# Patient Record
Sex: MALE | Race: WHITE | NOT HISPANIC OR LATINO | Employment: FULL TIME | URBAN - METROPOLITAN AREA
[De-identification: names, ages, dates, MRNs, and addresses within clinical notes are randomized per-mention and may not be internally consistent; named-entity substitution may affect disease eponyms.]

---

## 2023-11-06 ENCOUNTER — ANCILLARY PROCEDURE (OUTPATIENT)
Dept: RADIOLOGY | Facility: CLINIC | Age: 58
End: 2023-11-06
Payer: COMMERCIAL

## 2023-11-06 DIAGNOSIS — C83.10 MANTLE CELL LYMPHOMA, UNSPECIFIED SITE (MULTI): ICD-10-CM

## 2023-11-06 PROCEDURE — 71260 CT THORAX DX C+: CPT | Performed by: STUDENT IN AN ORGANIZED HEALTH CARE EDUCATION/TRAINING PROGRAM

## 2023-11-06 PROCEDURE — 74177 CT ABD & PELVIS W/CONTRAST: CPT | Performed by: STUDENT IN AN ORGANIZED HEALTH CARE EDUCATION/TRAINING PROGRAM

## 2023-11-06 PROCEDURE — 71260 CT THORAX DX C+: CPT

## 2023-11-06 PROCEDURE — 2550000001 HC RX 255 CONTRASTS: Performed by: INTERNAL MEDICINE

## 2023-11-06 RX ADMIN — IOHEXOL 70 ML: 350 INJECTION, SOLUTION INTRAVENOUS at 09:48

## 2023-11-11 PROBLEM — K57.90 DIVERTICULOSIS: Status: ACTIVE | Noted: 2020-10-01

## 2023-11-11 PROBLEM — S60.419A ABRASION OF FINGER: Status: ACTIVE | Noted: 2023-11-11

## 2023-11-11 PROBLEM — H53.2 VERTICAL DIPLOPIA: Status: ACTIVE | Noted: 2023-11-11

## 2023-11-11 PROBLEM — R93.1 AGATSTON CORONARY ARTERY CALCIUM SCORE BETWEEN 100 AND 400: Status: ACTIVE | Noted: 2023-09-18

## 2023-11-11 PROBLEM — K21.9 GERD (GASTROESOPHAGEAL REFLUX DISEASE): Status: ACTIVE | Noted: 2018-01-31

## 2023-11-11 PROBLEM — H53.2 DIPLOPIA: Status: ACTIVE | Noted: 2023-11-11

## 2023-11-11 PROBLEM — E78.00 HYPERCHOLESTEROLEMIA: Status: ACTIVE | Noted: 2023-09-18

## 2023-11-11 PROBLEM — S60.042A CONTUSION OF LEFT RING FINGER WITHOUT DAMAGE TO NAIL: Status: ACTIVE | Noted: 2023-11-11

## 2023-11-11 PROBLEM — H04.123 DRY EYE SYNDROME OF BILATERAL LACRIMAL GLANDS: Status: ACTIVE | Noted: 2017-01-10

## 2023-11-11 PROBLEM — H53.2 TRANSIENT DIPLOPIA: Status: ACTIVE | Noted: 2023-11-11

## 2023-11-11 PROBLEM — M79.2 NEURALGIC PAIN: Status: ACTIVE | Noted: 2023-11-11

## 2023-11-11 PROBLEM — S69.92XA: Status: ACTIVE | Noted: 2023-11-11

## 2023-11-11 PROBLEM — G60.3 IDIOPATHIC PROGRESSIVE NEUROPATHY: Status: ACTIVE | Noted: 2023-11-11

## 2023-11-11 PROBLEM — S61.219A LACERATION OF FINGER, INITIAL ENCOUNTER: Status: ACTIVE | Noted: 2023-11-11

## 2023-11-11 PROBLEM — I70.0 ATHEROSCLEROSIS OF ABDOMINAL AORTA (CMS-HCC): Status: ACTIVE | Noted: 2023-09-18

## 2023-11-11 PROBLEM — H52.7 REFRACTIVE ERROR: Status: ACTIVE | Noted: 2017-01-10

## 2023-11-11 RX ORDER — PYRIDOSTIGMINE BROMIDE 60 MG/1
1 TABLET ORAL 3 TIMES DAILY
COMMUNITY
Start: 2017-06-08 | End: 2023-11-13 | Stop reason: ALTCHOICE

## 2023-11-11 RX ORDER — PRAVASTATIN SODIUM 10 MG/1
TABLET ORAL
COMMUNITY
Start: 2023-04-20 | End: 2023-11-13 | Stop reason: DRUGHIGH

## 2023-11-11 RX ORDER — POLYETHYLENE GLYCOL 3350 17 G/17G
POWDER, FOR SOLUTION ORAL
COMMUNITY
Start: 2023-07-10 | End: 2023-11-13 | Stop reason: ALTCHOICE

## 2023-11-11 RX ORDER — CHLORHEXIDINE GLUCONATE ORAL RINSE 1.2 MG/ML
SOLUTION DENTAL
COMMUNITY
Start: 2023-08-13 | End: 2023-11-13 | Stop reason: ALTCHOICE

## 2023-11-11 RX ORDER — IBUPROFEN 600 MG/1
TABLET ORAL
COMMUNITY
Start: 2023-08-21 | End: 2023-11-13 | Stop reason: ALTCHOICE

## 2023-11-11 RX ORDER — PANTOPRAZOLE SODIUM 40 MG/1
TABLET, DELAYED RELEASE ORAL
COMMUNITY
Start: 2016-01-08 | End: 2023-11-13 | Stop reason: ALTCHOICE

## 2023-11-11 RX ORDER — VENETOCLAX 10 MG/1
TABLET, FILM COATED ORAL
COMMUNITY
Start: 2019-12-16 | End: 2023-11-13 | Stop reason: ALTCHOICE

## 2023-11-11 RX ORDER — CLINDAMYCIN HYDROCHLORIDE 300 MG/1
CAPSULE ORAL
COMMUNITY
Start: 2023-08-13 | End: 2023-11-13 | Stop reason: ALTCHOICE

## 2023-11-11 RX ORDER — ACYCLOVIR 400 MG/1
TABLET ORAL
COMMUNITY
Start: 2019-11-15 | End: 2023-11-13 | Stop reason: ALTCHOICE

## 2023-11-11 RX ORDER — ASPIRIN 81 MG/1
81 TABLET ORAL DAILY
COMMUNITY
Start: 2023-09-18 | End: 2024-09-17

## 2023-11-11 RX ORDER — SULFAMETHOXAZOLE AND TRIMETHOPRIM 800; 160 MG/1; MG/1
TABLET ORAL
COMMUNITY
End: 2023-11-13 | Stop reason: ALTCHOICE

## 2023-11-11 RX ORDER — HYDROCORTISONE 10 MG/G
CREAM TOPICAL
COMMUNITY
Start: 2023-08-20

## 2023-11-11 RX ORDER — FEXOFENADINE HCL AND PSEUDOEPHEDRINE HCI 180; 240 MG/1; MG/1
1 TABLET, EXTENDED RELEASE ORAL
COMMUNITY
Start: 2011-10-20 | End: 2023-11-13 | Stop reason: ALTCHOICE

## 2023-11-11 RX ORDER — ATORVASTATIN CALCIUM 40 MG/1
40 TABLET, FILM COATED ORAL DAILY
COMMUNITY
Start: 2023-06-15 | End: 2024-05-16

## 2023-11-13 ENCOUNTER — OFFICE VISIT (OUTPATIENT)
Dept: HEMATOLOGY/ONCOLOGY | Facility: HOSPITAL | Age: 58
End: 2023-11-13
Payer: COMMERCIAL

## 2023-11-13 ENCOUNTER — LAB (OUTPATIENT)
Dept: LAB | Facility: HOSPITAL | Age: 58
End: 2023-11-13
Payer: COMMERCIAL

## 2023-11-13 VITALS
BODY MASS INDEX: 31.98 KG/M2 | WEIGHT: 236.11 LBS | TEMPERATURE: 97.2 F | DIASTOLIC BLOOD PRESSURE: 88 MMHG | OXYGEN SATURATION: 99 % | HEIGHT: 72 IN | HEART RATE: 74 BPM | RESPIRATION RATE: 16 BRPM | SYSTOLIC BLOOD PRESSURE: 132 MMHG

## 2023-11-13 DIAGNOSIS — C83.10 MANTLE CELL LYMPHOMA (MULTI): ICD-10-CM

## 2023-11-13 LAB
ALBUMIN SERPL BCP-MCNC: 4.6 G/DL (ref 3.4–5)
ALP SERPL-CCNC: 64 U/L (ref 33–120)
ALT SERPL W P-5'-P-CCNC: 47 U/L (ref 10–52)
ANION GAP SERPL CALC-SCNC: 13 MMOL/L (ref 10–20)
AST SERPL W P-5'-P-CCNC: 41 U/L (ref 9–39)
BASOPHILS # BLD AUTO: 0.02 X10*3/UL (ref 0–0.1)
BASOPHILS NFR BLD AUTO: 0.3 %
BILIRUB SERPL-MCNC: 0.5 MG/DL (ref 0–1.2)
BUN SERPL-MCNC: 19 MG/DL (ref 6–23)
CALCIUM SERPL-MCNC: 9.7 MG/DL (ref 8.6–10.3)
CHLORIDE SERPL-SCNC: 102 MMOL/L (ref 98–107)
CO2 SERPL-SCNC: 29 MMOL/L (ref 21–32)
CREAT SERPL-MCNC: 1.21 MG/DL (ref 0.5–1.3)
EOSINOPHIL # BLD AUTO: 0.13 X10*3/UL (ref 0–0.7)
EOSINOPHIL NFR BLD AUTO: 2.2 %
ERYTHROCYTE [DISTWIDTH] IN BLOOD BY AUTOMATED COUNT: 12.7 % (ref 11.5–14.5)
GFR SERPL CREATININE-BSD FRML MDRD: 69 ML/MIN/1.73M*2
GLUCOSE SERPL-MCNC: 82 MG/DL (ref 74–99)
HCT VFR BLD AUTO: 44.1 % (ref 41–52)
HGB BLD-MCNC: 15.1 G/DL (ref 13.5–17.5)
IMM GRANULOCYTES # BLD AUTO: 0.04 X10*3/UL (ref 0–0.7)
IMM GRANULOCYTES NFR BLD AUTO: 0.7 % (ref 0–0.9)
LYMPHOCYTES # BLD AUTO: 1.89 X10*3/UL (ref 1.2–4.8)
LYMPHOCYTES NFR BLD AUTO: 31.3 %
MCH RBC QN AUTO: 31.7 PG (ref 26–34)
MCHC RBC AUTO-ENTMCNC: 34.2 G/DL (ref 32–36)
MCV RBC AUTO: 93 FL (ref 80–100)
MONOCYTES # BLD AUTO: 0.68 X10*3/UL (ref 0.1–1)
MONOCYTES NFR BLD AUTO: 11.3 %
NEUTROPHILS # BLD AUTO: 3.28 X10*3/UL (ref 1.2–7.7)
NEUTROPHILS NFR BLD AUTO: 54.2 %
NRBC BLD-RTO: 0 /100 WBCS (ref 0–0)
PLATELET # BLD AUTO: 162 X10*3/UL (ref 150–450)
POTASSIUM SERPL-SCNC: 4.5 MMOL/L (ref 3.5–5.3)
PROT SERPL-MCNC: 7.5 G/DL (ref 6.4–8.2)
RBC # BLD AUTO: 4.76 X10*6/UL (ref 4.5–5.9)
SODIUM SERPL-SCNC: 139 MMOL/L (ref 136–145)
WBC # BLD AUTO: 6 X10*3/UL (ref 4.4–11.3)

## 2023-11-13 PROCEDURE — 85025 COMPLETE CBC W/AUTO DIFF WBC: CPT

## 2023-11-13 PROCEDURE — 99214 OFFICE O/P EST MOD 30 MIN: CPT | Performed by: INTERNAL MEDICINE

## 2023-11-13 PROCEDURE — 36415 COLL VENOUS BLD VENIPUNCTURE: CPT

## 2023-11-13 PROCEDURE — 80053 COMPREHEN METABOLIC PANEL: CPT

## 2023-11-13 PROCEDURE — 1036F TOBACCO NON-USER: CPT | Performed by: INTERNAL MEDICINE

## 2023-11-13 ASSESSMENT — ENCOUNTER SYMPTOMS
CHILLS: 0
WOUND: 0
DIARRHEA: 0
LIGHT-HEADEDNESS: 0
NUMBNESS: 1
DIZZINESS: 0
DYSURIA: 0
BLOOD IN STOOL: 0
NAUSEA: 0
HEMATURIA: 0
ADENOPATHY: 0
DIAPHORESIS: 0
SHORTNESS OF BREATH: 0
WHEEZING: 0
COUGH: 0
VOMITING: 0
LEG SWELLING: 1
PALPITATIONS: 0
UNEXPECTED WEIGHT CHANGE: 0
CONSTIPATION: 0
SLEEP DISTURBANCE: 0
BRUISES/BLEEDS EASILY: 0
APPETITE CHANGE: 0
FEVER: 0
FATIGUE: 0

## 2023-11-13 ASSESSMENT — PAIN SCALES - GENERAL: PAINLEVEL: 0-NO PAIN

## 2023-11-13 NOTE — PROGRESS NOTES
Patient ID: Nancie Armenta is a 58 y.o. male.    Treatment:   Oncology History Overview Note           Patient Visit Information:   Visit Type: Follow Up Visit      Cancer History:   Treatment Synopsis:     1. Stage IV A mantle cell non-Hodgkin lymphoma involving the gastrointestinal tract diagnosed in October 2011, with diffuse gastrointestinal involvement. The patient was treated  with RCHOP alternating with Rituxan and high-dose Mamie-C and received total of 6 cycles of chemotherapy which he finished in February 2012.   Allergic to Augmentin, causes hives.     2. Patient underwent beam chemotherapy followed by autologous peripheral stem cell transplant in April 2012, by Dr. Yvonne Hobbs at [facility], was also involved in phase 3 clinical trial up killed shingles vaccine versus placebo (Merck-1 Z10  study).     3.The patient developed shingles in January 2014, involving left flank.      4. 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      5.  Recurrent mantle cell lymphoma June 2019 assx presentation with leukocytosis. Peripheral blood flow which showed a CD5 positve clonal lymphocytosis. 6/2/19 CT of chest abdomen and pelvis revealed  splenomegaly of 15 cm compared to 12.8 cm last evaluation. Recurrence confirmed by FISH on peripheral blood earlier this month.  BM biopsy which showed 5% Mantle cell involvement although peripheral blood shows 30% involvement. CT imaging showed splenomegaly. PET scan declined by insurance twice.  Patient underwent colonoscopy  with Dr. Ac ( [place]) 8/8/19 which showed cobblestoning of colon and multiple biopsies including terminal ileum, appendix, cecum,  rectosigmoid positive for mantle cell lymphoma.     6. Initiation of acalabrutinib August 2019 with minimal response. Switched to ibrutinib and venetoclax 12/20/20 with clearing of t(11, 14) by by FISH ( 1/2020) and resolution of involvement of colon by endoscopy and pathology on exam 3/2/2020! BM biopsy  4/2020 HEATHER.     CR March 2022, ibrutinib and venetoclax discontinued.     7. S/P COVID 19 infection 12/1/2020, no sequelae     8. BMBx (4/13/2020): no evidence of MCL either by histology, flow, or FISH studies. PET scan ( 8/24/20): no abnormal FDG uptake seen. [place]auville 1      9. Repeat CT C/A/P (3/10/21) with no evidence of LAD seen, Spleen size 11.5 cm ( decreased from prior).       10.C-scope (3/11/22): at OSH: negative, no evidence of disease. PET scan ( 3/14/22): no abnormal uptake. [place] 1. BMBx (3/17/22): negative, no evidence of MCL.      11. Evusheld given on (3/17/22)     12. Pt has remained in remission, with no evidence of disease on C-scope, PET scan or BMBx. Will stop Ibrutinib/Venetoclax (3/24/22) and plan to continue to ID PPX.     13. ID PPX acyclovir stopped 2/23/23.    14: Colonoscopy (7/14/23): normal except internal hemorrhoids.    15: CT CAP (11/6/23): no intrathoracic or intra-abdominal/pelvic enlarged lymphadenopathy.  Spleen is normal in size.             Mantle cell lymphoma of spleen (CMS/HCC)   10/13/2016 Initial Diagnosis    Mantle cell lymphoma of spleen (CMS/HCC)         Response:     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    Right-sided fourth cranial nerve palsy    Malignant neoplasm of connective and soft tissue, unspecified (CMS/HCC) 06/08/2017    Cancer of blood vessel       Surgical History:     Past Surgical History:   Procedure Laterality Date    SHOULDER SURGERY  07/13/2017    Shoulder Surgery         Family History:     Family History   Problem Relation Name Age of Onset    Cataracts Mother      Cataracts Father      Other (chronic lymphoid leukemia) Father         Social History:     Social History     Tobacco Use    Smoking status: Never    Smokeless tobacco: Never   Vaping Use    Vaping Use: Never used   Substance Use Topics    Alcohol use: Never    Drug use: Never      -------------------------------------------------------------------------------------------------------  Subjective       HPI    Nancie Armenta is a 58 year old male with PMH of hyperglycemia, renal insufficiency, and stage IV A mantle cell non hodgkin lymphoma involving the gastrointestinal tract, s/p autologous SCT (April 2012).  Mr. Armenta presents to the clinic today (11/13/23), for evaluation and blood work.      Reports he is doing well.  No health changes since last seen 8/3/23.  Stated he met his goal by seeing his daughter get  in October.  States his energy level is good.  Has a good appetite and has had no weight loss.     Reports no fevers, chills, night sweats, swollen lymph nodes, headaches, vision changes, light-headedness, dizziness, cough, SOB, wheezing, chest pain, palpitations, swelling, nausea, vomiting, diarrhea, constipation, dysuria, blood in stool/urine, new skin rash/lesions, gait/balance disturbances.        Review of Systems   Constitutional:  Negative for appetite change, chills, diaphoresis, fatigue, fever and unexpected weight change.   Respiratory:  Negative for cough, shortness of breath and wheezing.    Cardiovascular:  Positive for leg swelling (mild intermittent edema to bilateral lower extremities.). Negative for chest pain and palpitations.   Gastrointestinal:  Negative for blood in stool, constipation, diarrhea, nausea and vomiting.   Genitourinary:  Negative for dysuria and hematuria.    Skin:  Negative for rash and wound.   Neurological:  Positive for numbness (hands and feet,  "intermittent). Negative for dizziness and light-headedness.   Hematological:  Negative for adenopathy. Does not bruise/bleed easily.   Psychiatric/Behavioral:  Negative for sleep disturbance.       -------------------------------------------------------------------------------------------------------  Objective   BSA: 2.34 meters squared  /88   Pulse 74   Temp 36.2 °C (97.2 °F)   Resp 16   Ht (S) 1.837 m (6' 0.32\")   Wt 107 kg (236 lb 1.8 oz)   SpO2 99%   BMI 31.74 kg/m²     Physical Exam  Vitals reviewed.   Constitutional:       General: He is not in acute distress.     Appearance: Normal appearance.   HENT:      Head: Normocephalic.      Mouth/Throat:      Mouth: Mucous membranes are moist.   Cardiovascular:      Rate and Rhythm: Normal rate and regular rhythm.      Heart sounds: Normal heart sounds. No murmur heard.     No friction rub. No gallop.   Pulmonary:      Effort: Pulmonary effort is normal. No respiratory distress.      Breath sounds: Normal breath sounds. No wheezing.   Abdominal:      General: Abdomen is flat. Bowel sounds are normal. There is no distension.      Palpations: Abdomen is soft. There is no mass.      Tenderness: There is no abdominal tenderness.   Musculoskeletal:         General: Swelling (non-pitting edema to bilateral lower extremities) present. Normal range of motion.   Skin:     General: Skin is warm and dry.   Neurological:      General: No focal deficit present.      Mental Status: He is alert and oriented to person, place, and time.   Psychiatric:         Mood and Affect: Mood normal.         Behavior: Behavior normal.       Performance Status:  ECOG performance status: 0 fully active  -------------------------------------------------------------------------------------------------------  Assessment/Plan      1. Stage IV mantle cell lymphoma in unmaintained complete  remission after receiving Nordic regimen and consolidation autograft with BEAM preparative regimen " April 2012.  Recent noted mild leukocytosis  and tpenia and flow cytometry suggestive of recurrence as is splenomegaly on CT imaging from 6/2019.  I have recommended complete staging evaluation with BM biopsy 5% mantle cells, blood 30% lymphoma cells.   colonoscopy cobblestoning throughout with positive biopsy for mantle cell.  Seems this is an indolent relapse although we don't have Ki67 analysis or SOX11.     Patient initated acalabrutinib 8/2019 without response as Peripheral blood still shows 71 copies of mantle cell gene which is identical to the pretreatment. In addition spleen size on ultrasound 15.5 cm which is unchanged or even a little larger than pre treatment.      Patient initiated venetoclax and ibrutinib 12/20/20 according  article in NEJ 2018, 378: 6479-9442 by Jim et al shows an overall response rate  of 71% with 62% CR rate at 16 weeks. In order to prevent tumor lysis syndrome venetoclax is ramped up as follows 20 mg for 1 week, 50 mg for 1 week, 100 mg for 1 week, 200 mg for 1 week, 400 mg for 1 week and maintained at 400 mg. Restagin March 2020  included colonoscopy 3/2/20 shows HEATHER!!  BM biopsy from 4/13/2020 also shows HEATHER by histology, flow or FISH studies as does PET from 8/24/90.      (3/24/22): pt seen in clinic, completed EOT C-scope (3/11/22): at OSH: negative, no evidence of disease. PET scan ( 3/14/22): no abnormal uptake.  [place]ville 1. BMBx (3/17/22): negative, no evidence of MCL. Given  Pt has remained in remission, with no evidence of disease on C-scope, PET scan or BMBx.  Ibrutinib/Venetoclax discontinued (3/24/22) .         8/3/23   on observation since March 2022.   Surveillance CT imaging AP with contrast (4/13/23)  no evidence of disease recurrence and personally reviewed. Spleen size 11.5 cm on  films.  Surveillance colonoscopy July 2023 negative for recurrence. Plan CT imaging prior to return in November, than will do annually. Explained that new drup pirtubrutinib   recently approved for relapsed MCL.    11/13/23:   -CT CAP done 11/6/23: no lymphadenopathy, stable right upper lobe 0.6 cm pulmonary nodule since 2015-likely benign.  Spleen is normal in size.    -Repeat CT scan in 1 year.    -Today Hbg 15.1, WBC 6.0.    -Continue observation with evaluation every 3 months with blood work.  Contact with any new concerns.      Renal failure:  encouraged to hydrate more, grade 1 will follow.  11/13/23: Creatinine improved level 1.21.       2. Health maintenance.   Patient has recv'd both doses of shingrix vaccine & has completed all of his other post transplant vaccines,  Discontinued Acyclovir, & Bactrim prophylaxis (2/23/23) since off treatment since 3/24/22.  Recv'd Prevnar 13 and influenza vaccine (9/16/20) with his PCP. Up to date on tetanus.   - Recv'd Sha & Sha COVID vaccine on 3/16/21. Received booster late 2021. Received Evusheld given on (3/17/22) & 9/22/22.   -Received influenza vaccine either late September or early October 2023.  Advised to obtain covid vaccine at local pharmacy.     4/20/23: some aortic calcifications on imaging, started on prevastatin-managed by his PCP.  11/13/23:  Reported PCP increased pravastatin dose to 40 mg daily    RTC:    Follow up in 3 months with blood draws 1 week prior    -------------------------------------------------------------------------------------------------------  NORBERT Mathew

## 2023-11-17 ENCOUNTER — APPOINTMENT (OUTPATIENT)
Dept: HEMATOLOGY/ONCOLOGY | Facility: CLINIC | Age: 58
End: 2023-11-17
Payer: COMMERCIAL

## 2024-01-23 ENCOUNTER — OFFICE VISIT (OUTPATIENT)
Dept: HEMATOLOGY/ONCOLOGY | Facility: CLINIC | Age: 59
End: 2024-01-23
Payer: COMMERCIAL

## 2024-01-23 VITALS
OXYGEN SATURATION: 97 % | BODY MASS INDEX: 32.77 KG/M2 | WEIGHT: 243.83 LBS | DIASTOLIC BLOOD PRESSURE: 72 MMHG | TEMPERATURE: 98.1 F | SYSTOLIC BLOOD PRESSURE: 133 MMHG | HEART RATE: 62 BPM

## 2024-01-23 DIAGNOSIS — C83.17 MANTLE CELL LYMPHOMA OF SPLEEN (MULTI): Primary | ICD-10-CM

## 2024-01-23 PROCEDURE — 99213 OFFICE O/P EST LOW 20 MIN: CPT | Performed by: INTERNAL MEDICINE

## 2024-01-23 PROCEDURE — 1036F TOBACCO NON-USER: CPT | Performed by: INTERNAL MEDICINE

## 2024-01-23 ASSESSMENT — PAIN SCALES - GENERAL: PAINLEVEL: 0-NO PAIN

## 2024-01-23 NOTE — PROGRESS NOTES
Follow-up in 6 months with no labs.  Call back instructions reviewed.  Patient verbalized understanding.

## 2024-01-23 NOTE — PROGRESS NOTES
Patient ID: Nancie Armenta is a 58 y.o. male.  Referring Physician: No referring provider defined for this encounter.  Primary Care Provider: Maurice Nunez MD    ORDERS & PATIENT INSTRUCTIONS:        RTC 6 m  No labs            ASSESSMENT, PROBLEM LIST, DECISION MAKING, PLAN.      1. Stage IV A mantle cell non-Hodgkin lymphoma involving the gastrointestinal tract diagnosed in October 2011, with diffuse gastrointestinal involvement. The patient was treated with RCHOP alternating with Rituxan and high-dose Mamie-C and received total of 6 cycles of chemotherapy which he finished in February 2012.   2. Patient underwent beam chemotherapy followed by autologous peripheral stem cell transplant in April 2012, by Dr. Yvonne Hobbs at [facility], was also involved in phase 3 clinical trial up killed shingles vaccine versus placebo (Merck-1 Z10 study).       3. Patient developed progressive disease in June 2019 with positive bone marrow, and significant GI involvement and he has started Alcalabrutinib on August 21, 2019 but didnt get expected response especially peripheral blood flow cytometric continued to show abnormal lymphocytes so was switched to Imbruvica 560 mg and Venetoclax in December 2019 . Patient's peripheral blood FISH is completely negative for mantle cell DNA so CAR-T was not considered. And has been followed by Dr. Yvonne Hobbs.   Pt remained in CR   Pts PET, BM Bx at  and Colonoscopy at [facility] in March 2022 was -ve so Imbruvica and Venetoclax was d/ed March 24, 2022      3. The patient developed shingles in January 2014, involving left flank.   4.  Patient had Covid in December 2020 but fortunately it was mild and did not require any hospital admissions and continued his anticancer medication during that time.    4. 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 emergency room when he was orthostatic and also injured his head and further workup is underway to determine fourth cranial nerve palsy left side, MRI of the brain was overall stable, was evaluated by Dr Vern St/neuro-ophthalmologist and thinks that patient probably has myasthenia gravis and has been placed on pyridostigmine in June 2017, and seems to have responded  He was also evaluated with MRI of the brain and spinal tap which was showing minimal increased protein but cytology, flow cytometry were all normal and has been followed by neurologist at [facility]. Patient's symptoms are improving with pyridostigmine, although later he discontinued and patient became asymptomatic, on watchful waiting      4. Allergic to Augmentin, causes hives      INTERVAL HISTORY: The patient is being evaluated virtually for mantle cell lymphoma  Pt remained in CR   Pts PET, BM Bx at  and Colonoscopy at [facility] in March 2022 was -ve so Imbruvica and Venetoclax was d/ed March 24, 2022    His last CAT scan of the chest abdomen pelvis done in November 2023 showed no evidence of lymphadenopathy stable pulmonary nodule 0.6 cm possible benign, he had a calcium scoring CT scan done which showed moderately increased calcium in coronary arteries so he has been now placed on Lipitor by Dr. Brandon Nunez    Patient is currently asymptomatic          General: Conscious, alert, oriented x3, not in acute distress.  HEENT:    No icterus.    Chest:Bilateral symmetrical,     CVS:  S1, S2.    Abdomen:  Soft, no palpable mass   Extremities: No clubbing, cyanosis,     Skin: No petechial rash.    ASSESSMENT AND PLAN: 1. Mr. Armenta with mantle cell lymphoma.,  Had a progressive disease in August 2019  pt has been started on Alcalabrutinib on 8/21/2019 and with ultimate plan for CAR-T  Patient did not get expected response especially peripheral blood  flow cytometry was positive for abnormal lymphocytes so was switched to Imbruvica 560 mg and Venetoclax  400mg daily as there was no study using Alcalabrutinib with  Venetoclax, patient seems to have responded and is in complete molecular remission so CAR-T is not being offered.  Pt remained in CR   Pts PET, BM Bx at  and Colonoscopy at [facility] in March 2022 was -ve so Imbruvica and Venetoclax was d/ed March 24, 2022    His last CAT scan of the chest abdomen pelvis done in November 2023 showed no evidence of lymphadenopathy stable pulmonary nodule 0.6 cm possible benign, he had a calcium scoring CT scan done which showed moderately increased calcium in coronary arteries so he has been now placed on Lipitor by Dr. Brandon Nunez      Patient was seen by Dr. Yvonne Hobbs in November 2023    Patient's colonoscopy has been scheduled by Dr. Julien in July 1, 2023     There is no clinical evidence of progression  Continue supportive care    Return for follow-up in 6-month or sooner if there is any new symptoms            VITALS:   2.38 meters squared /72   Pulse 62   Temp 36.7 °C (98.1 °F) (Temporal)   Wt 111 kg (243 lb 13.3 oz)   SpO2 97%   BMI 32.77 kg/m²     LABS:    CBC:  Recent Labs     11/13/23  1256 08/03/23  1408 04/13/23  1318 02/23/23  1046 12/22/22  1317   WBC 6.0 5.1 5.3 5.2 4.9   HGB 15.1 13.4* 14.3 14.7 13.7   HCT 44.1 38.5* 42.8 42.0 38.5*    141* 175 163 146*   MCV 93 93 94 92 92       CMP:  Recent Labs     11/13/23  1256 08/03/23  1408 04/20/23  1340 04/13/23  1324 04/13/23  1318 02/23/23  1046 12/22/22  1317 03/24/22  1248 02/24/22  1335    143  --   --  141 140 137   < > 138   K 4.5 4.9  --   --  4.6 5.1 4.8   < > 4.8    107  --   --  104 105 102   < > 104   CO2 29 30  --   --  27 28 31   < > 30   ANIONGAP 13 11  --   --  15 12 9*   < > 9*   BUN 19 22  --  23 22 20 28*   < > 27*   CREATININE 1.21 1.49* 1.40* 1.50* 1.37* 1.34* 1.25   < > 1.28   EGFR 69  --   --   --   --   --    "--   --   --    MG  --   --   --   --   --   --   --   --  2.02    < > = values in this interval not displayed.     Recent Labs     11/13/23  1256 08/03/23  1408 04/13/23  1318 02/23/23  1046 12/22/22  1317   ALBUMIN 4.6 4.4 4.5 4.8 4.3   ALKPHOS 64 66 59 76 60   ALT 47 31 42 39 40   AST 41* 25 28 28 26   BILITOT 0.5 0.6 0.6 0.6 0.5       HEME/ENDO:No results for input(s): \"FERRITIN\", \"IRONSAT\", \"TSH\", \"BEGLJPFE05\", \"FOLATE\" in the last 71421 hours.     MICRO: No results for input(s): \"ESR\", \"CRP\", \"PROCAL\" in the last 78906 hours.  No results found for the last 90 days.        TUMOR MARKERS:  No results found for: \"LABCA2\", \"CEA\", \"\", \"PSA\", \"AFPTM\", \"HCGTM\", \"\"             IMAGING:         CT chest abdomen pelvis w IV contrast  Narrative: Interpreted By:  Samson Villanueva,   STUDY:  CT CHEST ABDOMEN PELVIS W IV CONTRAST;  11/6/2023 9:48 am      INDICATION:  Signs/Symptoms: history of mantel cell lymphoma, monitor for diseae  progression  C83.10: Mantle cell lymphoma.      COMPARISON:  CT scan dated 04/14/2023. PET scan dated 03/14/2022. Chest CT scan  dated 03/10/2021      ACCESSION NUMBER(S):  [id number]      ORDERING CLINICIAN:  FLASH CHAIREZ      TECHNIQUE:  CT of the chest, abdomen, and pelvis was performed.  Contiguous axial  images were obtained at 3 mm slice thickness through the chest,  abdomen and pelvis. Coronal and sagittal reconstructions at 3 mm  slice thickness were performed.  75 ml of contrast Omnipaque were administered intravenously without  immediate complication.      FINDINGS:  CHEST:      LUNG/PLEURA/LARGE AIRWAYS:  Stable right upper lobe 0.6 cm pulmonary nodule (series 202, image  148). No lung masses, pulmonary nodules or consolidations are  present. There is no pleural effusion or pneumothorax.      VESSELS:  Aorta and pulmonary arteries are normal caliber.  No atherosclerotic  changes of the aorta are identified.  Moderate coronary artery  calcifications are present.    "   HEART:  The heart is normal in size.  No pericardial effusion      MEDIASTINUM AND HERNANDO:  No mediastinal, hilar or axillary lymphadenopathy is present.  The  esophagus is normal.      CHEST WALL AND LOWER NECK:  The soft tissues of the chest wall demonstrate no gross abnormality.  The visualized thyroid gland appears within normal limits.      ABDOMEN:      LIVER:  Normal size. Normal contour. Hepatic steatosis. Stable calcific  nodule in segment 4A measuring 1.2 cm.      BILE DUCTS:  The intrahepatic and extrahepatic ducts are not dilated.      GALLBLADDER:  Surgically absent.      PANCREAS:  The pancreas appears unremarkable without evidence of ductal  dilatation or masses.      SPLEEN:  The spleen is normal in size without focal lesions.      ADRENAL GLANDS:  No nodules.      KIDNEYS AND URETERS:  The kidneys are normal in size and enhance symmetrically.  No  hydroureteronephrosis or nephroureterolithiasis is identified.      PELVIS:      BLADDER:  Within normal limits.      REPRODUCTIVE ORGANS:  No pelvic masses.      BOWEL:  No bowel dilatation. Few uncomplicated colonic diverticulosis. No  ascites. No pneumoperitoneum          VESSELS:  The aorta and IVC appear normal.      LYMPH NODES:  No enlarged lymphadenopathy.      BONE AND SOFT TISSUE:  No destructive osseous lesions. Redemonstrated small umbilical and  periumbilical fat containing hernias      Impression: 1. No intrathoracic or intra-abdominal/pelvic enlarged  lymphadenopathy.  2. Stable right upper lobe 0.6 cm pulmonary nodule since at least  2015 and likely benign. No suspicious pulmonary nodules.  3. Other ancillary findings are unchanged.      MACRO:  None      Signed by: Samson Villanueva 11/6/2023 2:10 PM  Dictation workstation:   [id number]       Current Outpatient Medications   Medication Sig Dispense Refill    ASCORBIC ACID, VITAMIN C, ORAL Take 1 tablet by mouth once daily.      aspirin 81 mg EC tablet Take 1 tablet (81 mg) by mouth once  daily.      hydrocortisone 1 % cream       multivit-min/iron/folic acid/K (ADULTS MULTIVITAMIN ORAL) Take by mouth.      atorvastatin (Lipitor) 40 mg tablet Take 1 tablet (40 mg) by mouth once daily.       No current facility-administered medications for this visit.            FAMILY HISTORY:   Family History   Problem Relation Name Age of Onset    Cataracts Mother      Cataracts Father      Other (chronic lymphoid leukemia) Father          ALLERGY: Amoxicillin-pot clavulanate    SOCIAL HISTORY: He  reports no history of alcohol use. He  reports no history of drug use.            Medication reviewed in e-chart  Patient is monitored for medication toxicity  labs reviewed and interpreted independently, X rays independently reviewed  Notes from other physicians involved in care were reviewed    Charting was completed using voice recognition technology and may include unintended errors.    JUSTIN BELTRAN MD, [place].    Octavia Landrum Master Clinician in Hematology and Oncology  Medical Director, [facility] at [facility].  [place] office  Phone [phone]  Fax      [phone]  [facility] /[place].  Phone [phone]  Fax     [phone]

## 2024-02-13 ASSESSMENT — ENCOUNTER SYMPTOMS
FATIGUE: 0
PALPITATIONS: 0
NAUSEA: 0
WOUND: 0
DIAPHORESIS: 0
DIZZINESS: 0
UNEXPECTED WEIGHT CHANGE: 0
FEVER: 0
WHEEZING: 0
CHILLS: 0
VOMITING: 0
LIGHT-HEADEDNESS: 0
LEG SWELLING: 1
APPETITE CHANGE: 0
SHORTNESS OF BREATH: 0
BLOOD IN STOOL: 0
ADENOPATHY: 0
DIARRHEA: 0
COUGH: 0
DYSURIA: 0
HEMATURIA: 0

## 2024-02-14 NOTE — PROGRESS NOTES
Patient ID: Nancie Armenta is a 59 y.o. male.    Treatment:   Oncology History Overview Note           Patient Visit Information:   Visit Type: Follow Up Visit      Cancer History:   Treatment Synopsis:     1. Stage IV A mantle cell non-Hodgkin lymphoma involving the gastrointestinal tract diagnosed in October 2011, with diffuse gastrointestinal involvement. The patient was treated  with RCHOP alternating with Rituxan and high-dose Mamie-C and received total of 6 cycles of chemotherapy which he finished in February 2012.   Allergic to Augmentin, causes hives.     2. Patient underwent beam chemotherapy followed by autologous peripheral stem cell transplant in April 2012, by Dr. Yvonne Hobbs at [facility], was also involved in phase 3 clinical trial up killed shingles vaccine versus placebo (Merck-1 Z10  study).     3.The patient developed shingles in January 2014, involving left flank.      4. 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      5.  Recurrent mantle cell lymphoma June 2019 assx presentation with leukocytosis. Peripheral blood flow which showed a CD5 positve clonal lymphocytosis. 6/2/19 CT of chest abdomen and pelvis revealed  splenomegaly of 15 cm compared to 12.8 cm last evaluation. Recurrence confirmed by FISH on peripheral blood earlier this month.  BM biopsy which showed 5% Mantle cell involvement although peripheral blood shows 30% involvement. CT imaging showed splenomegaly. PET scan declined by insurance twice.  Patient underwent colonoscopy  with Dr. Ac ( [place]) 8/8/19 which showed cobblestoning of colon and multiple biopsies including terminal ileum, appendix, cecum,  rectosigmoid positive for mantle cell lymphoma.     6. Initiation of acalabrutinib August 2019 with minimal response. Switched to ibrutinib and venetoclax 12/20/20 with clearing of t(11, 14) by by FISH ( 1/2020) and resolution of involvement of colon by endoscopy and pathology on exam 3/2/2020! BM biopsy  4/2020 HEATHER.     CR March 2022, ibrutinib and venetoclax discontinued.     7. S/P COVID 19 infection 12/1/2020, no sequelae     8. BMBx (4/13/2020): no evidence of MCL either by histology, flow, or FISH studies. PET scan ( 8/24/20): no abnormal FDG uptake seen. Deauville 1      9. Repeat CT C/A/P (3/10/21) with no evidence of LAD seen, Spleen size 11.5 cm ( decreased from prior).       10.C-scope (3/11/22): at OSH: negative, no evidence of disease. PET scan ( 3/14/22): no abnormal uptake. [place] 1. BMBx (3/17/22): negative, no evidence of MCL.      11. Evusheld given on (3/17/22)     12. Pt has remained in remission, with no evidence of disease on C-scope, PET scan or BMBx. Will stop Ibrutinib/Venetoclax (3/24/22) and plan to continue to ID PPX.     13. ID PPX acyclovir stopped 2/23/23.    14: Colonoscopy (7/14/23): normal except internal hemorrhoids.    15: CT CAP (11/6/23): no intrathoracic or intra-abdominal/pelvic enlarged lymphadenopathy.  Spleen is normal in size.             Mantle cell lymphoma of spleen (CMS/HCC)   10/13/2016 Initial Diagnosis    Mantle cell lymphoma of spleen (CMS/HCC)         Response:     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    Right-sided fourth cranial nerve palsy    Malignant neoplasm of connective and soft tissue, unspecified (CMS/HCC) 06/08/2017    Cancer of blood vessel       Surgical History:     Past Surgical History:   Procedure Laterality Date    SHOULDER SURGERY  07/13/2017    Shoulder Surgery         Family History:     Family History   Problem Relation Name Age of Onset    Cataracts Mother      Cataracts Father      Other (chronic lymphoid leukemia) Father         Social History:     Social History     Tobacco Use    Smoking status: Never    Smokeless tobacco: Never   Vaping Use    Vaping Use: Never used   Substance Use Topics    Alcohol use: Never    Drug use: Never      -------------------------------------------------------------------------------------------------------  Subjective       HPI    Nancie Armenta is a 59 year old male with PMH of hyperglycemia, renal insufficiency, and stage IV A mantle cell non hodgkin lymphoma involving the gastrointestinal tract, s/p autologous SCT (April 2012).  Mr. Armenta presents to the clinic today (2/15/24) with his spouse Cheri for follow up evaluation of mantle cell NHL and blood work.      Mr. Armenta reports he is doing well.  Had had no illnesses since last visit, feels like his prostate may be enlarged and PCP discussed starting prostate medication.  Recently had stress test done and was good.      Continues to works for [employer] in [employer].  Averages working about 50-55 hours per week.  Energy level is good, working out [employer] about 3-4 times per week using the elliptical machine and lifts weights.  Appetite is good and reports no weight loss.      Has constipation at times, but feels like he needs to drink more water.  Neuropathy to hands and feet, feels like an electric shock, improving.    Review of Systems   Constitutional:  Negative for appetite change, chills, diaphoresis, fatigue, fever and unexpected weight change.   Respiratory:  Negative for cough, shortness of breath and wheezing.    Cardiovascular:  Positive for leg swelling (mild intermittent edema to bilateral lower extremities.). Negative for chest pain and palpitations.   Gastrointestinal:  Positive for constipation. Negative for blood in stool, diarrhea, nausea and  vomiting.   Genitourinary:  Positive for frequency and nocturia. Negative for dysuria and hematuria.    Musculoskeletal:  Negative for gait problem.   Skin:  Negative for rash and wound.   Neurological:  Negative for dizziness, gait problem and light-headedness.   Hematological:  Negative for adenopathy.      -------------------------------------------------------------------------------------------------------  Objective   BSA: 2.36 meters squared  /83   Pulse 67   Temp 36.2 °C (97.2 °F)   Resp 16   Wt 109 kg (239 lb 10.2 oz)   SpO2 98%   BMI 32.21 kg/m²     Physical Exam  Vitals reviewed.   Constitutional:       General: He is not in acute distress.     Appearance: Normal appearance.   HENT:      Head: Normocephalic.      Mouth/Throat:      Mouth: Mucous membranes are moist.   Cardiovascular:      Rate and Rhythm: Normal rate and regular rhythm.      Heart sounds: Normal heart sounds. No murmur heard.     No friction rub. No gallop.   Pulmonary:      Effort: Pulmonary effort is normal. No respiratory distress.      Breath sounds: Normal breath sounds. No wheezing.   Abdominal:      General: Abdomen is flat. Bowel sounds are normal. There is no distension.      Palpations: Abdomen is soft. There is no mass.      Tenderness: There is no abdominal tenderness.   Musculoskeletal:         General: Swelling (non-pitting edema to bilateral lower extremities) present. Normal range of motion.   Lymphadenopathy:      Comments: No lymphadenopathy   Skin:     General: Skin is warm and dry.   Neurological:      General: No focal deficit present.      Mental Status: He is alert and oriented to person, place, and time.   Psychiatric:         Mood and Affect: Mood normal.         Behavior: Behavior normal.       Performance Status:  ECOG performance status: 0 fully active  -------------------------------------------------------------------------------------------------------  Assessment/Plan      1. Stage IV mantle  cell lymphoma in unmaintained complete  remission after receiving Nordic regimen and consolidation autograft with BEAM preparative regimen April 2012.  Recent noted mild leukocytosis  and tpenia and flow cytometry suggestive of recurrence as is splenomegaly on CT imaging from 6/2019.  I have recommended complete staging evaluation with BM biopsy 5% mantle cells, blood 30% lymphoma cells.   colonoscopy cobblestoning throughout with positive biopsy for mantle cell.  Seems this is an indolent relapse although we don't have Ki67 analysis or SOX11.     Patient initated acalabrutinib 8/2019 without response as Peripheral blood still shows 71 copies of mantle cell gene which is identical to the pretreatment. In addition spleen size on ultrasound 15.5 cm which is unchanged or even a little larger than pre treatment.      Patient initiated venetoclax and ibrutinib 12/20/20 according  article in NEJM 2018, 378: 6573-9851 by Jim et al shows an overall response rate  of 71% with 62% CR rate at 16 weeks. In order to prevent tumor lysis syndrome venetoclax is ramped up as follows 20 mg for 1 week, 50 mg for 1 week, 100 mg for 1 week, 200 mg for 1 week, 400 mg for 1 week and maintained at 400 mg. Restagin March 2020  included colonoscopy 3/2/20 shows HEATHER!!  BM biopsy from 4/13/2020 also shows HEATHER by histology, flow or FISH studies as does PET from 8/24/90.      (3/24/22): pt seen in clinic, completed EOT C-scope (3/11/22): at OSH: negative, no evidence of disease. PET scan ( 3/14/22): no abnormal uptake.  [place]ak[place] 1. BMBx (3/17/22): negative, no evidence of MCL. Given  Pt has remained in remission, with no evidence of disease on C-scope, PET scan or BMBx.  Ibrutinib/Venetoclax discontinued (3/24/22) .         8/3/23   on observation since March 2022.   Surveillance CT imaging AP with contrast (4/13/23)  no evidence of disease recurrence and personally reviewed. Spleen size 11.5 cm on  films.  Surveillance colonoscopy July  2023 negative for recurrence. Plan CT imaging prior to return in November, than will do annually. Explained that new drup pirtubrutinib recently approved for relapsed MCL.    11/13/23:   -CT CAP done 11/6/23: no lymphadenopathy, stable right upper lobe 0.6 cm pulmonary nodule since 2015-likely benign.  Spleen is normal in size.    -Repeat CT scan in 1 year.    -Today Hbg 15.1, WBC 6.0.    -Continue observation with evaluation every 3 months with blood work.  Contact with any new concerns.      2/15/24:  Today hbg 14.6, plts 162, WBC 6.3.  Physical examination unremarkable.  Follow up evaluation in 3 months with blood work prior.  Plan for CT scan November 2024.       Renal failure:  Encouraged to hydrate more, grade 1 will follow.  2/15/24: Creatinine improved level 1.22.       Health maintenance.   Patient has recv'd both doses of shingrix vaccine & has completed all of his other post transplant vaccines,  Discontinued Acyclovir, & Bactrim prophylaxis (2/23/23) since off treatment since 3/24/22.  Recv'd Prevnar 13 and influenza vaccine (9/16/20) with his PCP. Up to date on tetanus.   - Recv'd Sha & Sha COVID vaccine on 3/16/21. Received booster late 2021. Received Evusheld given on (3/17/22) & 9/22/22.   -Received influenza vaccine 10/18/23.  Advised to obtain covid vaccine at local pharmacy.  Colonoscopy completed 7/14/23: normal except internal hemorrhoids.  Recommendations to repeat in 2 years.      4/20/23: some aortic calcifications on imaging, started on prevastatin-managed by his PCP.  PCP increased pravastatin dose to 40 mg daily    RTC:  Follow up visit with provider in 3 months with blood draws 1 week prior    ------------------------------------------------------------------------------------------------------Kelvin Sullivan APRN-CNP

## 2024-02-15 ENCOUNTER — OFFICE VISIT (OUTPATIENT)
Dept: HEMATOLOGY/ONCOLOGY | Facility: HOSPITAL | Age: 59
End: 2024-02-15
Payer: COMMERCIAL

## 2024-02-15 ENCOUNTER — LAB (OUTPATIENT)
Dept: LAB | Facility: HOSPITAL | Age: 59
End: 2024-02-15
Payer: COMMERCIAL

## 2024-02-15 VITALS
BODY MASS INDEX: 32.21 KG/M2 | OXYGEN SATURATION: 98 % | WEIGHT: 239.64 LBS | SYSTOLIC BLOOD PRESSURE: 124 MMHG | HEART RATE: 67 BPM | TEMPERATURE: 97.2 F | DIASTOLIC BLOOD PRESSURE: 83 MMHG | RESPIRATION RATE: 16 BRPM

## 2024-02-15 DIAGNOSIS — C83.17 MANTLE CELL LYMPHOMA OF SPLEEN (MULTI): Primary | ICD-10-CM

## 2024-02-15 DIAGNOSIS — C83.17 MANTLE CELL LYMPHOMA OF SPLEEN (MULTI): ICD-10-CM

## 2024-02-15 DIAGNOSIS — E78.00 HYPERCHOLESTEROLEMIA: ICD-10-CM

## 2024-02-15 DIAGNOSIS — Z00.00 HEALTHCARE MAINTENANCE: ICD-10-CM

## 2024-02-15 DIAGNOSIS — C83.10 MANTLE CELL LYMPHOMA (MULTI): ICD-10-CM

## 2024-02-15 LAB
ALBUMIN SERPL BCP-MCNC: 4.5 G/DL (ref 3.4–5)
ALP SERPL-CCNC: 66 U/L (ref 33–136)
ALT SERPL W P-5'-P-CCNC: 37 U/L (ref 10–52)
ANION GAP SERPL CALC-SCNC: 12 MMOL/L (ref 10–20)
AST SERPL W P-5'-P-CCNC: 28 U/L (ref 9–39)
BASOPHILS # BLD AUTO: 0.04 X10*3/UL (ref 0–0.1)
BASOPHILS NFR BLD AUTO: 0.6 %
BILIRUB SERPL-MCNC: 0.6 MG/DL (ref 0–1.2)
BUN SERPL-MCNC: 21 MG/DL (ref 6–23)
CALCIUM SERPL-MCNC: 9.7 MG/DL (ref 8.6–10.3)
CHLORIDE SERPL-SCNC: 104 MMOL/L (ref 98–107)
CO2 SERPL-SCNC: 28 MMOL/L (ref 21–32)
CREAT SERPL-MCNC: 1.22 MG/DL (ref 0.5–1.3)
EGFRCR SERPLBLD CKD-EPI 2021: 68 ML/MIN/1.73M*2
EOSINOPHIL # BLD AUTO: 0.23 X10*3/UL (ref 0–0.7)
EOSINOPHIL NFR BLD AUTO: 3.7 %
ERYTHROCYTE [DISTWIDTH] IN BLOOD BY AUTOMATED COUNT: 12.8 % (ref 11.5–14.5)
GLUCOSE SERPL-MCNC: 94 MG/DL (ref 74–99)
HCT VFR BLD AUTO: 42 % (ref 41–52)
HGB BLD-MCNC: 14.6 G/DL (ref 13.5–17.5)
IMM GRANULOCYTES # BLD AUTO: 0.02 X10*3/UL (ref 0–0.7)
IMM GRANULOCYTES NFR BLD AUTO: 0.3 % (ref 0–0.9)
LDH SERPL L TO P-CCNC: 170 U/L (ref 84–246)
LYMPHOCYTES # BLD AUTO: 2.47 X10*3/UL (ref 1.2–4.8)
LYMPHOCYTES NFR BLD AUTO: 39.5 %
MCH RBC QN AUTO: 31.9 PG (ref 26–34)
MCHC RBC AUTO-ENTMCNC: 34.8 G/DL (ref 32–36)
MCV RBC AUTO: 92 FL (ref 80–100)
MONOCYTES # BLD AUTO: 0.61 X10*3/UL (ref 0.1–1)
MONOCYTES NFR BLD AUTO: 9.7 %
NEUTROPHILS # BLD AUTO: 2.89 X10*3/UL (ref 1.2–7.7)
NEUTROPHILS NFR BLD AUTO: 46.2 %
NRBC BLD-RTO: 0 /100 WBCS (ref 0–0)
PLATELET # BLD AUTO: 162 X10*3/UL (ref 150–450)
POTASSIUM SERPL-SCNC: 4.6 MMOL/L (ref 3.5–5.3)
PROT SERPL-MCNC: 7.2 G/DL (ref 6.4–8.2)
RBC # BLD AUTO: 4.57 X10*6/UL (ref 4.5–5.9)
SODIUM SERPL-SCNC: 139 MMOL/L (ref 136–145)
WBC # BLD AUTO: 6.3 X10*3/UL (ref 4.4–11.3)

## 2024-02-15 PROCEDURE — 99214 OFFICE O/P EST MOD 30 MIN: CPT

## 2024-02-15 PROCEDURE — 85025 COMPLETE CBC W/AUTO DIFF WBC: CPT

## 2024-02-15 PROCEDURE — 1036F TOBACCO NON-USER: CPT

## 2024-02-15 PROCEDURE — 83615 LACTATE (LD) (LDH) ENZYME: CPT

## 2024-02-15 PROCEDURE — 36415 COLL VENOUS BLD VENIPUNCTURE: CPT

## 2024-02-15 PROCEDURE — 80053 COMPREHEN METABOLIC PANEL: CPT

## 2024-02-15 ASSESSMENT — ENCOUNTER SYMPTOMS
FREQUENCY: 1
CONSTIPATION: 1

## 2024-02-15 ASSESSMENT — PAIN SCALES - GENERAL: PAINLEVEL: 0-NO PAIN

## 2024-02-18 PROBLEM — Z00.00 HEALTHCARE MAINTENANCE: Status: ACTIVE | Noted: 2024-02-18

## 2024-05-15 ASSESSMENT — ENCOUNTER SYMPTOMS
HEMATURIA: 0
VOMITING: 0
DIARRHEA: 0
CHILLS: 0
UNEXPECTED WEIGHT CHANGE: 0
ADENOPATHY: 0
COUGH: 0
PALPITATIONS: 0
FATIGUE: 0
NAUSEA: 0
APPETITE CHANGE: 0
FEVER: 0
DIZZINESS: 0
LIGHT-HEADEDNESS: 0
DIAPHORESIS: 0
WHEEZING: 0
DYSURIA: 0
BLOOD IN STOOL: 0
WOUND: 0
SHORTNESS OF BREATH: 0

## 2024-05-15 NOTE — PROGRESS NOTES
Patient ID: Nancie Armenta is a 59 y.o. male.    Treatment:   Oncology History Overview Note           Patient Visit Information:   Visit Type: Follow Up Visit      Cancer History:   Treatment Synopsis:     1. Stage IV A mantle cell non-Hodgkin lymphoma involving the gastrointestinal tract diagnosed in October 2011, with diffuse gastrointestinal involvement. The patient was treated  with RCHOP alternating with Rituxan and high-dose Mamie-C and received total of 6 cycles of chemotherapy which he finished in February 2012.   Allergic to Augmentin, causes hives.     2. Patient underwent beam chemotherapy followed by autologous peripheral stem cell transplant in April 2012, by Dr. Yvonne Hobbs at [facility], was also involved in phase 3 clinical trial up killed shingles vaccine versus placebo (Merck-1 Z10  study).     3.The patient developed shingles in January 2014, involving left flank.      4. 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      5.  Recurrent mantle cell lymphoma June 2019 assx presentation with leukocytosis. Peripheral blood flow which showed a CD5 positve clonal lymphocytosis. 6/2/19 CT of chest abdomen and pelvis revealed  splenomegaly of 15 cm compared to 12.8 cm last evaluation. Recurrence confirmed by FISH on peripheral blood earlier this month.  BM biopsy which showed 5% Mantle cell involvement although peripheral blood shows 30% involvement. CT imaging showed splenomegaly. PET scan declined by insurance twice.  Patient underwent colonoscopy  with Dr. Ac ( [place]) 8/8/19 which showed cobblestoning of colon and multiple biopsies including terminal ileum, appendix, cecum,  rectosigmoid positive for mantle cell lymphoma.     6. Initiation of acalabrutinib August 2019 with minimal response. Switched to ibrutinib and venetoclax 12/20/20 with clearing of t(11, 14) by by FISH ( 1/2020) and resolution of involvement of colon by endoscopy and pathology on exam 3/2/2020! BM biopsy  4/2020 HEATHER.     CR March 2022, ibrutinib and venetoclax discontinued.     7. S/P COVID 19 infection 12/1/2020, no sequelae     8. BMBx (4/13/2020): no evidence of MCL either by histology, flow, or FISH studies. PET scan ( 8/24/20): no abnormal FDG uptake seen. Deauville 1      9. Repeat CT C/A/P (3/10/21) with no evidence of LAD seen, Spleen size 11.5 cm ( decreased from prior).       10.C-scope (3/11/22): at OSH: negative, no evidence of disease. PET scan ( 3/14/22): no abnormal uptake. [place] 1. BMBx (3/17/22): negative, no evidence of MCL.      11. Evusheld given on (3/17/22)     12. Pt has remained in remission, with no evidence of disease on C-scope, PET scan or BMBx. Will stop Ibrutinib/Venetoclax (3/24/22) and plan to continue to ID PPX.     13. ID PPX acyclovir stopped 2/23/23.    14: Colonoscopy (7/14/23): normal except internal hemorrhoids.    15: CT CAP (11/6/23): no intrathoracic or intra-abdominal/pelvic enlarged lymphadenopathy.  Spleen is normal in size.             Mantle cell lymphoma of spleen (Multi)   10/13/2016 Initial Diagnosis    Mantle cell lymphoma of spleen (CMS/HCC)         Response:     Past Medical History:  Renal insufficiency.   Past Medical History:   Diagnosis Date    Fourth (trochlear) nerve palsy, right eye 06/08/2017    Right-sided fourth cranial nerve palsy    Fourth (trochlear) nerve palsy, right eye 06/08/2017    Right-sided fourth cranial nerve palsy    Malignant neoplasm of connective and soft tissue, unspecified (Multi) 06/08/2017    Cancer of blood vessel     Surgical History:   Past Surgical History:   Procedure Laterality Date    SHOULDER SURGERY  07/13/2017     Shoulder Surgery        Family History:   Family History   Problem Relation Name Age of Onset    Cataracts Mother      Cataracts Father      Other (chronic lymphoid leukemia) Father         Social History:  .  Working full time for [employer].    Social History     Tobacco Use    Smoking status: Never    Smokeless tobacco: Never   Vaping Use    Vaping status: Never Used   Substance Use Topics    Alcohol use: Never    Drug use: Never   -------------------------------------------------------------------------------------------------------  Subjective       HPI    Nancie Armenta is a 59 year old male with PMH of hyperglycemia, renal insufficiency, and stage IV A mantle cell non hodgkin lymphoma involving the gastrointestinal tract, s/p autologous SCT (April 2012).  Mr. Armenta presents to the clinic today (5/16/24) with his spouse Cheri for follow up evaluation of mantle cell NHL and blood work.      He is doing well overall.  Working 50-60 hours per week for [employer].  Excited to be going to [place], [place] on vacation for 10 days at the end of July.    Had mild upper respiratory infection with sinus congestion in April.  Symptoms resolved in a few days with OTC medications.  Energy level is good.  Has continued to go to [employer] 3-4 times per week.  Appetite is good.  Is up a few pounds but thinks it might be from building muscle weight.  No longer having urinary urgency and nocturia but is improved after taking tadalafil daily.  Continues to have tingling to bilateral feet.      Review of Systems   Constitutional:  Negative for appetite change, chills, diaphoresis, fatigue, fever and unexpected weight change.   Respiratory:  Negative for cough, shortness of breath and wheezing.    Cardiovascular:  Positive for leg swelling (mild intermittent edema to bilateral lower extremities, left is worse than right). Negative for chest pain and palpitations.   Gastrointestinal:  Negative for  blood in stool, constipation, diarrhea, nausea and vomiting.   Genitourinary:  Negative for dysuria, frequency, hematuria and nocturia.    Musculoskeletal: Negative.  Negative for gait problem.   Skin:  Negative for rash and wound.   Neurological:  Negative for dizziness, gait problem, light-headedness and numbness.   Hematological:  Negative for adenopathy.   -------------------------------------------------------------------------------------------------------  Objective   BSA: 2.36 meters squared  /72 (BP Location: Left arm, Patient Position: Sitting)   Pulse 65   Temp 36.5 °C (97.7 °F) (Temporal)   Resp 18   Wt 109 kg (239 lb 10.2 oz)   SpO2 95%   BMI 32.21 kg/m²     Physical Exam  Vitals reviewed.   Constitutional:       General: He is not in acute distress.     Appearance: Normal appearance.   HENT:      Head: Normocephalic.      Mouth/Throat:      Mouth: Mucous membranes are moist.   Eyes:      Pupils: Pupils are equal, round, and reactive to light.   Cardiovascular:      Rate and Rhythm: Normal rate and regular rhythm.      Pulses: Normal pulses.      Heart sounds: Normal heart sounds. No murmur heard.     No friction rub. No gallop.   Pulmonary:      Effort: Pulmonary effort is normal. No respiratory distress.      Breath sounds: Normal breath sounds. No wheezing.   Abdominal:      General: Abdomen is flat. Bowel sounds are normal. There is no distension.      Palpations: Abdomen is soft. There is no mass.      Tenderness: There is no abdominal tenderness.   Musculoskeletal:         General: Swelling (non-pitting edema to bilateral lower extremities) present. Normal range of motion.   Lymphadenopathy:      Comments: No lymphadenopathy   Skin:     General: Skin is warm and dry.   Neurological:      General: No focal deficit present.      Mental Status: He is alert and oriented to person, place, and time.   Psychiatric:         Mood and Affect: Mood normal.         Behavior: Behavior normal.      Performance Status:  ECOG performance status: 0 fully active  -------------------------------------------------------------------------------------------------------  Assessment/Plan      1. Stage IV mantle cell lymphoma in unmaintained complete  remission after receiving Nordic regimen and consolidation autograft with BEAM preparative regimen April 2012.  Recent noted mild leukocytosis  and tpenia and flow cytometry suggestive of recurrence as is splenomegaly on CT imaging from 6/2019.  I have recommended complete staging evaluation with BM biopsy 5% mantle cells, blood 30% lymphoma cells.   colonoscopy cobblestoning throughout with positive biopsy for mantle cell.  Seems this is an indolent relapse although we don't have Ki67 analysis or SOX11.     Patient initated acalabrutinib 8/2019 without response as Peripheral blood still shows 71 copies of mantle cell gene which is identical to the pretreatment. In addition spleen size on ultrasound 15.5 cm which is unchanged or even a little larger than pre treatment.      Patient initiated venetoclax and ibrutinib 12/20/20 according  article in NEJM 2018, 378: 2255-2409 by Jim et al shows an overall response rate  of 71% with 62% CR rate at 16 weeks. In order to prevent tumor lysis syndrome venetoclax is ramped up as follows 20 mg for 1 week, 50 mg for 1 week, 100 mg for 1 week, 200 mg for 1 week, 400 mg for 1 week and maintained at 400 mg. Restagin March 2020  included colonoscopy 3/2/20 shows HEATHER!!  BM biopsy from 4/13/2020 also shows HEATHER by histology, flow or FISH studies as does PET from 8/24/90.      (3/24/22): pt seen in clinic, completed EOT C-scope (3/11/22): at OSH: negative, no evidence of disease. PET scan ( 3/14/22): no abnormal uptake.  Deauville 1. BMBx (3/17/22): negative, no evidence of MCL. Given  Pt has remained in remission, with no evidence of disease on C-scope, PET scan or BMBx.  Ibrutinib/Venetoclax discontinued (3/24/22) .         8/3/23   on  observation since March 2022.   Surveillance CT imaging AP with contrast (4/13/23)  no evidence of disease recurrence and personally reviewed. Spleen size 11.5 cm on  films.  Surveillance colonoscopy July 2023 negative for recurrence. Plan CT imaging prior to return in November, than will do annually. Explained that new drup pirtubrutinib recently approved for relapsed MCL.    11/13/23:   -CT CAP done 11/6/23: no lymphadenopathy, stable right upper lobe 0.6 cm pulmonary nodule since 2015-likely benign.  Spleen is normal in size.    -Repeat CT scan in 1 year.    -Today Hbg 15.1, WBC 6.0.    -Continue observation with evaluation every 3 months with blood work.  Contact with any new concerns.      2/15/24:  Today hbg 14.6, plts 162, WBC 6.3.  Physical examination unremarkable.  Follow up evaluation in 3 months with blood work prior.  Plan for CT scan November 2024.    5/16/24:  Blood work from today is stable: hgb 13.6, plt 151, WBC 5.5, ANC 2.89.  No concerning findings on physical examination.  Plan for CT scan November 2024, will order at next visit.  Follow up in 3 months.     Renal failure:  Encouraged to hydrate more, grade 1 will follow.  5/16/24: Creatinine level 1.38.  Again advised adequate oral hydration.       Health maintenance.   Patient has recv'd both doses of shingrix vaccine & has completed all of his other post transplant vaccines,  Discontinued Acyclovir, & Bactrim prophylaxis (2/23/23) since off treatment since 3/24/22.  Recv'd Prevnar 13 and influenza vaccine (9/16/20) with his PCP. Up to date on tetanus.   - Recv'd Sha & Sha COVID vaccine on 3/16/21. Received booster late 2021. Received Evusheld given on (3/17/22) & 9/22/22.   -Received influenza vaccine 10/18/23.  Advised to obtain covid vaccine at local pharmacy.  Colonoscopy completed 7/14/23: normal except internal hemorrhoids.  Recommendations to repeat in 2 years.      4/20/23: some aortic calcifications on imaging, started on  prevastatin-managed by his PCP.  PCP increased pravastatin dose to 40 mg daily.  PCP switched to atorvastatin 40 mg daily.    BPH:  5/16/24:  Reports urinary frequency and nocturia symptoms has greatly improved after starting tadalafil 5 mg daily.     RTC:  Follow up visit with provider in 3 months with blood draws 1 week prior  -------------------------------------------------------------------------------------------------------  THA Mathew-LETI

## 2024-05-16 ENCOUNTER — LAB (OUTPATIENT)
Dept: LAB | Facility: HOSPITAL | Age: 59
End: 2024-05-16
Payer: COMMERCIAL

## 2024-05-16 ENCOUNTER — OFFICE VISIT (OUTPATIENT)
Dept: HEMATOLOGY/ONCOLOGY | Facility: HOSPITAL | Age: 59
End: 2024-05-16
Payer: COMMERCIAL

## 2024-05-16 VITALS
DIASTOLIC BLOOD PRESSURE: 72 MMHG | TEMPERATURE: 97.7 F | HEART RATE: 65 BPM | BODY MASS INDEX: 32.21 KG/M2 | WEIGHT: 239.64 LBS | SYSTOLIC BLOOD PRESSURE: 123 MMHG | RESPIRATION RATE: 18 BRPM | OXYGEN SATURATION: 95 %

## 2024-05-16 DIAGNOSIS — C83.10 MANTLE CELL LYMPHOMA (MULTI): ICD-10-CM

## 2024-05-16 DIAGNOSIS — C83.17 MANTLE CELL LYMPHOMA OF SPLEEN (MULTI): Primary | ICD-10-CM

## 2024-05-16 DIAGNOSIS — E78.00 HYPERCHOLESTEROLEMIA: ICD-10-CM

## 2024-05-16 DIAGNOSIS — N40.1 BENIGN NON-NODULAR PROSTATIC HYPERPLASIA WITH LOWER URINARY TRACT SYMPTOMS: ICD-10-CM

## 2024-05-16 LAB
ALBUMIN SERPL BCP-MCNC: 4.4 G/DL (ref 3.4–5)
ALP SERPL-CCNC: 62 U/L (ref 33–136)
ALT SERPL W P-5'-P-CCNC: 28 U/L (ref 10–52)
ANION GAP SERPL CALC-SCNC: 12 MMOL/L (ref 10–20)
AST SERPL W P-5'-P-CCNC: 21 U/L (ref 9–39)
BASOPHILS # BLD AUTO: 0.02 X10*3/UL (ref 0–0.1)
BASOPHILS NFR BLD AUTO: 0.4 %
BILIRUB SERPL-MCNC: 0.5 MG/DL (ref 0–1.2)
BUN SERPL-MCNC: 28 MG/DL (ref 6–23)
CALCIUM SERPL-MCNC: 9.3 MG/DL (ref 8.6–10.3)
CHLORIDE SERPL-SCNC: 106 MMOL/L (ref 98–107)
CO2 SERPL-SCNC: 28 MMOL/L (ref 21–32)
CREAT SERPL-MCNC: 1.38 MG/DL (ref 0.5–1.3)
EGFRCR SERPLBLD CKD-EPI 2021: 59 ML/MIN/1.73M*2
EOSINOPHIL # BLD AUTO: 0.15 X10*3/UL (ref 0–0.7)
EOSINOPHIL NFR BLD AUTO: 2.7 %
ERYTHROCYTE [DISTWIDTH] IN BLOOD BY AUTOMATED COUNT: 12.9 % (ref 11.5–14.5)
GLUCOSE SERPL-MCNC: 78 MG/DL (ref 74–99)
HCT VFR BLD AUTO: 39.9 % (ref 41–52)
HGB BLD-MCNC: 13.6 G/DL (ref 13.5–17.5)
IMM GRANULOCYTES # BLD AUTO: 0.01 X10*3/UL (ref 0–0.7)
IMM GRANULOCYTES NFR BLD AUTO: 0.2 % (ref 0–0.9)
LDH SERPL L TO P-CCNC: 147 U/L (ref 84–246)
LYMPHOCYTES # BLD AUTO: 1.86 X10*3/UL (ref 1.2–4.8)
LYMPHOCYTES NFR BLD AUTO: 33.8 %
MCH RBC QN AUTO: 31.4 PG (ref 26–34)
MCHC RBC AUTO-ENTMCNC: 34.1 G/DL (ref 32–36)
MCV RBC AUTO: 92 FL (ref 80–100)
MONOCYTES # BLD AUTO: 0.58 X10*3/UL (ref 0.1–1)
MONOCYTES NFR BLD AUTO: 10.5 %
NEUTROPHILS # BLD AUTO: 2.89 X10*3/UL (ref 1.2–7.7)
NEUTROPHILS NFR BLD AUTO: 52.4 %
NRBC BLD-RTO: 0 /100 WBCS (ref 0–0)
PLATELET # BLD AUTO: 151 X10*3/UL (ref 150–450)
POTASSIUM SERPL-SCNC: 5.1 MMOL/L (ref 3.5–5.3)
PROT SERPL-MCNC: 6.8 G/DL (ref 6.4–8.2)
RBC # BLD AUTO: 4.33 X10*6/UL (ref 4.5–5.9)
SODIUM SERPL-SCNC: 141 MMOL/L (ref 136–145)
WBC # BLD AUTO: 5.5 X10*3/UL (ref 4.4–11.3)

## 2024-05-16 PROCEDURE — 83615 LACTATE (LD) (LDH) ENZYME: CPT

## 2024-05-16 PROCEDURE — 99214 OFFICE O/P EST MOD 30 MIN: CPT

## 2024-05-16 PROCEDURE — 36415 COLL VENOUS BLD VENIPUNCTURE: CPT

## 2024-05-16 PROCEDURE — 85025 COMPLETE CBC W/AUTO DIFF WBC: CPT

## 2024-05-16 PROCEDURE — 80053 COMPREHEN METABOLIC PANEL: CPT

## 2024-05-16 RX ORDER — TADALAFIL 5 MG/1
5 TABLET ORAL DAILY
COMMUNITY

## 2024-05-16 ASSESSMENT — ENCOUNTER SYMPTOMS
FREQUENCY: 0
CONSTIPATION: 0
NUMBNESS: 0
LEG SWELLING: 1
MUSCULOSKELETAL NEGATIVE: 1

## 2024-05-16 ASSESSMENT — PAIN SCALES - GENERAL: PAINLEVEL: 0-NO PAIN

## 2024-06-28 ENCOUNTER — LAB (OUTPATIENT)
Dept: LAB | Facility: LAB | Age: 59
End: 2024-06-28
Payer: COMMERCIAL

## 2024-06-28 DIAGNOSIS — E78.00 PURE HYPERCHOLESTEROLEMIA, UNSPECIFIED: ICD-10-CM

## 2024-06-28 DIAGNOSIS — Z00.00 ENCOUNTER FOR GENERAL ADULT MEDICAL EXAMINATION WITHOUT ABNORMAL FINDINGS: Primary | ICD-10-CM

## 2024-06-28 LAB
PSA SERPL-MCNC: 0.99 NG/ML
TSH SERPL-ACNC: 3.58 MIU/L (ref 0.44–3.98)

## 2024-06-28 PROCEDURE — 80061 LIPID PANEL: CPT

## 2024-06-28 PROCEDURE — 80053 COMPREHEN METABOLIC PANEL: CPT

## 2024-06-28 PROCEDURE — 36415 COLL VENOUS BLD VENIPUNCTURE: CPT

## 2024-06-28 PROCEDURE — 83036 HEMOGLOBIN GLYCOSYLATED A1C: CPT

## 2024-06-28 PROCEDURE — 84153 ASSAY OF PSA TOTAL: CPT

## 2024-06-28 PROCEDURE — 84443 ASSAY THYROID STIM HORMONE: CPT

## 2024-06-28 PROCEDURE — 85027 COMPLETE CBC AUTOMATED: CPT

## 2024-06-29 LAB
ALBUMIN SERPL BCP-MCNC: 4.5 G/DL (ref 3.4–5)
ALP SERPL-CCNC: 66 U/L (ref 33–136)
ALT SERPL W P-5'-P-CCNC: 37 U/L (ref 10–52)
ANION GAP SERPL CALC-SCNC: 12 MMOL/L (ref 10–20)
AST SERPL W P-5'-P-CCNC: 27 U/L (ref 9–39)
BILIRUB SERPL-MCNC: 0.8 MG/DL (ref 0–1.2)
BUN SERPL-MCNC: 24 MG/DL (ref 6–23)
CALCIUM SERPL-MCNC: 9.6 MG/DL (ref 8.6–10.6)
CHLORIDE SERPL-SCNC: 106 MMOL/L (ref 98–107)
CHOLEST SERPL-MCNC: 144 MG/DL (ref 0–199)
CHOLESTEROL/HDL RATIO: 3.3
CO2 SERPL-SCNC: 26 MMOL/L (ref 21–32)
CREAT SERPL-MCNC: 1.33 MG/DL (ref 0.5–1.3)
EGFRCR SERPLBLD CKD-EPI 2021: 61 ML/MIN/1.73M*2
ERYTHROCYTE [DISTWIDTH] IN BLOOD BY AUTOMATED COUNT: 12.8 % (ref 11.5–14.5)
EST. AVERAGE GLUCOSE BLD GHB EST-MCNC: 105 MG/DL
GLUCOSE SERPL-MCNC: 93 MG/DL (ref 74–99)
HBA1C MFR BLD: 5.3 %
HCT VFR BLD AUTO: 42.6 % (ref 41–52)
HDLC SERPL-MCNC: 43.3 MG/DL
HGB BLD-MCNC: 14.4 G/DL (ref 13.5–17.5)
LDLC SERPL CALC-MCNC: 80 MG/DL
MCH RBC QN AUTO: 31.2 PG (ref 26–34)
MCHC RBC AUTO-ENTMCNC: 33.8 G/DL (ref 32–36)
MCV RBC AUTO: 92 FL (ref 80–100)
NON HDL CHOLESTEROL: 101 MG/DL (ref 0–149)
NRBC BLD-RTO: 0 /100 WBCS (ref 0–0)
PLATELET # BLD AUTO: 95 X10*3/UL (ref 150–450)
POTASSIUM SERPL-SCNC: 4.6 MMOL/L (ref 3.5–5.3)
PROT SERPL-MCNC: 7.1 G/DL (ref 6.4–8.2)
RBC # BLD AUTO: 4.62 X10*6/UL (ref 4.5–5.9)
SODIUM SERPL-SCNC: 139 MMOL/L (ref 136–145)
TRIGL SERPL-MCNC: 105 MG/DL (ref 0–149)
VLDL: 21 MG/DL (ref 0–40)
WBC # BLD AUTO: 4 X10*3/UL (ref 4.4–11.3)

## 2024-07-23 ENCOUNTER — OFFICE VISIT (OUTPATIENT)
Dept: HEMATOLOGY/ONCOLOGY | Facility: CLINIC | Age: 59
End: 2024-07-23
Payer: COMMERCIAL

## 2024-07-23 VITALS
SYSTOLIC BLOOD PRESSURE: 127 MMHG | HEART RATE: 73 BPM | BODY MASS INDEX: 31.89 KG/M2 | OXYGEN SATURATION: 98 % | DIASTOLIC BLOOD PRESSURE: 79 MMHG | WEIGHT: 237.22 LBS | TEMPERATURE: 98.4 F

## 2024-07-23 DIAGNOSIS — C83.17 MANTLE CELL LYMPHOMA OF SPLEEN (MULTI): ICD-10-CM

## 2024-07-23 PROCEDURE — G2211 COMPLEX E/M VISIT ADD ON: HCPCS | Performed by: INTERNAL MEDICINE

## 2024-07-23 PROCEDURE — 99214 OFFICE O/P EST MOD 30 MIN: CPT | Performed by: INTERNAL MEDICINE

## 2024-07-23 ASSESSMENT — ENCOUNTER SYMPTOMS
LOSS OF SENSATION IN FEET: 0
DEPRESSION: 0
OCCASIONAL FEELINGS OF UNSTEADINESS: 0

## 2024-07-23 ASSESSMENT — PATIENT HEALTH QUESTIONNAIRE - PHQ9
2. FEELING DOWN, DEPRESSED OR HOPELESS: NOT AT ALL
1. LITTLE INTEREST OR PLEASURE IN DOING THINGS: NOT AT ALL
SUM OF ALL RESPONSES TO PHQ9 QUESTIONS 1 AND 2: 0

## 2024-07-23 ASSESSMENT — COLUMBIA-SUICIDE SEVERITY RATING SCALE - C-SSRS
2. HAVE YOU ACTUALLY HAD ANY THOUGHTS OF KILLING YOURSELF?: NO
6. HAVE YOU EVER DONE ANYTHING, STARTED TO DO ANYTHING, OR PREPARED TO DO ANYTHING TO END YOUR LIFE?: NO
1. IN THE PAST MONTH, HAVE YOU WISHED YOU WERE DEAD OR WISHED YOU COULD GO TO SLEEP AND NOT WAKE UP?: NO

## 2024-07-23 ASSESSMENT — PAIN SCALES - GENERAL: PAINLEVEL: 0-NO PAIN

## 2024-07-23 NOTE — PROGRESS NOTES
Lab closed now.  Patient will come in tomorrow AM and have repeat CBC and wait for results to review with RN.  Follow-up in 6 months with no labs.  Call back instructions reviewed.  Patient verbalized understanding.

## 2024-07-23 NOTE — PROGRESS NOTES
Patient ID: Nancie Armenta is a 59 y.o. male.  Referring Physician: Fito Hdez MD  [address], [address]  [place] [address]  Primary Care Provider: Maurice Nunez MD    ORDERS & PATIENT INSTRUCTIONS:    CBC today    RTC 6 m  No labs            ASSESSMENT, PROBLEM LIST, DECISION MAKING, PLAN.      1. Stage IV A mantle cell non-Hodgkin lymphoma involving the gastrointestinal tract diagnosed in October 2011, with diffuse gastrointestinal involvement. The patient was treated with RCHOP alternating with Rituxan and high-dose Mamie-C and received total of 6 cycles of chemotherapy which he finished in February 2012.   2. Patient underwent beam chemotherapy followed by autologous peripheral stem cell transplant in April 2012, by Dr. Yvonne Hobbs at [facility], was also involved in phase 3 clinical trial up killed shingles vaccine versus placebo ([employer]-1 Z10 study).       3. Patient developed progressive disease in June 2019 with positive bone marrow, and significant GI involvement and he has started Alcalabrutinib on August 21, 2019 but didnt get expected response especially peripheral blood flow cytometric continued to show abnormal lymphocytes so was switched to Imbruvica 560 mg and Venetoclax in December 2019 . Patient's peripheral blood FISH is completely negative for mantle cell DNA so CAR-T was not considered. And has been followed by Dr. Yvonne Hobbs.   Pt remained in CR   Pts PET, BM Bx at  and Colonoscopy at [facility] in March 2022 was -ve so Imbruvica and Venetoclax was d/ed March 24, 2022      3. The patient developed shingles in January 2014, involving left flank.   4.  Patient had Covid in December 2020 but fortunately it was mild and did not require any hospital admissions and continued his anticancer medication during that time.    4. 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 but Patient had a syncopal episode in January 2017 without any clear explanation went to [facility] emergency room when he was orthostatic and also injured his head and further workup is underway to determine fourth cranial nerve palsy left side, MRI of the brain was overall stable, was evaluated by Dr Vern St/neuro-ophthalmologist and thinks that patient probably has myasthenia gravis and has been placed on pyridostigmine in June 2017, and seems to have responded  He was also evaluated with MRI of the brain and spinal tap which was showing minimal increased protein but cytology, flow cytometry were all normal and has been followed by neurologist at [facility]. Patient's symptoms are improving with pyridostigmine, although later he discontinued and patient became asymptomatic, on watchful waiting      4. Allergic to Augmentin, causes hives      INTERVAL HISTORY: The patient is being evaluated virtually for mantle cell lymphoma  Pt remained in CR   Pts PET, BM Bx at  and Colonoscopy at [facility] in March 2022 was -ve so Imbruvica and Venetoclax was d/ed March 24, 2022    His last CAT scan of the chest abdomen pelvis done in November 2023 showed no evidence of lymphadenopathy stable pulmonary nodule 0.6 cm possible benign, he had a calcium scoring CT scan done which showed moderately increased calcium in coronary arteries so he has been now placed on Lipitor by Dr. Brandon Nunez    Patient is currently asymptomatic  Patient was seen by nurse practitioner of Dr. Yvonne Hobbs in May 2024 and had normal CBC, he was seen by Dr. Valiente and had repeat lab work done on 28 June 2024 showed platelet count of 95,000 without any clear explanation patient is otherwise asymptomatic        General: Conscious, alert, oriented x3, not in acute distress.  HEENT:    No icterus.    Chest:Bilateral symmetrical,     CVS:  S1, S2.    Abdomen:  Soft, no palpable mass    Extremities: No clubbing, cyanosis,     Skin: No petechial rash.    ASSESSMENT AND PLAN: 1. Mr. Armenta with mantle cell lymphoma.,  Had a progressive disease in August 2019  pt has been started on Alcalabrutinib on 8/21/2019 and with ultimate plan for CAR-T  Patient did not get expected response especially peripheral blood flow cytometry was positive for abnormal lymphocytes so was switched to Imbruvica 560 mg and Venetoclax  400mg daily as there was no study using Alcalabrutinib with  Venetoclax, patient seems to have responded and is in complete molecular remission so CAR-T is not being offered.  Pt remained in CR   Pts PET, BM Bx at  and Colonoscopy at [facility] in March 2022 was -ve so Imbruvica and Venetoclax was d/ed March 24, 2022    His last CAT scan of the chest abdomen pelvis done in November 2023 showed no evidence of lymphadenopathy stable pulmonary nodule 0.6 cm possible benign, he had a calcium scoring CT scan done which showed moderately increased calcium in coronary arteries so he has been now placed on Lipitor by Dr. Brandon Nunez    Patient was seen by nurse practitioner of Dr. Yvonne Hobbs in May 2024 and had normal CBC, he had repeat lab work done by primary care physician Dr. Brice on June 28, 2024 showed platelet count of 95,000 without any clear explanation, my gut feeling is that this is probably a lab error and could be related to platelet count being, we will recheck CBC just to make sure.  If platelet remains stable or comes up then he will require no additional workup if not we will do additional testing.    -Clinically there is no evidence of progression      Patient's colonoscopy has been scheduled by Dr. Julien in July 1, 2023     There is no clinical evidence of progression  Continue supportive care    Return for follow-up in 6-month or sooner if there is any new symptoms            VITALS:   2.35 meters squared /79   Pulse 73   Temp 36.9 °C (98.4 °F)   Wt 108 kg (237 lb 3.4  "oz)   SpO2 98%   BMI 31.89 kg/m²     LABS:    CBC:  Recent Labs     06/28/24  0755 05/16/24  1332 02/15/24  1252 11/13/23  1256 08/03/23  1408   WBC 4.0* 5.5 6.3 6.0 5.1   HGB 14.4 13.6 14.6 15.1 13.4*   HCT 42.6 39.9* 42.0 44.1 38.5*   PLT 95* 151 162 162 141*   MCV 92 92 92 93 93       CMP:  Recent Labs     06/28/24  0755 05/16/24  1332 02/15/24  1252 11/13/23  1256 08/03/23  1408 03/24/22  1248 02/24/22  1335    141 139 139 143   < > 138   K 4.6 5.1 4.6 4.5 4.9   < > 4.8    106 104 102 107   < > 104   CO2 26 28 28 29 30   < > 30   ANIONGAP 12 12 12 13 11   < > 9*   BUN 24* 28* 21 19 22   < > 27*   CREATININE 1.33* 1.38* 1.22 1.21 1.49*   < > 1.28   EGFR 61 59* 68 69  --   --   --    MG  --   --   --   --   --   --  2.02    < > = values in this interval not displayed.     Recent Labs     06/28/24  0755 05/16/24  1332 02/15/24  1252 11/13/23  1256 08/03/23  1408   ALBUMIN 4.5 4.4 4.5 4.6 4.4   ALKPHOS 66 62 66 64 66   ALT 37 28 37 47 31   AST 27 21 28 41* 25   BILITOT 0.8 0.5 0.6 0.5 0.6       HEME/ENDO:  Recent Labs     06/28/24  0755   TSH 3.58        MICRO: No results for input(s): \"ESR\", \"CRP\", \"PROCAL\" in the last 93115 hours.  No results found for the last 90 days.        TUMOR MARKERS:  Prostate Specific AG (ng/mL)   Date Value   06/28/2024 0.99                IMAGING:         CT chest abdomen pelvis w IV contrast  Narrative: Interpreted By:  Samson Villanueva,   STUDY:  CT CHEST ABDOMEN PELVIS W IV CONTRAST;  11/6/2023 9:48 am      INDICATION:  Signs/Symptoms: history of mantel cell lymphoma, monitor for diseae  progression  C83.10: Mantle cell lymphoma.      COMPARISON:  CT scan dated 04/14/2023. PET scan dated 03/14/2022. Chest CT scan  dated 03/10/2021      ACCESSION NUMBER(S):  [id number]      ORDERING CLINICIAN:  FLASH CHAIREZ      TECHNIQUE:  CT of the chest, abdomen, and pelvis was performed.  Contiguous axial  images were obtained at 3 mm slice thickness through the chest,  abdomen and " pelvis. Coronal and sagittal reconstructions at 3 mm  slice thickness were performed.  75 ml of contrast Omnipaque were administered intravenously without  immediate complication.      FINDINGS:  CHEST:      LUNG/PLEURA/LARGE AIRWAYS:  Stable right upper lobe 0.6 cm pulmonary nodule (series 202, image  148). No lung masses, pulmonary nodules or consolidations are  present. There is no pleural effusion or pneumothorax.      VESSELS:  Aorta and pulmonary arteries are normal caliber.  No atherosclerotic  changes of the aorta are identified.  Moderate coronary artery  calcifications are present.      HEART:  The heart is normal in size.  No pericardial effusion      MEDIASTINUM AND HERNANDO:  No mediastinal, hilar or axillary lymphadenopathy is present.  The  esophagus is normal.      CHEST WALL AND LOWER NECK:  The soft tissues of the chest wall demonstrate no gross abnormality.  The visualized thyroid gland appears within normal limits.      ABDOMEN:      LIVER:  Normal size. Normal contour. Hepatic steatosis. Stable calcific  nodule in segment 4A measuring 1.2 cm.      BILE DUCTS:  The intrahepatic and extrahepatic ducts are not dilated.      GALLBLADDER:  Surgically absent.      PANCREAS:  The pancreas appears unremarkable without evidence of ductal  dilatation or masses.      SPLEEN:  The spleen is normal in size without focal lesions.      ADRENAL GLANDS:  No nodules.      KIDNEYS AND URETERS:  The kidneys are normal in size and enhance symmetrically.  No  hydroureteronephrosis or nephroureterolithiasis is identified.      PELVIS:      BLADDER:  Within normal limits.      REPRODUCTIVE ORGANS:  No pelvic masses.      BOWEL:  No bowel dilatation. Few uncomplicated colonic diverticulosis. No  ascites. No pneumoperitoneum          VESSELS:  The aorta and IVC appear normal.      LYMPH NODES:  No enlarged lymphadenopathy.      BONE AND SOFT TISSUE:  No destructive osseous lesions. Redemonstrated small umbilical  and  periumbilical fat containing hernias      Impression: 1. No intrathoracic or intra-abdominal/pelvic enlarged  lymphadenopathy.  2. Stable right upper lobe 0.6 cm pulmonary nodule since at least  2015 and likely benign. No suspicious pulmonary nodules.  3. Other ancillary findings are unchanged.      MACRO:  None      Signed by: Samson Villanueva 11/6/2023 2:10 PM  Dictation workstation:   [id number]       Current Outpatient Medications   Medication Sig Dispense Refill    aspirin 81 mg EC tablet Take 1 tablet (81 mg) by mouth once daily.      hydrocortisone 1 % cream       multivit-min/iron/folic acid/K (ADULTS MULTIVITAMIN ORAL) Take by mouth.      tadalafil (Cialis) 5 mg tablet Take 1 tablet (5 mg) by mouth once daily.      atorvastatin (Lipitor) 40 mg tablet Take 1 tablet (40 mg) by mouth once daily.       No current facility-administered medications for this visit.            FAMILY HISTORY:   Family History   Problem Relation Name Age of Onset    Cataracts Mother      Cataracts Father      Other (chronic lymphoid leukemia) Father          ALLERGY: Amoxicillin-pot clavulanate    SOCIAL HISTORY: He  reports no history of alcohol use. He  reports no history of drug use.            Medication reviewed in e-chart  Patient is monitored for medication toxicity  labs reviewed and interpreted independently, X rays independently reviewed  Notes from other physicians involved in care were reviewed    Charting was completed using voice recognition technology and may include unintended errors.    JUSTIN BELTRAN MD, [place].    Octavia Landrum Master Clinician in Hematology and Oncology  Medical Director, [facility] at [facility].  [place] office  Phone [phone]  Fax      [phone]  [facility] /[place].  Phone [phone]  Fax     [phone]

## 2024-07-24 ENCOUNTER — TELEPHONE (OUTPATIENT)
Dept: ADMISSION | Facility: HOSPITAL | Age: 59
End: 2024-07-24
Payer: COMMERCIAL

## 2024-07-24 ENCOUNTER — TELEPHONE (OUTPATIENT)
Dept: HEMATOLOGY/ONCOLOGY | Facility: CLINIC | Age: 59
End: 2024-07-24
Payer: COMMERCIAL

## 2024-07-24 ENCOUNTER — LAB (OUTPATIENT)
Dept: LAB | Facility: CLINIC | Age: 59
End: 2024-07-24
Payer: COMMERCIAL

## 2024-07-24 DIAGNOSIS — C83.17 MANTLE CELL LYMPHOMA OF SPLEEN (MULTI): Primary | ICD-10-CM

## 2024-07-24 DIAGNOSIS — C83.17 MANTLE CELL LYMPHOMA OF SPLEEN (MULTI): ICD-10-CM

## 2024-07-24 LAB
ALBUMIN SERPL BCP-MCNC: 4.5 G/DL (ref 3.4–5)
ALP SERPL-CCNC: 86 U/L (ref 33–136)
ALT SERPL W P-5'-P-CCNC: 38 U/L (ref 10–52)
ANION GAP SERPL CALC-SCNC: 13 MMOL/L (ref 10–20)
AST SERPL W P-5'-P-CCNC: 28 U/L (ref 9–39)
BASOPHILS # BLD AUTO: 0 X10*3/UL (ref 0–0.1)
BASOPHILS NFR BLD AUTO: 0 %
BILIRUB SERPL-MCNC: 0.8 MG/DL (ref 0–1.2)
BUN SERPL-MCNC: 27 MG/DL (ref 6–23)
CALCIUM SERPL-MCNC: 9 MG/DL (ref 8.6–10.6)
CHLORIDE SERPL-SCNC: 104 MMOL/L (ref 98–107)
CO2 SERPL-SCNC: 27 MMOL/L (ref 21–32)
CREAT SERPL-MCNC: 1.52 MG/DL (ref 0.5–1.3)
EGFRCR SERPLBLD CKD-EPI 2021: 52 ML/MIN/1.73M*2
EOSINOPHIL # BLD AUTO: 0.08 X10*3/UL (ref 0–0.7)
EOSINOPHIL NFR BLD AUTO: 1.8 %
ERYTHROCYTE [DISTWIDTH] IN BLOOD BY AUTOMATED COUNT: 13 % (ref 11.5–14.5)
GLUCOSE SERPL-MCNC: 81 MG/DL (ref 74–99)
HCT VFR BLD AUTO: 37.9 % (ref 41–52)
HGB BLD-MCNC: 13.1 G/DL (ref 13.5–17.5)
IMM GRANULOCYTES # BLD AUTO: 0.01 X10*3/UL (ref 0–0.7)
IMM GRANULOCYTES NFR BLD AUTO: 0.2 % (ref 0–0.9)
LDH SERPL L TO P-CCNC: 183 U/L (ref 84–246)
LYMPHOCYTES # BLD AUTO: 1.27 X10*3/UL (ref 1.2–4.8)
LYMPHOCYTES NFR BLD AUTO: 28.8 %
MCH RBC QN AUTO: 31.6 PG (ref 26–34)
MCHC RBC AUTO-ENTMCNC: 34.6 G/DL (ref 32–36)
MCV RBC AUTO: 92 FL (ref 80–100)
MONOCYTES # BLD AUTO: 0.49 X10*3/UL (ref 0.1–1)
MONOCYTES NFR BLD AUTO: 11.1 %
NEUTROPHILS # BLD AUTO: 2.56 X10*3/UL (ref 1.2–7.7)
NEUTROPHILS NFR BLD AUTO: 58.1 %
NRBC BLD-RTO: ABNORMAL /100{WBCS}
PLATELET # BLD AUTO: 55 X10*3/UL (ref 150–450)
POTASSIUM SERPL-SCNC: 4.8 MMOL/L (ref 3.5–5.3)
PROT SERPL-MCNC: 6.9 G/DL (ref 6.4–8.2)
RBC # BLD AUTO: 4.14 X10*6/UL (ref 4.5–5.9)
SODIUM SERPL-SCNC: 139 MMOL/L (ref 136–145)
WBC # BLD AUTO: 4.4 X10*3/UL (ref 4.4–11.3)

## 2024-07-24 PROCEDURE — 36415 COLL VENOUS BLD VENIPUNCTURE: CPT

## 2024-07-24 PROCEDURE — 80053 COMPREHEN METABOLIC PANEL: CPT

## 2024-07-24 PROCEDURE — 83615 LACTATE (LD) (LDH) ENZYME: CPT

## 2024-07-24 PROCEDURE — 85025 COMPLETE CBC W/AUTO DIFF WBC: CPT

## 2024-07-24 NOTE — TELEPHONE ENCOUNTER
Spoke with Dr Hdez by phone.  Per Dr Hdez PET Scan and BMBX.  We will arrange STAT PET and BMBX will be arranged by Dr Hobbs.  Provider will reach out to patient and this nurse will follow-up with patient as well.

## 2024-07-24 NOTE — TELEPHONE ENCOUNTER
Patient called back to [facility] to confirm that he received the message regarding the BMBX scheduling and will arrive tomorrow at 10AM as per the VM. Message routed to [facility] as confirmation

## 2024-07-24 NOTE — TELEPHONE ENCOUNTER
Call placed to patient.  Discussed providers recommendation for PET and BMBX.  Patient confirmed he spoke with Dr Hdez this AM and is aware of plan.  Patient is leaving on vacation tomorrow and returns 8/9.  Will schedule PET next available and Dr Rojas team to coordinate BMBX.  Teaching reiterated regarding thrombocytopenia.  Call back instructions reviewed.  Patient verbalized understanding.

## 2024-07-25 ENCOUNTER — PROCEDURE VISIT (OUTPATIENT)
Dept: OTHER | Facility: HOSPITAL | Age: 59
End: 2024-07-25
Payer: COMMERCIAL

## 2024-07-25 ENCOUNTER — LAB (OUTPATIENT)
Dept: LAB | Facility: HOSPITAL | Age: 59
End: 2024-07-25
Payer: COMMERCIAL

## 2024-07-25 ENCOUNTER — OFFICE VISIT (OUTPATIENT)
Dept: HEMATOLOGY/ONCOLOGY | Facility: HOSPITAL | Age: 59
End: 2024-07-25
Payer: COMMERCIAL

## 2024-07-25 VITALS
OXYGEN SATURATION: 100 % | DIASTOLIC BLOOD PRESSURE: 80 MMHG | TEMPERATURE: 96.3 F | HEART RATE: 81 BPM | WEIGHT: 233.25 LBS | SYSTOLIC BLOOD PRESSURE: 142 MMHG | BODY MASS INDEX: 31.35 KG/M2 | RESPIRATION RATE: 18 BRPM

## 2024-07-25 DIAGNOSIS — C83.17 MANTLE CELL LYMPHOMA OF SPLEEN (MULTI): ICD-10-CM

## 2024-07-25 DIAGNOSIS — C83.17 MANTLE CELL LYMPHOMA OF SPLEEN (MULTI): Primary | ICD-10-CM

## 2024-07-25 LAB
BASOPHILS # BLD AUTO: 0.01 X10*3/UL (ref 0–0.1)
BASOPHILS NFR BLD AUTO: 0.2 %
EOSINOPHIL # BLD AUTO: 0.08 X10*3/UL (ref 0–0.7)
EOSINOPHIL NFR BLD AUTO: 1.8 %
ERYTHROCYTE [DISTWIDTH] IN BLOOD BY AUTOMATED COUNT: 13.2 % (ref 11.5–14.5)
HCT VFR BLD AUTO: 39.6 % (ref 41–52)
HGB BLD-MCNC: 13.7 G/DL (ref 13.5–17.5)
IMM GRANULOCYTES # BLD AUTO: 0.01 X10*3/UL (ref 0–0.7)
IMM GRANULOCYTES NFR BLD AUTO: 0.2 % (ref 0–0.9)
LYMPHOCYTES # BLD AUTO: 1.33 X10*3/UL (ref 1.2–4.8)
LYMPHOCYTES NFR BLD AUTO: 29.5 %
MCH RBC QN AUTO: 31.4 PG (ref 26–34)
MCHC RBC AUTO-ENTMCNC: 34.6 G/DL (ref 32–36)
MCV RBC AUTO: 91 FL (ref 80–100)
MONOCYTES # BLD AUTO: 0.48 X10*3/UL (ref 0.1–1)
MONOCYTES NFR BLD AUTO: 10.6 %
NEUTROPHILS # BLD AUTO: 2.6 X10*3/UL (ref 1.2–7.7)
NEUTROPHILS NFR BLD AUTO: 57.7 %
NRBC BLD-RTO: 0 /100 WBCS (ref 0–0)
PLATELET # BLD AUTO: 71 X10*3/UL (ref 150–450)
RBC # BLD AUTO: 4.37 X10*6/UL (ref 4.5–5.9)
WBC # BLD AUTO: 4.5 X10*3/UL (ref 4.4–11.3)

## 2024-07-25 PROCEDURE — 99214 OFFICE O/P EST MOD 30 MIN: CPT | Performed by: PHYSICIAN ASSISTANT

## 2024-07-25 PROCEDURE — 36415 COLL VENOUS BLD VENIPUNCTURE: CPT

## 2024-07-25 PROCEDURE — 99214 OFFICE O/P EST MOD 30 MIN: CPT | Performed by: NURSE PRACTITIONER

## 2024-07-25 PROCEDURE — 88185 FLOWCYTOMETRY/TC ADD-ON: CPT | Mod: TC

## 2024-07-25 PROCEDURE — 85025 COMPLETE CBC W/AUTO DIFF WBC: CPT

## 2024-07-25 NOTE — PROGRESS NOTES
Patient arrived to ASCT unit via self-ambulation for bone marrow biopsy, accompanied by spouse. He is alert and oriented x3, denies pain or any discomfort. Vital signs obtained. Blood drawn prior to arrival to unit. Stephania Frias PA-C performed procedure. Dressing is clean, dry and intact. Education provided and PI sheet given. LEVI Barton also saw patient at bedside for follow up visit. No adverse reactions, no complaints and no assistance needed.

## 2024-07-25 NOTE — PROGRESS NOTES
Patient ID: Nancie Armenta is a 59 y.o. male.    Bone marrow biopsy and aspirate    Date/Time: 7/25/2024 11:30 AM    Performed by: Stephania Womack PA-C  Authorized by: Stephania Womack PA-C    Consent:     Consent obtained:  Verbal    Consent given by:  Patient    Risks discussed:  Bleeding, infection and pain  Universal protocol:     Immediately prior to procedure, a time out was called: yes      Patient identity confirmed:  Verbally with patient  Indications:     Indications:  To asess disease status  Pre-procedure details:     Skin preparation:  Chlorhexidine  Procedure specific details:      The patient was explained the risks and benefits of the bone marrow biopsy procedure.  Their questions were answered and consent was obtained.  Patient's most recent history and physical reviewed and relevant findings were noted.  Medications and allergies reviewed.  Prior to the procedure the patient's identity was confirmed using their name, medical record number, and date of birth.  The patient lay in the prone position and their Lt iliac crest was cleansed and draped in sterile fashion.  15ml of 1% plain lidocaine was injected locally.  Using a Jamshidi the core and aspirate samples were obtained without difficulty and sent for pathology, flow cytometry, testing per heme/path, and cytogenetics.  A sterile dressing was applied once hemostasis achieved.  The patient tolerated the procedure well with no immediate complications and less than 5ml of blood loss.  The patient was educated to leave the dressing on for 24hrs and they verbalized understanding.       Post-procedure details:     Procedure completion:  Tolerated well, no immediate complications

## 2024-07-26 LAB
CELL COUNT (BLOOD): 7.13 X10*3/UL
CELL POPULATIONS: NORMAL
DIAGNOSIS: NORMAL
FLOW DIFFERENTIAL: NORMAL
FLOW TEST ORDERED: NORMAL
LAB TEST METHOD: NORMAL
NUMBER OF CELLS COLLECTED: NORMAL PER TUBE
PATH REPORT.TOTAL CANCER: NORMAL
SIGNATURE COMMENT: NORMAL
SPECIMEN VIABILITY: NORMAL

## 2024-07-26 NOTE — PROGRESS NOTES
Patient ID: Nancie Armenta is a 59 y.o. male.    Subjective    HPI  Presents today for bmbx and routine follow up visit in [facility]. Recently found to have new thrombocytopenia (Plt 55 yesterday). He is feeling well with no acute issues. No s/sx bleeding/bruising in the setting of new thrombocytopenia. Traveling to [place] tomorrow for 1 week.    Review of Systems   All other systems reviewed and are negative.      Objective    BSA: There is no height or weight on file to calculate BSA.  There were no vitals taken for this visit.  Vital signs reviewed today.      Physical Exam  Constitutional:       Appearance: Normal appearance.   HENT:      Head: Normocephalic.      Nose: Nose normal.      Mouth/Throat:      Mouth: Mucous membranes are moist.      Pharynx: Oropharynx is clear.   Eyes:      Extraocular Movements: Extraocular movements intact.      Conjunctiva/sclera: Conjunctivae normal.      Pupils: Pupils are equal, round, and reactive to light.   Cardiovascular:      Rate and Rhythm: Normal rate and regular rhythm.      Pulses: Normal pulses.      Heart sounds: Normal heart sounds.   Pulmonary:      Effort: Pulmonary effort is normal.      Breath sounds: Normal breath sounds.   Abdominal:      General: Abdomen is flat. Bowel sounds are normal.      Palpations: Abdomen is soft.   Musculoskeletal:         General: Normal range of motion.      Cervical back: Normal range of motion.   Skin:     General: Skin is warm and dry.      Capillary Refill: Capillary refill takes less than 2 seconds.   Neurological:      General: No focal deficit present.      Mental Status: He is alert and oriented to person, place, and time. Mental status is at baseline.   Psychiatric:         Mood and Affect: Mood normal.         Performance Status:  Karnofsky Score: 90 - Able to carry on normal activity; minor signs or symptoms of disease       Assessment/Plan        Oncology History Overview Note           Patient  Visit Information:   Visit Type: Follow Up Visit      Cancer History:   Treatment Synopsis:     1. Stage IV A mantle cell non-Hodgkin lymphoma involving the gastrointestinal tract diagnosed in October 2011, with diffuse gastrointestinal involvement. The patient was treated  with RCHOP alternating with Rituxan and high-dose Mamie-C and received total of 6 cycles of chemotherapy which he finished in February 2012.   Allergic to Augmentin, causes hives.     2. Patient underwent beam chemotherapy followed by autologous peripheral stem cell transplant in April 2012, by Dr. Yvonne Hobbs at [facility], was also involved in phase 3 clinical trial up killed shingles vaccine versus placebo ([employer]-1 Z10  study).     3.The patient developed shingles in January 2014, involving left flank.      4. 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      5.  Recurrent mantle cell lymphoma June 2019 assx presentation with leukocytosis. Peripheral blood flow which showed a CD5 positve clonal lymphocytosis. 6/2/19 CT of chest abdomen and pelvis revealed  splenomegaly of 15 cm compared to 12.8 cm last evaluation. Recurrence confirmed by FISH on peripheral blood earlier this month.  BM biopsy which showed 5% Mantle cell involvement although peripheral blood shows 30% involvement. CT imaging showed splenomegaly. PET scan declined by insurance twice.  Patient underwent colonoscopy  with Dr. Ac ( [place]) 8/8/19 which showed cobblestoning of colon and multiple biopsies including terminal ileum, appendix, cecum, rectosigmoid positive for mantle cell lymphoma.     6. Initiation of acalabrutinib August 2019 with minimal response.  Switched to ibrutinib and venetoclax 12/20/20 with clearing of t(11, 14) by by FISH ( 1/2020) and resolution of involvement of colon by endoscopy and pathology on exam 3/2/2020! BM biopsy  4/2020 HEATHER.     CR March 2022, ibrutinib and venetoclax discontinued.     7. S/P COVID 19 infection 12/1/2020, no sequelae     8. BMBx (4/13/2020): no evidence of MCL either by histology, flow, or FISH studies. PET scan ( 8/24/20): no abnormal FDG uptake seen. Deauville 1      9. Repeat CT C/A/P (3/10/21) with no evidence of LAD seen, Spleen size 11.5 cm ( decreased from prior).       10.C-scope (3/11/22): at OSH: negative, no evidence of disease. PET scan ( 3/14/22): no abnormal uptake. Deauville 1. BMBx (3/17/22): negative, no evidence of MCL.      11. Evusheld given on (3/17/22)     12. Pt has remained in remission, with no evidence of disease on C-scope, PET scan or BMBx. Will stop Ibrutinib/Venetoclax (3/24/22) and plan to continue to ID PPX.     13. ID PPX acyclovir stopped 2/23/23.    14: Colonoscopy (7/14/23): normal except internal hemorrhoids.    15: CT CAP (11/6/23): no intrathoracic or intra-abdominal/pelvic enlarged lymphadenopathy.  Spleen is normal in size.             Mantle cell lymphoma of spleen (Multi)   10/13/2016 Initial Diagnosis    Mantle cell lymphoma of spleen (CMS/HCC)          1. Stage IV mantle cell lymphoma in unmaintained complete  remission after receiving Nordic regimen and consolidation autograft with BEAM preparative regimen April 2012.  Recent noted mild leukocytosis  and tpenia and flow cytometry suggestive of recurrence as is splenomegaly on CT imaging from 6/2019.  I have recommended complete staging evaluation with BM biopsy 5% mantle cells, blood 30% lymphoma cells.   colonoscopy cobblestoning throughout with positive biopsy for mantle cell.  Seems this is an indolent relapse although we don't have Ki67 analysis or SOX11.     Patient initated acalabrutinib 8/2019 without response as  Peripheral blood still shows 71 copies of mantle cell gene which is identical to the pretreatment. In addition spleen size on ultrasound 15.5 cm which is unchanged or even a little larger than pre treatment.      Patient initiated venetoclax and ibrutinib 12/20/20 according  article in NEJ 2018, 378: 4377-6396 by Jim et al shows an overall response rate  of 71% with 62% CR rate at 16 weeks. In order to prevent tumor lysis syndrome venetoclax is ramped up as follows 20 mg for 1 week, 50 mg for 1 week, 100 mg for 1 week, 200 mg for 1 week, 400 mg for 1 week and maintained at 400 mg. Restagin March 2020  included colonoscopy 3/2/20 shows HEATHER!!  BM biopsy from 4/13/2020 also shows HEATHER by histology, flow or FISH studies as does PET from 8/24/90.      (3/24/22): pt seen in clinic, completed EOT C-scope (3/11/22): at OSH: negative, no evidence of disease. PET scan ( 3/14/22): no abnormal uptake.  Deauville 1. BMBx (3/17/22): negative, no evidence of MCL. Given  Pt has remained in remission, with no evidence of disease on C-scope, PET scan or BMBx.  Ibrutinib/Venetoclax discontinued (3/24/22) .         8/3/23   on observation since March 2022.   Surveillance CT imaging AP with contrast (4/13/23)  no evidence of disease recurrence and personally reviewed. Spleen size 11.5 cm on  films.  Surveillance colonoscopy July 2023 negative for recurrence. Plan CT imaging prior to return in November, than will do annually. Explained that new drup pirtubrutinib recently approved for relapsed MCL.     11/13/23:   -CT CAP done 11/6/23: no lymphadenopathy, stable right upper lobe 0.6 cm pulmonary nodule since 2015-likely benign.  Spleen is normal in size.    -Repeat CT scan in 1 year.    -Today Hbg 15.1, WBC 6.0.    -Continue observation with evaluation every 3 months with blood work.  Contact with any new concerns.       2/15/24:  Today hbg 14.6, plts 162, WBC 6.3.  Physical examination unremarkable.  Follow up evaluation in 3  months with blood work prior.  Plan for CT scan November 2024.      5/16/24:  Blood work from today is stable: hgb 13.6, plt 151, WBC 5.5, ANC 2.89.  No concerning findings on physical examination.  Plan for CT scan November 2024, will order at next visit.  Follow up in 3 months.     7/25/24: New thrombocytopenia in the past week - Platelets 55 yesterday; 71 today. Feeling well otherwise. Evaluated with Bmbx today. PET-CT + repeat CBC planned for 8/8.  Educated to watch for s/sx bleeding while traveling.      Renal failure:  Encouraged to hydrate more, grade 1 will follow.  5/16/24: Creatinine level 1.38.  Again advised adequate oral hydration.       Health maintenance.   Patient has recv'd both doses of shingrix vaccine & has completed all of his other post transplant vaccines,  Discontinued Acyclovir, & Bactrim prophylaxis (2/23/23) since off treatment since 3/24/22.  Recv'd Prevnar 13 and influenza vaccine (9/16/20) with his PCP. Up to date on tetanus.   - Recv'd Sha & Sha COVID vaccine on 3/16/21. Received booster late 2021. Received Evusheld given on (3/17/22) & 9/22/22.   -Received influenza vaccine 10/18/23.  Advised to obtain covid vaccine at local pharmacy.  Colonoscopy completed 7/14/23: normal except internal hemorrhoids.  Recommendations to repeat in 2 years.      4/20/23: some aortic calcifications on imaging, started on prevastatin-managed by his PCP.  PCP increased pravastatin dose to 40 mg daily.  PCP switched to atorvastatin 40 mg daily.     BPH:  5/16/24:  Reports urinary frequency and nocturia symptoms has greatly improved after starting tadalafil 5 mg daily.      RTC:  8/8 PET-CT ([place]) + Labs ([place])  8/15 Follow up with Dr. Anjel Barton, THA-CNP  [facility]

## 2024-07-30 LAB
PATH REPORT.COMMENTS IMP SPEC: NORMAL
PATH REPORT.FINAL DX SPEC: NORMAL
PATH REPORT.GROSS SPEC: NORMAL
PATH REPORT.MICROSCOPIC SPEC OTHER STN: NORMAL
PATH REPORT.RELEVANT HX SPEC: NORMAL
PATH REPORT.RELEVANT HX SPEC: NORMAL
PATH REPORT.TOTAL CANCER: NORMAL

## 2024-07-31 ENCOUNTER — TELEPHONE (OUTPATIENT)
Dept: HEMATOLOGY/ONCOLOGY | Facility: CLINIC | Age: 59
End: 2024-07-31
Payer: COMMERCIAL

## 2024-07-31 DIAGNOSIS — C83.17 MANTLE CELL LYMPHOMA OF SPLEEN (MULTI): Primary | ICD-10-CM

## 2024-07-31 LAB
ELECTRONICALLY SIGNED BY: NORMAL
LYMPHOID NGS RESULTS: NORMAL

## 2024-07-31 NOTE — TELEPHONE ENCOUNTER
Patient called stating he received call regarding denial.  Notified patient we are aware and working on this.  Call back instructions reviewed.  Patient verbalized understanding.   Discussed with provider by phone.  Per provider will do [id number].

## 2024-07-31 NOTE — TELEPHONE ENCOUNTER
Denial received by fax from [facility] for upcoming PET scheduled 8/8/24.  Will forward to provider to advise and follow-up.  Stating not medically necessary.  Denial placed in providers inbox for ref.

## 2024-08-01 DIAGNOSIS — C83.17 MANTLE CELL LYMPHOMA OF SPLEEN (MULTI): ICD-10-CM

## 2024-08-02 LAB
CHROM ANALY OVERALL INTERP-IMP: NORMAL
CHROM ANALY OVERALL INTERP-IMP: NORMAL
ELECTRONICALLY COSIGNED BY CYTOGENETICS: NORMAL
ELECTRONICALLY COSIGNED BY CYTOGENETICS: NORMAL
ELECTRONICALLY SIGNED BY CYTOGENETICS: NORMAL
ELECTRONICALLY SIGNED BY CYTOGENETICS: NORMAL
FISH ISCN RESULTS: NORMAL
FISH ISCN RESULTS: NORMAL

## 2024-08-05 ENCOUNTER — TELEPHONE (OUTPATIENT)
Dept: HEMATOLOGY/ONCOLOGY | Facility: CLINIC | Age: 59
End: 2024-08-05
Payer: COMMERCIAL

## 2024-08-05 ENCOUNTER — TELEPHONE (OUTPATIENT)
Dept: HEMATOLOGY/ONCOLOGY | Facility: HOSPITAL | Age: 59
End: 2024-08-05
Payer: COMMERCIAL

## 2024-08-05 NOTE — TELEPHONE ENCOUNTER
Patient wishing to talk with Scott regarding PET Scan and understanding messages on [employer].    As of now, peer to peer was to be handled to Dr. Hobbs but is to be completed by Zainab Sullivan NP with Dr. Hobbs.  PET is still scheduled as of now for 08/08/24.

## 2024-08-06 ENCOUNTER — HOSPITAL ENCOUNTER (OUTPATIENT)
Dept: RADIOLOGY | Facility: CLINIC | Age: 59
Discharge: HOME | End: 2024-08-06
Payer: COMMERCIAL

## 2024-08-06 ENCOUNTER — TELEPHONE (OUTPATIENT)
Dept: HEMATOLOGY/ONCOLOGY | Facility: HOSPITAL | Age: 59
End: 2024-08-06

## 2024-08-06 DIAGNOSIS — C83.17 MANTLE CELL LYMPHOMA OF SPLEEN (MULTI): ICD-10-CM

## 2024-08-06 LAB
CHROM ANALY OVERALL INTERP-IMP: NORMAL
ELECTRONICALLY SIGNED BY CYTOGENETICS: NORMAL

## 2024-08-06 PROCEDURE — 74177 CT ABD & PELVIS W/CONTRAST: CPT | Performed by: RADIOLOGY

## 2024-08-06 PROCEDURE — 74177 CT ABD & PELVIS W/CONTRAST: CPT

## 2024-08-06 PROCEDURE — 71260 CT THORAX DX C+: CPT

## 2024-08-06 PROCEDURE — 71260 CT THORAX DX C+: CPT | Performed by: RADIOLOGY

## 2024-08-06 PROCEDURE — 2550000001 HC RX 255 CONTRASTS: Performed by: INTERNAL MEDICINE

## 2024-08-08 ENCOUNTER — APPOINTMENT (OUTPATIENT)
Dept: HEMATOLOGY/ONCOLOGY | Facility: HOSPITAL | Age: 59
End: 2024-08-08
Payer: COMMERCIAL

## 2024-08-08 ENCOUNTER — LAB (OUTPATIENT)
Dept: LAB | Facility: HOSPITAL | Age: 59
End: 2024-08-08
Payer: COMMERCIAL

## 2024-08-08 ENCOUNTER — ONCOLOGY MEDICATION OUTREACH (OUTPATIENT)
Dept: HEMATOLOGY/ONCOLOGY | Facility: HOSPITAL | Age: 59
End: 2024-08-08

## 2024-08-08 ENCOUNTER — APPOINTMENT (OUTPATIENT)
Dept: RADIOLOGY | Facility: CLINIC | Age: 59
End: 2024-08-08
Payer: COMMERCIAL

## 2024-08-08 ENCOUNTER — OFFICE VISIT (OUTPATIENT)
Dept: HEMATOLOGY/ONCOLOGY | Facility: HOSPITAL | Age: 59
End: 2024-08-08
Payer: COMMERCIAL

## 2024-08-08 ENCOUNTER — TUMOR BOARD CONFERENCE (OUTPATIENT)
Dept: HEMATOLOGY/ONCOLOGY | Facility: HOSPITAL | Age: 59
End: 2024-08-08
Payer: COMMERCIAL

## 2024-08-08 VITALS
WEIGHT: 228.62 LBS | DIASTOLIC BLOOD PRESSURE: 82 MMHG | TEMPERATURE: 97 F | HEART RATE: 89 BPM | SYSTOLIC BLOOD PRESSURE: 135 MMHG | RESPIRATION RATE: 17 BRPM | OXYGEN SATURATION: 99 % | BODY MASS INDEX: 30.73 KG/M2

## 2024-08-08 DIAGNOSIS — C83.17 MANTLE CELL LYMPHOMA OF SPLEEN (MULTI): ICD-10-CM

## 2024-08-08 LAB
ALBUMIN SERPL BCP-MCNC: 4.1 G/DL (ref 3.4–5)
ALP SERPL-CCNC: 133 U/L (ref 33–136)
ALT SERPL W P-5'-P-CCNC: 136 U/L (ref 10–52)
ANION GAP SERPL CALC-SCNC: 14 MMOL/L (ref 10–20)
AST SERPL W P-5'-P-CCNC: 71 U/L (ref 9–39)
BASOPHILS # BLD AUTO: 0.01 X10*3/UL (ref 0–0.1)
BASOPHILS NFR BLD AUTO: 0.2 %
BILIRUB SERPL-MCNC: 1.2 MG/DL (ref 0–1.2)
BUN SERPL-MCNC: 17 MG/DL (ref 6–23)
CALCIUM SERPL-MCNC: 9.1 MG/DL (ref 8.6–10.3)
CHLORIDE SERPL-SCNC: 101 MMOL/L (ref 98–107)
CO2 SERPL-SCNC: 27 MMOL/L (ref 21–32)
CREAT SERPL-MCNC: 1.21 MG/DL (ref 0.5–1.3)
EGFRCR SERPLBLD CKD-EPI 2021: 69 ML/MIN/1.73M*2
EOSINOPHIL # BLD AUTO: 0.03 X10*3/UL (ref 0–0.7)
EOSINOPHIL NFR BLD AUTO: 0.5 %
ERYTHROCYTE [DISTWIDTH] IN BLOOD BY AUTOMATED COUNT: 13.1 % (ref 11.5–14.5)
GLUCOSE SERPL-MCNC: 154 MG/DL (ref 74–99)
HCT VFR BLD AUTO: 37.6 % (ref 41–52)
HGB BLD-MCNC: 12.4 G/DL (ref 13.5–17.5)
IMM GRANULOCYTES # BLD AUTO: 0.03 X10*3/UL (ref 0–0.7)
IMM GRANULOCYTES NFR BLD AUTO: 0.5 % (ref 0–0.9)
LYMPHOCYTES # BLD AUTO: 1.44 X10*3/UL (ref 1.2–4.8)
LYMPHOCYTES NFR BLD AUTO: 25.6 %
MCH RBC QN AUTO: 30.2 PG (ref 26–34)
MCHC RBC AUTO-ENTMCNC: 33 G/DL (ref 32–36)
MCV RBC AUTO: 92 FL (ref 80–100)
MONOCYTES # BLD AUTO: 0.57 X10*3/UL (ref 0.1–1)
MONOCYTES NFR BLD AUTO: 10.1 %
NEUTROPHILS # BLD AUTO: 3.55 X10*3/UL (ref 1.2–7.7)
NEUTROPHILS NFR BLD AUTO: 63.1 %
NRBC BLD-RTO: 0 /100 WBCS (ref 0–0)
PLATELET # BLD AUTO: 102 X10*3/UL (ref 150–450)
POTASSIUM SERPL-SCNC: 5 MMOL/L (ref 3.5–5.3)
PROT SERPL-MCNC: 7 G/DL (ref 6.4–8.2)
RBC # BLD AUTO: 4.11 X10*6/UL (ref 4.5–5.9)
SODIUM SERPL-SCNC: 137 MMOL/L (ref 136–145)
WBC # BLD AUTO: 5.6 X10*3/UL (ref 4.4–11.3)

## 2024-08-08 PROCEDURE — 99215 OFFICE O/P EST HI 40 MIN: CPT | Performed by: INTERNAL MEDICINE

## 2024-08-08 PROCEDURE — 85025 COMPLETE CBC W/AUTO DIFF WBC: CPT

## 2024-08-08 PROCEDURE — 36415 COLL VENOUS BLD VENIPUNCTURE: CPT

## 2024-08-08 PROCEDURE — 84075 ASSAY ALKALINE PHOSPHATASE: CPT

## 2024-08-08 RX ORDER — ACYCLOVIR 400 MG/1
400 TABLET ORAL 2 TIMES DAILY
Qty: 60 TABLET | Refills: 11 | Status: SHIPPED | OUTPATIENT
Start: 2024-08-08 | End: 2025-08-08

## 2024-08-08 RX ORDER — PIRTOBRUTINIB 100 MG/1
200 TABLET, COATED ORAL DAILY
Qty: 60 TABLET | Refills: 1 | Status: SHIPPED | OUTPATIENT
Start: 2024-08-08 | End: 2025-08-08

## 2024-08-08 RX ORDER — ALLOPURINOL 300 MG/1
300 TABLET ORAL DAILY
Qty: 30 TABLET | Refills: 1 | Status: SHIPPED | OUTPATIENT
Start: 2024-08-08 | End: 2025-02-04

## 2024-08-08 RX ORDER — PREDNISONE 50 MG/1
100 TABLET ORAL DAILY
Qty: 10 TABLET | Refills: 0 | Status: SHIPPED | OUTPATIENT
Start: 2024-08-08 | End: 2024-08-13

## 2024-08-08 ASSESSMENT — ENCOUNTER SYMPTOMS
MUSCULOSKELETAL NEGATIVE: 1
DIZZINESS: 0
ADENOPATHY: 0
PALPITATIONS: 0
FATIGUE: 0
UNEXPECTED WEIGHT CHANGE: 0
SHORTNESS OF BREATH: 0
CHILLS: 0
WHEEZING: 0
BLOOD IN STOOL: 0
WOUND: 0
CONSTIPATION: 0
DIAPHORESIS: 0
LEG SWELLING: 1
DYSURIA: 0
DIARRHEA: 0
LIGHT-HEADEDNESS: 0
NUMBNESS: 0
NAUSEA: 0
FEVER: 0
COUGH: 0
VOMITING: 0
APPETITE CHANGE: 0
FREQUENCY: 0
HEMATURIA: 0

## 2024-08-08 ASSESSMENT — PAIN SCALES - GENERAL: PAINLEVEL: 0-NO PAIN

## 2024-08-08 NOTE — TUMOR BOARD NOTE
TUMOR BOARD DISCUSSION SUMMARY    PRESENTER: Dr. Yvonne Hobbs    DIAGNOSIS: Stage IV mantle cell non-Hodgkin lymphoma    SUMMARY/PRESENTATION: Nancie Armenta is a 59 y.o. male patient who presents with recurrent mantle cell lymphoma involving the gastrointestinal tract dx 2011. Now with splenomegaly.     History: Shingles in 2014    Previous treatment: Nordic regimen, Beam therapy followed by autologous stem cell transplant in 2012, acalabrutinib 2019, venetoclax and ibrutinib in 2020       Information reviewed: Radiology Review and Pathology Review    Radiology:   (08/06/24) CT A/P w Contrast     Recurrent malignancy, again presumably lymphoma with upper  retroperitoneal adenopathy and splenomegaly including with some areas  of parenchymal hypodensity. There are also linear components of the  splenic hypodensity, questionable for splenic laceration which could  occur with predisposed splenomegaly and relatively minor trauma.  Follow-up would be recommended if is associated symptomatology.  2. Mild sigmoid diverticulosis, small volume of free fluid in the  pelvis and nonincarcerated umbilical hernia.    Pathology:   (07/25/24) Bone Marrow Biopsy  Variably cellular bone marrow (30-50%) with maturing trilineage hematopoiesis and focal involvement by mantle cell lymphoma.   NOTE: Clonal lambda-restricted B cells that were CD5+ and CD10? were identified by flow cytometry and visualized by immunostain at roughly 5-10% of the marrow cellularity. These findings are consistent with the patient's known lymphoproliferative disease. Clinical correlation and correlation with pending molecular and cytogenetic studies is recommended.     RECOMMENDATIONS: Consider Pirtobrutinib alone or with venetclax, consider CAR T cell treatment.          Disclaimer     [facility] tumor board recommendations represent the consensus opinion of physicians present at a weekly patient care conference. The treating [facility] physician is not always present,  and many of the physicians formulating the recommendation have not personally seen or examined the patient under discussion. It is understood that the treating [facility] physician considers the expertise of the Tumor Board Recommendation in formulating his/her plan for the patient. However, in many situations, based on individualized patient considerations, a different plan is determined by the treating physician to be the optimal medical management.     Scribe Attestation  By signing my name below, IEileen Scribe   attest that this documentation has been prepared under the direction and in the presence of MALIGNANT HEME TUMOR BOARD.

## 2024-08-08 NOTE — PROGRESS NOTES
ONCOLOGY PHARMACY CHEMOTHERAPY EDUCATION NOTE     Diagnosis: Mantle Cell lymphoma     Prescriber: Dr. Hobbs    Current Outpatient Medications on File Prior to Visit   Medication Sig Dispense Refill    acyclovir (Zovirax) 400 mg tablet Take 1 tablet (400 mg) by mouth 2 times a day. 60 tablet 11    allopurinol (Zyloprim) 300 mg tablet Take 1 tablet (300 mg) by mouth once daily. 30 tablet 1    aspirin 81 mg EC tablet Take 1 tablet (81 mg) by mouth once daily.      atorvastatin (Lipitor) 40 mg tablet Take 1 tablet (40 mg) by mouth once daily.      hydrocortisone 1 % cream       multivit-min/iron/folic acid/K (ADULTS MULTIVITAMIN ORAL) Take by mouth.      pirtobrutinib 100 mg tablet Take 200 mg by mouth once daily. 60 tablet 1    predniSONE (Deltasone) 50 mg tablet Take 2 tablets (100 mg) by mouth once daily for 5 days. 10 tablet 0    tadalafil (Cialis) 5 mg tablet Take 1 tablet (5 mg) by mouth once daily.       No current facility-administered medications on file prior to visit.     Treatment Plan       None            Note:      Pharmacist consulted to provide education on Pirtobrutinib  chemotherapy via In-Person  visit.      1.  Chemotherapy Education: The chemotherapy regimen Pirtobrutinib was discussed with patient and wife including treatment schedule, potential side effects, and management of side effects.  Potential side effects include but are not limited to: chemotherapy side effects: neutropenia, infection risk, anemia, fatigue, weakness, low energy, thrombocytopenia, bleeding/bruising, skin rash, and afib/aflutter.  Management techniques of these side effects were discussed, such as blood count checks, prophylactic anti-infective medicines, temperature checks, blood product transfusions, electrolyte monitoring, and moisturizer and sunblock SPF30 use.  Written materials were provided including drug-specific side effect handouts.  Prescriptions for supportive care/prophylaxis (if applicable) medications were  also discussed in terms of use and potential side effects.      2.  Home Medications: Reviewed patients documented home medications. Drug interactions identified between chemotherapy and patient’s home medications: none*.  All questions were answered.  patient and wife verbalized understanding of the plan of care and management of side effects.  Spent approximately 20 minutes coordinating care and patient interaction.  Will follow-up as necessary.        Thank you for consulting [facility] Oncology Pharmacy Team.   Please don’t hesitate to reach out for further questions regarding this patient.     Sumit Donovan [facility], PharmD

## 2024-08-08 NOTE — PROGRESS NOTES
Patient ID: Nancie Armenta is a 59 y.o. male.    Treatment:   Oncology History Overview Note           Patient Visit Information:   Visit Type: Follow Up Visit      Cancer History:   Treatment Synopsis:     1. Stage IV A mantle cell non-Hodgkin lymphoma involving the gastrointestinal tract diagnosed in October 2011, with diffuse gastrointestinal involvement. The patient was treated  with RCHOP alternating with Rituxan and high-dose Mamie-C and received total of 6 cycles of chemotherapy which he finished in February 2012.   Allergic to Augmentin, causes hives.     2. Patient underwent beam chemotherapy followed by autologous peripheral stem cell transplant in April 2012, by Dr. Yvonne Hobbs at [facility], was also involved in phase 3 clinical trial up killed shingles vaccine versus placebo (Merck-1 Z10  study).     3.The patient developed shingles in January 2014, involving left flank.      4. 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      5.  Recurrent mantle cell lymphoma June 2019 assx presentation with leukocytosis. Peripheral blood flow which showed a CD5 positve clonal lymphocytosis. 6/2/19 CT of chest abdomen and pelvis revealed  splenomegaly of 15 cm compared to 12.8 cm last evaluation. Recurrence confirmed by FISH on peripheral blood earlier this month.  BM biopsy which showed 5% Mantle cell involvement although peripheral blood shows 30% involvement. CT imaging showed splenomegaly. PET scan declined by insurance twice.  Patient underwent colonoscopy  with Dr. Ac ( [place]) 8/8/19 which showed cobblestoning of colon and multiple biopsies including terminal ileum, appendix, cecum,  rectosigmoid positive for mantle cell lymphoma.     6. Initiation of acalabrutinib August 2019 with minimal response. Switched to ibrutinib and venetoclax 12/20/20 with clearing of t(11, 14) by by FISH ( 1/2020) and resolution of involvement of colon by endoscopy and pathology on exam 3/2/2020! BM biopsy  4/2020 HEATHER.     CR March 2022, ibrutinib and venetoclax discontinued.    July 2024 development of Tpenia, bone marrow biopsy 7/25/24 showed focal involvement with mantle cell lymphoma (NGS cyclin D1 positive and Sox 11 positive. BIRC+ and TP 53 positve with a VAF of 3%, BIRC3 positive). FISH negative for 17p deletion  CT imaging done on 8/7/24 shows thickening of sigmoid colon, development of mild to moderate retroperitoneal adenopathy, splenomegally and possible splenic infarct.    Pirotbrutib 200 mg daily initiated 8/12/24     7. S/P COVID 19 infection 12/1/2020, no sequelae     8. BMBx (4/13/2020): no evidence of MCL either by histology, flow, or FISH studies. PET scan ( 8/24/20): no abnormal FDG uptake seen. Deauville 1      9. Repeat CT C/A/P (3/10/21) with no evidence of LAD seen, Spleen size 11.5 cm ( decreased from prior).       10.C-scope (3/11/22): at OSH: negative, no evidence of disease. PET scan ( 3/14/22): no abnormal uptake. Deauville 1. BMBx (3/17/22): negative, no evidence of MCL.      11. Evusheld given on (3/17/22)     12. Pt has remained in remission, with no evidence of disease on C-scope, PET scan or BMBx. Will stop Ibrutinib/Venetoclax (3/24/22) and plan to continue to ID PPX.     13. ID PPX acyclovir stopped 2/23/23.    14: Colonoscopy (7/14/23): normal except internal hemorrhoids.    15: CT CAP (11/6/23): no intrathoracic or intra-abdominal/pelvic enlarged lymphadenopathy.  Spleen is normal in size.             Lymphoma (Multi)   10/20/2011 Initial Diagnosis    Lymphoma (Multi)     Mantle cell lymphoma of spleen (Multi)   10/13/2016 Initial Diagnosis    Mantle cell lymphoma of spleen  (CMS/[facility])         Response:     Past Medical History:  Renal insufficiency.   Past Medical History:   Diagnosis Date    Fourth (trochlear) nerve palsy, right eye 06/08/2017    Right-sided fourth cranial nerve palsy    Fourth (trochlear) nerve palsy, right eye 06/08/2017    Right-sided fourth cranial nerve palsy    Malignant neoplasm of connective and soft tissue, unspecified (Multi) 06/08/2017    Cancer of blood vessel     Surgical History:   Past Surgical History:   Procedure Laterality Date    SHOULDER SURGERY  07/13/2017    Shoulder Surgery        Family History:   Family History   Problem Relation Name Age of Onset    Cataracts Mother      Cataracts Father      Other (chronic lymphoid leukemia) Father         Social History:  .  Working full time for [employer].    Social History     Tobacco Use    Smoking status: Never    Smokeless tobacco: Never   Vaping Use    Vaping status: Never Used   Substance Use Topics    Alcohol use: Never    Drug use: Never   -------------------------------------------------------------------------------------------------------  Subjective       HPI    Nancie Armenta is a 59 year old male with PMH of hyperglycemia, renal insufficiency, and stage IV A mantle cell non hodgkin lymphoma involving the gastrointestinal tract, s/p autologous SCT (April 2012).  Mr. Armenta presents to the clinic today (5/16/24) with his spouse Cheri for follow up evaluation of mantle cell NHL and blood work.      Patient is here today 8/8/24 with his wife to discuss treatment for recently recurrent mantle cell lymphoma,  Early in July patient noted to have dropping platelet counts and bone marrow biopsy 7/25/24 showed focal involvement with mantle cell lymphoma (NGS cyclin D1 positive and Sox 11 positive. BIRC+ ad TP 53 positve with a VAF of 3%, BIRC3 positive). FISH negative for 17p deletion  CT imaging done on 8/7/24 shows thickening of sigmoid colon, development of mild to moderate  retroperitoneal adenopathy, splenomegally and possible splenic infarct.  He has noted some pain in his spleen,  appetite is down  and eating small portions.  Noted some cold intolerance was able to relax some on his recent beach vacation despite the worries about recurrence. .       Review of Systems   Constitutional:  Negative for appetite change, chills, diaphoresis, fatigue, fever and unexpected weight change.   Respiratory:  Negative for cough, shortness of breath and wheezing.    Cardiovascular:  Positive for leg swelling (mild intermittent edema to bilateral lower extremities, left is worse than right). Negative for chest pain and palpitations.   Gastrointestinal:  Positive for abdominal pain (LUQ). Negative for blood in stool, constipation, diarrhea, nausea and vomiting.   Genitourinary:  Negative for dysuria, frequency, hematuria and nocturia.    Musculoskeletal: Negative.  Negative for gait problem.   Skin:  Negative for rash and wound.   Neurological:  Negative for dizziness, gait problem, light-headedness and numbness.   Hematological:  Negative for adenopathy.   -------------------------------------------------------------------------------------------------------  Objective   BSA: 2.3 meters squared  /82   Pulse 89   Temp 36.1 °C (97 °F)   Resp 17   Wt 104 kg (228 lb 9.9 oz)   SpO2 99%   BMI 30.73 kg/m²     Physical Exam  Vitals reviewed.   Constitutional:       General: He is not in acute distress.     Appearance: Normal appearance.   HENT:      Head: Normocephalic and atraumatic.      Mouth/Throat:      Mouth: Mucous membranes are moist.   Eyes:      Extraocular Movements: Extraocular movements intact.      Conjunctiva/sclera: Conjunctivae normal.      Pupils: Pupils are equal, round, and reactive to light.   Cardiovascular:      Rate and Rhythm: Normal rate and regular rhythm.      Pulses: Normal pulses.      Heart sounds: Normal heart sounds. No murmur heard.     No friction rub. No  gallop.   Pulmonary:      Effort: Pulmonary effort is normal. No respiratory distress.      Breath sounds: Normal breath sounds. No wheezing.   Abdominal:      General: Abdomen is flat. Bowel sounds are normal. There is no distension.      Palpations: Abdomen is soft. There is no mass.      Tenderness: There is no abdominal tenderness.      Comments: Spleen soft and palpable 10 cm BCM, mild tenderness   Musculoskeletal:         General: Swelling (non-pitting edema to bilateral lower extremities) present. Normal range of motion.   Lymphadenopathy:      Comments: No lymphadenopathy   Skin:     General: Skin is warm and dry.   Neurological:      General: No focal deficit present.      Mental Status: He is alert and oriented to person, place, and time.   Psychiatric:         Mood and Affect: Mood normal.         Behavior: Behavior normal.         Thought Content: Thought content normal.         Judgment: Judgment normal.       Performance Status:  ECOG performance status: 0 fully active  -------------------------------------------------------------------------------------------------------  Assessment/Plan      1. Stage IV mantle cell lymphoma in unmaintained complete  remission after receiving Nordic regimen and consolidation autograft with BEAM preparative regimen April 2012.  Recent noted mild leukocytosis  and tpenia and flow cytometry suggestive of recurrence as is splenomegaly on CT imaging from 6/2019.  BM biopsy 5% mantle cells, blood 30% lymphoma cells. colonoscopy cobblestoning throughout with positive biopsy for mantle cell.  Seems this is an indolent relapse although we don't have Ki67 analysis or SOX11.     Patient initated acalabrutinib 8/2019 without response as Peripheral blood still shows 71 copies of mantle cell gene which is identical to the pretreatment. In addition spleen size on ultrasound 15.5 cm which is unchanged or even a little larger than pre treatment.      Patient initiated venetoclax and  ibrutinib 12/20/20 according  article in NEJM 2018, 378: 8467-1306 by Jim et al shows an overall response rate  of 71% with 62% CR rate at 16 weeks. In order to prevent tumor lysis syndrome venetoclax is ramped up as follows 20 mg for 1 week, 50 mg for 1 week, 100 mg for 1 week, 200 mg for 1 week, 400 mg for 1 week and maintained at 400 mg. Restagin March 2020  included colonoscopy 3/2/20 shows HEATHER!!  BM biopsy from 4/13/2020 also shows HEATHER by histology, flow or FISH studies as does PET from 8/24/90.      (3/24/22): pt seen in clinic, completed EOT C-scope (3/11/22): at OSH: negative, no evidence of disease. PET scan ( 3/14/22): no abnormal uptake.  Deauville 1. BMBx (3/17/22): negative, no evidence of MCL. Given  Pt has remained in remission, with no evidence of disease on C-scope, PET scan or BMBx.  Ibrutinib/Venetoclax discontinued (3/24/22) .        8/8/24 In July patient noted to have dropping platelet counts and bone marrow biopsy 7/25/24 showed focal involvement with mantle cell lymphoma (NGS cyclin D1 positive and Sox 11 positive. BIRC+ ad TP 53 positve with a VAF of 3%, BIRC3 positive). FISH negative for 17p deletion  CT imaging done on 8/7/24 shows thickening of sigmoid colon, development of mild to moderate retroperitoneal adenopathy, splenomegally and possible splenic infarct.  Today  platelets in 110K range, we discussed starting pirubrutinib a new BTK inhibitor for salvage therapy with goal to decrease splenomegally and improve blood counts and gain control of disease. Side effects including cytopenias, rash, increased bleeding risk and risk of cardiac arryhtmias, chemo consent signed and med sent to  specialty with delivery on 8/12/24 anticipated.  Will give short course of prephase prednisone for debulking and sx control.    Long term will likely plan commercial CART cells, tescartus, info provided        Renal failure:  Encouraged to hydrate more, grade 1 will follow.  5/16/24: Creatinine level  1.38.  Again advised adequate oral hydration.       Health maintenance.   Patient has recv'd both doses of shingrix vaccine & has completed all of his other post transplant vaccines,  Discontinued Acyclovir, & Bactrim prophylaxis (2/23/23) since off treatment since 3/24/22.  Recv'd Prevnar 13 and influenza vaccine (9/16/20) with his PCP. Up to date on tetanus.   - Recv'd Sha & Sha COVID vaccine on 3/16/21. Received booster late 2021. Received Evusheld given on (3/17/22) & 9/22/22.   -Received influenza vaccine 10/18/23.  Advised to obtain covid vaccine at local pharmacy.  Colonoscopy completed 7/14/23: normal except internal hemorrhoids.  Recommendations to repeat in 2 years.      4/20/23: some aortic calcifications on imaging, started on prevastatin-managed by his PCP.  PCP increased pravastatin dose to 40 mg daily.  PCP switched to atorvastatin 40 mg daily.    BPH:  5/16/24:  Reports urinary frequency and nocturia symptoms has greatly improved after starting tadalafil 5 mg daily.     RTC: To start pirtobrutinib, side effects reviewed,  will also start allopurinol and acyclovir. Will give short course of prednisone to relieve splenic swelling

## 2024-08-09 ENCOUNTER — SPECIALTY PHARMACY (OUTPATIENT)
Dept: PHARMACY | Facility: CLINIC | Age: 59
End: 2024-08-09

## 2024-08-09 ENCOUNTER — SPECIALTY PHARMACY (OUTPATIENT)
Dept: HEMATOLOGY/ONCOLOGY | Facility: HOSPITAL | Age: 59
End: 2024-08-09
Payer: COMMERCIAL

## 2024-08-09 ENCOUNTER — PHARMACY VISIT (OUTPATIENT)
Dept: PHARMACY | Facility: CLINIC | Age: 59
End: 2024-08-09
Payer: COMMERCIAL

## 2024-08-09 PROCEDURE — RXMED WILLOW AMBULATORY MEDICATION CHARGE

## 2024-08-09 RX ORDER — OMEPRAZOLE 20 MG/1
20 CAPSULE, DELAYED RELEASE ORAL
Qty: 30 CAPSULE | Refills: 11 | Status: SHIPPED | OUTPATIENT
Start: 2024-08-09 | End: 2025-08-09

## 2024-08-09 ASSESSMENT — ENCOUNTER SYMPTOMS: ABDOMINAL PAIN: 1

## 2024-08-09 NOTE — PROGRESS NOTES
[facility] Clinical Note    Nancie Armenta is a 59 y.o. male, who is on the specialty pharmacy service for management of: Oncology Core with status of: (Enrolled)     Nancie was contacted on 8/9/2024.    Refer to the encounter summary report for documentation details about patient counseling and education.      Medication Adherence  The importance of adherence was discussed with the patient and they were advised to take the medication as prescribed by their provider. Nancie was encouraged to call his physician's office if they have a question regarding a missed dose.       Patient advised to contact the pharmacy if there are any changes to her medication list, including prescriptions, OTC medications, herbal products, or supplements. Patient was advised of [facility]’s dispensing process, refill timeline, contact information ([phone]), and patient management follow up. Patient confirmed understanding of education conducted during assessment. All patient questions and concerns were addressed to the best of my ability. Patient was encouraged to contact the specialty pharmacy with any questions or concerns.    Confirmed follow-up outreaches are properly scheduled. Reviewed goals of therapy in the program targets.  See - onc med outreach note for education details.  Sumit Donovan, PharmD

## 2024-08-09 NOTE — PROGRESS NOTES
Specialty Pharmacy    Jaypirca 100mg    Coverage has been approved for this patient's medication from 07.10.24-08.09.25.     This patient has scheduled their medication delivery with  Specialty Pharmacy.      They should receive their medication Monday, 08.12.24.    Thank you,    Mayte Portillo, [facility]  Pharmacy Support [facility]  Specialty Pharmacy  [address] [place] [address], [id number]  T: [phone]  F: [phone]  [email or web address]

## 2024-08-15 ENCOUNTER — APPOINTMENT (OUTPATIENT)
Dept: HEMATOLOGY/ONCOLOGY | Facility: HOSPITAL | Age: 59
End: 2024-08-15
Payer: COMMERCIAL

## 2024-08-15 ENCOUNTER — SPECIALTY PHARMACY (OUTPATIENT)
Dept: PHARMACY | Facility: CLINIC | Age: 59
End: 2024-08-15

## 2024-08-28 ASSESSMENT — ENCOUNTER SYMPTOMS
WOUND: 0
ABDOMINAL PAIN: 1
SHORTNESS OF BREATH: 0
HEMATURIA: 0
FEVER: 0
DIAPHORESIS: 0
DIZZINESS: 0
LEG SWELLING: 1
LIGHT-HEADEDNESS: 0
BLOOD IN STOOL: 0
COUGH: 0
UNEXPECTED WEIGHT CHANGE: 0
VOMITING: 0
FREQUENCY: 0
CONSTIPATION: 0
MUSCULOSKELETAL NEGATIVE: 1
APPETITE CHANGE: 0
ADENOPATHY: 0
PALPITATIONS: 0
CHILLS: 0
NAUSEA: 0
FATIGUE: 0
NUMBNESS: 0
DIARRHEA: 0
DYSURIA: 0
WHEEZING: 0

## 2024-08-29 ENCOUNTER — SPECIALTY PHARMACY (OUTPATIENT)
Dept: HEMATOLOGY/ONCOLOGY | Facility: HOSPITAL | Age: 59
End: 2024-08-29
Payer: COMMERCIAL

## 2024-08-29 ENCOUNTER — LAB (OUTPATIENT)
Dept: LAB | Facility: HOSPITAL | Age: 59
End: 2024-08-29
Payer: COMMERCIAL

## 2024-08-29 ENCOUNTER — OFFICE VISIT (OUTPATIENT)
Dept: HEMATOLOGY/ONCOLOGY | Facility: HOSPITAL | Age: 59
End: 2024-08-29
Payer: COMMERCIAL

## 2024-08-29 VITALS
SYSTOLIC BLOOD PRESSURE: 122 MMHG | DIASTOLIC BLOOD PRESSURE: 78 MMHG | HEART RATE: 91 BPM | BODY MASS INDEX: 30.55 KG/M2 | OXYGEN SATURATION: 100 % | TEMPERATURE: 96.3 F | RESPIRATION RATE: 16 BRPM | WEIGHT: 227.29 LBS

## 2024-08-29 DIAGNOSIS — C83.17 MANTLE CELL LYMPHOMA OF SPLEEN (MULTI): ICD-10-CM

## 2024-08-29 LAB
ALBUMIN SERPL BCP-MCNC: 3.7 G/DL (ref 3.4–5)
ALP SERPL-CCNC: 90 U/L (ref 33–136)
ALT SERPL W P-5'-P-CCNC: 44 U/L (ref 10–52)
ANION GAP SERPL CALC-SCNC: 14 MMOL/L (ref 10–20)
AST SERPL W P-5'-P-CCNC: 29 U/L (ref 9–39)
BASOPHILS # BLD AUTO: 0.02 X10*3/UL (ref 0–0.1)
BASOPHILS NFR BLD AUTO: 0.4 %
BILIRUB SERPL-MCNC: 0.7 MG/DL (ref 0–1.2)
BUN SERPL-MCNC: 23 MG/DL (ref 6–23)
CALCIUM SERPL-MCNC: 8.4 MG/DL (ref 8.6–10.3)
CHLORIDE SERPL-SCNC: 102 MMOL/L (ref 98–107)
CO2 SERPL-SCNC: 26 MMOL/L (ref 21–32)
CREAT SERPL-MCNC: 1.35 MG/DL (ref 0.5–1.3)
EGFRCR SERPLBLD CKD-EPI 2021: 60 ML/MIN/1.73M*2
EOSINOPHIL # BLD AUTO: 0.04 X10*3/UL (ref 0–0.7)
EOSINOPHIL NFR BLD AUTO: 0.8 %
ERYTHROCYTE [DISTWIDTH] IN BLOOD BY AUTOMATED COUNT: 15 % (ref 11.5–14.5)
GLUCOSE SERPL-MCNC: 106 MG/DL (ref 74–99)
HCT VFR BLD AUTO: 34.6 % (ref 41–52)
HGB BLD-MCNC: 11 G/DL (ref 13.5–17.5)
IMM GRANULOCYTES # BLD AUTO: 0.02 X10*3/UL (ref 0–0.7)
IMM GRANULOCYTES NFR BLD AUTO: 0.4 % (ref 0–0.9)
LDH SERPL L TO P-CCNC: 147 U/L (ref 84–246)
LYMPHOCYTES # BLD AUTO: 1.86 X10*3/UL (ref 1.2–4.8)
LYMPHOCYTES NFR BLD AUTO: 39 %
MCH RBC QN AUTO: 30.2 PG (ref 26–34)
MCHC RBC AUTO-ENTMCNC: 31.8 G/DL (ref 32–36)
MCV RBC AUTO: 95 FL (ref 80–100)
MONOCYTES # BLD AUTO: 0.44 X10*3/UL (ref 0.1–1)
MONOCYTES NFR BLD AUTO: 9.2 %
NEUTROPHILS # BLD AUTO: 2.39 X10*3/UL (ref 1.2–7.7)
NEUTROPHILS NFR BLD AUTO: 50.2 %
NRBC BLD-RTO: 0 /100 WBCS (ref 0–0)
PLATELET # BLD AUTO: 63 X10*3/UL (ref 150–450)
POTASSIUM SERPL-SCNC: 5 MMOL/L (ref 3.5–5.3)
PROT SERPL-MCNC: 6.9 G/DL (ref 6.4–8.2)
RBC # BLD AUTO: 3.64 X10*6/UL (ref 4.5–5.9)
SODIUM SERPL-SCNC: 137 MMOL/L (ref 136–145)
WBC # BLD AUTO: 4.8 X10*3/UL (ref 4.4–11.3)

## 2024-08-29 PROCEDURE — 83615 LACTATE (LD) (LDH) ENZYME: CPT

## 2024-08-29 PROCEDURE — 85025 COMPLETE CBC W/AUTO DIFF WBC: CPT

## 2024-08-29 PROCEDURE — 99214 OFFICE O/P EST MOD 30 MIN: CPT | Performed by: INTERNAL MEDICINE

## 2024-08-29 PROCEDURE — 36415 COLL VENOUS BLD VENIPUNCTURE: CPT

## 2024-08-29 PROCEDURE — 84075 ASSAY ALKALINE PHOSPHATASE: CPT

## 2024-08-29 ASSESSMENT — PAIN SCALES - GENERAL: PAINLEVEL: 0-NO PAIN

## 2024-08-29 NOTE — PROGRESS NOTES
[facility] Clinical Note    Nancie Armenta is a 59 y.o. male, who is on the specialty pharmacy service for management of: Oncology Core with status of: (Enrolled)     Nancie was contacted on 8/29/2024.    Refer to the encounter summary report for documentation details about patient counseling and education.      Medication Adherence  The importance of adherence was discussed with the patient and they were advised to take the medication as prescribed by their provider. Nancie was encouraged to call his physician's office if they have a question regarding a missed dose.       Patient advised to contact the pharmacy if there are any changes to her medication list, including prescriptions, OTC medications, herbal products, or supplements. Patient was advised of [facility]’s dispensing process, refill timeline, contact information ([phone]), and patient management follow up. Patient confirmed understanding of education conducted during assessment. All patient questions and concerns were addressed to the best of my ability. Patient was encouraged to contact the specialty pharmacy with any questions or concerns.    Confirmed follow-up outreaches are properly scheduled. Reviewed goals of therapy in the program targets.    Sumit Donovan, PharmD

## 2024-08-29 NOTE — PROGRESS NOTES
Patient ID: Nancie Armenta is a 59 y.o. male.    Treatment:   Oncology History Overview Note           Patient Visit Information:   Visit Type: Follow Up Visit      Cancer History:   Treatment Synopsis:     1. Stage IV A mantle cell non-Hodgkin lymphoma involving the gastrointestinal tract diagnosed in October 2011, with diffuse gastrointestinal involvement. The patient was treated  with RCHOP alternating with Rituxan and high-dose Mamie-C and received total of 6 cycles of chemotherapy which he finished in February 2012.   Allergic to Augmentin, causes hives.     2. Patient underwent beam chemotherapy followed by autologous peripheral stem cell transplant in April 2012, by Dr. Yvonne Hobbs at [facility], was also involved in phase 3 clinical trial up killed shingles vaccine versus placebo ([employer]-1 Z10  study).     3.The patient developed shingles in January 2014, involving left flank.      4. 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      5.  Recurrent mantle cell lymphoma June 2019 assx presentation with leukocytosis. Peripheral blood flow which showed a CD5 positve clonal lymphocytosis. 6/2/19 CT of chest abdomen and pelvis revealed  splenomegaly of 15 cm compared to 12.8 cm last evaluation. Recurrence confirmed by FISH on peripheral blood earlier this month.  BM biopsy which showed 5% Mantle cell involvement although peripheral blood shows 30% involvement. CT imaging showed splenomegaly. PET scan declined by insurance twice.  Patient underwent colonoscopy  with Dr. Ac ( [place]) 8/8/19 which showed cobblestoning of colon and multiple biopsies including terminal ileum, appendix, cecum,  rectosigmoid positive for mantle cell lymphoma.     6. Initiation of acalabrutinib August 2019 with minimal response. Switched to ibrutinib and venetoclax 12/20/20 with clearing of t(11, 14) by by FISH ( 1/2020) and resolution of involvement of colon by endoscopy and pathology on exam 3/2/2020! BM biopsy  4/2020 HEATHER.     CR March 2022, ibrutinib and venetoclax discontinued.    July 2024 development of Tpenia, bone marrow biopsy 7/25/24 showed focal involvement with mantle cell lymphoma (NGS cyclin D1 positive and Sox 11 positive. BIRC+ and TP 53 positve with a VAF of 3%, BIRC3 positive). FISH negative for 17p deletion  CT imaging done on 8/7/24 shows thickening of sigmoid colon, development of mild to moderate retroperitoneal adenopathy, splenomegally and possible splenic infarct.    Pirotbrutib 200 mg daily initiated 8/12/24     7. S/P COVID 19 infection 12/1/2020, no sequelae     8. BMBx (4/13/2020): no evidence of MCL either by histology, flow, or FISH studies. PET scan ( 8/24/20): no abnormal FDG uptake seen. Deauville 1      9. Repeat CT C/A/P (3/10/21) with no evidence of LAD seen, Spleen size 11.5 cm ( decreased from prior).       10.C-scope (3/11/22): at OSH: negative, no evidence of disease. PET scan ( 3/14/22): no abnormal uptake. Deauville 1. BMBx (3/17/22): negative, no evidence of MCL.      11. Evusheld given on (3/17/22)     12. Pt has remained in remission, with no evidence of disease on C-scope, PET scan or BMBx. Will stop Ibrutinib/Venetoclax (3/24/22) and plan to continue to ID PPX.     13. ID PPX acyclovir stopped 2/23/23.    14: Colonoscopy (7/14/23): normal except internal hemorrhoids.    15: CT CAP (11/6/23): no intrathoracic or intra-abdominal/pelvic enlarged lymphadenopathy.  Spleen is normal in size.             Lymphoma (Multi)   10/20/2011 Initial Diagnosis    Lymphoma (Multi)     Mantle cell lymphoma of spleen (Multi)   10/13/2016 Initial Diagnosis    Mantle cell lymphoma of spleen  (CMS/[facility])         Response:     Past Medical History:  Renal insufficiency.   Past Medical History:   Diagnosis Date   • Fourth (trochlear) nerve palsy, right eye 06/08/2017    Right-sided fourth cranial nerve palsy   • Fourth (trochlear) nerve palsy, right eye 06/08/2017    Right-sided fourth cranial nerve palsy   • Malignant neoplasm of connective and soft tissue, unspecified (Multi) 06/08/2017    Cancer of blood vessel     Surgical History:   Past Surgical History:   Procedure Laterality Date   • SHOULDER SURGERY  07/13/2017    Shoulder Surgery        Family History:   Family History   Problem Relation Name Age of Onset   • Cataracts Mother     • Cataracts Father     • Other (chronic lymphoid leukemia) Father         Social History:  .  Working full time for [employer].    Social History     Tobacco Use   • Smoking status: Never   • Smokeless tobacco: Never   Vaping Use   • Vaping status: Never Used   Substance Use Topics   • Alcohol use: Never   • Drug use: Never   -------------------------------------------------------------------------------------------------------  Subjective       HPI    Nancie Armenta is a 59 year old male with PMH of hyperglycemia, renal insufficiency, and stage IV A mantle cell non hodgkin lymphoma involving the gastrointestinal tract, s/p autologous SCT (April 2012).  Mr. Armenta presents to the clinic today (5/16/24) with his spouse Cheri for follow up evaluation of mantle cell NHL and blood work.      Patient is here today 8/8/24 with his wife to discuss treatment for recently recurrent mantle cell lymphoma,  Early in July patient noted to have dropping platelet counts and bone marrow biopsy 7/25/24 showed focal involvement with mantle cell lymphoma (NGS cyclin D1 positive and Sox 11 positive. BIRC+ ad TP 53 positve with a VAF of 3%, BIRC3 positive). FISH negative for 17p deletion  CT imaging done on 8/7/24 shows thickening of sigmoid colon, development of mild to  moderate retroperitoneal adenopathy, splenomegally and possible splenic infarct.  He has noted some pain in his spleen,  appetite is down  and eating small portions.  Noted some cold intolerance was able to relax some on his recent beach vacation despite the worries about recurrence. .       Review of Systems   Constitutional:  Negative for appetite change, chills, diaphoresis, fatigue, fever and unexpected weight change.   Respiratory:  Negative for cough, shortness of breath and wheezing.    Cardiovascular:  Positive for leg swelling (mild intermittent edema to bilateral lower extremities, left is worse than right). Negative for chest pain and palpitations.   Gastrointestinal:  Positive for abdominal pain (LUQ). Negative for blood in stool, constipation, diarrhea, nausea and vomiting.   Genitourinary:  Negative for dysuria, frequency, hematuria and nocturia.    Musculoskeletal: Negative.  Negative for gait problem.   Skin:  Negative for rash and wound.   Neurological:  Negative for dizziness, gait problem, light-headedness and numbness.   Hematological:  Negative for adenopathy.   -------------------------------------------------------------------------------------------------------  Objective   BSA: There is no height or weight on file to calculate BSA.  There were no vitals taken for this visit.    Physical Exam  Vitals reviewed.   Constitutional:       General: He is not in acute distress.     Appearance: Normal appearance.   HENT:      Head: Normocephalic and atraumatic.      Mouth/Throat:      Mouth: Mucous membranes are moist.   Eyes:      Extraocular Movements: Extraocular movements intact.      Conjunctiva/sclera: Conjunctivae normal.      Pupils: Pupils are equal, round, and reactive to light.   Cardiovascular:      Rate and Rhythm: Normal rate and regular rhythm.      Pulses: Normal pulses.      Heart sounds: Normal heart sounds. No murmur heard.     No friction rub. No gallop.   Pulmonary:      Effort:  Pulmonary effort is normal. No respiratory distress.      Breath sounds: Normal breath sounds. No wheezing.   Abdominal:      General: Abdomen is flat. Bowel sounds are normal. There is no distension.      Palpations: Abdomen is soft. There is no mass.      Tenderness: There is no abdominal tenderness.      Comments: Spleen soft and palpable 10 cm BCM, mild tenderness   Musculoskeletal:         General: Swelling (non-pitting edema to bilateral lower extremities) present. Normal range of motion.   Lymphadenopathy:      Comments: No lymphadenopathy   Skin:     General: Skin is warm and dry.   Neurological:      General: No focal deficit present.      Mental Status: He is alert and oriented to person, place, and time.   Psychiatric:         Mood and Affect: Mood normal.         Behavior: Behavior normal.         Thought Content: Thought content normal.         Judgment: Judgment normal.     Performance Status:  ECOG performance status: 0 fully active  -------------------------------------------------------------------------------------------------------  Assessment/Plan      1. Stage IV mantle cell lymphoma in unmaintained complete  remission after receiving Nordic regimen and consolidation autograft with BEAM preparative regimen April 2012.  Recent noted mild leukocytosis  and tpenia and flow cytometry suggestive of recurrence as is splenomegaly on CT imaging from 6/2019.  BM biopsy 5% mantle cells, blood 30% lymphoma cells. colonoscopy cobblestoning throughout with positive biopsy for mantle cell.  Seems this is an indolent relapse although we don't have Ki67 analysis or SOX11.     Patient initated acalabrutinib 8/2019 without response as Peripheral blood still shows 71 copies of mantle cell gene which is identical to the pretreatment. In addition spleen size on ultrasound 15.5 cm which is unchanged or even a little larger than pre treatment.      Patient initiated venetoclax and ibrutinib 12/20/20 according  article  in NEJ 2018, 378: 5437-0274 by Jim et al shows an overall response rate  of 71% with 62% CR rate at 16 weeks. In order to prevent tumor lysis syndrome venetoclax is ramped up as follows 20 mg for 1 week, 50 mg for 1 week, 100 mg for 1 week, 200 mg for 1 week, 400 mg for 1 week and maintained at 400 mg. Restagin March 2020  included colonoscopy 3/2/20 shows HEATHER!!  BM biopsy from 4/13/2020 also shows HEATHER by histology, flow or FISH studies as does PET from 8/24/90.      (3/24/22): pt seen in clinic, completed EOT C-scope (3/11/22): at OSH: negative, no evidence of disease. PET scan ( 3/14/22): no abnormal uptake.  Deauville 1. BMBx (3/17/22): negative, no evidence of MCL. Given  Pt has remained in remission, with no evidence of disease on C-scope, PET scan or BMBx.  Ibrutinib/Venetoclax discontinued (3/24/22) .         In July patient noted to have dropping platelet counts and bone marrow biopsy 7/25/24 showed focal involvement with mantle cell lymphoma (NGS cyclin D1 positive and Sox 11 positive. BIRC+ ad TP 53 positve with a VAF of 3%, BIRC3 positive). FISH negative for 17p deletion  CT imaging done on 8/7/24 shows thickening of sigmoid colon, development of mild to moderate retroperitoneal adenopathy, splenomegally and possible splenic infarct.  Today  platelets in 110K range, we discussed starting pirubrutinib a new BTK inhibitor for salvage therapy with goal to decrease splenomegally and improve blood counts and gain control of disease. Side effects including cytopenias, rash, increased bleeding risk and risk of cardiac arryhtmias, chemo consent signed and med sent to  specialty and delivered on 8/12/24 anticipated.  Will give short course of prephase prednisone for debulking and sx control.    Patient here with his wife today 8/29/24 for followup of MCL, started pritobrutinib 8/12/24,  patient feels that his splenic pain is better, weight has increased,  endorses a dry cough during the day, goes away at night.  He is working full time.     Long term will likely plan commercial CART cells, tescartus, info provided        Renal failure:  Encouraged to hydrate more, grade 1 will follow.  5/16/24: Creatinine level 1.38.  Again advised adequate oral hydration.       Health maintenance.   Patient has recv'd both doses of shingrix vaccine & has completed all of his other post transplant vaccines,  Discontinued Acyclovir, & Bactrim prophylaxis (2/23/23) since off treatment since 3/24/22.  Recv'd Prevnar 13 and influenza vaccine (9/16/20) with his PCP. Up to date on tetanus.   - Recv'd Sha & Sha COVID vaccine on 3/16/21. Received booster late 2021. Received Evusheld given on (3/17/22) & 9/22/22.   -Received influenza vaccine 10/18/23.  Advised to obtain covid vaccine at local pharmacy.  Colonoscopy completed 7/14/23: normal except internal hemorrhoids.  Recommendations to repeat in 2 years.      4/20/23: some aortic calcifications on imaging, started on prevastatin-managed by his PCP.  PCP increased pravastatin dose to 40 mg daily.  PCP switched to atorvastatin 40 mg daily.    BPH:  5/16/24:  Reports urinary frequency and nocturia symptoms has greatly improved after starting tadalafil 5 mg daily.     RTC: To start pirtobrutinib, side effects reviewed,  will also start allopurinol and acyclovir. Will give short course of prednisone to relieve splenic swelling counts in 2 weeks, followup in 4 weeks.

## 2024-08-30 ASSESSMENT — ENCOUNTER SYMPTOMS
DYSURIA: 0
CONSTIPATION: 0
FATIGUE: 0
BLOOD IN STOOL: 0
DIARRHEA: 0
HEMATURIA: 0
CHILLS: 0
MUSCULOSKELETAL NEGATIVE: 1
LIGHT-HEADEDNESS: 0
WHEEZING: 0
NAUSEA: 0
PALPITATIONS: 0
DIZZINESS: 0
NUMBNESS: 0
WOUND: 0
ABDOMINAL PAIN: 0
FEVER: 0
FREQUENCY: 0
VOMITING: 0
ADENOPATHY: 0
DIAPHORESIS: 0
LEG SWELLING: 0
COUGH: 0
APPETITE CHANGE: 0
SHORTNESS OF BREATH: 0
UNEXPECTED WEIGHT CHANGE: 0

## 2024-08-30 NOTE — PROGRESS NOTES
Patient ID: Nancie Armenta is a 59 y.o. male.    Treatment:   Oncology History Overview Note           Patient Visit Information:   Visit Type: Follow Up Visit      Cancer History:   Treatment Synopsis:     1. Stage IV A mantle cell non-Hodgkin lymphoma involving the gastrointestinal tract diagnosed in October 2011, with diffuse gastrointestinal involvement. The patient was treated  with RCHOP alternating with Rituxan and high-dose Mamie-C and received total of 6 cycles of chemotherapy which he finished in February 2012.   Allergic to Augmentin, causes hives.     2. Patient underwent beam chemotherapy followed by autologous peripheral stem cell transplant in April 2012, by Dr. Yvonne Hobbs at [facility], was also involved in phase 3 clinical trial up killed shingles vaccine versus placebo (Merck-1 Z10  study).     3.The patient developed shingles in January 2014, involving left flank.      4. 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      5.  Recurrent mantle cell lymphoma June 2019 assx presentation with leukocytosis. Peripheral blood flow which showed a CD5 positve clonal lymphocytosis. 6/2/19 CT of chest abdomen and pelvis revealed  splenomegaly of 15 cm compared to 12.8 cm last evaluation. Recurrence confirmed by FISH on peripheral blood earlier this month.  BM biopsy which showed 5% Mantle cell involvement although peripheral blood shows 30% involvement. CT imaging showed splenomegaly. PET scan declined by insurance twice.  Patient underwent colonoscopy  with Dr. Ac ( [place]) 8/8/19 which showed cobblestoning of colon and multiple biopsies including terminal ileum, appendix, cecum,  rectosigmoid positive for mantle cell lymphoma.     6. Initiation of acalabrutinib August 2019 with minimal response. Switched to ibrutinib and venetoclax 12/20/20 with clearing of t(11, 14) by by FISH ( 1/2020) and resolution of involvement of colon by endoscopy and pathology on exam 3/2/2020! BM biopsy  4/2020 HEATHER.     CR March 2022, ibrutinib and venetoclax discontinued.    July 2024 development of Tpenia, bone marrow biopsy 7/25/24 showed focal involvement with mantle cell lymphoma (NGS cyclin D1 positive and Sox 11 positive. BIRC+ and TP 53 positve with a VAF of 3%, BIRC3 positive). FISH negative for 17p deletion  CT imaging done on 8/7/24 shows thickening of sigmoid colon, development of mild to moderate retroperitoneal adenopathy, splenomegally and possible splenic infarct.    Pirotbrutib 200 mg daily initiated 8/12/24     7. S/P COVID 19 infection 12/1/2020, no sequelae     8. BMBx (4/13/2020): no evidence of MCL either by histology, flow, or FISH studies. PET scan ( 8/24/20): no abnormal FDG uptake seen. Deauville 1      9. Repeat CT C/A/P (3/10/21) with no evidence of LAD seen, Spleen size 11.5 cm ( decreased from prior).       10.C-scope (3/11/22): at OSH: negative, no evidence of disease. PET scan ( 3/14/22): no abnormal uptake. Deauville 1. BMBx (3/17/22): negative, no evidence of MCL.      11. Evusheld given on (3/17/22)     12. Pt has remained in remission, with no evidence of disease on C-scope, PET scan or BMBx. Will stop Ibrutinib/Venetoclax (3/24/22) and plan to continue to ID PPX.     13. ID PPX acyclovir stopped 2/23/23.    14: Colonoscopy (7/14/23): normal except internal hemorrhoids.    15: CT CAP (11/6/23): no intrathoracic or intra-abdominal/pelvic enlarged lymphadenopathy.  Spleen is normal in size.             Lymphoma (Multi)   10/20/2011 Initial Diagnosis    Lymphoma (Multi)     Mantle cell lymphoma of spleen (Multi)   10/13/2016 Initial Diagnosis    Mantle cell lymphoma of spleen  (CMS/[facility])         Response:     Past Medical History:  Renal insufficiency.   Past Medical History:   Diagnosis Date    Fourth (trochlear) nerve palsy, right eye 06/08/2017    Right-sided fourth cranial nerve palsy    Fourth (trochlear) nerve palsy, right eye 06/08/2017    Right-sided fourth cranial nerve palsy    Malignant neoplasm of connective and soft tissue, unspecified (Multi) 06/08/2017    Cancer of blood vessel     Surgical History:   Past Surgical History:   Procedure Laterality Date    SHOULDER SURGERY  07/13/2017    Shoulder Surgery        Family History:   Family History   Problem Relation Name Age of Onset    Cataracts Mother      Cataracts Father      Other (chronic lymphoid leukemia) Father         Social History:  .  Working full time for [employer] (formerly [employer]).    Social History     Tobacco Use    Smoking status: Never    Smokeless tobacco: Never   Vaping Use    Vaping status: Never Used   Substance Use Topics    Alcohol use: Never    Drug use: Never   -------------------------------------------------------------------------------------------------------  Subjective       HPI    Nancie Armenta is a 59 year old male with PMH of hyperglycemia, renal insufficiency, and stage IV A mantle cell non hodgkin lymphoma involving the gastrointestinal tract, s/p autologous SCT (April 2012).      Early in July 2024 patient noted to have dropping platelet counts and bone marrow biopsy 7/25/24 showed focal involvement with mantle cell lymphoma (NGS cyclin D1 positive and Sox 11 positive. BIRC+ ad TP 53 positve with a VAF of 3%, BIRC3 positive). FISH negative for 17p deletion  CT imaging done on 8/7/24 shows thickening of sigmoid colon, development of mild to moderate retroperitoneal adenopathy, splenomegally and possible splenic infarct.     Patient is here today 8/29/24 with his wife for followup of recently recurrent mantle cell lymphoma as above. He started pirtobrutinib 8/12/24 with a short prephase  prednisone and seems to be tolerating it well.  His splenic pain has resolved and his appetite has returned, no longer feels cold. Working full time at his job and is in good spirits.  Leg swellling improved.      Review of Systems   Constitutional:  Negative for appetite change, chills, diaphoresis, fatigue, fever and unexpected weight change.   Respiratory:  Negative for cough, shortness of breath and wheezing.    Cardiovascular:  Negative for chest pain, leg swelling and palpitations.   Gastrointestinal:  Negative for abdominal pain, blood in stool, constipation, diarrhea, nausea and vomiting.   Genitourinary:  Negative for dysuria, frequency, hematuria and nocturia.    Musculoskeletal: Negative.  Negative for gait problem.   Skin:  Negative for rash and wound.   Neurological:  Negative for dizziness, gait problem, light-headedness and numbness.   Hematological:  Negative for adenopathy.   -------------------------------------------------------------------------------------------------------  Objective   BSA: 2.29 meters squared  /78   Pulse 91   Temp 35.7 °C (96.3 °F)   Resp 16   Wt 103 kg (227 lb 4.7 oz)   SpO2 100%   BMI 30.55 kg/m²     Physical Exam  Vitals reviewed.   Constitutional:       General: He is not in acute distress.     Appearance: Normal appearance.   HENT:      Head: Normocephalic and atraumatic.      Mouth/Throat:      Mouth: Mucous membranes are moist.   Eyes:      Extraocular Movements: Extraocular movements intact.      Conjunctiva/sclera: Conjunctivae normal.      Pupils: Pupils are equal, round, and reactive to light.   Cardiovascular:      Rate and Rhythm: Normal rate and regular rhythm.      Pulses: Normal pulses.      Heart sounds: Normal heart sounds. No murmur heard.     No friction rub. No gallop.   Pulmonary:      Effort: Pulmonary effort is normal. No respiratory distress.      Breath sounds: Normal breath sounds. No wheezing.   Abdominal:      General: Abdomen is flat.  Bowel sounds are normal. There is no distension.      Palpations: Abdomen is soft. There is no mass.      Tenderness: There is no abdominal tenderness.      Comments: Spleen firm and palpable 10 cm BCM, non tender   Musculoskeletal:         General: Swelling (non-pitting edema to bilateral lower extremities) present. Normal range of motion.   Lymphadenopathy:      Comments: No lymphadenopathy   Skin:     General: Skin is warm and dry.   Neurological:      General: No focal deficit present.      Mental Status: He is alert and oriented to person, place, and time.   Psychiatric:         Mood and Affect: Mood normal.         Behavior: Behavior normal.         Thought Content: Thought content normal.         Judgment: Judgment normal.       Performance Status:  ECOG performance status: 0 fully active  -------------------------------------------------------------------------------------------------------  Assessment/Plan      1. Stage IV mantle cell lymphoma itreated with Nordic regimen and consolidation autograft with BEAM preparative regimen April 2012.  Relapse 2019 treated with ibrutinib and venetoclax with complete pathology response until 3/ 2022. See above.         Recurrence July 2024 presenting with dropping platelet counts and bone marrow biopsy 7/25/24 showed focal involvement with mantle cell lymphoma (NGS cyclin D1 positive and Sox 11 positive. BIRC+ ad TP 53 positve with a VAF of 3%, BIRC3 positive). FISH negative for 17p deletion  CT imaging done on 8/7/24 shows thickening of sigmoid colon, development of mild to moderate retroperitoneal adenopathy, splenomegally and possible splenic infarct.      Patient started pirtobrutinib salvage August 12,2024 and feels better. Today 8/27/24 still thrombocytopenic, splenic pain resolved although spleen still enlarged 10 cm BCM, plan to do reimaging in November 2024, sooner if clinically indicated and will decide time of cellular therapy ie CART cells based on  response.  On acyclovir prophylaxis.      Renal failure: stable at 1.3 or so    Health maintenance.   Patient has recv'd both doses of shingrix vaccine & has completed all of his other post transplant vaccines,  Discontinued Acyclovir, & Bactrim prophylaxis (2/23/23) since off treatment since 3/24/22.  Recv'd Prevnar 13 and influenza vaccine (9/16/20) with his PCP. Up to date on tetanus.   - Recv'd Sha & Sha COVID vaccine on 3/16/21. Received booster late 2021. Received Evusheld given on (3/17/22) & 9/22/22.   -Received influenza vaccine 10/18/23.  Advised to obtain covid vaccine at local pharmacy.  Colonoscopy completed 7/14/23: normal except internal hemorrhoids.  Recommendations to repeat in 2 years.      4/20/23: some aortic calcifications on imaging, started on prevastatin-managed by his PCP.  PCP increased pravastatin dose to 40 mg daily.  PCP switched to atorvastatin 40 mg daily.    BPH:  5/16/24:  Reports urinary frequency and nocturia symptoms has greatly improved after starting tadalafil 5 mg daily.     RTC: CBC in 2 weeks, followup in 1 month with labs

## 2024-09-02 PROCEDURE — RXMED WILLOW AMBULATORY MEDICATION CHARGE

## 2024-09-04 ENCOUNTER — SPECIALTY PHARMACY (OUTPATIENT)
Dept: PHARMACY | Facility: CLINIC | Age: 59
End: 2024-09-04

## 2024-09-05 ENCOUNTER — PHARMACY VISIT (OUTPATIENT)
Dept: PHARMACY | Facility: CLINIC | Age: 59
End: 2024-09-05
Payer: COMMERCIAL

## 2024-09-12 ENCOUNTER — LAB (OUTPATIENT)
Dept: LAB | Facility: CLINIC | Age: 59
End: 2024-09-12
Payer: COMMERCIAL

## 2024-09-12 DIAGNOSIS — C83.17 MANTLE CELL LYMPHOMA OF SPLEEN (MULTI): ICD-10-CM

## 2024-09-12 LAB
BASOPHILS # BLD AUTO: 0 X10*3/UL (ref 0–0.1)
BASOPHILS NFR BLD AUTO: 0 %
EOSINOPHIL # BLD AUTO: 0.03 X10*3/UL (ref 0–0.7)
EOSINOPHIL NFR BLD AUTO: 1.2 %
ERYTHROCYTE [DISTWIDTH] IN BLOOD BY AUTOMATED COUNT: 15.7 % (ref 11.5–14.5)
HCT VFR BLD AUTO: 30.4 % (ref 41–52)
HGB BLD-MCNC: 9.9 G/DL (ref 13.5–17.5)
IMM GRANULOCYTES # BLD AUTO: 0 X10*3/UL (ref 0–0.7)
IMM GRANULOCYTES NFR BLD AUTO: 0 % (ref 0–0.9)
LYMPHOCYTES # BLD AUTO: 1.9 X10*3/UL (ref 1.2–4.8)
LYMPHOCYTES NFR BLD AUTO: 77.6 %
MCH RBC QN AUTO: 30.7 PG (ref 26–34)
MCHC RBC AUTO-ENTMCNC: 32.6 G/DL (ref 32–36)
MCV RBC AUTO: 94 FL (ref 80–100)
MONOCYTES # BLD AUTO: 0.35 X10*3/UL (ref 0.1–1)
MONOCYTES NFR BLD AUTO: 14.3 %
NEUTROPHILS # BLD AUTO: 0.17 X10*3/UL (ref 1.2–7.7)
NEUTROPHILS NFR BLD AUTO: 6.9 %
NRBC BLD-RTO: ABNORMAL /100{WBCS}
PLATELET # BLD AUTO: 47 X10*3/UL (ref 150–450)
RBC # BLD AUTO: 3.22 X10*6/UL (ref 4.5–5.9)
WBC # BLD AUTO: 2.5 X10*3/UL (ref 4.4–11.3)

## 2024-09-12 PROCEDURE — 85025 COMPLETE CBC W/AUTO DIFF WBC: CPT

## 2024-09-12 PROCEDURE — 36415 COLL VENOUS BLD VENIPUNCTURE: CPT

## 2024-09-13 DIAGNOSIS — C83.17 MANTLE CELL LYMPHOMA OF SPLEEN (MULTI): Primary | ICD-10-CM

## 2024-09-19 ENCOUNTER — LAB (OUTPATIENT)
Dept: LAB | Facility: CLINIC | Age: 59
End: 2024-09-19
Payer: COMMERCIAL

## 2024-09-19 DIAGNOSIS — C83.17 MANTLE CELL LYMPHOMA OF SPLEEN (MULTI): ICD-10-CM

## 2024-09-19 LAB
BASOPHILS # BLD AUTO: 0.01 X10*3/UL (ref 0–0.1)
BASOPHILS NFR BLD AUTO: 0.2 %
EOSINOPHIL # BLD AUTO: 0.03 X10*3/UL (ref 0–0.7)
EOSINOPHIL NFR BLD AUTO: 0.6 %
ERYTHROCYTE [DISTWIDTH] IN BLOOD BY AUTOMATED COUNT: 16.4 % (ref 11.5–14.5)
HCT VFR BLD AUTO: 30.7 % (ref 41–52)
HGB BLD-MCNC: 10 G/DL (ref 13.5–17.5)
IMM GRANULOCYTES # BLD AUTO: 0.02 X10*3/UL (ref 0–0.7)
IMM GRANULOCYTES NFR BLD AUTO: 0.4 % (ref 0–0.9)
LYMPHOCYTES # BLD AUTO: 2.25 X10*3/UL (ref 1.2–4.8)
LYMPHOCYTES NFR BLD AUTO: 41.4 %
MCH RBC QN AUTO: 31.4 PG (ref 26–34)
MCHC RBC AUTO-ENTMCNC: 32.6 G/DL (ref 32–36)
MCV RBC AUTO: 97 FL (ref 80–100)
MONOCYTES # BLD AUTO: 0.43 X10*3/UL (ref 0.1–1)
MONOCYTES NFR BLD AUTO: 7.9 %
NEUTROPHILS # BLD AUTO: 2.69 X10*3/UL (ref 1.2–7.7)
NEUTROPHILS NFR BLD AUTO: 49.5 %
NRBC BLD-RTO: ABNORMAL /100{WBCS}
PLATELET # BLD AUTO: 49 X10*3/UL (ref 150–450)
RBC # BLD AUTO: 3.18 X10*6/UL (ref 4.5–5.9)
WBC # BLD AUTO: 5.4 X10*3/UL (ref 4.4–11.3)

## 2024-09-19 PROCEDURE — 36415 COLL VENOUS BLD VENIPUNCTURE: CPT

## 2024-09-19 PROCEDURE — 84075 ASSAY ALKALINE PHOSPHATASE: CPT

## 2024-09-19 PROCEDURE — 83615 LACTATE (LD) (LDH) ENZYME: CPT

## 2024-09-19 PROCEDURE — 85025 COMPLETE CBC W/AUTO DIFF WBC: CPT | Performed by: STUDENT IN AN ORGANIZED HEALTH CARE EDUCATION/TRAINING PROGRAM

## 2024-09-20 DIAGNOSIS — C83.17 MANTLE CELL LYMPHOMA OF SPLEEN (MULTI): ICD-10-CM

## 2024-09-20 LAB
ALBUMIN SERPL BCP-MCNC: 3.9 G/DL (ref 3.4–5)
ALP SERPL-CCNC: 77 U/L (ref 33–136)
ALT SERPL W P-5'-P-CCNC: 38 U/L (ref 10–52)
ANION GAP SERPL CALC-SCNC: 15 MMOL/L (ref 10–20)
AST SERPL W P-5'-P-CCNC: 25 U/L (ref 9–39)
BILIRUB SERPL-MCNC: 0.9 MG/DL (ref 0–1.2)
BUN SERPL-MCNC: 24 MG/DL (ref 6–23)
CALCIUM SERPL-MCNC: 8.7 MG/DL (ref 8.6–10.6)
CHLORIDE SERPL-SCNC: 105 MMOL/L (ref 98–107)
CO2 SERPL-SCNC: 25 MMOL/L (ref 21–32)
CREAT SERPL-MCNC: 1.44 MG/DL (ref 0.5–1.3)
EGFRCR SERPLBLD CKD-EPI 2021: 56 ML/MIN/1.73M*2
GLUCOSE SERPL-MCNC: 146 MG/DL (ref 74–99)
LDH SERPL L TO P-CCNC: 161 U/L (ref 84–246)
POTASSIUM SERPL-SCNC: 4.3 MMOL/L (ref 3.5–5.3)
PROT SERPL-MCNC: 6.2 G/DL (ref 6.4–8.2)
SODIUM SERPL-SCNC: 141 MMOL/L (ref 136–145)

## 2024-09-23 RX ORDER — PIRTOBRUTINIB 100 MG/1
200 TABLET, COATED ORAL DAILY
Qty: 60 TABLET | Refills: 1 | Status: SHIPPED | OUTPATIENT
Start: 2024-09-23 | End: 2025-09-23

## 2024-09-25 ASSESSMENT — ENCOUNTER SYMPTOMS
WHEEZING: 0
COUGH: 0
PALPITATIONS: 0
UNEXPECTED WEIGHT CHANGE: 0
WOUND: 0
DIAPHORESIS: 0
FREQUENCY: 0
DIARRHEA: 0
MUSCULOSKELETAL NEGATIVE: 1
APPETITE CHANGE: 0
ABDOMINAL PAIN: 0
SHORTNESS OF BREATH: 0
DIZZINESS: 0
ADENOPATHY: 0
NAUSEA: 0
FATIGUE: 0
CONSTIPATION: 0
HEMATURIA: 0
LEG SWELLING: 0
VOMITING: 0
NUMBNESS: 0
BLOOD IN STOOL: 0
LIGHT-HEADEDNESS: 0
FEVER: 0
CHILLS: 0
DYSURIA: 0

## 2024-09-25 NOTE — PROGRESS NOTES
Patient ID: Nancie Armenta is a 59 y.o. male.    Treatment:   Oncology History Overview Note           Patient Visit Information:   Visit Type: Follow Up Visit      Cancer History:   Treatment Synopsis:     1. Stage IV A mantle cell non-Hodgkin lymphoma involving the gastrointestinal tract diagnosed in October 2011, with diffuse gastrointestinal involvement. The patient was treated  with RCHOP alternating with Rituxan and high-dose Mamie-C and received total of 6 cycles of chemotherapy which he finished in February 2012.   Allergic to Augmentin, causes hives.     2. Patient underwent beam chemotherapy followed by autologous peripheral stem cell transplant in April 2012, by Dr. Yvonne Hobbs at [facility], was also involved in phase 3 clinical trial up killed shingles vaccine versus placebo (Merck-1 Z10  study).     3.The patient developed shingles in January 2014, involving left flank.      4. 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      5.  Recurrent mantle cell lymphoma June 2019 assx presentation with leukocytosis. Peripheral blood flow which showed a CD5 positve clonal lymphocytosis. 6/2/19 CT of chest abdomen and pelvis revealed  splenomegaly of 15 cm compared to 12.8 cm last evaluation. Recurrence confirmed by FISH on peripheral blood earlier this month.  BM biopsy which showed 5% Mantle cell involvement although peripheral blood shows 30% involvement. CT imaging showed splenomegaly. PET scan declined by insurance twice.  Patient underwent colonoscopy  with Dr. Ac ( [place]) 8/8/19 which showed cobblestoning of colon and multiple biopsies including terminal ileum, appendix, cecum,  rectosigmoid positive for mantle cell lymphoma.     6. Initiation of acalabrutinib August 2019 with minimal response. Switched to ibrutinib and venetoclax 12/20/20 with clearing of t(11, 14) by by FISH ( 1/2020) and resolution of involvement of colon by endoscopy and pathology on exam 3/2/2020! BM biopsy  4/2020 HEATHER.     C-scope (3/11/22): at OSH: negative, no evidence of disease. PET scan ( 3/14/22): no abnormal uptake. Deauville 1. BMBx (3/17/22): negative, no evidence of MCL.   March 2022, ibrutinib and venetoclax discontinued.    July 2024 development of Tpenia, bone marrow biopsy 7/25/24 showed focal involvement with mantle cell lymphoma (NGS cyclin D1 positive and Sox 11 positive. BIRC+ and TP 53 positve with a VAF of 3%, BIRC3 positive). FISH negative for 17p deletion  CT imaging done on 8/7/24 shows thickening of sigmoid colon, development of mild to moderate retroperitoneal adenopathy, splenomegally and possible splenic infarct.    Pirotbrutib 200 mg daily initiated 8/12/24     7. S/P COVID 19 infection 12/1/2020, no sequelae                          Lymphoma (Multi)   10/20/2011 Initial Diagnosis    Lymphoma (Multi)     Mantle cell lymphoma of spleen (Multi)   10/13/2016 Initial Diagnosis    Mantle cell lymphoma of spleen (CMS/HCC)         Response:     Past Medical History:  Renal insufficiency.   Past Medical History:   Diagnosis Date    Fourth (trochlear) nerve palsy, right eye 06/08/2017    Right-sided fourth cranial nerve palsy    Fourth (trochlear) nerve palsy, right eye 06/08/2017    Right-sided fourth cranial nerve palsy    Malignant neoplasm of connective and soft tissue, unspecified (Multi) 06/08/2017    Cancer of blood vessel     Surgical History:   Past Surgical History:   Procedure Laterality Date    SHOULDER SURGERY  07/13/2017    Shoulder Surgery        Family History:   Family History   Problem Relation Name Age of Onset    Cataracts Mother      Cataracts Father      Other (chronic lymphoid  leukemia) Father         Social History:  .  Working full time for [employer].    Social History     Tobacco Use    Smoking status: Never    Smokeless tobacco: Never   Vaping Use    Vaping status: Never Used   Substance Use Topics    Alcohol use: Never    Drug use: Never   -------------------------------------------------------------------------------------------------------  Subjective       HPI    Nancie Armenta is a 59 year old male with PMH of hyperglycemia, renal insufficiency, and stage IV A mantle cell non hodgkin lymphoma involving the gastrointestinal tract, s/p autologous SCT (April 2012).      Early in July 2024 patient noted to have dropping platelet counts and bone marrow biopsy 7/25/24 showed focal involvement with mantle cell lymphoma (NGS cyclin D1 positive and Sox 11 positive. BIRC+ ad TP 53 positve with a VAF of 3%, BIRC3 positive). FISH negative for 17p deletion  CT imaging done on 8/7/24 shows thickening of sigmoid colon, development of mild to moderate retroperitoneal adenopathy, splenomegally and possible splenic infarct.     Patient is here today 9/26/24 with his wife for followup of recently recurrent mantle cell lymphoma as above. He started pirtobrutinib 8/12/24 with a short prephase prednisone and seems to be tolerating it well.  His splenic pain has resolved , however he reports he has worse appetite an has lost about 15 pounds in the past couple of months.  Working full time at his job and is in good spirits.  Notes chronic cough, worse when sits up better when lies down, denies fever,  shortness of breath of exertion.  Occasional rib cramping. Leg swellling improved.      Review of Systems   Constitutional:  Negative for appetite change, chills, diaphoresis, fatigue, fever and unexpected weight change.   Respiratory:  Negative for cough, shortness of breath and wheezing.    Cardiovascular:  Negative for chest pain, leg swelling and palpitations.   Gastrointestinal:   Negative for abdominal pain, blood in stool, constipation, diarrhea, nausea and vomiting.   Genitourinary:  Negative for dysuria, frequency, hematuria and nocturia.    Musculoskeletal: Negative.  Negative for gait problem.   Skin:  Negative for rash and wound.   Neurological:  Negative for dizziness, gait problem, light-headedness and numbness.   Hematological:  Negative for adenopathy.   -------------------------------------------------------------------------------------------------------  Objective   BSA: 2.27 meters squared  /84   Pulse 100   Temp 35.9 °C (96.6 °F)   Resp 17   Wt 101 kg (222 lb 0.1 oz)   SpO2 100%   BMI 29.84 kg/m²     Physical Exam  Vitals reviewed.   Constitutional:       General: He is not in acute distress.     Appearance: Normal appearance.      Comments: Looks more fatigued than usual   HENT:      Head: Normocephalic and atraumatic.      Mouth/Throat:      Mouth: Mucous membranes are moist.   Eyes:      Extraocular Movements: Extraocular movements intact.      Conjunctiva/sclera: Conjunctivae normal.      Pupils: Pupils are equal, round, and reactive to light.   Cardiovascular:      Rate and Rhythm: Normal rate and regular rhythm.      Pulses: Normal pulses.      Heart sounds: Normal heart sounds. No murmur heard.     No friction rub. No gallop.   Pulmonary:      Effort: Pulmonary effort is normal. No respiratory distress.      Breath sounds: Normal breath sounds. No wheezing.   Abdominal:      General: Abdomen is flat. Bowel sounds are normal. There is no distension.      Palpations: Abdomen is soft. There is splenomegaly. There is no mass.      Tenderness: There is no abdominal tenderness.      Comments: Spleen palpable and 20 cm bcm crosses midline   Musculoskeletal:         General: Swelling (non-pitting edema to bilateral lower extremities) present. Normal range of motion.   Lymphadenopathy:      Comments: No lymphadenopathy   Skin:     General: Skin is warm and dry.    Neurological:      General: No focal deficit present.      Mental Status: He is alert and oriented to person, place, and time.   Psychiatric:         Mood and Affect: Mood normal.         Behavior: Behavior normal.         Thought Content: Thought content normal.         Judgment: Judgment normal.       Performance Status:  ECOG performance status: 0 fully active  -------------------------------------------------------------------------------------------------------  Assessment/Plan      1. Stage IV mantle cell lymphoma itreated with Nordic regimen and consolidation autograft with BEAM preparative regimen April 2012.  Relapse 2019 treated with ibrutinib and venetoclax with complete pathology response until 3/ 2022. See above.         Recurrence July 2024 presenting with dropping platelet counts and bone marrow biopsy 7/25/24 showed focal involvement with mantle cell lymphoma (NGS cyclin D1 positive and Sox 11 positive. BIRC+ ad TP 53 positve with a VAF of 3%, BIRC3 positive). FISH negative for 17p deletion  CT imaging done on 8/7/24 shows thickening of sigmoid colon, development of mild to moderate retroperitoneal adenopathy, splenomegally and possible splenic infarct.      Patient started pirtobrutinib salvage August 12,2024 and feels better. Today 9/26/24 still thrombocytopenic, splenic pain resolved although spleen more enlarged today and ongoing weight loss of concern.  still enlarged 10 cm BCM, plan to do reimaging in November 2024, sooner if clinically indicated and will decide time of cellular therapy ie CART cells based on response.  On acyclovir prophylaxis.    9/26/24 oncerns about ongoing cough, weight loss and markedly enlarged spleen, suspect failure to response to pirtubrutinib,  will  obtain CT imaging ASAP , if no reponse with start venetoclax and proceed to CART cell ASAP, CXR to assess cough unremarkable        Renal failure: stable at 1.3 or so    Health maintenance.   Patient has recv'd both  doses of shingrix vaccine & has completed all of his other post transplant vaccines,  Discontinued Acyclovir, & Bactrim prophylaxis (2/23/23) since off treatment since 3/24/22.  Recv'd Prevnar 13 and influenza vaccine (9/16/20) with his PCP. Up to date on tetanus.   - Recv'd Sha & Sha COVID vaccine on 3/16/21. Received booster late 2021. Received Evusheld given on (3/17/22) & 9/22/22.   -Received influenza vaccine 10/18/23.  Advised to obtain covid vaccine at local pharmacy.  Colonoscopy completed 7/14/23: normal except internal hemorrhoids.  Recommendations to repeat in 2 years.      4/20/23: some aortic calcifications on imaging, started on prevastatin-managed by his PCP.  PCP increased pravastatin dose to 40 mg daily.  PCP switched to atorvastatin 40 mg daily.    BPH:  5/16/24:  Reports urinary frequency and nocturia symptoms has greatly improved after starting tadalafil 5 mg daily.     RTC: after CT CAP , followup 10/10/24    Addendum 10/4/24  Called patient for wellness check,  at work today, weight is steady awaiting CT CAP on 10/10/24.  Discussed referral to CART program today,  also will put in precert for venetoclax give concern of insufficent reponse to pirtobrutinib.  Will start at 100 mg for 1 week, 200 mg for 1 week and then 400 mg as per paper by [address] al Blood 2021; [phone].

## 2024-09-26 ENCOUNTER — LAB (OUTPATIENT)
Dept: LAB | Facility: HOSPITAL | Age: 59
End: 2024-09-26
Payer: COMMERCIAL

## 2024-09-26 ENCOUNTER — OFFICE VISIT (OUTPATIENT)
Dept: HEMATOLOGY/ONCOLOGY | Facility: HOSPITAL | Age: 59
End: 2024-09-26
Payer: COMMERCIAL

## 2024-09-26 ENCOUNTER — HOSPITAL ENCOUNTER (OUTPATIENT)
Dept: RADIOLOGY | Facility: HOSPITAL | Age: 59
Discharge: HOME | End: 2024-09-26
Payer: COMMERCIAL

## 2024-09-26 ENCOUNTER — TELEPHONE (OUTPATIENT)
Dept: HEMATOLOGY/ONCOLOGY | Facility: HOSPITAL | Age: 59
End: 2024-09-26
Payer: COMMERCIAL

## 2024-09-26 VITALS
HEART RATE: 100 BPM | OXYGEN SATURATION: 100 % | RESPIRATION RATE: 17 BRPM | BODY MASS INDEX: 29.84 KG/M2 | WEIGHT: 222 LBS | SYSTOLIC BLOOD PRESSURE: 131 MMHG | DIASTOLIC BLOOD PRESSURE: 84 MMHG | TEMPERATURE: 96.6 F

## 2024-09-26 DIAGNOSIS — C83.17 MANTLE CELL LYMPHOMA OF SPLEEN (MULTI): ICD-10-CM

## 2024-09-26 LAB
BASOPHILS # BLD AUTO: 0.01 X10*3/UL (ref 0–0.1)
BASOPHILS NFR BLD AUTO: 0.2 %
EOSINOPHIL # BLD AUTO: 0.02 X10*3/UL (ref 0–0.7)
EOSINOPHIL NFR BLD AUTO: 0.4 %
ERYTHROCYTE [DISTWIDTH] IN BLOOD BY AUTOMATED COUNT: 16.5 % (ref 11.5–14.5)
HCT VFR BLD AUTO: 31.9 % (ref 41–52)
HGB BLD-MCNC: 10.3 G/DL (ref 13.5–17.5)
HOLD SPECIMEN: NORMAL
IMM GRANULOCYTES # BLD AUTO: 0.02 X10*3/UL (ref 0–0.7)
IMM GRANULOCYTES NFR BLD AUTO: 0.4 % (ref 0–0.9)
LYMPHOCYTES # BLD AUTO: 2.4 X10*3/UL (ref 1.2–4.8)
LYMPHOCYTES NFR BLD AUTO: 42.2 %
MCH RBC QN AUTO: 31.5 PG (ref 26–34)
MCHC RBC AUTO-ENTMCNC: 32.3 G/DL (ref 32–36)
MCV RBC AUTO: 98 FL (ref 80–100)
MONOCYTES # BLD AUTO: 0.47 X10*3/UL (ref 0.1–1)
MONOCYTES NFR BLD AUTO: 8.3 %
NEUTROPHILS # BLD AUTO: 2.77 X10*3/UL (ref 1.2–7.7)
NEUTROPHILS NFR BLD AUTO: 48.5 %
NRBC BLD-RTO: 0 /100 WBCS (ref 0–0)
PLATELET # BLD AUTO: 49 X10*3/UL (ref 150–450)
RBC # BLD AUTO: 3.27 X10*6/UL (ref 4.5–5.9)
WBC # BLD AUTO: 5.7 X10*3/UL (ref 4.4–11.3)

## 2024-09-26 PROCEDURE — 71046 X-RAY EXAM CHEST 2 VIEWS: CPT

## 2024-09-26 PROCEDURE — 99215 OFFICE O/P EST HI 40 MIN: CPT | Performed by: INTERNAL MEDICINE

## 2024-09-26 PROCEDURE — 71046 X-RAY EXAM CHEST 2 VIEWS: CPT | Performed by: RADIOLOGY

## 2024-09-26 PROCEDURE — 36415 COLL VENOUS BLD VENIPUNCTURE: CPT

## 2024-09-26 PROCEDURE — 85025 COMPLETE CBC W/AUTO DIFF WBC: CPT

## 2024-09-26 ASSESSMENT — PAIN SCALES - GENERAL: PAINLEVEL: 0-NO PAIN

## 2024-09-27 ENCOUNTER — HOSPITAL ENCOUNTER (OUTPATIENT)
Dept: RADIOLOGY | Facility: HOSPITAL | Age: 59
End: 2024-09-27
Payer: COMMERCIAL

## 2024-09-27 ENCOUNTER — APPOINTMENT (OUTPATIENT)
Dept: RADIOLOGY | Facility: HOSPITAL | Age: 59
End: 2024-09-27
Payer: COMMERCIAL

## 2024-09-30 PROCEDURE — RXMED WILLOW AMBULATORY MEDICATION CHARGE

## 2024-10-03 ENCOUNTER — PHARMACY VISIT (OUTPATIENT)
Dept: PHARMACY | Facility: CLINIC | Age: 59
End: 2024-10-03
Payer: COMMERCIAL

## 2024-10-04 DIAGNOSIS — C83.17 MANTLE CELL LYMPHOMA OF SPLEEN (MULTI): Primary | ICD-10-CM

## 2024-10-07 ENCOUNTER — SPECIALTY PHARMACY (OUTPATIENT)
Dept: PHARMACY | Facility: CLINIC | Age: 59
End: 2024-10-07

## 2024-10-07 ENCOUNTER — PHARMACY VISIT (OUTPATIENT)
Dept: PHARMACY | Facility: CLINIC | Age: 59
End: 2024-10-07
Payer: COMMERCIAL

## 2024-10-07 PROCEDURE — RXMED WILLOW AMBULATORY MEDICATION CHARGE

## 2024-10-07 NOTE — PROGRESS NOTES
Specialty Pharmacy    Venclexta 100mg    Coverage has been approved for this patient's medication from 09.07.24-10.07.25.     This patient has scheduled their medication delivery with  Specialty Pharmacy.      They should receive their medication Wednesday, 10.09.24.    Thank you,    Mayte Portillo, [facility]  [facility] Support [facility]  Specialty Pharmacy  [address] [place] [address], [id number]  T: [phone]  F: [phone]  [email or web address]

## 2024-10-09 ENCOUNTER — LAB (OUTPATIENT)
Dept: LAB | Facility: CLINIC | Age: 59
End: 2024-10-09
Payer: COMMERCIAL

## 2024-10-09 DIAGNOSIS — C83.17 MANTLE CELL LYMPHOMA OF SPLEEN (MULTI): ICD-10-CM

## 2024-10-09 LAB
BASOPHILS # BLD AUTO: 0 X10*3/UL (ref 0–0.1)
BASOPHILS NFR BLD AUTO: 0 %
EOSINOPHIL # BLD AUTO: 0.04 X10*3/UL (ref 0–0.7)
EOSINOPHIL NFR BLD AUTO: 0.7 %
ERYTHROCYTE [DISTWIDTH] IN BLOOD BY AUTOMATED COUNT: 16.1 % (ref 11.5–14.5)
HCT VFR BLD AUTO: 30.1 % (ref 41–52)
HGB BLD-MCNC: 9.7 G/DL (ref 13.5–17.5)
IMM GRANULOCYTES # BLD AUTO: 0.01 X10*3/UL (ref 0–0.7)
IMM GRANULOCYTES NFR BLD AUTO: 0.2 % (ref 0–0.9)
LYMPHOCYTES # BLD AUTO: 2.87 X10*3/UL (ref 1.2–4.8)
LYMPHOCYTES NFR BLD AUTO: 48.5 %
MCH RBC QN AUTO: 31.3 PG (ref 26–34)
MCHC RBC AUTO-ENTMCNC: 32.2 G/DL (ref 32–36)
MCV RBC AUTO: 97 FL (ref 80–100)
MONOCYTES # BLD AUTO: 0.64 X10*3/UL (ref 0.1–1)
MONOCYTES NFR BLD AUTO: 10.8 %
NEUTROPHILS # BLD AUTO: 2.36 X10*3/UL (ref 1.2–7.7)
NEUTROPHILS NFR BLD AUTO: 39.8 %
NRBC BLD-RTO: ABNORMAL /100{WBCS}
PLATELET # BLD AUTO: 47 X10*3/UL (ref 150–450)
RBC # BLD AUTO: 3.1 X10*6/UL (ref 4.5–5.9)
WBC # BLD AUTO: 5.9 X10*3/UL (ref 4.4–11.3)

## 2024-10-09 PROCEDURE — 80053 COMPREHEN METABOLIC PANEL: CPT

## 2024-10-09 PROCEDURE — 36415 COLL VENOUS BLD VENIPUNCTURE: CPT

## 2024-10-09 PROCEDURE — 85025 COMPLETE CBC W/AUTO DIFF WBC: CPT

## 2024-10-09 PROCEDURE — 83615 LACTATE (LD) (LDH) ENZYME: CPT

## 2024-10-09 ASSESSMENT — ENCOUNTER SYMPTOMS
ADENOPATHY: 0
NUMBNESS: 0
PALPITATIONS: 0
FATIGUE: 0
COUGH: 0
WHEEZING: 0
CHILLS: 0
FEVER: 0
CONSTIPATION: 0
LEG SWELLING: 0
SHORTNESS OF BREATH: 0
WOUND: 0
DIAPHORESIS: 0
DIARRHEA: 0
FREQUENCY: 0
LIGHT-HEADEDNESS: 0
MUSCULOSKELETAL NEGATIVE: 1
ABDOMINAL PAIN: 0
VOMITING: 0
NAUSEA: 0
DYSURIA: 0
APPETITE CHANGE: 0
DIZZINESS: 0
HEMATURIA: 0
BLOOD IN STOOL: 0

## 2024-10-10 ENCOUNTER — HOSPITAL ENCOUNTER (OUTPATIENT)
Dept: RADIOLOGY | Facility: CLINIC | Age: 59
Discharge: HOME | End: 2024-10-10
Payer: COMMERCIAL

## 2024-10-10 ENCOUNTER — OFFICE VISIT (OUTPATIENT)
Dept: HEMATOLOGY/ONCOLOGY | Facility: HOSPITAL | Age: 59
End: 2024-10-10
Payer: COMMERCIAL

## 2024-10-10 ENCOUNTER — SPECIALTY PHARMACY (OUTPATIENT)
Dept: HEMATOLOGY/ONCOLOGY | Facility: HOSPITAL | Age: 59
End: 2024-10-10
Payer: COMMERCIAL

## 2024-10-10 ENCOUNTER — DOCUMENTATION (OUTPATIENT)
Dept: RESEARCH | Age: 59
End: 2024-10-10
Payer: COMMERCIAL

## 2024-10-10 ENCOUNTER — APPOINTMENT (OUTPATIENT)
Dept: RADIOLOGY | Facility: CLINIC | Age: 59
End: 2024-10-10
Payer: COMMERCIAL

## 2024-10-10 VITALS
DIASTOLIC BLOOD PRESSURE: 73 MMHG | WEIGHT: 220.24 LBS | RESPIRATION RATE: 17 BRPM | OXYGEN SATURATION: 100 % | BODY MASS INDEX: 29.6 KG/M2 | TEMPERATURE: 97.5 F | SYSTOLIC BLOOD PRESSURE: 130 MMHG | HEART RATE: 99 BPM

## 2024-10-10 DIAGNOSIS — C83.17 MANTLE CELL LYMPHOMA OF SPLEEN (MULTI): Primary | ICD-10-CM

## 2024-10-10 DIAGNOSIS — C83.17 MANTLE CELL LYMPHOMA OF SPLEEN (MULTI): ICD-10-CM

## 2024-10-10 LAB
ALBUMIN SERPL BCP-MCNC: 3.9 G/DL (ref 3.4–5)
ALP SERPL-CCNC: 66 U/L (ref 33–136)
ALT SERPL W P-5'-P-CCNC: 30 U/L (ref 10–52)
ANION GAP SERPL CALC-SCNC: 16 MMOL/L (ref 10–20)
AST SERPL W P-5'-P-CCNC: 23 U/L (ref 9–39)
BILIRUB SERPL-MCNC: 0.9 MG/DL (ref 0–1.2)
BUN SERPL-MCNC: 24 MG/DL (ref 6–23)
CALCIUM SERPL-MCNC: 8.9 MG/DL (ref 8.6–10.6)
CHLORIDE SERPL-SCNC: 102 MMOL/L (ref 98–107)
CO2 SERPL-SCNC: 24 MMOL/L (ref 21–32)
CREAT SERPL-MCNC: 1.42 MG/DL (ref 0.5–1.3)
EGFRCR SERPLBLD CKD-EPI 2021: 57 ML/MIN/1.73M*2
GLUCOSE SERPL-MCNC: 190 MG/DL (ref 74–99)
LDH SERPL L TO P-CCNC: 142 U/L (ref 84–246)
POTASSIUM SERPL-SCNC: 4.7 MMOL/L (ref 3.5–5.3)
PROT SERPL-MCNC: 6.4 G/DL (ref 6.4–8.2)
SODIUM SERPL-SCNC: 137 MMOL/L (ref 136–145)

## 2024-10-10 PROCEDURE — 74177 CT ABD & PELVIS W/CONTRAST: CPT

## 2024-10-10 PROCEDURE — 99215 OFFICE O/P EST HI 40 MIN: CPT | Performed by: INTERNAL MEDICINE

## 2024-10-10 PROCEDURE — 2550000001 HC RX 255 CONTRASTS

## 2024-10-10 ASSESSMENT — PAIN SCALES - GENERAL: PAINLEVEL: 0-NO PAIN

## 2024-10-10 NOTE — RESEARCH NOTES
STUDY: [id number]     Clinical Research Specialist saw patient in clinic today to present [id number] study to. Patient's wife and Dr. Hobbs were present. Briefly went through the consent document and study. Patient is aware of voluntary nature of study and that they will have to complete testing to confirm eligibility.     All questions answered to patient satisfaction.    JOSE EDUARDO NÚÑEZ  10/10/2024

## 2024-10-10 NOTE — PROGRESS NOTES
ONCOLOGY PHARMACY CHEMOTHERAPY EDUCATION NOTE     Diagnosis: Mantle cell lymphoma     Prescriber: Dr. Hobbs    Current Outpatient Medications on File Prior to Visit   Medication Sig Dispense Refill    acyclovir (Zovirax) 400 mg tablet Take 1 tablet (400 mg) by mouth 2 times a day. 60 tablet 11    allopurinol (Zyloprim) 300 mg tablet Take 1 tablet (300 mg) by mouth once daily. 30 tablet 1    atorvastatin (Lipitor) 40 mg tablet Take 1 tablet (40 mg) by mouth once daily.      hydrocortisone 1 % cream       Jaypirca 100 mg tablet Take 2 tablets (200 mg) by mouth once daily. 60 tablet 1    multivit-min/iron/folic acid/K (ADULTS MULTIVITAMIN ORAL) Take by mouth.      tadalafil (Cialis) 5 mg tablet Take 1 tablet (5 mg) by mouth once daily.      venetoclax (Venclexta) 100 mg tablet Take 1 tablet (100 mg) by mouth once daily for 1 week, then 2 tablets (200 mg) once daily for 1 week, then 4 tablets (400 mg) once daily. Take with food. 80 tablet 0     Current Facility-Administered Medications on File Prior to Visit   Medication Dose Route Frequency Provider Last Rate Last Admin    [COMPLETED] iohexol (OMNIPaque) 350 mg iodine/mL solution 90 mL  90 mL intravenous Once in imaging NORBERT Enriquez   90 mL at 10/10/24 1041     Treatment Plan       None            Note:      Pharmacist consulted to provide education on Venetoclax and pirtobrutinib  chemotherapy via In-Person  visit.      1.  Chemotherapy Education: The chemotherapy regimen Venetoclax and pirtobrutinib was discussed with patient and caregiver/s including treatment schedule, potential side effects, and management of side effects.  Potential side effects include but are not limited to: chemotherapy side effects: neutropenia, infection risk, anemia, fatigue, weakness, low energy, thrombocytopenia, bleeding/bruising, n/v, diarrhea, and tumor lysis syndrome.  Management techniques of these side effects were discussed, such as blood count checks, prophylactic  anti-infective medicines, temperature checks, blood product transfusions, electrolyte monitoring, antiemetic use, loperamide use with max dose of 8 tabs per 24 hours, staying hydrated if having diarrhea, and hydration for TLS prevention.  Written materials were provided including drug-specific side effect handouts.  Prescriptions for supportive care/prophylaxis (if applicable) medications were also discussed in terms of use and potential side effects.      2.  Home Medications: Reviewed patients documented home medications. Drug interactions identified between chemotherapy and patient’s home medications: none*.  All questions were answered.  patient and caregiver/s verbalized understanding of the plan of care and management of side effects.  Spent approximately 20 minutes coordinating care and patient interaction.  Will follow-up as necessary.      3. [facility] Clinical Note    Nancie Armenta is a 59 y.o. male, who is on the specialty pharmacy service for management of: Oncology Core with status of: (Enrolled)     Nancie was contacted on 10/10/2024.    Refer to the encounter summary report for documentation details about patient counseling and education.      Medication Adherence  The importance of adherence was discussed with the patient and they were advised to take the medication as prescribed by their provider. Nancie was encouraged to call his physician's office if they have a question regarding a missed dose.       Patient advised to contact the pharmacy if there are any changes to her medication list, including prescriptions, OTC medications, herbal products, or supplements. Patient was advised of [facility]’s dispensing process, refill timeline, contact information ([phone]), and patient management follow up. Patient confirmed understanding of education conducted during assessment. All patient questions and concerns were addressed to the  best of my ability. Patient was encouraged to contact the specialty pharmacy with any questions or concerns.    Confirmed follow-up outreaches are properly scheduled. Reviewed goals of therapy in the program targets.      Thank you for consulting [facility] Team.   Please don’t hesitate to reach out for further questions regarding this patient.     Sumit Donovan [facility], PharmD

## 2024-10-10 NOTE — PROGRESS NOTES
Patient ID: Nancie Armenta is a 59 y.o. male.    Treatment:   Oncology History Overview Note           Patient Visit Information:   Visit Type: Follow Up Visit      Cancer History:   Treatment Synopsis:     1. Stage IV A mantle cell non-Hodgkin lymphoma involving the gastrointestinal tract diagnosed in October 2011, with diffuse gastrointestinal involvement. The patient was treated  with RCHOP alternating with Rituxan and high-dose Mamie-C and received total of 6 cycles of chemotherapy which he finished in February 2012.   Allergic to Augmentin, causes hives.     2. Patient underwent beam chemotherapy followed by autologous peripheral stem cell transplant in April 2012, by Dr. Yvonne Hobbs at [facility], was also involved in phase 3 clinical trial up killed shingles vaccine versus placebo ([employer]-1 Z10  study).     3.The patient developed shingles in January 2014, involving left flank.      4. 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      5.  Recurrent mantle cell lymphoma June 2019 assx presentation with leukocytosis. Peripheral blood flow which showed a CD5 positve clonal lymphocytosis. 6/2/19 CT of chest abdomen and pelvis revealed  splenomegaly of 15 cm compared to 12.8 cm last evaluation. Recurrence confirmed by FISH on peripheral blood earlier this month.  BM biopsy which showed 5% Mantle cell involvement although peripheral blood shows 30% involvement. CT imaging showed splenomegaly. PET scan declined by insurance twice.  Patient underwent colonoscopy  with Dr. Ac ( [place]) 8/8/19 which showed cobblestoning of colon and multiple biopsies including terminal ileum, appendix, cecum,  rectosigmoid positive for mantle cell lymphoma.     6. Initiation of acalabrutinib August 2019 with minimal response. Switched to ibrutinib and venetoclax 12/20/20 with clearing of t(11, 14) by by FISH ( 1/2020) and resolution of involvement of colon by endoscopy and pathology on exam 3/2/2020! BM biopsy  4/2020 HEATHER.     C-scope (3/11/22): at OSH: negative, no evidence of disease. PET scan ( 3/14/22): no abnormal uptake. Deauville 1. BMBx (3/17/22): negative, no evidence of MCL.   March 2022, ibrutinib and venetoclax discontinued.    July 2024 development of Tpenia, bone marrow biopsy 7/25/24 showed focal involvement with mantle cell lymphoma (NGS cyclin D1 positive and Sox 11 positive. BIRC+ and TP 53 positve with a VAF of 3%, BIRC3 positive). FISH negative for 17p deletion  CT imaging done on 8/7/24 shows thickening of sigmoid colon, development of mild to moderate retroperitoneal adenopathy, splenomegally and possible splenic infarct.    Pirotbrutib 200 mg daily initiated 8/12/24     7. S/P COVID 19 infection 12/1/2020, no sequelae                          Lymphoma   10/20/2011 Initial Diagnosis    Lymphoma (Multi)     Mantle cell lymphoma of spleen (Multi)   10/13/2016 Initial Diagnosis    Mantle cell lymphoma of spleen (CMS/HCC)         Response:     Past Medical History:  Renal insufficiency.   Past Medical History:   Diagnosis Date    Fourth (trochlear) nerve palsy, right eye 06/08/2017    Right-sided fourth cranial nerve palsy    Fourth (trochlear) nerve palsy, right eye 06/08/2017    Right-sided fourth cranial nerve palsy    Malignant neoplasm of connective and soft tissue, unspecified 06/08/2017    Cancer of blood vessel     Surgical History:   Past Surgical History:   Procedure Laterality Date    SHOULDER SURGERY  07/13/2017    Shoulder Surgery        Family History:   Family History   Problem Relation Name Age of Onset    Cataracts Mother      Cataracts Father      Other (chronic lymphoid leukemia)  Father         Social History:  .  Working full time for [employer].    Social History     Tobacco Use    Smoking status: Never    Smokeless tobacco: Never   Vaping Use    Vaping status: Never Used   Substance Use Topics    Alcohol use: Never    Drug use: Never   -------------------------------------------------------------------------------------------------------  Subjective       HPI    Nancie Armenta is a 59 year old male with PMH of hyperglycemia, renal insufficiency, and stage IV A mantle cell non hodgkin lymphoma involving the gastrointestinal tract, s/p autologous SCT (April 2012).      Early in July 2024 patient noted to have dropping platelet counts and bone marrow biopsy 7/25/24 showed focal involvement with mantle cell lymphoma (NGS cyclin D1 positive and Sox 11 positive. BIRC+ ad TP 53 positve with a VAF of 3%, BIRC3 positive). FISH negative for 17p deletion  CT imaging done on 8/7/24 shows thickening of sigmoid colon, development of mild to moderate retroperitoneal adenopathy, splenomegally and possible splenic infarct.     Patient is here today 10/10/24 with his wife for followup of recently recurrent mantle cell lymphoma as above. He started pirtobrutinib 8/12/24 with a short prephase prednisone and seems to be tolerating it well.  His splenic pain has resolved , however he reports he has worse appetite an has lost about 17 pounds in the past couple of months.  Working full time at his job and is in good spirits.  Notes chronic cough, worse when sits up better when lies down, denies fever,  shortness of breath of exertion.  CXR showed no abnormalities.  As expected CT scan of chest, abdomen, and pelvis done before this visit  showed spleen increasing is size and mild increase in retroperitoneal nodes. ,     Review of Systems   Constitutional:  Positive for unexpected weight change. Negative for appetite change, chills, diaphoresis, fatigue and fever.   Respiratory:  Negative for cough,  shortness of breath and wheezing.    Cardiovascular:  Negative for chest pain, leg swelling and palpitations.   Gastrointestinal:  Negative for abdominal pain, blood in stool, constipation, diarrhea, nausea and vomiting.   Genitourinary:  Negative for dysuria, frequency, hematuria and nocturia.    Musculoskeletal: Negative.  Negative for gait problem.   Skin:  Negative for rash and wound.   Neurological:  Negative for dizziness, gait problem, light-headedness and numbness.   Hematological:  Negative for adenopathy.   -------------------------------------------------------------------------------------------------------  Objective   BSA: 2.26 meters squared  /73   Pulse 99   Temp 36.4 °C (97.5 °F)   Resp 17   Wt 99.9 kg (220 lb 3.8 oz)   SpO2 100%   BMI 29.60 kg/m²     Physical Exam  Vitals reviewed.   Constitutional:       General: He is not in acute distress.     Appearance: Normal appearance.      Comments: Looks more fatigued than usual   HENT:      Head: Normocephalic and atraumatic.      Mouth/Throat:      Mouth: Mucous membranes are moist.   Eyes:      Extraocular Movements: Extraocular movements intact.      Conjunctiva/sclera: Conjunctivae normal.      Pupils: Pupils are equal, round, and reactive to light.   Cardiovascular:      Rate and Rhythm: Normal rate and regular rhythm.      Pulses: Normal pulses.      Heart sounds: Normal heart sounds. No murmur heard.     No friction rub. No gallop.   Pulmonary:      Effort: Pulmonary effort is normal. No respiratory distress.      Breath sounds: Normal breath sounds. No wheezing.   Abdominal:      General: Abdomen is flat. Bowel sounds are normal. There is no distension.      Palpations: Abdomen is soft. There is splenomegaly. There is no mass.      Tenderness: There is no abdominal tenderness.      Comments: Spleen palpable and 20 cm bcm crosses midline   Musculoskeletal:         General: Swelling (non-pitting edema to bilateral lower extremities)  present. Normal range of motion.   Lymphadenopathy:      Comments: No lymphadenopathy   Skin:     General: Skin is warm and dry.   Neurological:      General: No focal deficit present.      Mental Status: He is alert and oriented to person, place, and time.   Psychiatric:         Mood and Affect: Mood normal.         Behavior: Behavior normal.         Thought Content: Thought content normal.         Judgment: Judgment normal.     Performance Status:  ECOG performance status: 0 fully active  -------------------------------------------------------------------------------------------------------  Assessment/Plan      1. Stage IV mantle cell lymphoma itreated with Nordic regimen and consolidation autograft with BEAM preparative regimen April 2012.  Relapse 2019 treated with ibrutinib and venetoclax with complete pathology response until 3/ 2022. See above.         Recurrence July 2024 presenting with dropping platelet counts and bone marrow biopsy 7/25/24 showed focal involvement with mantle cell lymphoma (NGS cyclin D1 positive and Sox 11 positive. BIRC+ ad TP 53 positve with a VAF of 3%, BIRC3 positive). FISH negative for 17p deletion  CT imaging done on 8/7/24 shows thickening of sigmoid colon, development of mild to moderate retroperitoneal adenopathy, splenomegally and possible splenic infarct.      10/10/24 Patient started pirtobrutinib salvage August 12,2024 and feels better. However spleen size is increasing per CT imagning today and physical exam so plan to add venetoclax. Chemo consent signed.  Will plan to start venetoclax 100 mg daily on Sunday evening October 14 with MWF counts for TLS labs at main campus.  Advised to dring 2 liters of fluids daily, will increase venetoclax to 200 mg on week 2 and 400 mg on week 3.  See modification below:     Addendum 10/11/24, have decided to add an additional ramp up dose of 50 mg for first week, then  100, 200, and 400.  Reviewed plan with patient and with Dr. Raza  Zion,  patient instructed to start with 50 mg dose and wait to start venetoclax till lower dose  arrives and have TLS labs obtained day after starting and MWF as outlined above    Plan to proceed to CART cell therapy ASAP, discussed CASE 2419, general procedures for CART collections, lymphodepleting therapy and side effects discussed. If CART cells not available on trail will proceed to [facility].  On acyclovir prophylaxis and allopurinol.       Renal failure: stable at 1.3 or so    Health maintenance.   Patient has recv'd both doses of shingrix vaccine & has completed all of his other post transplant vaccines,  Discontinued Acyclovir, & Bactrim prophylaxis (2/23/23) since off treatment since 3/24/22.  Recv'd Prevnar 13 and influenza vaccine (9/16/20) with his PCP. Up to date on tetanus.   - Recv'd Sha & Sha COVID vaccine on 3/16/21. Received booster late 2021. Received Evusheld given on (3/17/22) & 9/22/22.   -Received influenza vaccine 10/18/23.  Advised to obtain covid vaccine at local pharmacy.  Colonoscopy completed 7/14/23: normal except internal hemorrhoids.  Recommendations to repeat in 2 years.      4/20/23: some aortic calcifications on imaging, started on prevastatin-managed by his PCP.  PCP increased pravastatin dose to 40 mg daily.  PCP switched to atorvastatin 40 mg daily.    BPH:  5/16/24:  Reports urinary frequency and nocturia symptoms has greatly improved after starting tadalafil 5 mg daily.     RTC: CT scan of CAP shows increased splenic size to 22 cm.  Will add venetoclax  on Sunday, see above..  Patient instructed to drink 2 liters of fluids daily. Will take the first venetoclax on Sunday evening then TLS labs MWF. Visit with Zainab Sullivan on 10/21

## 2024-10-11 ENCOUNTER — PHARMACY VISIT (OUTPATIENT)
Dept: PHARMACY | Facility: CLINIC | Age: 59
End: 2024-10-11
Payer: COMMERCIAL

## 2024-10-11 ENCOUNTER — SPECIALTY PHARMACY (OUTPATIENT)
Dept: PHARMACY | Facility: CLINIC | Age: 59
End: 2024-10-11

## 2024-10-11 DIAGNOSIS — C83.17 MANTLE CELL LYMPHOMA OF SPLEEN (MULTI): Primary | ICD-10-CM

## 2024-10-11 PROCEDURE — RXMED WILLOW AMBULATORY MEDICATION CHARGE

## 2024-10-11 ASSESSMENT — ENCOUNTER SYMPTOMS: UNEXPECTED WEIGHT CHANGE: 1

## 2024-10-14 ENCOUNTER — TELEPHONE (OUTPATIENT)
Dept: ADMISSION | Facility: HOSPITAL | Age: 59
End: 2024-10-14
Payer: COMMERCIAL

## 2024-10-14 ENCOUNTER — PATIENT MESSAGE (OUTPATIENT)
Dept: HEMATOLOGY/ONCOLOGY | Facility: HOSPITAL | Age: 59
End: 2024-10-14
Payer: COMMERCIAL

## 2024-10-14 ENCOUNTER — TELEPHONE (OUTPATIENT)
Dept: HEMATOLOGY/ONCOLOGY | Facility: HOSPITAL | Age: 59
End: 2024-10-14
Payer: COMMERCIAL

## 2024-10-14 ENCOUNTER — LAB (OUTPATIENT)
Dept: LAB | Facility: HOSPITAL | Age: 59
End: 2024-10-14
Payer: COMMERCIAL

## 2024-10-14 DIAGNOSIS — C83.17 MANTLE CELL LYMPHOMA OF SPLEEN (MULTI): ICD-10-CM

## 2024-10-14 LAB
ALBUMIN SERPL BCP-MCNC: 4 G/DL (ref 3.4–5)
ALP SERPL-CCNC: 59 U/L (ref 33–136)
ALT SERPL W P-5'-P-CCNC: 26 U/L (ref 10–52)
ANION GAP SERPL CALC-SCNC: 16 MMOL/L (ref 10–20)
AST SERPL W P-5'-P-CCNC: 29 U/L (ref 9–39)
BASOPHILS # BLD MANUAL: 0.09 X10*3/UL (ref 0–0.1)
BASOPHILS NFR BLD MANUAL: 1 %
BILIRUB SERPL-MCNC: 0.7 MG/DL (ref 0–1.2)
BUN SERPL-MCNC: 32 MG/DL (ref 6–23)
BURR CELLS BLD QL SMEAR: ABNORMAL
CALCIUM SERPL-MCNC: 8.8 MG/DL (ref 8.6–10.3)
CHLORIDE SERPL-SCNC: 101 MMOL/L (ref 98–107)
CO2 SERPL-SCNC: 23 MMOL/L (ref 21–32)
CREAT SERPL-MCNC: 1.49 MG/DL (ref 0.5–1.3)
DACRYOCYTES BLD QL SMEAR: ABNORMAL
EGFRCR SERPLBLD CKD-EPI 2021: 53 ML/MIN/1.73M*2
EOSINOPHIL # BLD MANUAL: 0 X10*3/UL (ref 0–0.7)
EOSINOPHIL NFR BLD MANUAL: 0 %
ERYTHROCYTE [DISTWIDTH] IN BLOOD BY AUTOMATED COUNT: 15.8 % (ref 11.5–14.5)
GLUCOSE SERPL-MCNC: 150 MG/DL (ref 74–99)
HCT VFR BLD AUTO: 31.8 % (ref 41–52)
HGB BLD-MCNC: 10.1 G/DL (ref 13.5–17.5)
IMM GRANULOCYTES # BLD AUTO: 0.06 X10*3/UL (ref 0–0.7)
IMM GRANULOCYTES NFR BLD AUTO: 0.6 % (ref 0–0.9)
LDH SERPL L TO P-CCNC: 236 U/L (ref 84–246)
LYMPHOCYTES # BLD MANUAL: 1.49 X10*3/UL (ref 1.2–4.8)
LYMPHOCYTES NFR BLD MANUAL: 16 %
MCH RBC QN AUTO: 31.5 PG (ref 26–34)
MCHC RBC AUTO-ENTMCNC: 31.8 G/DL (ref 32–36)
MCV RBC AUTO: 99 FL (ref 80–100)
METAMYELOCYTES # BLD MANUAL: 0.09 X10*3/UL
METAMYELOCYTES NFR BLD MANUAL: 1 %
MONOCYTES # BLD MANUAL: 1.3 X10*3/UL (ref 0.1–1)
MONOCYTES NFR BLD MANUAL: 14 %
NEUTROPHILS # BLD MANUAL: 6.32 X10*3/UL (ref 1.2–7.7)
NEUTS BAND # BLD MANUAL: 1.02 X10*3/UL (ref 0–0.7)
NEUTS BAND NFR BLD MANUAL: 11 %
NEUTS SEG # BLD MANUAL: 5.3 X10*3/UL (ref 1.2–7)
NEUTS SEG NFR BLD MANUAL: 57 %
NRBC BLD-RTO: 0 /100 WBCS (ref 0–0)
OVALOCYTES BLD QL SMEAR: ABNORMAL
PHOSPHATE SERPL-MCNC: 4.5 MG/DL (ref 2.5–4.9)
PLATELET # BLD AUTO: 50 X10*3/UL (ref 150–450)
POLYCHROMASIA BLD QL SMEAR: ABNORMAL
POTASSIUM SERPL-SCNC: 4.4 MMOL/L (ref 3.5–5.3)
PROT SERPL-MCNC: 6.8 G/DL (ref 6.4–8.2)
RBC # BLD AUTO: 3.21 X10*6/UL (ref 4.5–5.9)
RBC MORPH BLD: ABNORMAL
SODIUM SERPL-SCNC: 136 MMOL/L (ref 136–145)
TOTAL CELLS COUNTED BLD: 100
URATE SERPL-MCNC: 10.9 MG/DL (ref 4–7.5)
WBC # BLD AUTO: 9.3 X10*3/UL (ref 4.4–11.3)

## 2024-10-14 PROCEDURE — 85027 COMPLETE CBC AUTOMATED: CPT

## 2024-10-14 PROCEDURE — 83615 LACTATE (LD) (LDH) ENZYME: CPT

## 2024-10-14 PROCEDURE — 85007 BL SMEAR W/DIFF WBC COUNT: CPT

## 2024-10-14 PROCEDURE — 84550 ASSAY OF BLOOD/URIC ACID: CPT

## 2024-10-14 PROCEDURE — 36415 COLL VENOUS BLD VENIPUNCTURE: CPT

## 2024-10-14 PROCEDURE — 84100 ASSAY OF PHOSPHORUS: CPT

## 2024-10-14 PROCEDURE — 80053 COMPREHEN METABOLIC PANEL: CPT

## 2024-10-14 RX ORDER — ALLOPURINOL 300 MG/1
300 TABLET ORAL DAILY
Qty: 30 TABLET | Refills: 1 | Status: SHIPPED | OUTPATIENT
Start: 2024-10-14 | End: 2025-04-12

## 2024-10-14 RX ORDER — PROCHLORPERAZINE MALEATE 10 MG
10 TABLET ORAL EVERY 6 HOURS PRN
Qty: 60 TABLET | Refills: 5 | Status: SHIPPED | OUTPATIENT
Start: 2024-10-14 | End: 2025-01-12

## 2024-10-14 RX ORDER — ONDANSETRON HYDROCHLORIDE 8 MG/1
8 TABLET, FILM COATED ORAL EVERY 8 HOURS PRN
Qty: 30 TABLET | Refills: 5 | Status: SHIPPED | OUTPATIENT
Start: 2024-10-14

## 2024-10-14 NOTE — TELEPHONE ENCOUNTER
"The patient also wrote in a  [employer] message \"Good morning Zainab,     I am going to get my labs done shortly. I started the 50 mg. venetoclax with the JayPirca last evening. I had a horrible night with a stomach ache. I did not have digestive problems( no diarrhea thankfully) but I didn’t sleep at all until this morning at 7a. From exhaustion. I don’t know if I should try pepto bismal or if I need a zofran. Ry @  Specialty mentioned it at my appointment w/Dr. Hobbs last Thursday.     If you have just 2 minutes to call me sometime today I have to get this figured out.     Thanks,     Dada Armenta  1965  [phone]\"  "

## 2024-10-14 NOTE — TELEPHONE ENCOUNTER
The patient called in and left a . Stated that he is aware that Dr. Hobbs is on vacation however was wondering if he could speak with Zainab Sullivan briefly in regards to his venetoclax medication. States he started a new dose last night and had a very rough night with nausea and wanted to discuss management/ondansetron.     Will be there around 1:00 for lab work, was unable to make it this morning due to nausea, please advise.

## 2024-10-14 NOTE — PATIENT COMMUNICATION
The patient also called in about this issue and it was relayed to the team. Will check in with team to see where they are with possibly calling patient

## 2024-10-16 ENCOUNTER — DOCUMENTATION (OUTPATIENT)
Dept: OTHER | Facility: HOSPITAL | Age: 59
End: 2024-10-16
Payer: COMMERCIAL

## 2024-10-16 ENCOUNTER — LAB (OUTPATIENT)
Dept: LAB | Facility: HOSPITAL | Age: 59
End: 2024-10-16
Payer: COMMERCIAL

## 2024-10-16 DIAGNOSIS — Z51.81 ENCOUNTER FOR MONITORING CARDIOTOXIC DRUG THERAPY: ICD-10-CM

## 2024-10-16 DIAGNOSIS — Z76.82 STEM CELL TRANSPLANT CANDIDATE: ICD-10-CM

## 2024-10-16 DIAGNOSIS — C83.17 MANTLE CELL LYMPHOMA OF SPLEEN (MULTI): ICD-10-CM

## 2024-10-16 DIAGNOSIS — R06.89 BREATHING DIFFICULTY: ICD-10-CM

## 2024-10-16 DIAGNOSIS — D69.6 THROMBOCYTOPENIA (CMS-HCC): ICD-10-CM

## 2024-10-16 DIAGNOSIS — Z79.899 ENCOUNTER FOR MONITORING CARDIOTOXIC DRUG THERAPY: ICD-10-CM

## 2024-10-16 DIAGNOSIS — Z79.899 HIGH RISK MEDICATION USE: ICD-10-CM

## 2024-10-16 LAB
ALBUMIN SERPL BCP-MCNC: 4.2 G/DL (ref 3.4–5)
ALP SERPL-CCNC: 58 U/L (ref 33–136)
ALT SERPL W P-5'-P-CCNC: 24 U/L (ref 10–52)
ANION GAP SERPL CALC-SCNC: 14 MMOL/L (ref 10–20)
AST SERPL W P-5'-P-CCNC: 18 U/L (ref 9–39)
BASOPHILS # BLD AUTO: 0 X10*3/UL (ref 0–0.1)
BASOPHILS NFR BLD AUTO: 0 %
BILIRUB SERPL-MCNC: 1 MG/DL (ref 0–1.2)
BUN SERPL-MCNC: 25 MG/DL (ref 6–23)
CALCIUM SERPL-MCNC: 9.5 MG/DL (ref 8.6–10.3)
CHLORIDE SERPL-SCNC: 104 MMOL/L (ref 98–107)
CO2 SERPL-SCNC: 26 MMOL/L (ref 21–32)
CREAT SERPL-MCNC: 1.32 MG/DL (ref 0.5–1.3)
EGFRCR SERPLBLD CKD-EPI 2021: 62 ML/MIN/1.73M*2
EOSINOPHIL # BLD AUTO: 0.08 X10*3/UL (ref 0–0.7)
EOSINOPHIL NFR BLD AUTO: 2.1 %
ERYTHROCYTE [DISTWIDTH] IN BLOOD BY AUTOMATED COUNT: 15.7 % (ref 11.5–14.5)
GLUCOSE SERPL-MCNC: 121 MG/DL (ref 74–99)
HCT VFR BLD AUTO: 34.2 % (ref 41–52)
HGB BLD-MCNC: 10.7 G/DL (ref 13.5–17.5)
IMM GRANULOCYTES # BLD AUTO: 0.01 X10*3/UL (ref 0–0.7)
IMM GRANULOCYTES NFR BLD AUTO: 0.3 % (ref 0–0.9)
LDH SERPL L TO P-CCNC: 137 U/L (ref 84–246)
LYMPHOCYTES # BLD AUTO: 0.87 X10*3/UL (ref 1.2–4.8)
LYMPHOCYTES NFR BLD AUTO: 23.3 %
MCH RBC QN AUTO: 31 PG (ref 26–34)
MCHC RBC AUTO-ENTMCNC: 31.3 G/DL (ref 32–36)
MCV RBC AUTO: 99 FL (ref 80–100)
MONOCYTES # BLD AUTO: 0.32 X10*3/UL (ref 0.1–1)
MONOCYTES NFR BLD AUTO: 8.6 %
NEUTROPHILS # BLD AUTO: 2.45 X10*3/UL (ref 1.2–7.7)
NEUTROPHILS NFR BLD AUTO: 65.7 %
NRBC BLD-RTO: 0 /100 WBCS (ref 0–0)
PHOSPHATE SERPL-MCNC: 4 MG/DL (ref 2.5–4.9)
PLATELET # BLD AUTO: 49 X10*3/UL (ref 150–450)
POTASSIUM SERPL-SCNC: 4.6 MMOL/L (ref 3.5–5.3)
PROT SERPL-MCNC: 7.1 G/DL (ref 6.4–8.2)
RBC # BLD AUTO: 3.45 X10*6/UL (ref 4.5–5.9)
SODIUM SERPL-SCNC: 139 MMOL/L (ref 136–145)
URATE SERPL-MCNC: 7.7 MG/DL (ref 4–7.5)
WBC # BLD AUTO: 3.7 X10*3/UL (ref 4.4–11.3)

## 2024-10-16 PROCEDURE — 36415 COLL VENOUS BLD VENIPUNCTURE: CPT

## 2024-10-16 PROCEDURE — 84550 ASSAY OF BLOOD/URIC ACID: CPT

## 2024-10-16 PROCEDURE — 84075 ASSAY ALKALINE PHOSPHATASE: CPT

## 2024-10-16 PROCEDURE — 84100 ASSAY OF PHOSPHORUS: CPT

## 2024-10-16 PROCEDURE — 83615 LACTATE (LD) (LDH) ENZYME: CPT

## 2024-10-16 PROCEDURE — 85025 COMPLETE CBC W/AUTO DIFF WBC: CPT

## 2024-10-16 NOTE — PROGRESS NOTES
10/16/24 2:30PM    Spoke with Mr. Armenta and introduced myself as his coordinator. I explained to Mr. Armenta that research would like to sign consents with him for CAR-T Case 2419 on Monday 10/21. I explained to patient that I would be at his appointment with calendars for testing, collection, and CAR-T. Patient verbalized understanding and has my contact information. I will provide education to patient on Monday 10/21.    Olive Enrique RN

## 2024-10-18 ENCOUNTER — LAB (OUTPATIENT)
Dept: LAB | Facility: HOSPITAL | Age: 59
End: 2024-10-18
Payer: COMMERCIAL

## 2024-10-18 DIAGNOSIS — C83.10 MANTLE CELL LYMPHOMA, UNSPECIFIED BODY REGION (MULTI): ICD-10-CM

## 2024-10-18 DIAGNOSIS — C83.17 MANTLE CELL LYMPHOMA OF SPLEEN (MULTI): ICD-10-CM

## 2024-10-18 DIAGNOSIS — Z76.82 STEM CELL TRANSPLANT CANDIDATE: ICD-10-CM

## 2024-10-18 LAB
ALBUMIN SERPL BCP-MCNC: 4.1 G/DL (ref 3.4–5)
ALP SERPL-CCNC: 60 U/L (ref 33–136)
ALT SERPL W P-5'-P-CCNC: 24 U/L (ref 10–52)
ANION GAP SERPL CALC-SCNC: 14 MMOL/L (ref 10–20)
AST SERPL W P-5'-P-CCNC: 18 U/L (ref 9–39)
BASOPHILS # BLD AUTO: 0.01 X10*3/UL (ref 0–0.1)
BASOPHILS NFR BLD AUTO: 0.3 %
BILIRUB SERPL-MCNC: 0.9 MG/DL (ref 0–1.2)
BUN SERPL-MCNC: 23 MG/DL (ref 6–23)
CALCIUM SERPL-MCNC: 9.4 MG/DL (ref 8.6–10.3)
CHLORIDE SERPL-SCNC: 105 MMOL/L (ref 98–107)
CO2 SERPL-SCNC: 25 MMOL/L (ref 21–32)
CREAT SERPL-MCNC: 1.21 MG/DL (ref 0.5–1.3)
EGFRCR SERPLBLD CKD-EPI 2021: 69 ML/MIN/1.73M*2
EOSINOPHIL # BLD AUTO: 0.07 X10*3/UL (ref 0–0.7)
EOSINOPHIL NFR BLD AUTO: 2.1 %
ERYTHROCYTE [DISTWIDTH] IN BLOOD BY AUTOMATED COUNT: 15.4 % (ref 11.5–14.5)
GLUCOSE SERPL-MCNC: 115 MG/DL (ref 74–99)
HCT VFR BLD AUTO: 33.8 % (ref 41–52)
HGB BLD-MCNC: 10.6 G/DL (ref 13.5–17.5)
IMM GRANULOCYTES # BLD AUTO: 0.02 X10*3/UL (ref 0–0.7)
IMM GRANULOCYTES NFR BLD AUTO: 0.6 % (ref 0–0.9)
LDH SERPL L TO P-CCNC: 122 U/L (ref 84–246)
LYMPHOCYTES # BLD AUTO: 0.94 X10*3/UL (ref 1.2–4.8)
LYMPHOCYTES NFR BLD AUTO: 28.1 %
MCH RBC QN AUTO: 31.2 PG (ref 26–34)
MCHC RBC AUTO-ENTMCNC: 31.4 G/DL (ref 32–36)
MCV RBC AUTO: 99 FL (ref 80–100)
MONOCYTES # BLD AUTO: 0.32 X10*3/UL (ref 0.1–1)
MONOCYTES NFR BLD AUTO: 9.6 %
NEUTROPHILS # BLD AUTO: 1.98 X10*3/UL (ref 1.2–7.7)
NEUTROPHILS NFR BLD AUTO: 59.3 %
NRBC BLD-RTO: 0 /100 WBCS (ref 0–0)
PHOSPHATE SERPL-MCNC: 2.7 MG/DL (ref 2.5–4.9)
PLATELET # BLD AUTO: 45 X10*3/UL (ref 150–450)
POTASSIUM SERPL-SCNC: 4.5 MMOL/L (ref 3.5–5.3)
PROT SERPL-MCNC: 6.7 G/DL (ref 6.4–8.2)
RBC # BLD AUTO: 3.4 X10*6/UL (ref 4.5–5.9)
SODIUM SERPL-SCNC: 139 MMOL/L (ref 136–145)
URATE SERPL-MCNC: 5.8 MG/DL (ref 4–7.5)
WBC # BLD AUTO: 3.3 X10*3/UL (ref 4.4–11.3)

## 2024-10-18 PROCEDURE — 36415 COLL VENOUS BLD VENIPUNCTURE: CPT

## 2024-10-18 PROCEDURE — 80053 COMPREHEN METABOLIC PANEL: CPT

## 2024-10-18 PROCEDURE — 84550 ASSAY OF BLOOD/URIC ACID: CPT

## 2024-10-18 PROCEDURE — 84100 ASSAY OF PHOSPHORUS: CPT

## 2024-10-18 PROCEDURE — 85025 COMPLETE CBC W/AUTO DIFF WBC: CPT

## 2024-10-18 PROCEDURE — 83615 LACTATE (LD) (LDH) ENZYME: CPT

## 2024-10-21 ENCOUNTER — HOSPITAL ENCOUNTER (OUTPATIENT)
Dept: RADIOLOGY | Facility: HOSPITAL | Age: 59
Discharge: HOME | End: 2024-10-21
Payer: COMMERCIAL

## 2024-10-21 ENCOUNTER — LAB (OUTPATIENT)
Dept: LAB | Facility: HOSPITAL | Age: 59
End: 2024-10-21
Payer: COMMERCIAL

## 2024-10-21 ENCOUNTER — OFFICE VISIT (OUTPATIENT)
Dept: HEMATOLOGY/ONCOLOGY | Facility: HOSPITAL | Age: 59
End: 2024-10-21
Payer: COMMERCIAL

## 2024-10-21 VITALS
OXYGEN SATURATION: 100 % | SYSTOLIC BLOOD PRESSURE: 127 MMHG | HEART RATE: 98 BPM | RESPIRATION RATE: 17 BRPM | DIASTOLIC BLOOD PRESSURE: 79 MMHG | BODY MASS INDEX: 29.31 KG/M2 | WEIGHT: 218.03 LBS | TEMPERATURE: 96.8 F

## 2024-10-21 DIAGNOSIS — C83.17 MANTLE CELL LYMPHOMA OF SPLEEN (MULTI): ICD-10-CM

## 2024-10-21 DIAGNOSIS — Z79.899 ENCOUNTER FOR MONITORING CARDIOTOXIC DRUG THERAPY: ICD-10-CM

## 2024-10-21 DIAGNOSIS — C83.17 MANTLE CELL LYMPHOMA OF SPLEEN (MULTI): Primary | ICD-10-CM

## 2024-10-21 DIAGNOSIS — Z76.82 STEM CELL TRANSPLANT CANDIDATE: ICD-10-CM

## 2024-10-21 DIAGNOSIS — Z51.81 ENCOUNTER FOR MONITORING CARDIOTOXIC DRUG THERAPY: ICD-10-CM

## 2024-10-21 DIAGNOSIS — R06.89 BREATHING DIFFICULTY: ICD-10-CM

## 2024-10-21 DIAGNOSIS — D69.6 THROMBOCYTOPENIA (CMS-HCC): ICD-10-CM

## 2024-10-21 LAB
ABO GROUP (TYPE) IN BLOOD: NORMAL
ALBUMIN SERPL BCP-MCNC: 4.2 G/DL (ref 3.4–5)
ALP SERPL-CCNC: 66 U/L (ref 33–136)
ALT SERPL W P-5'-P-CCNC: 22 U/L (ref 10–52)
ANION GAP SERPL CALC-SCNC: 16 MMOL/L (ref 10–20)
ANTIBODY SCREEN: NORMAL
APTT PPP: 27 SECONDS (ref 27–38)
AST SERPL W P-5'-P-CCNC: 16 U/L (ref 9–39)
BASOPHILS # BLD AUTO: 0.01 X10*3/UL (ref 0–0.1)
BASOPHILS NFR BLD AUTO: 0.4 %
BILIRUB SERPL-MCNC: 0.9 MG/DL (ref 0–1.2)
BNP SERPL-MCNC: 22 PG/ML (ref 0–99)
BUN SERPL-MCNC: 22 MG/DL (ref 6–23)
CA-I BLD-SCNC: 1.13 MMOL/L (ref 1.1–1.33)
CALCIUM SERPL-MCNC: 9.1 MG/DL (ref 8.6–10.3)
CARDIAC TROPONIN I PNL SERPL HS: <3 NG/L (ref 0–53)
CHLORIDE SERPL-SCNC: 101 MMOL/L (ref 98–107)
CMV IGG AVIDITY SERPL IA-RTO: NONREACTIVE %
CO2 SERPL-SCNC: 25 MMOL/L (ref 21–32)
CREAT SERPL-MCNC: 1.27 MG/DL (ref 0.5–1.3)
EBV EA IGG SER QL: POSITIVE
EBV NA AB SER QL: POSITIVE
EBV VCA IGG SER IA-ACNC: POSITIVE
EBV VCA IGM SER IA-ACNC: NEGATIVE
EGFRCR SERPLBLD CKD-EPI 2021: 65 ML/MIN/1.73M*2
EOSINOPHIL # BLD AUTO: 0.02 X10*3/UL (ref 0–0.7)
EOSINOPHIL NFR BLD AUTO: 0.7 %
ERYTHROCYTE [DISTWIDTH] IN BLOOD BY AUTOMATED COUNT: 15.9 % (ref 11.5–14.5)
GLUCOSE SERPL-MCNC: 145 MG/DL (ref 74–99)
HBV CORE AB SER QL: NONREACTIVE
HBV CORE IGM SER QL: NONREACTIVE
HBV SURFACE AB SER-ACNC: 6.4 MIU/ML
HBV SURFACE AG SERPL QL IA: NONREACTIVE
HCT VFR BLD AUTO: 31.6 % (ref 41–52)
HCV AB SER QL: NONREACTIVE
HERPES SIMPLEX VIRUS 1 IGG: 2.8 INDEX
HERPES SIMPLEX VIRUS 2 IGG: <0.2 INDEX
HGB BLD-MCNC: 10.2 G/DL (ref 13.5–17.5)
HIV 1+2 AB+HIV1 P24 AG SERPL QL IA: NONREACTIVE
IMM GRANULOCYTES # BLD AUTO: 0.02 X10*3/UL (ref 0–0.7)
IMM GRANULOCYTES NFR BLD AUTO: 0.7 % (ref 0–0.9)
INR PPP: 1.1 (ref 0.9–1.1)
LDH SERPL L TO P-CCNC: 112 U/L (ref 84–246)
LYMPHOCYTES # BLD AUTO: 0.87 X10*3/UL (ref 1.2–4.8)
LYMPHOCYTES NFR BLD AUTO: 31.5 %
MAGNESIUM SERPL-MCNC: 2.06 MG/DL (ref 1.6–2.4)
MCH RBC QN AUTO: 32.1 PG (ref 26–34)
MCHC RBC AUTO-ENTMCNC: 32.3 G/DL (ref 32–36)
MCV RBC AUTO: 99 FL (ref 80–100)
MONOCYTES # BLD AUTO: 0.21 X10*3/UL (ref 0.1–1)
MONOCYTES NFR BLD AUTO: 7.6 %
NEUTROPHILS # BLD AUTO: 1.63 X10*3/UL (ref 1.2–7.7)
NEUTROPHILS NFR BLD AUTO: 59.1 %
NRBC BLD-RTO: 0 /100 WBCS (ref 0–0)
PHOSPHATE SERPL-MCNC: 3.4 MG/DL (ref 2.5–4.9)
PLATELET # BLD AUTO: 52 X10*3/UL (ref 150–450)
POTASSIUM SERPL-SCNC: 3.9 MMOL/L (ref 3.5–5.3)
PROT SERPL-MCNC: 6.8 G/DL (ref 6.4–8.2)
PROTHROMBIN TIME: 12.3 SECONDS (ref 9.8–12.8)
RBC # BLD AUTO: 3.18 X10*6/UL (ref 4.5–5.9)
RH FACTOR (ANTIGEN D): NORMAL
SODIUM SERPL-SCNC: 138 MMOL/L (ref 136–145)
T GONDII IGG SER-ACNC: REACTIVE
TREPONEMA PALLIDUM IGG+IGM AB [PRESENCE] IN SERUM OR PLASMA BY IMMUNOASSAY: NONREACTIVE
URATE SERPL-MCNC: 5.4 MG/DL (ref 4–7.5)
VARICELLA ZOSTER IGG INDEX: 6.4 IA
VZV IGG SER QL IA: POSITIVE
WBC # BLD AUTO: 2.8 X10*3/UL (ref 4.4–11.3)

## 2024-10-21 PROCEDURE — 83880 ASSAY OF NATRIURETIC PEPTIDE: CPT

## 2024-10-21 PROCEDURE — 36415 COLL VENOUS BLD VENIPUNCTURE: CPT

## 2024-10-21 PROCEDURE — 86644 CMV ANTIBODY: CPT

## 2024-10-21 PROCEDURE — 87389 HIV-1 AG W/HIV-1&-2 AB AG IA: CPT

## 2024-10-21 PROCEDURE — 82330 ASSAY OF CALCIUM: CPT

## 2024-10-21 PROCEDURE — 86777 TOXOPLASMA ANTIBODY: CPT

## 2024-10-21 PROCEDURE — 83735 ASSAY OF MAGNESIUM: CPT

## 2024-10-21 PROCEDURE — 86695 HERPES SIMPLEX TYPE 1 TEST: CPT

## 2024-10-21 PROCEDURE — 87340 HEPATITIS B SURFACE AG IA: CPT

## 2024-10-21 PROCEDURE — 86780 TREPONEMA PALLIDUM: CPT

## 2024-10-21 PROCEDURE — 86901 BLOOD TYPING SEROLOGIC RH(D): CPT

## 2024-10-21 PROCEDURE — 86790 VIRUS ANTIBODY NOS: CPT

## 2024-10-21 PROCEDURE — 84100 ASSAY OF PHOSPHORUS: CPT

## 2024-10-21 PROCEDURE — 83021 HEMOGLOBIN CHROMOTOGRAPHY: CPT

## 2024-10-21 PROCEDURE — 86704 HEP B CORE ANTIBODY TOTAL: CPT

## 2024-10-21 PROCEDURE — 84075 ASSAY ALKALINE PHOSPHATASE: CPT

## 2024-10-21 PROCEDURE — 85025 COMPLETE CBC W/AUTO DIFF WBC: CPT

## 2024-10-21 PROCEDURE — 86645 CMV ANTIBODY IGM: CPT

## 2024-10-21 PROCEDURE — 85610 PROTHROMBIN TIME: CPT

## 2024-10-21 PROCEDURE — 83615 LACTATE (LD) (LDH) ENZYME: CPT

## 2024-10-21 PROCEDURE — 87799 DETECT AGENT NOS DNA QUANT: CPT

## 2024-10-21 PROCEDURE — 86787 VARICELLA-ZOSTER ANTIBODY: CPT

## 2024-10-21 PROCEDURE — 84550 ASSAY OF BLOOD/URIC ACID: CPT

## 2024-10-21 PROCEDURE — 86706 HEP B SURFACE ANTIBODY: CPT

## 2024-10-21 PROCEDURE — 71046 X-RAY EXAM CHEST 2 VIEWS: CPT

## 2024-10-21 PROCEDURE — 86803 HEPATITIS C AB TEST: CPT

## 2024-10-21 PROCEDURE — 84484 ASSAY OF TROPONIN QUANT: CPT

## 2024-10-21 PROCEDURE — 99215 OFFICE O/P EST HI 40 MIN: CPT | Performed by: PHYSICIAN ASSISTANT

## 2024-10-21 PROCEDURE — 86705 HEP B CORE ANTIBODY IGM: CPT

## 2024-10-21 PROCEDURE — 86663 EPSTEIN-BARR ANTIBODY: CPT

## 2024-10-21 ASSESSMENT — ENCOUNTER SYMPTOMS
CHILLS: 0
LEG SWELLING: 0
VOMITING: 0
FEVER: 0
FREQUENCY: 0
CONSTIPATION: 0
DIZZINESS: 0
DIARRHEA: 0
PALPITATIONS: 0
NUMBNESS: 0
BLOOD IN STOOL: 0
FATIGUE: 0
APPETITE CHANGE: 0
WOUND: 0
LIGHT-HEADEDNESS: 0
NAUSEA: 0
COUGH: 1
DYSURIA: 0
SHORTNESS OF BREATH: 0
MUSCULOSKELETAL NEGATIVE: 1
ADENOPATHY: 0
DIAPHORESIS: 0
ABDOMINAL PAIN: 0
WHEEZING: 0
UNEXPECTED WEIGHT CHANGE: 1
HEMATURIA: 0

## 2024-10-21 NOTE — PROGRESS NOTES
Patient ID: Nancie Armenta is a 59 y.o. male.    Treatment:   Oncology History Overview Note           Patient Visit Information:   Visit Type: Follow Up Visit      Cancer History:   Treatment Synopsis:     1. Stage IV A mantle cell non-Hodgkin lymphoma involving the gastrointestinal tract diagnosed in October 2011, with diffuse gastrointestinal involvement. The patient was treated  with RCHOP alternating with Rituxan and high-dose Mamie-C and received total of 6 cycles of chemotherapy which he finished in February 2012.   Allergic to Augmentin, causes hives.     2. Patient underwent beam chemotherapy followed by autologous peripheral stem cell transplant in April 2012, by Dr. Yvonne Hobbs at [facility], was also involved in phase 3 clinical trial up killed shingles vaccine versus placebo ([employer]ck-1 Z10  study).     3.The patient developed shingles in January 2014, involving left flank.      4. 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      5.  Recurrent mantle cell lymphoma June 2019 assx presentation with leukocytosis. Peripheral blood flow which showed a CD5 positve clonal lymphocytosis. 6/2/19 CT of chest abdomen and pelvis revealed  splenomegaly of 15 cm compared to 12.8 cm last evaluation. Recurrence confirmed by FISH on peripheral blood earlier this month.  BM biopsy which showed 5% Mantle cell involvement although peripheral blood shows 30% involvement. CT imaging showed splenomegaly. PET scan declined by insurance twice.  Patient underwent colonoscopy  with Dr. Ac ( [facility]) 8/8/19 which showed cobblestoning of colon and multiple biopsies including terminal ileum, appendix, cecum,  rectosigmoid positive for mantle cell lymphoma.     6. Initiation of acalabrutinib August 2019 with minimal response. Switched to ibrutinib and venetoclax 12/20/20 with clearing of t(11, 14) by by FISH ( 1/2020) and resolution of involvement of colon by endoscopy and pathology on exam 3/2/2020! BM biopsy  4/2020 HEATHER.     C-scope (3/11/22): at OSH: negative, no evidence of disease. PET scan ( 3/14/22): no abnormal uptake. Deauville 1. BMBx (3/17/22): negative, no evidence of MCL.   March 2022, ibrutinib and venetoclax discontinued.    July 2024 development of Tpenia, bone marrow biopsy 7/25/24 showed focal involvement with mantle cell lymphoma (NGS cyclin D1 positive and Sox 11 positive. BIRC+ and TP 53 positve with a VAF of 3%, BIRC3 positive). FISH negative for 17p deletion  CT imaging done on 8/7/24 shows thickening of sigmoid colon, development of mild to moderate retroperitoneal adenopathy, splenomegally and possible splenic infarct.    Pirotbrutib 200 mg daily initiated 8/12/24     7. S/P COVID 19 infection 12/1/2020, no sequelae                          Lymphoma   10/20/2011 Initial Diagnosis    Lymphoma (Multi)     Mantle cell lymphoma of spleen (Multi)   10/13/2016 Initial Diagnosis    Mantle cell lymphoma of spleen (CMS/HCC)         Response:     Past Medical History:  Renal insufficiency.   Past Medical History:   Diagnosis Date    Fourth (trochlear) nerve palsy, right eye 06/08/2017    Right-sided fourth cranial nerve palsy    Fourth (trochlear) nerve palsy, right eye 06/08/2017    Right-sided fourth cranial nerve palsy    Malignant neoplasm of connective and soft tissue, unspecified 06/08/2017    Cancer of blood vessel     Surgical History:   Past Surgical History:   Procedure Laterality Date    SHOULDER SURGERY  07/13/2017    Shoulder Surgery        Family History:   Family History   Problem Relation Name Age of Onset    Cataracts Mother      Cataracts Father      Other (chronic lymphoid leukemia)  Father         Social History:  .  Working full time for [employer].    Social History     Tobacco Use    Smoking status: Never    Smokeless tobacco: Never   Vaping Use    Vaping status: Never Used   Substance Use Topics    Alcohol use: Never    Drug use: Never   -------------------------------------------------------------------------------------------------------  Subjective       HPI    Nancie Armenta is a 59 year old man with PMH of hyperglycemia, renal insufficiency, and stage IV A mantle cell non hodgkin lymphoma involving the gastrointestinal tract, s/p autologous SCT (April 2012).      Early in July 2024 patient noted to have dropping platelet counts and bone marrow biopsy 7/25/24 showed focal involvement with mantle cell lymphoma (NGS cyclin D1 positive and Sox 11 positive. BIRC+ ad TP 53 positve with a VAF of 3%, BIRC3 positive). FISH negative for 17p deletion  CT imaging done on 8/7/24 shows thickening of sigmoid colon, development of mild to moderate retroperitoneal adenopathy, splenomegally and possible splenic infarct.     Patient is here today (10/21/24) with his wife for followup of recently recurrent mantle cell lymphoma as above. He started pirtobrutinib 8/12/24 with a short prephase prednisone     He started the piropuritinb 8/12. He says he started having a cough back in August, soon after. Overall this is getting better. He also thinks that it's gotten even better since he's started the venetoclax    He started the ventoclax (50 mg) on Adolfo 10/13, then increased to 100 mg yesterday, 10/20/24. Denies any GI issues; Denies nausea/vomiting/diarrhea. Denies constipation. He does have some prochlorperazine PRN that he uses as a pre-med for his venetocax. He has been carefully drinking about 2 L of oral fluid daily.     His appetite has been low in general -- he lost weight down from 237 to 218 since July. He thinks that his appetite is a little bettersince being on theve.    No rashes.  No mouth sores. No abnormal bruising or bleeding bleeding. He did have some joint pains and aches that started with the pirtobrutinib, but these are also improved.     He continues to work full-time for the [employer].     He says that his LUQ symptoms - his spleen symtpoms were really bad in the summer. His symptoms hadve really gotten better.     Review of Systems   Constitutional:  Positive for unexpected weight change. Negative for appetite change, chills, diaphoresis, fatigue and fever.   Respiratory:  Positive for cough (improving, as above). Negative for shortness of breath and wheezing.    Cardiovascular:  Negative for chest pain, leg swelling and palpitations.   Gastrointestinal:  Negative for abdominal pain, blood in stool, constipation, diarrhea, nausea and vomiting.   Genitourinary:  Negative for dysuria, frequency, hematuria and nocturia.    Musculoskeletal: Negative.  Negative for gait problem.   Skin:  Negative for rash and wound.   Neurological:  Negative for dizziness, gait problem, light-headedness and numbness.   Hematological:  Negative for adenopathy.   -------------------------------------------------------------------------------------------------------  Objective   BSA: 2.25 meters squared  /79   Pulse 98   Temp 36 °C (96.8 °F)   Resp 17   Wt 98.9 kg (218 lb 0.6 oz)   SpO2 100%   BMI 29.31 kg/m²     Physical Exam  Vitals reviewed.   Constitutional:       General: He is not in acute distress.     Appearance: Normal appearance.   HENT:      Head: Normocephalic and atraumatic.      Mouth/Throat:      Mouth: Mucous membranes are moist.   Eyes:      Extraocular Movements: Extraocular movements intact.      Conjunctiva/sclera: Conjunctivae normal.      Pupils: Pupils are equal, round, and reactive to light.   Cardiovascular:      Rate and Rhythm: Normal rate and regular rhythm.      Pulses: Normal pulses.      Heart sounds: Normal heart sounds. No murmur heard.     No friction rub.  No gallop.   Pulmonary:      Effort: Pulmonary effort is normal. No respiratory distress.      Breath sounds: Normal breath sounds. No wheezing.   Abdominal:      General: Abdomen is flat. Bowel sounds are normal. There is no distension.      Palpations: Abdomen is soft. There is splenomegaly. There is no mass.      Tenderness: There is no abdominal tenderness.      Comments: Spleen palpable about 10 cm below costal margin, dose not seem to cross midline today (10/21/24)    Musculoskeletal:         General: No swelling. Normal range of motion.   Lymphadenopathy:      Comments: No lymphadenopathy   Skin:     General: Skin is warm and dry.   Neurological:      General: No focal deficit present.      Mental Status: He is alert and oriented to person, place, and time.   Psychiatric:         Mood and Affect: Mood normal.         Behavior: Behavior normal.         Thought Content: Thought content normal.         Judgment: Judgment normal.       Performance Status:  ECOG performance status: 0 fully active  -------------------------------------------------------------------------------------------------------  Assessment/Plan      1. Stage IV mantle cell lymphoma itreated with Nordic regimen and consolidation autograft with BEAM preparative regimen April 2012.  Relapse 2019 treated with ibrutinib and venetoclax with complete pathology response until 3/ 2022. See above.     Recurrence July 2024 presenting with dropping platelet counts and bone marrow biopsy 7/25/24 showed focal involvement with mantle cell lymphoma (NGS cyclin D1 positive and Sox 11 positive. BIRC+ ad TP 53 positve with a VAF of 3%, BIRC3 positive). FISH negative for 17p deletion  CT imaging done on 8/7/24 shows thickening of sigmoid colon, development of mild to moderate retroperitoneal adenopathy, splenomegally and possible splenic infarct.      Patient started pirtobrutinib salvage August 12,2024 and feels better. However spleen size was increasing per CT  imaging (8/6/24)  and physical exam so Dr. Hobbs planned to add venetoclax as follows, starting 10/13/24:  - 50 mg for first week, then 100 (10/20), and 200 mg (10/27)for 7 days each, then 400 mg (11/3/24- )  - He continues to have labs on MWF for TLS labs at [facility].    - Advised to drink 2 liters of fluids daily    - Plan to proceed to CART cell therapy ASAP, discussed CASE 2419, general procedures for CART collections, lymphodepleting therapy and side effects discussed. If CART cells not available on trail will proceed to [place].  Met today (10/21/24)  with Research RN and CAR-T coordinator.     Prophylaxis: On acyclovir prophylaxis and allopurinol. He has had no signs of TLS with K 3.9 mMol/L, phosphorus 3.4 mg/dL, and uric acid 5.4 mg/dL today (10/21/24). Given no signs of TLS, will reach out to Dr. Hobbs and see if he could move his lab monitoring to [place] as of next week.    Renal failure: improved, with sCr down to 1.27 mg/dL today (10/21/24) .     Health maintenance.   Patient has recv'd both doses of shingrix vaccine & has completed all of his other post transplant vaccines,  Discontinued Acyclovir, & Bactrim prophylaxis (2/23/23) since off treatment since 3/24/22.  Recv'd Prevnar 13 and influenza vaccine (9/16/20) with his PCP. Up to date on tetanus.   - Recv'd Sha & Sha COVID vaccine on 3/16/21. Received booster late 2021. Received Evusheld given on (3/17/22) & 9/22/22.   -Received influenza vaccine 10/18/23.  Advised to obtain covid vaccine at local pharmacy.  Colonoscopy completed 7/14/23: normal except internal hemorrhoids.  Recommendations to repeat in 2 years.    - Some aortic calcifications on imaging, started on prevastatin-managed by his PCP.  PCP increased pravastatin dose to 40 mg daily.  PCP switched to atorvastatin 40 mg daily.    BPH:  Reports his previous urinary frequency and nocturia symptoms has greatly improved after starting tadalafil 5 mg daily.     RTC:   -  TLS labs @ [facility] Wednesday and Friday 10/23 and 10/25  - Will reach out to Dr. Hobbs as above to see if further TLS monitoring can be moved to [place]  - Will reach out to Dr. Hobbs to find out how soon she would like to see this patient for further CAR-T planning  - Timeline of CAR-T collection and infusion is still TBD by Dr. Hobbs    - 10/23/24: Pre-CAR-T Testing:  = TTE  = Onco-Cardiology  = BMBx  = CAR-T [facility]

## 2024-10-22 ENCOUNTER — DOCUMENTATION (OUTPATIENT)
Dept: OTHER | Facility: HOSPITAL | Age: 59
End: 2024-10-22
Payer: COMMERCIAL

## 2024-10-22 LAB
HEMOGLOBIN A2: 2.8 % (ref 2–3.5)
HEMOGLOBIN A: 96.7 % (ref 95.8–98)
HEMOGLOBIN F: 0.5 % (ref 0–2)
HEMOGLOBIN IDENTIFICATION INTERPRETATION: NORMAL
PATH REVIEW-HGB IDENTIFICATION: NORMAL

## 2024-10-22 NOTE — PROGRESS NOTES
LATE ENTRY FOR VISIT 10/21/24      Research Note Screening    Nancie Armenta is here today to screen for [id number].   Consent reviewed with patient and his wife, Mary, and signed. Patient given copy.  Procedures completed per protocol. AE's and con-meds reviewed with patient.   Potential treatment start date is TBD. Will discuss with Dr. Hobbs.

## 2024-10-23 ENCOUNTER — PROCEDURE VISIT (OUTPATIENT)
Dept: HEMATOLOGY/ONCOLOGY | Facility: HOSPITAL | Age: 59
End: 2024-10-23
Payer: COMMERCIAL

## 2024-10-23 ENCOUNTER — LAB (OUTPATIENT)
Dept: LAB | Facility: HOSPITAL | Age: 59
End: 2024-10-23
Payer: COMMERCIAL

## 2024-10-23 ENCOUNTER — HOSPITAL ENCOUNTER (OUTPATIENT)
Dept: CARDIOLOGY | Facility: HOSPITAL | Age: 59
Discharge: HOME | End: 2024-10-23
Payer: COMMERCIAL

## 2024-10-23 ENCOUNTER — APPOINTMENT (OUTPATIENT)
Dept: CARDIOLOGY | Facility: HOSPITAL | Age: 59
End: 2024-10-23
Payer: COMMERCIAL

## 2024-10-23 ENCOUNTER — OFFICE VISIT (OUTPATIENT)
Dept: CARDIOLOGY | Facility: HOSPITAL | Age: 59
End: 2024-10-23
Payer: COMMERCIAL

## 2024-10-23 ENCOUNTER — CLINICAL SUPPORT (OUTPATIENT)
Dept: OTHER | Facility: HOSPITAL | Age: 59
End: 2024-10-23
Payer: COMMERCIAL

## 2024-10-23 VITALS
OXYGEN SATURATION: 100 % | DIASTOLIC BLOOD PRESSURE: 77 MMHG | RESPIRATION RATE: 17 BRPM | WEIGHT: 216.27 LBS | BODY MASS INDEX: 29.07 KG/M2 | SYSTOLIC BLOOD PRESSURE: 121 MMHG | TEMPERATURE: 97.2 F | HEART RATE: 90 BPM

## 2024-10-23 VITALS
BODY MASS INDEX: 28.98 KG/M2 | WEIGHT: 215.61 LBS | SYSTOLIC BLOOD PRESSURE: 117 MMHG | RESPIRATION RATE: 16 BRPM | OXYGEN SATURATION: 100 % | TEMPERATURE: 96.8 F | HEART RATE: 102 BPM | DIASTOLIC BLOOD PRESSURE: 80 MMHG

## 2024-10-23 VITALS — HEIGHT: 73 IN | BODY MASS INDEX: 28.82 KG/M2

## 2024-10-23 DIAGNOSIS — Z79.899 ENCOUNTER FOR MONITORING CARDIOTOXIC DRUG THERAPY: ICD-10-CM

## 2024-10-23 DIAGNOSIS — C83.17 MANTLE CELL LYMPHOMA OF SPLEEN (MULTI): ICD-10-CM

## 2024-10-23 DIAGNOSIS — I70.0 ATHEROSCLEROSIS OF ABDOMINAL AORTA (CMS-HCC): Primary | ICD-10-CM

## 2024-10-23 DIAGNOSIS — C83.17 MANTLE CELL LYMPHOMA OF SPLEEN (MULTI): Primary | ICD-10-CM

## 2024-10-23 DIAGNOSIS — Z76.82 STEM CELL TRANSPLANT CANDIDATE: ICD-10-CM

## 2024-10-23 DIAGNOSIS — Z79.899 HIGH RISK MEDICATION USE: ICD-10-CM

## 2024-10-23 DIAGNOSIS — Z51.81 ENCOUNTER FOR MONITORING CARDIOTOXIC DRUG THERAPY: ICD-10-CM

## 2024-10-23 DIAGNOSIS — Z94.84 HX OF AUTOLOGOUS STEM CELL TRANSPLANT: ICD-10-CM

## 2024-10-23 DIAGNOSIS — Z92.21 STATUS POST ADMINISTRATION OF CARDIOTOXIC CHEMOTHERAPY: ICD-10-CM

## 2024-10-23 DIAGNOSIS — E78.00 HYPERCHOLESTEROLEMIA: ICD-10-CM

## 2024-10-23 DIAGNOSIS — R93.1 AGATSTON CORONARY ARTERY CALCIUM SCORE BETWEEN 100 AND 400: ICD-10-CM

## 2024-10-23 PROBLEM — S69.92XA: Status: RESOLVED | Noted: 2023-11-11 | Resolved: 2024-10-23

## 2024-10-23 PROBLEM — S61.219A LACERATION OF FINGER, INITIAL ENCOUNTER: Status: RESOLVED | Noted: 2023-11-11 | Resolved: 2024-10-23

## 2024-10-23 PROBLEM — S60.042A CONTUSION OF LEFT RING FINGER WITHOUT DAMAGE TO NAIL: Status: RESOLVED | Noted: 2023-11-11 | Resolved: 2024-10-23

## 2024-10-23 PROBLEM — S60.419A ABRASION OF FINGER: Status: RESOLVED | Noted: 2023-11-11 | Resolved: 2024-10-23

## 2024-10-23 LAB
ADENOVIRUS QPCR,PLASMA, VIRC: NOT DETECTED COPIES/ML
ALBUMIN SERPL BCP-MCNC: 4.1 G/DL (ref 3.4–5)
ALP SERPL-CCNC: 63 U/L (ref 33–136)
ALT SERPL W P-5'-P-CCNC: 21 U/L (ref 10–52)
AMPHETAMINES UR QL SCN: NORMAL
ANION GAP SERPL CALC-SCNC: 14 MMOL/L (ref 10–20)
AORTIC VALVE MEAN GRADIENT: 3.1 MMHG
AORTIC VALVE PEAK VELOCITY: 1.27 M/S
AST SERPL W P-5'-P-CCNC: 16 U/L (ref 9–39)
AV PEAK GRADIENT: 6.5 MMHG
AVA (PEAK VEL): 2.42 CM2
AVA (VTI): 2.92 CM2
BARBITURATES UR QL SCN: NORMAL
BASOPHILS # BLD AUTO: 0 X10*3/UL (ref 0–0.1)
BASOPHILS NFR BLD AUTO: 0 %
BENZODIAZ UR QL SCN: NORMAL
BILIRUB SERPL-MCNC: 1 MG/DL (ref 0–1.2)
BUN SERPL-MCNC: 20 MG/DL (ref 6–23)
BZE UR QL SCN: NORMAL
CALCIUM SERPL-MCNC: 9.3 MG/DL (ref 8.6–10.3)
CANNABINOIDS UR QL SCN: NORMAL
CHLORIDE SERPL-SCNC: 104 MMOL/L (ref 98–107)
CMV DNA SERPL NAA+PROBE-LOG IU: NORMAL {LOG_IU}/ML
CMV IGM SERPL-ACNC: <8 AU/ML
CO2 SERPL-SCNC: 26 MMOL/L (ref 21–32)
CREAT SERPL-MCNC: 1.26 MG/DL (ref 0.5–1.3)
EBV DNA SPEC NAA+PROBE-LOG#: NORMAL {LOG_COPIES}/ML
EGFRCR SERPLBLD CKD-EPI 2021: 65 ML/MIN/1.73M*2
EJECTION FRACTION APICAL 4 CHAMBER: 54.3
EJECTION FRACTION: 53 %
EOSINOPHIL # BLD AUTO: 0.01 X10*3/UL (ref 0–0.7)
EOSINOPHIL NFR BLD AUTO: 0.4 %
ERYTHROCYTE [DISTWIDTH] IN BLOOD BY AUTOMATED COUNT: 15.9 % (ref 11.5–14.5)
FENTANYL+NORFENTANYL UR QL SCN: NORMAL
GLUCOSE SERPL-MCNC: 123 MG/DL (ref 74–99)
HCT VFR BLD AUTO: 31.8 % (ref 41–52)
HGB BLD-MCNC: 10.1 G/DL (ref 13.5–17.5)
HTLV I+II AB SER QL IA: NEGATIVE
IMM GRANULOCYTES # BLD AUTO: 0.01 X10*3/UL (ref 0–0.7)
IMM GRANULOCYTES NFR BLD AUTO: 0.4 % (ref 0–0.9)
LABORATORY COMMENT REPORT: NOT DETECTED
LABORATORY COMMENT REPORT: NOT DETECTED
LDH SERPL L TO P-CCNC: 107 U/L (ref 84–246)
LEFT ATRIUM VOLUME AREA LENGTH INDEX BSA: 26.9 ML/M2
LEFT VENTRICLE INTERNAL DIMENSION DIASTOLE: 4.96 CM (ref 3.5–6)
LEFT VENTRICULAR OUTFLOW TRACT DIAMETER: 1.99 CM
LYMPHOCYTES # BLD AUTO: 0.76 X10*3/UL (ref 1.2–4.8)
LYMPHOCYTES NFR BLD AUTO: 30.6 %
MCH RBC QN AUTO: 31.7 PG (ref 26–34)
MCHC RBC AUTO-ENTMCNC: 31.8 G/DL (ref 32–36)
MCV RBC AUTO: 100 FL (ref 80–100)
METHADONE UR QL SCN: NORMAL
MITRAL VALVE E/A RATIO: 0.85
MONOCYTES # BLD AUTO: 0.3 X10*3/UL (ref 0.1–1)
MONOCYTES NFR BLD AUTO: 12.1 %
NEUTROPHILS # BLD AUTO: 1.4 X10*3/UL (ref 1.2–7.7)
NEUTROPHILS NFR BLD AUTO: 56.5 %
NRBC BLD-RTO: 0 /100 WBCS (ref 0–0)
OPIATES UR QL SCN: NORMAL
OXYCODONE+OXYMORPHONE UR QL SCN: NORMAL
PCP UR QL SCN: NORMAL
PHOSPHATE SERPL-MCNC: 2.7 MG/DL (ref 2.5–4.9)
PLATELET # BLD AUTO: 39 X10*3/UL (ref 150–450)
POTASSIUM SERPL-SCNC: 4.6 MMOL/L (ref 3.5–5.3)
PROT SERPL-MCNC: 6.8 G/DL (ref 6.4–8.2)
RBC # BLD AUTO: 3.19 X10*6/UL (ref 4.5–5.9)
RIGHT VENTRICLE FREE WALL PEAK S': 12 CM/S
RIGHT VENTRICLE PEAK SYSTOLIC PRESSURE: 23.6 MMHG
SODIUM SERPL-SCNC: 139 MMOL/L (ref 136–145)
TRICUSPID ANNULAR PLANE SYSTOLIC EXCURSION: 1.8 CM
URATE SERPL-MCNC: 5.8 MG/DL (ref 4–7.5)
WBC # BLD AUTO: 2.5 X10*3/UL (ref 4.4–11.3)

## 2024-10-23 PROCEDURE — 80307 DRUG TEST PRSMV CHEM ANLYZR: CPT

## 2024-10-23 PROCEDURE — 93306 TTE W/DOPPLER COMPLETE: CPT

## 2024-10-23 PROCEDURE — 85097 BONE MARROW INTERPRETATION: CPT | Performed by: INTERNAL MEDICINE

## 2024-10-23 PROCEDURE — 84550 ASSAY OF BLOOD/URIC ACID: CPT

## 2024-10-23 PROCEDURE — 36415 COLL VENOUS BLD VENIPUNCTURE: CPT

## 2024-10-23 PROCEDURE — 99215 OFFICE O/P EST HI 40 MIN: CPT | Mod: 25 | Performed by: INTERNAL MEDICINE

## 2024-10-23 PROCEDURE — 85025 COMPLETE CBC W/AUTO DIFF WBC: CPT

## 2024-10-23 PROCEDURE — 84100 ASSAY OF PHOSPHORUS: CPT

## 2024-10-23 PROCEDURE — 83615 LACTATE (LD) (LDH) ENZYME: CPT

## 2024-10-23 PROCEDURE — 88184 FLOWCYTOMETRY/ TC 1 MARKER: CPT | Mod: TC | Performed by: INTERNAL MEDICINE

## 2024-10-23 PROCEDURE — 80053 COMPREHEN METABOLIC PANEL: CPT

## 2024-10-23 ASSESSMENT — ENCOUNTER SYMPTOMS
NUMBNESS: 1
PSYCHIATRIC NEGATIVE: 1
CARDIOVASCULAR NEGATIVE: 1
ABDOMINAL DISTENTION: 1
ARTHRALGIAS: 1
RESPIRATORY NEGATIVE: 1

## 2024-10-23 ASSESSMENT — PAIN SCALES - GENERAL
PAINLEVEL_OUTOF10: 0-NO PAIN
PAINLEVEL_OUTOF10: 0-NO PAIN

## 2024-10-23 NOTE — PROGRESS NOTES
Patient ID: Nancie Armenta is a 59 y.o. male.    Biopsy bone marrow    Date/Time: 10/23/2024 7:57 AM    Performed by: Bryant Blair PA-C  Authorized by: Bryant Blair PA-C    Consent:     Consent obtained:  Written    Consent given by:  Patient    Risks, benefits, and alternatives were discussed: yes      Risks discussed:  Bleeding, infection, nerve damage and pain    Alternatives discussed:  No treatment  Universal protocol:     Procedure explained and questions answered to patient or proxy's satisfaction: yes      Site/side marked: yes      Immediately prior to procedure, a time out was called: yes      Patient identity confirmed:  Arm band, verbally with patient and hospital-assigned identification number  Indications:     Indications:  Mantle Cell Lymphoma  Pre-procedure details:     Skin preparation:  Chlorhexidine  Sedation:     Sedation type:  None  Anesthesia:     Anesthesia method:  Local infiltration    Local anesthetic:  Lidocaine 1% w/o epi  Procedure specific details:      Bone Marrow Biopsy and Aspiration Procedure Note     Informed consent was obtained and potential risks including bleeding, infection and pain were reviewed with the patient.     Patient did not receive any premedication for the procedure.    The right and left posterior iliac crest were prepped with chlorhexidine.     20 ml of lidocaine 1% local anesthesia infiltrated into the subcutaneous tissue.    Left bone marrow biopsy and Left bone marrow aspirate were obtained after several passes and repositioning of the biopsy site.     The procedure was tolerated well and there were no complications. No active bleeding noted at end of the procedure after pressure was held.     Specimens sent for: routine histopathologic stains and sectioning, flow cytometry, cytogenetics, and molecular analysis    Procedure completed by: Bryant Blair PA-C     Post-procedure details:     Procedure completion:  Tolerated

## 2024-10-23 NOTE — PROGRESS NOTES
Today I had the pleasure of meeting 59-year-old Nancie Armenta  (MRN [id number]) for a [id number] Q. He is a Non-Hodgkin Lymphoma patient of Dr. Hobbs. His tentative date of collection is set for 11/04/24 in the ambulatory stem cell unit with line placement that morning. Pt has history of an autologous collection (4/2012), Gallbladder removal (2016), and Right shoulder surgery after birth. No recent travel or exposure to any communicable diseases. Reviewed PI sheet with patient and answered all questions. Reviewed this information with Dr. Lenz; pt has no indication for IV calcium at start. EKG done on SCC 2 and attached to email.     Allergies: Augmentin     Vital Signs: Ht= 184.5 cm, Wt= 98.1 kg, BP= 121/77, SpO2= 100% on room air, RR= 17, T= 36.2  HR= 90    Medications: prochlorperazine, acyclovir, allopurinol, multivitamin, atorvastatin, hydrocortisone cream, jaypirca, venclexta, and tadalafil   Thank you

## 2024-10-23 NOTE — PROGRESS NOTES
CARDIOLOGY NEW PATIENT OFFICE VISIT    Patient:  Nancie Armenta  YOB: 1965  MRN: [id number]       Chief Complaint/Active Symptoms:       Nancie Armenta is a 59 y.o. male who is being seen today at the request of Yvonne Hobbs for evaluation of stem cell transplant / CAR-T therapy.    No chief complaint on file.      History of Present Illness:   HPI    Patient here for CV evaluation prior to CART therapy.     No prior cardiac history. No chest pain or discomfort, no shortness of breath, orthopnea or PND. No palpitations, syncope or near syncope. Had elective CT cardiac score done which was low risk (<200). Is on statin therapy and tolerating well. Active without any significant disability. Sore today after his BMBx.     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   Oncologist: Yvonne Hobbs  Treatment: RCHOP alternating with Rituxan and high dose Mamie-C -for 6 cycles competed 2/2012. BEAM chemotherapy followed by autologous stem cell transplant 4/2012. Recurrence 6/.2019 startec on Acalabrutinib with minimal response, switched to ibrutinib and venetoclax which were stopped 3/2022. Recurrence found 7/2024. Pirotbrutib oral started 8/2024  Total Dose Anthracycline: 300 mg/m2    EKG today - NSR, LAE, low voltage QRS  Echo today - EF 53%. No old echos for comparison  CT cardiac score <200 - 172 in LAD.   Muga 2/2012 prior to his SCT was 60%.     Allergies:     Allergies   Allergen Reactions    Amoxicillin-Pot Clavulanate Hives, Itching and Swelling     Tolerates Fortaz   Tolerates Fortaz    Tolerates Fortaz        Outpatient Medications:     Current Outpatient Medications   Medication Instructions    acyclovir (ZOVIRAX) 400 mg, oral, 2 times daily    allopurinol (ZYLOPRIM) 300 mg, oral, Daily    atorvastatin (LIPITOR) 40 mg, Daily    hydrocortisone 1 % cream     Jaypirca 100 mg tablet Take 2 tablets (200 mg) by mouth once daily.    multivit-min/iron/folic  acid/K (ADULTS MULTIVITAMIN ORAL) Take by mouth.    ondansetron (ZOFRAN) 8 mg, oral, Every 8 hours PRN    prochlorperazine (COMPAZINE) 10 mg, oral, Every 6 hours PRN    tadalafil (CIALIS) 5 mg, Daily    venetoclax (Venclexta) 100 mg tablet Take 1 tablet (100 mg) by mouth once daily for 1 week, then 2 tablets (200 mg) once daily for 1 week, then 4 tablets (400 mg) once daily. Take with food.        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    Right-sided fourth cranial nerve palsy    Malignant neoplasm of connective and soft tissue, unspecified 06/08/2017    Cancer of blood vessel       Social History:     Social History     Socioeconomic History    Marital status:    Tobacco Use    Smoking status: Never    Smokeless tobacco: Never   Vaping Use    Vaping status: Never Used   Substance and Sexual Activity    Alcohol use: Never    Drug use: Never     Social Drivers of Health     Financial Resource Strain: Low Risk  (12/30/2022)    Received from [facility]    Overall Financial Resource Strain (CARDIA)     Difficulty of Paying Living Expenses: Not hard at all   Food Insecurity: No Food Insecurity (12/30/2022)    Received from [facility]    Hunger Vital Sign     Worried About Running Out of Food in the Last Year: Never true     Ran Out of Food in the Last Year: Never true   Transportation Needs: No Transportation Needs (12/30/2022)    Received from [facility]    PRAPARE - Transportation     Lack of Transportation (Medical): No     Lack of Transportation (Non-Medical): No   Physical Activity: Insufficiently Active (12/30/2022)    Received from [facility]    Exercise Vital Sign     Days of Exercise per Week: 2 days     Minutes of Exercise per Session: 20 min   Stress: No Stress  Concern Present (12/30/2022)    Received from [facility]    [employer] [place] - Occupational Stress Questionnaire     Feeling of Stress : Not at all   Social Connections: Moderately Integrated (12/30/2022)    Received from [facility]    Social Connection and Isolation Panel [NHANES]     Frequency of Communication with Friends and Family: More than three times a week     Frequency of Social Gatherings with Friends and Family: Three times a week     Attends Sabianism Services: More than 4 times per year     Active Member of Clubs or Organizations: No     Attends Club or Organization Meetings: Never     Marital Status:    Intimate Partner Violence: Not At Risk (12/30/2022)    Received from [facility]    Humiliation, Afraid, Rape, and Kick questionnaire     Fear of Current or Ex-Partner: No     Emotionally Abused: No     Physically Abused: No     Sexually Abused: No   Housing Stability: Low Risk  (12/30/2022)    Received from [facility]    Housing Stability Vital Sign     Unable to Pay for Housing in the Last Year: No     Number of Places Lived in the Last Year: 1     Unstable Housing in the Last Year: No       Family History:        Family History   Problem Relation Name Age of Onset    Cataracts Mother      Cataracts Father      Other (chronic lymphoid leukemia) Father         Review of Systems:     Review of Systems   Respiratory: Negative.     Cardiovascular: Negative.    Gastrointestinal:  Positive for abdominal distention.   Musculoskeletal:  Positive for arthralgias.   Neurological:  Positive for numbness (right great toe).   Psychiatric/Behavioral: Negative.     All other systems reviewed and are negative.      Objective:     Vitals:    10/23/24 1000   BP: 121/77   Pulse: 90   Resp: 17   Temp: 36.2 °C (97.2 °F)   SpO2: 100%       Vitals:    10/23/24 1000   Weight: 98.1 kg (216 lb 4.3 oz)       Physical Examination:    GENERAL:  Well developed, well nourished, in no acute distress.  HEENT: NC AT EOMI with anicteric sclera  NECK:  Supple, no JVD, no bruit.  CHEST:  Symmetric and nontender.  LUNGS:  Normal respiratory effort. Bilateral breath sounds clear to auscultation.   HEART:  PMI nondisplaced. Rate and rhythm regular with no evident murmur, no gallop appreciated. There are no rubs, clicks or heaves.  PERIPHERAL VASCULAR:  Pulses present and equally palpable; 2+ throughout.  ABDOMEN: Soft, NT, no bruits, normoactive bowel sounds  MUSCULOSKELETAL: No significant joint or limb deformity, mild OA changes  EXTREMITIES:  Warm with good color, no clubbing or cyanosis.  There is no edema noted.  NEURO/PSYCH:  Alert and oriented times three with approppriate behavior and responses.  SKIN: No rashes on exposed skin, no reported skin lesions.     Lab:     CBC:   Lab Results   Component Value Date    WBC 2.5 (L) 10/23/2024    RBC 3.19 (L) 10/23/2024    HGB 10.1 (L) 10/23/2024    HCT 31.8 (L) 10/23/2024    PLT 39 (LL) 10/23/2024      Lab Results   Component Value Date    WBC 2.5 (L) 10/23/2024    WBC 2.8 (L) 10/21/2024    WBC 3.3 (L) 10/18/2024    RBC 3.19 (L) 10/23/2024    RBC 3.18 (L) 10/21/2024    RBC 3.40 (L) 10/18/2024    HGB 10.1 (L) 10/23/2024    HGB 10.2 (L) 10/21/2024    HGB 10.6 (L) 10/18/2024    HCT 31.8 (L) 10/23/2024    HCT 31.6 (L) 10/21/2024    HCT 33.8 (L) 10/18/2024     10/23/2024    MCV 99 10/21/2024    MCV 99 10/18/2024    MCH 31.7 10/23/2024    MCH 32.1 10/21/2024    MCH 31.2 10/18/2024    MCHC 31.8 (L) 10/23/2024    MCHC 32.3 10/21/2024    MCHC 31.4 (L) 10/18/2024    RDW 15.9 (H) 10/23/2024    RDW 15.9 (H) 10/21/2024    RDW 15.4 (H) 10/18/2024    PLT 39 (LL) 10/23/2024    PLT 52 (L) 10/21/2024    PLT 45 (L) 10/18/2024       CMP:    Lab Results   Component Value Date     10/23/2024    K 4.6 10/23/2024     10/23/2024    CO2 26 10/23/2024    BUN 20 10/23/2024    CREATININE 1.26 10/23/2024    GLUCOSE  123 (H) 10/23/2024    CALCIUM 9.3 10/23/2024     Lab Results   Component Value Date     10/23/2024     10/21/2024     10/18/2024    K 4.6 10/23/2024    K 3.9 10/21/2024    K 4.5 10/18/2024     10/23/2024     10/21/2024     10/18/2024    CO2 26 10/23/2024    CO2 25 10/21/2024    CO2 25 10/18/2024    BUN 20 10/23/2024    BUN 22 10/21/2024    BUN 23 10/18/2024    CREATININE 1.26 10/23/2024    CREATININE 1.27 10/21/2024    CREATININE 1.21 10/18/2024     Lab Results   Component Value Date    ALKPHOS 63 10/23/2024    ALT 21 10/23/2024    AST 16 10/23/2024    PROT 6.8 10/23/2024    BILITOT 1.0 10/23/2024    BILIDIR 0.1 12/23/2019       Magnesium:    Lab Results   Component Value Date    MG 2.06 10/21/2024       Lipid Profile:    Lab Results   Component Value Date    TRIG 105 06/28/2024    HDL 43.3 06/28/2024    LDLCALC 80 06/28/2024       TSH:    Lab Results   Component Value Date    TSH 3.58 06/28/2024       BNP:   Lab Results   Component Value Date    BNP 22 10/21/2024        PT/INR:    Lab Results   Component Value Date    PROTIME 12.3 10/21/2024    INR 1.1 10/21/2024       HgBA1c:    Lab Results   Component Value Date    HGBA1C 5.3 06/28/2024       Cardiac Enzymes:    Lab Results   Component Value Date    TROPHS <3 10/21/2024         Diagnostic Studies:     Todays echo with EF 53%, no significant valvular heart disease.  EKG today - SR, LAE, low voltage QRS.     Radiology:     No valid procedures specified.    Assessment:     Problem List Items Addressed This Visit       Agatston coronary artery calcium score between 100 and 400    Hypercholesterolemia    Mantle cell lymphoma of spleen (Multi) - Primary    Hx of autologous stem cell transplant    Stem cell transplant candidate    Status post administration of cardiotoxic chemotherapy       Plan:     Mantle cell lymphoma. Hx of cardiotoxic chemotherapy and auto SCT. Now for CART. No angina or CHF symptoms. LVEF preserved at 53%. No  cardiac contraindications to proceeding with CART. Will get echo post treatment and given his Adriamycin dose will be followed chronically and intermittently by Cardio-Oncology.   Coronary calcium score > 200. Lower risk patient. Is appropriately on statin Rx and tolerating well. No angina. Continue risk factor modifications  Hypercholesterolemia. LDL cholesterol goal < 100, ideally less than 70.     Will see patient back post CART with echo.

## 2024-10-25 ENCOUNTER — LAB (OUTPATIENT)
Dept: LAB | Facility: CLINIC | Age: 59
End: 2024-10-25
Payer: COMMERCIAL

## 2024-10-25 ENCOUNTER — DOCUMENTATION (OUTPATIENT)
Dept: HEMATOLOGY/ONCOLOGY | Facility: HOSPITAL | Age: 59
End: 2024-10-25
Payer: COMMERCIAL

## 2024-10-25 DIAGNOSIS — C83.17 MANTLE CELL LYMPHOMA OF SPLEEN (MULTI): ICD-10-CM

## 2024-10-25 LAB
BASOPHILS # BLD AUTO: 0.01 X10*3/UL (ref 0–0.1)
BASOPHILS NFR BLD AUTO: 0.4 %
EOSINOPHIL # BLD AUTO: 0.01 X10*3/UL (ref 0–0.7)
EOSINOPHIL NFR BLD AUTO: 0.4 %
ERYTHROCYTE [DISTWIDTH] IN BLOOD BY AUTOMATED COUNT: 15.8 % (ref 11.5–14.5)
HCT VFR BLD AUTO: 29.4 % (ref 41–52)
HGB BLD-MCNC: 9.4 G/DL (ref 13.5–17.5)
IMM GRANULOCYTES # BLD AUTO: 0.01 X10*3/UL (ref 0–0.7)
IMM GRANULOCYTES NFR BLD AUTO: 0.4 % (ref 0–0.9)
LYMPHOCYTES # BLD AUTO: 0.91 X10*3/UL (ref 1.2–4.8)
LYMPHOCYTES NFR BLD AUTO: 34.6 %
MCH RBC QN AUTO: 31.6 PG (ref 26–34)
MCHC RBC AUTO-ENTMCNC: 32 G/DL (ref 32–36)
MCV RBC AUTO: 99 FL (ref 80–100)
MONOCYTES # BLD AUTO: 0.28 X10*3/UL (ref 0.1–1)
MONOCYTES NFR BLD AUTO: 10.6 %
NEUTROPHILS # BLD AUTO: 1.41 X10*3/UL (ref 1.2–7.7)
NEUTROPHILS NFR BLD AUTO: 53.6 %
NRBC BLD-RTO: ABNORMAL /100{WBCS}
PATH REPORT.COMMENTS IMP SPEC: NORMAL
PATH REPORT.FINAL DX SPEC: NORMAL
PATH REPORT.GROSS SPEC: NORMAL
PATH REPORT.MICROSCOPIC SPEC OTHER STN: NORMAL
PATH REPORT.RELEVANT HX SPEC: NORMAL
PATH REPORT.RELEVANT HX SPEC: NORMAL
PATH REPORT.TOTAL CANCER: NORMAL
PLATELET # BLD AUTO: 33 X10*3/UL (ref 150–450)
RBC # BLD AUTO: 2.97 X10*6/UL (ref 4.5–5.9)
WBC # BLD AUTO: 2.6 X10*3/UL (ref 4.4–11.3)

## 2024-10-25 PROCEDURE — 85025 COMPLETE CBC W/AUTO DIFF WBC: CPT

## 2024-10-25 PROCEDURE — 84100 ASSAY OF PHOSPHORUS: CPT

## 2024-10-25 PROCEDURE — 84550 ASSAY OF BLOOD/URIC ACID: CPT

## 2024-10-25 PROCEDURE — 83615 LACTATE (LD) (LDH) ENZYME: CPT

## 2024-10-25 PROCEDURE — 36415 COLL VENOUS BLD VENIPUNCTURE: CPT

## 2024-10-25 PROCEDURE — 80053 COMPREHEN METABOLIC PANEL: CPT

## 2024-10-25 NOTE — PROGRESS NOTES
Call made to patient; informed him of decreased platelet count and Dr Hobbs's directive to hold the Jaypir[place] for now but to continue with the Venetoclax as ordered.  Understanding verified via teach back.

## 2024-10-26 LAB
ALBUMIN SERPL BCP-MCNC: 4.3 G/DL (ref 3.4–5)
ALP SERPL-CCNC: 63 U/L (ref 33–136)
ALT SERPL W P-5'-P-CCNC: 20 U/L (ref 10–52)
ANION GAP SERPL CALC-SCNC: 13 MMOL/L (ref 10–20)
AST SERPL W P-5'-P-CCNC: 18 U/L (ref 9–39)
BILIRUB SERPL-MCNC: 1 MG/DL (ref 0–1.2)
BUN SERPL-MCNC: 24 MG/DL (ref 6–23)
CALCIUM SERPL-MCNC: 9.1 MG/DL (ref 8.6–10.6)
CHLORIDE SERPL-SCNC: 103 MMOL/L (ref 98–107)
CO2 SERPL-SCNC: 26 MMOL/L (ref 21–32)
CREAT SERPL-MCNC: 1.29 MG/DL (ref 0.5–1.3)
EGFRCR SERPLBLD CKD-EPI 2021: 63 ML/MIN/1.73M*2
GLUCOSE SERPL-MCNC: 88 MG/DL (ref 74–99)
LDH SERPL L TO P-CCNC: 107 U/L (ref 84–246)
PHOSPHATE SERPL-MCNC: 3.8 MG/DL (ref 2.5–4.9)
POTASSIUM SERPL-SCNC: 5.3 MMOL/L (ref 3.5–5.3)
PROT SERPL-MCNC: 6.5 G/DL (ref 6.4–8.2)
SODIUM SERPL-SCNC: 137 MMOL/L (ref 136–145)
URATE SERPL-MCNC: 5.2 MG/DL (ref 4–7.5)

## 2024-10-27 LAB
CELL COUNT (BLOOD): 13.33 X10*3/UL
CELL POPULATIONS: NORMAL
DIAGNOSIS: NORMAL
FLOW DIFFERENTIAL: NORMAL
FLOW TEST ORDERED: NORMAL
LAB TEST METHOD: NORMAL
NUMBER OF CELLS COLLECTED: NORMAL PER TUBE
PATH REPORT.TOTAL CANCER: NORMAL
SIGNATURE COMMENT: NORMAL
SPECIMEN VIABILITY: NORMAL

## 2024-10-28 DIAGNOSIS — C83.17 MANTLE CELL LYMPHOMA OF SPLEEN (MULTI): ICD-10-CM

## 2024-10-28 LAB
CHROM ANALY OVERALL INTERP-IMP: NORMAL
ELECTRONICALLY SIGNED BY CYTOGENETICS: NORMAL

## 2024-10-29 ENCOUNTER — DOCUMENTATION (OUTPATIENT)
Dept: HEMATOLOGY/ONCOLOGY | Facility: HOSPITAL | Age: 59
End: 2024-10-29
Payer: COMMERCIAL

## 2024-10-29 ENCOUNTER — LAB (OUTPATIENT)
Dept: LAB | Facility: CLINIC | Age: 59
End: 2024-10-29
Payer: COMMERCIAL

## 2024-10-29 ENCOUNTER — TELEPHONE (OUTPATIENT)
Dept: HEMATOLOGY/ONCOLOGY | Facility: CLINIC | Age: 59
End: 2024-10-29
Payer: COMMERCIAL

## 2024-10-29 DIAGNOSIS — C83.17 MANTLE CELL LYMPHOMA OF SPLEEN (MULTI): ICD-10-CM

## 2024-10-29 LAB
ALBUMIN SERPL BCP-MCNC: 4 G/DL (ref 3.4–5)
ALP SERPL-CCNC: 73 U/L (ref 33–136)
ALT SERPL W P-5'-P-CCNC: 22 U/L (ref 10–52)
ANION GAP SERPL CALC-SCNC: 12 MMOL/L (ref 10–20)
AST SERPL W P-5'-P-CCNC: 16 U/L (ref 9–39)
BASOPHILS # BLD AUTO: 0.01 X10*3/UL (ref 0–0.1)
BASOPHILS NFR BLD AUTO: 0.4 %
BILIRUB SERPL-MCNC: 1.1 MG/DL (ref 0–1.2)
BUN SERPL-MCNC: 22 MG/DL (ref 6–23)
CALCIUM SERPL-MCNC: 8.9 MG/DL (ref 8.6–10.6)
CHLORIDE SERPL-SCNC: 105 MMOL/L (ref 98–107)
CO2 SERPL-SCNC: 25 MMOL/L (ref 21–32)
CREAT SERPL-MCNC: 1.17 MG/DL (ref 0.5–1.3)
EGFRCR SERPLBLD CKD-EPI 2021: 71 ML/MIN/1.73M*2
EOSINOPHIL # BLD AUTO: 0 X10*3/UL (ref 0–0.7)
EOSINOPHIL NFR BLD AUTO: 0 %
ERYTHROCYTE [DISTWIDTH] IN BLOOD BY AUTOMATED COUNT: 16 % (ref 11.5–14.5)
GLUCOSE SERPL-MCNC: 164 MG/DL (ref 74–99)
HCT VFR BLD AUTO: 27.5 % (ref 41–52)
HGB BLD-MCNC: 8.9 G/DL (ref 13.5–17.5)
IMM GRANULOCYTES # BLD AUTO: 0 X10*3/UL (ref 0–0.7)
IMM GRANULOCYTES NFR BLD AUTO: 0 % (ref 0–0.9)
LDH SERPL L TO P-CCNC: 115 U/L (ref 84–246)
LYMPHOCYTES # BLD AUTO: 0.76 X10*3/UL (ref 1.2–4.8)
LYMPHOCYTES NFR BLD AUTO: 31.3 %
MCH RBC QN AUTO: 32.2 PG (ref 26–34)
MCHC RBC AUTO-ENTMCNC: 32.4 G/DL (ref 32–36)
MCV RBC AUTO: 100 FL (ref 80–100)
MONOCYTES # BLD AUTO: 0.22 X10*3/UL (ref 0.1–1)
MONOCYTES NFR BLD AUTO: 9.1 %
NEUTROPHILS # BLD AUTO: 1.44 X10*3/UL (ref 1.2–7.7)
NEUTROPHILS NFR BLD AUTO: 59.2 %
NRBC BLD-RTO: ABNORMAL /100{WBCS}
PHOSPHATE SERPL-MCNC: 2.7 MG/DL (ref 2.5–4.9)
PLATELET # BLD AUTO: 37 X10*3/UL (ref 150–450)
POTASSIUM SERPL-SCNC: 4.4 MMOL/L (ref 3.5–5.3)
PROT SERPL-MCNC: 6.4 G/DL (ref 6.4–8.2)
RBC # BLD AUTO: 2.76 X10*6/UL (ref 4.5–5.9)
SODIUM SERPL-SCNC: 138 MMOL/L (ref 136–145)
URATE SERPL-MCNC: 5.4 MG/DL (ref 4–7.5)
WBC # BLD AUTO: 2.4 X10*3/UL (ref 4.4–11.3)

## 2024-10-29 PROCEDURE — 83615 LACTATE (LD) (LDH) ENZYME: CPT

## 2024-10-29 PROCEDURE — 85025 COMPLETE CBC W/AUTO DIFF WBC: CPT

## 2024-10-29 PROCEDURE — 84100 ASSAY OF PHOSPHORUS: CPT

## 2024-10-29 PROCEDURE — 84550 ASSAY OF BLOOD/URIC ACID: CPT

## 2024-10-29 PROCEDURE — 80053 COMPREHEN METABOLIC PANEL: CPT

## 2024-10-29 PROCEDURE — 36415 COLL VENOUS BLD VENIPUNCTURE: CPT

## 2024-10-30 LAB
ELECTRONICALLY SIGNED BY: NORMAL
MYELOID NGS RESULTS: NORMAL

## 2024-10-30 ASSESSMENT — ENCOUNTER SYMPTOMS
SHORTNESS OF BREATH: 0
ABDOMINAL PAIN: 0
NAUSEA: 0
VOMITING: 0
FREQUENCY: 0
DIZZINESS: 0
LEG SWELLING: 0
PALPITATIONS: 0
COUGH: 1
CONSTIPATION: 0
LIGHT-HEADEDNESS: 0
FEVER: 0
DIAPHORESIS: 0
WHEEZING: 0
FATIGUE: 0
APPETITE CHANGE: 0
BLOOD IN STOOL: 0
MUSCULOSKELETAL NEGATIVE: 1
DIARRHEA: 0
UNEXPECTED WEIGHT CHANGE: 1
WOUND: 0
ADENOPATHY: 0
NUMBNESS: 0
DYSURIA: 0
CHILLS: 0
HEMATURIA: 0

## 2024-10-31 ENCOUNTER — DOCUMENTATION (OUTPATIENT)
Dept: HEMATOLOGY/ONCOLOGY | Facility: HOSPITAL | Age: 59
End: 2024-10-31
Payer: COMMERCIAL

## 2024-10-31 ENCOUNTER — OFFICE VISIT (OUTPATIENT)
Dept: HEMATOLOGY/ONCOLOGY | Facility: HOSPITAL | Age: 59
End: 2024-10-31
Payer: COMMERCIAL

## 2024-10-31 ENCOUNTER — LAB (OUTPATIENT)
Dept: LAB | Facility: HOSPITAL | Age: 59
End: 2024-10-31
Payer: COMMERCIAL

## 2024-10-31 VITALS
OXYGEN SATURATION: 100 % | HEART RATE: 100 BPM | RESPIRATION RATE: 22 BRPM | WEIGHT: 217.81 LBS | SYSTOLIC BLOOD PRESSURE: 115 MMHG | DIASTOLIC BLOOD PRESSURE: 73 MMHG | TEMPERATURE: 97.7 F | BODY MASS INDEX: 29.02 KG/M2

## 2024-10-31 DIAGNOSIS — C83.17 MANTLE CELL LYMPHOMA OF SPLEEN (MULTI): ICD-10-CM

## 2024-10-31 DIAGNOSIS — Z00.6 EXAM FOR CLINICAL RESEARCH: ICD-10-CM

## 2024-10-31 DIAGNOSIS — C83.10 MANTLE CELL LYMPHOMA, UNSPECIFIED BODY REGION (MULTI): Primary | ICD-10-CM

## 2024-10-31 LAB
ALBUMIN SERPL BCP-MCNC: 4.1 G/DL (ref 3.4–5)
ALP SERPL-CCNC: 70 U/L (ref 33–136)
ALT SERPL W P-5'-P-CCNC: 26 U/L (ref 10–52)
ANION GAP SERPL CALC-SCNC: 13 MMOL/L (ref 10–20)
AST SERPL W P-5'-P-CCNC: 20 U/L (ref 9–39)
BASOPHILS # BLD AUTO: 0 X10*3/UL (ref 0–0.1)
BASOPHILS NFR BLD AUTO: 0 %
BILIRUB SERPL-MCNC: 1.2 MG/DL (ref 0–1.2)
BUN SERPL-MCNC: 22 MG/DL (ref 6–23)
CALCIUM SERPL-MCNC: 9.2 MG/DL (ref 8.6–10.3)
CHLORIDE SERPL-SCNC: 103 MMOL/L (ref 98–107)
CO2 SERPL-SCNC: 26 MMOL/L (ref 21–32)
CREAT SERPL-MCNC: 1.32 MG/DL (ref 0.5–1.3)
EGFRCR SERPLBLD CKD-EPI 2021: 62 ML/MIN/1.73M*2
EOSINOPHIL # BLD AUTO: 0 X10*3/UL (ref 0–0.7)
EOSINOPHIL NFR BLD AUTO: 0 %
ERYTHROCYTE [DISTWIDTH] IN BLOOD BY AUTOMATED COUNT: 16 % (ref 11.5–14.5)
GLUCOSE SERPL-MCNC: 87 MG/DL (ref 74–99)
HCT VFR BLD AUTO: 26.2 % (ref 41–52)
HGB BLD-MCNC: 8.5 G/DL (ref 13.5–17.5)
HOLD SPECIMEN: NORMAL
IMM GRANULOCYTES # BLD AUTO: 0.01 X10*3/UL (ref 0–0.7)
IMM GRANULOCYTES NFR BLD AUTO: 0.4 % (ref 0–0.9)
LDH SERPL L TO P-CCNC: 125 U/L (ref 84–246)
LYMPHOCYTES # BLD AUTO: 0.89 X10*3/UL (ref 1.2–4.8)
LYMPHOCYTES NFR BLD AUTO: 35.7 %
MCH RBC QN AUTO: 32.1 PG (ref 26–34)
MCHC RBC AUTO-ENTMCNC: 32.4 G/DL (ref 32–36)
MCV RBC AUTO: 99 FL (ref 80–100)
MONOCYTES # BLD AUTO: 0.33 X10*3/UL (ref 0.1–1)
MONOCYTES NFR BLD AUTO: 13.3 %
NEUTROPHILS # BLD AUTO: 1.26 X10*3/UL (ref 1.2–7.7)
NEUTROPHILS NFR BLD AUTO: 50.6 %
NRBC BLD-RTO: 0 /100 WBCS (ref 0–0)
PHOSPHATE SERPL-MCNC: 3.8 MG/DL (ref 2.5–4.9)
PLATELET # BLD AUTO: 38 X10*3/UL (ref 150–450)
POTASSIUM SERPL-SCNC: 5 MMOL/L (ref 3.5–5.3)
PROT SERPL-MCNC: 6.7 G/DL (ref 6.4–8.2)
RBC # BLD AUTO: 2.65 X10*6/UL (ref 4.5–5.9)
SODIUM SERPL-SCNC: 137 MMOL/L (ref 136–145)
URATE SERPL-MCNC: 5 MG/DL (ref 4–7.5)
WBC # BLD AUTO: 2.5 X10*3/UL (ref 4.4–11.3)

## 2024-10-31 PROCEDURE — 85025 COMPLETE CBC W/AUTO DIFF WBC: CPT

## 2024-10-31 PROCEDURE — 84100 ASSAY OF PHOSPHORUS: CPT

## 2024-10-31 PROCEDURE — 84550 ASSAY OF BLOOD/URIC ACID: CPT

## 2024-10-31 PROCEDURE — 83615 LACTATE (LD) (LDH) ENZYME: CPT

## 2024-10-31 PROCEDURE — 84075 ASSAY ALKALINE PHOSPHATASE: CPT

## 2024-10-31 PROCEDURE — 36415 COLL VENOUS BLD VENIPUNCTURE: CPT

## 2024-10-31 PROCEDURE — 99215 OFFICE O/P EST HI 40 MIN: CPT | Performed by: INTERNAL MEDICINE

## 2024-10-31 ASSESSMENT — PAIN SCALES - GENERAL: PAINLEVEL_OUTOF10: 0-NO PAIN

## 2024-11-01 RX ORDER — DIPHENHYDRAMINE HYDROCHLORIDE 50 MG/ML
50 INJECTION INTRAMUSCULAR; INTRAVENOUS AS NEEDED
OUTPATIENT
Start: 2024-11-04

## 2024-11-01 RX ORDER — EPINEPHRINE 0.3 MG/.3ML
0.3 INJECTION SUBCUTANEOUS EVERY 5 MIN PRN
OUTPATIENT
Start: 2024-11-04

## 2024-11-01 RX ORDER — FAMOTIDINE 10 MG/ML
20 INJECTION INTRAVENOUS ONCE AS NEEDED
OUTPATIENT
Start: 2024-11-04

## 2024-11-01 RX ORDER — HEPARIN SODIUM,PORCINE/PF 10 UNIT/ML
50 SYRINGE (ML) INTRAVENOUS AS NEEDED
OUTPATIENT
Start: 2024-11-04

## 2024-11-01 RX ORDER — HEPARIN 100 UNIT/ML
500 SYRINGE INTRAVENOUS AS NEEDED
OUTPATIENT
Start: 2024-11-04

## 2024-11-01 RX ORDER — ALBUTEROL SULFATE 0.83 MG/ML
3 SOLUTION RESPIRATORY (INHALATION) AS NEEDED
OUTPATIENT
Start: 2024-11-04

## 2024-11-02 DIAGNOSIS — C83.17 MANTLE CELL LYMPHOMA OF SPLEEN (MULTI): ICD-10-CM

## 2024-11-02 RX ORDER — VENETOCLAX 100 MG/1
400 TABLET, FILM COATED ORAL DAILY
Qty: 80 TABLET | Refills: 0 | Status: CANCELLED | OUTPATIENT
Start: 2024-11-02

## 2024-11-02 RX ORDER — VENETOCLAX 50 MG/1
50 TABLET, FILM COATED ORAL DAILY
Qty: 7 TABLET | Refills: 0 | Status: CANCELLED | OUTPATIENT
Start: 2024-11-02 | End: 2024-11-09

## 2024-11-04 ENCOUNTER — HOSPITAL ENCOUNTER (OUTPATIENT)
Dept: RADIOLOGY | Facility: HOSPITAL | Age: 59
Discharge: HOME | End: 2024-11-04
Payer: COMMERCIAL

## 2024-11-04 ENCOUNTER — LAB (OUTPATIENT)
Dept: LAB | Facility: HOSPITAL | Age: 59
End: 2024-11-04
Payer: COMMERCIAL

## 2024-11-04 ENCOUNTER — APPOINTMENT (OUTPATIENT)
Dept: RADIOLOGY | Facility: HOSPITAL | Age: 59
End: 2024-11-04
Payer: COMMERCIAL

## 2024-11-04 ENCOUNTER — DOCUMENTATION (OUTPATIENT)
Dept: HEMATOLOGY/ONCOLOGY | Facility: HOSPITAL | Age: 59
End: 2024-11-04
Payer: COMMERCIAL

## 2024-11-04 ENCOUNTER — APPOINTMENT (OUTPATIENT)
Dept: OTHER | Facility: HOSPITAL | Age: 59
End: 2024-11-04
Payer: COMMERCIAL

## 2024-11-04 ENCOUNTER — APPOINTMENT (OUTPATIENT)
Dept: HEMATOLOGY/ONCOLOGY | Facility: HOSPITAL | Age: 59
End: 2024-11-04
Payer: COMMERCIAL

## 2024-11-04 DIAGNOSIS — C83.17 MANTLE CELL LYMPHOMA OF SPLEEN (MULTI): ICD-10-CM

## 2024-11-04 LAB
ALBUMIN SERPL BCP-MCNC: 4.1 G/DL (ref 3.4–5)
ALP SERPL-CCNC: 74 U/L (ref 33–136)
ALT SERPL W P-5'-P-CCNC: 24 U/L (ref 10–52)
ANION GAP SERPL CALC-SCNC: 14 MMOL/L (ref 10–20)
AST SERPL W P-5'-P-CCNC: 18 U/L (ref 9–39)
BASOPHILS # BLD AUTO: 0.01 X10*3/UL (ref 0–0.1)
BASOPHILS NFR BLD AUTO: 0.3 %
BILIRUB SERPL-MCNC: 1.5 MG/DL (ref 0–1.2)
BUN SERPL-MCNC: 20 MG/DL (ref 6–23)
CALCIUM SERPL-MCNC: 9.2 MG/DL (ref 8.6–10.3)
CHLORIDE SERPL-SCNC: 102 MMOL/L (ref 98–107)
CO2 SERPL-SCNC: 25 MMOL/L (ref 21–32)
CREAT SERPL-MCNC: 1.29 MG/DL (ref 0.5–1.3)
DACRYOCYTES BLD QL SMEAR: NORMAL
EGFRCR SERPLBLD CKD-EPI 2021: 63 ML/MIN/1.73M*2
EOSINOPHIL # BLD AUTO: 0 X10*3/UL (ref 0–0.7)
EOSINOPHIL NFR BLD AUTO: 0 %
ERYTHROCYTE [DISTWIDTH] IN BLOOD BY AUTOMATED COUNT: 16.2 % (ref 11.5–14.5)
GLUCOSE BLD MANUAL STRIP-MCNC: 91 MG/DL (ref 74–99)
GLUCOSE SERPL-MCNC: 88 MG/DL (ref 74–99)
HCT VFR BLD AUTO: 27.2 % (ref 41–52)
HGB BLD-MCNC: 8.8 G/DL (ref 13.5–17.5)
IMM GRANULOCYTES # BLD AUTO: 0.02 X10*3/UL (ref 0–0.7)
IMM GRANULOCYTES NFR BLD AUTO: 0.7 % (ref 0–0.9)
LDH SERPL L TO P-CCNC: 130 U/L (ref 84–246)
LYMPHOCYTES # BLD AUTO: 1.16 X10*3/UL (ref 1.2–4.8)
LYMPHOCYTES NFR BLD AUTO: 39.2 %
MCH RBC QN AUTO: 32.1 PG (ref 26–34)
MCHC RBC AUTO-ENTMCNC: 32.4 G/DL (ref 32–36)
MCV RBC AUTO: 99 FL (ref 80–100)
MONOCYTES # BLD AUTO: 0.27 X10*3/UL (ref 0.1–1)
MONOCYTES NFR BLD AUTO: 9.1 %
NEUTROPHILS # BLD AUTO: 1.5 X10*3/UL (ref 1.2–7.7)
NEUTROPHILS NFR BLD AUTO: 50.7 %
NRBC BLD-RTO: 0 /100 WBCS (ref 0–0)
OVALOCYTES BLD QL SMEAR: NORMAL
PHOSPHATE SERPL-MCNC: 3.9 MG/DL (ref 2.5–4.9)
PLATELET # BLD AUTO: 34 X10*3/UL (ref 150–450)
POLYCHROMASIA BLD QL SMEAR: NORMAL
POTASSIUM SERPL-SCNC: 4.4 MMOL/L (ref 3.5–5.3)
PROT SERPL-MCNC: 6.8 G/DL (ref 6.4–8.2)
RBC # BLD AUTO: 2.74 X10*6/UL (ref 4.5–5.9)
RBC MORPH BLD: NORMAL
SCHISTOCYTES BLD QL SMEAR: NORMAL
SODIUM SERPL-SCNC: 137 MMOL/L (ref 136–145)
URATE SERPL-MCNC: 6 MG/DL (ref 4–7.5)
WBC # BLD AUTO: 3 X10*3/UL (ref 4.4–11.3)

## 2024-11-04 PROCEDURE — 78815 PET IMAGE W/CT SKULL-THIGH: CPT | Mod: PS

## 2024-11-04 PROCEDURE — A9552 F18 FDG: HCPCS | Performed by: INTERNAL MEDICINE

## 2024-11-04 PROCEDURE — 85025 COMPLETE CBC W/AUTO DIFF WBC: CPT

## 2024-11-04 PROCEDURE — 36415 COLL VENOUS BLD VENIPUNCTURE: CPT

## 2024-11-04 PROCEDURE — 84550 ASSAY OF BLOOD/URIC ACID: CPT

## 2024-11-04 PROCEDURE — 83615 LACTATE (LD) (LDH) ENZYME: CPT

## 2024-11-04 PROCEDURE — 84100 ASSAY OF PHOSPHORUS: CPT

## 2024-11-04 PROCEDURE — 80053 COMPREHEN METABOLIC PANEL: CPT

## 2024-11-04 PROCEDURE — 82947 ASSAY GLUCOSE BLOOD QUANT: CPT

## 2024-11-04 PROCEDURE — 3430000001 HC RX 343 DIAGNOSTIC RADIOPHARMACEUTICALS: Performed by: INTERNAL MEDICINE

## 2024-11-04 RX ORDER — FLUDEOXYGLUCOSE F 18 200 MCI/ML
11.22 INJECTION, SOLUTION INTRAVENOUS
Status: COMPLETED | OUTPATIENT
Start: 2024-11-04 | End: 2024-11-04

## 2024-11-06 DIAGNOSIS — C83.17 MANTLE CELL LYMPHOMA OF SPLEEN (MULTI): ICD-10-CM

## 2024-11-06 RX ORDER — VENETOCLAX 50 MG/1
50 TABLET, FILM COATED ORAL DAILY
Qty: 7 TABLET | Refills: 0 | Status: CANCELLED | OUTPATIENT
Start: 2024-11-02 | End: 2024-11-09

## 2024-11-07 ENCOUNTER — DOCUMENTATION (OUTPATIENT)
Dept: HEMATOLOGY/ONCOLOGY | Facility: HOSPITAL | Age: 59
End: 2024-11-07
Payer: COMMERCIAL

## 2024-11-07 ENCOUNTER — LAB (OUTPATIENT)
Dept: LAB | Facility: CLINIC | Age: 59
End: 2024-11-07
Payer: COMMERCIAL

## 2024-11-07 DIAGNOSIS — C83.17 MANTLE CELL LYMPHOMA OF SPLEEN (MULTI): ICD-10-CM

## 2024-11-07 LAB
BASOPHILS # BLD AUTO: 0 X10*3/UL (ref 0–0.1)
BASOPHILS NFR BLD AUTO: 0 %
EOSINOPHIL # BLD AUTO: 0 X10*3/UL (ref 0–0.7)
EOSINOPHIL NFR BLD AUTO: 0 %
ERYTHROCYTE [DISTWIDTH] IN BLOOD BY AUTOMATED COUNT: 16.4 % (ref 11.5–14.5)
HCT VFR BLD AUTO: 27 % (ref 41–52)
HGB BLD-MCNC: 8.6 G/DL (ref 13.5–17.5)
IMM GRANULOCYTES # BLD AUTO: 0.01 X10*3/UL (ref 0–0.7)
IMM GRANULOCYTES NFR BLD AUTO: 0.4 % (ref 0–0.9)
LYMPHOCYTES # BLD AUTO: 0.77 X10*3/UL (ref 1.2–4.8)
LYMPHOCYTES NFR BLD AUTO: 29.4 %
MCH RBC QN AUTO: 31.5 PG (ref 26–34)
MCHC RBC AUTO-ENTMCNC: 31.9 G/DL (ref 32–36)
MCV RBC AUTO: 99 FL (ref 80–100)
MONOCYTES # BLD AUTO: 0.25 X10*3/UL (ref 0.1–1)
MONOCYTES NFR BLD AUTO: 9.5 %
NEUTROPHILS # BLD AUTO: 1.59 X10*3/UL (ref 1.2–7.7)
NEUTROPHILS NFR BLD AUTO: 60.7 %
NRBC BLD-RTO: ABNORMAL /100{WBCS}
PLATELET # BLD AUTO: 34 X10*3/UL (ref 150–450)
RBC # BLD AUTO: 2.73 X10*6/UL (ref 4.5–5.9)
WBC # BLD AUTO: 2.6 X10*3/UL (ref 4.4–11.3)

## 2024-11-07 PROCEDURE — 85025 COMPLETE CBC W/AUTO DIFF WBC: CPT

## 2024-11-07 PROCEDURE — 83615 LACTATE (LD) (LDH) ENZYME: CPT

## 2024-11-07 PROCEDURE — 80053 COMPREHEN METABOLIC PANEL: CPT

## 2024-11-07 PROCEDURE — 84100 ASSAY OF PHOSPHORUS: CPT

## 2024-11-07 PROCEDURE — 84550 ASSAY OF BLOOD/URIC ACID: CPT

## 2024-11-07 PROCEDURE — 36415 COLL VENOUS BLD VENIPUNCTURE: CPT

## 2024-11-08 DIAGNOSIS — C83.17 MANTLE CELL LYMPHOMA OF SPLEEN (MULTI): ICD-10-CM

## 2024-11-08 DIAGNOSIS — Z76.82 STEM CELL TRANSPLANT CANDIDATE: ICD-10-CM

## 2024-11-08 LAB
ALBUMIN SERPL BCP-MCNC: 4.1 G/DL (ref 3.4–5)
ALP SERPL-CCNC: 79 U/L (ref 33–136)
ALT SERPL W P-5'-P-CCNC: 23 U/L (ref 10–52)
ANION GAP SERPL CALC-SCNC: 12 MMOL/L (ref 10–20)
AST SERPL W P-5'-P-CCNC: 16 U/L (ref 9–39)
BILIRUB SERPL-MCNC: 1.3 MG/DL (ref 0–1.2)
BUN SERPL-MCNC: 19 MG/DL (ref 6–23)
CALCIUM SERPL-MCNC: 8.9 MG/DL (ref 8.6–10.6)
CHLORIDE SERPL-SCNC: 103 MMOL/L (ref 98–107)
CO2 SERPL-SCNC: 26 MMOL/L (ref 21–32)
CREAT SERPL-MCNC: 1.21 MG/DL (ref 0.5–1.3)
EGFRCR SERPLBLD CKD-EPI 2021: 69 ML/MIN/1.73M*2
GLUCOSE SERPL-MCNC: 121 MG/DL (ref 74–99)
LDH SERPL L TO P-CCNC: 120 U/L (ref 84–246)
PHOSPHATE SERPL-MCNC: 3.1 MG/DL (ref 2.5–4.9)
POTASSIUM SERPL-SCNC: 4.3 MMOL/L (ref 3.5–5.3)
PROT SERPL-MCNC: 6.6 G/DL (ref 6.4–8.2)
SODIUM SERPL-SCNC: 137 MMOL/L (ref 136–145)
URATE SERPL-MCNC: 5.6 MG/DL (ref 4–7.5)

## 2024-11-11 ENCOUNTER — LAB (OUTPATIENT)
Dept: LAB | Facility: CLINIC | Age: 59
End: 2024-11-11
Payer: COMMERCIAL

## 2024-11-11 ENCOUNTER — DOCUMENTATION (OUTPATIENT)
Dept: HEMATOLOGY/ONCOLOGY | Facility: HOSPITAL | Age: 59
End: 2024-11-11
Payer: COMMERCIAL

## 2024-11-11 DIAGNOSIS — C83.17 MANTLE CELL LYMPHOMA OF SPLEEN (MULTI): ICD-10-CM

## 2024-11-11 LAB
BASOPHILS # BLD AUTO: 0.01 X10*3/UL (ref 0–0.1)
BASOPHILS NFR BLD AUTO: 0.4 %
EOSINOPHIL # BLD AUTO: 0.01 X10*3/UL (ref 0–0.7)
EOSINOPHIL NFR BLD AUTO: 0.4 %
ERYTHROCYTE [DISTWIDTH] IN BLOOD BY AUTOMATED COUNT: 16.5 % (ref 11.5–14.5)
HCT VFR BLD AUTO: 27.1 % (ref 41–52)
HGB BLD-MCNC: 8.7 G/DL (ref 13.5–17.5)
IMM GRANULOCYTES # BLD AUTO: 0.01 X10*3/UL (ref 0–0.7)
IMM GRANULOCYTES NFR BLD AUTO: 0.4 % (ref 0–0.9)
LYMPHOCYTES # BLD AUTO: 0.76 X10*3/UL (ref 1.2–4.8)
LYMPHOCYTES NFR BLD AUTO: 26.7 %
MCH RBC QN AUTO: 32.1 PG (ref 26–34)
MCHC RBC AUTO-ENTMCNC: 32.1 G/DL (ref 32–36)
MCV RBC AUTO: 100 FL (ref 80–100)
MONOCYTES # BLD AUTO: 0.33 X10*3/UL (ref 0.1–1)
MONOCYTES NFR BLD AUTO: 11.6 %
NEUTROPHILS # BLD AUTO: 1.73 X10*3/UL (ref 1.2–7.7)
NEUTROPHILS NFR BLD AUTO: 60.5 %
NRBC BLD-RTO: ABNORMAL /100{WBCS}
PLATELET # BLD AUTO: 39 X10*3/UL (ref 150–450)
RBC # BLD AUTO: 2.71 X10*6/UL (ref 4.5–5.9)
WBC # BLD AUTO: 2.9 X10*3/UL (ref 4.4–11.3)

## 2024-11-11 PROCEDURE — 80053 COMPREHEN METABOLIC PANEL: CPT

## 2024-11-11 PROCEDURE — 36415 COLL VENOUS BLD VENIPUNCTURE: CPT

## 2024-11-11 PROCEDURE — 85025 COMPLETE CBC W/AUTO DIFF WBC: CPT

## 2024-11-11 PROCEDURE — 84100 ASSAY OF PHOSPHORUS: CPT

## 2024-11-11 PROCEDURE — 84550 ASSAY OF BLOOD/URIC ACID: CPT

## 2024-11-11 PROCEDURE — 83615 LACTATE (LD) (LDH) ENZYME: CPT

## 2024-11-11 NOTE — PROGRESS NOTES
Notified by Rashad Rodriguez in Lab Services that PLT 39. Secure Chat sent to Dr. Hobbs, Zainab Sullivan NP and RN Partner.

## 2024-11-12 LAB
ALBUMIN SERPL BCP-MCNC: 4.2 G/DL (ref 3.4–5)
ALP SERPL-CCNC: 78 U/L (ref 33–136)
ALT SERPL W P-5'-P-CCNC: 21 U/L (ref 10–52)
ANION GAP SERPL CALC-SCNC: 16 MMOL/L (ref 10–20)
AST SERPL W P-5'-P-CCNC: 16 U/L (ref 9–39)
BILIRUB SERPL-MCNC: 1.2 MG/DL (ref 0–1.2)
BUN SERPL-MCNC: 28 MG/DL (ref 6–23)
CALCIUM SERPL-MCNC: 9.2 MG/DL (ref 8.6–10.6)
CHLORIDE SERPL-SCNC: 101 MMOL/L (ref 98–107)
CO2 SERPL-SCNC: 25 MMOL/L (ref 21–32)
CREAT SERPL-MCNC: 1.45 MG/DL (ref 0.5–1.3)
EGFRCR SERPLBLD CKD-EPI 2021: 55 ML/MIN/1.73M*2
GLUCOSE SERPL-MCNC: 115 MG/DL (ref 74–99)
LDH SERPL L TO P-CCNC: 123 U/L (ref 84–246)
PHOSPHATE SERPL-MCNC: 4.3 MG/DL (ref 2.5–4.9)
POTASSIUM SERPL-SCNC: 5.2 MMOL/L (ref 3.5–5.3)
PROT SERPL-MCNC: 6.7 G/DL (ref 6.4–8.2)
SODIUM SERPL-SCNC: 137 MMOL/L (ref 136–145)
URATE SERPL-MCNC: 5.7 MG/DL (ref 4–7.5)

## 2024-11-13 ASSESSMENT — ENCOUNTER SYMPTOMS
LIGHT-HEADEDNESS: 0
ABDOMINAL PAIN: 0
APPETITE CHANGE: 0
NUMBNESS: 0
DYSURIA: 0
HEMATURIA: 0
DIARRHEA: 0
BLOOD IN STOOL: 0
VOMITING: 0
SHORTNESS OF BREATH: 0
LEG SWELLING: 0
PALPITATIONS: 0
DIAPHORESIS: 0
DIZZINESS: 0
FEVER: 0
NAUSEA: 0
ADENOPATHY: 0
CHILLS: 0
FREQUENCY: 0
WHEEZING: 0
MUSCULOSKELETAL NEGATIVE: 1
WOUND: 0
CONSTIPATION: 0
FATIGUE: 0
UNEXPECTED WEIGHT CHANGE: 1

## 2024-11-13 NOTE — PROGRESS NOTES
Patient ID: Nancie Armenta is a 59 y.o. male.    Treatment:   Oncology History Overview Note           Patient Visit Information:   Visit Type: Follow Up Visit      Cancer History:   Treatment Synopsis:     1. Stage IV A mantle cell non-Hodgkin lymphoma involving the gastrointestinal tract diagnosed in October 2011, with diffuse gastrointestinal involvement. The patient was treated  with RCHOP alternating with Rituxan and high-dose Mamie-C and received total of 6 cycles of chemotherapy which he finished in February 2012.   Allergic to Augmentin, causes hives.     2. Patient underwent beam chemotherapy followed by autologous peripheral stem cell transplant in April 2012, by Dr. Yvonne Hobbs at [facility], was also involved in phase 3 clinical trial up killed shingles vaccine versus placebo (Merck-1 Z10  study).     3.The patient developed shingles in January 2014, involving left flank.      4. 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 but Patient had a syncopal episode in January 2017 without any clear explanation  went to [facility] emergency room when he was orthostatic and also injured his head, negative MRI and LP.      5.  Recurrent mantle cell lymphoma June 2019 assx presentation with leukocytosis. Peripheral blood flow which showed a CD5 positve clonal lymphocytosis. 6/2/19 CT of chest abdomen and pelvis revealed  splenomegaly of 15 cm compared to 12.8 cm last evaluation. Recurrence confirmed by FISH on peripheral blood earlier this month.  BM biopsy which showed 5% Mantle cell involvement although peripheral blood shows 30% involvement. CT imaging showed splenomegaly. PET scan declined by insurance twice.  Patient underwent colonoscopy  with Dr. Ac ( [place]) 8/8/19 which showed cobblestoning of colon and multiple biopsies including terminal ileum, appendix, cecum,  rectosigmoid positive for mantle cell lymphoma.     6. Initiation of acalabrutinib August 2019 with minimal response. Switched to ibrutinib and venetoclax 12/20/20 with clearing of t(11, 14) by by FISH ( 1/2020) and resolution of involvement of colon by endoscopy and pathology on exam 3/2/2020! BM biopsy  4/2020 HEATHER.     C-scope (3/11/22): at OSH: negative, no evidence of disease. PET scan ( 3/14/22): no abnormal uptake. Deauville 1. BMBx (3/17/22): negative, no evidence of MCL.   March 2022, ibrutinib and venetoclax discontinued.    July 2024 development of Tpenia, bone marrow biopsy 7/25/24 showed focal involvement with mantle cell lymphoma (NGS cyclin D1 positive and Sox 11 positive. BIRC+ and TP 53 positve with a VAF of 3%, BIRC3 positive). FISH negative for 17p deletion  CT imaging done on 8/7/24 shows thickening of sigmoid colon, development of mild to moderate retroperitoneal adenopathy, splenomegally and possible splenic infarct.    Pirotbrutib 200 mg daily initiated 8/12/24.  Due to inadequate response, he started the ventoclax (50 mg) on Adolfo 10/13, then increased to 100 mg , 200 mg and 400 mg      7. S/P COVID 19 infection 12/1/2020, no sequelae                          Lymphoma   10/20/2011 Initial Diagnosis    Lymphoma (Multi)     Mantle cell lymphoma of spleen (Multi)   10/13/2016 Initial Diagnosis    Mantle cell lymphoma of spleen (CMS/HCC)         Response:     Past Medical History:  Renal insufficiency.   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  Working full time for [employer].    Social History     Tobacco Use    Smoking status: Never    Smokeless tobacco: Never   Vaping Use    Vaping status: Never Used   Substance Use Topics    Alcohol use: Never    Drug use: Never   -------------------------------------------------------------------------------------------------------  Subjective       HPI    Nancie Armenta is a 59 year old man with PMH of hyperglycemia, renal insufficiency, and stage IV A mantle cell non hodgkin lymphoma involving the gastrointestinal tract, s/p autologous SCT (April 2012).      Early in July 2024 patient noted to have dropping platelet counts and bone marrow biopsy 7/25/24 showed focal involvement with mantle cell lymphoma (NGS cyclin D1 positive and Sox 11 positive. BIRC+ ad TP 53 positve with a VAF of 3%, BIRC3 positive). FISH negative for 17p deletion  CT imaging done on 8/7/24 shows thickening of sigmoid colon, development of mild to moderate retroperitoneal adenopathy, splenomegally and possible splenic infarct.     Patient is here today (11/14/24) with his wife for followup of recently recurrent mantle cell lymphoma as above and for ongoing planning of CART cell therapy.  He started pirtobrutinib 8/12/24 with a short prephase prednisone, due to inadequate response, he started the ventoclax (50 mg) on Adolfo 10/13, then increased to 100 mg  10/20/24 .  He has been on venetoclax 400 mg daily and pirtubrutinib was resumed last visit.  He ran out of pirtubrutinib 2 days ago and and will stop both agents in anticipation of cart cell collection on November 25.  Weight has been stable at 98 kg last 4 weeks, no bleeding, no bruising, cough is gone, gets winded on 2 flights of stairs.  No evidence of TLS, feels more fatigued, but working everyday, cough and splenic  pain has resolved.      He continues to work full-time for the [employer].     Review of Systems   Constitutional:  Positive for unexpected weight change. Negative for appetite change, chills, diaphoresis, fatigue and fever.   Respiratory:  Negative for cough (improving, as above), shortness of breath and wheezing.    Cardiovascular:  Negative for chest pain, leg swelling and palpitations.   Gastrointestinal:  Negative for abdominal pain, blood in stool, constipation, diarrhea, nausea and vomiting.   Genitourinary:  Negative for dysuria, frequency, hematuria and nocturia.    Musculoskeletal: Negative.  Negative for gait problem.   Skin:  Negative for rash and wound.   Neurological:  Negative for dizziness, gait problem, light-headedness and numbness.   Hematological:  Negative for adenopathy.   -------------------------------------------------------------------------------------------------------  Objective   BSA: 2.24 meters squared  /75   Pulse 99   Temp 36.1 °C (97 °F)   Resp 16   Wt 98.2 kg (216 lb 7.9 oz)   SpO2 100%   BMI 28.85 kg/m²     Physical Exam  Vitals reviewed.   Constitutional:       General: He is not in acute distress.     Appearance: Normal appearance.   HENT:      Head: Normocephalic and atraumatic.      Mouth/Throat:      Mouth: Mucous membranes are moist.   Eyes:      Extraocular Movements: Extraocular movements intact.      Conjunctiva/sclera: Conjunctivae normal.      Pupils: Pupils are equal, round, and reactive to light.   Cardiovascular:      Rate and Rhythm: Normal rate and regular rhythm.      Pulses: Normal pulses.      Heart sounds: Normal heart sounds. No murmur heard.     No friction rub. No gallop.   Pulmonary:      Effort: Pulmonary effort is normal. No respiratory distress.      Breath sounds: Normal breath sounds. No wheezing.   Abdominal:      General: Abdomen is flat. Bowel sounds are normal. There is no distension.      Palpations: Abdomen is soft. There is  splenomegaly. There is no mass.      Tenderness: There is no abdominal tenderness.      Comments: Spleen palpable about 14 cm below costal margin, firmdose not seem to cross midline today (10/31/24)    Musculoskeletal:         General: No swelling. Normal range of motion.   Lymphadenopathy:      Comments: No lymphadenopathy   Skin:     General: Skin is warm and dry.   Neurological:      General: No focal deficit present.      Mental Status: He is alert and oriented to person, place, and time.   Psychiatric:         Mood and Affect: Mood normal.         Behavior: Behavior normal.         Thought Content: Thought content normal.         Judgment: Judgment normal.       Performance Status:  ECOG performance status: 0 fully active  -------------------------------------------------------------------------------------------------------  Assessment/Plan      1. Stage IV mantle cell lymphoma itreated with Nordic regimen and consolidation autograft with BEAM preparative regimen April 2012.  Relapse 2019 treated with ibrutinib and venetoclax with complete pathology response until 3/ 2022. See above.     Recurrence July 2024 presenting with dropping platelet counts and bone marrow biopsy 7/25/24 showed focal involvement with mantle cell lymphoma (NGS cyclin D1 positive and Sox 11 positive. BIRC+ ad TP 53 positve with a VAF of 3%, BIRC3 positive). FISH negative for 17p deletion  CT imaging done on 8/7/24 shows thickening of sigmoid colon, development of mild to moderate retroperitoneal adenopathy, splenomegally and possible splenic infarct.      Patient started pirtobrutinib salvage August 12,2024 and feels better. However spleen size was increasing per CT imaging (8/6/24)  and physical exam so Dr. Hobbs planned to add venetoclax as follows, starting 10/13/24:  - 50 mg for first week, then 100 (10/20), and 200 mg (10/27) for 7 days each, then 400 mg (11/3/24- )  -increased anemia with hgb 8.5, platlets 37K, no bleeding.      Pre CART staging:    BM biopsy low level involvment by mantle cells about 3% mantle cells by flow. Absence of CD19 on this  flow sample likely reflects insufficient sampling and positive for CD 19 on prior bone marrow sample of 7/24/24..    PET imaging 11/4/24   1. FDG avid lymphadenopathy throughout the neck, chest, abdomen and  pelvis as detailed above, consistent with lymphomatous involvement.  2. Intense FDG avid splenomegaly, consistent with lymphomatous  involvement. Peripheral regions of photopenia likely reflect areas of  infarction.  3. No PET evidence of FDG avid extranodal disease.on     - Plan to proceed to CART cell therapy ASAP, discussed CASE 2419, anticipate collection on 11/25 and start of LD shortly thereafter. Patient instructed to take last dose of pirto and venetoclax on 11/14/24 to allow 10 day washout.  Met today (11/15/24)  with Research RN and CAR-T coordinator. Due to anemia, ordered prbcs to be transfused Friday before intended collection on 11/25/24.    Prophylaxis: On acyclovir prophylaxis and allopurinol. He has had no signs of TLS   Advised to get labs as [place] Mondays and Thursdays.    Renal failure: sCr  remains at 1.28  today11/15/24) .     Health maintenance.   Patient has recv'd both doses of shingrix vaccine & has completed all of his other post transplant vaccines,  Discontinued Acyclovir, & Bactrim prophylaxis (2/23/23) since off treatment since 3/24/22.  Recv'd Prevnar 13 and influenza vaccine (9/16/20) with his PCP. Up to date on tetanus.   - Recv'd Sha & Sha COVID vaccine on 3/16/21. Received booster late 2021. Received Evusheld given on (3/17/22) & 9/22/22.   -Received influenza vaccine 10/18/23.  Advised to obtain covid vaccine at local pharmacy.  Colonoscopy completed 7/14/23: normal except internal hemorrhoids.  Recommendations to repeat in 2 years.    - Some aortic calcifications on imaging, started on prevastatin-managed by his PCP.  PCP increased  pravastatin dose to 40 mg daily.  PCP switched to atorvastatin 40 mg daily.    BPH:  Reports his previous urinary frequency and nocturia symptoms has greatly improved after starting tadalafil 5 mg daily.     RTC: Followup after CART collections and before initiation of lymphodepleting therapy which will be done as inpatient due to study stipulations.    -  - 10/23/24: Pre-CAR-T Testing:  = TTE  borderline low at 53%  = Onco-Cardiology  = BMBx   = CAR-T Q

## 2024-11-14 ENCOUNTER — LAB (OUTPATIENT)
Dept: LAB | Facility: HOSPITAL | Age: 59
End: 2024-11-14
Payer: COMMERCIAL

## 2024-11-14 ENCOUNTER — OFFICE VISIT (OUTPATIENT)
Dept: HEMATOLOGY/ONCOLOGY | Facility: HOSPITAL | Age: 59
End: 2024-11-14
Payer: COMMERCIAL

## 2024-11-14 VITALS
DIASTOLIC BLOOD PRESSURE: 75 MMHG | BODY MASS INDEX: 28.85 KG/M2 | HEART RATE: 99 BPM | WEIGHT: 216.49 LBS | OXYGEN SATURATION: 100 % | TEMPERATURE: 97 F | SYSTOLIC BLOOD PRESSURE: 124 MMHG | RESPIRATION RATE: 16 BRPM

## 2024-11-14 DIAGNOSIS — C83.17 MANTLE CELL LYMPHOMA OF SPLEEN (MULTI): Primary | ICD-10-CM

## 2024-11-14 DIAGNOSIS — C83.17 MANTLE CELL LYMPHOMA OF SPLEEN (MULTI): ICD-10-CM

## 2024-11-14 LAB
ALBUMIN SERPL BCP-MCNC: 4.2 G/DL (ref 3.4–5)
ALP SERPL-CCNC: 84 U/L (ref 33–136)
ALT SERPL W P-5'-P-CCNC: 21 U/L (ref 10–52)
ANION GAP SERPL CALC-SCNC: 12 MMOL/L (ref 10–20)
AST SERPL W P-5'-P-CCNC: 15 U/L (ref 9–39)
BASOPHILS # BLD AUTO: 0.01 X10*3/UL (ref 0–0.1)
BASOPHILS NFR BLD AUTO: 0.5 %
BILIRUB SERPL-MCNC: 1.1 MG/DL (ref 0–1.2)
BUN SERPL-MCNC: 22 MG/DL (ref 6–23)
CALCIUM SERPL-MCNC: 9.1 MG/DL (ref 8.6–10.3)
CHLORIDE SERPL-SCNC: 101 MMOL/L (ref 98–107)
CO2 SERPL-SCNC: 28 MMOL/L (ref 21–32)
CREAT SERPL-MCNC: 1.28 MG/DL (ref 0.5–1.3)
EGFRCR SERPLBLD CKD-EPI 2021: 64 ML/MIN/1.73M*2
EOSINOPHIL # BLD AUTO: 0 X10*3/UL (ref 0–0.7)
EOSINOPHIL NFR BLD AUTO: 0 %
ERYTHROCYTE [DISTWIDTH] IN BLOOD BY AUTOMATED COUNT: 16.9 % (ref 11.5–14.5)
GLUCOSE SERPL-MCNC: 115 MG/DL (ref 74–99)
HCT VFR BLD AUTO: 26.3 % (ref 41–52)
HGB BLD-MCNC: 8.4 G/DL (ref 13.5–17.5)
IMM GRANULOCYTES # BLD AUTO: 0.02 X10*3/UL (ref 0–0.7)
IMM GRANULOCYTES NFR BLD AUTO: 1 % (ref 0–0.9)
LDH SERPL L TO P-CCNC: 125 U/L (ref 84–246)
LYMPHOCYTES # BLD AUTO: 0.53 X10*3/UL (ref 1.2–4.8)
LYMPHOCYTES NFR BLD AUTO: 25.6 %
MCH RBC QN AUTO: 31.9 PG (ref 26–34)
MCHC RBC AUTO-ENTMCNC: 31.9 G/DL (ref 32–36)
MCV RBC AUTO: 100 FL (ref 80–100)
MONOCYTES # BLD AUTO: 0.26 X10*3/UL (ref 0.1–1)
MONOCYTES NFR BLD AUTO: 12.6 %
NEUTROPHILS # BLD AUTO: 1.25 X10*3/UL (ref 1.2–7.7)
NEUTROPHILS NFR BLD AUTO: 60.3 %
NRBC BLD-RTO: 0 /100 WBCS (ref 0–0)
PHOSPHATE SERPL-MCNC: 3.6 MG/DL (ref 2.5–4.9)
PLATELET # BLD AUTO: 44 X10*3/UL (ref 150–450)
POTASSIUM SERPL-SCNC: 3.9 MMOL/L (ref 3.5–5.3)
PROT SERPL-MCNC: 6.9 G/DL (ref 6.4–8.2)
RBC # BLD AUTO: 2.63 X10*6/UL (ref 4.5–5.9)
SODIUM SERPL-SCNC: 137 MMOL/L (ref 136–145)
URATE SERPL-MCNC: 5.2 MG/DL (ref 4–7.5)
WBC # BLD AUTO: 2.1 X10*3/UL (ref 4.4–11.3)

## 2024-11-14 PROCEDURE — 84075 ASSAY ALKALINE PHOSPHATASE: CPT

## 2024-11-14 PROCEDURE — 36415 COLL VENOUS BLD VENIPUNCTURE: CPT

## 2024-11-14 PROCEDURE — 83615 LACTATE (LD) (LDH) ENZYME: CPT

## 2024-11-14 PROCEDURE — 84100 ASSAY OF PHOSPHORUS: CPT

## 2024-11-14 PROCEDURE — 85025 COMPLETE CBC W/AUTO DIFF WBC: CPT

## 2024-11-14 PROCEDURE — 99215 OFFICE O/P EST HI 40 MIN: CPT | Performed by: INTERNAL MEDICINE

## 2024-11-14 PROCEDURE — 84550 ASSAY OF BLOOD/URIC ACID: CPT

## 2024-11-14 ASSESSMENT — PAIN SCALES - GENERAL: PAINLEVEL_OUTOF10: 0-NO PAIN

## 2024-11-14 ASSESSMENT — ENCOUNTER SYMPTOMS: COUGH: 0

## 2024-11-18 ENCOUNTER — LAB (OUTPATIENT)
Dept: LAB | Facility: CLINIC | Age: 59
End: 2024-11-18
Payer: COMMERCIAL

## 2024-11-18 DIAGNOSIS — C83.17 MANTLE CELL LYMPHOMA OF SPLEEN (MULTI): ICD-10-CM

## 2024-11-18 DIAGNOSIS — Z76.82 STEM CELL TRANSPLANT CANDIDATE: ICD-10-CM

## 2024-11-18 PROBLEM — C83.10 MANTLE CELL LYMPHOMA: Status: ACTIVE | Noted: 2024-11-18

## 2024-11-18 LAB
ABO GROUP (TYPE) IN BLOOD: NORMAL
ALBUMIN SERPL BCP-MCNC: 4.3 G/DL (ref 3.4–5)
ALP SERPL-CCNC: 75 U/L (ref 33–136)
ALT SERPL W P-5'-P-CCNC: 21 U/L (ref 10–52)
ANION GAP SERPL CALC-SCNC: 14 MMOL/L (ref 10–20)
ANTIBODY SCREEN: NORMAL
AST SERPL W P-5'-P-CCNC: 15 U/L (ref 9–39)
BASOPHILS # BLD AUTO: 0.01 X10*3/UL (ref 0–0.1)
BASOPHILS NFR BLD AUTO: 0.4 %
BILIRUB SERPL-MCNC: 1 MG/DL (ref 0–1.2)
BUN SERPL-MCNC: 21 MG/DL (ref 6–23)
CALCIUM SERPL-MCNC: 9 MG/DL (ref 8.6–10.6)
CHLORIDE SERPL-SCNC: 103 MMOL/L (ref 98–107)
CO2 SERPL-SCNC: 25 MMOL/L (ref 21–32)
CREAT SERPL-MCNC: 1.18 MG/DL (ref 0.5–1.3)
EGFRCR SERPLBLD CKD-EPI 2021: 71 ML/MIN/1.73M*2
EOSINOPHIL # BLD AUTO: 0.02 X10*3/UL (ref 0–0.7)
EOSINOPHIL NFR BLD AUTO: 0.8 %
ERYTHROCYTE [DISTWIDTH] IN BLOOD BY AUTOMATED COUNT: 16.8 % (ref 11.5–14.5)
GLUCOSE SERPL-MCNC: 97 MG/DL (ref 74–99)
HCT VFR BLD AUTO: 28 % (ref 41–52)
HGB BLD-MCNC: 8.9 G/DL (ref 13.5–17.5)
IMM GRANULOCYTES # BLD AUTO: 0.01 X10*3/UL (ref 0–0.7)
IMM GRANULOCYTES NFR BLD AUTO: 0.4 % (ref 0–0.9)
LDH SERPL L TO P-CCNC: 129 U/L (ref 84–246)
LYMPHOCYTES # BLD AUTO: 0.73 X10*3/UL (ref 1.2–4.8)
LYMPHOCYTES NFR BLD AUTO: 29.1 %
MCH RBC QN AUTO: 32 PG (ref 26–34)
MCHC RBC AUTO-ENTMCNC: 31.8 G/DL (ref 32–36)
MCV RBC AUTO: 101 FL (ref 80–100)
MONOCYTES # BLD AUTO: 0.3 X10*3/UL (ref 0.1–1)
MONOCYTES NFR BLD AUTO: 12 %
NEUTROPHILS # BLD AUTO: 1.44 X10*3/UL (ref 1.2–7.7)
NEUTROPHILS NFR BLD AUTO: 57.3 %
NRBC BLD-RTO: ABNORMAL /100{WBCS}
PHOSPHATE SERPL-MCNC: 3.1 MG/DL (ref 2.5–4.9)
PLATELET # BLD AUTO: 41 X10*3/UL (ref 150–450)
POTASSIUM SERPL-SCNC: 4.5 MMOL/L (ref 3.5–5.3)
PROT SERPL-MCNC: 6.9 G/DL (ref 6.4–8.2)
RBC # BLD AUTO: 2.78 X10*6/UL (ref 4.5–5.9)
RH FACTOR (ANTIGEN D): NORMAL
SODIUM SERPL-SCNC: 137 MMOL/L (ref 136–145)
URATE SERPL-MCNC: 6.4 MG/DL (ref 4–7.5)
WBC # BLD AUTO: 2.5 X10*3/UL (ref 4.4–11.3)

## 2024-11-18 PROCEDURE — 84100 ASSAY OF PHOSPHORUS: CPT

## 2024-11-18 PROCEDURE — 84550 ASSAY OF BLOOD/URIC ACID: CPT

## 2024-11-18 PROCEDURE — 83615 LACTATE (LD) (LDH) ENZYME: CPT

## 2024-11-18 PROCEDURE — 80053 COMPREHEN METABOLIC PANEL: CPT

## 2024-11-18 PROCEDURE — 86920 COMPATIBILITY TEST SPIN: CPT

## 2024-11-18 PROCEDURE — 86923 COMPATIBILITY TEST ELECTRIC: CPT | Mod: 59

## 2024-11-18 PROCEDURE — 36415 COLL VENOUS BLD VENIPUNCTURE: CPT

## 2024-11-18 PROCEDURE — 88185 FLOWCYTOMETRY/TC ADD-ON: CPT

## 2024-11-18 PROCEDURE — 85025 COMPLETE CBC W/AUTO DIFF WBC: CPT

## 2024-11-18 PROCEDURE — 86900 BLOOD TYPING SEROLOGIC ABO: CPT

## 2024-11-19 LAB
CD3 CELLS # BLD: 0.56 X10E9/L
CD3 CELLS NFR SPEC: 76 %
LYMPHOCYTES # SPEC AUTO: 0.73 X10*3/UL

## 2024-11-20 LAB
BLOOD EXPIRATION DATE: NORMAL
DISPENSE STATUS: NORMAL
PRODUCT BLOOD TYPE: 7300
PRODUCT CODE: NORMAL
UNIT ABO: NORMAL
UNIT NUMBER: NORMAL
UNIT RH: NORMAL
UNIT VOLUME: 350
XM INTEP: NORMAL

## 2024-11-20 RX ORDER — EPINEPHRINE 0.3 MG/.3ML
0.3 INJECTION SUBCUTANEOUS EVERY 5 MIN PRN
Status: CANCELLED | OUTPATIENT
Start: 2024-11-20

## 2024-11-20 RX ORDER — FAMOTIDINE 10 MG/ML
20 INJECTION INTRAVENOUS ONCE AS NEEDED
Status: CANCELLED | OUTPATIENT
Start: 2024-11-20

## 2024-11-20 RX ORDER — DIPHENHYDRAMINE HYDROCHLORIDE 50 MG/ML
50 INJECTION INTRAMUSCULAR; INTRAVENOUS AS NEEDED
Status: CANCELLED | OUTPATIENT
Start: 2024-11-20

## 2024-11-20 RX ORDER — ALBUTEROL SULFATE 0.83 MG/ML
3 SOLUTION RESPIRATORY (INHALATION) AS NEEDED
Status: CANCELLED | OUTPATIENT
Start: 2024-11-20

## 2024-11-20 ASSESSMENT — ENCOUNTER SYMPTOMS
CHILLS: 0
ABDOMINAL PAIN: 0
ADENOPATHY: 0
MUSCULOSKELETAL NEGATIVE: 1
COUGH: 0
WHEEZING: 0
BLOOD IN STOOL: 0
VOMITING: 0
FEVER: 0
CONSTIPATION: 0
FATIGUE: 0
DYSURIA: 0
LIGHT-HEADEDNESS: 0
LEG SWELLING: 0
HEMATURIA: 0
WOUND: 0
APPETITE CHANGE: 0
UNEXPECTED WEIGHT CHANGE: 1
NUMBNESS: 0
DIZZINESS: 0
SHORTNESS OF BREATH: 0
NAUSEA: 0
DIAPHORESIS: 0
PALPITATIONS: 0
FREQUENCY: 0
DIARRHEA: 0

## 2024-11-20 NOTE — PROGRESS NOTES
Patient ID: Nancie Armenta is a 59 y.o. male.    Treatment:   Oncology History Overview Note           Patient Visit Information:   Visit Type: Follow Up Visit      Cancer History:   Treatment Synopsis:     1. Stage IV A mantle cell non-Hodgkin lymphoma involving the gastrointestinal tract diagnosed in October 2011, with diffuse gastrointestinal involvement. The patient was treated  with RCHOP alternating with Rituxan and high-dose Mamie-C and received total of 6 cycles of chemotherapy which he finished in February 2012.   Allergic to Augmentin, causes hives.     2. Patient underwent beam chemotherapy followed by autologous peripheral stem cell transplant in April 2012, by Dr. Yvonne Hobbs at [facility], was also involved in phase 3 clinical trial up killed shingles vaccine versus placebo ([employer]ck-1 Z10  study).     3.The patient developed shingles in January 2014, involving left flank.      4. 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      5.  Recurrent mantle cell lymphoma June 2019 assx presentation with leukocytosis. Peripheral blood flow which showed a CD5 positve clonal lymphocytosis. 6/2/19 CT of chest abdomen and pelvis revealed  splenomegaly of 15 cm compared to 12.8 cm last evaluation. Recurrence confirmed by FISH on peripheral blood earlier this month.  BM biopsy which showed 5% Mantle cell involvement although peripheral blood shows 30% involvement. CT imaging showed splenomegaly. PET scan declined by insurance twice.  Patient underwent colonoscopy  with Dr. Ac ( [place]) 8/8/19 which showed cobblestoning of colon and multiple biopsies including terminal ileum, appendix, cecum,  rectosigmoid positive for mantle cell lymphoma.     6. Initiation of acalabrutinib August 2019 with minimal response. Switched to ibrutinib and venetoclax 12/20/20 with clearing of t(11, 14) by by FISH ( 1/2020) and resolution of involvement of colon by endoscopy and pathology on exam 3/2/2020! BM biopsy  4/2020 HEATHER.     C-scope (3/11/22): at OSH: negative, no evidence of disease. PET scan ( 3/14/22): no abnormal uptake. Deauville 1. BMBx (3/17/22): negative, no evidence of MCL.   March 2022, ibrutinib and venetoclax discontinued.    July 2024 development of Tpenia, bone marrow biopsy 7/25/24 showed focal involvement with mantle cell lymphoma (NGS cyclin D1 positive and Sox 11 positive. BIRC+ and TP 53 positve with a VAF of 3%, BIRC3 positive). FISH negative for 17p deletion  CT imaging done on 8/7/24 shows thickening of sigmoid colon, development of mild to moderate retroperitoneal adenopathy, splenomegally and possible splenic infarct.    Pirotbrutib 200 mg daily initiated 8/12/24.  Due to inadequate response, he started the ventoclax (50 mg) on Adolfo 10/13, then increased to 100 mg , 200 mg and 400 mg      7. S/P COVID 19 infection 12/1/2020, no sequelae                          Lymphoma   10/20/2011 Initial Diagnosis    Lymphoma (Multi)     Mantle cell lymphoma of spleen (Multi)   10/13/2016 Initial Diagnosis    Mantle cell lymphoma of spleen (CMS/HCC)     Mantle cell lymphoma   11/18/2024 Initial Diagnosis    Mantle cell lymphoma     11/30/2024 -  Bone Marrow Transplant           Response:     Past Medical History:  Renal insufficiency.   Past Medical History:   Diagnosis Date    Fourth (trochlear) nerve palsy, right eye 06/08/2017    Right-sided fourth cranial nerve palsy    Fourth (trochlear) nerve palsy, right eye 06/08/2017    Right-sided fourth cranial nerve palsy    Malignant neoplasm of connective and soft tissue, unspecified 06/08/2017    Cancer of blood vessel     Surgical History:   Past Surgical  History:   Procedure Laterality Date    SHOULDER SURGERY  07/13/2017    Shoulder Surgery        Family History:   Family History   Problem Relation Name Age of Onset    Cataracts Mother      Cataracts Father      Other (chronic lymphoid leukemia) Father         Social History:  .  Working full time for [employer].    Social History     Tobacco Use    Smoking status: Never    Smokeless tobacco: Never   Vaping Use    Vaping status: Never Used   Substance Use Topics    Alcohol use: Never    Drug use: Never   -------------------------------------------------------------------------------------------------------  Subjective       HPI    Nancie Armenta is a 59 year old man with PMH of hyperglycemia, renal insufficiency, and stage IV A mantle cell non hodgkin lymphoma involving the gastrointestinal tract, s/p autologous SCT (April 2012).      Early in July 2024 patient noted to have dropping platelet counts and bone marrow biopsy 7/25/24 showed focal involvement with mantle cell lymphoma (NGS cyclin D1 positive and Sox 11 positive. BIRC+ ad TP 53 positve with a VAF of 3%, BIRC3 positive). FISH negative for 17p deletion  CT imaging done on 8/7/24 shows thickening of sigmoid colon, development of mild to moderate retroperitoneal adenopathy, splenomegally and possible splenic infarct.     Patient is here today (11/21/24) with his wife for followup of recently recurrent mantle cell lymphoma as above and for ongoing planning of CART cell therapy.  He started pirtobrutinib 8/12/24 with a short prephase prednisone, due to inadequate response, he started the ventoclax (50 mg) on Adolfo 10/13, then increased to 100 mg  10/20/24 .  He has been on venetoclax 400 mg daily and pirtubrutinib was resumed last visit.  He ran out of pirtubrutinib 2 days ago and and  both agents  stopped 11/14 in anticipation of cart cell collection on November 25.  Patient gained 2 pounds since last visit, appetite is improved.  Noted some  splenic pain recently, still coughing.  Planned admission on 11/29/24. Patient will go back to work after collections on Monday. Still gets winded on 2 flights of stairs.          Review of Systems   Constitutional:  Positive for unexpected weight change. Negative for appetite change, chills, diaphoresis, fatigue and fever.   Respiratory:  Negative for cough (improving, as above), shortness of breath and wheezing.    Cardiovascular:  Negative for chest pain, leg swelling and palpitations.   Gastrointestinal:  Negative for abdominal pain, blood in stool, constipation, diarrhea, nausea and vomiting.   Genitourinary:  Negative for dysuria, frequency, hematuria and nocturia.    Musculoskeletal: Negative.  Negative for gait problem.   Skin:  Negative for rash and wound.   Neurological:  Negative for dizziness, gait problem, light-headedness and numbness.   Hematological:  Negative for adenopathy.   -------------------------------------------------------------------------------------------------------  Objective   BSA: 2.24 meters squared  /77   Pulse 106   Temp 36.1 °C (97 °F)   Resp 16   Wt 98.1 kg (216 lb 4.3 oz)   SpO2 100%   BMI 28.82 kg/m²     Physical Exam  Vitals reviewed.   Constitutional:       General: He is not in acute distress.     Appearance: Normal appearance.   HENT:      Head: Normocephalic and atraumatic.      Mouth/Throat:      Mouth: Mucous membranes are moist.   Eyes:      Extraocular Movements: Extraocular movements intact.      Conjunctiva/sclera: Conjunctivae normal.      Pupils: Pupils are equal, round, and reactive to light.   Cardiovascular:      Rate and Rhythm: Normal rate and regular rhythm.      Pulses: Normal pulses.      Heart sounds: Normal heart sounds. No murmur heard.     No friction rub. No gallop.   Pulmonary:      Effort: Pulmonary effort is normal. No respiratory distress.      Breath sounds: Normal breath sounds. No wheezing.   Abdominal:      General: Abdomen is  flat. Bowel sounds are normal. There is no distension.      Palpations: Abdomen is soft. There is splenomegaly. There is no mass.      Tenderness: There is no abdominal tenderness.      Comments: Spleen very enlarge, crossed midline.    Musculoskeletal:         General: No swelling. Normal range of motion.   Lymphadenopathy:      Comments: No lymphadenopathy   Skin:     General: Skin is warm and dry.   Neurological:      General: No focal deficit present.      Mental Status: He is alert and oriented to person, place, and time.   Psychiatric:         Mood and Affect: Mood normal.         Behavior: Behavior normal.         Thought Content: Thought content normal.         Judgment: Judgment normal.       Performance Status:  ECOG performance status: 0 fully active  -------------------------------------------------------------------------------------------------------  Assessment/Plan      1. Stage IV mantle cell lymphoma itreated with Nordic regimen and consolidation autograft with BEAM preparative regimen April 2012.  Relapse 2019 treated with ibrutinib and venetoclax with complete pathology response until 3/ 2022. See above.     Recurrence July 2024 presenting with dropping platelet counts and bone marrow biopsy 7/25/24 showed focal involvement with mantle cell lymphoma (NGS cyclin D1 positive and Sox 11 positive. BIRC+ ad TP 53 positve with a VAF of 3%, BIRC3 positive). FISH negative for 17p deletion  CT imaging done on 8/7/24 shows thickening of sigmoid colon, development of mild to moderate retroperitoneal adenopathy, splenomegally and possible splenic infarct.      Patient started pirtobrutinib salvage August 12,2024 and feels better. However spleen size was increasing per CT imaging (8/6/24)  and physical exam so Dr. Hobbs planned to add venetoclax as follows, starting 10/13/24:  - 50 mg for first week, then 100 (10/20), and 200 mg (10/27) for 7 days each, then 400 mg (11/3/24- )  -increased anemia with hgb  8.5, platlets 37K, no bleeding.     Pre CART staging:    BM biopsy low level involvment by mantle cells about 3% mantle cells by flow. Absence of CD19 on this  flow sample likely reflects insufficient sampling and positive for CD 19 on prior bone marrow sample of 7/24/24..    PET imaging 11/4/24   1. FDG avid lymphadenopathy throughout the neck, chest, abdomen and  pelvis as detailed above, consistent with lymphomatous involvement.  2. Intense FDG avid splenomegaly, consistent with lymphomatous  involvement. Peripheral regions of photopenia likely reflect areas of  infarction.  3. No PET evidence of FDG avid extranodal disease.on     ECHO 10/23/24 low normal LVEF 53%, cleared by Dr. Izaguirre.     - Plan to proceed to CART cell therapy ASAP, discussed CASE 2419, anticipate collection on 11/25 and start of LD shortly thereafter. Patient instructed to take last dose of pirto and venetoclax on 11/14/24 to allow 10 day washout.  Met today (11/21/24)  with Research RN and CAR-T coordinator. Due to anemia, ordered prbcs to be transfused Friday before intended collection on 11/25/24.    11/21/24 patient is getting prepared for CART collections.  Will received prbcs in preparation today, platlets 31K, will check platelet transfuison plan.     Prophylaxis: On acyclovir prophylaxis and allopurinol. He has had no signs of TLS   Advised to get labs as [place] center Mondays and Thursdays.    Renal failure: sCr  remains at 1.20) .     Health maintenance.   Patient has recv'd both doses of shingrix vaccine & has completed all of his other post transplant vaccines,  Discontinued Acyclovir, & Bactrim prophylaxis (2/23/23) since off treatment since 3/24/22.  Recv'd Prevnar 13 and influenza vaccine (9/16/20) with his PCP. Up to date on tetanus.   - Recv'd Sha & Sha COVID vaccine on 3/16/21. Received booster late 2021. Received Evusheld given on (3/17/22) & 9/22/22.   -Received influenza vaccine 10/18/23.  Advised to obtain  covid vaccine at local pharmacy.  Colonoscopy completed 7/14/23: normal except internal hemorrhoids.  Recommendations to repeat in 2 years.    - Some aortic calcifications on imaging, started on prevastatin-managed by his PCP.  PCP increased pravastatin dose to 40 mg daily.  PCP switched to atorvastatin 40 mg daily.    BPH:  Reports his previous urinary frequency and nocturia symptoms has greatly improved after starting tadalafil 5 mg daily.     RTC: Plan admission for lymphodepleting therapy on 11/29/24 if cells adequate from 11/25/24    -

## 2024-11-20 NOTE — PROGRESS NOTES
PRE CAR T-CELL THERAPY ONCOLOGY PHARMACY EDUCATION NOTE   Nancie Armenta is a 59 y.o. male with a diagnosis of mantle cell lymphoma scheduled to undergo CAR T-cell therapy (CASE 2419) on 12/6/2024. The patient will receive lymphodepleting chemotherapy with cyclophosphamide 60 mg/kg D-6 and fludarabine 25 mg/m2 every 24 hours on D-5, D-4, D-3. The patient's primary transplant oncologist is Dr. Hobbs. Patient presents to clinic today for evaluation. Pharmacist was consulted to provide education regarding lymphodepleting chemotherapy, which will be initiated on 11/30/2024 and assist with transitions of care.    Chemotherapy Education: The chemotherapy and CAR T-cell therapy regimen was discussed with the patient/family including treatment schedule, potential side effects, and management of side effects. Potential side effects discussed include: myelosuppression, infection, nausea/vomiting, diarrhea, alopecia/hair thinning, fatigue, hemorraghic cystitis, cardiotoxicity, cytokine release syndrome (CRS), and neurotoxicity. Management techniques of these side effects were discussed. Supportive care medications were briefly discussed in terms of use and potential side effects. Patient has ondansetron and prochlorperazine available at home.    Home Medications: Reviewed medication list. Drug interactions identified include: none. Patient currently does not take herbal supplements. Discussed that herbal supplements are not regulated by the FDA and thus have not been well studied to assess drug-drug, drug-food, drug-disease interactions.     Anti-Infective Prophylaxis: The patient will require the following anti-infective medications.     HSV Fungal Bacterial PJP   Acyclovir 400 mg tablets: take 1 tablet by mouth every 12 hours Fluconazole 200 mg tablets: take 2 tablets by mouth once daily Levofloxacin 500 mg tablets: take 1 tablet by mouth once daily Bactrim /160 mg tablets: take 1 tablet by mouth on Mondays,  Wednesdays, and Fridays   Start on admission and continue for 6-12 months after CAR T-cell  (Pt already on) Start on Day +1 and continue for 3 months after CAR T-cell Start once neutropenic and continue until engraftment Start on day +30 and continue for at least 6 months after CAR T-cell     All questions were answered. Patient/family verbalized understanding of the plan of care and information provided. Will follow as necessary. Time spent with patient/family and/or coordinating care: 90 minutes.      Bea Duke, PharmD, [facility]  Ambulatory Stem Cell Transplant Transplant Pharmacist    Current Outpatient Medications   Medication Instructions    acyclovir (ZOVIRAX) 400 mg, oral, 2 times daily    atorvastatin (LIPITOR) 40 mg, oral, Every morning    hydrocortisone 1 % cream Apply 1 Application topically 2 times a day as needed (hemorrhoids).    ondansetron (ZOFRAN) 8 mg, oral, Every 8 hours PRN    prochlorperazine (COMPAZINE) 10 mg, oral, Every 6 hours PRN    tadalafil (CIALIS) 5 mg, oral, Every morning

## 2024-11-21 ENCOUNTER — TELEPHONE (OUTPATIENT)
Dept: OTHER | Facility: HOSPITAL | Age: 59
End: 2024-11-21

## 2024-11-21 ENCOUNTER — DOCUMENTATION (OUTPATIENT)
Dept: HEMATOLOGY/ONCOLOGY | Facility: HOSPITAL | Age: 59
End: 2024-11-21
Payer: COMMERCIAL

## 2024-11-21 ENCOUNTER — OFFICE VISIT (OUTPATIENT)
Dept: HEMATOLOGY/ONCOLOGY | Facility: HOSPITAL | Age: 59
End: 2024-11-21
Payer: COMMERCIAL

## 2024-11-21 ENCOUNTER — DOCUMENTATION (OUTPATIENT)
Dept: OTHER | Facility: HOSPITAL | Age: 59
End: 2024-11-21
Payer: COMMERCIAL

## 2024-11-21 ENCOUNTER — LAB (OUTPATIENT)
Dept: LAB | Facility: HOSPITAL | Age: 59
End: 2024-11-21
Payer: COMMERCIAL

## 2024-11-21 ENCOUNTER — INFUSION (OUTPATIENT)
Dept: HEMATOLOGY/ONCOLOGY | Facility: HOSPITAL | Age: 59
End: 2024-11-21
Payer: COMMERCIAL

## 2024-11-21 VITALS
WEIGHT: 216.27 LBS | BODY MASS INDEX: 28.82 KG/M2 | HEART RATE: 106 BPM | SYSTOLIC BLOOD PRESSURE: 133 MMHG | DIASTOLIC BLOOD PRESSURE: 77 MMHG | OXYGEN SATURATION: 100 % | RESPIRATION RATE: 16 BRPM | TEMPERATURE: 97 F

## 2024-11-21 VITALS
SYSTOLIC BLOOD PRESSURE: 102 MMHG | HEART RATE: 95 BPM | TEMPERATURE: 97.7 F | OXYGEN SATURATION: 99 % | DIASTOLIC BLOOD PRESSURE: 68 MMHG | RESPIRATION RATE: 16 BRPM

## 2024-11-21 DIAGNOSIS — C83.17 MANTLE CELL LYMPHOMA OF SPLEEN (MULTI): ICD-10-CM

## 2024-11-21 DIAGNOSIS — Z76.82 STEM CELL TRANSPLANT CANDIDATE: ICD-10-CM

## 2024-11-21 DIAGNOSIS — C83.17 MANTLE CELL LYMPHOMA OF SPLEEN (MULTI): Primary | ICD-10-CM

## 2024-11-21 LAB
ALBUMIN SERPL BCP-MCNC: 4.1 G/DL (ref 3.4–5)
ALP SERPL-CCNC: 70 U/L (ref 33–136)
ALT SERPL W P-5'-P-CCNC: 18 U/L (ref 10–52)
ANION GAP SERPL CALC-SCNC: 12 MMOL/L (ref 10–20)
AST SERPL W P-5'-P-CCNC: 15 U/L (ref 9–39)
BASOPHILS # BLD AUTO: 0.01 X10*3/UL (ref 0–0.1)
BASOPHILS NFR BLD AUTO: 0.4 %
BILIRUB SERPL-MCNC: 1 MG/DL (ref 0–1.2)
BLOOD EXPIRATION DATE: NORMAL
BUN SERPL-MCNC: 19 MG/DL (ref 6–23)
CALCIUM SERPL-MCNC: 9 MG/DL (ref 8.6–10.3)
CHLORIDE SERPL-SCNC: 104 MMOL/L (ref 98–107)
CO2 SERPL-SCNC: 26 MMOL/L (ref 21–32)
CREAT SERPL-MCNC: 1.2 MG/DL (ref 0.5–1.3)
DISPENSE STATUS: NORMAL
EGFRCR SERPLBLD CKD-EPI 2021: 69 ML/MIN/1.73M*2
EOSINOPHIL # BLD AUTO: 0.03 X10*3/UL (ref 0–0.7)
EOSINOPHIL NFR BLD AUTO: 1.2 %
ERYTHROCYTE [DISTWIDTH] IN BLOOD BY AUTOMATED COUNT: 16.8 % (ref 11.5–14.5)
GLUCOSE SERPL-MCNC: 116 MG/DL (ref 74–99)
HCT VFR BLD AUTO: 25.6 % (ref 41–52)
HGB BLD-MCNC: 8.3 G/DL (ref 13.5–17.5)
IMM GRANULOCYTES # BLD AUTO: 0.01 X10*3/UL (ref 0–0.7)
IMM GRANULOCYTES NFR BLD AUTO: 0.4 % (ref 0–0.9)
LDH SERPL L TO P-CCNC: 140 U/L (ref 84–246)
LYMPHOCYTES # BLD AUTO: 0.68 X10*3/UL (ref 1.2–4.8)
LYMPHOCYTES NFR BLD AUTO: 27.3 %
MAGNESIUM SERPL-MCNC: 2.04 MG/DL (ref 1.6–2.4)
MCH RBC QN AUTO: 32.5 PG (ref 26–34)
MCHC RBC AUTO-ENTMCNC: 32.4 G/DL (ref 32–36)
MCV RBC AUTO: 100 FL (ref 80–100)
MONOCYTES # BLD AUTO: 0.3 X10*3/UL (ref 0.1–1)
MONOCYTES NFR BLD AUTO: 12 %
NEUTROPHILS # BLD AUTO: 1.46 X10*3/UL (ref 1.2–7.7)
NEUTROPHILS NFR BLD AUTO: 58.7 %
NRBC BLD-RTO: 0 /100 WBCS (ref 0–0)
PHOSPHATE SERPL-MCNC: 2.6 MG/DL (ref 2.5–4.9)
PLATELET # BLD AUTO: 31 X10*3/UL (ref 150–450)
POTASSIUM SERPL-SCNC: 4.4 MMOL/L (ref 3.5–5.3)
PRODUCT BLOOD TYPE: 7300
PRODUCT CODE: NORMAL
PROT SERPL-MCNC: 6.8 G/DL (ref 6.4–8.2)
RBC # BLD AUTO: 2.55 X10*6/UL (ref 4.5–5.9)
SODIUM SERPL-SCNC: 138 MMOL/L (ref 136–145)
UNIT ABO: NORMAL
UNIT NUMBER: NORMAL
UNIT RH: NORMAL
UNIT VOLUME: 350
URATE SERPL-MCNC: 7.2 MG/DL (ref 4–7.5)
WBC # BLD AUTO: 2.5 X10*3/UL (ref 4.4–11.3)
XM INTEP: NORMAL

## 2024-11-21 PROCEDURE — 80053 COMPREHEN METABOLIC PANEL: CPT

## 2024-11-21 PROCEDURE — 84100 ASSAY OF PHOSPHORUS: CPT

## 2024-11-21 PROCEDURE — 84550 ASSAY OF BLOOD/URIC ACID: CPT

## 2024-11-21 PROCEDURE — 99214 OFFICE O/P EST MOD 30 MIN: CPT | Mod: 25 | Performed by: INTERNAL MEDICINE

## 2024-11-21 PROCEDURE — 83735 ASSAY OF MAGNESIUM: CPT

## 2024-11-21 PROCEDURE — 83615 LACTATE (LD) (LDH) ENZYME: CPT

## 2024-11-21 PROCEDURE — 36415 COLL VENOUS BLD VENIPUNCTURE: CPT

## 2024-11-21 PROCEDURE — 85025 COMPLETE CBC W/AUTO DIFF WBC: CPT

## 2024-11-21 PROCEDURE — 87799 DETECT AGENT NOS DNA QUANT: CPT

## 2024-11-21 PROCEDURE — 36430 TRANSFUSION BLD/BLD COMPNT: CPT

## 2024-11-21 PROCEDURE — P9040 RBC LEUKOREDUCED IRRADIATED: HCPCS

## 2024-11-21 RX ORDER — ATORVASTATIN CALCIUM 40 MG/1
40 TABLET, FILM COATED ORAL EVERY MORNING
Qty: 30 TABLET | Refills: 2 | Status: SHIPPED | OUTPATIENT
Start: 2024-11-21 | End: 2027-04-23

## 2024-11-21 RX ORDER — TADALAFIL 5 MG/1
5 TABLET ORAL EVERY MORNING
Qty: 10 TABLET | Refills: 1 | Status: SHIPPED | OUTPATIENT
Start: 2024-11-21

## 2024-11-21 RX ORDER — HEPARIN 100 UNIT/ML
500 SYRINGE INTRAVENOUS AS NEEDED
OUTPATIENT
Start: 2024-11-21

## 2024-11-21 RX ORDER — HEPARIN SODIUM,PORCINE/PF 10 UNIT/ML
50 SYRINGE (ML) INTRAVENOUS AS NEEDED
OUTPATIENT
Start: 2024-11-21

## 2024-11-21 ASSESSMENT — PAIN SCALES - GENERAL: PAINLEVEL_OUTOF10: 0-NO PAIN

## 2024-11-21 NOTE — PROGRESS NOTES
Research Note Non-Treatment Day    Nancie Armenta is here today. Patient is on [id number]. Procedures completed per protocol. Patient states he feels good today. ECOG 0. AE's and con-meds reviewed with patient. Reviewed apheresis and admission/treatment schedule with patient and his wife. Patient is aware of treatment plan. Patient is scheduled for apheresis on 11/25/24. Will follow up with him at that time or sooner as needed.       Education Documentation  Treatment Plan and Schedule, taught by Truong Chong RN at 11/21/2024 10:21 AM.  Learner: Significant Other, Patient  Readiness: Eager  Method: Explanation  Response: Verbalizes Understanding    Complete Blood Count with Differential (CBC w/ Diff), taught by Truong Chong RN at 11/21/2024 10:21 AM.  Learner: Significant Other, Patient  Readiness: Eager  Method: Explanation  Response: Verbalizes Understanding    Education Comments  No comments found.

## 2024-11-21 NOTE — PROGRESS NOTES
Patient arrived ambulatory to infusion for scheduled tx of 1u pRBC transfusion in preparation for pheresis next week. Saw Dr. Hobbs in clinic prior. Tolerated transfusion without issue. Discharged in stable condition.

## 2024-11-21 NOTE — PROGRESS NOTES
Because of platelets being below 50, Dr. Hobbs asked that patient come in on Sunday to get a platelet infusion. Appointment made in infusion suite for 1 pm on Sunday. Information verified with patient and he verbalized understanding.

## 2024-11-22 LAB — TOXOPLASMA GONDII PCR QUANTITATIVE,WHOLE BLOOD: NOT DETECTED COPIES/ML

## 2024-11-24 ENCOUNTER — INFUSION (OUTPATIENT)
Dept: HEMATOLOGY/ONCOLOGY | Facility: HOSPITAL | Age: 59
End: 2024-11-24
Payer: COMMERCIAL

## 2024-11-24 ENCOUNTER — TELEPHONE (OUTPATIENT)
Dept: ADMISSION | Facility: HOSPITAL | Age: 59
End: 2024-11-24

## 2024-11-24 VITALS
BODY MASS INDEX: 28.65 KG/M2 | HEART RATE: 95 BPM | WEIGHT: 215 LBS | RESPIRATION RATE: 18 BRPM | DIASTOLIC BLOOD PRESSURE: 60 MMHG | TEMPERATURE: 98.8 F | OXYGEN SATURATION: 100 % | SYSTOLIC BLOOD PRESSURE: 108 MMHG

## 2024-11-24 DIAGNOSIS — C83.17 MANTLE CELL LYMPHOMA OF SPLEEN (MULTI): ICD-10-CM

## 2024-11-24 DIAGNOSIS — Z76.82 STEM CELL TRANSPLANT CANDIDATE: ICD-10-CM

## 2024-11-24 DIAGNOSIS — C83.17 MANTLE CELL LYMPHOMA OF SPLEEN (MULTI): Primary | ICD-10-CM

## 2024-11-24 LAB
ABO GROUP (TYPE) IN BLOOD: NORMAL
ALBUMIN SERPL BCP-MCNC: 4 G/DL (ref 3.4–5)
ALP SERPL-CCNC: 69 U/L (ref 33–136)
ALT SERPL W P-5'-P-CCNC: 18 U/L (ref 10–52)
ANION GAP SERPL CALC-SCNC: 13 MMOL/L (ref 10–20)
ANTIBODY SCREEN: NORMAL
AST SERPL W P-5'-P-CCNC: 16 U/L (ref 9–39)
BASOPHILS # BLD AUTO: 0.01 X10*3/UL (ref 0–0.1)
BASOPHILS NFR BLD AUTO: 0.4 %
BILIRUB SERPL-MCNC: 1 MG/DL (ref 0–1.2)
BLOOD EXPIRATION DATE: NORMAL
BUN SERPL-MCNC: 22 MG/DL (ref 6–23)
CALCIUM SERPL-MCNC: 8.8 MG/DL (ref 8.6–10.6)
CD3 CELLS # BLD: 0.64 X10E9/L
CD3 CELLS NFR SPEC: 87 %
CD3+CD4+ CELLS # BLD: 0.44 X10E9/L
CD3+CD4+ CELLS # BLD: 438 /MM3
CD3+CD4+ CELLS NFR BLD: 60 %
CD3+CD4+ CELLS/CD3+CD8+ CLL BLD: 2.4 %
CD3+CD8+ CELLS # BLD: 0.18 X10E9/L
CD3+CD8+ CELLS NFR BLD: 25 %
CHLORIDE SERPL-SCNC: 101 MMOL/L (ref 98–107)
CO2 SERPL-SCNC: 24 MMOL/L (ref 21–32)
CREAT SERPL-MCNC: 1.22 MG/DL (ref 0.5–1.3)
DISPENSE STATUS: NORMAL
EGFRCR SERPLBLD CKD-EPI 2021: 68 ML/MIN/1.73M*2
EOSINOPHIL # BLD AUTO: 0.06 X10*3/UL (ref 0–0.7)
EOSINOPHIL NFR BLD AUTO: 2.4 %
ERYTHROCYTE [DISTWIDTH] IN BLOOD BY AUTOMATED COUNT: 16.3 % (ref 11.5–14.5)
GLUCOSE SERPL-MCNC: 120 MG/DL (ref 74–99)
HCT VFR BLD AUTO: 25.9 % (ref 41–52)
HGB BLD-MCNC: 8.1 G/DL (ref 13.5–17.5)
IMM GRANULOCYTES # BLD AUTO: 0.02 X10*3/UL (ref 0–0.7)
IMM GRANULOCYTES NFR BLD AUTO: 0.8 % (ref 0–0.9)
LDH SERPL L TO P-CCNC: 149 U/L (ref 84–246)
LYMPHOCYTES # BLD AUTO: 0.73 X10*3/UL (ref 1.2–4.8)
LYMPHOCYTES # SPEC AUTO: 0.73 X10*3/UL
LYMPHOCYTES NFR BLD AUTO: 28.7 %
MCH RBC QN AUTO: 31.4 PG (ref 26–34)
MCHC RBC AUTO-ENTMCNC: 31.3 G/DL (ref 32–36)
MCV RBC AUTO: 100 FL (ref 80–100)
MONOCYTES # BLD AUTO: 0.19 X10*3/UL (ref 0.1–1)
MONOCYTES NFR BLD AUTO: 7.5 %
NEUTROPHILS # BLD AUTO: 1.53 X10*3/UL (ref 1.2–7.7)
NEUTROPHILS NFR BLD AUTO: 60.2 %
NRBC BLD-RTO: 0 /100 WBCS (ref 0–0)
PHOSPHATE SERPL-MCNC: 3.8 MG/DL (ref 2.5–4.9)
PLATELET # BLD AUTO: 38 X10*3/UL (ref 150–450)
POTASSIUM SERPL-SCNC: 3.6 MMOL/L (ref 3.5–5.3)
PRODUCT BLOOD TYPE: 7300
PRODUCT CODE: NORMAL
PROT SERPL-MCNC: 6.7 G/DL (ref 6.4–8.2)
RBC # BLD AUTO: 2.58 X10*6/UL (ref 4.5–5.9)
RH FACTOR (ANTIGEN D): NORMAL
SODIUM SERPL-SCNC: 134 MMOL/L (ref 136–145)
UNIT ABO: NORMAL
UNIT NUMBER: NORMAL
UNIT RH: NORMAL
UNIT VOLUME: 330
URATE SERPL-MCNC: 7.5 MG/DL (ref 4–7.5)
WBC # BLD AUTO: 2.5 X10*3/UL (ref 4.4–11.3)

## 2024-11-24 PROCEDURE — 87635 SARS-COV-2 COVID-19 AMP PRB: CPT

## 2024-11-24 PROCEDURE — 83615 LACTATE (LD) (LDH) ENZYME: CPT

## 2024-11-24 PROCEDURE — 36430 TRANSFUSION BLD/BLD COMPNT: CPT

## 2024-11-24 PROCEDURE — 80053 COMPREHEN METABOLIC PANEL: CPT

## 2024-11-24 PROCEDURE — 84100 ASSAY OF PHOSPHORUS: CPT

## 2024-11-24 PROCEDURE — 84550 ASSAY OF BLOOD/URIC ACID: CPT

## 2024-11-24 PROCEDURE — 88185 FLOWCYTOMETRY/TC ADD-ON: CPT

## 2024-11-24 PROCEDURE — P9073 PLATELETS PHERESIS PATH REDU: HCPCS

## 2024-11-24 PROCEDURE — 86923 COMPATIBILITY TEST ELECTRIC: CPT

## 2024-11-24 PROCEDURE — 85025 COMPLETE CBC W/AUTO DIFF WBC: CPT

## 2024-11-24 PROCEDURE — 86900 BLOOD TYPING SEROLOGIC ABO: CPT

## 2024-11-24 RX ORDER — EPINEPHRINE 0.3 MG/.3ML
0.3 INJECTION SUBCUTANEOUS EVERY 5 MIN PRN
Status: DISCONTINUED | OUTPATIENT
Start: 2024-11-24 | End: 2024-11-24 | Stop reason: HOSPADM

## 2024-11-24 RX ORDER — ONDANSETRON HYDROCHLORIDE 8 MG/1
16 TABLET, FILM COATED ORAL ONCE
Status: CANCELLED | OUTPATIENT
Start: 2024-11-30 | End: 2024-11-30

## 2024-11-24 RX ORDER — DIPHENHYDRAMINE HYDROCHLORIDE 50 MG/ML
50 INJECTION INTRAMUSCULAR; INTRAVENOUS AS NEEDED
Status: CANCELLED | OUTPATIENT
Start: 2024-11-30

## 2024-11-24 RX ORDER — ACETAMINOPHEN 325 MG/1
650 TABLET ORAL ONCE
Status: CANCELLED | OUTPATIENT
Start: 2024-12-06

## 2024-11-24 RX ORDER — FAMOTIDINE 10 MG/ML
20 INJECTION INTRAVENOUS AS NEEDED
Status: CANCELLED | OUTPATIENT
Start: 2024-11-30

## 2024-11-24 RX ORDER — LEVETIRACETAM 250 MG/1
500 TABLET ORAL 2 TIMES DAILY
Status: CANCELLED | OUTPATIENT
Start: 2024-12-05

## 2024-11-24 RX ORDER — ALBUTEROL SULFATE 0.83 MG/ML
3 SOLUTION RESPIRATORY (INHALATION) AS NEEDED
Status: CANCELLED | OUTPATIENT
Start: 2024-11-30

## 2024-11-24 RX ORDER — ACYCLOVIR 400 MG/1
400 TABLET ORAL EVERY 12 HOURS
Status: CANCELLED | OUTPATIENT
Start: 2024-11-30

## 2024-11-24 RX ORDER — ONDANSETRON HYDROCHLORIDE 2 MG/ML
8 INJECTION, SOLUTION INTRAVENOUS ONCE
Status: CANCELLED | OUTPATIENT
Start: 2024-12-03 | End: 2024-12-03

## 2024-11-24 RX ORDER — ONDANSETRON HYDROCHLORIDE 2 MG/ML
8 INJECTION, SOLUTION INTRAVENOUS ONCE
Status: CANCELLED | OUTPATIENT
Start: 2024-12-01 | End: 2024-12-01

## 2024-11-24 RX ORDER — FAMOTIDINE 10 MG/ML
20 INJECTION INTRAVENOUS ONCE AS NEEDED
Status: DISCONTINUED | OUTPATIENT
Start: 2024-11-24 | End: 2024-11-24 | Stop reason: HOSPADM

## 2024-11-24 RX ORDER — EPINEPHRINE 0.3 MG/.3ML
0.3 INJECTION SUBCUTANEOUS EVERY 5 MIN PRN
Status: CANCELLED | OUTPATIENT
Start: 2024-11-24

## 2024-11-24 RX ORDER — ALBUTEROL SULFATE 0.83 MG/ML
3 SOLUTION RESPIRATORY (INHALATION) AS NEEDED
Status: CANCELLED | OUTPATIENT
Start: 2024-11-24

## 2024-11-24 RX ORDER — DIPHENHYDRAMINE HYDROCHLORIDE 50 MG/ML
50 INJECTION INTRAMUSCULAR; INTRAVENOUS AS NEEDED
Status: CANCELLED | OUTPATIENT
Start: 2024-11-24

## 2024-11-24 RX ORDER — DIPHENHYDRAMINE HYDROCHLORIDE 50 MG/ML
50 INJECTION INTRAMUSCULAR; INTRAVENOUS AS NEEDED
Status: DISCONTINUED | OUTPATIENT
Start: 2024-11-24 | End: 2024-11-24 | Stop reason: HOSPADM

## 2024-11-24 RX ORDER — FLUCONAZOLE 200 MG/1
400 TABLET ORAL DAILY
Status: CANCELLED | OUTPATIENT
Start: 2024-12-07

## 2024-11-24 RX ORDER — FAMOTIDINE 10 MG/ML
20 INJECTION INTRAVENOUS ONCE AS NEEDED
Status: CANCELLED | OUTPATIENT
Start: 2024-11-24

## 2024-11-24 RX ORDER — DIPHENHYDRAMINE HCL 25 MG
25 CAPSULE ORAL ONCE
Status: CANCELLED | OUTPATIENT
Start: 2024-12-06

## 2024-11-24 RX ORDER — ONDANSETRON HYDROCHLORIDE 2 MG/ML
8 INJECTION, SOLUTION INTRAVENOUS ONCE
Status: CANCELLED | OUTPATIENT
Start: 2024-12-02 | End: 2024-12-02

## 2024-11-24 RX ORDER — ALBUTEROL SULFATE 0.83 MG/ML
3 SOLUTION RESPIRATORY (INHALATION) AS NEEDED
Status: DISCONTINUED | OUTPATIENT
Start: 2024-11-24 | End: 2024-11-24 | Stop reason: HOSPADM

## 2024-11-24 RX ORDER — ALLOPURINOL 100 MG/1
300 TABLET ORAL DAILY
Status: CANCELLED | OUTPATIENT
Start: 2024-11-30

## 2024-11-24 RX ORDER — EPINEPHRINE 1 MG/ML
0.3 INJECTION INTRAMUSCULAR; INTRAVENOUS; SUBCUTANEOUS EVERY 5 MIN PRN
Status: CANCELLED | OUTPATIENT
Start: 2024-11-30

## 2024-11-24 RX ORDER — SODIUM CHLORIDE 9 MG/ML
125 INJECTION, SOLUTION INTRAVENOUS CONTINUOUS
Status: CANCELLED | OUTPATIENT
Start: 2024-11-30

## 2024-11-24 ASSESSMENT — PAIN SCALES - GENERAL: PAINLEVEL_OUTOF10: 0-NO PAIN

## 2024-11-24 NOTE — TELEPHONE ENCOUNTER
I called and spoke with Rashad at  lab and critical lab result was platelets 38 and collected at 2:19p.m and 3:25p.m resulted. Messaged team and secure chat fellow with critical lab result. Pt. Was also seen in infusion today for platelet transfusion, blood draw and covid swab. Covid swab still in process.

## 2024-11-24 NOTE — PROGRESS NOTES
Pt arrived to [facility] for platelet transfusion, blood draw, and covid swab. Pt received platelet infusion without incident. Pt discharged home in safe condition with wife.

## 2024-11-24 NOTE — TELEPHONE ENCOUNTER
Received secure chat from fellow below about critical lab result:    this is baseline for him. nothing to do    28 mins

## 2024-11-24 NOTE — TELEPHONE ENCOUNTER
Chief complaint:   Chief Complaint   Patient presents with   • Cough     EST RM 5   • Nose Problem       Vitals:  Visit Vitals  Pulse (!) 136 Comment: No cold medicine today   Temp 99.7 °F (37.6 °C) (Tympanic)   Resp 22   SpO2 96%       HISTORY OF PRESENT ILLNESS     HPI   2-year-old male presents to the urgent care with mom for complaints of cough, congestion/rhinorrhea, wheezing for 2-3 days.  Mom reports symptomatic management.  She reports the child attends .  She denies known sick contact.  She has no concerns for COVID and does not desire testing today.  States otherwise child is playful and active.  He has normal appetite with good oral intake.  He is up-to-date vaccination per mom.  No other complaints at this time.  Other significant problems:  There are no problems to display for this patient.      PAST MEDICAL, FAMILY AND SOCIAL HISTORY     Medications:  No current outpatient medications on file.     No current facility-administered medications for this visit.       Allergies:  ALLERGIES:  No Known Allergies    Past Medical  History/Surgeries:  No past medical history on file.    No past surgical history on file.    Family History:  Family History   Problem Relation Age of Onset   • Patient is unaware of any medical problems Mother    • Patient is unaware of any medical problems Father        Social History:  Social History     Tobacco Use   • Smoking status: Never Smoker   • Smokeless tobacco: Never Used   Substance Use Topics   • Alcohol use: Not on file       REVIEW OF SYSTEMS     Review of Systems   Constitutional: Negative for fatigue and fever.   HENT: Positive for congestion and rhinorrhea. Negative for ear pain and sore throat.    Respiratory: Positive for cough and wheezing. Negative for stridor.    Gastrointestinal: Negative for diarrhea.   Musculoskeletal: Negative for myalgias.   Neurological: Negative for headaches.       PHYSICAL EXAM     Physical Exam  Constitutional:       General:  Received an after hour page regarding a critical lab. I will call lab now.    He is active.      Appearance: Normal appearance. He is well-developed.   HENT:      Right Ear: Tympanic membrane, ear canal and external ear normal.      Left Ear: Tympanic membrane, ear canal and external ear normal.      Mouth/Throat:      Pharynx: Oropharynx is clear.   Cardiovascular:      Rate and Rhythm: Normal rate and regular rhythm.   Pulmonary:      Effort: Pulmonary effort is normal. No respiratory distress, nasal flaring or retractions.      Breath sounds: Normal breath sounds. No stridor or decreased air movement. No wheezing, rhonchi or rales.   Lymphadenopathy:      Cervical: No cervical adenopathy.   Skin:     General: Skin is warm.   Neurological:      Mental Status: He is alert and oriented for age.         ASSESSMENT/PLAN     Viral URI  (primary encounter diagnosis)  Comment:     Continue with symptomatic management.  We did discuss to avoid attending  until symptoms have resolved.  Follow-up with PCP as needed. Return to urgent care/ED for worsening or any new symptoms. Patient voiced understanding and is in agreement of care plan. Please refer to AVS.

## 2024-11-25 ENCOUNTER — HOSPITAL ENCOUNTER (OUTPATIENT)
Dept: RADIOLOGY | Facility: HOSPITAL | Age: 59
Discharge: HOME | End: 2024-11-25
Payer: COMMERCIAL

## 2024-11-25 ENCOUNTER — DOCUMENTATION (OUTPATIENT)
Dept: OTHER | Facility: HOSPITAL | Age: 59
End: 2024-11-25
Payer: COMMERCIAL

## 2024-11-25 ENCOUNTER — TREATMENT (OUTPATIENT)
Dept: OTHER | Facility: HOSPITAL | Age: 59
End: 2024-11-25
Payer: COMMERCIAL

## 2024-11-25 ENCOUNTER — APPOINTMENT (OUTPATIENT)
Dept: HEMATOLOGY/ONCOLOGY | Facility: HOSPITAL | Age: 59
End: 2024-11-25
Payer: COMMERCIAL

## 2024-11-25 VITALS
SYSTOLIC BLOOD PRESSURE: 105 MMHG | HEART RATE: 90 BPM | OXYGEN SATURATION: 98 % | RESPIRATION RATE: 15 BRPM | DIASTOLIC BLOOD PRESSURE: 61 MMHG

## 2024-11-25 VITALS
DIASTOLIC BLOOD PRESSURE: 58 MMHG | RESPIRATION RATE: 18 BRPM | BODY MASS INDEX: 28.29 KG/M2 | OXYGEN SATURATION: 99 % | HEART RATE: 85 BPM | WEIGHT: 212.3 LBS | SYSTOLIC BLOOD PRESSURE: 103 MMHG | TEMPERATURE: 98.1 F

## 2024-11-25 DIAGNOSIS — C83.10 MANTLE CELL LYMPHOMA, UNSPECIFIED BODY REGION (MULTI): ICD-10-CM

## 2024-11-25 DIAGNOSIS — Z76.82 STEM CELL TRANSPLANT CANDIDATE: ICD-10-CM

## 2024-11-25 DIAGNOSIS — C83.17 MANTLE CELL LYMPHOMA OF SPLEEN (MULTI): Primary | ICD-10-CM

## 2024-11-25 LAB
ABO GROUP (TYPE) IN BLOOD: NORMAL
ABO GROUP (TYPE) IN BLOOD: NORMAL
ALBUMIN SERPL BCP-MCNC: 3.3 G/DL (ref 3.4–5)
ALBUMIN SERPL BCP-MCNC: 3.9 G/DL (ref 3.4–5)
ALP SERPL-CCNC: 55 U/L (ref 33–136)
ALP SERPL-CCNC: 62 U/L (ref 33–136)
ALT SERPL W P-5'-P-CCNC: 15 U/L (ref 10–52)
ALT SERPL W P-5'-P-CCNC: 17 U/L (ref 10–52)
ANION GAP SERPL CALC-SCNC: 13 MMOL/L (ref 10–20)
ANION GAP SERPL CALC-SCNC: 13 MMOL/L (ref 10–20)
ANTIBODY SCREEN: NORMAL
AST SERPL W P-5'-P-CCNC: 13 U/L (ref 9–39)
AST SERPL W P-5'-P-CCNC: 15 U/L (ref 9–39)
AUTOMATED IMMATURE GRAN % COLLECTION: 0.2 %
AUTOMATED IMMATURE GRAN ABSOLUTE COLLECTION: 0.07 X10*3/UL
BASOPHIL % COLLECTION: 0 %
BASOPHIL ABSOLUTE COLLECTION: 0 X10*3/UL
BASOPHILS # BLD AUTO: 0 X10*3/UL (ref 0–0.1)
BASOPHILS NFR BLD AUTO: 0 %
BILIRUB SERPL-MCNC: 0.8 MG/DL (ref 0–1.2)
BILIRUB SERPL-MCNC: 1.1 MG/DL (ref 0–1.2)
BLOOD EXPIRATION DATE: NORMAL
BUN SERPL-MCNC: 19 MG/DL (ref 6–23)
BUN SERPL-MCNC: 20 MG/DL (ref 6–23)
CA-I BLD-SCNC: 1.2 MMOL/L (ref 1.1–1.33)
CALCIUM SERPL-MCNC: 8.7 MG/DL (ref 8.6–10.3)
CALCIUM SERPL-MCNC: 8.8 MG/DL (ref 8.6–10.3)
CD3 CELLS NFR BLD: 64 %
CHLORIDE SERPL-SCNC: 104 MMOL/L (ref 98–107)
CHLORIDE SERPL-SCNC: 105 MMOL/L (ref 98–107)
CO2 SERPL-SCNC: 25 MMOL/L (ref 21–32)
CO2 SERPL-SCNC: 26 MMOL/L (ref 21–32)
CREAT SERPL-MCNC: 1.12 MG/DL (ref 0.5–1.3)
CREAT SERPL-MCNC: 1.21 MG/DL (ref 0.5–1.3)
DISPENSE STATUS: NORMAL
EGFRCR SERPLBLD CKD-EPI 2021: 69 ML/MIN/1.73M*2
EGFRCR SERPLBLD CKD-EPI 2021: 75 ML/MIN/1.73M*2
EOSINOPHIL # BLD AUTO: 0.06 X10*3/UL (ref 0–0.7)
EOSINOPHIL % COLLECTION: 0 %
EOSINOPHIL ABSOLUTE COLLECTION: 0 X10*3/UL
EOSINOPHIL NFR BLD AUTO: 2.6 %
ERYTHROCYTE [DISTWIDTH] IN BLOOD BY AUTOMATED COUNT: 16.4 % (ref 11.5–14.5)
ERYTHROCYTE [DISTWIDTH] IN BLOOD BY AUTOMATED COUNT: 16.5 % (ref 11.5–14.5)
GLUCOSE SERPL-MCNC: 157 MG/DL (ref 74–99)
GLUCOSE SERPL-MCNC: 88 MG/DL (ref 74–99)
HCT VFR BLD AUTO: 21.5 % (ref 41–52)
HCT VFR BLD AUTO: 24.7 % (ref 41–52)
HCT, COLLECTION: 2.2 %
HGB BLD-MCNC: 6.9 G/DL (ref 13.5–17.5)
HGB BLD-MCNC: 8.1 G/DL (ref 13.5–17.5)
HGB, COLLECTION: 0.4 G/DL
IMM GRANULOCYTES # BLD AUTO: 0.01 X10*3/UL (ref 0–0.7)
IMM GRANULOCYTES NFR BLD AUTO: 0.4 % (ref 0–0.9)
LDH SERPL L TO P-CCNC: 148 U/L (ref 84–246)
LYMPHOCYTE % COLLECTION: 73.3 %
LYMPHOCYTE ABSOLUTE COLLECTION: 31.36 X10*3/UL
LYMPHOCYTES # BLD AUTO: 0.76 X10*3/UL (ref 1.2–4.8)
LYMPHOCYTES NFR BLD AUTO: 33.2 %
MAGNESIUM SERPL-MCNC: 2.14 MG/DL (ref 1.6–2.4)
MAGNESIUM SERPL-MCNC: 2.15 MG/DL (ref 1.6–2.4)
MCH RBC QN AUTO: 31.9 PG (ref 26–34)
MCH RBC QN AUTO: 32.7 PG (ref 26–34)
MCHC RBC AUTO-ENTMCNC: 32.1 G/DL (ref 32–36)
MCHC RBC AUTO-ENTMCNC: 32.8 G/DL (ref 32–36)
MCV RBC AUTO: 100 FL (ref 80–100)
MCV RBC AUTO: 100 FL (ref 80–100)
MONOCYTE % COLLECTION: 25.9 %
MONOCYTE ABSOLUTE COLLECTION: 11.09 X10*3/UL
MONOCYTES # BLD AUTO: 0.27 X10*3/UL (ref 0.1–1)
MONOCYTES NFR BLD AUTO: 11.8 %
NEUTROPHIL % COLLECTION: 0.6 %
NEUTROPHIL ABSOLUTE COLLECTION: 0.25 X10*3/UL
NEUTROPHILS # BLD AUTO: 1.19 X10*3/UL (ref 1.2–7.7)
NEUTROPHILS NFR BLD AUTO: 52 %
NRBC BLD-RTO: 0 /100 WBCS (ref 0–0)
NRBC BLD-RTO: 0 /100 WBCS (ref 0–0)
NUCLEATED RBC, COLLECTION: 0.1 /100 WBCS
PHOSPHATE SERPL-MCNC: 3.9 MG/DL (ref 2.5–4.9)
PLATELET # BLD AUTO: 27 X10*3/UL (ref 150–450)
PLATELET # BLD AUTO: 32 X10*3/UL (ref 150–450)
PLT, COLLECTION: 233 X10*3/UL
POTASSIUM SERPL-SCNC: 3.5 MMOL/L (ref 3.5–5.3)
POTASSIUM SERPL-SCNC: 4.1 MMOL/L (ref 3.5–5.3)
PRODUCT BLOOD TYPE: 7300
PRODUCT CODE: NORMAL
PROT SERPL-MCNC: 5.6 G/DL (ref 6.4–8.2)
PROT SERPL-MCNC: 6.5 G/DL (ref 6.4–8.2)
RBC # BLD AUTO: 2.16 X10*6/UL (ref 4.5–5.9)
RBC # BLD AUTO: 2.48 X10*6/UL (ref 4.5–5.9)
RBC, COLLECTION: 0.17 X10*6/UL
RH FACTOR (ANTIGEN D): NORMAL
RH FACTOR (ANTIGEN D): NORMAL
SARS-COV-2 RNA RESP QL NAA+PROBE: NOT DETECTED
SODIUM SERPL-SCNC: 139 MMOL/L (ref 136–145)
SODIUM SERPL-SCNC: 139 MMOL/L (ref 136–145)
UNIT ABO: NORMAL
UNIT NUMBER: NORMAL
UNIT RH: NORMAL
UNIT VOLUME: 350
URATE SERPL-MCNC: 8.5 MG/DL (ref 4–7.5)
VIABLE CELLS NFR SPEC: 99.8 %
WBC # BLD AUTO: 1.7 X10*3/UL (ref 4.4–11.3)
WBC # BLD AUTO: 2.3 X10*3/UL (ref 4.4–11.3)
WBC, COLLECTION: 42.8 X10*3/UL
XM INTEP: NORMAL

## 2024-11-25 PROCEDURE — 36430 TRANSFUSION BLD/BLD COMPNT: CPT | Mod: CCI

## 2024-11-25 PROCEDURE — 99153 MOD SED SAME PHYS/QHP EA: CPT | Performed by: RADIOLOGY

## 2024-11-25 PROCEDURE — 99152 MOD SED SAME PHYS/QHP 5/>YRS: CPT

## 2024-11-25 PROCEDURE — 84100 ASSAY OF PHOSPHORUS: CPT

## 2024-11-25 PROCEDURE — 36410 VNPNXR 3YR/> PHY/QHP DX/THER: CPT | Mod: 59 | Performed by: RADIOLOGY

## 2024-11-25 PROCEDURE — 99153 MOD SED SAME PHYS/QHP EA: CPT

## 2024-11-25 PROCEDURE — 99152 MOD SED SAME PHYS/QHP 5/>YRS: CPT | Performed by: RADIOLOGY

## 2024-11-25 PROCEDURE — 96366 THER/PROPH/DIAG IV INF ADDON: CPT | Mod: 59

## 2024-11-25 PROCEDURE — 7100000010 HC PHASE TWO TIME - EACH INCREMENTAL 1 MINUTE

## 2024-11-25 PROCEDURE — 7100000009 HC PHASE TWO TIME - INITIAL BASE CHARGE

## 2024-11-25 PROCEDURE — 83615 LACTATE (LD) (LDH) ENZYME: CPT

## 2024-11-25 PROCEDURE — P9040 RBC LEUKOREDUCED IRRADIATED: HCPCS

## 2024-11-25 PROCEDURE — 83735 ASSAY OF MAGNESIUM: CPT

## 2024-11-25 PROCEDURE — 85025 COMPLETE CBC W/AUTO DIFF WBC: CPT

## 2024-11-25 PROCEDURE — 77001 FLUOROGUIDE FOR VEIN DEVICE: CPT | Performed by: RADIOLOGY

## 2024-11-25 PROCEDURE — 2500000004 HC RX 250 GENERAL PHARMACY W/ HCPCS (ALT 636 FOR OP/ED): Performed by: RADIOLOGY

## 2024-11-25 PROCEDURE — C1752 CATH,HEMODIALYSIS,SHORT-TERM: HCPCS

## 2024-11-25 PROCEDURE — 96365 THER/PROPH/DIAG IV INF INIT: CPT | Mod: 59

## 2024-11-25 PROCEDURE — 2500000004 HC RX 250 GENERAL PHARMACY W/ HCPCS (ALT 636 FOR OP/ED): Mod: JZ | Performed by: INTERNAL MEDICINE

## 2024-11-25 PROCEDURE — 88185 FLOWCYTOMETRY/TC ADD-ON: CPT

## 2024-11-25 PROCEDURE — 80053 COMPREHEN METABOLIC PANEL: CPT

## 2024-11-25 PROCEDURE — 2780000003 HC OR 278 NO HCPCS

## 2024-11-25 PROCEDURE — 76937 US GUIDE VASCULAR ACCESS: CPT | Performed by: RADIOLOGY

## 2024-11-25 PROCEDURE — 36511 APHERESIS WBC: CPT

## 2024-11-25 PROCEDURE — 36556 INSERT NON-TUNNEL CV CATH: CPT | Performed by: RADIOLOGY

## 2024-11-25 PROCEDURE — 36010 PLACE CATHETER IN VEIN: CPT | Performed by: RADIOLOGY

## 2024-11-25 PROCEDURE — 84550 ASSAY OF BLOOD/URIC ACID: CPT

## 2024-11-25 PROCEDURE — 82330 ASSAY OF CALCIUM: CPT

## 2024-11-25 PROCEDURE — 86850 RBC ANTIBODY SCREEN: CPT

## 2024-11-25 PROCEDURE — 85027 COMPLETE CBC AUTOMATED: CPT | Mod: 59

## 2024-11-25 RX ORDER — EPINEPHRINE 0.3 MG/.3ML
0.3 INJECTION SUBCUTANEOUS EVERY 5 MIN PRN
OUTPATIENT
Start: 2024-11-25

## 2024-11-25 RX ORDER — FAMOTIDINE 10 MG/ML
20 INJECTION INTRAVENOUS ONCE AS NEEDED
Status: CANCELLED | OUTPATIENT
Start: 2024-11-25

## 2024-11-25 RX ORDER — EPINEPHRINE 0.3 MG/.3ML
0.3 INJECTION SUBCUTANEOUS EVERY 5 MIN PRN
Status: CANCELLED | OUTPATIENT
Start: 2024-11-25

## 2024-11-25 RX ORDER — POTASSIUM CHLORIDE 750 MG/1
40 TABLET, FILM COATED, EXTENDED RELEASE ORAL ONCE AS NEEDED
Status: CANCELLED | OUTPATIENT
Start: 2024-11-25

## 2024-11-25 RX ORDER — LANOLIN ALCOHOL/MO/W.PET/CERES
400 CREAM (GRAM) TOPICAL ONCE AS NEEDED
Status: CANCELLED | OUTPATIENT
Start: 2024-11-25

## 2024-11-25 RX ORDER — MIDAZOLAM HYDROCHLORIDE 1 MG/ML
INJECTION INTRAMUSCULAR; INTRAVENOUS
Status: COMPLETED | OUTPATIENT
Start: 2024-11-25 | End: 2024-11-25

## 2024-11-25 RX ORDER — CALCIUM CARBONATE 200(500)MG
2 TABLET,CHEWABLE ORAL EVERY 5 MIN PRN
Status: CANCELLED | OUTPATIENT
Start: 2024-11-25

## 2024-11-25 RX ORDER — ALBUTEROL SULFATE 0.83 MG/ML
3 SOLUTION RESPIRATORY (INHALATION) AS NEEDED
Status: CANCELLED | OUTPATIENT
Start: 2024-11-25

## 2024-11-25 RX ORDER — MAGNESIUM SULFATE HEPTAHYDRATE 40 MG/ML
4 INJECTION, SOLUTION INTRAVENOUS ONCE AS NEEDED
Status: CANCELLED | OUTPATIENT
Start: 2024-11-25

## 2024-11-25 RX ORDER — MAGNESIUM SULFATE HEPTAHYDRATE 40 MG/ML
2 INJECTION, SOLUTION INTRAVENOUS ONCE AS NEEDED
Status: CANCELLED | OUTPATIENT
Start: 2024-11-25

## 2024-11-25 RX ORDER — POTASSIUM CHLORIDE 20 MEQ/1
40 TABLET, EXTENDED RELEASE ORAL ONCE AS NEEDED
Status: CANCELLED | OUTPATIENT
Start: 2024-11-25

## 2024-11-25 RX ORDER — FAMOTIDINE 10 MG/ML
20 INJECTION INTRAVENOUS ONCE AS NEEDED
OUTPATIENT
Start: 2024-11-25

## 2024-11-25 RX ORDER — FENTANYL CITRATE 50 UG/ML
INJECTION, SOLUTION INTRAMUSCULAR; INTRAVENOUS
Status: COMPLETED | OUTPATIENT
Start: 2024-11-25 | End: 2024-11-25

## 2024-11-25 RX ORDER — DIPHENHYDRAMINE HYDROCHLORIDE 50 MG/ML
50 INJECTION INTRAMUSCULAR; INTRAVENOUS AS NEEDED
OUTPATIENT
Start: 2024-11-25

## 2024-11-25 RX ORDER — ALBUTEROL SULFATE 0.83 MG/ML
3 SOLUTION RESPIRATORY (INHALATION) AS NEEDED
OUTPATIENT
Start: 2024-11-25

## 2024-11-25 RX ORDER — DIPHENHYDRAMINE HYDROCHLORIDE 50 MG/ML
50 INJECTION INTRAMUSCULAR; INTRAVENOUS AS NEEDED
Status: CANCELLED | OUTPATIENT
Start: 2024-11-25

## 2024-11-25 ASSESSMENT — PAIN SCALES - GENERAL
PAINLEVEL_OUTOF10: 0 - NO PAIN
PAINLEVEL_OUTOF10: 0-NO PAIN

## 2024-11-25 ASSESSMENT — PAIN - FUNCTIONAL ASSESSMENT
PAIN_FUNCTIONAL_ASSESSMENT: 0-10
PAIN_FUNCTIONAL_ASSESSMENT: 0-10

## 2024-11-25 NOTE — POST-PROCEDURE NOTE
This is a 59 y.o. male with mantle cell lymphoma who underwent peripheral blood CAR-T cell collection for Case 2419 study.    I saw and evaluated the patient during the procedure.  The patient was resting in bed.  The vascular access functioned well.     Pre-procedure labs (11/25/24 at 09:27):    WBC: 2.3 x 10*3/uL  Hemoglobin: 8.1 g/dL  Hematocrit: 24.7%  Platelets: 32 x 10*3/uL    Ionized calcium: 1.20 mmol/L     Pre-procedure vital signs at 09:38:  T: 36.4 C, HR: 91, RR: 18, /73  Pulse oximetry: 98% on room air    Post-procedure vital signs at 12:11:  T: 37.0 C, HR: 83, RR: 18, BP: 103/54     Pulse oximetry: 98% on room air    Outcome: Peripheral CAR-T cell collection completed.   Adverse Reaction: No    Assessment and Plan:  59 y.o. male with mantle cell lymphoma who underwent peripheral blood CAR-T cell collection in preparation for immunotherapy.  Draw post-procedure labs  Vascular access to be managed by clinical team.  This is the only collection procedure scheduled at this time.

## 2024-11-25 NOTE — PROGRESS NOTES
Research Note Treatment Day    Nancie Armenta is here today for apheresis t cell collection on [id number]. Today is T-11. Procedures completed per protocol. AE's and con-meds reviewed with patient. Patient is aware of treatment plan.    [x]   Apheresis as planned.     Education Documentation  Treatment Plan and Schedule, taught by Truong Chong RN at 11/25/2024 11:02 AM.  Learner: Significant Other, Patient  Readiness: Eager  Method: Explanation  Response: Verbalizes Understanding    Complete Blood Count with Differential (CBC w/ Diff), taught by Truong Chong RN at 11/25/2024 11:02 AM.  Learner: Significant Other, Patient  Readiness: Eager  Method: Explanation  Response: Verbalizes Understanding    Education Comments  No comments found.

## 2024-11-25 NOTE — PROGRESS NOTES
Pt brought to unit via wheelchair post line placement in IR, pt accompanied by RN and spouse. Pt alert, oriented and ambulatory. Vitals taken and labs drawn via apheresis catheter; okay to use line verified via EMR. Report was called to Dr. Lenz and OK to Proceed given at 1009. Waiver obtained due to patient's hematocrit being less than 30. Collection began at 1015 and continued for 92 minutes with no acute issues reported or observed. Pt remained on IV calcium gluconate during procedure. At the end of collection, vitals were taken and labs were drawn from patient and product. Apheresis catheter was flushed with saline and caps replaced. Post labs were reviewed with patient and spouse, patient required 1unit of PRBCs for hematocrit of 21.4 and hemoglobin of 6.9 post collection, pt tolerated infusion well, see EMR. Pt confirmed understanding of all instructions and teaching and has no additional learning needs at this time. Pt left the unit at 1420 alert, oriented and ambulatory, accompanied by spouse and this RN to IR for line removal..

## 2024-11-25 NOTE — POST-PROCEDURE NOTE
Interventional Radiology Brief Postprocedure Note    Attending: Dr Jermain Keller    Assistant: Dr Yeimy Sapp    Diagnosis: Mantle Cell Lymphoma    Description of procedure: Under fluoroscopic and ultrasound guidance, a trialysis catheter was placed in the right Internal Jugular vein and flushed. The patient tolerated the procedure well. Please refer to PACs for detailed report.    Anesthesia:  MAC Moderate    Complications: None    Estimated Blood Loss: minimal    Medications (Filter: Administrations occurring from 0822 to 0911 on 11/25/24) As of 11/25/24 0911      fentaNYL PF (Sublimaze) injection (mcg) Total dose:  50 mcg      Date/Time Rate/Dose/Volume Action       11/25/24  0845 50 mcg Given               midazolam (Versed) injection (mg) Total dose:  1 mg      Date/Time Rate/Dose/Volume Action       11/25/24  0846 1 mg Given                   No specimens collected      See detailed result report with images in PACS.    The patient tolerated the procedure well without incident or complication and is in stable condition.

## 2024-11-25 NOTE — PRE-PROCEDURE NOTE
Interventional Radiology Preprocedure Note    Indication for procedure: Diagnoses of Stem cell transplant candidate and Mantle cell lymphoma, unspecified body region (Multi) were pertinent to this visit.    Relevant review of systems: NA    Relevant Labs:   Lab Results   Component Value Date    CREATININE 1.22 11/24/2024    EGFR 68 11/24/2024    INR 1.1 10/21/2024    PROTIME 12.3 10/21/2024       Planned Sedation/Anesthesia: Moderate    Airway assessment: normal    Directed physical examination:    AAOx3  Normal Chest Rise      Mallampati: III (soft and hard palate and base of uvula visible)    ASA Score: ASA 2 - Patient with mild systemic disease with no functional limitations    Benefits, risks and alternatives of procedure and planned sedation have been discussed with the patient and/or their representative. All questions answered and they agree to proceed.

## 2024-11-26 DIAGNOSIS — Z76.82 STEM CELL TRANSPLANT CANDIDATE: ICD-10-CM

## 2024-11-27 ENCOUNTER — LAB (OUTPATIENT)
Dept: LAB | Facility: CLINIC | Age: 59
End: 2024-11-27
Payer: COMMERCIAL

## 2024-11-27 DIAGNOSIS — Z76.82 STEM CELL TRANSPLANT CANDIDATE: ICD-10-CM

## 2024-11-27 DIAGNOSIS — C83.17 MANTLE CELL LYMPHOMA OF SPLEEN (MULTI): ICD-10-CM

## 2024-11-27 LAB
BASOPHILS # BLD AUTO: 0 X10*3/UL (ref 0–0.1)
BASOPHILS NFR BLD AUTO: 0 %
EOSINOPHIL # BLD AUTO: 0.11 X10*3/UL (ref 0–0.7)
EOSINOPHIL NFR BLD AUTO: 4.3 %
ERYTHROCYTE [DISTWIDTH] IN BLOOD BY AUTOMATED COUNT: 17.2 % (ref 11.5–14.5)
HCT VFR BLD AUTO: 28.9 % (ref 41–52)
HGB BLD-MCNC: 9.3 G/DL (ref 13.5–17.5)
IMM GRANULOCYTES # BLD AUTO: 0.01 X10*3/UL (ref 0–0.7)
IMM GRANULOCYTES NFR BLD AUTO: 0.4 % (ref 0–0.9)
LYMPHOCYTES # BLD AUTO: 0.71 X10*3/UL (ref 1.2–4.8)
LYMPHOCYTES NFR BLD AUTO: 27.7 %
MCH RBC QN AUTO: 31.7 PG (ref 26–34)
MCHC RBC AUTO-ENTMCNC: 32.2 G/DL (ref 32–36)
MCV RBC AUTO: 99 FL (ref 80–100)
MONOCYTES # BLD AUTO: 0.3 X10*3/UL (ref 0.1–1)
MONOCYTES NFR BLD AUTO: 11.7 %
NEUTROPHILS # BLD AUTO: 1.43 X10*3/UL (ref 1.2–7.7)
NEUTROPHILS NFR BLD AUTO: 55.9 %
NRBC BLD-RTO: ABNORMAL /100{WBCS}
PLATELET # BLD AUTO: 29 X10*3/UL (ref 150–450)
RBC # BLD AUTO: 2.93 X10*6/UL (ref 4.5–5.9)
WBC # BLD AUTO: 2.6 X10*3/UL (ref 4.4–11.3)

## 2024-11-27 PROCEDURE — 83615 LACTATE (LD) (LDH) ENZYME: CPT

## 2024-11-27 PROCEDURE — 80053 COMPREHEN METABOLIC PANEL: CPT

## 2024-11-27 PROCEDURE — 36415 COLL VENOUS BLD VENIPUNCTURE: CPT

## 2024-11-27 PROCEDURE — 84550 ASSAY OF BLOOD/URIC ACID: CPT

## 2024-11-27 PROCEDURE — 84100 ASSAY OF PHOSPHORUS: CPT

## 2024-11-27 PROCEDURE — 85025 COMPLETE CBC W/AUTO DIFF WBC: CPT

## 2024-11-28 LAB
ALBUMIN SERPL BCP-MCNC: 4.2 G/DL (ref 3.4–5)
ALP SERPL-CCNC: 75 U/L (ref 33–136)
ALT SERPL W P-5'-P-CCNC: 19 U/L (ref 10–52)
ANION GAP SERPL CALC-SCNC: 15 MMOL/L (ref 10–20)
AST SERPL W P-5'-P-CCNC: 17 U/L (ref 9–39)
BILIRUB SERPL-MCNC: 1.3 MG/DL (ref 0–1.2)
BUN SERPL-MCNC: 20 MG/DL (ref 6–23)
CALCIUM SERPL-MCNC: 9 MG/DL (ref 8.6–10.6)
CHLORIDE SERPL-SCNC: 102 MMOL/L (ref 98–107)
CO2 SERPL-SCNC: 26 MMOL/L (ref 21–32)
CREAT SERPL-MCNC: 1.36 MG/DL (ref 0.5–1.3)
EGFRCR SERPLBLD CKD-EPI 2021: 60 ML/MIN/1.73M*2
GLUCOSE SERPL-MCNC: 95 MG/DL (ref 74–99)
LDH SERPL L TO P-CCNC: 155 U/L (ref 84–246)
PHOSPHATE SERPL-MCNC: 3.9 MG/DL (ref 2.5–4.9)
POTASSIUM SERPL-SCNC: 5 MMOL/L (ref 3.5–5.3)
PROT SERPL-MCNC: 6.4 G/DL (ref 6.4–8.2)
SODIUM SERPL-SCNC: 138 MMOL/L (ref 136–145)
URATE SERPL-MCNC: 8 MG/DL (ref 4–7.5)

## 2024-11-29 ENCOUNTER — HOSPITAL ENCOUNTER (INPATIENT)
Facility: HOSPITAL | Age: 59
End: 2024-11-29
Attending: INTERNAL MEDICINE | Admitting: PHYSICIAN ASSISTANT
Payer: COMMERCIAL

## 2024-11-29 ENCOUNTER — INFUSION (OUTPATIENT)
Dept: OTHER | Facility: HOSPITAL | Age: 59
End: 2024-11-29
Payer: COMMERCIAL

## 2024-11-29 ENCOUNTER — HOSPITAL ENCOUNTER (OUTPATIENT)
Dept: RADIOLOGY | Facility: HOSPITAL | Age: 59
Discharge: HOME | End: 2024-11-29
Payer: COMMERCIAL

## 2024-11-29 VITALS
HEART RATE: 91 BPM | BODY MASS INDEX: 28.7 KG/M2 | SYSTOLIC BLOOD PRESSURE: 124 MMHG | DIASTOLIC BLOOD PRESSURE: 77 MMHG | TEMPERATURE: 97.3 F | RESPIRATION RATE: 18 BRPM | OXYGEN SATURATION: 100 % | WEIGHT: 215.39 LBS

## 2024-11-29 DIAGNOSIS — C83.10: Primary | ICD-10-CM

## 2024-11-29 DIAGNOSIS — Z76.82 STEM CELL TRANSPLANT CANDIDATE: ICD-10-CM

## 2024-11-29 DIAGNOSIS — C83.17 MANTLE CELL LYMPHOMA OF SPLEEN (MULTI): ICD-10-CM

## 2024-11-29 DIAGNOSIS — C83.10 MANTLE CELL LYMPHOMA, UNSPECIFIED BODY REGION (MULTI): ICD-10-CM

## 2024-11-29 LAB — SARS-COV-2 RNA RESP QL NAA+PROBE: NOT DETECTED

## 2024-11-29 PROCEDURE — 87635 SARS-COV-2 COVID-19 AMP PRB: CPT | Performed by: PHYSICIAN ASSISTANT

## 2024-11-29 PROCEDURE — 2780000003 HC OR 278 NO HCPCS

## 2024-11-29 PROCEDURE — 1170000001 HC PRIVATE ONCOLOGY ROOM DAILY

## 2024-11-29 PROCEDURE — 99223 1ST HOSP IP/OBS HIGH 75: CPT | Performed by: STUDENT IN AN ORGANIZED HEALTH CARE EDUCATION/TRAINING PROGRAM

## 2024-11-29 PROCEDURE — 02HV33Z INSERTION OF INFUSION DEVICE INTO SUPERIOR VENA CAVA, PERCUTANEOUS APPROACH: ICD-10-PCS | Performed by: INTERNAL MEDICINE

## 2024-11-29 PROCEDURE — 36415 COLL VENOUS BLD VENIPUNCTURE: CPT

## 2024-11-29 PROCEDURE — C1751 CATH, INF, PER/CENT/MIDLINE: HCPCS

## 2024-11-29 PROCEDURE — 2500000001 HC RX 250 WO HCPCS SELF ADMINISTERED DRUGS (ALT 637 FOR MEDICARE OP): Performed by: PHYSICIAN ASSISTANT

## 2024-11-29 PROCEDURE — 36573 INSJ PICC RS&I 5 YR+: CPT

## 2024-11-29 RX ORDER — ACYCLOVIR 400 MG/1
400 TABLET ORAL 2 TIMES DAILY
Status: DISCONTINUED | OUTPATIENT
Start: 2024-11-29 | End: 2024-11-30

## 2024-11-29 RX ORDER — HYDROCORTISONE 1 %
1 CREAM (GRAM) TOPICAL 2 TIMES DAILY PRN
Status: DISPENSED | OUTPATIENT
Start: 2024-11-29

## 2024-11-29 RX ORDER — ONDANSETRON HYDROCHLORIDE 8 MG/1
8 TABLET, FILM COATED ORAL EVERY 8 HOURS PRN
Status: DISPENSED | OUTPATIENT
Start: 2024-11-29

## 2024-11-29 RX ORDER — PANTOPRAZOLE SODIUM 40 MG/1
40 TABLET, DELAYED RELEASE ORAL
Status: DISPENSED | OUTPATIENT
Start: 2024-11-30

## 2024-11-29 RX ORDER — ATORVASTATIN CALCIUM 40 MG/1
40 TABLET, FILM COATED ORAL EVERY MORNING
Status: DISPENSED | OUTPATIENT
Start: 2024-11-30

## 2024-11-29 RX ORDER — PROCHLORPERAZINE MALEATE 10 MG
10 TABLET ORAL EVERY 6 HOURS PRN
Status: ACTIVE | OUTPATIENT
Start: 2024-11-29

## 2024-11-29 SDOH — SOCIAL STABILITY: SOCIAL INSECURITY: ABUSE: ADULT

## 2024-11-29 SDOH — SOCIAL STABILITY: SOCIAL INSECURITY: WERE YOU ABLE TO COMPLETE ALL THE BEHAVIORAL HEALTH SCREENINGS?: YES

## 2024-11-29 SDOH — SOCIAL STABILITY: SOCIAL INSECURITY: ARE YOU OR HAVE YOU BEEN THREATENED OR ABUSED PHYSICALLY, EMOTIONALLY, OR SEXUALLY BY ANYONE?: NO

## 2024-11-29 SDOH — SOCIAL STABILITY: SOCIAL INSECURITY: HAVE YOU HAD THOUGHTS OF HARMING ANYONE ELSE?: NO

## 2024-11-29 SDOH — SOCIAL STABILITY: SOCIAL INSECURITY
WITHIN THE LAST YEAR, HAVE YOU BEEN RAPED OR FORCED TO HAVE ANY KIND OF SEXUAL ACTIVITY BY YOUR PARTNER OR EX-PARTNER?: NO

## 2024-11-29 SDOH — ECONOMIC STABILITY: HOUSING INSECURITY: IN THE LAST 12 MONTHS, WAS THERE A TIME WHEN YOU WERE NOT ABLE TO PAY THE MORTGAGE OR RENT ON TIME?: NO

## 2024-11-29 SDOH — ECONOMIC STABILITY: INCOME INSECURITY: IN THE PAST 12 MONTHS HAS THE ELECTRIC, GAS, OIL, OR WATER COMPANY THREATENED TO SHUT OFF SERVICES IN YOUR HOME?: NO

## 2024-11-29 SDOH — SOCIAL STABILITY: SOCIAL INSECURITY: WITHIN THE LAST YEAR, HAVE YOU BEEN HUMILIATED OR EMOTIONALLY ABUSED IN OTHER WAYS BY YOUR PARTNER OR EX-PARTNER?: NO

## 2024-11-29 SDOH — SOCIAL STABILITY: SOCIAL INSECURITY: DO YOU FEEL UNSAFE GOING BACK TO THE PLACE WHERE YOU ARE LIVING?: NO

## 2024-11-29 SDOH — HEALTH STABILITY: MENTAL HEALTH: HOW OFTEN DO YOU HAVE SIX OR MORE DRINKS ON ONE OCCASION?: NEVER

## 2024-11-29 SDOH — SOCIAL STABILITY: SOCIAL INSECURITY: DO YOU FEEL ANYONE HAS EXPLOITED OR TAKEN ADVANTAGE OF YOU FINANCIALLY OR OF YOUR PERSONAL PROPERTY?: NO

## 2024-11-29 SDOH — SOCIAL STABILITY: SOCIAL INSECURITY: WITHIN THE LAST YEAR, HAVE YOU BEEN AFRAID OF YOUR PARTNER OR EX-PARTNER?: NO

## 2024-11-29 SDOH — SOCIAL STABILITY: SOCIAL INSECURITY: ARE THERE ANY APPARENT SIGNS OF INJURIES/BEHAVIORS THAT COULD BE RELATED TO ABUSE/NEGLECT?: NO

## 2024-11-29 SDOH — SOCIAL STABILITY: SOCIAL INSECURITY: HAVE YOU HAD ANY THOUGHTS OF HARMING ANYONE ELSE?: NO

## 2024-11-29 SDOH — SOCIAL STABILITY: SOCIAL INSECURITY: DOES ANYONE TRY TO KEEP YOU FROM HAVING/CONTACTING OTHER FRIENDS OR DOING THINGS OUTSIDE YOUR HOME?: NO

## 2024-11-29 SDOH — SOCIAL STABILITY: SOCIAL INSECURITY
WITHIN THE LAST YEAR, HAVE YOU BEEN KICKED, HIT, SLAPPED, OR OTHERWISE PHYSICALLY HURT BY YOUR PARTNER OR EX-PARTNER?: NO

## 2024-11-29 SDOH — ECONOMIC STABILITY: FOOD INSECURITY: HOW HARD IS IT FOR YOU TO PAY FOR THE VERY BASICS LIKE FOOD, HOUSING, MEDICAL CARE, AND HEATING?: NOT VERY HARD

## 2024-11-29 SDOH — ECONOMIC STABILITY: FOOD INSECURITY: WITHIN THE PAST 12 MONTHS, YOU WORRIED THAT YOUR FOOD WOULD RUN OUT BEFORE YOU GOT THE MONEY TO BUY MORE.: NEVER TRUE

## 2024-11-29 SDOH — ECONOMIC STABILITY: HOUSING INSECURITY: AT ANY TIME IN THE PAST 12 MONTHS, WERE YOU HOMELESS OR LIVING IN A SHELTER (INCLUDING NOW)?: NO

## 2024-11-29 SDOH — HEALTH STABILITY: MENTAL HEALTH: HOW OFTEN DO YOU HAVE A DRINK CONTAINING ALCOHOL?: NEVER

## 2024-11-29 SDOH — HEALTH STABILITY: MENTAL HEALTH: HOW MANY DRINKS CONTAINING ALCOHOL DO YOU HAVE ON A TYPICAL DAY WHEN YOU ARE DRINKING?: PATIENT DOES NOT DRINK

## 2024-11-29 SDOH — ECONOMIC STABILITY: FOOD INSECURITY: WITHIN THE PAST 12 MONTHS, THE FOOD YOU BOUGHT JUST DIDN'T LAST AND YOU DIDN'T HAVE MONEY TO GET MORE.: NEVER TRUE

## 2024-11-29 SDOH — ECONOMIC STABILITY: HOUSING INSECURITY: IN THE PAST 12 MONTHS, HOW MANY TIMES HAVE YOU MOVED WHERE YOU WERE LIVING?: 1

## 2024-11-29 SDOH — SOCIAL STABILITY: SOCIAL INSECURITY: HAS ANYONE EVER THREATENED TO HURT YOUR FAMILY OR YOUR PETS?: NO

## 2024-11-29 SDOH — ECONOMIC STABILITY: TRANSPORTATION INSECURITY: IN THE PAST 12 MONTHS, HAS LACK OF TRANSPORTATION KEPT YOU FROM MEDICAL APPOINTMENTS OR FROM GETTING MEDICATIONS?: NO

## 2024-11-29 ASSESSMENT — ENCOUNTER SYMPTOMS
BRUISES/BLEEDS EASILY: 0
HEMATURIA: 0
CHILLS: 0
BLOOD IN STOOL: 0
SORE THROAT: 0
DIFFICULTY URINATING: 0
NAUSEA: 0
DIZZINESS: 0
APPETITE CHANGE: 0
VOMITING: 0
HEADACHES: 0
DIARRHEA: 0
SHORTNESS OF BREATH: 0
JOINT SWELLING: 0
CONSTIPATION: 0
COUGH: 0
FEVER: 0

## 2024-11-29 ASSESSMENT — LIFESTYLE VARIABLES
AUDIT-C TOTAL SCORE: 0
HOW OFTEN DO YOU HAVE 6 OR MORE DRINKS ON ONE OCCASION: NEVER
AUDIT-C TOTAL SCORE: 0
HOW MANY STANDARD DRINKS CONTAINING ALCOHOL DO YOU HAVE ON A TYPICAL DAY: PATIENT DOES NOT DRINK
AUDIT-C TOTAL SCORE: 0
SKIP TO QUESTIONS 9-10: 1
AUDIT-C TOTAL SCORE: 0
SKIP TO QUESTIONS 9-10: 1
HOW MANY STANDARD DRINKS CONTAINING ALCOHOL DO YOU HAVE ON A TYPICAL DAY: PATIENT DOES NOT DRINK
HOW OFTEN DO YOU HAVE A DRINK CONTAINING ALCOHOL: NEVER
AUDIT-C TOTAL SCORE: 0
HOW OFTEN DO YOU HAVE A DRINK CONTAINING ALCOHOL: NEVER
HOW OFTEN DO YOU HAVE 6 OR MORE DRINKS ON ONE OCCASION: NEVER
SKIP TO QUESTIONS 9-10: 1

## 2024-11-29 ASSESSMENT — PATIENT HEALTH QUESTIONNAIRE - PHQ9
2. FEELING DOWN, DEPRESSED OR HOPELESS: NOT AT ALL
SUM OF ALL RESPONSES TO PHQ9 QUESTIONS 1 & 2: 0
SUM OF ALL RESPONSES TO PHQ9 QUESTIONS 1 & 2: 0
1. LITTLE INTEREST OR PLEASURE IN DOING THINGS: NOT AT ALL
1. LITTLE INTEREST OR PLEASURE IN DOING THINGS: NOT AT ALL
2. FEELING DOWN, DEPRESSED OR HOPELESS: NOT AT ALL

## 2024-11-29 ASSESSMENT — ACTIVITIES OF DAILY LIVING (ADL)
PATIENT'S MEMORY ADEQUATE TO SAFELY COMPLETE DAILY ACTIVITIES?: NO
ADEQUATE_TO_COMPLETE_ADL: NO
LACK_OF_TRANSPORTATION: NO
HEARING - LEFT EAR: FUNCTIONAL
GROOMING: INDEPENDENT
LACK_OF_TRANSPORTATION: NO
FEEDING YOURSELF: INDEPENDENT
TOILETING: INDEPENDENT
DRESSING YOURSELF: INDEPENDENT
WALKS IN HOME: INDEPENDENT
HEARING - RIGHT EAR: FUNCTIONAL
JUDGMENT_ADEQUATE_SAFELY_COMPLETE_DAILY_ACTIVITIES: NO
BATHING: INDEPENDENT

## 2024-11-29 ASSESSMENT — COGNITIVE AND FUNCTIONAL STATUS - GENERAL
MOBILITY SCORE: 24
DAILY ACTIVITIY SCORE: 24
PATIENT BASELINE BEDBOUND: NO

## 2024-11-29 ASSESSMENT — COLUMBIA-SUICIDE SEVERITY RATING SCALE - C-SSRS
1. IN THE PAST MONTH, HAVE YOU WISHED YOU WERE DEAD OR WISHED YOU COULD GO TO SLEEP AND NOT WAKE UP?: NO
2. HAVE YOU ACTUALLY HAD ANY THOUGHTS OF KILLING YOURSELF?: NO
6. HAVE YOU EVER DONE ANYTHING, STARTED TO DO ANYTHING, OR PREPARED TO DO ANYTHING TO END YOUR LIFE?: NO

## 2024-11-29 ASSESSMENT — PAIN SCALES - GENERAL: PAINLEVEL_OUTOF10: 0 - NO PAIN

## 2024-11-29 ASSESSMENT — PAIN - FUNCTIONAL ASSESSMENT: PAIN_FUNCTIONAL_ASSESSMENT: 0-10

## 2024-11-29 NOTE — H&P
History Of Present Illness  Nancie Armenta is a 59 y.o. male presenting with relapsed Mantle cell lymphoma admitted for his CAR- T cell therapy on CASE 2419.    On admit, patient reports doing well. He had PICC placed prior to his admission today. He tolerated it well. He has been eating and drinking well. He notes occasional pain at site of his enlarged spleen. He also notes very infrequent numbness of his hands and feet. He currently denies fever, chills, dizziness, headache, cough, mouth sores, sore throat, CP, SOB, nausea, vomiting, diarrhea, constipation, urinary issues, vision changes, rash, or bleeding issues.        Oncology History Overview Note            Patient Visit Information:   Visit Type: Follow Up Visit      Cancer History:   Treatment Synopsis:     1. Stage IV A mantle cell non-Hodgkin lymphoma involving the gastrointestinal tract diagnosed in October 2011, with diffuse gastrointestinal involvement. The patient was treated  with RCHOP alternating with Rituxan and high-dose Mamie-C and received total of 6 cycles of chemotherapy which he finished in February 2012.   Allergic to Augmentin, causes hives.     2. Patient underwent beam chemotherapy followed by autologous peripheral stem cell transplant in April 2012, by Dr. Yvonne Hobbs at [facility], was also involved in phase 3 clinical trial up killed shingles vaccine versus placebo ([employer]-1 Z10  study).     3.The patient developed shingles in January 2014, involving left flank.      4. 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      5.  Recurrent mantle cell lymphoma June 2019 assx presentation with leukocytosis. Peripheral  blood flow which showed a CD5 positve clonal lymphocytosis. 6/2/19 CT of chest abdomen and pelvis revealed  splenomegaly of 15 cm compared to 12.8 cm last evaluation. Recurrence confirmed by FISH on peripheral blood earlier this month.  BM biopsy which showed 5% Mantle cell involvement although peripheral blood shows 30% involvement. CT imaging showed splenomegaly. PET scan declined by insurance twice.  Patient underwent colonoscopy  with Dr. Yashira Lacey Orland) 8/8/19 which showed cobblestoning of colon and multiple biopsies including terminal ileum, appendix, cecum, rectosigmoid positive for mantle cell lymphoma.     6. Initiation of acalabrutinib August 2019 with minimal response. Switched to ibrutinib and venetoclax 12/20/20 with clearing of t(11, 14) by by FISH ( 1/2020) and resolution of involvement of colon by endoscopy and pathology on exam 3/2/2020! BM biopsy  4/2020 HEATHER.     C-scope (3/11/22): at OSH: negative, no evidence of disease. PET scan ( 3/14/22): no abnormal uptake. Nakul 1. BMBx (3/17/22): negative, no evidence of MCL.   March 2022, ibrutinib and venetoclax discontinued.     July 2024 development of Tpenia, bone marrow biopsy 7/25/24 showed focal involvement with mantle cell lymphoma (NGS cyclin D1 positive and Sox 11 positive. BIRC+ and TP 53 positve with a VAF of 3%, BIRC3 positive). FISH negative for 17p deletion  CT imaging done on 8/7/24 shows thickening of sigmoid colon, development of mild to moderate retroperitoneal adenopathy, splenomegally and possible splenic infarct.     Pirotbrutib 200 mg daily initiated 8/12/24.  Due to inadequate response, he started the ventoclax (50 mg) on Adolfo 10/13, then increased to 100 mg , 200 mg and 400 mg      7. S/P COVID 19 infection 12/1/2020, no sequelae             Past Medical History  Past Medical History:   Diagnosis Date    Fourth (trochlear) nerve palsy, right eye 06/08/2017    Right-sided fourth cranial nerve palsy    Fourth (trochlear) nerve  palsy, right eye 06/08/2017    Right-sided fourth cranial nerve palsy    Malignant neoplasm of connective and soft tissue, unspecified 06/08/2017    Cancer of blood vessel       Surgical History  Past Surgical History:   Procedure Laterality Date    IR CVC PICC  11/29/2024    [place] C[place] [facility] [place]    SHOULDER SURGERY  07/13/2017    Shoulder Surgery        Social History  He reports that he has never smoked. He has never used smokeless tobacco. He reports that he does not drink alcohol and does not use drugs.    Family History  Family History   Problem Relation Name Age of Onset    Cataracts Mother      Cataracts Father      Other (chronic lymphoid leukemia) Father          Allergies  Amoxicillin-pot clavulanate    Review of Systems   Constitutional:  Negative for appetite change, chills and fever.   HENT:  Negative for mouth sores and sore throat.    Eyes:  Negative for visual disturbance.   Respiratory:  Negative for cough and shortness of breath.    Cardiovascular:  Negative for chest pain.   Gastrointestinal:  Negative for blood in stool, constipation, diarrhea, nausea and vomiting.        Reports occasional pain at site of his enlarged spleen   Genitourinary:  Negative for difficulty urinating and hematuria.   Musculoskeletal:  Negative for joint swelling.   Skin:  Negative for rash.   Neurological:  Negative for dizziness and headaches.        Very infrequent numbness noted in his hands or feet   Hematological:  Does not bruise/bleed easily.        Physical Exam  Vitals reviewed.   Constitutional:       Appearance: Normal appearance.   HENT:      Head: Normocephalic and atraumatic.      Nose: Nose normal.      Mouth/Throat:      Mouth: Mucous membranes are moist.   Eyes:      Extraocular Movements: Extraocular movements intact.      Pupils: Pupils are equal, round, and reactive to light.   Cardiovascular:      Rate and Rhythm: Normal rate and regular rhythm.      Heart sounds: Normal heart  "sounds.   Pulmonary:      Effort: Pulmonary effort is normal.      Breath sounds: Normal breath sounds. No wheezing, rhonchi or rales.   Abdominal:      General: Bowel sounds are normal.      Palpations: Abdomen is soft.      Tenderness: There is no abdominal tenderness. There is no guarding.   Musculoskeletal:         General: No swelling or tenderness. Normal range of motion.      Cervical back: Neck supple.   Skin:     General: Skin is warm and dry.      Findings: No rash.   Neurological:      General: No focal deficit present.      Mental Status: He is alert and oriented to person, place, and time.   Psychiatric:         Mood and Affect: Mood normal.         Behavior: Behavior normal.          Last Recorded Vitals  Blood pressure 107/70, pulse 80, temperature 36.2 °C (97.2 °F), temperature source Temporal, resp. rate 18, height 1.839 m (6' 0.4\"), weight 97.2 kg (214 lb 4.6 oz), SpO2 97%.    Relevant Results    Type Scheduled medications  acyclovir, 400 mg, oral, BID  [START ON 11/30/2024] atorvastatin, 40 mg, oral, q AM  [START ON 11/30/2024] pantoprazole, 40 mg, oral, Daily before breakfast      Continuous medications     PRN medications  PRN medications: alteplase, hydrocortisone, ondansetron, prochlorperazine      Labs reviewed       Assessment/Plan   Assessment & Plan  Mantle cell lymphoma    MCL (mantle cell lymphoma) (Multi)    Nancie Armenta is a 59 y.o. male presenting with relapsed Mantle cell lymphoma admitted on 11/29/24 for his CAR- T cell therapy on CASE 2419 (T=0, 12/6).    ONC:   -Hx/o Stage IV Mantle cell lymphoma involving the GI tract initially treated with [place] regimen and had consolidation autograft with BEAM prep regimen April 2022  -Relapsed 2019 and treated with Ibrutinib and Venetoclax w/complete response   -Recurrent MCL in July 2024 presenting with dropping Plt counts and bmbx (7/25/24) showing focal involvement w/MCL (NGS cyclin D1 positive and SOX 11 positive. BIRC+ and TP53 " positive w/a VAF of 3% BIRC3 positive). FISH neg for 17p deletion.  - CT imaging (8/7/24) showing thickening of sigmoid colon, development of mild to moderate retroperitoneal adenopathy, splenomegaly, and poss splenic infarct.  - Started Pirtobrutinib salvage on 8/12/24. Added Venetoclax starting 10/13/24 since spleen size was increasing on repeat CT imaging and on physical exam  - Pirtobrutinib and Venetoclax stopped 11/14/24 in preparation for CART collection  - Bmbx (10/23/24) with low level involvement by mantle cells about 3% Mantel cells by flow  - PET/CT (11/4/24) with FDG avid lymphadenopathy throughout the neck, chest, abdomen, and pelvis c/w lymphomatous involvement. Intense FDG avid splenomegaly. Peripheral regions of photopenia likely reflect areas of infarction.  - Now admitted for his CART cell therapy on CASE 2419 (T=0, 12/6)       CAR-T   - Conditioning with Flu/Cy   - Cyclophosphamide 60 mg/m2 IV T-6   - Fludarabine 25 mg/m2 IV T-5, T-4, T-3    - Plan Allopurinol and Keppra per protocol         - Baseline ICE Score:   - ECOG Score: 0- Fully active, able to carry on all pre-disease performance w/o restriction.     # CRS Grade: --    - Organ/System affected by CRS: --   - Date of CRS onset: --   - Date of highest CRS Grade: --   - Date of CRS resolution to Grade 1 or lower: --   - CRS management: --      # ICE Score:   - Date of Neurotoxicity onset: --   - Date of highest Neurotoxicity Grade: --   - Date of Neurotoxicity resolution to Grade 1 or lower: --   - Neurotox management: --      # CAR-T Labs   - CMV DNA PCR ordered on admit  - IgG level ordered on admit  - CBC, RFP, Mg, LFTs: Daily   - Coagulation Screen, Fibrinogen: daily   - LDH-daily  - Haptoglobin: Weekly           HEME:  - Pancytopenia secondary to his malignancy and previous treatment  - Monitor CBC w/diff and transfuse as needed    ID:  - Afebrile   - Cont ACV prophy  - Plan Fluconazole prophy per protocol  - Plan Levaquin when  neutropenic    FEN/GI:  - Admit wt: 97.2 kg  - Monitor electrolytes and replete as needed  - Added PPI on admit  - Hx/o CKD (Baseline Cr: 1.20)    CV:  - Cont daily Atorvastatin  - ECHO (10/23/24) with low normal LVEF 53%     :  - BPH. Held Tadalafil on admit.    DISP:  - Patient of Dr. Yvonne Hobbs  - PICC line placed (11/29)  - PT ordered      I spent 60 minutes in the professional and overall care of this patient.      CHRISTIANO LopezC

## 2024-11-29 NOTE — POST-PROCEDURE NOTE
Pre-Procedure Checklist:  Emergent Line Insertion: No  Type of Line to be Placed: PICC  Consent Obtained: Yes  Emergency Medication Necessary: No  Patient Identified with 2 Independent Identifiers: Yes  Review of Allergies, Anticoagulation, Relevant Labs, ECG/Telemetry: Yes  Risks/Benefits/Alternatives Discussed with Patient/POA/Legal Representative: Yes  Stop Sign on Door: Yes  Time Out Performed: Yes  Catheter Exchange: No    Positioning Checklist:  All People, Including Patient, in the Room with Cap and Mask: Yes  Fluoroscopy Used to Identify Vessel and Guide Insertion: No   Sterile Cover Used: Yes  Full Barrier Precautions Followed (Mask, Cap, Gown, Gloves): Yes  Hands Washed: Yes  Monitors Attached with Sound Alarms On: No  Full Body Sterile Drape (Head-to-Toe) Used to Cover Patient: Yes  Trendelenburg Position (For IJ and Subclavian): No  CHG Skin Prep Used and Allowed to Air Dry to Skin Procedure: Yes    Procedure Checklist:  Blood Aspirated From All Lumens, All Ports Subsequently Flushed: Yes  Catheter Caps Placed on All Lumens; Lumens Clamped: Yes  Maintain Guidewire Control Throughout, Ensuring Guidewire Removal: Yes  Maintain Sterile Field Throughout Insertion: Yes  Catheter Secured: Yes  Confirmatory Test of Venous Placement: Non-Pulsatile Blood    Post Procedure Checklist:  Date and Time Written on Dressing: Yes  Sharp and Wire Count and Safe Disposal of all Sharps/Wires: Yes  Sterile Dressing Applied Per Protocol: Yes  X-ray Ordered or ECG Image: Yes  Okay to use PICC  PICC Insertion Details:  Size (Fr): 4  Lumen Type: DL  Catheter to Vein Ratio Less Than 50%: Yes  Total Length (cm): 45  External Length (cm): 0  Orientation: left  Location: basilic  Site Prep: Chlorohexidine; Usual sterile procedure followed  Local Anesthetic: Injectable/Subcutaneous  Indication: Car-T  Insertion Team Members in the Room: NurseGRAEME--Brenda Sun LPN  Initial Extremity Circumference (cm): 32  Insertion  Attempts:1  Patient Tolerance: Tolerated Well, Age Appropriate  Comfort Measures: Subcutaneous anesthetic; Verbal  Procedure Location: Bedside--Brenda Sun LPN  Safety Measures: Patient specific safety measures addressed with RN  Estimated Blood Loss (mL): 0  Vessel Fully Compressible Proximally and Distally to Insertion Site: Yes  Brisk Blood Return Obtained and Line Draws Easily: Yes  Tip Location:SVC  Line Confirmation: ECG  Lot #:[id number]  : BD  PICC Line Exp Date:12/31/2025  Securement: Stat Lock  Post Procedure Checklist: Handoff with RN; Obtain all new IV tubing prior to use; Bed at lowest level and wheels locked; Line discharge information at bedside.  Additional Details: Line was inserted using Modified Seldinger's Technique.   Placed by: Penny Wharton RN-[facility]

## 2024-11-29 NOTE — PROGRESS NOTES
Pt ambulated to SCT unit without assistance. Pt denies complaints or pain today. Vitals obtained, blood drawn via LAC with a 25 G butterfly and sent to lab. Pt will be admitted to [facility] after lab draw. Pt ambulated off unit without complaints or assistance.

## 2024-11-30 LAB
ABO GROUP (TYPE) IN BLOOD: NORMAL
ALBUMIN SERPL BCP-MCNC: 3.6 G/DL (ref 3.4–5)
ALP SERPL-CCNC: 66 U/L (ref 33–136)
ALT SERPL W P-5'-P-CCNC: 17 U/L (ref 10–52)
ANION GAP SERPL CALC-SCNC: 14 MMOL/L (ref 10–20)
ANTIBODY SCREEN: NORMAL
APTT PPP: 28 SECONDS (ref 27–38)
AST SERPL W P-5'-P-CCNC: 14 U/L (ref 9–39)
BASOPHILS # BLD AUTO: 0 X10*3/UL (ref 0–0.1)
BASOPHILS NFR BLD AUTO: 0 %
BILIRUB SERPL-MCNC: 1.2 MG/DL (ref 0–1.2)
BUN SERPL-MCNC: 19 MG/DL (ref 6–23)
CALCIUM SERPL-MCNC: 8.2 MG/DL (ref 8.6–10.6)
CHLORIDE SERPL-SCNC: 103 MMOL/L (ref 98–107)
CO2 SERPL-SCNC: 24 MMOL/L (ref 21–32)
CREAT SERPL-MCNC: 1.12 MG/DL (ref 0.5–1.3)
EGFRCR SERPLBLD CKD-EPI 2021: 75 ML/MIN/1.73M*2
EOSINOPHIL # BLD AUTO: 0.1 X10*3/UL (ref 0–0.7)
EOSINOPHIL NFR BLD AUTO: 4.4 %
ERYTHROCYTE [DISTWIDTH] IN BLOOD BY AUTOMATED COUNT: 16.9 % (ref 11.5–14.5)
FERRITIN SERPL-MCNC: 754 NG/ML (ref 20–300)
GLUCOSE SERPL-MCNC: 104 MG/DL (ref 74–99)
HCT VFR BLD AUTO: 24.2 % (ref 41–52)
HGB BLD-MCNC: 7.6 G/DL (ref 13.5–17.5)
HOLD SPECIMEN: NORMAL
IGG SERPL-MCNC: 1040 MG/DL (ref 700–1600)
IMM GRANULOCYTES # BLD AUTO: 0.02 X10*3/UL (ref 0–0.7)
IMM GRANULOCYTES NFR BLD AUTO: 0.9 % (ref 0–0.9)
INR PPP: 1.1 (ref 0.9–1.1)
LDH SERPL L TO P-CCNC: 140 U/L (ref 84–246)
LYMPHOCYTES # BLD AUTO: 0.81 X10*3/UL (ref 1.2–4.8)
LYMPHOCYTES NFR BLD AUTO: 36 %
MAGNESIUM SERPL-MCNC: 2.11 MG/DL (ref 1.6–2.4)
MAGNESIUM SERPL-MCNC: 2.25 MG/DL (ref 1.6–2.4)
MCH RBC QN AUTO: 31 PG (ref 26–34)
MCHC RBC AUTO-ENTMCNC: 31.4 G/DL (ref 32–36)
MCV RBC AUTO: 99 FL (ref 80–100)
MONOCYTES # BLD AUTO: 0.27 X10*3/UL (ref 0.1–1)
MONOCYTES NFR BLD AUTO: 12 %
NEUTROPHILS # BLD AUTO: 1.05 X10*3/UL (ref 1.2–7.7)
NEUTROPHILS NFR BLD AUTO: 46.7 %
NRBC BLD-RTO: 0 /100 WBCS (ref 0–0)
PLATELET # BLD AUTO: 34 X10*3/UL (ref 150–450)
POC APPEARANCE, URINE: NORMAL
POC BILIRUBIN, URINE: NORMAL
POC BLOOD, URINE: NORMAL
POC BLOOD, URINE: NORMAL
POC COLOR, URINE: NORMAL
POC GLUCOSE, URINE: NORMAL
POC KETONES, URINE: NORMAL
POC LEUKOCYTES, URINE: NORMAL
POC NITRITE,URINE: NORMAL
POC PH, URINE: NORMAL
POC PROTEIN, URINE: NORMAL
POC SPECIFIC GRAVITY, URINE: NORMAL
POC UROBILINOGEN, URINE: NORMAL
POTASSIUM SERPL-SCNC: 3.9 MMOL/L (ref 3.5–5.3)
PROT SERPL-MCNC: 5.6 G/DL (ref 6.4–8.2)
PROTHROMBIN TIME: 12.2 SECONDS (ref 9.8–12.8)
RBC # BLD AUTO: 2.45 X10*6/UL (ref 4.5–5.9)
RH FACTOR (ANTIGEN D): NORMAL
SODIUM SERPL-SCNC: 137 MMOL/L (ref 136–145)
WBC # BLD AUTO: 2.3 X10*3/UL (ref 4.4–11.3)

## 2024-11-30 PROCEDURE — 83735 ASSAY OF MAGNESIUM: CPT | Performed by: INTERNAL MEDICINE

## 2024-11-30 PROCEDURE — 2500000004 HC RX 250 GENERAL PHARMACY W/ HCPCS (ALT 636 FOR OP/ED): Performed by: INTERNAL MEDICINE

## 2024-11-30 PROCEDURE — 86900 BLOOD TYPING SEROLOGIC ABO: CPT

## 2024-11-30 PROCEDURE — 82728 ASSAY OF FERRITIN: CPT | Performed by: INTERNAL MEDICINE

## 2024-11-30 PROCEDURE — 82784 ASSAY IGA/IGD/IGG/IGM EACH: CPT | Performed by: PHYSICIAN ASSISTANT

## 2024-11-30 PROCEDURE — 86901 BLOOD TYPING SEROLOGIC RH(D): CPT

## 2024-11-30 PROCEDURE — 85610 PROTHROMBIN TIME: CPT | Performed by: INTERNAL MEDICINE

## 2024-11-30 PROCEDURE — 80053 COMPREHEN METABOLIC PANEL: CPT | Performed by: PHYSICIAN ASSISTANT

## 2024-11-30 PROCEDURE — 2500000001 HC RX 250 WO HCPCS SELF ADMINISTERED DRUGS (ALT 637 FOR MEDICARE OP): Performed by: INTERNAL MEDICINE

## 2024-11-30 PROCEDURE — 86923 COMPATIBILITY TEST ELECTRIC: CPT

## 2024-11-30 PROCEDURE — 85025 COMPLETE CBC W/AUTO DIFF WBC: CPT | Performed by: PHYSICIAN ASSISTANT

## 2024-11-30 PROCEDURE — 1170000001 HC PRIVATE ONCOLOGY ROOM DAILY

## 2024-11-30 PROCEDURE — 2500000005 HC RX 250 GENERAL PHARMACY W/O HCPCS: Performed by: INTERNAL MEDICINE

## 2024-11-30 PROCEDURE — 99233 SBSQ HOSP IP/OBS HIGH 50: CPT | Performed by: STUDENT IN AN ORGANIZED HEALTH CARE EDUCATION/TRAINING PROGRAM

## 2024-11-30 PROCEDURE — 2500000002 HC RX 250 W HCPCS SELF ADMINISTERED DRUGS (ALT 637 FOR MEDICARE OP, ALT 636 FOR OP/ED): Performed by: PHYSICIAN ASSISTANT

## 2024-11-30 PROCEDURE — 83615 LACTATE (LD) (LDH) ENZYME: CPT | Performed by: PHYSICIAN ASSISTANT

## 2024-11-30 PROCEDURE — 81002 URINALYSIS NONAUTO W/O SCOPE: CPT | Performed by: PHYSICIAN ASSISTANT

## 2024-11-30 PROCEDURE — 2500000001 HC RX 250 WO HCPCS SELF ADMINISTERED DRUGS (ALT 637 FOR MEDICARE OP): Performed by: PHYSICIAN ASSISTANT

## 2024-11-30 PROCEDURE — 85730 THROMBOPLASTIN TIME PARTIAL: CPT | Performed by: INTERNAL MEDICINE

## 2024-11-30 PROCEDURE — 3E04305 INTRODUCTION OF OTHER ANTINEOPLASTIC INTO CENTRAL VEIN, PERCUTANEOUS APPROACH: ICD-10-PCS | Performed by: STUDENT IN AN ORGANIZED HEALTH CARE EDUCATION/TRAINING PROGRAM

## 2024-11-30 PROCEDURE — 2500000005 HC RX 250 GENERAL PHARMACY W/O HCPCS: Performed by: PHYSICIAN ASSISTANT

## 2024-11-30 PROCEDURE — 83735 ASSAY OF MAGNESIUM: CPT | Performed by: PHYSICIAN ASSISTANT

## 2024-11-30 PROCEDURE — 2500000001 HC RX 250 WO HCPCS SELF ADMINISTERED DRUGS (ALT 637 FOR MEDICARE OP)

## 2024-11-30 RX ORDER — SODIUM CHLORIDE 9 MG/ML
125 INJECTION, SOLUTION INTRAVENOUS CONTINUOUS
Status: ACTIVE | OUTPATIENT
Start: 2024-11-30 | End: 2024-11-30

## 2024-11-30 RX ORDER — LEVETIRACETAM 500 MG/1
500 TABLET ORAL 2 TIMES DAILY
Status: ACTIVE | OUTPATIENT
Start: 2024-12-05

## 2024-11-30 RX ORDER — EPINEPHRINE 1 MG/ML
0.3 INJECTION, SOLUTION, CONCENTRATE INTRAVENOUS EVERY 5 MIN PRN
Status: ACTIVE | OUTPATIENT
Start: 2024-11-30

## 2024-11-30 RX ORDER — DIPHENHYDRAMINE HYDROCHLORIDE 50 MG/ML
50 INJECTION INTRAMUSCULAR; INTRAVENOUS AS NEEDED
Status: ACTIVE | OUTPATIENT
Start: 2024-11-30

## 2024-11-30 RX ORDER — ONDANSETRON HYDROCHLORIDE 8 MG/1
16 TABLET, FILM COATED ORAL ONCE
Status: COMPLETED | OUTPATIENT
Start: 2024-11-30 | End: 2024-11-30

## 2024-11-30 RX ORDER — FLUCONAZOLE 200 MG/1
400 TABLET ORAL DAILY
Status: ACTIVE | OUTPATIENT
Start: 2024-12-07

## 2024-11-30 RX ORDER — ACETAMINOPHEN 500 MG
5 TABLET ORAL ONCE
Status: COMPLETED | OUTPATIENT
Start: 2024-11-30 | End: 2024-11-30

## 2024-11-30 RX ORDER — ALBUTEROL SULFATE 0.83 MG/ML
3 SOLUTION RESPIRATORY (INHALATION) AS NEEDED
Status: ACTIVE | OUTPATIENT
Start: 2024-11-30

## 2024-11-30 RX ORDER — TAMSULOSIN HYDROCHLORIDE 0.4 MG/1
0.4 CAPSULE ORAL DAILY
Status: DISPENSED | OUTPATIENT
Start: 2024-11-30

## 2024-11-30 RX ORDER — ALLOPURINOL 300 MG/1
300 TABLET ORAL DAILY
Status: DISPENSED | OUTPATIENT
Start: 2024-11-30

## 2024-11-30 RX ORDER — ACYCLOVIR 400 MG/1
400 TABLET ORAL EVERY 12 HOURS
Status: DISPENSED | OUTPATIENT
Start: 2024-11-30

## 2024-11-30 RX ORDER — FAMOTIDINE 10 MG/ML
20 INJECTION INTRAVENOUS AS NEEDED
Status: ACTIVE | OUTPATIENT
Start: 2024-11-30

## 2024-11-30 ASSESSMENT — COGNITIVE AND FUNCTIONAL STATUS - GENERAL
DAILY ACTIVITIY SCORE: 24
MOBILITY SCORE: 24

## 2024-11-30 ASSESSMENT — PAIN SCALES - GENERAL
PAINLEVEL_OUTOF10: 0 - NO PAIN
PAINLEVEL_OUTOF10: 0 - NO PAIN

## 2024-11-30 NOTE — CARE PLAN
The clinical goals for the shift include patient will be free from falss and injury this shift      Problem: Infection related to problem list condition  Goal: Infection will resolve through treatment  Outcome: Progressing     Problem: Fall/Injury  Goal: Not fall by end of shift  Outcome: Progressing  Goal: Be free from injury by end of the shift  Outcome: Progressing  Goal: Verbalize understanding of personal risk factors for fall in the hospital  Outcome: Progressing  Goal: Verbalize understanding of risk factor reduction measures to prevent injury from fall in the home  Outcome: Progressing  Goal: Use assistive devices by end of the shift  Outcome: Progressing  Goal: Pace activities to prevent fatigue by end of the shift  Outcome: Progressing     Problem: Pain - Adult  Goal: Verbalizes/displays adequate comfort level or baseline comfort level  Outcome: Progressing     Problem: Safety - Adult  Goal: Free from fall injury  Outcome: Progressing     Problem: Discharge Planning  Goal: Discharge to home or other facility with appropriate resources  Outcome: Progressing     Problem: Chronic Conditions and Co-morbidities  Goal: Patient's chronic conditions and co-morbidity symptoms are monitored and maintained or improved  Outcome: Progressing

## 2024-11-30 NOTE — CARE PLAN
Problem: Infection related to problem list condition  Goal: Infection will resolve through treatment  Outcome: Progressing     Problem: Fall/Injury  Goal: Not fall by end of shift  Outcome: Progressing  Goal: Be free from injury by end of the shift  Outcome: Progressing  Goal: Verbalize understanding of personal risk factors for fall in the hospital  Outcome: Progressing  Goal: Verbalize understanding of risk factor reduction measures to prevent injury from fall in the home  Outcome: Progressing  Goal: Use assistive devices by end of the shift  Outcome: Progressing  Goal: Pace activities to prevent fatigue by end of the shift  Outcome: Progressing     Problem: Pain - Adult  Goal: Verbalizes/displays adequate comfort level or baseline comfort level  Outcome: Progressing     Problem: Safety - Adult  Goal: Free from fall injury  Outcome: Progressing     Problem: Discharge Planning  Goal: Discharge to home or other facility with appropriate resources  Outcome: Progressing     Problem: Chronic Conditions and Co-morbidities  Goal: Patient's chronic conditions and co-morbidity symptoms are monitored and maintained or improved  Outcome: Progressing       The clinical goals for the shift include Patient will remain safe throughout shift.    Patient admitted from  after PICC line was placed. Patient admitted to [place] [id number] for CAR-T (on study). Patient's history includes relapsed Mantle Cell Lymphoma. VSS on admit. Patient was safely admitted to [facility].

## 2024-11-30 NOTE — CARE PLAN
Problem: Fall/Injury  Goal: Not fall by end of shift  Outcome: Progressing  Goal: Be free from injury by end of the shift  Outcome: Progressing  Goal: Verbalize understanding of personal risk factors for fall in the hospital  Outcome: Progressing  Goal: Verbalize understanding of risk factor reduction measures to prevent injury from fall in the home  Outcome: Progressing  Goal: Use assistive devices by end of the shift  Outcome: Progressing  Goal: Pace activities to prevent fatigue by end of the shift  Outcome: Progressing     Problem: Infection related to problem list condition  Goal: Infection will resolve through treatment  Outcome: Progressing     Problem: Pain - Adult  Goal: Verbalizes/displays adequate comfort level or baseline comfort level  Outcome: Progressing     Problem: Safety - Adult  Goal: Free from fall injury  Outcome: Progressing

## 2024-11-30 NOTE — PROGRESS NOTES
"Nancie Armenta is a 59 y.o. male on day 1 of admission presenting with Mantle cell lymphoma.    Subjective   Afebrile. No complaints. Received Cytoxan this morning. ROS otherwise.     Objective     Physical Exam  Constitutional:       Appearance: Normal appearance.   HENT:      Head: Normocephalic and atraumatic.      Nose: Nose normal.      Mouth/Throat:      Mouth: Mucous membranes are moist.   Eyes:      Extraocular Movements: Extraocular movements intact.      Pupils: Pupils are equal, round, and reactive to light.   Cardiovascular:      Rate and Rhythm: Normal rate and regular rhythm.      Heart sounds: Normal heart sounds.   Pulmonary:      Effort: Pulmonary effort is normal.      Breath sounds: Normal breath sounds. No wheezing, rhonchi or rales.   Abdominal:      General: Bowel sounds are normal.      Palpations: Abdomen is soft.      Tenderness: There is no abdominal tenderness. There is no guarding.   Musculoskeletal:         General: No swelling or tenderness. Normal range of motion.      Cervical back: Neck supple.   Skin:     General: Skin is warm and dry.      Findings: No rash.   Neurological:      General: No focal deficit present.      Mental Status: He is alert and oriented to person, place, and time.   Psychiatric:         Mood and Affect: Mood normal.         Behavior: Behavior normal.     Last Recorded Vitals  Blood pressure 110/68, pulse 89, temperature 36.1 °C (97 °F), temperature source Temporal, resp. rate 16, height 1.839 m (6' 0.4\"), weight 96.8 kg (213 lb 6.5 oz), SpO2 99%.  Intake/Output last 3 Shifts:  No intake/output data recorded.    Relevant Results    Type Scheduled medications  acyclovir, 400 mg, oral, q12h  allopurinol, 300 mg, oral, Daily  atorvastatin, 40 mg, oral, q AM  [START ON 12/7/2024] fluconazole, 400 mg, oral, Daily  [START ON 12/5/2024] levETIRAcetam, 500 mg, oral, BID  mesna (Mesnex) 500 mg in sodium chloride 0.9% 115 mL IV, 500 mg, intravenous, q4h  pantoprazole, " 40 mg, oral, Daily before breakfast      Continuous medications  sodium chloride 0.9%, 125 mL/hr, Last Rate: 125 mL/hr (11/30/24 1332)      PRN medications  PRN medications: albuterol, alteplase, dextrose, diphenhydrAMINE, EPINEPHrine HCl, famotidine, hydrocortisone, methylPREDNISolone sodium succinate (PF), ondansetron, prochlorperazine, sodium chloride      Type   Results for orders placed or performed during the hospital encounter of 11/29/24 (from the past 24 hours)   Sars-CoV-2 PCR   Result Value Ref Range    Coronavirus 2019, PCR Not Detected Not Detected   Type and screen   Result Value Ref Range    ABO TYPE B     Rh TYPE POS     ANTIBODY SCREEN NEG    CBC and Auto Differential   Result Value Ref Range    WBC 2.3 (L) 4.4 - 11.3 x10*3/uL    nRBC 0.0 0.0 - 0.0 /100 WBCs    RBC 2.45 (L) 4.50 - 5.90 x10*6/uL    Hemoglobin 7.6 (L) 13.5 - 17.5 g/dL    Hematocrit 24.2 (L) 41.0 - 52.0 %    MCV 99 80 - 100 fL    MCH 31.0 26.0 - 34.0 pg    MCHC 31.4 (L) 32.0 - 36.0 g/dL    RDW 16.9 (H) 11.5 - 14.5 %    Platelets 34 (LL) 150 - 450 x10*3/uL    Neutrophils % 46.7 40.0 - 80.0 %    Immature Granulocytes %, Automated 0.9 0.0 - 0.9 %    Lymphocytes % 36.0 13.0 - 44.0 %    Monocytes % 12.0 2.0 - 10.0 %    Eosinophils % 4.4 0.0 - 6.0 %    Basophils % 0.0 0.0 - 2.0 %    Neutrophils Absolute 1.05 (L) 1.20 - 7.70 x10*3/uL    Immature Granulocytes Absolute, Automated 0.02 0.00 - 0.70 x10*3/uL    Lymphocytes Absolute 0.81 (L) 1.20 - 4.80 x10*3/uL    Monocytes Absolute 0.27 0.10 - 1.00 x10*3/uL    Eosinophils Absolute 0.10 0.00 - 0.70 x10*3/uL    Basophils Absolute 0.00 0.00 - 0.10 x10*3/uL   Magnesium   Result Value Ref Range    Magnesium 2.11 1.60 - 2.40 mg/dL   Comprehensive metabolic panel   Result Value Ref Range    Glucose 104 (H) 74 - 99 mg/dL    Sodium 137 136 - 145 mmol/L    Potassium 3.9 3.5 - 5.3 mmol/L    Chloride 103 98 - 107 mmol/L    Bicarbonate 24 21 - 32 mmol/L    Anion Gap 14 10 - 20 mmol/L    Urea Nitrogen 19 6 -  23 mg/dL    Creatinine 1.12 0.50 - 1.30 mg/dL    eGFR 75 >60 mL/min/1.73m*2    Calcium 8.2 (L) 8.6 - 10.6 mg/dL    Albumin 3.6 3.4 - 5.0 g/dL    Alkaline Phosphatase 66 33 - 136 U/L    Total Protein 5.6 (L) 6.4 - 8.2 g/dL    AST 14 9 - 39 U/L    Bilirubin, Total 1.2 0.0 - 1.2 mg/dL    ALT 17 10 - 52 U/L   IgG   Result Value Ref Range    IgG 1,040 700 - 1,600 mg/dL   Lactate Dehydrogenase   Result Value Ref Range     84 - 246 U/L   APTT   Result Value Ref Range    aPTT 28 27 - 38 seconds   Ferritin   Result Value Ref Range    Ferritin 754 (H) 20 - 300 ng/mL   Magnesium   Result Value Ref Range    Magnesium 2.25 1.60 - 2.40 mg/dL   Protime-INR   Result Value Ref Range    Protime 12.2 9.8 - 12.8 seconds    INR 1.1 0.9 - 1.1   Research collection: 10mL Lavender EDTA - Research Collect Correlative Assays; Pre-Dose; Prior to treatment   Result Value Ref Range    Extra Tube Hold for add-ons.    Research collection: 10mL Lavender EDTA - Research Collect Correlative Assays; Pre-Dose; Prior to treatment   Result Value Ref Range    Extra Tube Hold for add-ons.    Research collection: 10mL Lavender EDTA - Research Collect Correlative Assays; Pre-Dose; Prior to treatment   Result Value Ref Range    Extra Tube Hold for add-ons.    POCT UA (nonautomated) manually resulted   Result Value Ref Range    POC Color, Urine      POC Appearance, Urine      POC Glucose, Urine      POC Bilirubin, Urine      POC Ketones, Urine      POC Specific Gravity, Urine      POC Blood, Urine      POC PH, Urine      POC Protein, Urine      POC Urobilinogen, Urine      Poc Nitrite, Urine      POC Leukocytes, Urine                This patient has a central line   Reason for the central line remaining today?  Electrolytes and blood products          Assessment/Plan   Assessment & Plan  Mantle cell lymphoma    MCL (mantle cell lymphoma) (Multi)    Nancie Armenta is a 59 y.o. male presenting with relapsed Mantle cell lymphoma admitted on 11/29/24 for  his CAR- T cell therapy on CASE 2419 (T=0, 12/6).      Currently T-6 today (T=0, 12/6)     ONC:   -Hx/o Stage IV Mantle cell lymphoma involving the GI tract initially treated with [place] regimen and had consolidation autograft with BEAM prep regimen April 2012  -Relapsed 2019 w/presentation of leukocytosis. Had colonoscopy 8/2019 with multiple biopsies including ileum, cecum, appendix, and rectosigmoid c/w MCL, Treated with Ibrutinib and Venetoclax w/complete response.  -Recurrent MCL in July 2024 presenting with dropping Plt counts and bmbx (7/25/24) showing focal involvement w/MCL (NGS cyclin D1 positive and SOX 11 positive. BIRC+ and TP53 positive w/a VAF of 3% BIRC3 positive). FISH neg for 17p deletion.  - CT imaging (8/7/24) showing thickening of sigmoid colon, development of mild to moderate retroperitoneal adenopathy, splenomegaly, and poss splenic infarct.  - Started Pirtobrutinib salvage on 8/12/24. Added Venetoclax starting 10/13/24 since spleen size was increasing on repeat CT imaging and on physical exam  - Pirtobrutinib and Venetoclax stopped 11/14/24 in preparation for CART collection  - Bmbx (10/23/24) with low level involvement by mantle cells about 3% Mantel cells by flow  - PET/CT (11/4/24) with FDG avid lymphadenopathy throughout the neck, chest, abdomen, and pelvis c/w lymphomatous involvement. Intense FDG avid splenomegaly. Peripheral regions of photopenia likely reflect areas of infarction.  - Admitted for his CART cell therapy on CASE 2419 (T=0, 12/6)         CAR-T   - Conditioning with Flu/Cy   - Cyclophosphamide 60 mg/kg IV T-6   - Fludarabine 25 mg/m2 IV T-5, T-4, T-3    - Plan Allopurinol and Keppra per protocol         - Baseline ICE Score:   - ECOG Score: 0- Fully active, able to carry on all pre-disease performance w/o restriction.     # CRS Grade: --    - Organ/System affected by CRS: --   - Date of CRS onset: --   - Date of highest CRS Grade: --   - Date of CRS resolution to Grade 1  or lower: --   - CRS management: --      # ICE Score:   - Date of Neurotoxicity onset: --   - Date of highest Neurotoxicity Grade: --   - Date of Neurotoxicity resolution to Grade 1 or lower: --   - Neurotox management: --      # CAR-T Labs   - CMV DNA PCR ordered on admit  - IgG level ordered on admit  - CBC, RFP, Mg, LFTs: Daily   - Coagulation Screen, Fibrinogen: daily   - LDH-daily  - Haptoglobin: Weekly             HEME:  - Pancytopenia secondary to his malignancy and previous treatment  - Monitor CBC w/diff and transfuse as needed     ID:  - Afebrile   - Cont ACV prophy  - Plan Fluconazole prophy per protocol  - Plan Levaquin when neutropenic     FEN/GI:  - Admit wt: 97.2 kg  - Monitor electrolytes and replete as needed  - Added PPI on admit  - Hx/o CKD (Baseline Cr: 1.20)     CV:  - Cont daily Atorvastatin  - ECHO (10/23/24) with low normal LVEF 53%      :  - BPH. Held Tadalafil on admit. Started Flomax (11/30).     DISP:  - Patient of Dr. Yvonne Hobbs  - PICC line placed (11/29)  - PT ordered    Patient seen, examined, and discussed with Dr. Dooley.  Stephania Womack PA-C

## 2024-11-30 NOTE — ASSESSMENT & PLAN NOTE
Nancie Armenta is a 59 y.o. male presenting with relapsed Mantle cell lymphoma admitted on 11/29/24 for his CAR- T cell therapy on CASE 2419 (T=0, 12/6).      Currently T-6 today (T=0, 12/6)     ONC:   -Hx/o Stage IV Mantle cell lymphoma involving the GI tract initially treated with [place] regimen and had consolidation autograft with BEAM prep regimen April 2012  -Relapsed 2019 w/presentation of leukocytosis. Had colonoscopy 8/2019 with multiple biopsies including ileum, cecum, appendix, and rectosigmoid c/w MCL, Treated with Ibrutinib and Venetoclax w/complete response.  -Recurrent MCL in July 2024 presenting with dropping Plt counts and bmbx (7/25/24) showing focal involvement w/MCL (NGS cyclin D1 positive and SOX 11 positive. BIRC+ and TP53 positive w/a VAF of 3% BIRC3 positive). FISH neg for 17p deletion.  - CT imaging (8/7/24) showing thickening of sigmoid colon, development of mild to moderate retroperitoneal adenopathy, splenomegaly, and poss splenic infarct.  - Started Pirtobrutinib salvage on 8/12/24. Added Venetoclax starting 10/13/24 since spleen size was increasing on repeat CT imaging and on physical exam  - Pirtobrutinib and Venetoclax stopped 11/14/24 in preparation for CART collection  - Bmbx (10/23/24) with low level involvement by mantle cells about 3% Mantel cells by flow  - PET/CT (11/4/24) with FDG avid lymphadenopathy throughout the neck, chest, abdomen, and pelvis c/w lymphomatous involvement. Intense FDG avid splenomegaly. Peripheral regions of photopenia likely reflect areas of infarction.  - Admitted for his CART cell therapy on CASE 2419 (T=0, 12/6)         CAR-T   - Conditioning with Flu/Cy   - Cyclophosphamide 60 mg/kg IV T-6   - Fludarabine 25 mg/m2 IV T-5, T-4, T-3    - Plan Allopurinol and Keppra per protocol         - Baseline ICE Score:   - ECOG Score: 0- Fully active, able to carry on all pre-disease performance w/o restriction.     # CRS Grade: --    - Organ/System affected by  CRS: --   - Date of CRS onset: --   - Date of highest CRS Grade: --   - Date of CRS resolution to Grade 1 or lower: --   - CRS management: --      # ICE Score:   - Date of Neurotoxicity onset: --   - Date of highest Neurotoxicity Grade: --   - Date of Neurotoxicity resolution to Grade 1 or lower: --   - Neurotox management: --      # CAR-T Labs   - CMV DNA PCR ordered on admit  - IgG level ordered on admit  - CBC, RFP, Mg, LFTs: Daily   - Coagulation Screen, Fibrinogen: daily   - LDH-daily  - Haptoglobin: Weekly             HEME:  - Pancytopenia secondary to his malignancy and previous treatment  - Monitor CBC w/diff and transfuse as needed     ID:  - Afebrile   - Cont ACV prophy  - Plan Fluconazole prophy per protocol  - Plan Levaquin when neutropenic     FEN/GI:  - Admit wt: 97.2 kg  - Monitor electrolytes and replete as needed  - Added PPI on admit  - Hx/o CKD (Baseline Cr: 1.20)     CV:  - Cont daily Atorvastatin  - ECHO (10/23/24) with low normal LVEF 53%      :  - BPH. Held Tadalafil on admit. Started Flomax (11/30).     DISP:  - Patient of Dr. Yvonne Hobbs  - PICC line placed (11/29)  - PT ordered

## 2024-12-01 ENCOUNTER — APPOINTMENT (OUTPATIENT)
Dept: RADIOLOGY | Facility: HOSPITAL | Age: 59
End: 2024-12-01
Payer: COMMERCIAL

## 2024-12-01 VITALS
TEMPERATURE: 96.1 F | HEART RATE: 77 BPM | OXYGEN SATURATION: 99 % | SYSTOLIC BLOOD PRESSURE: 130 MMHG | HEIGHT: 72 IN | WEIGHT: 220.24 LBS | RESPIRATION RATE: 16 BRPM | DIASTOLIC BLOOD PRESSURE: 83 MMHG | BODY MASS INDEX: 29.83 KG/M2

## 2024-12-01 LAB
ALBUMIN SERPL BCP-MCNC: 3.3 G/DL (ref 3.4–5)
ALP SERPL-CCNC: 64 U/L (ref 33–136)
ALT SERPL W P-5'-P-CCNC: 16 U/L (ref 10–52)
ANION GAP SERPL CALC-SCNC: 12 MMOL/L (ref 10–20)
AST SERPL W P-5'-P-CCNC: 15 U/L (ref 9–39)
BASOPHILS # BLD AUTO: 0.01 X10*3/UL (ref 0–0.1)
BASOPHILS NFR BLD AUTO: 0.6 %
BILIRUB SERPL-MCNC: 1.1 MG/DL (ref 0–1.2)
BLOOD EXPIRATION DATE: NORMAL
BUN SERPL-MCNC: 18 MG/DL (ref 6–23)
CALCIUM SERPL-MCNC: 7.7 MG/DL (ref 8.6–10.6)
CHLORIDE SERPL-SCNC: 103 MMOL/L (ref 98–107)
CMV DNA SERPL NAA+PROBE-LOG IU: NORMAL {LOG_IU}/ML
CO2 SERPL-SCNC: 23 MMOL/L (ref 21–32)
CREAT SERPL-MCNC: 0.99 MG/DL (ref 0.5–1.3)
DISPENSE STATUS: NORMAL
EGFRCR SERPLBLD CKD-EPI 2021: 87 ML/MIN/1.73M*2
EOSINOPHIL # BLD AUTO: 0.11 X10*3/UL (ref 0–0.7)
EOSINOPHIL NFR BLD AUTO: 6.8 %
ERYTHROCYTE [DISTWIDTH] IN BLOOD BY AUTOMATED COUNT: 16.9 % (ref 11.5–14.5)
GLUCOSE SERPL-MCNC: 83 MG/DL (ref 74–99)
HCT VFR BLD AUTO: 21.4 % (ref 41–52)
HGB BLD-MCNC: 6.8 G/DL (ref 13.5–17.5)
IMM GRANULOCYTES # BLD AUTO: 0 X10*3/UL (ref 0–0.7)
IMM GRANULOCYTES NFR BLD AUTO: 0 % (ref 0–0.9)
LABORATORY COMMENT REPORT: NOT DETECTED
LDH SERPL L TO P-CCNC: 133 U/L (ref 84–246)
LYMPHOCYTES # BLD AUTO: 0.52 X10*3/UL (ref 1.2–4.8)
LYMPHOCYTES NFR BLD AUTO: 32.1 %
MAGNESIUM SERPL-MCNC: 1.9 MG/DL (ref 1.6–2.4)
MCH RBC QN AUTO: 31.6 PG (ref 26–34)
MCHC RBC AUTO-ENTMCNC: 31.8 G/DL (ref 32–36)
MCV RBC AUTO: 100 FL (ref 80–100)
MONOCYTES # BLD AUTO: 0.19 X10*3/UL (ref 0.1–1)
MONOCYTES NFR BLD AUTO: 11.7 %
NEUTROPHILS # BLD AUTO: 0.79 X10*3/UL (ref 1.2–7.7)
NEUTROPHILS NFR BLD AUTO: 48.8 %
NRBC BLD-RTO: 0 /100 WBCS (ref 0–0)
PLATELET # BLD AUTO: 27 X10*3/UL (ref 150–450)
POC APPEARANCE, URINE: NORMAL
POC BILIRUBIN, URINE: NORMAL
POC BLOOD, URINE: NEGATIVE
POC BLOOD, URINE: NORMAL
POC BLOOD, URINE: NORMAL
POC COLOR, URINE: NORMAL
POC GLUCOSE, URINE: NORMAL
POC KETONES, URINE: NORMAL
POC LEUKOCYTES, URINE: NORMAL
POC NITRITE,URINE: NORMAL
POC PH, URINE: NORMAL
POC PROTEIN, URINE: NORMAL
POC SPECIFIC GRAVITY, URINE: NORMAL
POC UROBILINOGEN, URINE: NORMAL
POTASSIUM SERPL-SCNC: 3.7 MMOL/L (ref 3.5–5.3)
PRODUCT BLOOD TYPE: 9500
PRODUCT CODE: NORMAL
PROT SERPL-MCNC: 5.2 G/DL (ref 6.4–8.2)
RBC # BLD AUTO: 2.15 X10*6/UL (ref 4.5–5.9)
SODIUM SERPL-SCNC: 134 MMOL/L (ref 136–145)
UNIT ABO: NORMAL
UNIT NUMBER: NORMAL
UNIT RH: NORMAL
UNIT VOLUME: 280
WBC # BLD AUTO: 1.6 X10*3/UL (ref 4.4–11.3)
XM INTEP: NORMAL

## 2024-12-01 PROCEDURE — 83735 ASSAY OF MAGNESIUM: CPT | Performed by: PHYSICIAN ASSISTANT

## 2024-12-01 PROCEDURE — 2500000002 HC RX 250 W HCPCS SELF ADMINISTERED DRUGS (ALT 637 FOR MEDICARE OP, ALT 636 FOR OP/ED): Performed by: PHYSICIAN ASSISTANT

## 2024-12-01 PROCEDURE — 2500000005 HC RX 250 GENERAL PHARMACY W/O HCPCS: Performed by: INTERNAL MEDICINE

## 2024-12-01 PROCEDURE — P9040 RBC LEUKOREDUCED IRRADIATED: HCPCS

## 2024-12-01 PROCEDURE — 80053 COMPREHEN METABOLIC PANEL: CPT | Performed by: PHYSICIAN ASSISTANT

## 2024-12-01 PROCEDURE — 83615 LACTATE (LD) (LDH) ENZYME: CPT | Performed by: PHYSICIAN ASSISTANT

## 2024-12-01 PROCEDURE — 71045 X-RAY EXAM CHEST 1 VIEW: CPT

## 2024-12-01 PROCEDURE — 36430 TRANSFUSION BLD/BLD COMPNT: CPT

## 2024-12-01 PROCEDURE — 2500000004 HC RX 250 GENERAL PHARMACY W/ HCPCS (ALT 636 FOR OP/ED): Performed by: PHYSICIAN ASSISTANT

## 2024-12-01 PROCEDURE — 2500000001 HC RX 250 WO HCPCS SELF ADMINISTERED DRUGS (ALT 637 FOR MEDICARE OP): Performed by: INTERNAL MEDICINE

## 2024-12-01 PROCEDURE — 2500000001 HC RX 250 WO HCPCS SELF ADMINISTERED DRUGS (ALT 637 FOR MEDICARE OP): Performed by: PHYSICIAN ASSISTANT

## 2024-12-01 PROCEDURE — 1170000001 HC PRIVATE ONCOLOGY ROOM DAILY

## 2024-12-01 PROCEDURE — 81002 URINALYSIS NONAUTO W/O SCOPE: CPT | Performed by: PHYSICIAN ASSISTANT

## 2024-12-01 PROCEDURE — 2500000004 HC RX 250 GENERAL PHARMACY W/ HCPCS (ALT 636 FOR OP/ED): Performed by: INTERNAL MEDICINE

## 2024-12-01 PROCEDURE — 85025 COMPLETE CBC W/AUTO DIFF WBC: CPT | Performed by: PHYSICIAN ASSISTANT

## 2024-12-01 PROCEDURE — 99233 SBSQ HOSP IP/OBS HIGH 50: CPT | Performed by: STUDENT IN AN ORGANIZED HEALTH CARE EDUCATION/TRAINING PROGRAM

## 2024-12-01 PROCEDURE — 2500000005 HC RX 250 GENERAL PHARMACY W/O HCPCS: Performed by: PHYSICIAN ASSISTANT

## 2024-12-01 RX ORDER — FUROSEMIDE 10 MG/ML
20 INJECTION INTRAMUSCULAR; INTRAVENOUS ONCE
Status: COMPLETED | OUTPATIENT
Start: 2024-12-01 | End: 2024-12-01

## 2024-12-01 RX ORDER — POTASSIUM CHLORIDE 750 MG/1
20 TABLET, FILM COATED, EXTENDED RELEASE ORAL ONCE
Status: COMPLETED | OUTPATIENT
Start: 2024-12-01 | End: 2024-12-01

## 2024-12-01 RX ORDER — ONDANSETRON HYDROCHLORIDE 2 MG/ML
8 INJECTION, SOLUTION INTRAVENOUS ONCE
Status: DISCONTINUED | OUTPATIENT
Start: 2024-12-01 | End: 2024-12-01 | Stop reason: ALTCHOICE

## 2024-12-01 RX ORDER — ONDANSETRON HYDROCHLORIDE 8 MG/1
8 TABLET, FILM COATED ORAL ONCE
Status: COMPLETED | OUTPATIENT
Start: 2024-12-01 | End: 2024-12-01

## 2024-12-01 ASSESSMENT — PAIN SCALES - GENERAL
PAINLEVEL_OUTOF10: 0 - NO PAIN

## 2024-12-01 ASSESSMENT — COGNITIVE AND FUNCTIONAL STATUS - GENERAL
DAILY ACTIVITIY SCORE: 24
DAILY ACTIVITIY SCORE: 24
MOBILITY SCORE: 24
MOBILITY SCORE: 24

## 2024-12-01 NOTE — CARE PLAN
Problem: Infection related to problem list condition  Goal: Infection will resolve through treatment  Outcome: Progressing     Problem: Fall/Injury  Goal: Not fall by end of shift  Outcome: Progressing  Goal: Be free from injury by end of the shift  Outcome: Progressing  Goal: Verbalize understanding of personal risk factors for fall in the hospital  Outcome: Progressing  Goal: Verbalize understanding of risk factor reduction measures to prevent injury from fall in the home  Outcome: Progressing  Goal: Use assistive devices by end of the shift  Outcome: Progressing  Goal: Pace activities to prevent fatigue by end of the shift  Outcome: Progressing     Problem: Pain - Adult  Goal: Verbalizes/displays adequate comfort level or baseline comfort level  Outcome: Progressing     Problem: Safety - Adult  Goal: Free from fall injury  Outcome: Progressing     Problem: Discharge Planning  Goal: Discharge to home or other facility with appropriate resources  Outcome: Progressing     Problem: Chronic Conditions and Co-morbidities  Goal: Patient's chronic conditions and co-morbidity symptoms are monitored and maintained or improved  Outcome: Progressing   The patient's goals for the shift include      The clinical goals for the shift include remain HDS

## 2024-12-01 NOTE — CARE PLAN
Problem: Infection related to problem list condition  Goal: Infection will resolve through treatment  Outcome: Progressing     Problem: Fall/Injury  Goal: Not fall by end of shift  Outcome: Progressing  Goal: Be free from injury by end of the shift  Outcome: Progressing  Goal: Verbalize understanding of personal risk factors for fall in the hospital  Outcome: Progressing  Goal: Verbalize understanding of risk factor reduction measures to prevent injury from fall in the home  Outcome: Progressing  Goal: Use assistive devices by end of the shift  Outcome: Progressing  Goal: Pace activities to prevent fatigue by end of the shift  Outcome: Progressing     Problem: Pain - Adult  Goal: Verbalizes/displays adequate comfort level or baseline comfort level  Outcome: Progressing     Problem: Safety - Adult  Goal: Free from fall injury  Outcome: Progressing     Problem: Discharge Planning  Goal: Discharge to home or other facility with appropriate resources  Outcome: Progressing     Problem: Chronic Conditions and Co-morbidities  Goal: Patient's chronic conditions and co-morbidity symptoms are monitored and maintained or improved  Outcome: Progressing    The clinical goals for the shift include Pt will ambulate in the [place] by EOS

## 2024-12-01 NOTE — ASSESSMENT & PLAN NOTE
Nancie Armenta is a 59 y.o. male presenting with relapsed Mantle cell lymphoma admitted on 11/29/24 for his CAR- T cell therapy on CASE 2419 (T=0, 12/6).      Currently T-5 today (T=0, 12/6)     ONC:   -Hx/o Stage IV Mantle cell lymphoma involving the GI tract initially treated with [place] regimen and had consolidation autograft with BEAM prep regimen April 2012  -Relapsed 2019 w/presentation of leukocytosis. Had colonoscopy 8/2019 with multiple biopsies including ileum, cecum, appendix, and rectosigmoid c/w MCL, Treated with Ibrutinib and Venetoclax w/complete response.  -Recurrent MCL in July 2024 presenting with dropping Plt counts and bmbx (7/25/24) showing focal involvement w/MCL (NGS cyclin D1 positive and SOX 11 positive. BIRC+ and TP53 positive w/a VAF of 3% BIRC3 positive). FISH neg for 17p deletion.  - CT imaging (8/7/24) showing thickening of sigmoid colon, development of mild to moderate retroperitoneal adenopathy, splenomegaly, and poss splenic infarct.  - Started Pirtobrutinib salvage on 8/12/24. Added Venetoclax starting 10/13/24 since spleen size was increasing on repeat CT imaging and on physical exam  - Pirtobrutinib and Venetoclax stopped 11/14/24 in preparation for CART collection  - Bmbx (10/23/24) with low level involvement by mantle cells about 3% Mantel cells by flow  - PET/CT (11/4/24) with FDG avid lymphadenopathy throughout the neck, chest, abdomen, and pelvis c/w lymphomatous involvement. Intense FDG avid splenomegaly. Peripheral regions of photopenia likely reflect areas of infarction.  - Admitted for his CART cell therapy on CASE 2419 (T=0, 12/6)         CAR-T   - Conditioning with Flu/Cy   - Cyclophosphamide 60 mg/kg IV T-6   - Fludarabine 25 mg/m2 IV T-5, T-4, T-3    - Plan Allopurinol and Keppra per protocol         - Baseline ICE Score:   - ECOG Score: 0- Fully active, able to carry on all pre-disease performance w/o restriction.     # CRS Grade: --    - Organ/System affected by  CRS: --   - Date of CRS onset: --   - Date of highest CRS Grade: --   - Date of CRS resolution to Grade 1 or lower: --   - CRS management: --      # ICE Score:   - Date of Neurotoxicity onset: --   - Date of highest Neurotoxicity Grade: --   - Date of Neurotoxicity resolution to Grade 1 or lower: --   - Neurotox management: --      # CAR-T Labs   - CMV DNA PCR ordered on admit  - IgG level ordered on admit  - CBC, RFP, Mg, LFTs: Daily   - Coagulation Screen, Fibrinogen: daily   - LDH-daily  - Haptoglobin: Weekly             HEME:  - Pancytopenia secondary to his malignancy and previous treatment  - Monitor CBC w/diff and transfuse as needed  - Hgb 6.8 on 12/1. 1 unit of blood ordered.     ID:  - Afebrile   - Cont ACV prophy  - Plan Fluconazole prophy per protocol  - Plan Levaquin when neutropenic     FEN/GI:  - Admit wt: 97.2 kg, current wt: 99.9 kg  - Monitor electrolytes and replete as needed  - Added PPI on admit  - Hx/o CKD (Baseline Cr: 1.20)  - Volume overload. Gave Lasix 20mg IV once (12/1).  - Hypokalemia repleted last on 12/1.      CV:  - Cont daily Atorvastatin  - ECHO (10/23/24) with low normal LVEF 53%      :  - BPH. Held Tadalafil on admit. Started Flomax (11/30).     DISP:  - Patient of Dr. Yvonne Hobbs  - PICC line placed (11/29). Per nurse, there is new drainage noted around the picc line today, Ordered CXR to confirm line placement (12/1).  - PT ordered

## 2024-12-01 NOTE — PROGRESS NOTES
"Nancie Armenta is a 59 y.o. male on day 2 of admission presenting with Mantle cell lymphoma.    Subjective   Afebrile. No complaints. Received 1st dose of Fludarabine this morning. Ambulating the hallways without difficulty. Nurse notes some clear drainage around patient's picc line today. ROS otherwise.     Objective     Physical Exam  Constitutional:       Appearance: Normal appearance.   HENT:      Head: Normocephalic and atraumatic.      Nose: Nose normal.      Mouth/Throat:      Mouth: Mucous membranes are moist.   Eyes:      Extraocular Movements: Extraocular movements intact.      Pupils: Pupils are equal, round, and reactive to light.   Cardiovascular:      Rate and Rhythm: Normal rate and regular rhythm.      Heart sounds: Normal heart sounds.   Pulmonary:      Effort: Pulmonary effort is normal.      Breath sounds: Normal breath sounds. No wheezing, rhonchi or rales.   Abdominal:      General: Bowel sounds are normal.      Palpations: Abdomen is soft.      Tenderness: There is no abdominal tenderness. There is no guarding.   Musculoskeletal:         General: No swelling or tenderness. Normal range of motion.      Cervical back: Neck supple.   Skin:     General: Skin is warm and dry.      Findings: No rash.   Neurological:      General: No focal deficit present.      Mental Status: He is alert and oriented to person, place, and time.   Psychiatric:         Mood and Affect: Mood normal.         Behavior: Behavior normal.     Last Recorded Vitals  Blood pressure 120/78, pulse 89, temperature 36.8 °C (98.2 °F), temperature source Temporal, resp. rate 16, height 1.839 m (6' 0.4\"), weight 99.9 kg (220 lb 3.8 oz), SpO2 100%.  Intake/Output last 3 Shifts:  I/O last 3 completed shifts:  In: 2554.6 (26.4 mL/kg) [I.V.:1214.6 (12.5 mL/kg); IV Piggyback:1340]  Out: 575 (5.9 mL/kg) [Urine:575 (0.2 mL/kg/hr)]  Weight: 96.8 kg     Relevant Results    Type Scheduled medications  acyclovir, 400 mg, oral, " q12h  allopurinol, 300 mg, oral, Daily  atorvastatin, 40 mg, oral, q AM  [START ON 12/7/2024] fluconazole, 400 mg, oral, Daily  furosemide, 20 mg, intravenous, Once  [START ON 12/5/2024] levETIRAcetam, 500 mg, oral, BID  pantoprazole, 40 mg, oral, Daily before breakfast  tamsulosin, 0.4 mg, oral, Daily      Continuous medications       PRN medications  PRN medications: albuterol, alteplase, dextrose, diphenhydrAMINE, EPINEPHrine HCl, famotidine, hydrocortisone, methylPREDNISolone sodium succinate (PF), ondansetron, prochlorperazine, sodium chloride      Type   Results for orders placed or performed during the hospital encounter of 11/29/24 (from the past 24 hours)   POCT UA (nonautomated) manually resulted   Result Value Ref Range    POC Blood, Urine     CBC and Auto Differential   Result Value Ref Range    WBC 1.6 (L) 4.4 - 11.3 x10*3/uL    nRBC 0.0 0.0 - 0.0 /100 WBCs    RBC 2.15 (L) 4.50 - 5.90 x10*6/uL    Hemoglobin 6.8 (L) 13.5 - 17.5 g/dL    Hematocrit 21.4 (L) 41.0 - 52.0 %     80 - 100 fL    MCH 31.6 26.0 - 34.0 pg    MCHC 31.8 (L) 32.0 - 36.0 g/dL    RDW 16.9 (H) 11.5 - 14.5 %    Platelets 27 (LL) 150 - 450 x10*3/uL    Neutrophils % 48.8 40.0 - 80.0 %    Immature Granulocytes %, Automated 0.0 0.0 - 0.9 %    Lymphocytes % 32.1 13.0 - 44.0 %    Monocytes % 11.7 2.0 - 10.0 %    Eosinophils % 6.8 0.0 - 6.0 %    Basophils % 0.6 0.0 - 2.0 %    Neutrophils Absolute 0.79 (L) 1.20 - 7.70 x10*3/uL    Immature Granulocytes Absolute, Automated 0.00 0.00 - 0.70 x10*3/uL    Lymphocytes Absolute 0.52 (L) 1.20 - 4.80 x10*3/uL    Monocytes Absolute 0.19 0.10 - 1.00 x10*3/uL    Eosinophils Absolute 0.11 0.00 - 0.70 x10*3/uL    Basophils Absolute 0.01 0.00 - 0.10 x10*3/uL   Magnesium   Result Value Ref Range    Magnesium 1.90 1.60 - 2.40 mg/dL   Comprehensive metabolic panel   Result Value Ref Range    Glucose 83 74 - 99 mg/dL    Sodium 134 (L) 136 - 145 mmol/L    Potassium 3.7 3.5 - 5.3 mmol/L    Chloride 103 98 - 107  mmol/L    Bicarbonate 23 21 - 32 mmol/L    Anion Gap 12 10 - 20 mmol/L    Urea Nitrogen 18 6 - 23 mg/dL    Creatinine 0.99 0.50 - 1.30 mg/dL    eGFR 87 >60 mL/min/1.73m*2    Calcium 7.7 (L) 8.6 - 10.6 mg/dL    Albumin 3.3 (L) 3.4 - 5.0 g/dL    Alkaline Phosphatase 64 33 - 136 U/L    Total Protein 5.2 (L) 6.4 - 8.2 g/dL    AST 15 9 - 39 U/L    Bilirubin, Total 1.1 0.0 - 1.2 mg/dL    ALT 16 10 - 52 U/L   Lactate Dehydrogenase   Result Value Ref Range     84 - 246 U/L   Prepare RBC: 1 Units, Irradiated, Leukocytes Reduced (CMV reduced risk)   Result Value Ref Range    PRODUCT CODE N3655V55     Unit Number [id number]     Unit ABO O     Unit RH NEG     XM INTEP COMP     Dispense Status IS     Blood Expiration Date 12/2/2024 11:59:00 PM EST     PRODUCT BLOOD TYPE 9500     UNIT VOLUME 280    POCT UA (nonautomated) manually resulted   Result Value Ref Range    POC Blood, Urine     POCT UA (nonautomated) manually resulted   Result Value Ref Range    POC Color, Urine      POC Appearance, Urine      POC Glucose, Urine      POC Bilirubin, Urine      POC Ketones, Urine      POC Specific Gravity, Urine      POC Blood, Urine NEGATIVE NEGATIVE    POC PH, Urine      POC Protein, Urine      POC Urobilinogen, Urine      Poc Nitrite, Urine      POC Leukocytes, Urine       *Note: Due to a large number of results and/or encounters for the requested time period, some results have not been displayed. A complete set of results can be found in Results Review.              This patient has a central line   Reason for the central line remaining today?  Electrolytes and blood products          Assessment/Plan   Assessment & Plan  Mantle cell lymphoma    MCL (mantle cell lymphoma) (Multi)    Nancie Armenta is a 59 y.o. male presenting with relapsed Mantle cell lymphoma admitted on 11/29/24 for his CAR- T cell therapy on CASE 2419 (T=0, 12/6).      Currently T-5 today (T=0, 12/6)     ONC:   -Hx/o Stage IV Mantle cell lymphoma  involving the GI tract initially treated with [place] regimen and had consolidation autograft with BEAM prep regimen April 2012  -Relapsed 2019 w/presentation of leukocytosis. Had colonoscopy 8/2019 with multiple biopsies including ileum, cecum, appendix, and rectosigmoid c/w MCL, Treated with Ibrutinib and Venetoclax w/complete response.  -Recurrent MCL in July 2024 presenting with dropping Plt counts and bmbx (7/25/24) showing focal involvement w/MCL (NGS cyclin D1 positive and SOX 11 positive. BIRC+ and TP53 positive w/a VAF of 3% BIRC3 positive). FISH neg for 17p deletion.  - CT imaging (8/7/24) showing thickening of sigmoid colon, development of mild to moderate retroperitoneal adenopathy, splenomegaly, and poss splenic infarct.  - Started Pirtobrutinib salvage on 8/12/24. Added Venetoclax starting 10/13/24 since spleen size was increasing on repeat CT imaging and on physical exam  - Pirtobrutinib and Venetoclax stopped 11/14/24 in preparation for CART collection  - Bmbx (10/23/24) with low level involvement by mantle cells about 3% Mantel cells by flow  - PET/CT (11/4/24) with FDG avid lymphadenopathy throughout the neck, chest, abdomen, and pelvis c/w lymphomatous involvement. Intense FDG avid splenomegaly. Peripheral regions of photopenia likely reflect areas of infarction.  - Admitted for his CART cell therapy on CASE 2419 (T=0, 12/6)         CAR-T   - Conditioning with Flu/Cy   - Cyclophosphamide 60 mg/kg IV T-6   - Fludarabine 25 mg/m2 IV T-5, T-4, T-3    - Plan Allopurinol and Keppra per protocol         - Baseline ICE Score:   - ECOG Score: 0- Fully active, able to carry on all pre-disease performance w/o restriction.     # CRS Grade: --    - Organ/System affected by CRS: --   - Date of CRS onset: --   - Date of highest CRS Grade: --   - Date of CRS resolution to Grade 1 or lower: --   - CRS management: --      # ICE Score:   - Date of Neurotoxicity onset: --   - Date of highest Neurotoxicity Grade: --    - Date of Neurotoxicity resolution to Grade 1 or lower: --   - Neurotox management: --      # CAR-T Labs   - CMV DNA PCR ordered on admit  - IgG level ordered on admit  - CBC, RFP, Mg, LFTs: Daily   - Coagulation Screen, Fibrinogen: daily   - LDH-daily  - Haptoglobin: Weekly             HEME:  - Pancytopenia secondary to his malignancy and previous treatment  - Monitor CBC w/diff and transfuse as needed  - Hgb 6.8 on 12/1. 1 unit of blood ordered.     ID:  - Afebrile   - Cont ACV prophy  - Plan Fluconazole prophy per protocol  - Plan Levaquin when neutropenic     FEN/GI:  - Admit wt: 97.2 kg, current wt: 99.9 kg  - Monitor electrolytes and replete as needed  - Added PPI on admit  - Hx/o CKD (Baseline Cr: 1.20)  - Volume overload. Gave Lasix 20mg IV once (12/1).  - Hypokalemia repleted last on 12/1.      CV:  - Cont daily Atorvastatin  - ECHO (10/23/24) with low normal LVEF 53%      :  - BPH. Held Tadalafil on admit. Started Flomax (11/30).     DISP:  - Patient of Dr. Yvonne Hobbs  - PICC line placed (11/29). Per nurse, there is new drainage noted around the picc line today, Ordered CXR to confirm line placement (12/1).  - PT ordered    Patient seen, examined, and discussed with Dr. Dooley.  Stephania Womack PA-C

## 2024-12-02 ENCOUNTER — DOCUMENTATION (OUTPATIENT)
Dept: OTHER | Facility: HOSPITAL | Age: 59
End: 2024-12-02
Payer: COMMERCIAL

## 2024-12-02 ENCOUNTER — APPOINTMENT (OUTPATIENT)
Dept: RADIOLOGY | Facility: HOSPITAL | Age: 59
End: 2024-12-02
Payer: COMMERCIAL

## 2024-12-02 LAB
ALBUMIN SERPL BCP-MCNC: 3.7 G/DL (ref 3.4–5)
ALP SERPL-CCNC: 79 U/L (ref 33–136)
ALT SERPL W P-5'-P-CCNC: 15 U/L (ref 10–52)
ANION GAP SERPL CALC-SCNC: 12 MMOL/L (ref 10–20)
APTT PPP: 27 SECONDS (ref 27–38)
AST SERPL W P-5'-P-CCNC: 17 U/L (ref 9–39)
BASOPHILS # BLD MANUAL: 0.01 X10*3/UL (ref 0–0.1)
BASOPHILS NFR BLD MANUAL: 0.9 %
BILIRUB SERPL-MCNC: 1.5 MG/DL (ref 0–1.2)
BUN SERPL-MCNC: 19 MG/DL (ref 6–23)
CALCIUM SERPL-MCNC: 9 MG/DL (ref 8.6–10.6)
CHLORIDE SERPL-SCNC: 104 MMOL/L (ref 98–107)
CO2 SERPL-SCNC: 26 MMOL/L (ref 21–32)
CREAT SERPL-MCNC: 1.15 MG/DL (ref 0.5–1.3)
EGFRCR SERPLBLD CKD-EPI 2021: 73 ML/MIN/1.73M*2
EOSINOPHIL # BLD MANUAL: 0.04 X10*3/UL (ref 0–0.7)
EOSINOPHIL NFR BLD MANUAL: 2.6 %
ERYTHROCYTE [DISTWIDTH] IN BLOOD BY AUTOMATED COUNT: 17.1 % (ref 11.5–14.5)
FIBRINOGEN PPP-MCNC: 306 MG/DL (ref 200–400)
GLUCOSE SERPL-MCNC: 89 MG/DL (ref 74–99)
HCT VFR BLD AUTO: 27.5 % (ref 41–52)
HGB BLD-MCNC: 9 G/DL (ref 13.5–17.5)
IMM GRANULOCYTES # BLD AUTO: 0.01 X10*3/UL (ref 0–0.7)
IMM GRANULOCYTES NFR BLD AUTO: 0.7 % (ref 0–0.9)
INR PPP: 1.1 (ref 0.9–1.1)
LDH SERPL L TO P-CCNC: 175 U/L (ref 84–246)
LYMPHOCYTES # BLD MANUAL: 0.09 X10*3/UL (ref 1.2–4.8)
LYMPHOCYTES NFR BLD MANUAL: 6.2 %
MAGNESIUM SERPL-MCNC: 2.27 MG/DL (ref 1.6–2.4)
MCH RBC QN AUTO: 31.4 PG (ref 26–34)
MCHC RBC AUTO-ENTMCNC: 32.7 G/DL (ref 32–36)
MCV RBC AUTO: 96 FL (ref 80–100)
MONOCYTES # BLD MANUAL: 0.04 X10*3/UL (ref 0.1–1)
MONOCYTES NFR BLD MANUAL: 2.6 %
NEUTROPHILS # BLD MANUAL: 1.18 X10*3/UL (ref 1.2–7.7)
NEUTS BAND # BLD MANUAL: 0.01 X10*3/UL (ref 0–0.7)
NEUTS BAND NFR BLD MANUAL: 0.9 %
NEUTS SEG # BLD MANUAL: 1.17 X10*3/UL (ref 1.2–7)
NEUTS SEG NFR BLD MANUAL: 83.3 %
NRBC BLD-RTO: 0 /100 WBCS (ref 0–0)
PLATELET # BLD AUTO: 35 X10*3/UL (ref 150–450)
POC BLOOD, URINE: NORMAL
POTASSIUM SERPL-SCNC: 4.3 MMOL/L (ref 3.5–5.3)
PROT SERPL-MCNC: 6.7 G/DL (ref 6.4–8.2)
PROTHROMBIN TIME: 12.1 SECONDS (ref 9.8–12.8)
RBC # BLD AUTO: 2.87 X10*6/UL (ref 4.5–5.9)
RBC MORPH BLD: ABNORMAL
SODIUM SERPL-SCNC: 138 MMOL/L (ref 136–145)
TOTAL CELLS COUNTED BLD: 114
VARIANT LYMPHS # BLD MANUAL: 0.05 X10*3/UL (ref 0–0.5)
VARIANT LYMPHS NFR BLD: 3.5 %
WBC # BLD AUTO: 1.4 X10*3/UL (ref 4.4–11.3)

## 2024-12-02 PROCEDURE — 85610 PROTHROMBIN TIME: CPT | Performed by: PHYSICIAN ASSISTANT

## 2024-12-02 PROCEDURE — C1751 CATH, INF, PER/CENT/MIDLINE: HCPCS

## 2024-12-02 PROCEDURE — 83735 ASSAY OF MAGNESIUM: CPT | Performed by: PHYSICIAN ASSISTANT

## 2024-12-02 PROCEDURE — 85007 BL SMEAR W/DIFF WBC COUNT: CPT | Performed by: PHYSICIAN ASSISTANT

## 2024-12-02 PROCEDURE — 97161 PT EVAL LOW COMPLEX 20 MIN: CPT | Mod: GP

## 2024-12-02 PROCEDURE — 99233 SBSQ HOSP IP/OBS HIGH 50: CPT | Performed by: STUDENT IN AN ORGANIZED HEALTH CARE EDUCATION/TRAINING PROGRAM

## 2024-12-02 PROCEDURE — 85027 COMPLETE CBC AUTOMATED: CPT | Performed by: PHYSICIAN ASSISTANT

## 2024-12-02 PROCEDURE — 2500000002 HC RX 250 W HCPCS SELF ADMINISTERED DRUGS (ALT 637 FOR MEDICARE OP, ALT 636 FOR OP/ED): Performed by: PHYSICIAN ASSISTANT

## 2024-12-02 PROCEDURE — 2500000001 HC RX 250 WO HCPCS SELF ADMINISTERED DRUGS (ALT 637 FOR MEDICARE OP): Performed by: PHYSICIAN ASSISTANT

## 2024-12-02 PROCEDURE — 1170000001 HC PRIVATE ONCOLOGY ROOM DAILY

## 2024-12-02 PROCEDURE — 2780000003 HC OR 278 NO HCPCS

## 2024-12-02 PROCEDURE — 83615 LACTATE (LD) (LDH) ENZYME: CPT | Performed by: PHYSICIAN ASSISTANT

## 2024-12-02 PROCEDURE — 36573 INSJ PICC RS&I 5 YR+: CPT

## 2024-12-02 PROCEDURE — 02HV33Z INSERTION OF INFUSION DEVICE INTO SUPERIOR VENA CAVA, PERCUTANEOUS APPROACH: ICD-10-PCS | Performed by: STUDENT IN AN ORGANIZED HEALTH CARE EDUCATION/TRAINING PROGRAM

## 2024-12-02 PROCEDURE — 81002 URINALYSIS NONAUTO W/O SCOPE: CPT | Performed by: PHYSICIAN ASSISTANT

## 2024-12-02 PROCEDURE — 80053 COMPREHEN METABOLIC PANEL: CPT | Performed by: PHYSICIAN ASSISTANT

## 2024-12-02 PROCEDURE — 2500000004 HC RX 250 GENERAL PHARMACY W/ HCPCS (ALT 636 FOR OP/ED): Performed by: INTERNAL MEDICINE

## 2024-12-02 PROCEDURE — 36416 COLLJ CAPILLARY BLOOD SPEC: CPT | Performed by: PHYSICIAN ASSISTANT

## 2024-12-02 PROCEDURE — 85384 FIBRINOGEN ACTIVITY: CPT | Performed by: PHYSICIAN ASSISTANT

## 2024-12-02 PROCEDURE — 2500000001 HC RX 250 WO HCPCS SELF ADMINISTERED DRUGS (ALT 637 FOR MEDICARE OP): Performed by: INTERNAL MEDICINE

## 2024-12-02 RX ORDER — ONDANSETRON HYDROCHLORIDE 2 MG/ML
8 INJECTION, SOLUTION INTRAVENOUS ONCE
Status: COMPLETED | OUTPATIENT
Start: 2024-12-02 | End: 2024-12-02

## 2024-12-02 RX ORDER — LIDOCAINE HYDROCHLORIDE 10 MG/ML
5 INJECTION, SOLUTION INFILTRATION; PERINEURAL ONCE
Status: DISCONTINUED | OUTPATIENT
Start: 2024-12-02 | End: 2024-12-07

## 2024-12-02 ASSESSMENT — COGNITIVE AND FUNCTIONAL STATUS - GENERAL
DAILY ACTIVITIY SCORE: 24
MOBILITY SCORE: 24
MOBILITY SCORE: 24

## 2024-12-02 ASSESSMENT — PAIN SCALES - GENERAL
PAINLEVEL_OUTOF10: 0 - NO PAIN

## 2024-12-02 ASSESSMENT — PAIN - FUNCTIONAL ASSESSMENT: PAIN_FUNCTIONAL_ASSESSMENT: 0-10

## 2024-12-02 ASSESSMENT — ACTIVITIES OF DAILY LIVING (ADL): ADL_ASSISTANCE: INDEPENDENT

## 2024-12-02 NOTE — CARE PLAN
Problem: Fall/Injury  Goal: Not fall by end of shift  Outcome: Progressing  Goal: Be free from injury by end of the shift  Outcome: Progressing  Goal: Verbalize understanding of personal risk factors for fall in the hospital  Outcome: Progressing  Goal: Verbalize understanding of risk factor reduction measures to prevent injury from fall in the home  Outcome: Progressing  Goal: Use assistive devices by end of the shift  Outcome: Progressing  Goal: Pace activities to prevent fatigue by end of the shift  Outcome: Progressing   The patient's goals for the shift include      The clinical goals for the shift include remain HDS

## 2024-12-02 NOTE — PROGRESS NOTES
Research Note Treatment Day    Nancie Armenta remains admitted on [facility] 3 today for treatment on [id number]. Today is T-4. Procedures completed per protocol. AE's and con-meds reviewed with patient. Patient is feeling good. Some intermittent nausea, unchanged. States PRN antiemetics are working. Appetite still decreased. Replacing PICC today due to leaking around site. Patient currently has peripheral IV. ECOG 0. Patient is aware of treatment plan. Will continue to follow per protocol calendar and as needed.     [x]   Received treatment as planned     Education Documentation  Treatment Plan and Schedule, taught by Truong Chong RN at 12/2/2024  1:30 PM.  Learner: Patient  Readiness: Eager  Method: Explanation  Response: Verbalizes Understanding    Education Comments  No comments found.

## 2024-12-02 NOTE — PROGRESS NOTES
Physical Therapy    Physical Therapy Evaluation    Patient Name: Nancie Armenta  MRN: [id number]  Department: [facility]  Room: [facility]  Today's Date: 12/2/2024   Time Calculation  Start Time: 0930  Stop Time: 0943  Time Calculation (min): 13 min    Assessment/Plan   PT Assessment  Rehab Prognosis: Excellent  Barriers to Discharge: medical needs  Evaluation/Treatment Tolerance: Patient tolerated treatment well  Medical Staff Made Aware: Yes  Strengths: Ability to acquire knowledge, Attitude of self, Premorbid level of function  Barriers to Participation: Comorbidities  End of Session Communication: Bedside nurse  Assessment Comment: Pt is a 59 year old male who presents for CAR T. Pt demonstrates no deficits at this time however would benefit from continued therapy during hospital stay to maintain level of function due to potentially long hospital stay and medical treatment  End of Session Patient Position: Up in chair  IP OR SWING BED PT PLAN  Inpatient or Swing Bed: Inpatient  PT Plan  Treatment/Interventions: Bed mobility, Transfer training, Gait training, Stair training, Neuromuscular re-education, Balance training, Strengthening, Range of motion, Endurance training, Therapeutic exercise, Therapeutic activity, Home exercise program, Positioning, Postural re-education  PT Plan: Ongoing PT  PT Frequency: 1 time per week  PT Discharge Recommendations: No PT needed after discharge  PT Recommended Transfer Status: Independent    Subjective   General Visit Information:  General  Reason for Referral: CAR- T cell therapy on CASE 2419; T=0, 12/6  Past Medical History Relevant to Rehab: Mantle cell lymphoma  Prior to Session Communication: Bedside nurse  Patient Position Received: Up in chair  Preferred Learning Style: verbal, visual  General Comment: Pt is pleasant, cooperative, and willing to participate in therapy  Home Living:  Home Living  Type of Home: House  Lives With: Spouse  Home Adaptive Equipment: None  Home  Layout: Multi-level, Bed/bath upstairs, Stairs to alternate level with rails  Alternate Level Stairs-Number of Steps: 14  Home Access: Level entry  Bathroom Shower/Tub: Walk-in shower  Bathroom Toilet: Standard  Bathroom Equipment: None  Prior Level of Function:  Prior Function Per Pt/Caregiver Report  Level of [place]: Independent with ADLs and functional transfers, Independent with homemaking with ambulation  ADL Assistance: Independent  Homemaking Assistance: Independent  Ambulatory Assistance: Independent  Vocational: Full time employment  Leisure: enjoys yard work  Prior Function Comments: no hx of falls, (+) driving  Precautions:  Precautions  Medical Precautions: Fall precautions  Precautions Comment: protective precautions (H/H: 9.0/24.5; Plts: 35; WBC: 1.4)     Vital Signs (Past 2hrs)        Date/Time Vitals Session Patient Position Pulse Resp SpO2 BP MAP (mmHg)    12/02/24 1304 --  --  95  16  100 %  109/68  82                         Objective   Pain:  Pain Assessment  Pain Assessment: 0-10  0-10 (Numeric) Pain Score: 0 - No pain  Cognition:  Cognition  Overall Cognitive Status: Within Functional Limits  Orientation Level: Oriented X4    General Assessments:  Activity Tolerance  Endurance: Tolerates 10 - 20 min exercise with multiple rests    Sensation  Sensation Comment: occasional neuropathy in toes       Static Sitting Balance  Static Sitting-Balance Support: Feet supported, No upper extremity supported  Static Sitting-Level of Assistance: Independent  Dynamic Sitting Balance  Dynamic Sitting-Balance Support: Feet supported, No upper extremity supported  Dynamic Sitting-Level of Assistance: Independent    Static Standing Balance  Static Standing-Balance Support: No upper extremity supported  Static Standing-Level of Assistance: Independent  Dynamic Standing Balance  Dynamic Standing-Balance Support: No upper extremity supported  Dynamic Standing-Level of Assistance: Independent  Functional  Assessments:  Bed Mobility  Bed Mobility: No    Transfers  Transfer: Yes  Transfer 1  Transfer From 1: Sit to, Stand to  Transfer to 1: Stand, Sit  Technique 1: Sit to stand, Stand to sit  Transfer Level of Assistance 1: Independent  Trials/Comments 1: VCs, multiple trials during session, good control of COM over SANDRA    Ambulation/Gait Training  Ambulation/Gait Training Performed: Yes  Ambulation/Gait Training 1  Surface 1: Level tile  Device 1: No device  Assistance 1: Independent  Comments/Distance (ft) 1: 1,445 ft during 6 MWT, 200 ft    Stairs  Stairs: No  Extremity/Trunk Assessments:  RLE   RLE :  (>/= 3+/5 observed via function)  LLE   LLE :  (>/= 3+/5 observed via function)  Outcome Measures:  [facility] Basic Mobility  Turning from your back to your side while in a flat bed without using bedrails: None  Moving from lying on your back to sitting on the side of a flat bed without using bedrails: None  Moving to and from bed to chair (including a wheelchair): None  Standing up from a chair using your arms (e.g. wheelchair or bedside chair): None  To walk in hospital room: None  Climbing 3-5 steps with railing: None  Basic Mobility - Total Score: 24    Tinetti  Sitting Balance: Steady, safe  Arises: Able without using arms  Attempts to Arise: Able to arise, one attempt  Immediate Standing Balance (First 5 Seconds): Steady without walker or other support  Standing Balance: Narrow stance without support  Nudged: Steady without walker or other support  Eyes Closed: Steady  Turned 360 Degrees: Steadiness: Steady  Turned 360 Degrees: Continuity of Steps: Continuous  Sitting Down: Safe, smooth motion  Balance Score: 16  Initiation of Gait: No hesitancy  Step Height: R Swing Foot: Right foot complete clears floor  Step Length: R Swing Foot: Passes left stance foot  Step Height: L Swing Foot: Left foot complete clears floor  Step Length: L Swing Foot: Passes right stance foot  Step Symmetry: Right and left step appear  equal  Step Continuity: Steps appear continuous  Path: Straight without walking aid  Trunk: No sway, no flexion, no use of arms, no walking aid  Walking Time: Heels almost touching while walking  Gait Score: 12  Total Score: 28    Other Measures  6 min Walk Test: 1,445 ft during 6 MWT  30 Second Sit to Stand: 16 STS transfers    Encounter Problems       Encounter Problems (Active)       Balance       Pt will score a 25/28 or greater on the Tinetti balance assessment in order to demonstrate low fall risk.        Start:  12/02/24    Expected End:  12/23/24               Mobility       STG - Patient will ambulate >1,800 ft independently with no LOB, progress as able and appropriate        Start:  12/02/24    Expected End:  12/23/24            STG - Patient will ascend and descend a flight of stairs independently        Start:  12/02/24    Expected End:  12/23/24            Pt will be able to ambulate 1,450 ft independently within 6 minutes in order to demonstrate functional endurance of a community dweller.        Start:  12/02/24    Expected End:  12/23/24               PT Transfers       STG - Patient will perform bed mobility independently        Start:  12/02/24    Expected End:  12/23/24            STG - Patient will transfer sit to and from stand independently        Start:  12/02/24    Expected End:  12/23/24            Pt will be able to perform 16 STS transfers in 30 seconds to demonstrate average LE strength of community dweller        Start:  12/02/24    Expected End:  12/23/24               Pain - Adult              Education Documentation  Precautions, taught by Caity Valentin, PT at 12/2/2024  2:57 PM.  Learner: Patient  Readiness: Acceptance  Method: Explanation  Response: Verbalizes Understanding  Comment: transfers, gait, importance of activity, activity and symptom monitoring    Mobility Training, taught by Caity Valentin, PT at 12/2/2024  2:57 PM.  Learner: Patient  Readiness: Acceptance  Method:  Explanation  Response: Verbalizes Understanding  Comment: transfers, gait, importance of activity, activity and symptom monitoring    Education Comments  No comments found.      12/02/24 at 2:58 PM - Caity Valentin PT'

## 2024-12-02 NOTE — PROGRESS NOTES
"Nancie Armenta is a 59 y.o. male on day 3 of admission presenting with Mantle cell lymphoma.    Subjective   Afebrile, VSS. No acute events overnight. Continues with lymphodepletion regimen, tolerating well. Denies CP, palpitations, SOB, cough, HA, vision changes, N/V/D/C. ROS otherwise unremarkable.    Objective     Physical Exam  Constitutional:       Appearance: Normal appearance.   HENT:      Head: Normocephalic and atraumatic.      Nose: Nose normal.      Mouth/Throat:      Mouth: Mucous membranes are moist.   Eyes:      Extraocular Movements: Extraocular movements intact.      Pupils: Pupils are equal, round, and reactive to light.   Cardiovascular:      Rate and Rhythm: Normal rate and regular rhythm.      Heart sounds: Normal heart sounds.   Pulmonary:      Effort: Pulmonary effort is normal.      Breath sounds: Normal breath sounds. No wheezing, rhonchi or rales.   Abdominal:      General: Bowel sounds are normal.      Palpations: Abdomen is soft.      Tenderness: There is no abdominal tenderness. There is no guarding.   Musculoskeletal:         General: No swelling or tenderness. Normal range of motion.      Cervical back: Neck supple.   Skin:     General: Skin is warm and dry.      Findings: No rash.   Neurological:      General: No focal deficit present.      Mental Status: He is alert and oriented to person, place, and time.   Psychiatric:         Mood and Affect: Mood normal.         Behavior: Behavior normal.     Last Recorded Vitals  Blood pressure 107/73, pulse 101, temperature 36.2 °C (97.2 °F), resp. rate 16, height 1.839 m (6' 0.4\"), weight 96.9 kg (213 lb 10 oz), SpO2 100%.  Intake/Output last 3 Shifts:  I/O last 3 completed shifts:  In: 908.2 (9.1 mL/kg) [Blood:336; IV Piggyback:572.2]  Out: 725 (7.3 mL/kg) [Urine:725 (0.2 mL/kg/hr)]  Weight: 99.9 kg     Relevant Results    Type Scheduled medications  acyclovir, 400 mg, oral, q12h  allopurinol, 300 mg, oral, Daily  atorvastatin, 40 mg, oral, " q AM  [START ON 12/7/2024] fluconazole, 400 mg, oral, Daily  [START ON 12/5/2024] levETIRAcetam, 500 mg, oral, BID  lidocaine, 5 mL, infiltration, Once  pantoprazole, 40 mg, oral, Daily before breakfast  tamsulosin, 0.4 mg, oral, Daily      Continuous medications       PRN medications  PRN medications: albuterol, alteplase, alteplase, dextrose, diphenhydrAMINE, EPINEPHrine HCl, famotidine, hydrocortisone, methylPREDNISolone sodium succinate (PF), ondansetron, prochlorperazine, sodium chloride      Type   Results for orders placed or performed during the hospital encounter of 11/29/24 (from the past 24 hours)   POCT UA (nonautomated) manually resulted   Result Value Ref Range    POC Color, Urine      POC Appearance, Urine      POC Glucose, Urine      POC Bilirubin, Urine      POC Ketones, Urine      POC Specific Gravity, Urine      POC Blood, Urine NEGATIVE NEGATIVE    POC PH, Urine      POC Protein, Urine      POC Urobilinogen, Urine      Poc Nitrite, Urine      POC Leukocytes, Urine     POCT UA (nonautomated) manually resulted   Result Value Ref Range    POC Blood, Urine     POCT UA (nonautomated) manually resulted   Result Value Ref Range    POC Blood, Urine     CBC and Auto Differential   Result Value Ref Range    WBC 1.4 (L) 4.4 - 11.3 x10*3/uL    nRBC 0.0 0.0 - 0.0 /100 WBCs    RBC 2.87 (L) 4.50 - 5.90 x10*6/uL    Hemoglobin 9.0 (L) 13.5 - 17.5 g/dL    Hematocrit 27.5 (L) 41.0 - 52.0 %    MCV 96 80 - 100 fL    MCH 31.4 26.0 - 34.0 pg    MCHC 32.7 32.0 - 36.0 g/dL    RDW 17.1 (H) 11.5 - 14.5 %    Platelets 35 (LL) 150 - 450 x10*3/uL    Immature Granulocytes %, Automated 0.7 0.0 - 0.9 %    Immature Granulocytes Absolute, Automated 0.01 0.00 - 0.70 x10*3/uL   Magnesium   Result Value Ref Range    Magnesium 2.27 1.60 - 2.40 mg/dL   Comprehensive metabolic panel   Result Value Ref Range    Glucose 89 74 - 99 mg/dL    Sodium 138 136 - 145 mmol/L    Potassium 4.3 3.5 - 5.3 mmol/L    Chloride 104 98 - 107 mmol/L     Bicarbonate 26 21 - 32 mmol/L    Anion Gap 12 10 - 20 mmol/L    Urea Nitrogen 19 6 - 23 mg/dL    Creatinine 1.15 0.50 - 1.30 mg/dL    eGFR 73 >60 mL/min/1.73m*2    Calcium 9.0 8.6 - 10.6 mg/dL    Albumin 3.7 3.4 - 5.0 g/dL    Alkaline Phosphatase 79 33 - 136 U/L    Total Protein 6.7 6.4 - 8.2 g/dL    AST 17 9 - 39 U/L    Bilirubin, Total 1.5 (H) 0.0 - 1.2 mg/dL    ALT 15 10 - 52 U/L   Lactate Dehydrogenase   Result Value Ref Range     84 - 246 U/L   Fibrinogen   Result Value Ref Range    Fibrinogen 306 200 - 400 mg/dL   Coagulation Screen   Result Value Ref Range    Protime 12.1 9.8 - 12.8 seconds    INR 1.1 0.9 - 1.1    aPTT 27 27 - 38 seconds   Manual Differential   Result Value Ref Range    Neutrophils %, Manual 83.3 40.0 - 80.0 %    Bands %, Manual 0.9 0.0 - 5.0 %    Lymphocytes %, Manual 6.2 13.0 - 44.0 %    Monocytes %, Manual 2.6 2.0 - 10.0 %    Eosinophils %, Manual 2.6 0.0 - 6.0 %    Basophils %, Manual 0.9 0.0 - 2.0 %    Atypical Lymphocytes %, Manual 3.5 0.0 - 2.0 %    Seg Neutrophils Absolute, Manual 1.17 (L) 1.20 - 7.00 x10*3/uL    Bands Absolute, Manual 0.01 0.00 - 0.70 x10*3/uL    Lymphocytes Absolute, Manual 0.09 (L) 1.20 - 4.80 x10*3/uL    Monocytes Absolute, Manual 0.04 (L) 0.10 - 1.00 x10*3/uL    Eosinophils Absolute, Manual 0.04 0.00 - 0.70 x10*3/uL    Basophils Absolute, Manual 0.01 0.00 - 0.10 x10*3/uL    Atypical Lymphs Absolute, Manual 0.05 0.00 - 0.50 x10*3/uL    Total Cells Counted 114     Neutrophils Absolute, Manual 1.18 (L) 1.20 - 7.70 x10*3/uL    RBC Morphology No significant RBC morphology present      *Note: Due to a large number of results and/or encounters for the requested time period, some results have not been displayed. A complete set of results can be found in Results Review.              This patient has a central line   Reason for the central line remaining today?  Electrolytes and blood products          Assessment/Plan   Assessment & Plan  Mantle cell lymphoma    MCL  (mantle cell lymphoma) (Multi)    Nancie Armenta is a 59 y.o. male presenting with relapsed Mantle cell lymphoma admitted on 11/29/24 for his CAR- T cell therapy on CASE 2419 (T=0, 12/6).      Currently T-4 today (T=0, 12/6)     ONC:   -Hx/o Stage IV Mantle cell lymphoma involving the GI tract initially treated with [place] regimen and had consolidation autograft with BEAM prep regimen April 2012  -Relapsed 2019 w/presentation of leukocytosis. Had colonoscopy 8/2019 with multiple biopsies including ileum, cecum, appendix, and rectosigmoid c/w MCL, Treated with Ibrutinib and Venetoclax w/complete response.  -Recurrent MCL in July 2024 presenting with dropping Plt counts and bmbx (7/25/24) showing focal involvement w/MCL (NGS cyclin D1 positive and SOX 11 positive. BIRC+ and TP53 positive w/a VAF of 3% BIRC3 positive). FISH neg for 17p deletion.  - CT imaging (8/7/24) showing thickening of sigmoid colon, development of mild to moderate retroperitoneal adenopathy, splenomegaly, and poss splenic infarct.  - Started Pirtobrutinib salvage on 8/12/24. Added Venetoclax starting 10/13/24 since spleen size was increasing on repeat CT imaging and on physical exam  - Pirtobrutinib and Venetoclax stopped 11/14/24 in preparation for CART collection  - Bmbx (10/23/24) with low level involvement by mantle cells about 3% Mantel cells by flow  - PET/CT (11/4/24) with FDG avid lymphadenopathy throughout the neck, chest, abdomen, and pelvis c/w lymphomatous involvement. Intense FDG avid splenomegaly. Peripheral regions of photopenia likely reflect areas of infarction.  - Admitted for his CART cell therapy on CASE 2419 (T=0, 12/6)         CAR-T   - Conditioning with Flu/Cy   - Cyclophosphamide 60 mg/kg IV T-6   - Fludarabine 25 mg/m2 IV T-5, T-4, T-3    - Plan Allopurinol and Keppra per protocol         - Baseline ICE Score:   - ECOG Score: 0- Fully active, able to carry on all pre-disease performance w/o restriction.     # CRS Grade:  --    - Organ/System affected by CRS: --   - Date of CRS onset: --   - Date of highest CRS Grade: --   - Date of CRS resolution to Grade 1 or lower: --   - CRS management: --      # ICE Score:   - Date of Neurotoxicity onset: --   - Date of highest Neurotoxicity Grade: --   - Date of Neurotoxicity resolution to Grade 1 or lower: --   - Neurotox management: --      # CAR-T Labs   - CMV DNA PCR ordered on admit  - IgG level ordered on admit  - CBC, RFP, Mg, LFTs: Daily   - Coagulation Screen, Fibrinogen: daily   - LDH-daily  - Haptoglobin: Weekly             HEME:  - Pancytopenia secondary to his malignancy and previous treatment  - Monitor CBC w/diff and transfuse as needed  - Hgb 6.8 on 12/1. 1 unit of blood ordered.     ID:  - Afebrile   - Cont ACV prophy  - Plan Fluconazole prophy per protocol  - Plan Levaquin when neutropenic     FEN/GI:  - Admit wt: 97.2 kg, current wt: 96.9 kg (12/2)  - Monitor electrolytes and replete as needed  - Added PPI on admit  - Hx/o CKD (Baseline Cr: 1.20)  - Volume overload. Gave Lasix 20mg IV once (12/1).  - Hypokalemia repleted last on 12/1.      CV:  - Cont daily Atorvastatin  - ECHO (10/23/24) with low normal LVEF 53%      :  - BPH. Held Tadalafil on admit. Started Flomax (11/30).     DISP:  - Patient of Dr. Yvonne Hobbs  - PICC line placed (11/29). Drainage from PICC site, will have line exchanged to other arm today.  - PT ordered    Patient seen, examined, and discussed with Dr. Dooley.  Wade Rios, THA-CNP

## 2024-12-02 NOTE — POST-PROCEDURE NOTE
Pre-Procedure Checklist:  Emergent Line Insertion: No  Type of Line to be Placed: PICC  Consent Obtained: Yes  Emergency Medication Necessary: No  Patient Identified with 2 Independent Identifiers: Yes  Review of Allergies, Anticoagulation, Relevant Labs, ECG/Telemetry: Yes  Risks/Benefits/Alternatives Discussed with Patient/POA/Legal Representative: Yes  Stop Sign on Door: Yes  Time Out Performed: Yes  Catheter Exchange: No    Positioning Checklist:  All People, Including Patient, in the Room with Cap and Mask: Yes  Fluoroscopy Used to Identify Vessel and Guide Insertion: No   Sterile Cover Used: Yes  Full Barrier Precautions Followed (Mask, Cap, Gown, Gloves): Yes  Hands Washed: Yes  Monitors Attached with Sound Alarms On: No  Full Body Sterile Drape (Head-to-Toe) Used to Cover Patient: Yes  Trendelenburg Position (For IJ and Subclavian): No  CHG Skin Prep Used and Allowed to Air Dry to Skin Procedure: Yes    Procedure Checklist:  Blood Aspirated From All Lumens, All Ports Subsequently Flushed: Yes  Catheter Caps Placed on All Lumens; Lumens Clamped: Yes  Maintain Guidewire Control Throughout, Ensuring Guidewire Removal: Yes  Maintain Sterile Field Throughout Insertion: Yes  Catheter Secured: Yes  Confirmatory Test of Venous Placement: Non-Pulsatile Blood    Post Procedure Checklist:  Date and Time Written on Dressing: Yes  Sharp and Wire Count and Safe Disposal of all Sharps/Wires: Yes  Sterile Dressing Applied Per Protocol: Yes  X-ray Ordered or ECG Image: Yes    PICC Insertion Details:  Size (Fr): 4  Lumen Type:DL  Catheter to Vein Ratio Less Than 50%: Yes  Total Length (cm): 45  External Length (cm): 0  Orientation: left  Location: brachial  Site Prep: Chlorohexidine; Usual sterile procedure followed  Local Anesthetic: Injectable/Subcutaneous  Indication:access for meds  Insertion Team Members in the Room: NurseGRAEME--Lona Tinoco LPN  Initial Extremity Circumference (cm): 33  Insertion Attempts:1  Patient  Tolerance: Tolerated Well, Age Appropriate  Comfort Measures: Subcutaneous anesthetic; Verbal  Procedure Location: Bedside--timeout with Radha Alvarenga RN  Safety Measures: Patient specific safety measures addressed with RN  Estimated Blood Loss (mL): 0  Vessel Fully Compressible Proximally and Distally to Insertion Site: Yes  Brisk Blood Return Obtained and Line Draws Easily: Yes  Tip Location:SVC  Line Confirmation: ECG  Lot #:[id number]  : BD  PICC Line Exp Date:12/31/2025  Securement: Stat Lock  Post Procedure Checklist: Handoff with RN; Obtain all new IV tubing prior to use; Bed at lowest level and wheels locked; Line discharge information at bedside.  Additional Details: Line was inserted using Modified Seldinger's Technique.   Placed by: Penny Wharton RN-[facility]

## 2024-12-02 NOTE — CARE PLAN
Problem: Infection related to problem list condition  Goal: Infection will resolve through treatment  Outcome: Progressing     Problem: Fall/Injury  Goal: Not fall by end of shift  Outcome: Progressing  Goal: Be free from injury by end of the shift  Outcome: Progressing  Goal: Verbalize understanding of personal risk factors for fall in the hospital  Outcome: Progressing  Goal: Verbalize understanding of risk factor reduction measures to prevent injury from fall in the home  Outcome: Progressing  Goal: Use assistive devices by end of the shift  Outcome: Progressing  Goal: Pace activities to prevent fatigue by end of the shift  Outcome: Progressing     Problem: Pain - Adult  Goal: Verbalizes/displays adequate comfort level or baseline comfort level  Outcome: Progressing     Problem: Safety - Adult  Goal: Free from fall injury  Outcome: Progressing     Problem: Discharge Planning  Goal: Discharge to home or other facility with appropriate resources  Outcome: Progressing     Problem: Chronic Conditions and Co-morbidities  Goal: Patient's chronic conditions and co-morbidity symptoms are monitored and maintained or improved  Outcome: Progressing

## 2024-12-03 LAB
ALBUMIN SERPL BCP-MCNC: 3.5 G/DL (ref 3.4–5)
ALP SERPL-CCNC: 71 U/L (ref 33–136)
ALT SERPL W P-5'-P-CCNC: 15 U/L (ref 10–52)
ANION GAP SERPL CALC-SCNC: 11 MMOL/L (ref 10–20)
APTT PPP: 27 SECONDS (ref 27–38)
AST SERPL W P-5'-P-CCNC: 16 U/L (ref 9–39)
BASOPHILS # BLD AUTO: 0 X10*3/UL (ref 0–0.1)
BASOPHILS NFR BLD AUTO: 0 %
BILIRUB SERPL-MCNC: 1.1 MG/DL (ref 0–1.2)
BUN SERPL-MCNC: 21 MG/DL (ref 6–23)
CALCIUM SERPL-MCNC: 8.2 MG/DL (ref 8.6–10.6)
CHLORIDE SERPL-SCNC: 105 MMOL/L (ref 98–107)
CO2 SERPL-SCNC: 25 MMOL/L (ref 21–32)
CREAT SERPL-MCNC: 0.94 MG/DL (ref 0.5–1.3)
EGFRCR SERPLBLD CKD-EPI 2021: >90 ML/MIN/1.73M*2
EOSINOPHIL # BLD AUTO: 0.04 X10*3/UL (ref 0–0.7)
EOSINOPHIL NFR BLD AUTO: 3.6 %
ERYTHROCYTE [DISTWIDTH] IN BLOOD BY AUTOMATED COUNT: 16.4 % (ref 11.5–14.5)
FERRITIN SERPL-MCNC: 907 NG/ML (ref 20–300)
FIBRINOGEN PPP-MCNC: 274 MG/DL (ref 200–400)
GLUCOSE SERPL-MCNC: 87 MG/DL (ref 74–99)
HAPTOGLOB SERPL NEPH-MCNC: 75 MG/DL (ref 30–200)
HCT VFR BLD AUTO: 23.7 % (ref 41–52)
HGB BLD-MCNC: 7.6 G/DL (ref 13.5–17.5)
IMM GRANULOCYTES # BLD AUTO: 0 X10*3/UL (ref 0–0.7)
IMM GRANULOCYTES NFR BLD AUTO: 0 % (ref 0–0.9)
INR PPP: 1.1 (ref 0.9–1.1)
LDH SERPL L TO P-CCNC: 138 U/L (ref 84–246)
LYMPHOCYTES # BLD AUTO: 0.05 X10*3/UL (ref 1.2–4.8)
LYMPHOCYTES NFR BLD AUTO: 4.5 %
MAGNESIUM SERPL-MCNC: 2.3 MG/DL (ref 1.6–2.4)
MCH RBC QN AUTO: 31 PG (ref 26–34)
MCHC RBC AUTO-ENTMCNC: 32.1 G/DL (ref 32–36)
MCV RBC AUTO: 97 FL (ref 80–100)
MONOCYTES # BLD AUTO: 0.02 X10*3/UL (ref 0.1–1)
MONOCYTES NFR BLD AUTO: 1.8 %
NEUTROPHILS # BLD AUTO: 1 X10*3/UL (ref 1.2–7.7)
NEUTROPHILS NFR BLD AUTO: 90.1 %
NRBC BLD-RTO: 0 /100 WBCS (ref 0–0)
OVALOCYTES BLD QL SMEAR: NORMAL
PLATELET # BLD AUTO: 30 X10*3/UL (ref 150–450)
POTASSIUM SERPL-SCNC: 4.1 MMOL/L (ref 3.5–5.3)
PROT SERPL-MCNC: 5.8 G/DL (ref 6.4–8.2)
PROTHROMBIN TIME: 12.2 SECONDS (ref 9.8–12.8)
RBC # BLD AUTO: 2.45 X10*6/UL (ref 4.5–5.9)
RBC MORPH BLD: NORMAL
SODIUM SERPL-SCNC: 137 MMOL/L (ref 136–145)
WBC # BLD AUTO: 1.1 X10*3/UL (ref 4.4–11.3)

## 2024-12-03 PROCEDURE — 83735 ASSAY OF MAGNESIUM: CPT | Performed by: PHYSICIAN ASSISTANT

## 2024-12-03 PROCEDURE — 2500000004 HC RX 250 GENERAL PHARMACY W/ HCPCS (ALT 636 FOR OP/ED): Performed by: INTERNAL MEDICINE

## 2024-12-03 PROCEDURE — 82728 ASSAY OF FERRITIN: CPT | Performed by: INTERNAL MEDICINE

## 2024-12-03 PROCEDURE — 83615 LACTATE (LD) (LDH) ENZYME: CPT | Performed by: PHYSICIAN ASSISTANT

## 2024-12-03 PROCEDURE — 85025 COMPLETE CBC W/AUTO DIFF WBC: CPT | Performed by: PHYSICIAN ASSISTANT

## 2024-12-03 PROCEDURE — 85384 FIBRINOGEN ACTIVITY: CPT | Performed by: PHYSICIAN ASSISTANT

## 2024-12-03 PROCEDURE — 99233 SBSQ HOSP IP/OBS HIGH 50: CPT | Performed by: STUDENT IN AN ORGANIZED HEALTH CARE EDUCATION/TRAINING PROGRAM

## 2024-12-03 PROCEDURE — 80053 COMPREHEN METABOLIC PANEL: CPT | Performed by: PHYSICIAN ASSISTANT

## 2024-12-03 PROCEDURE — 2500000001 HC RX 250 WO HCPCS SELF ADMINISTERED DRUGS (ALT 637 FOR MEDICARE OP): Performed by: PHYSICIAN ASSISTANT

## 2024-12-03 PROCEDURE — 2500000001 HC RX 250 WO HCPCS SELF ADMINISTERED DRUGS (ALT 637 FOR MEDICARE OP)

## 2024-12-03 PROCEDURE — 2500000001 HC RX 250 WO HCPCS SELF ADMINISTERED DRUGS (ALT 637 FOR MEDICARE OP): Performed by: INTERNAL MEDICINE

## 2024-12-03 PROCEDURE — 1170000001 HC PRIVATE ONCOLOGY ROOM DAILY

## 2024-12-03 PROCEDURE — 2500000002 HC RX 250 W HCPCS SELF ADMINISTERED DRUGS (ALT 637 FOR MEDICARE OP, ALT 636 FOR OP/ED): Performed by: PHYSICIAN ASSISTANT

## 2024-12-03 PROCEDURE — 83010 ASSAY OF HAPTOGLOBIN QUANT: CPT | Performed by: PHYSICIAN ASSISTANT

## 2024-12-03 PROCEDURE — 85610 PROTHROMBIN TIME: CPT | Performed by: PHYSICIAN ASSISTANT

## 2024-12-03 RX ORDER — BENZONATATE 100 MG/1
100 CAPSULE ORAL 3 TIMES DAILY PRN
Status: DISCONTINUED | OUTPATIENT
Start: 2024-12-03 | End: 2024-12-18 | Stop reason: HOSPADM

## 2024-12-03 RX ORDER — ONDANSETRON HYDROCHLORIDE 2 MG/ML
8 INJECTION, SOLUTION INTRAVENOUS ONCE
Status: COMPLETED | OUTPATIENT
Start: 2024-12-03 | End: 2024-12-03

## 2024-12-03 ASSESSMENT — COGNITIVE AND FUNCTIONAL STATUS - GENERAL
MOBILITY SCORE: 24
DAILY ACTIVITIY SCORE: 24

## 2024-12-03 ASSESSMENT — PAIN SCALES - GENERAL
PAINLEVEL_OUTOF10: 0 - NO PAIN
PAINLEVEL_OUTOF10: 0 - NO PAIN

## 2024-12-03 ASSESSMENT — PAIN - FUNCTIONAL ASSESSMENT: PAIN_FUNCTIONAL_ASSESSMENT: 0-10

## 2024-12-03 ASSESSMENT — ACTIVITIES OF DAILY LIVING (ADL): LACK_OF_TRANSPORTATION: NO

## 2024-12-03 NOTE — ASSESSMENT & PLAN NOTE
Nancie Armenta is a 59 y.o. male presenting with relapsed Mantle cell lymphoma admitted on 11/29/24 for his CAR- T cell therapy on CASE 2419 (T=0, 12/6).    Currently T-3 today (T=0, 12/6)     ONC:   -Hx/o Stage IV Mantle cell lymphoma involving the GI tract initially treated with [place] regimen and had consolidation autograft with BEAM prep regimen April 2012  -Relapsed 2019 w/presentation of leukocytosis. Had colonoscopy 8/2019 with multiple biopsies including ileum, cecum, appendix, and rectosigmoid c/w MCL, Treated with Ibrutinib and Venetoclax w/complete response.  -Recurrent MCL in July 2024 presenting with dropping Plt counts and bmbx (7/25/24) showing focal involvement w/MCL (NGS cyclin D1 positive and SOX 11 positive. BIRC+ and TP53 positive w/a VAF of 3% BIRC3 positive). FISH neg for 17p deletion.  - CT imaging (8/7/24) showing thickening of sigmoid colon, development of mild to moderate retroperitoneal adenopathy, splenomegaly, and poss splenic infarct.  - Started Pirtobrutinib salvage on 8/12/24. Added Venetoclax starting 10/13/24 since spleen size was increasing on repeat CT imaging and on physical exam  - Pirtobrutinib and Venetoclax stopped 11/14/24 in preparation for CART collection  - Bmbx (10/23/24) with low level involvement by mantle cells about 3% Mantle cells by flow  - PET/CT (11/4/24) with FDG avid lymphadenopathy throughout the neck, chest, abdomen, and pelvis c/w lymphomatous involvement. Intense FDG avid splenomegaly. Peripheral regions of photopenia likely reflect areas of infarction.  - Admitted for his CART cell therapy on CASE 2419 (T=0, 12/6)      CAR-T   - Conditioning with Flu/Cy   - Cyclophosphamide 60 mg/kg IV T-6   - Fludarabine 25 mg/m2 IV T-5, T-4, T-3    - Plan Allopurinol and Keppra per protocol     - Baseline ICE Score:   - ECOG Score 12/3: 0- Fully active, able to carry on all pre-disease performance w/o restriction.     # CRS Grade: --    - Organ/System affected by  CRS: --   - Date of CRS onset: --   - Date of highest CRS Grade: --   - Date of CRS resolution to Grade 1 or lower: --   - CRS management: --      # ICE Score:   - Date of Neurotoxicity onset: --   - Date of highest Neurotoxicity Grade: --   - Date of Neurotoxicity resolution to Grade 1 or lower: --   - Neurotox management: --      # CAR-T Labs   - CMV DNA PCR ordered on admit  - IgG level ordered on admit  - CBC, RFP, Mg, LFTs: Daily   - Coagulation Screen, Fibrinogen: daily   - LDH-daily  - Haptoglobin: Weekly       HEME:  - Pancytopenia secondary to his malignancy and previous treatment  - Monitor CBC w/diff and transfuse as needed  - Hgb 6.8 on 12/1. 1 unit of blood ordered.     ID:  - Afebrile   - Cont ACV prophy  - Plan Fluconazole prophy per protocol  - Plan Levaquin when neutropenic     FEN/GI:  - Admit wt: 97.2 kg, current wt: 96.9 kg (12/2)  - Monitor electrolytes and replete as needed  - Added PPI on admit  - Hx/o CKD (Baseline Cr: 1.20)  - Volume overload. Gave Lasix 20mg IV once (12/1).  - Hypokalemia repleted last on 12/1.      CV:  - Cont daily Atorvastatin  - ECHO (10/23/24) with low normal LVEF 53%      :  - BPH. Held Tadalafil on admit. Started Flomax (11/30).     DISP:  - Patient of Dr. Yvonne Hobbs  - PICC line re-placed (12/2)  - PT ordered

## 2024-12-03 NOTE — PROGRESS NOTES
"Nancie Armenta is a 59 y.o. male on day 4 of admission presenting with Mantle cell lymphoma.    Subjective   Afebrile, VSS. Notes a \"tickle\" in throat and dry cough, otherwise no acute events overnight. Tolerated lymphodepletion regimen well, now for rest days prior to cell infusion. Denies CP, palpitations, SOB, HA, vision changes, N/V/D/C. ROS otherwise unremarkable.    Objective     Physical Exam  Constitutional:       Appearance: Normal appearance.   HENT:      Head: Normocephalic and atraumatic.      Nose: Nose normal.      Mouth/Throat:      Mouth: Mucous membranes are moist.   Eyes:      Extraocular Movements: Extraocular movements intact.      Pupils: Pupils are equal, round, and reactive to light.   Cardiovascular:      Rate and Rhythm: Normal rate and regular rhythm.      Heart sounds: Normal heart sounds.   Pulmonary:      Effort: Pulmonary effort is normal.      Breath sounds: Normal breath sounds. No wheezing, rhonchi or rales.   Abdominal:      General: Bowel sounds are normal.      Palpations: Abdomen is soft.      Tenderness: There is no abdominal tenderness. There is no guarding.   Musculoskeletal:         General: No swelling or tenderness. Normal range of motion.      Cervical back: Neck supple.   Skin:     General: Skin is warm and dry.      Findings: No rash.   Neurological:      General: No focal deficit present.      Mental Status: He is alert and oriented to person, place, and time.   Psychiatric:         Mood and Affect: Mood normal.         Behavior: Behavior normal.     Last Recorded Vitals  Blood pressure 118/75, pulse 89, temperature 36.6 °C (97.9 °F), temperature source Temporal, resp. rate 18, height 1.839 m (6' 0.4\"), weight 96.9 kg (213 lb 10 oz), SpO2 97%.  Intake/Output last 3 Shifts:  I/O last 3 completed shifts:  In: 112.2 (1.2 mL/kg) [IV Piggyback:112.2]  Out: 625 (6.5 mL/kg) [Urine:625 (0.2 mL/kg/hr)]  Weight: 96.9 kg     Relevant Results    Type Scheduled " medications  acyclovir, 400 mg, oral, q12h  allopurinol, 300 mg, oral, Daily  atorvastatin, 40 mg, oral, q AM  [START ON 12/7/2024] fluconazole, 400 mg, oral, Daily  fludarabine, 25 mg/m2 (Treatment Plan Recorded), intravenous, Once  [START ON 12/5/2024] levETIRAcetam, 500 mg, oral, BID  lidocaine, 5 mL, infiltration, Once  ondansetron, 8 mg, intravenous, Once  pantoprazole, 40 mg, oral, Daily before breakfast  tamsulosin, 0.4 mg, oral, Daily      Continuous medications       PRN medications  PRN medications: albuterol, alteplase, alteplase, dextrose, diphenhydrAMINE, EPINEPHrine HCl, famotidine, hydrocortisone, methylPREDNISolone sodium succinate (PF), ondansetron, prochlorperazine, sodium chloride      Type   Results for orders placed or performed during the hospital encounter of 11/29/24 (from the past 24 hours)   Haptoglobin   Result Value Ref Range    Haptoglobin 75 30 - 200 mg/dL   CBC and Auto Differential   Result Value Ref Range    WBC 1.1 (L) 4.4 - 11.3 x10*3/uL    nRBC 0.0 0.0 - 0.0 /100 WBCs    RBC 2.45 (L) 4.50 - 5.90 x10*6/uL    Hemoglobin 7.6 (L) 13.5 - 17.5 g/dL    Hematocrit 23.7 (L) 41.0 - 52.0 %    MCV 97 80 - 100 fL    MCH 31.0 26.0 - 34.0 pg    MCHC 32.1 32.0 - 36.0 g/dL    RDW 16.4 (H) 11.5 - 14.5 %    Platelets 30 (LL) 150 - 450 x10*3/uL    Neutrophils % 90.1 40.0 - 80.0 %    Immature Granulocytes %, Automated 0.0 0.0 - 0.9 %    Lymphocytes % 4.5 13.0 - 44.0 %    Monocytes % 1.8 2.0 - 10.0 %    Eosinophils % 3.6 0.0 - 6.0 %    Basophils % 0.0 0.0 - 2.0 %    Neutrophils Absolute 1.00 (L) 1.20 - 7.70 x10*3/uL    Immature Granulocytes Absolute, Automated 0.00 0.00 - 0.70 x10*3/uL    Lymphocytes Absolute 0.05 (L) 1.20 - 4.80 x10*3/uL    Monocytes Absolute 0.02 (L) 0.10 - 1.00 x10*3/uL    Eosinophils Absolute 0.04 0.00 - 0.70 x10*3/uL    Basophils Absolute 0.00 0.00 - 0.10 x10*3/uL   Magnesium   Result Value Ref Range    Magnesium 2.30 1.60 - 2.40 mg/dL   Comprehensive metabolic panel   Result  Value Ref Range    Glucose 87 74 - 99 mg/dL    Sodium 137 136 - 145 mmol/L    Potassium 4.1 3.5 - 5.3 mmol/L    Chloride 105 98 - 107 mmol/L    Bicarbonate 25 21 - 32 mmol/L    Anion Gap 11 10 - 20 mmol/L    Urea Nitrogen 21 6 - 23 mg/dL    Creatinine 0.94 0.50 - 1.30 mg/dL    eGFR >90 >60 mL/min/1.73m*2    Calcium 8.2 (L) 8.6 - 10.6 mg/dL    Albumin 3.5 3.4 - 5.0 g/dL    Alkaline Phosphatase 71 33 - 136 U/L    Total Protein 5.8 (L) 6.4 - 8.2 g/dL    AST 16 9 - 39 U/L    Bilirubin, Total 1.1 0.0 - 1.2 mg/dL    ALT 15 10 - 52 U/L   Lactate Dehydrogenase   Result Value Ref Range     84 - 246 U/L   Fibrinogen   Result Value Ref Range    Fibrinogen 274 200 - 400 mg/dL   Coagulation Screen   Result Value Ref Range    Protime 12.2 9.8 - 12.8 seconds    INR 1.1 0.9 - 1.1    aPTT 27 27 - 38 seconds   Morphology   Result Value Ref Range    RBC Morphology See Below     Ovalocytes Few      *Note: Due to a large number of results and/or encounters for the requested time period, some results have not been displayed. A complete set of results can be found in Results Review.              This patient has a central line   Reason for the central line remaining today?  Electrolytes and blood products          Assessment/Plan   Assessment & Plan  Mantle cell lymphoma    MCL (mantle cell lymphoma) (Multi)    Nancie Armenta is a 59 y.o. male presenting with relapsed Mantle cell lymphoma admitted on 11/29/24 for his CAR- T cell therapy on CASE 2419 (T=0, 12/6).    Currently T-3 today (T=0, 12/6)     ONC:   -Hx/o Stage IV Mantle cell lymphoma involving the GI tract initially treated with [place] regimen and had consolidation autograft with BEAM prep regimen April 2012  -Relapsed 2019 w/presentation of leukocytosis. Had colonoscopy 8/2019 with multiple biopsies including ileum, cecum, appendix, and rectosigmoid c/w MCL, Treated with Ibrutinib and Venetoclax w/complete response.  -Recurrent MCL in July 2024 presenting with dropping  Plt counts and bmbx (7/25/24) showing focal involvement w/MCL (NGS cyclin D1 positive and SOX 11 positive. BIRC+ and TP53 positive w/a VAF of 3% BIRC3 positive). FISH neg for 17p deletion.  - CT imaging (8/7/24) showing thickening of sigmoid colon, development of mild to moderate retroperitoneal adenopathy, splenomegaly, and poss splenic infarct.  - Started Pirtobrutinib salvage on 8/12/24. Added Venetoclax starting 10/13/24 since spleen size was increasing on repeat CT imaging and on physical exam  - Pirtobrutinib and Venetoclax stopped 11/14/24 in preparation for CART collection  - Bmbx (10/23/24) with low level involvement by mantle cells about 3% Mantle cells by flow  - PET/CT (11/4/24) with FDG avid lymphadenopathy throughout the neck, chest, abdomen, and pelvis c/w lymphomatous involvement. Intense FDG avid splenomegaly. Peripheral regions of photopenia likely reflect areas of infarction.  - Admitted for his CART cell therapy on CASE 2419 (T=0, 12/6)      CAR-T   - Conditioning with Flu/Cy   - Cyclophosphamide 60 mg/kg IV T-6   - Fludarabine 25 mg/m2 IV T-5, T-4, T-3    - Plan Allopurinol and Keppra per protocol     - Baseline ICE Score:   - ECOG Score 12/3: 0- Fully active, able to carry on all pre-disease performance w/o restriction.     # CRS Grade: --    - Organ/System affected by CRS: --   - Date of CRS onset: --   - Date of highest CRS Grade: --   - Date of CRS resolution to Grade 1 or lower: --   - CRS management: --      # ICE Score:   - Date of Neurotoxicity onset: --   - Date of highest Neurotoxicity Grade: --   - Date of Neurotoxicity resolution to Grade 1 or lower: --   - Neurotox management: --      # CAR-T Labs   - CMV DNA PCR ordered on admit  - IgG level ordered on admit  - CBC, RFP, Mg, LFTs: Daily   - Coagulation Screen, Fibrinogen: daily   - LDH-daily  - Haptoglobin: Weekly       HEME:  - Pancytopenia secondary to his malignancy and previous treatment  - Monitor CBC w/diff and transfuse as  needed  - Hgb 6.8 on 12/1. 1 unit of blood ordered.     ID:  - Afebrile   - Cont ACV prophy  - Plan Fluconazole prophy per protocol  - Plan Levaquin when neutropenic     FEN/GI:  - Admit wt: 97.2 kg, current wt: 96.9 kg (12/2)  - Monitor electrolytes and replete as needed  - Added PPI on admit  - Hx/o CKD (Baseline Cr: 1.20)  - Volume overload. Gave Lasix 20mg IV once (12/1).  - Hypokalemia repleted last on 12/1.      CV:  - Cont daily Atorvastatin  - ECHO (10/23/24) with low normal LVEF 53%      :  - BPH. Held Tadalafil on admit. Started Flomax (11/30).     DISP:  - Patient of Dr. Yvonne Hobbs  - PICC line re-placed (12/2)  - PT ordered    Patient seen, examined, and discussed with Dr. Dooley.  THA Parra-CNP

## 2024-12-03 NOTE — PROGRESS NOTES
12/03/24 1542   Discharge Planning   Living Arrangements Spouse/significant other   Support Systems Spouse/significant other   Assistance Needed none   Type of Residence Private residence   Home or Post Acute Services None   Expected Discharge Disposition Home   Does the patient need discharge transport arranged? No   Financial Resource Strain   How hard is it for you to pay for the very basics like food, housing, medical care, and heating? Not hard   Housing Stability   In the last 12 months, was there a time when you were not able to pay the mortgage or rent on time? N   In the past 12 months, how many times have you moved where you were living? 0   At any time in the past 12 months, were you homeless or living in a shelter (including now)? N   Transportation Needs   In the past 12 months, has lack of transportation kept you from medical appointments or from getting medications? no   In the past 12 months, has lack of transportation kept you from meetings, work, or from getting things needed for daily living? No   Patient Choice   Provider Choice list and CMS website (https://medicare.gov/care-compare#search) for post-acute Quality and Resource Measure Data were provided and reviewed with: Patient     Pt with Mantle Cell Lymphoma was admitted for CAR-T CASE 2419, T=0 12/6/24.  Met with the pt to discuss his home going plan.  He will be returning to his home in [place], where he lives with his wife, who will be his caregiver.  He is independent and doesn't use any assistive devices.  He has a new DL Power PICC for which he will need new home care.  He has had a PICC in the past so states he will not need a RN, so can use [facility], pharmacy only for supplies.  His MD is Dr. Yvonne Hobbs.  Will continue to follow to assist with the home going plan.  Bea Gonzalez RN, TCC

## 2024-12-04 ENCOUNTER — APPOINTMENT (OUTPATIENT)
Dept: LAB | Facility: LAB | Age: 59
End: 2024-12-04
Payer: COMMERCIAL

## 2024-12-04 DIAGNOSIS — C91.00: ICD-10-CM

## 2024-12-04 LAB
ALBUMIN SERPL BCP-MCNC: 3.5 G/DL (ref 3.4–5)
ALP SERPL-CCNC: 71 U/L (ref 33–136)
ALT SERPL W P-5'-P-CCNC: 21 U/L (ref 10–52)
ANION GAP SERPL CALC-SCNC: 12 MMOL/L (ref 10–20)
APTT PPP: 27 SECONDS (ref 27–38)
AST SERPL W P-5'-P-CCNC: 22 U/L (ref 9–39)
BASOPHILS # BLD MANUAL: 0.01 X10*3/UL (ref 0–0.1)
BASOPHILS NFR BLD MANUAL: 1 %
BILIRUB SERPL-MCNC: 1 MG/DL (ref 0–1.2)
BUN SERPL-MCNC: 19 MG/DL (ref 6–23)
CALCIUM SERPL-MCNC: 8.2 MG/DL (ref 8.6–10.6)
CHLORIDE SERPL-SCNC: 104 MMOL/L (ref 98–107)
CO2 SERPL-SCNC: 25 MMOL/L (ref 21–32)
CREAT SERPL-MCNC: 0.96 MG/DL (ref 0.5–1.3)
EGFRCR SERPLBLD CKD-EPI 2021: 90 ML/MIN/1.73M*2
EOSINOPHIL # BLD MANUAL: 0.02 X10*3/UL (ref 0–0.7)
EOSINOPHIL NFR BLD MANUAL: 2 %
ERYTHROCYTE [DISTWIDTH] IN BLOOD BY AUTOMATED COUNT: 15.8 % (ref 11.5–14.5)
FIBRINOGEN PPP-MCNC: 258 MG/DL (ref 200–400)
GLUCOSE SERPL-MCNC: 87 MG/DL (ref 74–99)
HCT VFR BLD AUTO: 22.8 % (ref 41–52)
HGB BLD-MCNC: 7.6 G/DL (ref 13.5–17.5)
IMM GRANULOCYTES # BLD AUTO: 0.01 X10*3/UL (ref 0–0.7)
IMM GRANULOCYTES NFR BLD AUTO: 0.9 % (ref 0–0.9)
INR PPP: 1.1 (ref 0.9–1.1)
LDH SERPL L TO P-CCNC: 135 U/L (ref 84–246)
LYMPHOCYTES # BLD MANUAL: 0 X10*3/UL (ref 1.2–4.8)
LYMPHOCYTES NFR BLD MANUAL: 0 %
MAGNESIUM SERPL-MCNC: 2.28 MG/DL (ref 1.6–2.4)
MCH RBC QN AUTO: 31.7 PG (ref 26–34)
MCHC RBC AUTO-ENTMCNC: 33.3 G/DL (ref 32–36)
MCV RBC AUTO: 95 FL (ref 80–100)
MONOCYTES # BLD MANUAL: 0.01 X10*3/UL (ref 0.1–1)
MONOCYTES NFR BLD MANUAL: 1 %
NEUTROPHILS # BLD MANUAL: 1.15 X10*3/UL (ref 1.2–7.7)
NEUTS BAND # BLD MANUAL: 0.08 X10*3/UL (ref 0–0.7)
NEUTS BAND NFR BLD MANUAL: 6.9 %
NEUTS SEG # BLD MANUAL: 1.07 X10*3/UL (ref 1.2–7)
NEUTS SEG NFR BLD MANUAL: 89.1 %
NRBC BLD-RTO: 0 /100 WBCS (ref 0–0)
OVALOCYTES BLD QL SMEAR: ABNORMAL
PLATELET # BLD AUTO: 35 X10*3/UL (ref 150–450)
POTASSIUM SERPL-SCNC: 4.1 MMOL/L (ref 3.5–5.3)
PROT SERPL-MCNC: 5.9 G/DL (ref 6.4–8.2)
PROTHROMBIN TIME: 12 SECONDS (ref 9.8–12.8)
RBC # BLD AUTO: 2.4 X10*6/UL (ref 4.5–5.9)
RBC MORPH BLD: ABNORMAL
SODIUM SERPL-SCNC: 137 MMOL/L (ref 136–145)
TOTAL CELLS COUNTED BLD: 101
WBC # BLD AUTO: 1.2 X10*3/UL (ref 4.4–11.3)

## 2024-12-04 PROCEDURE — 85384 FIBRINOGEN ACTIVITY: CPT | Performed by: PHYSICIAN ASSISTANT

## 2024-12-04 PROCEDURE — 2500000001 HC RX 250 WO HCPCS SELF ADMINISTERED DRUGS (ALT 637 FOR MEDICARE OP)

## 2024-12-04 PROCEDURE — 85610 PROTHROMBIN TIME: CPT | Performed by: PHYSICIAN ASSISTANT

## 2024-12-04 PROCEDURE — 2500000001 HC RX 250 WO HCPCS SELF ADMINISTERED DRUGS (ALT 637 FOR MEDICARE OP): Performed by: PHYSICIAN ASSISTANT

## 2024-12-04 PROCEDURE — 83615 LACTATE (LD) (LDH) ENZYME: CPT | Performed by: PHYSICIAN ASSISTANT

## 2024-12-04 PROCEDURE — 1170000001 HC PRIVATE ONCOLOGY ROOM DAILY

## 2024-12-04 PROCEDURE — 84075 ASSAY ALKALINE PHOSPHATASE: CPT | Performed by: PHYSICIAN ASSISTANT

## 2024-12-04 PROCEDURE — 2500000001 HC RX 250 WO HCPCS SELF ADMINISTERED DRUGS (ALT 637 FOR MEDICARE OP): Performed by: INTERNAL MEDICINE

## 2024-12-04 PROCEDURE — 99233 SBSQ HOSP IP/OBS HIGH 50: CPT | Performed by: STUDENT IN AN ORGANIZED HEALTH CARE EDUCATION/TRAINING PROGRAM

## 2024-12-04 PROCEDURE — 87070 CULTURE OTHR SPECIMN AEROBIC: CPT | Performed by: INTERNAL MEDICINE

## 2024-12-04 PROCEDURE — 83735 ASSAY OF MAGNESIUM: CPT | Performed by: PHYSICIAN ASSISTANT

## 2024-12-04 PROCEDURE — 85007 BL SMEAR W/DIFF WBC COUNT: CPT | Performed by: PHYSICIAN ASSISTANT

## 2024-12-04 PROCEDURE — 2500000002 HC RX 250 W HCPCS SELF ADMINISTERED DRUGS (ALT 637 FOR MEDICARE OP, ALT 636 FOR OP/ED): Performed by: PHYSICIAN ASSISTANT

## 2024-12-04 PROCEDURE — 85027 COMPLETE CBC AUTOMATED: CPT | Performed by: PHYSICIAN ASSISTANT

## 2024-12-04 ASSESSMENT — COGNITIVE AND FUNCTIONAL STATUS - GENERAL
MOBILITY SCORE: 24
DAILY ACTIVITIY SCORE: 24

## 2024-12-04 ASSESSMENT — PAIN - FUNCTIONAL ASSESSMENT: PAIN_FUNCTIONAL_ASSESSMENT: 0-10

## 2024-12-04 ASSESSMENT — PAIN SCALES - GENERAL
PAINLEVEL_OUTOF10: 0 - NO PAIN
PAINLEVEL_OUTOF10: 0 - NO PAIN

## 2024-12-04 NOTE — PROGRESS NOTES
"Nancie Armenta is a 59 y.o. male on day 5 of admission presenting with Mantle cell lymphoma.    Subjective   Afebrile, VSS. States tessalon helped with tickle/cough he was having yesterday, otherwise no acute events overnight. Denies CP, palpitations, SOB, HA, vision changes, N/V/D/C. ROS otherwise unremarkable.    Objective     Physical Exam  Constitutional:       Appearance: Normal appearance.   HENT:      Head: Normocephalic and atraumatic.      Nose: Nose normal.      Mouth/Throat:      Mouth: Mucous membranes are moist.   Eyes:      Extraocular Movements: Extraocular movements intact.      Pupils: Pupils are equal, round, and reactive to light.   Cardiovascular:      Rate and Rhythm: Normal rate and regular rhythm.      Heart sounds: Normal heart sounds.   Pulmonary:      Effort: Pulmonary effort is normal.      Breath sounds: Normal breath sounds. No wheezing, rhonchi or rales.   Abdominal:      General: Bowel sounds are normal.      Palpations: Abdomen is soft.      Tenderness: There is no abdominal tenderness. There is no guarding.   Musculoskeletal:         General: No swelling or tenderness. Normal range of motion.      Cervical back: Neck supple.   Skin:     General: Skin is warm and dry.      Findings: No rash.   Neurological:      General: No focal deficit present.      Mental Status: He is alert and oriented to person, place, and time.   Psychiatric:         Mood and Affect: Mood normal.         Behavior: Behavior normal.     Last Recorded Vitals  Blood pressure 102/69, pulse 97, temperature 36.4 °C (97.5 °F), temperature source Temporal, resp. rate 18, height 1.839 m (6' 0.4\"), weight 96.9 kg (213 lb 10 oz), SpO2 98%.  Intake/Output last 3 Shifts:  I/O last 3 completed shifts:  In: 187 (1.9 mL/kg) [I.V.:72 (0.7 mL/kg); IV Piggyback:115]  Out: - (0 mL/kg)   Weight: 96.9 kg     Relevant Results    Type Scheduled medications  acyclovir, 400 mg, oral, q12h  allopurinol, 300 mg, oral, " Daily  atorvastatin, 40 mg, oral, q AM  [START ON 12/7/2024] fluconazole, 400 mg, oral, Daily  [START ON 12/5/2024] levETIRAcetam, 500 mg, oral, BID  lidocaine, 5 mL, infiltration, Once  pantoprazole, 40 mg, oral, Daily before breakfast  tamsulosin, 0.4 mg, oral, Daily      Continuous medications       PRN medications  PRN medications: albuterol, alteplase, alteplase, benzonatate, dextrose, diphenhydrAMINE, EPINEPHrine HCl, famotidine, hydrocortisone, methylPREDNISolone sodium succinate (PF), ondansetron, prochlorperazine, sodium chloride      Type   Results for orders placed or performed during the hospital encounter of 11/29/24 (from the past 24 hours)   CBC and Auto Differential   Result Value Ref Range    WBC 1.2 (L) 4.4 - 11.3 x10*3/uL    nRBC 0.0 0.0 - 0.0 /100 WBCs    RBC 2.40 (L) 4.50 - 5.90 x10*6/uL    Hemoglobin 7.6 (L) 13.5 - 17.5 g/dL    Hematocrit 22.8 (L) 41.0 - 52.0 %    MCV 95 80 - 100 fL    MCH 31.7 26.0 - 34.0 pg    MCHC 33.3 32.0 - 36.0 g/dL    RDW 15.8 (H) 11.5 - 14.5 %    Platelets 35 (LL) 150 - 450 x10*3/uL    Immature Granulocytes %, Automated 0.9 0.0 - 0.9 %    Immature Granulocytes Absolute, Automated 0.01 0.00 - 0.70 x10*3/uL   Magnesium   Result Value Ref Range    Magnesium 2.28 1.60 - 2.40 mg/dL   Comprehensive metabolic panel   Result Value Ref Range    Glucose 87 74 - 99 mg/dL    Sodium 137 136 - 145 mmol/L    Potassium 4.1 3.5 - 5.3 mmol/L    Chloride 104 98 - 107 mmol/L    Bicarbonate 25 21 - 32 mmol/L    Anion Gap 12 10 - 20 mmol/L    Urea Nitrogen 19 6 - 23 mg/dL    Creatinine 0.96 0.50 - 1.30 mg/dL    eGFR 90 >60 mL/min/1.73m*2    Calcium 8.2 (L) 8.6 - 10.6 mg/dL    Albumin 3.5 3.4 - 5.0 g/dL    Alkaline Phosphatase 71 33 - 136 U/L    Total Protein 5.9 (L) 6.4 - 8.2 g/dL    AST 22 9 - 39 U/L    Bilirubin, Total 1.0 0.0 - 1.2 mg/dL    ALT 21 10 - 52 U/L   Lactate Dehydrogenase   Result Value Ref Range     84 - 246 U/L   Fibrinogen   Result Value Ref Range    Fibrinogen 258  200 - 400 mg/dL   Coagulation Screen   Result Value Ref Range    Protime 12.0 9.8 - 12.8 seconds    INR 1.1 0.9 - 1.1    aPTT 27 27 - 38 seconds   Manual Differential   Result Value Ref Range    Neutrophils %, Manual 89.1 40.0 - 80.0 %    Bands %, Manual 6.9 0.0 - 5.0 %    Lymphocytes %, Manual 0.0 13.0 - 44.0 %    Monocytes %, Manual 1.0 2.0 - 10.0 %    Eosinophils %, Manual 2.0 0.0 - 6.0 %    Basophils %, Manual 1.0 0.0 - 2.0 %    Seg Neutrophils Absolute, Manual 1.07 (L) 1.20 - 7.00 x10*3/uL    Bands Absolute, Manual 0.08 0.00 - 0.70 x10*3/uL    Lymphocytes Absolute, Manual 0.00 (L) 1.20 - 4.80 x10*3/uL    Monocytes Absolute, Manual 0.01 (L) 0.10 - 1.00 x10*3/uL    Eosinophils Absolute, Manual 0.02 0.00 - 0.70 x10*3/uL    Basophils Absolute, Manual 0.01 0.00 - 0.10 x10*3/uL    Total Cells Counted 101     Neutrophils Absolute, Manual 1.15 (L) 1.20 - 7.70 x10*3/uL    RBC Morphology See Below     Ovalocytes Few      *Note: Due to a large number of results and/or encounters for the requested time period, some results have not been displayed. A complete set of results can be found in Results Review.              This patient has a central line   Reason for the central line remaining today?  Electrolytes and blood products          Assessment/Plan   Assessment & Plan  Mantle cell lymphoma    MCL (mantle cell lymphoma) (Multi)    Nancie Armenta is a 59 y.o. male presenting with relapsed Mantle cell lymphoma admitted on 11/29/24 for his CAR- T cell therapy on CASE 2419 (T=0, 12/6).    Currently T-2 today (T=0, 12/6)     ONC:   -Hx/o Stage IV Mantle cell lymphoma involving the GI tract initially treated with [place] regimen and had consolidation autograft with BEAM prep regimen April 2012  -Relapsed 2019 w/presentation of leukocytosis. Had colonoscopy 8/2019 with multiple biopsies including ileum, cecum, appendix, and rectosigmoid c/w MCL, Treated with Ibrutinib and Venetoclax w/complete response.  -Recurrent MCL in July  2024 presenting with dropping Plt counts and bmbx (7/25/24) showing focal involvement w/MCL (NGS cyclin D1 positive and SOX 11 positive. BIRC+ and TP53 positive w/a VAF of 3% BIRC3 positive). FISH neg for 17p deletion.  - CT imaging (8/7/24) showing thickening of sigmoid colon, development of mild to moderate retroperitoneal adenopathy, splenomegaly, and poss splenic infarct.  - Started Pirtobrutinib salvage on 8/12/24. Added Venetoclax starting 10/13/24 since spleen size was increasing on repeat CT imaging and on physical exam  - Pirtobrutinib and Venetoclax stopped 11/14/24 in preparation for CART collection  - Bmbx (10/23/24) with low level involvement by mantle cells about 3% Mantle cells by flow  - PET/CT (11/4/24) with FDG avid lymphadenopathy throughout the neck, chest, abdomen, and pelvis c/w lymphomatous involvement. Intense FDG avid splenomegaly. Peripheral regions of photopenia likely reflect areas of infarction.  - Admitted for his CART cell therapy on CASE 2419 (T=0, 12/6)      CAR-T   - Conditioning with Flu/Cy   - Cyclophosphamide 60 mg/kg IV T-6   - Fludarabine 25 mg/m2 IV T-5, T-4, T-3    - Plan Allopurinol and Keppra per protocol     - Baseline ICE Score:   - ECOG Score 12/3: 0- Fully active, able to carry on all pre-disease performance w/o restriction.     # CRS Grade: --    - Organ/System affected by CRS: --   - Date of CRS onset: --   - Date of highest CRS Grade: --   - Date of CRS resolution to Grade 1 or lower: --   - CRS management: --      # ICE Score:   - Date of Neurotoxicity onset: --   - Date of highest Neurotoxicity Grade: --   - Date of Neurotoxicity resolution to Grade 1 or lower: --   - Neurotox management: --      # CAR-T Labs   - CMV DNA PCR ordered on admit  - IgG level ordered on admit  - CBC, RFP, Mg, LFTs: Daily   - Coagulation Screen, Fibrinogen: daily   - LDH-daily  - Haptoglobin: Weekly       HEME:  - Pancytopenia secondary to his malignancy and previous treatment  - Monitor  CBC w/diff and transfuse as needed  - Hgb 6.8 on 12/1. 1 unit of blood ordered.     ID:  - Afebrile   - Cont ACV prophy  - Plan Fluconazole prophy per protocol  - Plan Levaquin when neutropenic     FEN/GI:  - Admit wt: 97.2 kg, current wt: 96.9 kg (12/2)  - Monitor electrolytes and replete as needed  - Added PPI on admit  - Hx/o CKD (Baseline Cr: 1.20)  - Volume overload. Gave Lasix 20mg IV once (12/1).  - Hypokalemia repleted last on 12/1.      CV:  - Cont daily Atorvastatin  - ECHO (10/23/24) with low normal LVEF 53%      :  - BPH. Held Tadalafil on admit. Started Flomax (11/30).     DISP:  - Patient of Dr. Yvonne Hobbs  - PICC line re-placed (12/2)  - PT ordered    Patient seen, examined, and discussed with Dr. Dooley.  THA Parra-CNP

## 2024-12-04 NOTE — ASSESSMENT & PLAN NOTE
Nancie Armenta is a 59 y.o. male presenting with relapsed Mantle cell lymphoma admitted on 11/29/24 for his CAR- T cell therapy on CASE 2419 (T=0, 12/6).    Currently T-2 today (T=0, 12/6)     ONC:   -Hx/o Stage IV Mantle cell lymphoma involving the GI tract initially treated with [place] regimen and had consolidation autograft with BEAM prep regimen April 2012  -Relapsed 2019 w/presentation of leukocytosis. Had colonoscopy 8/2019 with multiple biopsies including ileum, cecum, appendix, and rectosigmoid c/w MCL, Treated with Ibrutinib and Venetoclax w/complete response.  -Recurrent MCL in July 2024 presenting with dropping Plt counts and bmbx (7/25/24) showing focal involvement w/MCL (NGS cyclin D1 positive and SOX 11 positive. BIRC+ and TP53 positive w/a VAF of 3% BIRC3 positive). FISH neg for 17p deletion.  - CT imaging (8/7/24) showing thickening of sigmoid colon, development of mild to moderate retroperitoneal adenopathy, splenomegaly, and poss splenic infarct.  - Started Pirtobrutinib salvage on 8/12/24. Added Venetoclax starting 10/13/24 since spleen size was increasing on repeat CT imaging and on physical exam  - Pirtobrutinib and Venetoclax stopped 11/14/24 in preparation for CART collection  - Bmbx (10/23/24) with low level involvement by mantle cells about 3% Mantle cells by flow  - PET/CT (11/4/24) with FDG avid lymphadenopathy throughout the neck, chest, abdomen, and pelvis c/w lymphomatous involvement. Intense FDG avid splenomegaly. Peripheral regions of photopenia likely reflect areas of infarction.  - Admitted for his CART cell therapy on CASE 2419 (T=0, 12/6)      CAR-T   - Conditioning with Flu/Cy   - Cyclophosphamide 60 mg/kg IV T-6   - Fludarabine 25 mg/m2 IV T-5, T-4, T-3    - Plan Allopurinol and Keppra per protocol     - Baseline ICE Score:   - ECOG Score 12/3: 0- Fully active, able to carry on all pre-disease performance w/o restriction.     # CRS Grade: --    - Organ/System affected by  CRS: --   - Date of CRS onset: --   - Date of highest CRS Grade: --   - Date of CRS resolution to Grade 1 or lower: --   - CRS management: --      # ICE Score:   - Date of Neurotoxicity onset: --   - Date of highest Neurotoxicity Grade: --   - Date of Neurotoxicity resolution to Grade 1 or lower: --   - Neurotox management: --      # CAR-T Labs   - CMV DNA PCR ordered on admit  - IgG level ordered on admit  - CBC, RFP, Mg, LFTs: Daily   - Coagulation Screen, Fibrinogen: daily   - LDH-daily  - Haptoglobin: Weekly       HEME:  - Pancytopenia secondary to his malignancy and previous treatment  - Monitor CBC w/diff and transfuse as needed  - Hgb 6.8 on 12/1. 1 unit of blood ordered.     ID:  - Afebrile   - Cont ACV prophy  - Plan Fluconazole prophy per protocol  - Plan Levaquin when neutropenic     FEN/GI:  - Admit wt: 97.2 kg, current wt: 96.9 kg (12/2)  - Monitor electrolytes and replete as needed  - Added PPI on admit  - Hx/o CKD (Baseline Cr: 1.20)  - Volume overload. Gave Lasix 20mg IV once (12/1).  - Hypokalemia repleted last on 12/1.      CV:  - Cont daily Atorvastatin  - ECHO (10/23/24) with low normal LVEF 53%      :  - BPH. Held Tadalafil on admit. Started Flomax (11/30).     DISP:  - Patient of Dr. Yvonne Hobbs  - PICC line re-placed (12/2)  - PT ordered

## 2024-12-04 NOTE — CARE PLAN
Problem: Infection related to problem list condition  Goal: Infection will resolve through treatment  Outcome: Progressing     Problem: Fall/Injury  Goal: Not fall by end of shift  Outcome: Progressing  Goal: Be free from injury by end of the shift  Outcome: Progressing  Goal: Verbalize understanding of personal risk factors for fall in the hospital  Outcome: Progressing  Goal: Verbalize understanding of risk factor reduction measures to prevent injury from fall in the home  Outcome: Progressing  Goal: Use assistive devices by end of the shift  Outcome: Progressing  Goal: Pace activities to prevent fatigue by end of the shift  Outcome: Progressing     Problem: Pain - Adult  Goal: Verbalizes/displays adequate comfort level or baseline comfort level  Outcome: Progressing     Problem: Safety - Adult  Goal: Free from fall injury  Outcome: Progressing     Problem: Discharge Planning  Goal: Discharge to home or other facility with appropriate resources  Outcome: Progressing     Problem: Chronic Conditions and Co-morbidities  Goal: Patient's chronic conditions and co-morbidity symptoms are monitored and maintained or improved  Outcome: Progressing       The clinical goals for the shift include Patient will remain safe throughout shift

## 2024-12-05 LAB
ALBUMIN SERPL BCP-MCNC: 3.5 G/DL (ref 3.4–5)
ALP SERPL-CCNC: 74 U/L (ref 33–136)
ALT SERPL W P-5'-P-CCNC: 20 U/L (ref 10–52)
ANION GAP SERPL CALC-SCNC: 12 MMOL/L (ref 10–20)
APTT PPP: 27 SECONDS (ref 27–38)
AST SERPL W P-5'-P-CCNC: 19 U/L (ref 9–39)
BASOPHILS # BLD AUTO: 0 X10*3/UL (ref 0–0.1)
BASOPHILS NFR BLD AUTO: 0 %
BILIRUB SERPL-MCNC: 0.9 MG/DL (ref 0–1.2)
BUN SERPL-MCNC: 22 MG/DL (ref 6–23)
CALCIUM SERPL-MCNC: 8.2 MG/DL (ref 8.6–10.6)
CHLORIDE SERPL-SCNC: 104 MMOL/L (ref 98–107)
CO2 SERPL-SCNC: 24 MMOL/L (ref 21–32)
CREAT SERPL-MCNC: 0.84 MG/DL (ref 0.5–1.3)
EGFRCR SERPLBLD CKD-EPI 2021: >90 ML/MIN/1.73M*2
EOSINOPHIL # BLD AUTO: 0.06 X10*3/UL (ref 0–0.7)
EOSINOPHIL NFR BLD AUTO: 9.4 %
ERYTHROCYTE [DISTWIDTH] IN BLOOD BY AUTOMATED COUNT: 15.3 % (ref 11.5–14.5)
FIBRINOGEN PPP-MCNC: 253 MG/DL (ref 200–400)
GLUCOSE SERPL-MCNC: 92 MG/DL (ref 74–99)
HCT VFR BLD AUTO: 22.1 % (ref 41–52)
HGB BLD-MCNC: 7.4 G/DL (ref 13.5–17.5)
IMM GRANULOCYTES # BLD AUTO: 0 X10*3/UL (ref 0–0.7)
IMM GRANULOCYTES NFR BLD AUTO: 0 % (ref 0–0.9)
INR PPP: 1 (ref 0.9–1.1)
LDH SERPL L TO P-CCNC: 122 U/L (ref 84–246)
LYMPHOCYTES # BLD AUTO: 0.01 X10*3/UL (ref 1.2–4.8)
LYMPHOCYTES NFR BLD AUTO: 1.6 %
MAGNESIUM SERPL-MCNC: 2.19 MG/DL (ref 1.6–2.4)
MCH RBC QN AUTO: 32.2 PG (ref 26–34)
MCHC RBC AUTO-ENTMCNC: 33.5 G/DL (ref 32–36)
MCV RBC AUTO: 96 FL (ref 80–100)
MONOCYTES # BLD AUTO: 0.01 X10*3/UL (ref 0.1–1)
MONOCYTES NFR BLD AUTO: 1.6 %
NEUTROPHILS # BLD AUTO: 0.56 X10*3/UL (ref 1.2–7.7)
NEUTROPHILS NFR BLD AUTO: 87.4 %
NRBC BLD-RTO: 0 /100 WBCS (ref 0–0)
OVALOCYTES BLD QL SMEAR: NORMAL
PLATELET # BLD AUTO: 24 X10*3/UL (ref 150–450)
POTASSIUM SERPL-SCNC: 4 MMOL/L (ref 3.5–5.3)
PROT SERPL-MCNC: 5.8 G/DL (ref 6.4–8.2)
PROTHROMBIN TIME: 11.6 SECONDS (ref 9.8–12.8)
RBC # BLD AUTO: 2.3 X10*6/UL (ref 4.5–5.9)
RBC MORPH BLD: NORMAL
SODIUM SERPL-SCNC: 136 MMOL/L (ref 136–145)
WBC # BLD AUTO: 0.6 X10*3/UL (ref 4.4–11.3)

## 2024-12-05 PROCEDURE — 85610 PROTHROMBIN TIME: CPT | Performed by: PHYSICIAN ASSISTANT

## 2024-12-05 PROCEDURE — 2500000001 HC RX 250 WO HCPCS SELF ADMINISTERED DRUGS (ALT 637 FOR MEDICARE OP)

## 2024-12-05 PROCEDURE — 2500000001 HC RX 250 WO HCPCS SELF ADMINISTERED DRUGS (ALT 637 FOR MEDICARE OP): Performed by: INTERNAL MEDICINE

## 2024-12-05 PROCEDURE — 83615 LACTATE (LD) (LDH) ENZYME: CPT | Performed by: PHYSICIAN ASSISTANT

## 2024-12-05 PROCEDURE — 85025 COMPLETE CBC W/AUTO DIFF WBC: CPT | Performed by: PHYSICIAN ASSISTANT

## 2024-12-05 PROCEDURE — 2500000002 HC RX 250 W HCPCS SELF ADMINISTERED DRUGS (ALT 637 FOR MEDICARE OP, ALT 636 FOR OP/ED): Performed by: PHYSICIAN ASSISTANT

## 2024-12-05 PROCEDURE — 80053 COMPREHEN METABOLIC PANEL: CPT | Performed by: PHYSICIAN ASSISTANT

## 2024-12-05 PROCEDURE — 1170000001 HC PRIVATE ONCOLOGY ROOM DAILY

## 2024-12-05 PROCEDURE — 85384 FIBRINOGEN ACTIVITY: CPT | Performed by: PHYSICIAN ASSISTANT

## 2024-12-05 PROCEDURE — 83735 ASSAY OF MAGNESIUM: CPT | Performed by: PHYSICIAN ASSISTANT

## 2024-12-05 PROCEDURE — 99233 SBSQ HOSP IP/OBS HIGH 50: CPT | Performed by: STUDENT IN AN ORGANIZED HEALTH CARE EDUCATION/TRAINING PROGRAM

## 2024-12-05 PROCEDURE — 2500000001 HC RX 250 WO HCPCS SELF ADMINISTERED DRUGS (ALT 637 FOR MEDICARE OP): Performed by: PHYSICIAN ASSISTANT

## 2024-12-05 RX ORDER — LEVOFLOXACIN 500 MG/1
500 TABLET, FILM COATED ORAL EVERY 24 HOURS
Status: DISCONTINUED | OUTPATIENT
Start: 2024-12-05 | End: 2024-12-07

## 2024-12-05 ASSESSMENT — COGNITIVE AND FUNCTIONAL STATUS - GENERAL
MOBILITY SCORE: 24
DAILY ACTIVITIY SCORE: 24
DAILY ACTIVITIY SCORE: 24
MOBILITY SCORE: 24

## 2024-12-05 ASSESSMENT — PAIN - FUNCTIONAL ASSESSMENT: PAIN_FUNCTIONAL_ASSESSMENT: 0-10

## 2024-12-05 ASSESSMENT — PAIN SCALES - GENERAL
PAINLEVEL_OUTOF10: 0 - NO PAIN
PAINLEVEL_OUTOF10: 0 - NO PAIN

## 2024-12-05 NOTE — PROGRESS NOTES
"Nancie Armenta is a 59 y.o. male on day 6 of admission presenting with Mantle cell lymphoma.    Subjective   Afebrile, VSS. Struggling with low appetite today and dysgeusia stating that his food tastes like the plastic containers that it is stored in from the kitchen. Denies any other acute complaints. Remainder of ROS is negative.     Objective     Physical Exam  Constitutional:       Appearance: Normal appearance.   HENT:      Head: Normocephalic and atraumatic.      Nose: Nose normal.      Mouth/Throat:      Mouth: Mucous membranes are moist.   Eyes:      Extraocular Movements: Extraocular movements intact.      Pupils: Pupils are equal, round, and reactive to light.   Cardiovascular:      Rate and Rhythm: Normal rate and regular rhythm.      Heart sounds: Normal heart sounds.   Pulmonary:      Effort: Pulmonary effort is normal.      Breath sounds: Normal breath sounds. No wheezing, rhonchi or rales.   Abdominal:      General: Bowel sounds are normal.      Palpations: Abdomen is soft.      Tenderness: There is no abdominal tenderness. There is no guarding.   Musculoskeletal:         General: No swelling or tenderness. Normal range of motion.      Cervical back: Neck supple.   Skin:     General: Skin is warm and dry.      Findings: No rash.   Neurological:      General: No focal deficit present.      Mental Status: He is alert and oriented to person, place, and time.   Psychiatric:         Mood and Affect: Mood normal.         Behavior: Behavior normal.     Last Recorded Vitals  Blood pressure 105/71, pulse 89, temperature 36.4 °C (97.5 °F), temperature source Temporal, resp. rate 18, height 1.839 m (6' 0.4\"), weight 93.6 kg (206 lb 5.6 oz), SpO2 99%.  Intake/Output last 3 Shifts:  No intake/output data recorded.    Relevant Results    Type Scheduled medications  acyclovir, 400 mg, oral, q12h  allopurinol, 300 mg, oral, Daily  atorvastatin, 40 mg, oral, q AM  [START ON 12/7/2024] fluconazole, 400 mg, oral, " Daily  levETIRAcetam, 500 mg, oral, BID  lidocaine, 5 mL, infiltration, Once  pantoprazole, 40 mg, oral, Daily before breakfast  tamsulosin, 0.4 mg, oral, Daily      Continuous medications       PRN medications  PRN medications: albuterol, alteplase, alteplase, benzonatate, dextrose, diphenhydrAMINE, EPINEPHrine HCl, famotidine, hydrocortisone, methylPREDNISolone sodium succinate (PF), ondansetron, prochlorperazine, sodium chloride      Type   Results for orders placed or performed during the hospital encounter of 11/29/24 (from the past 24 hours)   CBC and Auto Differential   Result Value Ref Range    WBC 0.6 (LL) 4.4 - 11.3 x10*3/uL    nRBC 0.0 0.0 - 0.0 /100 WBCs    RBC 2.30 (L) 4.50 - 5.90 x10*6/uL    Hemoglobin 7.4 (L) 13.5 - 17.5 g/dL    Hematocrit 22.1 (L) 41.0 - 52.0 %    MCV 96 80 - 100 fL    MCH 32.2 26.0 - 34.0 pg    MCHC 33.5 32.0 - 36.0 g/dL    RDW 15.3 (H) 11.5 - 14.5 %    Platelets 24 (LL) 150 - 450 x10*3/uL    Neutrophils % 87.4 40.0 - 80.0 %    Immature Granulocytes %, Automated 0.0 0.0 - 0.9 %    Lymphocytes % 1.6 13.0 - 44.0 %    Monocytes % 1.6 2.0 - 10.0 %    Eosinophils % 9.4 0.0 - 6.0 %    Basophils % 0.0 0.0 - 2.0 %    Neutrophils Absolute 0.56 (L) 1.20 - 7.70 x10*3/uL    Immature Granulocytes Absolute, Automated 0.00 0.00 - 0.70 x10*3/uL    Lymphocytes Absolute 0.01 (L) 1.20 - 4.80 x10*3/uL    Monocytes Absolute 0.01 (L) 0.10 - 1.00 x10*3/uL    Eosinophils Absolute 0.06 0.00 - 0.70 x10*3/uL    Basophils Absolute 0.00 0.00 - 0.10 x10*3/uL   Magnesium   Result Value Ref Range    Magnesium 2.19 1.60 - 2.40 mg/dL   Comprehensive metabolic panel   Result Value Ref Range    Glucose 92 74 - 99 mg/dL    Sodium 136 136 - 145 mmol/L    Potassium 4.0 3.5 - 5.3 mmol/L    Chloride 104 98 - 107 mmol/L    Bicarbonate 24 21 - 32 mmol/L    Anion Gap 12 10 - 20 mmol/L    Urea Nitrogen 22 6 - 23 mg/dL    Creatinine 0.84 0.50 - 1.30 mg/dL    eGFR >90 >60 mL/min/1.73m*2    Calcium 8.2 (L) 8.6 - 10.6 mg/dL     Albumin 3.5 3.4 - 5.0 g/dL    Alkaline Phosphatase 74 33 - 136 U/L    Total Protein 5.8 (L) 6.4 - 8.2 g/dL    AST 19 9 - 39 U/L    Bilirubin, Total 0.9 0.0 - 1.2 mg/dL    ALT 20 10 - 52 U/L   Lactate Dehydrogenase   Result Value Ref Range     84 - 246 U/L   Fibrinogen   Result Value Ref Range    Fibrinogen 253 200 - 400 mg/dL   Coagulation Screen   Result Value Ref Range    Protime 11.6 9.8 - 12.8 seconds    INR 1.0 0.9 - 1.1    aPTT 27 27 - 38 seconds   Morphology   Result Value Ref Range    RBC Morphology See Below     Ovalocytes Few      *Note: Due to a large number of results and/or encounters for the requested time period, some results have not been displayed. A complete set of results can be found in Results Review.              This patient has a central line   Reason for the central line remaining today?  Electrolytes and blood products          Assessment/Plan   Assessment & Plan  Mantle cell lymphoma    MCL (mantle cell lymphoma) (Multi)    Nancie Armenta is a 59 y.o. male presenting with relapsed Mantle cell lymphoma admitted on 11/29/24 for his CAR- T cell therapy on CASE 2419 (T=0, 12/6).     Currently T-1 today (T=0, 12/6)     ONC:   -Hx/o Stage IV Mantle cell lymphoma involving the GI tract initially treated with [place] regimen and had consolidation autograft with BEAM prep regimen April 2012  -Relapsed 2019 w/presentation of leukocytosis. Had colonoscopy 8/2019 with multiple biopsies including ileum, cecum, appendix, and rectosigmoid c/w MCL, Treated with Ibrutinib and Venetoclax w/complete response.  -Recurrent MCL in July 2024 presenting with dropping Plt counts and bmbx (7/25/24) showing focal involvement w/MCL (NGS cyclin D1 positive and SOX 11 positive. BIRC+ and TP53 positive w/a VAF of 3% BIRC3 positive). FISH neg for 17p deletion.  - CT imaging (8/7/24) showing thickening of sigmoid colon, development of mild to moderate retroperitoneal adenopathy, splenomegaly, and poss splenic  infarct.  - Started Pirtobrutinib salvage on 8/12/24. Added Venetoclax starting 10/13/24 since spleen size was increasing on repeat CT imaging and on physical exam  - Pirtobrutinib and Venetoclax stopped 11/14/24 in preparation for CART collection  - Bmbx (10/23/24) with low level involvement by mantle cells about 3% Mantle cells by flow  - PET/CT (11/4/24) with FDG avid lymphadenopathy throughout the neck, chest, abdomen, and pelvis c/w lymphomatous involvement. Intense FDG avid splenomegaly. Peripheral regions of photopenia likely reflect areas of infarction.  - Admitted for his CART cell therapy on CASE 2419 (T=0, 12/6)      CAR-T   - Conditioning with Flu/Cy   - Cyclophosphamide 60 mg/kg IV T-6   - Fludarabine 25 mg/m2 IV T-5, T-4, T-3    - Plan Allopurinol and Keppra per protocol     - Baseline ICE Score:   - ECOG Score 12/3: 0- Fully active, able to carry on all pre-disease performance w/o restriction.     # CRS Grade: --    - Organ/System affected by CRS: --   - Date of CRS onset: --   - Date of highest CRS Grade: --   - Date of CRS resolution to Grade 1 or lower: --   - CRS management: --      # ICE Score:   - Date of Neurotoxicity onset: --   - Date of highest Neurotoxicity Grade: --   - Date of Neurotoxicity resolution to Grade 1 or lower: --   - Neurotox management: --      # CAR-T Labs   - CMV DNA PCR ordered on admit  - IgG level ordered on admit  - CBC, RFP, Mg, LFTs: Daily   - Coagulation Screen, Fibrinogen: daily   - LDH-daily  - Haptoglobin: Weekly       HEME:  - Pancytopenia secondary to his malignancy and previous treatment  - Monitor CBC w/diff and transfuse as needed  - Hgb 6.8 on 12/1. 1 unit of blood ordered.     ID:  - Afebrile   - Cont ACV prophy  - Plan Fluconazole prophy per protocol  - Starting Levaquin with oncoming neutropenia      FEN/GI:  - Admit wt: 97.2 kg, current wt: 93.6 kg (12/5)  - Monitor electrolytes and replete as needed  - Added PPI on admit  - Hx/o CKD (Baseline Cr:  1.20)  - Volume overload. Gave Lasix 20mg IV once (12/1).  - Hypokalemia repleted last on 12/1.      CV:  - Cont daily Atorvastatin  - ECHO (10/23/24) with low normal LVEF 53%      :  - BPH. Held Tadalafil on admit. Started Flomax (11/30).     DISP:  - Patient of Dr. Yvonne Hobbs  - PICC line re-placed (12/2)  - PT ordered  Patient seen, examined, and discussed with Dr. Dooley.  Bryant Blair PA-C

## 2024-12-06 ENCOUNTER — DOCUMENTATION (OUTPATIENT)
Dept: HEMATOLOGY/ONCOLOGY | Facility: HOSPITAL | Age: 59
End: 2024-12-06
Payer: COMMERCIAL

## 2024-12-06 LAB
ALBUMIN SERPL BCP-MCNC: 3.6 G/DL (ref 3.4–5)
ALP SERPL-CCNC: 79 U/L (ref 33–136)
ALT SERPL W P-5'-P-CCNC: 16 U/L (ref 10–52)
ANION GAP SERPL CALC-SCNC: 12 MMOL/L (ref 10–20)
APTT PPP: 26 SECONDS (ref 27–38)
AST SERPL W P-5'-P-CCNC: 17 U/L (ref 9–39)
BASOPHILS # BLD MANUAL: 0 X10*3/UL (ref 0–0.1)
BASOPHILS NFR BLD MANUAL: 0 %
BILIRUB SERPL-MCNC: 0.8 MG/DL (ref 0–1.2)
BUN SERPL-MCNC: 21 MG/DL (ref 6–23)
CALCIUM SERPL-MCNC: 8.2 MG/DL (ref 8.6–10.6)
CHLORIDE SERPL-SCNC: 105 MMOL/L (ref 98–107)
CO2 SERPL-SCNC: 25 MMOL/L (ref 21–32)
CREAT SERPL-MCNC: 0.85 MG/DL (ref 0.5–1.3)
EGFRCR SERPLBLD CKD-EPI 2021: >90 ML/MIN/1.73M*2
EOSINOPHIL # BLD MANUAL: 0.04 X10*3/UL (ref 0–0.7)
EOSINOPHIL NFR BLD MANUAL: 12.1 %
ERYTHROCYTE [DISTWIDTH] IN BLOOD BY AUTOMATED COUNT: 15 % (ref 11.5–14.5)
FERRITIN SERPL-MCNC: 953 NG/ML (ref 20–300)
FIBRINOGEN PPP-MCNC: 242 MG/DL (ref 200–400)
GLUCOSE SERPL-MCNC: 95 MG/DL (ref 74–99)
HCT VFR BLD AUTO: 22.1 % (ref 41–52)
HGB BLD-MCNC: 7.4 G/DL (ref 13.5–17.5)
HOLD SPECIMEN: NORMAL
IMM GRANULOCYTES # BLD AUTO: 0.01 X10*3/UL (ref 0–0.7)
IMM GRANULOCYTES NFR BLD AUTO: 3.4 % (ref 0–0.9)
INR PPP: 1.1 (ref 0.9–1.1)
LDH SERPL L TO P-CCNC: 115 U/L (ref 84–246)
LYMPHOCYTES # BLD MANUAL: 0.01 X10*3/UL (ref 1.2–4.8)
LYMPHOCYTES NFR BLD MANUAL: 3.4 %
MAGNESIUM SERPL-MCNC: 2.23 MG/DL (ref 1.6–2.4)
MCH RBC QN AUTO: 32.2 PG (ref 26–34)
MCHC RBC AUTO-ENTMCNC: 33.5 G/DL (ref 32–36)
MCV RBC AUTO: 96 FL (ref 80–100)
MONOCYTES # BLD MANUAL: 0 X10*3/UL (ref 0.1–1)
MONOCYTES NFR BLD MANUAL: 0 %
NEUTROPHILS # BLD MANUAL: 0.26 X10*3/UL (ref 1.2–7.7)
NEUTS BAND # BLD MANUAL: 0.02 X10*3/UL (ref 0–0.7)
NEUTS BAND NFR BLD MANUAL: 5.2 %
NEUTS SEG # BLD MANUAL: 0.24 X10*3/UL (ref 1.2–7)
NEUTS SEG NFR BLD MANUAL: 79.3 %
NRBC BLD-RTO: 0 /100 WBCS (ref 0–0)
OVALOCYTES BLD QL SMEAR: ABNORMAL
PLATELET # BLD AUTO: 18 X10*3/UL (ref 150–450)
POTASSIUM SERPL-SCNC: 4.1 MMOL/L (ref 3.5–5.3)
PROT SERPL-MCNC: 5.8 G/DL (ref 6.4–8.2)
PROTHROMBIN TIME: 12.4 SECONDS (ref 9.8–12.8)
RBC # BLD AUTO: 2.3 X10*6/UL (ref 4.5–5.9)
RBC MORPH BLD: ABNORMAL
SODIUM SERPL-SCNC: 138 MMOL/L (ref 136–145)
TOTAL CELLS COUNTED BLD: 58
WBC # BLD AUTO: 0.3 X10*3/UL (ref 4.4–11.3)

## 2024-12-06 PROCEDURE — 85384 FIBRINOGEN ACTIVITY: CPT | Performed by: PHYSICIAN ASSISTANT

## 2024-12-06 PROCEDURE — 85610 PROTHROMBIN TIME: CPT | Performed by: PHYSICIAN ASSISTANT

## 2024-12-06 PROCEDURE — 82728 ASSAY OF FERRITIN: CPT | Performed by: INTERNAL MEDICINE

## 2024-12-06 PROCEDURE — 85027 COMPLETE CBC AUTOMATED: CPT | Performed by: PHYSICIAN ASSISTANT

## 2024-12-06 PROCEDURE — 99233 SBSQ HOSP IP/OBS HIGH 50: CPT | Performed by: STUDENT IN AN ORGANIZED HEALTH CARE EDUCATION/TRAINING PROGRAM

## 2024-12-06 PROCEDURE — 0540T HC CAR-T CELL THERAPY ADMINISTRATION: CPT

## 2024-12-06 PROCEDURE — 83615 LACTATE (LD) (LDH) ENZYME: CPT | Performed by: PHYSICIAN ASSISTANT

## 2024-12-06 PROCEDURE — 1170000001 HC PRIVATE ONCOLOGY ROOM DAILY

## 2024-12-06 PROCEDURE — 2560000001 HC RX 256 EXPERIMENTAL DRUGS: Performed by: INTERNAL MEDICINE

## 2024-12-06 PROCEDURE — 2500000001 HC RX 250 WO HCPCS SELF ADMINISTERED DRUGS (ALT 637 FOR MEDICARE OP)

## 2024-12-06 PROCEDURE — 2500000001 HC RX 250 WO HCPCS SELF ADMINISTERED DRUGS (ALT 637 FOR MEDICARE OP): Performed by: PHYSICIAN ASSISTANT

## 2024-12-06 PROCEDURE — 85007 BL SMEAR W/DIFF WBC COUNT: CPT | Performed by: PHYSICIAN ASSISTANT

## 2024-12-06 PROCEDURE — 2500000002 HC RX 250 W HCPCS SELF ADMINISTERED DRUGS (ALT 637 FOR MEDICARE OP, ALT 636 FOR OP/ED): Performed by: PHYSICIAN ASSISTANT

## 2024-12-06 PROCEDURE — 83735 ASSAY OF MAGNESIUM: CPT | Performed by: PHYSICIAN ASSISTANT

## 2024-12-06 PROCEDURE — 80053 COMPREHEN METABOLIC PANEL: CPT | Performed by: PHYSICIAN ASSISTANT

## 2024-12-06 PROCEDURE — XW043C7 INTRODUCTION OF AUTOLOGOUS ENGINEERED CHIMERIC ANTIGEN RECEPTOR T-CELL IMMUNOTHERAPY INTO CENTRAL VEIN, PERCUTANEOUS APPROACH, NEW TECHNOLOGY GROUP 7: ICD-10-PCS | Performed by: STUDENT IN AN ORGANIZED HEALTH CARE EDUCATION/TRAINING PROGRAM

## 2024-12-06 PROCEDURE — 2500000001 HC RX 250 WO HCPCS SELF ADMINISTERED DRUGS (ALT 637 FOR MEDICARE OP): Performed by: INTERNAL MEDICINE

## 2024-12-06 PROCEDURE — 2500000004 HC RX 250 GENERAL PHARMACY W/ HCPCS (ALT 636 FOR OP/ED): Performed by: INTERNAL MEDICINE

## 2024-12-06 RX ORDER — OXYMETAZOLINE HCL 0.05 %
3 SPRAY, NON-AEROSOL (ML) NASAL EVERY 12 HOURS PRN
Status: DISPENSED | OUTPATIENT
Start: 2024-12-06 | End: 2024-12-09

## 2024-12-06 RX ORDER — ACETAMINOPHEN 325 MG/1
650 TABLET ORAL ONCE
Status: COMPLETED | OUTPATIENT
Start: 2024-12-06 | End: 2024-12-06

## 2024-12-06 RX ORDER — DIPHENHYDRAMINE HCL 25 MG
25 CAPSULE ORAL ONCE
Status: COMPLETED | OUTPATIENT
Start: 2024-12-06 | End: 2024-12-06

## 2024-12-06 ASSESSMENT — COGNITIVE AND FUNCTIONAL STATUS - GENERAL
MOBILITY SCORE: 24
DAILY ACTIVITIY SCORE: 24

## 2024-12-06 ASSESSMENT — PAIN - FUNCTIONAL ASSESSMENT
PAIN_FUNCTIONAL_ASSESSMENT: 0-10

## 2024-12-06 ASSESSMENT — PAIN SCALES - GENERAL
PAINLEVEL_OUTOF10: 0 - NO PAIN

## 2024-12-06 NOTE — CARE PLAN
Problem: Fall/Injury  Goal: Not fall by end of shift  Outcome: Progressing  Goal: Be free from injury by end of the shift  Outcome: Progressing  Goal: Verbalize understanding of personal risk factors for fall in the hospital  Outcome: Progressing  Goal: Verbalize understanding of risk factor reduction measures to prevent injury from fall in the home  Outcome: Progressing  Goal: Use assistive devices by end of the shift  Outcome: Progressing  Goal: Pace activities to prevent fatigue by end of the shift  Outcome: Progressing     Problem: Pain - Adult  Goal: Verbalizes/displays adequate comfort level or baseline comfort level  Outcome: Progressing     Problem: Chronic Conditions and Co-morbidities  Goal: Patient's chronic conditions and co-morbidity symptoms are monitored and maintained or improved  Outcome: Progressing

## 2024-12-06 NOTE — PROGRESS NOTES
"Nancie Armenta is a 59 y.o. male on day 7 of admission presenting with Mantle cell lymphoma.    Subjective   Afebrile, VSS. In good spirits awaiting his CAR_T infusion. No acute complaints beyond his known decreased appetite and dysgeusia 2/2 chemo. Remainder of ROS is negative. Tolerated CAR-T infusion without issue.     Objective     Physical Exam  Constitutional:       Appearance: Normal appearance.   HENT:      Head: Normocephalic and atraumatic.      Nose: Nose normal.      Mouth/Throat:      Mouth: Mucous membranes are moist.   Eyes:      Extraocular Movements: Extraocular movements intact.      Pupils: Pupils are equal, round, and reactive to light.   Cardiovascular:      Rate and Rhythm: Normal rate and regular rhythm.      Heart sounds: Normal heart sounds.   Pulmonary:      Effort: Pulmonary effort is normal.      Breath sounds: Normal breath sounds. No wheezing, rhonchi or rales.   Abdominal:      General: Bowel sounds are normal.      Palpations: Abdomen is soft.      Tenderness: There is no abdominal tenderness. There is no guarding.   Musculoskeletal:         General: No swelling or tenderness. Normal range of motion.      Cervical back: Neck supple.   Skin:     General: Skin is warm and dry.      Findings: No rash.   Neurological:      General: No focal deficit present.      Mental Status: He is alert and oriented to person, place, and time.   Psychiatric:         Mood and Affect: Mood normal.         Behavior: Behavior normal.     Last Recorded Vitals  Blood pressure 103/70, pulse (!) 117, temperature 36 °C (96.8 °F), temperature source Temporal, resp. rate 16, height 1.839 m (6' 0.4\"), weight 93.2 kg (205 lb 7.5 oz), SpO2 100%.  Intake/Output last 3 Shifts:  No intake/output data recorded.    Relevant Results    Type Scheduled medications  acyclovir, 400 mg, oral, q12h  allopurinol, 300 mg, oral, Daily  atorvastatin, 40 mg, oral, q AM  [START ON 12/7/2024] fluconazole, 400 mg, oral, " Daily  levETIRAcetam, 500 mg, oral, BID  levoFLOXacin, 500 mg, oral, q24h  lidocaine, 5 mL, infiltration, Once  pantoprazole, 40 mg, oral, Daily before breakfast  tamsulosin, 0.4 mg, oral, Daily      Continuous medications       PRN medications  PRN medications: albuterol, alteplase, alteplase, benzonatate, dextrose, diphenhydrAMINE, EPINEPHrine HCl, famotidine, hydrocortisone, methylPREDNISolone sodium succinate (PF), ondansetron, oxymetazoline, prochlorperazine, sodium chloride      Type   Results for orders placed or performed during the hospital encounter of 11/29/24 (from the past 24 hours)   CBC and Auto Differential   Result Value Ref Range    WBC 0.3 (LL) 4.4 - 11.3 x10*3/uL    nRBC 0.0 0.0 - 0.0 /100 WBCs    RBC 2.30 (L) 4.50 - 5.90 x10*6/uL    Hemoglobin 7.4 (L) 13.5 - 17.5 g/dL    Hematocrit 22.1 (L) 41.0 - 52.0 %    MCV 96 80 - 100 fL    MCH 32.2 26.0 - 34.0 pg    MCHC 33.5 32.0 - 36.0 g/dL    RDW 15.0 (H) 11.5 - 14.5 %    Platelets 18 (LL) 150 - 450 x10*3/uL    Immature Granulocytes %, Automated 3.4 (H) 0.0 - 0.9 %    Immature Granulocytes Absolute, Automated 0.01 0.00 - 0.70 x10*3/uL   Magnesium   Result Value Ref Range    Magnesium 2.23 1.60 - 2.40 mg/dL   Comprehensive metabolic panel   Result Value Ref Range    Glucose 95 74 - 99 mg/dL    Sodium 138 136 - 145 mmol/L    Potassium 4.1 3.5 - 5.3 mmol/L    Chloride 105 98 - 107 mmol/L    Bicarbonate 25 21 - 32 mmol/L    Anion Gap 12 10 - 20 mmol/L    Urea Nitrogen 21 6 - 23 mg/dL    Creatinine 0.85 0.50 - 1.30 mg/dL    eGFR >90 >60 mL/min/1.73m*2    Calcium 8.2 (L) 8.6 - 10.6 mg/dL    Albumin 3.6 3.4 - 5.0 g/dL    Alkaline Phosphatase 79 33 - 136 U/L    Total Protein 5.8 (L) 6.4 - 8.2 g/dL    AST 17 9 - 39 U/L    Bilirubin, Total 0.8 0.0 - 1.2 mg/dL    ALT 16 10 - 52 U/L   Lactate Dehydrogenase   Result Value Ref Range     84 - 246 U/L   Fibrinogen   Result Value Ref Range    Fibrinogen 242 200 - 400 mg/dL   Coagulation Screen   Result Value Ref  Range    Protime 12.4 9.8 - 12.8 seconds    INR 1.1 0.9 - 1.1    aPTT 26 (L) 27 - 38 seconds   Ferritin   Result Value Ref Range    Ferritin 953 (H) 20 - 300 ng/mL   Research collection: 10mL Lavender EDTA - Research Collect Correlative Assays; Pre-Dose; Prior to treatment   Result Value Ref Range    Extra Tube Hold for add-ons.    Research collection: 10mL Lavender EDTA - Research Collect Correlative Assays; Pre-Dose; Prior to treatment   Result Value Ref Range    Extra Tube Hold for add-ons.    Research collection: 10mL Lavender EDTA - Research Collect Correlative Assays; Pre-Dose; Prior to treatment   Result Value Ref Range    Extra Tube Hold for add-ons.    Research collection: 10mL Lavender EDTA - Research Collect Correlative Assays; Pre-Dose; Prior to treatment   Result Value Ref Range    Extra Tube Hold for add-ons.    Manual Differential   Result Value Ref Range    Neutrophils %, Manual 79.3 40.0 - 80.0 %    Bands %, Manual 5.2 0.0 - 5.0 %    Lymphocytes %, Manual 3.4 13.0 - 44.0 %    Monocytes %, Manual 0.0 2.0 - 10.0 %    Eosinophils %, Manual 12.1 0.0 - 6.0 %    Basophils %, Manual 0.0 0.0 - 2.0 %    Seg Neutrophils Absolute, Manual 0.24 (L) 1.20 - 7.00 x10*3/uL    Bands Absolute, Manual 0.02 0.00 - 0.70 x10*3/uL    Lymphocytes Absolute, Manual 0.01 (L) 1.20 - 4.80 x10*3/uL    Monocytes Absolute, Manual 0.00 (L) 0.10 - 1.00 x10*3/uL    Eosinophils Absolute, Manual 0.04 0.00 - 0.70 x10*3/uL    Basophils Absolute, Manual 0.00 0.00 - 0.10 x10*3/uL    Total Cells Counted 58     Neutrophils Absolute, Manual 0.26 (L) 1.20 - 7.70 x10*3/uL    RBC Morphology See Below     Ovalocytes Few      *Note: Due to a large number of results and/or encounters for the requested time period, some results have not been displayed. A complete set of results can be found in Results Review.              This patient has a central line   Reason for the central line remaining today?  Electrolytes and blood  products          Assessment/Plan   Assessment & Plan  Mantle cell lymphoma    MCL (mantle cell lymphoma) (Multi)    Nancie Armenta is a 59 y.o. male presenting with relapsed Mantle cell lymphoma admitted on 11/29/24 for his CAR- T cell therapy on CASE 2419 (T=0, 12/6).     Currently T-0 today (T=0, 12/6)     ONC:   -Hx/o Stage IV Mantle cell lymphoma involving the GI tract initially treated with [place] regimen and had consolidation autograft with BEAM prep regimen April 2012  -Relapsed 2019 w/presentation of leukocytosis. Had colonoscopy 8/2019 with multiple biopsies including ileum, cecum, appendix, and rectosigmoid c/w MCL, Treated with Ibrutinib and Venetoclax w/complete response.  -Recurrent MCL in July 2024 presenting with dropping Plt counts and bmbx (7/25/24) showing focal involvement w/MCL (NGS cyclin D1 positive and SOX 11 positive. BIRC+ and TP53 positive w/a VAF of 3% BIRC3 positive). FISH neg for 17p deletion.  - CT imaging (8/7/24) showing thickening of sigmoid colon, development of mild to moderate retroperitoneal adenopathy, splenomegaly, and poss splenic infarct.  - Started Pirtobrutinib salvage on 8/12/24. Added Venetoclax starting 10/13/24 since spleen size was increasing on repeat CT imaging and on physical exam  - Pirtobrutinib and Venetoclax stopped 11/14/24 in preparation for CART collection  - Bmbx (10/23/24) with low level involvement by mantle cells about 3% Mantle cells by flow  - PET/CT (11/4/24) with FDG avid lymphadenopathy throughout the neck, chest, abdomen, and pelvis c/w lymphomatous involvement. Intense FDG avid splenomegaly. Peripheral regions of photopenia likely reflect areas of infarction.  - Admitted for his CART cell therapy on CASE 2419 (T=0, 12/6)      CAR-T   - Conditioning with Flu/Cy   - Cyclophosphamide 60 mg/kg IV T-6   - Fludarabine 25 mg/m2 IV T-5, T-4, T-3    - Plan Allopurinol and Keppra per protocol     - Baseline ICE Score:   - ECOG Score 12/3: 0- Fully active,  able to carry on all pre-disease performance w/o restriction.     # CRS Grade: --    - Organ/System affected by CRS: --   - Date of CRS onset: --   - Date of highest CRS Grade: --   - Date of CRS resolution to Grade 1 or lower: --   - CRS management: --      # ICE Score:   - Date of Neurotoxicity onset: --   - Date of highest Neurotoxicity Grade: --   - Date of Neurotoxicity resolution to Grade 1 or lower: --   - Neurotox management: --      # CAR-T Labs   - CMV DNA PCR ordered on admit  - IgG level ordered on admit  - CBC, RFP, Mg, LFTs: Daily   - Coagulation Screen, Fibrinogen: daily   - LDH-daily  - Haptoglobin: Weekly       HEME:  - Pancytopenia secondary to his malignancy and previous treatment  - Monitor CBC w/diff and transfuse as needed  - Hgb 6.8 on 12/1. 1 unit of blood ordered.     ID:  - Afebrile   - Cont ACV prophy  - Plan Fluconazole prophy per protocol  - Starting Levaquin with oncoming neutropenia      FEN/GI:  - Admit wt: 97.2 kg, current wt: 93.6 kg (12/5)  - Monitor electrolytes and replete as needed  - Added PPI on admit  - Hx/o CKD (Baseline Cr: 1.20)  - Volume overload. Gave Lasix 20mg IV once (12/1).  - Hypokalemia repleted last on 12/1.      CV:  - Cont daily Atorvastatin  - ECHO (10/23/24) with low normal LVEF 53%      :  - BPH. Held Tadalafil on admit. Started Flomax (11/30).     DISP:  - Patient of Dr. Yvonne Hobbs  - PICC line re-placed (12/2)  - PT ordered    Patient seen, examined, and discussed with Dr. Dooley.  Bryant Blair PA-C

## 2024-12-06 NOTE — RESEARCH NOTES
Research Note Treatment Day    Nancie Armenta is currently admitted for treatment on [id number]. Today is T=0. Procedures completed per protocol. AE's and con-meds reviewed with patient. Patient reports lack of appetite otherwise feeling well.  Patient is aware of treatment plan.  Will continue to follow patient per protocol calendar requirements.      [x]   Received treatment as planned   OR  []    Treatment delayed; patient calendar updated as required   Treatment delayed because:    []   AE    []   Physician Discretion    []   Clinical Deterioration or Progression     []   Other    Education Documentation  General Medication Information, taught by Chloe Ivey RN at 12/6/2024 11:20 AM.  Learner: Patient  Readiness: Acceptance  Method: Explanation  Response: Verbalizes Understanding    Comprehensive Metabolic Panel (CMP), taught by Chloe Ivey RN at 12/6/2024 11:20 AM.  Learner: Patient  Readiness: Acceptance  Method: Explanation  Response: Verbalizes Understanding    Complete Blood Count with Differential (CBC w/ Diff), taught by Chloe Ivey RN at 12/6/2024 11:20 AM.  Learner: Patient  Readiness: Acceptance  Method: Explanation  Response: Verbalizes Understanding    Education Comments  No comments found.

## 2024-12-07 ENCOUNTER — OFFICE VISIT (OUTPATIENT)
Dept: HEMATOLOGY/ONCOLOGY | Facility: HOSPITAL | Age: 59
DRG: 018 | End: 2024-12-07
Payer: COMMERCIAL

## 2024-12-07 ENCOUNTER — APPOINTMENT (OUTPATIENT)
Dept: RADIOLOGY | Facility: HOSPITAL | Age: 59
DRG: 018 | End: 2024-12-07
Payer: COMMERCIAL

## 2024-12-07 LAB
ALBUMIN SERPL BCP-MCNC: 3.7 G/DL (ref 3.4–5)
ALP SERPL-CCNC: 80 U/L (ref 33–136)
ALT SERPL W P-5'-P-CCNC: 19 U/L (ref 10–52)
ANION GAP SERPL CALC-SCNC: 13 MMOL/L (ref 10–20)
APPEARANCE UR: CLEAR
APTT PPP: 27 SECONDS (ref 27–38)
AST SERPL W P-5'-P-CCNC: 16 U/L (ref 9–39)
BASOPHILS # BLD MANUAL: 0 X10*3/UL (ref 0–0.1)
BASOPHILS NFR BLD MANUAL: 0 %
BILIRUB SERPL-MCNC: 1.1 MG/DL (ref 0–1.2)
BILIRUB UR STRIP.AUTO-MCNC: NEGATIVE MG/DL
BUN SERPL-MCNC: 24 MG/DL (ref 6–23)
CALCIUM SERPL-MCNC: 8.3 MG/DL (ref 8.6–10.6)
CHLORIDE SERPL-SCNC: 104 MMOL/L (ref 98–107)
CO2 SERPL-SCNC: 22 MMOL/L (ref 21–32)
COLOR UR: YELLOW
CREAT SERPL-MCNC: 1.17 MG/DL (ref 0.5–1.3)
DACRYOCYTES BLD QL SMEAR: ABNORMAL
EGFRCR SERPLBLD CKD-EPI 2021: 71 ML/MIN/1.73M*2
EOSINOPHIL # BLD MANUAL: 0.01 X10*3/UL (ref 0–0.7)
EOSINOPHIL NFR BLD MANUAL: 10 %
ERYTHROCYTE [DISTWIDTH] IN BLOOD BY AUTOMATED COUNT: 14.9 % (ref 11.5–14.5)
FIBRINOGEN PPP-MCNC: 281 MG/DL (ref 200–400)
GLUCOSE SERPL-MCNC: 117 MG/DL (ref 74–99)
GLUCOSE UR STRIP.AUTO-MCNC: NORMAL MG/DL
HCT VFR BLD AUTO: 21.9 % (ref 41–52)
HGB BLD-MCNC: 7.3 G/DL (ref 13.5–17.5)
HOLD SPECIMEN: NORMAL
IMM GRANULOCYTES # BLD AUTO: 0.01 X10*3/UL (ref 0–0.7)
IMM GRANULOCYTES NFR BLD AUTO: 10 % (ref 0–0.9)
INR PPP: 1.2 (ref 0.9–1.1)
KETONES UR STRIP.AUTO-MCNC: NEGATIVE MG/DL
LDH SERPL L TO P-CCNC: 111 U/L (ref 84–246)
LEUKOCYTE ESTERASE UR QL STRIP.AUTO: NEGATIVE
LYMPHOCYTES # BLD MANUAL: 0.02 X10*3/UL (ref 1.2–4.8)
LYMPHOCYTES NFR BLD MANUAL: 20 %
MAGNESIUM SERPL-MCNC: 2.06 MG/DL (ref 1.6–2.4)
MCH RBC QN AUTO: 31.5 PG (ref 26–34)
MCHC RBC AUTO-ENTMCNC: 33.3 G/DL (ref 32–36)
MCV RBC AUTO: 94 FL (ref 80–100)
MONOCYTES # BLD MANUAL: 0 X10*3/UL (ref 0.1–1)
MONOCYTES NFR BLD MANUAL: 0 %
MUCOUS THREADS #/AREA URNS AUTO: NORMAL /LPF
NEUTS SEG # BLD MANUAL: 0.07 X10*3/UL (ref 1.2–7)
NEUTS SEG NFR BLD MANUAL: 70 %
NITRITE UR QL STRIP.AUTO: NEGATIVE
NRBC BLD-RTO: 0 /100 WBCS (ref 0–0)
OVALOCYTES BLD QL SMEAR: ABNORMAL
PH UR STRIP.AUTO: 6 [PH]
PLATELET # BLD AUTO: 10 X10*3/UL (ref 150–450)
POLYCHROMASIA BLD QL SMEAR: ABNORMAL
POTASSIUM SERPL-SCNC: 4.1 MMOL/L (ref 3.5–5.3)
PROT SERPL-MCNC: 6.2 G/DL (ref 6.4–8.2)
PROT UR STRIP.AUTO-MCNC: ABNORMAL MG/DL
PROTHROMBIN TIME: 13.6 SECONDS (ref 9.8–12.8)
RBC # BLD AUTO: 2.32 X10*6/UL (ref 4.5–5.9)
RBC # UR STRIP.AUTO: NEGATIVE /UL
RBC #/AREA URNS AUTO: NORMAL /HPF
RBC MORPH BLD: ABNORMAL
SODIUM SERPL-SCNC: 135 MMOL/L (ref 136–145)
SP GR UR STRIP.AUTO: 1.02
TOTAL CELLS COUNTED BLD: 10
UROBILINOGEN UR STRIP.AUTO-MCNC: NORMAL MG/DL
WBC # BLD AUTO: 0.1 X10*3/UL (ref 4.4–11.3)
WBC #/AREA URNS AUTO: NORMAL /HPF

## 2024-12-07 PROCEDURE — 2500000001 HC RX 250 WO HCPCS SELF ADMINISTERED DRUGS (ALT 637 FOR MEDICARE OP)

## 2024-12-07 PROCEDURE — 85610 PROTHROMBIN TIME: CPT | Performed by: PHYSICIAN ASSISTANT

## 2024-12-07 PROCEDURE — 2500000004 HC RX 250 GENERAL PHARMACY W/ HCPCS (ALT 636 FOR OP/ED)

## 2024-12-07 PROCEDURE — 2500000001 HC RX 250 WO HCPCS SELF ADMINISTERED DRUGS (ALT 637 FOR MEDICARE OP): Performed by: INTERNAL MEDICINE

## 2024-12-07 PROCEDURE — 36415 COLL VENOUS BLD VENIPUNCTURE: CPT

## 2024-12-07 PROCEDURE — 85007 BL SMEAR W/DIFF WBC COUNT: CPT | Performed by: PHYSICIAN ASSISTANT

## 2024-12-07 PROCEDURE — 83615 LACTATE (LD) (LDH) ENZYME: CPT | Performed by: PHYSICIAN ASSISTANT

## 2024-12-07 PROCEDURE — 99233 SBSQ HOSP IP/OBS HIGH 50: CPT | Performed by: STUDENT IN AN ORGANIZED HEALTH CARE EDUCATION/TRAINING PROGRAM

## 2024-12-07 PROCEDURE — 87040 BLOOD CULTURE FOR BACTERIA: CPT

## 2024-12-07 PROCEDURE — 2500000001 HC RX 250 WO HCPCS SELF ADMINISTERED DRUGS (ALT 637 FOR MEDICARE OP): Performed by: PHYSICIAN ASSISTANT

## 2024-12-07 PROCEDURE — 81001 URINALYSIS AUTO W/SCOPE: CPT

## 2024-12-07 PROCEDURE — 71046 X-RAY EXAM CHEST 2 VIEWS: CPT | Performed by: RADIOLOGY

## 2024-12-07 PROCEDURE — 2500000002 HC RX 250 W HCPCS SELF ADMINISTERED DRUGS (ALT 637 FOR MEDICARE OP, ALT 636 FOR OP/ED): Performed by: PHYSICIAN ASSISTANT

## 2024-12-07 PROCEDURE — 1200000003 HC ONCOLOGY  ROOM WITH TELEMETRY DAILY

## 2024-12-07 PROCEDURE — 85384 FIBRINOGEN ACTIVITY: CPT | Performed by: PHYSICIAN ASSISTANT

## 2024-12-07 PROCEDURE — 80053 COMPREHEN METABOLIC PANEL: CPT | Performed by: PHYSICIAN ASSISTANT

## 2024-12-07 PROCEDURE — 83735 ASSAY OF MAGNESIUM: CPT | Performed by: PHYSICIAN ASSISTANT

## 2024-12-07 PROCEDURE — 85027 COMPLETE CBC AUTOMATED: CPT | Performed by: PHYSICIAN ASSISTANT

## 2024-12-07 PROCEDURE — 93010 ELECTROCARDIOGRAM REPORT: CPT | Performed by: INTERNAL MEDICINE

## 2024-12-07 PROCEDURE — 93005 ELECTROCARDIOGRAM TRACING: CPT

## 2024-12-07 PROCEDURE — 71046 X-RAY EXAM CHEST 2 VIEWS: CPT

## 2024-12-07 RX ORDER — ACETAMINOPHEN 325 MG/1
650 TABLET ORAL ONCE
Status: COMPLETED | OUTPATIENT
Start: 2024-12-07 | End: 2024-12-07

## 2024-12-07 RX ORDER — CEFEPIME HYDROCHLORIDE 2 G/50ML
2 INJECTION, SOLUTION INTRAVENOUS EVERY 8 HOURS
Status: DISCONTINUED | OUTPATIENT
Start: 2024-12-07 | End: 2024-12-16

## 2024-12-07 ASSESSMENT — COGNITIVE AND FUNCTIONAL STATUS - GENERAL
MOBILITY SCORE: 24
MOBILITY SCORE: 24
DAILY ACTIVITIY SCORE: 24
DAILY ACTIVITIY SCORE: 24

## 2024-12-07 ASSESSMENT — PAIN - FUNCTIONAL ASSESSMENT
PAIN_FUNCTIONAL_ASSESSMENT: 0-10

## 2024-12-07 ASSESSMENT — PAIN SCALES - GENERAL
PAINLEVEL_OUTOF10: 0 - NO PAIN

## 2024-12-07 NOTE — PROGRESS NOTES
"Nancie Armenta is a 59 y.o. male on day 8 of admission presenting with Mantle cell lymphoma.    Subjective   Febrile this AM. Reporting dry cough, tessalon helping. Endorses some LOPEZ after moving around a lot. Continues to have known decreased appetite and dysgeusia 2/2 chemo. Nausea controlled with compazine. Denies CP, abd pain, emesis, D/C.     Objective   Physical Exam  Constitutional:       General: He is not in acute distress.  HENT:      Head: Normocephalic and atraumatic.      Mouth/Throat:      Mouth: Mucous membranes are moist.   Eyes:      Extraocular Movements: Extraocular movements intact.      Pupils: Pupils are equal, round, and reactive to light.   Cardiovascular:      Rate and Rhythm: Normal rate and regular rhythm.   Pulmonary:      Effort: Pulmonary effort is normal. No respiratory distress.      Breath sounds: Normal breath sounds.   Abdominal:      General: Bowel sounds are normal.      Palpations: Abdomen is soft.      Tenderness: There is no abdominal tenderness.   Musculoskeletal:         General: Normal range of motion.      Right lower leg: No edema.      Left lower leg: No edema.   Skin:     General: Skin is warm and dry.   Neurological:      Mental Status: He is alert and oriented to person, place, and time.   Psychiatric:         Mood and Affect: Mood normal.         Behavior: Behavior normal.       Last Recorded Vitals  Blood pressure 105/61, pulse (!) 135, temperature (!) 38.6 °C (101.5 °F), resp. rate 18, height 1.839 m (6' 0.4\"), weight 93.2 kg (205 lb 7.5 oz), SpO2 100%.  Intake/Output last 3 Shifts:  I/O last 3 completed shifts:  In: 59.3 (0.6 mL/kg) [I.V.:59.3 (0.6 mL/kg)]  Out: - (0 mL/kg)   Weight: 93.2 kg     Scheduled medications  acyclovir, 400 mg, oral, q12h  allopurinol, 300 mg, oral, Daily  atorvastatin, 40 mg, oral, q AM  cefepime, 2 g, intravenous, q8h  fluconazole, 400 mg, oral, Daily  levETIRAcetam, 500 mg, oral, BID  pantoprazole, 40 mg, oral, Daily before " breakfast  tamsulosin, 0.4 mg, oral, Daily    Continuous medications     PRN medications  PRN medications: albuterol, alteplase, alteplase, benzonatate, dextrose, EPINEPHrine HCl, hydrocortisone, methylPREDNISolone sodium succinate (PF), ondansetron, oxymetazoline, prochlorperazine, sodium chloride          This patient has a central line   Reason for the central line remaining today?  Electrolytes and blood products    Assessment/Plan     Nancie Armenta is a 59 y.o. male presenting with relapsed Mantle cell lymphoma admitted on 11/29/24 for his CAR-T cell therapy on CASE 2419 (T0=12/6), prepped with fludarabine/cyclophosphamide.     Currently T+1 today (T0=12/6)     ONC:   #Hx/o Stage IV Mantle cell lymphoma involving the GI tract   - initially treated with [place] regimen and consolidation autograft with BEAM prep regimen April 2012  -Relapsed 2019 w/presentation of leukocytosis. Had colonoscopy 8/2019 with multiple biopsies including ileum, cecum, appendix, and rectosigmoid c/w MCL, Treated with Ibrutinib and Venetoclax w/complete response.  -Recurrent MCL in July 2024 presenting with dropping Plt counts and bmbx (7/25/24) showing focal involvement w/MCL (NGS cyclin D1 positive and SOX 11 positive. BIRC+ and TP53 positive w/a VAF of 3% BIRC3 positive). FISH neg for 17p deletion.  - CT imaging (8/7/24) showing thickening of sigmoid colon, development of mild to moderate retroperitoneal adenopathy, splenomegaly, and poss splenic infarct.  - Started Pirtobrutinib salvage on 8/12/24. Added Venetoclax starting 10/13/24 since spleen size was increasing on repeat CT imaging and on physical exam  - Bmbx (10/23/24) with low level involvement by mantle cells about 3% Mantle cells by flow  - PET/CT (11/4/24) with FDG avid lymphadenopathy throughout the neck, chest, abdomen, and pelvis c/w lymphomatous involvement. Intense FDG avid splenomegaly. Peripheral regions of photopenia likely reflect areas of infarction.  -  Pirtobrutinib and Venetoclax stopped 11/14/24 in preparation for CART collection  - Admitted for his CART cell therapy on CASE 2419 (T0=12/6)  - Received CAR-T infusion on 12/6 at 11:00. Around 18:50, patient experienced chills and rigors. Vital signs otherwise stable, rescue diphenhydramine and Pepcid administered. Methylpred held per Dr. Bowie's instructions. Chills and rigors resolved.       CAR-T   - Conditioning with Flu/Cy   - Cyclophosphamide 60 mg/kg IV T-6   - Fludarabine 25 mg/m2 IV T-5, T-4, T-3    - Plan Allopurinol and Keppra per protocol     - Baseline CARTOX Score: 10/10  - Current CARTOX score: 10/10 (12/7)    - ECOG Score: 0- Fully active, able to carry on all pre-disease performance w/o restriction.     # CRS Grade: G1 (12/7)    - Organ/System affected by CRS: fever   - Date of CRS onset: 12/7   - Date of highest CRS Grade: 12/7   - Date of CRS resolution to Grade 1 or lower: ---  - CRS management: infectious workup, vitals q2h + telemetry, started cefepime, supportive care     # ICE Score:   - Date of Neurotoxicity onset: --   - Date of highest Neurotoxicity Grade: --   - Date of Neurotoxicity resolution to Grade 1 or lower: --   - Neurotox management: --      # CAR-T Labs   - CMV DNA PCR ordered on admit (11/30) ND  - IgG level ordered on admit (11/30) 1040  - CBC, RFP, Mg, LFTs, coag screen, fibrinogen, LDH: Daily   - Haptoglobin: Weekly       HEME:  # Pancytopenia secondary to malignancy and previous treatment  - Monitor CBC w/diff   - transfuse if hgb<7, plt<10     ID:  #PPX: acyclovir, fluconazole  - levaquin (12/5-12/7)  # neutropenic fever (Tmax 38.6 on 12/7)  - vitals q2h per SOP  - telemetry x 3 days  - bl cx x2 (12/7) pending  - UA/UC (12/7) negative  - CXR (12/7): No definite focal infiltrate   - cefepime (12/7-p)     FEN/GI:  - Admit wt: 97.2 kg, current wt: 93.2 kg (12/6)  # Monitor electrolytes and replete as needed  # PPI ppx: protonix  # Hx/o CKD (Baseline Cr: 1.20)  # Volume  overload. Gave Lasix 20mg IV once (12/1).     CV:  - ECHO (10/23/24) with low normal LVEF 53%   # HLD. Cont daily Atorvastatin  # sinus tachycardia   - EKG (12/7) sinus tach  - likely due to fever vs dehydration  - on tele, not sustaining over 120, bouncing around 110s-120s  - 500cc bolus x1 (12/7)    :  # BPH. Held Tadalafil on admit. Started Flomax (11/30).     DISP:  - Patient of Dr. Yvonne Hobbs  - PICC line re-placed (12/2)  - PT ordered    Patient seen, examined, and discussed with CHRITSIANO GloriaC

## 2024-12-07 NOTE — CARE PLAN
The clinical goals for the shift include Patient will remain safe with well-controlled nausea throughout the night. Over the shift, the patient made progress toward the following goals:    Problem: Infection related to problem list condition  Goal: Infection will resolve through treatment  Outcome: Progressing     Problem: Fall/Injury  Goal: Not fall by end of shift  Outcome: Progressing  Goal: Be free from injury by end of the shift  Outcome: Progressing  Goal: Verbalize understanding of personal risk factors for fall in the hospital  Outcome: Progressing  Goal: Verbalize understanding of risk factor reduction measures to prevent injury from fall in the home  Outcome: Progressing  Goal: Use assistive devices by end of the shift  Outcome: Progressing  Goal: Pace activities to prevent fatigue by end of the shift  Outcome: Progressing     Problem: Pain - Adult  Goal: Verbalizes/displays adequate comfort level or baseline comfort level  Outcome: Progressing     Problem: Safety - Adult  Goal: Free from fall injury  Outcome: Progressing     Problem: Discharge Planning  Goal: Discharge to home or other facility with appropriate resources  Outcome: Progressing     Problem: Chronic Conditions and Co-morbidities  Goal: Patient's chronic conditions and co-morbidity symptoms are monitored and maintained or improved  Outcome: Progressing

## 2024-12-08 LAB
ABO GROUP (TYPE) IN BLOOD: NORMAL
ALBUMIN SERPL BCP-MCNC: 3.3 G/DL (ref 3.4–5)
ALP SERPL-CCNC: 65 U/L (ref 33–136)
ALT SERPL W P-5'-P-CCNC: 22 U/L (ref 10–52)
ANION GAP SERPL CALC-SCNC: 11 MMOL/L (ref 10–20)
ANTIBODY SCREEN: NORMAL
APTT PPP: 24 SECONDS (ref 27–38)
AST SERPL W P-5'-P-CCNC: 16 U/L (ref 9–39)
BACTERIA BLD CULT: NORMAL
BACTERIA BLD CULT: NORMAL
BACTERIA BPU CULT: NORMAL
BASOPHILS # BLD AUTO: 0 X10*3/UL (ref 0–0.1)
BASOPHILS NFR BLD AUTO: 0 %
BILIRUB SERPL-MCNC: 0.9 MG/DL (ref 0–1.2)
BLOOD EXPIRATION DATE: NORMAL
BUN SERPL-MCNC: 24 MG/DL (ref 6–23)
CALCIUM SERPL-MCNC: 7.7 MG/DL (ref 8.6–10.6)
CHLORIDE SERPL-SCNC: 104 MMOL/L (ref 98–107)
CO2 SERPL-SCNC: 23 MMOL/L (ref 21–32)
CREAT SERPL-MCNC: 1.22 MG/DL (ref 0.5–1.3)
DISPENSE STATUS: NORMAL
EGFRCR SERPLBLD CKD-EPI 2021: 68 ML/MIN/1.73M*2
EOSINOPHIL # BLD AUTO: 0.01 X10*3/UL (ref 0–0.7)
EOSINOPHIL NFR BLD AUTO: 33.3 %
ERYTHROCYTE [DISTWIDTH] IN BLOOD BY AUTOMATED COUNT: 15.1 % (ref 11.5–14.5)
ERYTHROCYTE [DISTWIDTH] IN BLOOD BY AUTOMATED COUNT: 15.3 % (ref 11.5–14.5)
FIBRINOGEN PPP-MCNC: 260 MG/DL (ref 200–400)
GLUCOSE SERPL-MCNC: 108 MG/DL (ref 74–99)
HCT VFR BLD AUTO: 17.4 % (ref 41–52)
HCT VFR BLD AUTO: 19.5 % (ref 41–52)
HGB BLD-MCNC: 5.8 G/DL (ref 13.5–17.5)
HGB BLD-MCNC: 6.5 G/DL (ref 13.5–17.5)
IMM GRANULOCYTES # BLD AUTO: 0 X10*3/UL (ref 0–0.7)
IMM GRANULOCYTES NFR BLD AUTO: 0 % (ref 0–0.9)
INR PPP: 1.3 (ref 0.9–1.1)
LDH SERPL L TO P-CCNC: 111 U/L (ref 84–246)
LYMPHOCYTES # BLD AUTO: 0.01 X10*3/UL (ref 1.2–4.8)
LYMPHOCYTES NFR BLD AUTO: 33.4 %
MAGNESIUM SERPL-MCNC: 2.07 MG/DL (ref 1.6–2.4)
MCH RBC QN AUTO: 30.8 PG (ref 26–34)
MCH RBC QN AUTO: 31.7 PG (ref 26–34)
MCHC RBC AUTO-ENTMCNC: 33.3 G/DL (ref 32–36)
MCHC RBC AUTO-ENTMCNC: 33.3 G/DL (ref 32–36)
MCV RBC AUTO: 92 FL (ref 80–100)
MCV RBC AUTO: 95 FL (ref 80–100)
MONOCYTES # BLD AUTO: 0.01 X10*3/UL (ref 0.1–1)
MONOCYTES NFR BLD AUTO: 33.3 %
NEUTROPHILS # BLD AUTO: 0 X10*3/UL (ref 1.2–7.7)
NEUTROPHILS NFR BLD AUTO: 0 %
NRBC BLD-RTO: 0 /100 WBCS (ref 0–0)
NRBC BLD-RTO: 0 /100 WBCS (ref 0–0)
PLATELET # BLD AUTO: 11 X10*3/UL (ref 150–450)
PLATELET # BLD AUTO: 7 X10*3/UL (ref 150–450)
POTASSIUM SERPL-SCNC: 3.7 MMOL/L (ref 3.5–5.3)
PRODUCT BLOOD TYPE: 5100
PRODUCT BLOOD TYPE: 6200
PRODUCT BLOOD TYPE: 7300
PRODUCT CODE: NORMAL
PROT SERPL-MCNC: 5.6 G/DL (ref 6.4–8.2)
PROTHROMBIN TIME: 14.3 SECONDS (ref 9.8–12.8)
RBC # BLD AUTO: 1.83 X10*6/UL (ref 4.5–5.9)
RBC # BLD AUTO: 2.11 X10*6/UL (ref 4.5–5.9)
RH FACTOR (ANTIGEN D): NORMAL
SODIUM SERPL-SCNC: 134 MMOL/L (ref 136–145)
UNIT ABO: NORMAL
UNIT NUMBER: NORMAL
UNIT RH: NORMAL
UNIT VOLUME: 269
UNIT VOLUME: 350
UNIT VOLUME: 350
WBC # BLD AUTO: <0.1 X10*3/UL (ref 4.4–11.3)
WBC # BLD AUTO: <0.1 X10*3/UL (ref 4.4–11.3)
XM INTEP: NORMAL
XM INTEP: NORMAL

## 2024-12-08 PROCEDURE — 2500000004 HC RX 250 GENERAL PHARMACY W/ HCPCS (ALT 636 FOR OP/ED)

## 2024-12-08 PROCEDURE — 1200000003 HC ONCOLOGY  ROOM WITH TELEMETRY DAILY

## 2024-12-08 PROCEDURE — 86901 BLOOD TYPING SEROLOGIC RH(D): CPT

## 2024-12-08 PROCEDURE — 2500000001 HC RX 250 WO HCPCS SELF ADMINISTERED DRUGS (ALT 637 FOR MEDICARE OP): Performed by: PHYSICIAN ASSISTANT

## 2024-12-08 PROCEDURE — 83735 ASSAY OF MAGNESIUM: CPT | Performed by: PHYSICIAN ASSISTANT

## 2024-12-08 PROCEDURE — 2500000001 HC RX 250 WO HCPCS SELF ADMINISTERED DRUGS (ALT 637 FOR MEDICARE OP): Performed by: INTERNAL MEDICINE

## 2024-12-08 PROCEDURE — 80053 COMPREHEN METABOLIC PANEL: CPT | Performed by: PHYSICIAN ASSISTANT

## 2024-12-08 PROCEDURE — 2500000002 HC RX 250 W HCPCS SELF ADMINISTERED DRUGS (ALT 637 FOR MEDICARE OP, ALT 636 FOR OP/ED): Performed by: PHYSICIAN ASSISTANT

## 2024-12-08 PROCEDURE — 2500000001 HC RX 250 WO HCPCS SELF ADMINISTERED DRUGS (ALT 637 FOR MEDICARE OP)

## 2024-12-08 PROCEDURE — 99233 SBSQ HOSP IP/OBS HIGH 50: CPT | Performed by: STUDENT IN AN ORGANIZED HEALTH CARE EDUCATION/TRAINING PROGRAM

## 2024-12-08 PROCEDURE — 86923 COMPATIBILITY TEST ELECTRIC: CPT

## 2024-12-08 PROCEDURE — 85025 COMPLETE CBC W/AUTO DIFF WBC: CPT | Performed by: PHYSICIAN ASSISTANT

## 2024-12-08 PROCEDURE — 85610 PROTHROMBIN TIME: CPT | Performed by: PHYSICIAN ASSISTANT

## 2024-12-08 PROCEDURE — 36430 TRANSFUSION BLD/BLD COMPNT: CPT

## 2024-12-08 PROCEDURE — 85027 COMPLETE CBC AUTOMATED: CPT

## 2024-12-08 PROCEDURE — P9037 PLATE PHERES LEUKOREDU IRRAD: HCPCS

## 2024-12-08 PROCEDURE — P9040 RBC LEUKOREDUCED IRRADIATED: HCPCS

## 2024-12-08 PROCEDURE — 85384 FIBRINOGEN ACTIVITY: CPT | Performed by: PHYSICIAN ASSISTANT

## 2024-12-08 PROCEDURE — 83615 LACTATE (LD) (LDH) ENZYME: CPT | Performed by: PHYSICIAN ASSISTANT

## 2024-12-08 RX ORDER — SODIUM CHLORIDE, SODIUM LACTATE, POTASSIUM CHLORIDE, CALCIUM CHLORIDE 600; 310; 30; 20 MG/100ML; MG/100ML; MG/100ML; MG/100ML
125 INJECTION, SOLUTION INTRAVENOUS CONTINUOUS
Status: ACTIVE | OUTPATIENT
Start: 2024-12-08 | End: 2024-12-08

## 2024-12-08 RX ORDER — SODIUM CHLORIDE 9 MG/ML
100 INJECTION, SOLUTION INTRAVENOUS CONTINUOUS
Status: ACTIVE | OUTPATIENT
Start: 2024-12-08 | End: 2024-12-10

## 2024-12-08 ASSESSMENT — COGNITIVE AND FUNCTIONAL STATUS - GENERAL
DAILY ACTIVITIY SCORE: 24
MOBILITY SCORE: 24

## 2024-12-08 ASSESSMENT — PAIN SCALES - GENERAL
PAINLEVEL_OUTOF10: 0 - NO PAIN

## 2024-12-08 ASSESSMENT — PAIN - FUNCTIONAL ASSESSMENT
PAIN_FUNCTIONAL_ASSESSMENT: 0-10

## 2024-12-08 NOTE — SIGNIFICANT EVENT
Rapid Response Nurse Note: RADAR alert: 8    Pager time: [phone]  Arrival time: 111  Event end time:   Location: [facility]  [x] Triage by phone or secure messaging    Rapid response initiated by:  [] Rapid response RN [] Family [] Nursing Supervisor [] Physician   [x] RADAR auto page [] Sepsis auto-page [] RN [] RT   [] NP/PA [] Other:     Primary reason for call:   [] BAT [] New CPAP/BiPAP [] Bleeding [] Change in mental status   [] Chest pain [] Code blue [] FiO2 >/= 50% [] HR </= 40 bpm   [] HR >/= 130 bpm [] Hyperglycemia [] Hypoglycemia [x] RADAR    [] RR </= 8 bpm [] RR >/= 30 bpm [] SBP </= 90 mmHg [] SpO2 < 90%   [] Seizure [] Sepsis [] Shortness of breath  [] Staff concern: see comments     Initial VS and/or RADAR VS: T 36.6 °C; ; RR 18; BP 80/48; SPO2 90%.    Providers present at bedside (if applicable):     Name of ICU Provider contacted (if applicable):     Interventions:  [x] None [] ABG/VBG [] Assist w/ICU transfer [] BAT paged    [] Bag mask [] Blood [] Cardioversion [] Code Blue   [] Code blue for intubation [] Code status changed [] Chest x-ray [] EKG   [] IV fluid/bolus [] KUB x-ray [] Labs/cultures [] Medication   [] Nebulizer treatment [] NIPPV (CPAP/BiPAP) [] Oxygen [] Oral airway   [] Peripheral IV [] Palliative care consult [] CT/MRI [] Sepsis protocol    [] Suctioned [] Other:     Outcome:  [] Coded and  [] Code blue for intubation [] Coded and transferred to ICU []  on division   [x] Remained on division (no change) [] Remained on division + additional monitoring [] Remained in ED [] Transferred to ED   [] Transferred to ICU [] Transferred to inpatient status [] Transferred for interventions (procedure) [] Transferred to ICU stepdown    [] Transferred to surgery [] Transferred to telemetry [] Sepsis protocol [] STEMI protocol   [] Stroke protocol [x] Bedside nurse instructed to page rapid response for any concerns or acute change in condition/VS     Additional Comments:      Radar auto-page received for a radar score of 8 with the above listed vital signs.  Vital signs were confirmed and reviewed with primary RN and are orthostatic vital signs (standing).  RN has no concerns at this time.  There are no indications for interventions by Rapid Response at this time.  RN to contact Rapid Response with any future concerns or signs of clinical decompensation.

## 2024-12-08 NOTE — PROGRESS NOTES
"Nancie Armenta is a 59 y.o. male on day 9 of admission presenting with Mantle cell lymphoma.    Subjective   Had a rough night. Went to turn off light in his room, but blacked out, and he fell. He reports that he fell to his knees and turned and landed on his back, because he has a scrape on his knee, and his head doesn't hurt and he doesn't have any bruises anywhere. Denies HA, blurry vision. Encouraged patient to notify RN/provider immediately if any HA, change in vision. Had a loose BM this AM. Appetite improving, after not eating the last few days, he ate a bowl of cream of wheat and some raisin bran, along with a fruit parfait this AM. Also drinking Ensure. Nausea controlled with compazine. Denies CP, abd pain, emesis, D/C.     Objective   Physical Exam  Constitutional:       General: He is not in acute distress.  HENT:      Head: Normocephalic and atraumatic.      Mouth/Throat:      Mouth: Mucous membranes are moist.   Eyes:      Extraocular Movements: Extraocular movements intact.      Pupils: Pupils are equal, round, and reactive to light.   Cardiovascular:      Rate and Rhythm: Regular rhythm. Tachycardia present.   Pulmonary:      Effort: Pulmonary effort is normal. No respiratory distress.      Breath sounds: Normal breath sounds.   Abdominal:      General: Bowel sounds are normal.      Palpations: Abdomen is soft.      Tenderness: There is no abdominal tenderness.   Musculoskeletal:         General: Normal range of motion.      Right lower leg: No edema.      Left lower leg: No edema.   Skin:     General: Skin is warm and dry.   Neurological:      Mental Status: He is alert and oriented to person, place, and time.   Psychiatric:         Mood and Affect: Mood normal.         Behavior: Behavior normal.       Last Recorded Vitals  Blood pressure 101/66, pulse 107, temperature 36.5 °C (97.7 °F), temperature source Temporal, resp. rate 18, height 1.839 m (6' 0.4\"), weight 93.2 kg (205 lb 7.5 oz), SpO2 " 98%.  Intake/Output last 3 Shifts:  I/O last 3 completed shifts:  In: 1068 (11.5 mL/kg) [I.V.:118 (1.3 mL/kg); IV Piggyback:950]  Out: 850 (9.1 mL/kg) [Urine:850 (0.3 mL/kg/hr)]  Weight: 93.2 kg     Scheduled medications  acyclovir, 400 mg, oral, q12h  allopurinol, 300 mg, oral, Daily  atorvastatin, 40 mg, oral, q AM  cefepime, 2 g, intravenous, q8h  fluconazole, 400 mg, oral, Daily  levETIRAcetam, 500 mg, oral, BID  pantoprazole, 40 mg, oral, Daily before breakfast  tamsulosin, 0.4 mg, oral, Daily    Continuous medications     PRN medications  PRN medications: albuterol, alteplase, alteplase, benzonatate, dextrose, EPINEPHrine HCl, hydrocortisone, methylPREDNISolone sodium succinate (PF), ondansetron, oxymetazoline, prochlorperazine, sodium chloride        This patient has a central line   Reason for the central line remaining today?  Electrolytes and blood products    Assessment/Plan     Nancie Armenta is a 59 y.o. male presenting with relapsed Mantle cell lymphoma admitted on 11/29/24 for his CAR-T cell therapy on CASE 2419 (T0=12/6), prepped with fludarabine/cyclophosphamide.     Currently T+2 today (T0=12/6)     ONC:   #Hx/o Stage IV Mantle cell lymphoma involving the GI tract   - initially treated with [place] regimen and consolidation autograft with BEAM prep regimen April 2012  -Relapsed 2019 w/presentation of leukocytosis. Had colonoscopy 8/2019 with multiple biopsies including ileum, cecum, appendix, and rectosigmoid c/w MCL, Treated with Ibrutinib and Venetoclax w/complete response.  -Recurrent MCL in July 2024 presenting with dropping Plt counts and bmbx (7/25/24) showing focal involvement w/MCL (NGS cyclin D1 positive and SOX 11 positive. BIRC+ and TP53 positive w/a VAF of 3% BIRC3 positive). FISH neg for 17p deletion.  - CT imaging (8/7/24) showing thickening of sigmoid colon, development of mild to moderate retroperitoneal adenopathy, splenomegaly, and poss splenic infarct.  - Started Pirtobrutinib  salvage on 8/12/24. Added Venetoclax starting 10/13/24 since spleen size was increasing on repeat CT imaging and on physical exam  - Bmbx (10/23/24) with low level involvement by mantle cells about 3% Mantle cells by flow  - PET/CT (11/4/24) with FDG avid lymphadenopathy throughout the neck, chest, abdomen, and pelvis c/w lymphomatous involvement. Intense FDG avid splenomegaly. Peripheral regions of photopenia likely reflect areas of infarction.  - Pirtobrutinib and Venetoclax stopped 11/14/24 in preparation for CART collection  - Admitted for his CART cell therapy on CASE 2419 (T0=12/6)  - Received CAR-T infusion on 12/6 at 11:00. Around 18:50, patient experienced chills and rigors. Vital signs otherwise stable, rescue diphenhydramine and Pepcid administered. Methylpred held per Dr. Bowie's instructions. Chills and rigors resolved.       CAR-T   - Conditioning with Flu/Cy   - Cyclophosphamide 60 mg/kg IV T-6   - Fludarabine 25 mg/m2 IV T-5, T-4, T-3    - Plan Allopurinol and Keppra per protocol     - Baseline CARTOX Score: 10/10  - Current CARTOX score: 10/10 (12/8)    - ECOG Score: 0- Fully active, able to carry on all pre-disease performance w/o restriction.     # CRS Grade: G2 on 12/7 (febrile, tachycardic)  - Organ/System affected by CRS: fever, tachycardia   - Date of CRS onset: 12/7   - Date of highest CRS Grade: 12/7   - Date of CRS resolution to Grade 1 or lower: 12/8  - CRS management: infectious workup, vitals q2h + telemetry, started cefepime, supportive care. Toci x1 given (12/7 @ 2100).      # ICE Score:   - Date of Neurotoxicity onset: --   - Date of highest Neurotoxicity Grade: --   - Date of Neurotoxicity resolution to Grade 1 or lower: --   - Neurotox management: --      # CAR-T Labs   - CMV DNA PCR ordered on admit (11/30) ND  - IgG level ordered on admit (11/30) 1040  - CBC, RFP, Mg, LFTs, coag screen, fibrinogen, LDH: Daily   - Haptoglobin: Weekly       HEME:  # Pancytopenia secondary to  malignancy and previous treatment  - Monitor CBC w/diff   - transfuse if hgb<7, plt<10     ID:  #PPX: acyclovir, fluconazole  - levaquin (12/5-12/7)  # neutropenic fever (12/7)  - vitals q2h per SOP  - telemetry x 3 days  - bl cx x2 (12/7) NGTD  - UA/UC (12/7) negative  - CXR (12/7): No definite focal infiltrate   - cefepime (12/7-p)     FEN/GI:  - Admit wt: 97.2 kg, current wt: 94 kg (12/8)  # Monitor electrolytes and replete as needed  # PPI ppx: protonix  # Hx/o CKD (Baseline Cr: 1.20)  # Volume overload. Gave Lasix 20mg IV once (12/1).     CV:  - ECHO (10/23/24) with low normal LVEF 53%   # HLD. Cont daily Atorvastatin  # sinus tachycardia   - EKG (12/7) sinus tach  - likely due to fever vs CRS vs dehydration, poor PO intake  - on tele   - 500cc bolus x1 (12/7), received another total 750cc overnight/early AM 12/8  # symptomatic orthostatic hypotension 12/8  - LR 125ml/hr x 8hr, then NS 100ml/hr x 30hr     :  # BPH. Held Tadalafil on admit. Started Flomax (11/30), HOLD I/s/o orthostatic hypotension (12/8)     DISP:  - Patient of Dr. Yvonne Hobbs  - PICC line re-placed (12/2)  - PT ordered    Patient seen, examined, and discussed with Dr. Azeb Phillip PA-C

## 2024-12-08 NOTE — CARE PLAN
Problem: Fall/Injury  Goal: Not fall by end of shift  Outcome: Progressing  Goal: Be free from injury by end of the shift  Outcome: Progressing  Goal: Verbalize understanding of personal risk factors for fall in the hospital  Outcome: Progressing  Goal: Verbalize understanding of risk factor reduction measures to prevent injury from fall in the home  Outcome: Progressing  Goal: Use assistive devices by end of the shift  Outcome: Progressing  Goal: Pace activities to prevent fatigue by end of the shift  Outcome: Progressing     Problem: Pain - Adult  Goal: Verbalizes/displays adequate comfort level or baseline comfort level  Outcome: Progressing     Problem: Safety - Adult  Goal: Free from fall injury  Outcome: Progressing     Problem: Discharge Planning  Goal: Discharge to home or other facility with appropriate resources  Outcome: Progressing      The clinical goals for the shift include Pt to remain HDS thru shift.

## 2024-12-09 ENCOUNTER — APPOINTMENT (OUTPATIENT)
Dept: RADIOLOGY | Facility: HOSPITAL | Age: 59
DRG: 018 | End: 2024-12-09
Payer: COMMERCIAL

## 2024-12-09 ENCOUNTER — DOCUMENTATION (OUTPATIENT)
Dept: OTHER | Facility: HOSPITAL | Age: 59
End: 2024-12-09
Payer: COMMERCIAL

## 2024-12-09 DIAGNOSIS — C83.17 MANTLE CELL LYMPHOMA OF SPLEEN (MULTI): ICD-10-CM

## 2024-12-09 LAB
ALBUMIN SERPL BCP-MCNC: 3.1 G/DL (ref 3.4–5)
ALP SERPL-CCNC: 53 U/L (ref 33–136)
ALT SERPL W P-5'-P-CCNC: 21 U/L (ref 10–52)
ANION GAP SERPL CALC-SCNC: 10 MMOL/L (ref 10–20)
APPEARANCE UR: CLEAR
APTT PPP: 28 SECONDS (ref 27–38)
AST SERPL W P-5'-P-CCNC: 15 U/L (ref 9–39)
BASOPHILS # BLD AUTO: 0 X10*3/UL (ref 0–0.1)
BASOPHILS NFR BLD AUTO: 0 %
BILIRUB SERPL-MCNC: 1.6 MG/DL (ref 0–1.2)
BILIRUB UR STRIP.AUTO-MCNC: NEGATIVE MG/DL
BLOOD EXPIRATION DATE: NORMAL
BLOOD EXPIRATION DATE: NORMAL
BUN SERPL-MCNC: 21 MG/DL (ref 6–23)
CALCIUM SERPL-MCNC: 7.7 MG/DL (ref 8.6–10.6)
CHLORIDE SERPL-SCNC: 106 MMOL/L (ref 98–107)
CO2 SERPL-SCNC: 22 MMOL/L (ref 21–32)
COLOR UR: NORMAL
CREAT SERPL-MCNC: 1.14 MG/DL (ref 0.5–1.3)
DISPENSE STATUS: NORMAL
DISPENSE STATUS: NORMAL
EGFRCR SERPLBLD CKD-EPI 2021: 74 ML/MIN/1.73M*2
EOSINOPHIL # BLD AUTO: 0.01 X10*3/UL (ref 0–0.7)
EOSINOPHIL NFR BLD AUTO: 50 %
ERYTHROCYTE [DISTWIDTH] IN BLOOD BY AUTOMATED COUNT: 15.9 % (ref 11.5–14.5)
ERYTHROCYTE [DISTWIDTH] IN BLOOD BY AUTOMATED COUNT: 16 % (ref 11.5–14.5)
FIBRINOGEN PPP-MCNC: 199 MG/DL (ref 200–400)
GLUCOSE SERPL-MCNC: 101 MG/DL (ref 74–99)
GLUCOSE UR STRIP.AUTO-MCNC: NORMAL MG/DL
HAPTOGLOB SERPL NEPH-MCNC: 30 MG/DL (ref 30–200)
HAPTOGLOB SERPL NEPH-MCNC: 47 MG/DL (ref 30–200)
HCT VFR BLD AUTO: 18.2 % (ref 41–52)
HCT VFR BLD AUTO: 19 % (ref 41–52)
HGB BLD-MCNC: 6.2 G/DL (ref 13.5–17.5)
HGB BLD-MCNC: 6.5 G/DL (ref 13.5–17.5)
HGB RETIC QN: 35 PG (ref 28–38)
IMM GRANULOCYTES # BLD AUTO: 0 X10*3/UL (ref 0–0.7)
IMM GRANULOCYTES NFR BLD AUTO: 0 % (ref 0–0.9)
IMMATURE RETIC FRACTION: 2.7 %
INR PPP: 1.2 (ref 0.9–1.1)
KETONES UR STRIP.AUTO-MCNC: NEGATIVE MG/DL
LDH SERPL L TO P-CCNC: 117 U/L (ref 84–246)
LEUKOCYTE ESTERASE UR QL STRIP.AUTO: NEGATIVE
LYMPHOCYTES # BLD AUTO: 0 X10*3/UL (ref 1.2–4.8)
LYMPHOCYTES NFR BLD AUTO: 0 %
MAGNESIUM SERPL-MCNC: 2.08 MG/DL (ref 1.6–2.4)
MCH RBC QN AUTO: 30.4 PG (ref 26–34)
MCH RBC QN AUTO: 30.8 PG (ref 26–34)
MCHC RBC AUTO-ENTMCNC: 34.1 G/DL (ref 32–36)
MCHC RBC AUTO-ENTMCNC: 34.2 G/DL (ref 32–36)
MCV RBC AUTO: 89 FL (ref 80–100)
MCV RBC AUTO: 91 FL (ref 80–100)
MONOCYTES # BLD AUTO: 0 X10*3/UL (ref 0.1–1)
MONOCYTES NFR BLD AUTO: 0 %
MUCOUS THREADS #/AREA URNS AUTO: NORMAL /LPF
NEUTROPHILS # BLD AUTO: 0.01 X10*3/UL (ref 1.2–7.7)
NEUTROPHILS NFR BLD AUTO: 50 %
NITRITE UR QL STRIP.AUTO: NEGATIVE
NRBC BLD-RTO: 0 /100 WBCS (ref 0–0)
NRBC BLD-RTO: 0 /100 WBCS (ref 0–0)
PH UR STRIP.AUTO: 6 [PH]
PLATELET # BLD AUTO: 7 X10*3/UL (ref 150–450)
PLATELET # BLD AUTO: 8 X10*3/UL (ref 150–450)
POTASSIUM SERPL-SCNC: 3.7 MMOL/L (ref 3.5–5.3)
PRODUCT BLOOD TYPE: 6200
PRODUCT BLOOD TYPE: 7300
PRODUCT CODE: NORMAL
PRODUCT CODE: NORMAL
PROT SERPL-MCNC: 5.3 G/DL (ref 6.4–8.2)
PROT UR STRIP.AUTO-MCNC: NORMAL MG/DL
PROTHROMBIN TIME: 13.1 SECONDS (ref 9.8–12.8)
RBC # BLD AUTO: 2.01 X10*6/UL (ref 4.5–5.9)
RBC # BLD AUTO: 2.14 X10*6/UL (ref 4.5–5.9)
RBC # UR STRIP.AUTO: NEGATIVE /UL
RBC #/AREA URNS AUTO: NORMAL /HPF
RBC MORPH BLD: NORMAL
RETICS #: 0.01 X10*6/UL (ref 0.02–0.12)
RETICS/RBC NFR AUTO: 0.4 % (ref 0.5–2)
SODIUM SERPL-SCNC: 134 MMOL/L (ref 136–145)
SP GR UR STRIP.AUTO: 1.01
UNIT ABO: NORMAL
UNIT ABO: NORMAL
UNIT NUMBER: NORMAL
UNIT NUMBER: NORMAL
UNIT RH: NORMAL
UNIT RH: NORMAL
UNIT VOLUME: 279
UNIT VOLUME: 350
UROBILINOGEN UR STRIP.AUTO-MCNC: NORMAL MG/DL
WBC # BLD AUTO: <0.1 X10*3/UL (ref 4.4–11.3)
WBC # BLD AUTO: <0.1 X10*3/UL (ref 4.4–11.3)
WBC #/AREA URNS AUTO: NORMAL /HPF
XM INTEP: NORMAL

## 2024-12-09 PROCEDURE — 85384 FIBRINOGEN ACTIVITY: CPT | Performed by: PHYSICIAN ASSISTANT

## 2024-12-09 PROCEDURE — 80053 COMPREHEN METABOLIC PANEL: CPT | Performed by: PHYSICIAN ASSISTANT

## 2024-12-09 PROCEDURE — 2500000001 HC RX 250 WO HCPCS SELF ADMINISTERED DRUGS (ALT 637 FOR MEDICARE OP): Performed by: INTERNAL MEDICINE

## 2024-12-09 PROCEDURE — 85025 COMPLETE CBC W/AUTO DIFF WBC: CPT | Performed by: PHYSICIAN ASSISTANT

## 2024-12-09 PROCEDURE — 81003 URINALYSIS AUTO W/O SCOPE: CPT | Performed by: PHYSICIAN ASSISTANT

## 2024-12-09 PROCEDURE — P9040 RBC LEUKOREDUCED IRRADIATED: HCPCS

## 2024-12-09 PROCEDURE — 83010 ASSAY OF HAPTOGLOBIN QUANT: CPT | Performed by: PHYSICIAN ASSISTANT

## 2024-12-09 PROCEDURE — 85610 PROTHROMBIN TIME: CPT | Performed by: PHYSICIAN ASSISTANT

## 2024-12-09 PROCEDURE — 70450 CT HEAD/BRAIN W/O DYE: CPT

## 2024-12-09 PROCEDURE — 85027 COMPLETE CBC AUTOMATED: CPT | Performed by: PHYSICIAN ASSISTANT

## 2024-12-09 PROCEDURE — 70450 CT HEAD/BRAIN W/O DYE: CPT | Performed by: RADIOLOGY

## 2024-12-09 PROCEDURE — 36430 TRANSFUSION BLD/BLD COMPNT: CPT

## 2024-12-09 PROCEDURE — 83735 ASSAY OF MAGNESIUM: CPT | Performed by: PHYSICIAN ASSISTANT

## 2024-12-09 PROCEDURE — 1200000003 HC ONCOLOGY  ROOM WITH TELEMETRY DAILY

## 2024-12-09 PROCEDURE — 2500000001 HC RX 250 WO HCPCS SELF ADMINISTERED DRUGS (ALT 637 FOR MEDICARE OP): Performed by: PHYSICIAN ASSISTANT

## 2024-12-09 PROCEDURE — P9073 PLATELETS PHERESIS PATH REDU: HCPCS

## 2024-12-09 PROCEDURE — 85045 AUTOMATED RETICULOCYTE COUNT: CPT | Performed by: PHYSICIAN ASSISTANT

## 2024-12-09 PROCEDURE — 99233 SBSQ HOSP IP/OBS HIGH 50: CPT | Performed by: STUDENT IN AN ORGANIZED HEALTH CARE EDUCATION/TRAINING PROGRAM

## 2024-12-09 PROCEDURE — 2500000004 HC RX 250 GENERAL PHARMACY W/ HCPCS (ALT 636 FOR OP/ED): Performed by: PHYSICIAN ASSISTANT

## 2024-12-09 PROCEDURE — P9037 PLATE PHERES LEUKOREDU IRRAD: HCPCS

## 2024-12-09 PROCEDURE — 83615 LACTATE (LD) (LDH) ENZYME: CPT | Performed by: PHYSICIAN ASSISTANT

## 2024-12-09 PROCEDURE — 2500000004 HC RX 250 GENERAL PHARMACY W/ HCPCS (ALT 636 FOR OP/ED)

## 2024-12-09 PROCEDURE — 2500000001 HC RX 250 WO HCPCS SELF ADMINISTERED DRUGS (ALT 637 FOR MEDICARE OP)

## 2024-12-09 RX ORDER — GUAIFENESIN 100 MG/5ML
200 SOLUTION ORAL EVERY 4 HOURS PRN
Status: DISCONTINUED | OUTPATIENT
Start: 2024-12-09 | End: 2024-12-18 | Stop reason: HOSPADM

## 2024-12-09 ASSESSMENT — COGNITIVE AND FUNCTIONAL STATUS - GENERAL
DAILY ACTIVITIY SCORE: 24
MOBILITY SCORE: 24

## 2024-12-09 ASSESSMENT — PAIN - FUNCTIONAL ASSESSMENT: PAIN_FUNCTIONAL_ASSESSMENT: 0-10

## 2024-12-09 ASSESSMENT — PAIN SCALES - GENERAL
PAINLEVEL_OUTOF10: 0 - NO PAIN

## 2024-12-09 NOTE — PROGRESS NOTES
Nancie Armenta is a 59 y.o. male on day 11 of admission presenting with Mantle cell lymphoma.    Subjective   Afebrile.  Denies headache or vision changes.  Orthostatic with positional changes, but denies lightheadedness or dizziness.  Occasional unchanged dry cough.  Denies sob.  Intermittent mild nausea relieved w Compazine.  Decreased appetite.  Drinking fluids well.  Denies active bleeding sources.    Denies recent sx of Ha, fever, chills, sinus pain, anorexia, stomatitis, odynophagia, CP, Sob, dyspnea, abd pain, GERD, diarrhea, constipation, dysuria, bleeding, unexplained bruising, skin lesions, rashes, swelling, edema, paresthesia, & diff w balance, coordination, & gait.     Objective     Vitals:    12/09/24 0922 12/09/24 1219 12/09/24 1234 12/09/24 1248   BP: (!) 74/41 96/62 96/64 106/68   BP Location: Right arm      Patient Position: Standing      Pulse: (!) 115 97 90 94   Resp:  16 16 16   Temp:  37.2 °C (99 °F) 36.6 °C (97.9 °F) 36.9 °C (98.4 °F)   TempSrc:  Temporal Temporal Temporal   SpO2:  98% 97% 100%   Weight:       Height:            Physical Exam  Constitutional:       General: He is not in acute distress.  HENT:      Head: Normocephalic and atraumatic.      Mouth/Throat:      Mouth: Mucous membranes are moist.   Eyes:      Extraocular Movements: Extraocular movements intact.      Pupils: Pupils are equal, round, and reactive to light.   Cardiovascular:      Rate and Rhythm: Normal rate and regular rhythm.   Pulmonary:      Effort: Pulmonary effort is normal. No respiratory distress.      Breath sounds: Normal breath sounds.   Abdominal:      General: Bowel sounds are normal.      Palpations: Abdomen is soft.      Tenderness: There is no abdominal tenderness.   Musculoskeletal:         General: Normal range of motion.      Right lower leg: No edema.      Left lower leg: No edema.   Skin:     General: Skin is warm and dry.   Neurological:      Mental Status: He is alert and oriented to person,  place, and time.   Psychiatric:         Mood and Affect: Mood normal.         Behavior: Behavior normal.     Scheduled medications  acyclovir, 400 mg, oral, q12h  allopurinol, 300 mg, oral, Daily  atorvastatin, 40 mg, oral, q AM  cefepime, 2 g, intravenous, q8h  fluconazole, 400 mg, oral, Daily  levETIRAcetam, 500 mg, oral, BID  pantoprazole, 40 mg, oral, Daily before breakfast  [Held by provider] tamsulosin, 0.4 mg, oral, Daily      Continuous medications  sodium chloride 0.9%, 100 mL/hr, Last Rate: 100 mL/hr (12/09/24 0722)      PRN medications  PRN medications: albuterol, alteplase, alteplase, benzonatate, dextrose, EPINEPHrine HCl, hydrocortisone, methylPREDNISolone sodium succinate (PF), ondansetron, prochlorperazine, sodium chloride      L   Results from last 7 days   Lab Units 12/09/24  0405 12/08/24  1702 12/08/24  0356 12/07/24  0458 12/06/24  0406 12/05/24  0443   WBC AUTO x10*3/uL <0.1* <0.1* <0.1* 0.1*   < > 0.6*   HEMOGLOBIN g/dL 6.2* 6.5* 5.8* 7.3*   < > 7.4*   HEMATOCRIT % 18.2* 19.5* 17.4* 21.9*   < > 22.1*   PLATELETS AUTO x10*3/uL 7* 11* 7* 10*   < > 24*   NEUTROS PCT AUTO % 50.0  --  0.0  --   --  87.4   LYMPHO PCT MAN %  --   --   --  20.0   < >  --    LYMPHS PCT AUTO % 0.0  --  33.4  --   --  1.6   MONO PCT MAN %  --   --   --  0.0   < >  --    MONOS PCT AUTO % 0.0  --  33.3  --   --  1.6   EOSINO PCT MAN %  --   --   --  10.0   < >  --    EOS PCT AUTO % 50.0  --  33.3  --   --  9.4    < > = values in this interval not displayed.     Results from last 7 days   Lab Units 12/09/24  0405 12/08/24  0356 12/07/24  0458   SODIUM mmol/L 134* 134* 135*   POTASSIUM mmol/L 3.7 3.7 4.1   CHLORIDE mmol/L 106 104 104   CO2 mmol/L 22 23 22   BUN mg/dL 21 24* 24*   CREATININE mg/dL 1.14 1.22 1.17   CALCIUM mg/dL 7.7* 7.7* 8.3*   PROTEIN TOTAL g/dL 5.3* 5.6* 6.2*   BILIRUBIN TOTAL mg/dL 1.6* 0.9 1.1   ALK PHOS U/L 53 65 80   ALT U/L 21 22 19   AST U/L 15 16 16   GLUCOSE mg/dL 101* 108* 117*     Results from  last 7 days   Lab Units 12/09/24  0405 12/08/24  0356 12/07/24  0458   MAGNESIUM mg/dL 2.08 2.07 2.06       This patient has a central line   Reason for the central line remaining today?  Electrolytes and blood products    Assessment/Plan     Nancie Armenta is a 59 y.o. male presenting with relapsed Mantle cell lymphoma admitted on 11/29/24 for his CAR-T cell therapy on CASE 2419 (T0=12/6), prepped with fludarabine/cyclophosphamide.     Currently T+3 today (T0=12/6)     ONC:   #Hx/o Stage IV Mantle cell lymphoma involving the GI tract   - initially treated with [place] regimen and consolidation autograft with BEAM prep regimen April 2012  -Relapsed 2019 w/presentation of leukocytosis. Had colonoscopy 8/2019 with multiple biopsies including ileum, cecum, appendix, and rectosigmoid c/w MCL, Treated with Ibrutinib and Venetoclax w/complete response.  -Recurrent MCL in July 2024 presenting with dropping Plt counts and bmbx (7/25/24) showing focal involvement w/MCL (NGS cyclin D1 positive and SOX 11 positive. BIRC+ and TP53 positive w/a VAF of 3% BIRC3 positive). FISH neg for 17p deletion.  - CT imaging (8/7/24) showing thickening of sigmoid colon, development of mild to moderate retroperitoneal adenopathy, splenomegaly, and poss splenic infarct.  - Started Pirtobrutinib salvage on 8/12/24. Added Venetoclax starting 10/13/24 since spleen size was increasing on repeat CT imaging and on physical exam  - Bmbx (10/23/24) with low level involvement by mantle cells about 3% Mantle cells by flow  - PET/CT (11/4/24) with FDG avid lymphadenopathy throughout the neck, chest, abdomen, and pelvis c/w lymphomatous involvement. Intense FDG avid splenomegaly. Peripheral regions of photopenia likely reflect areas of infarction.  - Pirtobrutinib and Venetoclax stopped 11/14/24 in preparation for CART collection  - Admitted for his CART cell therapy on CASE 2419 (T0=12/6)  - Received CAR-T infusion on 12/6 at 11:00. Around 18:50, patient  experienced chills and rigors. Vital signs otherwise stable, rescue diphenhydramine and Pepcid administered. Methylpred held per Dr. Bowie's instructions. Chills and rigors resolved.       CAR-T   - Conditioning with Flu/Cy   - Cyclophosphamide 60 mg/kg IV T-6   - Fludarabine 25 mg/m2 IV T-5, T-4, T-3    - Plan Allopurinol and Keppra per protocol     - Baseline CARTOX Score: 10/10  - Current CARTOX score: 10/10 (12/9)    - ECOG Score: 0- Fully active, able to carry on all pre-disease performance w/o restriction.     # CRS Grade: G2 on 12/7 (febrile, tachycardic)  - Organ/System affected by CRS: fever, tachycardia   - Date of CRS onset: 12/7   - Date of highest CRS Grade: 12/7   - Date of CRS resolution to Grade 1 or lower: 12/8  - CRS management: infectious workup, vitals q2h + telemetry, started cefepime, supportive care. Toci x1 given (12/7 @ 2100).      # ICE Score:   - Date of Neurotoxicity onset: --   - Date of highest Neurotoxicity Grade: --   - Date of Neurotoxicity resolution to Grade 1 or lower: --   - Neurotox management: --      # CAR-T Labs   - CMV DNA PCR ordered on admit (11/30) ND  - IgG level ordered on admit (11/30) 1040  - CBC, RFP, Mg, LFTs, coag screen, fibrinogen, LDH: Daily   - Haptoglobin: Weekly       HEME:  # Pancytopenia secondary to malignancy and previous treatment  - Monitor CBC w/diff   - transfuse if hgb<7, plt<10  - Recv'd 2 units prbc for hgb 6.5 on 12/8, hgb (12/9) 6.2 ??  Tx 1 unit prbc (12/9), will repeat cbc after   - Hemolysis labs ordered (haptoglobin, LDH, Retic, Unconj bili, YOKASTA, UA)      12/9 : , T.bili 1.6, previous 0.9  - Recv'd plt tx (12/9)  - Head CT (12/9) - no ICH     ID:  #PPX: acyclovir, fluconazole  - levaquin (12/5-12/7)  # neutropenic fever (12/7)  - vitals q2h per SOP  - telemetry x 3 days  - bl cx x2 (12/7) NGTD  - UA/UC (12/7) negative  - CXR (12/7): No definite focal infiltrate   - cefepime (12/7-p)     FEN/GI:  - Admit wt: 97.2 kg, current wt: 94  kg (12/8)  # Monitor electrolytes and replete as needed  # PPI ppx: protonix  # Hx/o CKD (Baseline Cr: 1.20)  # Volume overload. Gave Lasix 20mg IV once (12/1).     CV:  - ECHO (10/23/24) with low normal LVEF 53%   # HLD. Cont daily Atorvastatin  # sinus tachycardia   - EKG (12/7) sinus tach  - likely due to fever vs CRS vs dehydration, poor PO intake  - on tele   - 500cc bolus x1 (12/7), received another total 750cc overnight/early AM 12/8  # symptomatic orthostatic hypotension 12/8  - LR 125ml/hr x 8hr, then NS 100ml/hr x 30hr   -  ml bolus for asympt orthostatic VS (12/9 am)    :  # BPH. Held Tadalafil on admit. Started Flomax (11/30), HOLD I/s/o orthostatic hypotension (12/8)     DISP:  - Patient of Dr. Yvonne Hobbs  - PICC line re-placed (12/2)  - PT ordered    Patient seen, examined, and discussed with CHRISTIANO BennettC

## 2024-12-09 NOTE — CARE PLAN
Problem: Infection related to problem list condition  Goal: Infection will resolve through treatment  Outcome: Progressing     Problem: Fall/Injury  Goal: Not fall by end of shift  Outcome: Progressing  Goal: Be free from injury by end of the shift  Outcome: Progressing  Goal: Verbalize understanding of personal risk factors for fall in the hospital  Outcome: Progressing  Goal: Verbalize understanding of risk factor reduction measures to prevent injury from fall in the home  Outcome: Progressing  Goal: Use assistive devices by end of the shift  Outcome: Progressing  Goal: Pace activities to prevent fatigue by end of the shift  Outcome: Progressing     Problem: Pain - Adult  Goal: Verbalizes/displays adequate comfort level or baseline comfort level  Outcome: Progressing     Problem: Safety - Adult  Goal: Free from fall injury  Outcome: Progressing     Problem: Discharge Planning  Goal: Discharge to home or other facility with appropriate resources  Outcome: Progressing     Problem: Chronic Conditions and Co-morbidities  Goal: Patient's chronic conditions and co-morbidity symptoms are monitored and maintained or improved  Outcome: Progressing   The patient's goals for the shift include      The clinical goals for the shift include patient will be free from falls and injury this shift

## 2024-12-09 NOTE — PROGRESS NOTES
"Research Note Non-Treatment Day    Nancie Armenta remains admitted on SCC 3 today. Patient is on [id number]. Today is T+3. Procedures completed per protocol. Correlatives for T+2 drawn this AM per protocol instructions. AE's and con-meds reviewed with patient. Patient had orthostatic hypotension and fever, but it has resolved and he \"feels much better today.\" Still fatigued. Appetite is adequate, but not at baseline. Discussed ANC and count recovery required for discharge and follow up response assessments. Patient is aware of treatment plan. ECOG 1. Will continue to follow patient per protocol calendar and as needed.       Education Documentation  Treatment Plan and Schedule, taught by Truong Chong RN at 12/9/2024  2:06 PM.  Learner: Patient  Readiness: Eager  Method: Explanation  Response: Verbalizes Understanding    Diagnostic Studies, taught by Truong Chong RN at 12/9/2024  2:06 PM.  Learner: Patient  Readiness: Eager  Method: Explanation  Response: Verbalizes Understanding    Complete Blood Count with Differential (CBC w/ Diff), taught by Truong Chong RN at 12/9/2024  2:06 PM.  Learner: Patient  Readiness: Eager  Method: Explanation  Response: Verbalizes Understanding    Education Comments  No comments found.       "

## 2024-12-09 NOTE — CARE PLAN
Problem: Fall/Injury  Goal: Not fall by end of shift  Outcome: Progressing  Goal: Be free from injury by end of the shift  Outcome: Progressing  Goal: Verbalize understanding of personal risk factors for fall in the hospital  Outcome: Progressing  Goal: Verbalize understanding of risk factor reduction measures to prevent injury from fall in the home  Outcome: Progressing  Goal: Use assistive devices by end of the shift  Outcome: Progressing  Goal: Pace activities to prevent fatigue by end of the shift  Outcome: Progressing     Problem: Pain - Adult  Goal: Verbalizes/displays adequate comfort level or baseline comfort level  Outcome: Progressing     Problem: Safety - Adult  Goal: Free from fall injury  Outcome: Progressing     Problem: Discharge Planning  Goal: Discharge to home or other facility with appropriate resources  Outcome: Progressing     Problem: Chronic Conditions and Co-morbidities  Goal: Patient's chronic conditions and co-morbidity symptoms are monitored and maintained or improved  Outcome: Progressing    The clinical goals for the shift include patient will be free from falls and injury this shift

## 2024-12-10 LAB
ALBUMIN SERPL BCP-MCNC: 3.2 G/DL (ref 3.4–5)
ALP SERPL-CCNC: 52 U/L (ref 33–136)
ALT SERPL W P-5'-P-CCNC: 23 U/L (ref 10–52)
ANION GAP SERPL CALC-SCNC: 11 MMOL/L (ref 10–20)
APTT PPP: 29 SECONDS (ref 27–38)
AST SERPL W P-5'-P-CCNC: 15 U/L (ref 9–39)
BASOPHILS # BLD AUTO: 0 X10*3/UL (ref 0–0.1)
BASOPHILS NFR BLD AUTO: 0 %
BILIRUB DIRECT SERPL-MCNC: 0.4 MG/DL (ref 0–0.3)
BILIRUB SERPL-MCNC: 1.4 MG/DL (ref 0–1.2)
BUN SERPL-MCNC: 17 MG/DL (ref 6–23)
CALCIUM SERPL-MCNC: 7.8 MG/DL (ref 8.6–10.6)
CHLORIDE SERPL-SCNC: 106 MMOL/L (ref 98–107)
CO2 SERPL-SCNC: 22 MMOL/L (ref 21–32)
CREAT SERPL-MCNC: 1.02 MG/DL (ref 0.5–1.3)
DAT-POLYSPECIFIC: NORMAL
EGFRCR SERPLBLD CKD-EPI 2021: 84 ML/MIN/1.73M*2
EOSINOPHIL # BLD AUTO: 0.01 X10*3/UL (ref 0–0.7)
EOSINOPHIL NFR BLD AUTO: 11.1 %
ERYTHROCYTE [DISTWIDTH] IN BLOOD BY AUTOMATED COUNT: 16.7 % (ref 11.5–14.5)
ERYTHROCYTE [DISTWIDTH] IN BLOOD BY AUTOMATED COUNT: 16.8 % (ref 11.5–14.5)
FIBRINOGEN PPP-MCNC: 200 MG/DL (ref 200–400)
GLUCOSE SERPL-MCNC: 94 MG/DL (ref 74–99)
HAPTOGLOB SERPL NEPH-MCNC: <30 MG/DL (ref 30–200)
HCT VFR BLD AUTO: 21.3 % (ref 41–52)
HCT VFR BLD AUTO: 21.4 % (ref 41–52)
HGB BLD-MCNC: 7.3 G/DL (ref 13.5–17.5)
HGB BLD-MCNC: 7.4 G/DL (ref 13.5–17.5)
HGB RETIC QN: 33 PG (ref 28–38)
HOLD SPECIMEN: NORMAL
IMM GRANULOCYTES # BLD AUTO: 0 X10*3/UL (ref 0–0.7)
IMM GRANULOCYTES NFR BLD AUTO: 0 % (ref 0–0.9)
IMMATURE RETIC FRACTION: 5.2 %
INR PPP: 1.2 (ref 0.9–1.1)
LDH SERPL L TO P-CCNC: 135 U/L (ref 84–246)
LYMPHOCYTES # BLD AUTO: 0.04 X10*3/UL (ref 1.2–4.8)
LYMPHOCYTES NFR BLD AUTO: 44.4 %
MAGNESIUM SERPL-MCNC: 2.07 MG/DL (ref 1.6–2.4)
MCH RBC QN AUTO: 29.4 PG (ref 26–34)
MCH RBC QN AUTO: 30.2 PG (ref 26–34)
MCHC RBC AUTO-ENTMCNC: 34.3 G/DL (ref 32–36)
MCHC RBC AUTO-ENTMCNC: 34.6 G/DL (ref 32–36)
MCV RBC AUTO: 86 FL (ref 80–100)
MCV RBC AUTO: 87 FL (ref 80–100)
MONOCYTES # BLD AUTO: 0.03 X10*3/UL (ref 0.1–1)
MONOCYTES NFR BLD AUTO: 33.3 %
NEUTROPHILS # BLD AUTO: 0.01 X10*3/UL (ref 1.2–7.7)
NEUTROPHILS NFR BLD AUTO: 11.2 %
NRBC BLD-RTO: 0 /100 WBCS (ref 0–0)
NRBC BLD-RTO: 0 /100 WBCS (ref 0–0)
PLATELET # BLD AUTO: 10 X10*3/UL (ref 150–450)
PLATELET # BLD AUTO: 10 X10*3/UL (ref 150–450)
POTASSIUM SERPL-SCNC: 3.7 MMOL/L (ref 3.5–5.3)
PROT SERPL-MCNC: 5.2 G/DL (ref 6.4–8.2)
PROTHROMBIN TIME: 13.2 SECONDS (ref 9.8–12.8)
RBC # BLD AUTO: 2.45 X10*6/UL (ref 4.5–5.9)
RBC # BLD AUTO: 2.48 X10*6/UL (ref 4.5–5.9)
RETICS #: 0.01 X10*6/UL (ref 0.02–0.12)
RETICS/RBC NFR AUTO: 0.5 % (ref 0.5–2)
SODIUM SERPL-SCNC: 135 MMOL/L (ref 136–145)
WBC # BLD AUTO: 0.1 X10*3/UL (ref 4.4–11.3)
WBC # BLD AUTO: 0.2 X10*3/UL (ref 4.4–11.3)

## 2024-12-10 PROCEDURE — 2500000004 HC RX 250 GENERAL PHARMACY W/ HCPCS (ALT 636 FOR OP/ED)

## 2024-12-10 PROCEDURE — 2500000001 HC RX 250 WO HCPCS SELF ADMINISTERED DRUGS (ALT 637 FOR MEDICARE OP): Performed by: INTERNAL MEDICINE

## 2024-12-10 PROCEDURE — 83735 ASSAY OF MAGNESIUM: CPT | Performed by: PHYSICIAN ASSISTANT

## 2024-12-10 PROCEDURE — 87632 RESP VIRUS 6-11 TARGETS: CPT | Performed by: PHYSICIAN ASSISTANT

## 2024-12-10 PROCEDURE — 85025 COMPLETE CBC W/AUTO DIFF WBC: CPT | Performed by: PHYSICIAN ASSISTANT

## 2024-12-10 PROCEDURE — 99233 SBSQ HOSP IP/OBS HIGH 50: CPT | Performed by: STUDENT IN AN ORGANIZED HEALTH CARE EDUCATION/TRAINING PROGRAM

## 2024-12-10 PROCEDURE — 2500000001 HC RX 250 WO HCPCS SELF ADMINISTERED DRUGS (ALT 637 FOR MEDICARE OP)

## 2024-12-10 PROCEDURE — 86880 COOMBS TEST DIRECT: CPT

## 2024-12-10 PROCEDURE — 85610 PROTHROMBIN TIME: CPT | Performed by: PHYSICIAN ASSISTANT

## 2024-12-10 PROCEDURE — 80053 COMPREHEN METABOLIC PANEL: CPT | Performed by: PHYSICIAN ASSISTANT

## 2024-12-10 PROCEDURE — 36430 TRANSFUSION BLD/BLD COMPNT: CPT

## 2024-12-10 PROCEDURE — 1200000003 HC ONCOLOGY  ROOM WITH TELEMETRY DAILY

## 2024-12-10 PROCEDURE — 83010 ASSAY OF HAPTOGLOBIN QUANT: CPT | Performed by: PHYSICIAN ASSISTANT

## 2024-12-10 PROCEDURE — 83615 LACTATE (LD) (LDH) ENZYME: CPT | Performed by: PHYSICIAN ASSISTANT

## 2024-12-10 PROCEDURE — 2500000001 HC RX 250 WO HCPCS SELF ADMINISTERED DRUGS (ALT 637 FOR MEDICARE OP): Performed by: PHYSICIAN ASSISTANT

## 2024-12-10 PROCEDURE — 82248 BILIRUBIN DIRECT: CPT | Performed by: PHYSICIAN ASSISTANT

## 2024-12-10 PROCEDURE — 85384 FIBRINOGEN ACTIVITY: CPT | Performed by: PHYSICIAN ASSISTANT

## 2024-12-10 PROCEDURE — 85045 AUTOMATED RETICULOCYTE COUNT: CPT | Performed by: PHYSICIAN ASSISTANT

## 2024-12-10 PROCEDURE — 85027 COMPLETE CBC AUTOMATED: CPT | Performed by: PHYSICIAN ASSISTANT

## 2024-12-10 ASSESSMENT — COGNITIVE AND FUNCTIONAL STATUS - GENERAL
MOBILITY SCORE: 24
DAILY ACTIVITIY SCORE: 24
DAILY ACTIVITIY SCORE: 24
MOBILITY SCORE: 24

## 2024-12-10 ASSESSMENT — PAIN SCALES - GENERAL
PAINLEVEL_OUTOF10: 0 - NO PAIN

## 2024-12-10 ASSESSMENT — PAIN - FUNCTIONAL ASSESSMENT: PAIN_FUNCTIONAL_ASSESSMENT: 0-10

## 2024-12-10 NOTE — CARE PLAN
The patient's goals for the shift include      The clinical goals for the shift include pt will remian HDS      Problem: Infection related to problem list condition  Goal: Infection will resolve through treatment  Outcome: Progressing     Problem: Fall/Injury  Goal: Not fall by end of shift  Outcome: Progressing  Goal: Be free from injury by end of the shift  Outcome: Progressing  Goal: Verbalize understanding of personal risk factors for fall in the hospital  Outcome: Progressing  Goal: Verbalize understanding of risk factor reduction measures to prevent injury from fall in the home  Outcome: Progressing  Goal: Use assistive devices by end of the shift  Outcome: Progressing  Goal: Pace activities to prevent fatigue by end of the shift  Outcome: Progressing

## 2024-12-10 NOTE — PROGRESS NOTES
Nancie Armenta is a 59 y.o. male on day 12 of admission presenting with Mantle cell lymphoma.    Subjective   Afebrile.  Denies headache or vision changes.  Orthostatic with positional changes improved.  Intermittent productive cough w clear mucus.  Denies sob.  Intermittent mild nausea relieved w Compazine.  Decreased appetite.  Drinking fluids well.  Denies active bleeding sources.    Denies recent sx of Ha, fever, chills, sinus pain, anorexia, stomatitis, odynophagia, CP, Sob, dyspnea, abd pain, GERD, diarrhea, constipation, dysuria, bleeding, unexplained bruising, skin lesions, rashes, swelling, edema, paresthesia, & diff w balance, coordination, & gait.     Objective     Vitals:    12/10/24 0150 12/10/24 0407 12/10/24 0754 12/10/24 1223   BP: 100/60 109/69 103/68 93/60   BP Location:  Right arm Right arm    Patient Position:  Lying Lying Sitting   Pulse: 88 86 74 97   Resp: 18 18 18 18   Temp: 36.4 °C (97.5 °F) 36.6 °C (97.9 °F) 36.3 °C (97.3 °F) 36.3 °C (97.3 °F)   TempSrc: Temporal Temporal     SpO2: 99% 98% 96% 98%   Weight:       Height:             Physical Exam  Constitutional:       General: He is not in acute distress.  HENT:      Head: Normocephalic and atraumatic.      Mouth/Throat:      Mouth: Mucous membranes are moist.   Eyes:      Extraocular Movements: Extraocular movements intact.      Pupils: Pupils are equal, round, and reactive to light.   Cardiovascular:      Rate and Rhythm: Normal rate and regular rhythm.   Pulmonary:      Effort: Pulmonary effort is normal. No respiratory distress.      Breath sounds: Normal breath sounds.   Abdominal:      General: Bowel sounds are normal.      Palpations: Abdomen is soft. There is splenomegaly.      Tenderness: There is no abdominal tenderness.   Musculoskeletal:         General: Normal range of motion.      Right lower leg: No edema.      Left lower leg: No edema.   Skin:     General: Skin is warm and dry.   Neurological:      Mental Status: He is  alert and oriented to person, place, and time.   Psychiatric:         Mood and Affect: Mood normal.         Behavior: Behavior normal.     Scheduled medications  acyclovir, 400 mg, oral, q12h  allopurinol, 300 mg, oral, Daily  atorvastatin, 40 mg, oral, q AM  cefepime, 2 g, intravenous, q8h  fluconazole, 400 mg, oral, Daily  levETIRAcetam, 500 mg, oral, BID  pantoprazole, 40 mg, oral, Daily before breakfast  [Held by provider] tamsulosin, 0.4 mg, oral, Daily    Continuous medications     PRN medications  PRN medications: albuterol, alteplase, alteplase, benzonatate, dextrose, EPINEPHrine HCl, guaiFENesin, hydrocortisone, methylPREDNISolone sodium succinate (PF), ondansetron, prochlorperazine, sodium chloride, sodium chloride   This patient has a central line   Reason for the central line remaining today?  Electrolytes and blood products    Results from last 7 days   Lab Units 12/10/24  0409 12/09/24  1821 12/09/24  0405 12/08/24  1702 12/08/24  0356   WBC AUTO x10*3/uL 0.1* <0.1* <0.1*   < > <0.1*   HEMOGLOBIN g/dL 7.3* 6.5* 6.2*   < > 5.8*   HEMATOCRIT % 21.3* 19.0* 18.2*   < > 17.4*   PLATELETS AUTO x10*3/uL 10* 8* 7*   < > 7*   NEUTROS PCT AUTO % 11.2  --  50.0  --  0.0   LYMPHS PCT AUTO % 44.4  --  0.0  --  33.4   MONOS PCT AUTO % 33.3  --  0.0  --  33.3   EOS PCT AUTO % 11.1  --  50.0  --  33.3    < > = values in this interval not displayed.     Results from last 7 days   Lab Units 12/10/24  0409 12/09/24  0405 12/08/24  0356   SODIUM mmol/L 135* 134* 134*   POTASSIUM mmol/L 3.7 3.7 3.7   CHLORIDE mmol/L 106 106 104   CO2 mmol/L 22 22 23   BUN mg/dL 17 21 24*   CREATININE mg/dL 1.02 1.14 1.22   CALCIUM mg/dL 7.8* 7.7* 7.7*   PROTEIN TOTAL g/dL 5.2* 5.3* 5.6*   BILIRUBIN TOTAL mg/dL 1.4* 1.6* 0.9   ALK PHOS U/L 52 53 65   ALT U/L 23 21 22   AST U/L 15 15 16   GLUCOSE mg/dL 94 101* 108*     Results from last 7 days   Lab Units 12/10/24  0409 12/09/24  0405 12/08/24  0356   MAGNESIUM mg/dL 2.07 2.08 2.07      Assessment/Plan     Nancie Armenta is a 59 y.o. male presenting with relapsed Mantle cell lymphoma admitted on 11/29/24 for his CAR-T cell therapy on CASE 2419 (T0=12/6), prepped with fludarabine/cyclophosphamide.     Currently T+4 today (T0=12/6)     ONC:   #Hx/o Stage IV Mantle cell lymphoma involving the GI tract   - initially treated with [place] regimen and consolidation autograft with BEAM prep regimen April 2012  -Relapsed 2019 w/presentation of leukocytosis. Had colonoscopy 8/2019 with multiple biopsies including ileum, cecum, appendix, and rectosigmoid c/w MCL, Treated with Ibrutinib and Venetoclax w/complete response.  -Recurrent MCL in July 2024 presenting with dropping Plt counts and bmbx (7/25/24) showing focal involvement w/MCL (NGS cyclin D1 positive and SOX 11 positive. BIRC+ and TP53 positive w/a VAF of 3% BIRC3 positive). FISH neg for 17p deletion.  - CT imaging (8/7/24) showing thickening of sigmoid colon, development of mild to moderate retroperitoneal adenopathy, splenomegaly, and poss splenic infarct.  - Started Pirtobrutinib salvage on 8/12/24. Added Venetoclax starting 10/13/24 since spleen size was increasing on repeat CT imaging and on physical exam  - Bmbx (10/23/24) with low level involvement by mantle cells about 3% Mantle cells by flow  - PET/CT (11/4/24) with FDG avid lymphadenopathy throughout the neck, chest, abdomen, and pelvis c/w lymphomatous involvement. Intense FDG avid splenomegaly. Peripheral regions of photopenia likely reflect areas of infarction.  - Pirtobrutinib and Venetoclax stopped 11/14/24 in preparation for CART collection  - Admitted for his CART cell therapy on CASE 2419 (T0=12/6)  - Received CAR-T infusion on 12/6 at 11:00. Around 18:50, patient experienced chills and rigors. Vital signs otherwise stable, rescue diphenhydramine and Pepcid administered. Methylpred held per Dr. Bowie's instructions. Chills and rigors resolved.       CAR-T   - Conditioning  with Flu/Cy   - Cyclophosphamide 60 mg/kg IV T-6   - Fludarabine 25 mg/m2 IV T-5, T-4, T-3    - Plan Allopurinol and Keppra per protocol     - Baseline CARTOX Score: 10/10  - Current CARTOX score: 10/10 (12/10)    - ECOG Score: 0- Fully active, able to carry on all pre-disease performance w/o restriction.     # CRS Grade: G2 on 12/7 (febrile, tachycardic)  - Organ/System affected by CRS: fever, tachycardia   - Date of CRS onset: 12/7   - Date of highest CRS Grade: 12/7   - Date of CRS resolution to Grade 1 or lower: 12/8  - CRS management: infectious workup, vitals q2h + telemetry, started cefepime, supportive care. Toci x1 given (12/7 @ 2100).      # ICE Score:   - Date of Neurotoxicity onset: --   - Date of highest Neurotoxicity Grade: --   - Date of Neurotoxicity resolution to Grade 1 or lower: --   - Neurotox management: --      # CAR-T Labs   - CMV DNA PCR ordered on admit (11/30) ND  - IgG level ordered on admit (11/30) 1040  - CBC, RFP, Mg, LFTs, coag screen, fibrinogen, LDH: Daily   - Haptoglobin: Weekly       HEME:  # Pancytopenia, neutropenic secondary to malignancy and treatment  - Monitor CBC w/diff   - transfuse if hgb<7, plt<10  - Recv'd 2 units prbc for hgb 6.5 on 12/8, hgb (12/9) 6.2 ??  Tx 1 unit prbc (12/9), repeat hgb after Tx 6.5, tx additional     prbc early 12/10 w post tx hgb 7.3  - Hemolysis labs ordered (haptoglobin, LDH, Retic, Unconj bili, YOKASTA, UA)      12/9 : , T.bili 1.6, previous 0.9     12/10: , T bili 1.4, D.bili 0.4, Haptoglobin <30, UA neg, Retic 0.011, YOKASTA poly - neg)  - Prob splenic sequestration of rbc's d/t massive spleen - CT (10/10/24) - splenomegaly 22.6 cm.  PET/CT (11/4/24) w    intense FDG avid splenomegaly  - Recv'd plt tx (12/8-10)  - Head CT (12/9) - no ICH     ID:  #PPX: acyclovir, fluconazole  - levaquin (12/5-12/7)  # neutropenic fever (12/7)  - vitals q2h per SOP  - telemetry x 3 days  - bl cx x2 (12/7) NGTD  - UA/UC (12/7) negative  - CXR (12/7): No  definite focal infiltrate   - cefepime (12/7-p)     FEN/GI:  - Admit wt: 97.2 kg, current wt: 94 kg (12/8)  # Monitor electrolytes and replete as needed  # PPI ppx: protonix  # Hx/o CKD (Baseline Cr: 1.20)  # Volume overload. Gave Lasix 20mg IV once (12/1).     CV:  - ECHO (10/23/24) with low normal LVEF 53%   # HLD. Cont daily Atorvastatin  # sinus tachycardia   - EKG (12/7) sinus tach  - likely due to fever vs CRS vs dehydration, poor PO intake  - on tele   - 500cc bolus x1 (12/7), received another total 750cc overnight/early AM 12/8  # symptomatic orthostatic hypotension 12/8  - LR 125ml/hr x 8hr, then NS 100ml/hr x 30hr   -  ml bolus for asympt orthostatic VS (12/9 am)    :  # BPH. Held Tadalafil on admit. Started Flomax (11/30), HOLD I/s/o orthostatic hypotension (12/8)     DISP:  - Patient of Dr. Yvonne Hobbs  - PICC line re-placed (12/2)  - PT ordered    Patient seen, examined, and discussed with Dr. Edith Urena PA-C

## 2024-12-10 NOTE — CARE PLAN
The patient's goals for the shift include      The clinical goals for the shift include pt will remian HDS    Over the shift, the patient received PRBC and PLTS, Pt had a nose bleed and a productive cough. The team is aware.  Problem: Infection related to problem list condition  Goal: Infection will resolve through treatment  12/10/2024 0459 by Daisy Jewell RN  Outcome: Progressing  12/10/2024 0457 by Daisy Jewell RN  Outcome: Progressing     Problem: Fall/Injury  Goal: Not fall by end of shift  12/10/2024 0459 by Daisy Jewell RN  Outcome: Progressing  12/10/2024 0457 by Daisy Jewell RN  Outcome: Progressing  Goal: Be free from injury by end of the shift  12/10/2024 0459 by Daisy Jewell RN  Outcome: Progressing  12/10/2024 0457 by Daisy Jewell RN  Outcome: Progressing  Goal: Verbalize understanding of personal risk factors for fall in the hospital  12/10/2024 0459 by Daisy Jewell RN  Outcome: Progressing  12/10/2024 0457 by Daisy Jewell RN  Outcome: Progressing  Goal: Verbalize understanding of risk factor reduction measures to prevent injury from fall in the home  12/10/2024 0459 by Daisy Jewell RN  Outcome: Progressing  12/10/2024 0457 by Daisy Jewell RN  Outcome: Progressing  Goal: Use assistive devices by end of the shift  12/10/2024 0459 by Daisy Jewell RN  Outcome: Progressing  12/10/2024 0457 by Daisy Jewell RN  Outcome: Progressing  Goal: Pace activities to prevent fatigue by end of the shift  12/10/2024 0459 by Daisy Jewell RN  Outcome: Progressing  12/10/2024 0457 by Daisy Jewell RN  Outcome: Progressing

## 2024-12-11 LAB
ALBUMIN SERPL BCP-MCNC: 3.2 G/DL (ref 3.4–5)
ALP SERPL-CCNC: 60 U/L (ref 33–136)
ALT SERPL W P-5'-P-CCNC: 30 U/L (ref 10–52)
ANION GAP SERPL CALC-SCNC: 10 MMOL/L (ref 10–20)
APTT PPP: 29 SECONDS (ref 27–38)
AST SERPL W P-5'-P-CCNC: 18 U/L (ref 9–39)
BACTERIA BLD CULT: NORMAL
BACTERIA BLD CULT: NORMAL
BASOPHILS # BLD AUTO: 0 X10*3/UL (ref 0–0.1)
BASOPHILS NFR BLD AUTO: 0 %
BILIRUB DIRECT SERPL-MCNC: 0.3 MG/DL (ref 0–0.3)
BILIRUB SERPL-MCNC: 1.2 MG/DL (ref 0–1.2)
BLOOD EXPIRATION DATE: NORMAL
BUN SERPL-MCNC: 17 MG/DL (ref 6–23)
CALCIUM SERPL-MCNC: 7.8 MG/DL (ref 8.6–10.6)
CHLORIDE SERPL-SCNC: 107 MMOL/L (ref 98–107)
CO2 SERPL-SCNC: 22 MMOL/L (ref 21–32)
CREAT SERPL-MCNC: 1.01 MG/DL (ref 0.5–1.3)
DISPENSE STATUS: NORMAL
EGFRCR SERPLBLD CKD-EPI 2021: 85 ML/MIN/1.73M*2
EOSINOPHIL # BLD AUTO: 0 X10*3/UL (ref 0–0.7)
EOSINOPHIL NFR BLD AUTO: 0 %
ERYTHROCYTE [DISTWIDTH] IN BLOOD BY AUTOMATED COUNT: 16.7 % (ref 11.5–14.5)
FIBRINOGEN PPP-MCNC: 175 MG/DL (ref 200–400)
GLUCOSE SERPL-MCNC: 95 MG/DL (ref 74–99)
HAPTOGLOB SERPL NEPH-MCNC: <30 MG/DL (ref 30–200)
HCT VFR BLD AUTO: 21 % (ref 41–52)
HGB BLD-MCNC: 7.5 G/DL (ref 13.5–17.5)
HGB RETIC QN: 36 PG (ref 28–38)
IMM GRANULOCYTES # BLD AUTO: 0 X10*3/UL (ref 0–0.7)
IMM GRANULOCYTES NFR BLD AUTO: 0 % (ref 0–0.9)
IMMATURE RETIC FRACTION: 4.9 %
INR PPP: 1.2 (ref 0.9–1.1)
LDH SERPL L TO P-CCNC: 143 U/L (ref 84–246)
LYMPHOCYTES # BLD AUTO: 0.37 X10*3/UL (ref 1.2–4.8)
LYMPHOCYTES NFR BLD AUTO: 61.7 %
MAGNESIUM SERPL-MCNC: 2.15 MG/DL (ref 1.6–2.4)
MCH RBC QN AUTO: 30.7 PG (ref 26–34)
MCHC RBC AUTO-ENTMCNC: 35.7 G/DL (ref 32–36)
MCV RBC AUTO: 86 FL (ref 80–100)
MONOCYTES # BLD AUTO: 0.2 X10*3/UL (ref 0.1–1)
MONOCYTES NFR BLD AUTO: 33.3 %
NEUTROPHILS # BLD AUTO: 0.03 X10*3/UL (ref 1.2–7.7)
NEUTROPHILS NFR BLD AUTO: 5 %
NRBC BLD-RTO: 0 /100 WBCS (ref 0–0)
OVALOCYTES BLD QL SMEAR: NORMAL
PLATELET # BLD AUTO: 6 X10*3/UL (ref 150–450)
POTASSIUM SERPL-SCNC: 3.3 MMOL/L (ref 3.5–5.3)
PRODUCT BLOOD TYPE: 1700
PRODUCT BLOOD TYPE: 6200
PRODUCT BLOOD TYPE: 7300
PRODUCT CODE: NORMAL
PROT SERPL-MCNC: 5.3 G/DL (ref 6.4–8.2)
PROTHROMBIN TIME: 13.2 SECONDS (ref 9.8–12.8)
RBC # BLD AUTO: 2.44 X10*6/UL (ref 4.5–5.9)
RBC MORPH BLD: NORMAL
RETICS #: 0.01 X10*6/UL (ref 0.02–0.12)
RETICS/RBC NFR AUTO: 0.4 % (ref 0.5–2)
SODIUM SERPL-SCNC: 136 MMOL/L (ref 136–145)
UNIT ABO: NORMAL
UNIT NUMBER: NORMAL
UNIT RH: NORMAL
UNIT VOLUME: 181
UNIT VOLUME: 299
UNIT VOLUME: 350
WBC # BLD AUTO: 0.6 X10*3/UL (ref 4.4–11.3)
XM INTEP: NORMAL

## 2024-12-11 PROCEDURE — 85384 FIBRINOGEN ACTIVITY: CPT | Performed by: PHYSICIAN ASSISTANT

## 2024-12-11 PROCEDURE — 83615 LACTATE (LD) (LDH) ENZYME: CPT | Performed by: PHYSICIAN ASSISTANT

## 2024-12-11 PROCEDURE — 2500000004 HC RX 250 GENERAL PHARMACY W/ HCPCS (ALT 636 FOR OP/ED)

## 2024-12-11 PROCEDURE — 2500000004 HC RX 250 GENERAL PHARMACY W/ HCPCS (ALT 636 FOR OP/ED): Mod: JZ

## 2024-12-11 PROCEDURE — 36430 TRANSFUSION BLD/BLD COMPNT: CPT

## 2024-12-11 PROCEDURE — 85610 PROTHROMBIN TIME: CPT | Performed by: PHYSICIAN ASSISTANT

## 2024-12-11 PROCEDURE — 99233 SBSQ HOSP IP/OBS HIGH 50: CPT | Performed by: STUDENT IN AN ORGANIZED HEALTH CARE EDUCATION/TRAINING PROGRAM

## 2024-12-11 PROCEDURE — 1200000003 HC ONCOLOGY  ROOM WITH TELEMETRY DAILY

## 2024-12-11 PROCEDURE — 2500000001 HC RX 250 WO HCPCS SELF ADMINISTERED DRUGS (ALT 637 FOR MEDICARE OP): Performed by: PHYSICIAN ASSISTANT

## 2024-12-11 PROCEDURE — P9073 PLATELETS PHERESIS PATH REDU: HCPCS

## 2024-12-11 PROCEDURE — 2500000001 HC RX 250 WO HCPCS SELF ADMINISTERED DRUGS (ALT 637 FOR MEDICARE OP)

## 2024-12-11 PROCEDURE — 83735 ASSAY OF MAGNESIUM: CPT | Performed by: PHYSICIAN ASSISTANT

## 2024-12-11 PROCEDURE — 80053 COMPREHEN METABOLIC PANEL: CPT | Performed by: PHYSICIAN ASSISTANT

## 2024-12-11 PROCEDURE — 85025 COMPLETE CBC W/AUTO DIFF WBC: CPT | Performed by: PHYSICIAN ASSISTANT

## 2024-12-11 PROCEDURE — 2500000001 HC RX 250 WO HCPCS SELF ADMINISTERED DRUGS (ALT 637 FOR MEDICARE OP): Performed by: INTERNAL MEDICINE

## 2024-12-11 PROCEDURE — 85045 AUTOMATED RETICULOCYTE COUNT: CPT | Performed by: PHYSICIAN ASSISTANT

## 2024-12-11 PROCEDURE — 82248 BILIRUBIN DIRECT: CPT | Performed by: PHYSICIAN ASSISTANT

## 2024-12-11 PROCEDURE — 83010 ASSAY OF HAPTOGLOBIN QUANT: CPT | Performed by: PHYSICIAN ASSISTANT

## 2024-12-11 RX ORDER — POTASSIUM CHLORIDE 29.8 MG/ML
40 INJECTION INTRAVENOUS ONCE
Status: COMPLETED | OUTPATIENT
Start: 2024-12-11 | End: 2024-12-11

## 2024-12-11 ASSESSMENT — COGNITIVE AND FUNCTIONAL STATUS - GENERAL
MOBILITY SCORE: 24
DAILY ACTIVITIY SCORE: 24
DAILY ACTIVITIY SCORE: 24
MOBILITY SCORE: 24

## 2024-12-11 ASSESSMENT — PAIN SCALES - GENERAL
PAINLEVEL_OUTOF10: 0 - NO PAIN

## 2024-12-11 ASSESSMENT — PAIN - FUNCTIONAL ASSESSMENT
PAIN_FUNCTIONAL_ASSESSMENT: 0-10
PAIN_FUNCTIONAL_ASSESSMENT: 0-10

## 2024-12-11 NOTE — PROGRESS NOTES
Nancie Armenta is a 59 y.o. male on day 12 of admission presenting with Mantle cell lymphoma.    Subjective   Afebrile.  Denies headache or vision changes.  Intermittent mild nausea relieved w Compazine.  Appetite slightly improving.  Drinking fluids well.  Denies active bleeding sources.    ANC increased to 0.03 today    Denies recent sx of Ha, fever, chills, sinus pain, anorexia, stomatitis, odynophagia, CP, Sob, dyspnea, abd pain, GERD, diarrhea, constipation, dysuria, bleeding, unexplained bruising, skin lesions, rashes, swelling, edema, paresthesia, & diff w balance, coordination, & gait.     Objective     Vitals:    12/10/24 0150 12/10/24 0407 12/10/24 0754 12/10/24 1223   BP: 100/60 109/69 103/68 93/60   BP Location:  Right arm Right arm    Patient Position:  Lying Lying Sitting   Pulse: 88 86 74 97   Resp: 18 18 18 18   Temp: 36.4 °C (97.5 °F) 36.6 °C (97.9 °F) 36.3 °C (97.3 °F) 36.3 °C (97.3 °F)   TempSrc: Temporal Temporal     SpO2: 99% 98% 96% 98%   Weight:       Height:             Physical Exam  Constitutional:       General: He is not in acute distress.  HENT:      Head: Normocephalic and atraumatic.      Mouth/Throat:      Mouth: Mucous membranes are moist.   Eyes:      Extraocular Movements: Extraocular movements intact.      Pupils: Pupils are equal, round, and reactive to light.   Cardiovascular:      Rate and Rhythm: Normal rate and regular rhythm.   Pulmonary:      Effort: Pulmonary effort is normal. No respiratory distress.      Breath sounds: Normal breath sounds.   Abdominal:      General: Bowel sounds are normal.      Palpations: Abdomen is soft. There is splenomegaly.      Tenderness: There is no abdominal tenderness.   Musculoskeletal:         General: Normal range of motion.      Right lower leg: No edema.      Left lower leg: No edema.   Skin:     General: Skin is warm and dry.   Neurological:      Mental Status: He is alert and oriented to person, place, and time.   Psychiatric:          Mood and Affect: Mood normal.         Behavior: Behavior normal.   Scheduled medications  acyclovir, 400 mg, oral, q12h  allopurinol, 300 mg, oral, Daily  atorvastatin, 40 mg, oral, q AM  cefepime, 2 g, intravenous, q8h  fluconazole, 400 mg, oral, Daily  levETIRAcetam, 500 mg, oral, BID  pantoprazole, 40 mg, oral, Daily before breakfast  [Held by provider] tamsulosin, 0.4 mg, oral, Daily    Continuous medications     PRN medications  PRN medications: albuterol, alteplase, alteplase, benzonatate, dextrose, EPINEPHrine HCl, guaiFENesin, hydrocortisone, methylPREDNISolone sodium succinate (PF), ondansetron, prochlorperazine, sodium chloride, sodium chloride   This patient has a central line   Reason for the central line remaining today?  Electrolytes and blood products    Results from last 7 days   Lab Units 12/10/24  0409 12/09/24  1821 12/09/24  0405 12/08/24  1702 12/08/24  0356   WBC AUTO x10*3/uL 0.1* <0.1* <0.1*   < > <0.1*   HEMOGLOBIN g/dL 7.3* 6.5* 6.2*   < > 5.8*   HEMATOCRIT % 21.3* 19.0* 18.2*   < > 17.4*   PLATELETS AUTO x10*3/uL 10* 8* 7*   < > 7*   NEUTROS PCT AUTO % 11.2  --  50.0  --  0.0   LYMPHS PCT AUTO % 44.4  --  0.0  --  33.4   MONOS PCT AUTO % 33.3  --  0.0  --  33.3   EOS PCT AUTO % 11.1  --  50.0  --  33.3    < > = values in this interval not displayed.     Results from last 7 days   Lab Units 12/10/24  0409 12/09/24  0405 12/08/24  0356   SODIUM mmol/L 135* 134* 134*   POTASSIUM mmol/L 3.7 3.7 3.7   CHLORIDE mmol/L 106 106 104   CO2 mmol/L 22 22 23   BUN mg/dL 17 21 24*   CREATININE mg/dL 1.02 1.14 1.22   CALCIUM mg/dL 7.8* 7.7* 7.7*   PROTEIN TOTAL g/dL 5.2* 5.3* 5.6*   BILIRUBIN TOTAL mg/dL 1.4* 1.6* 0.9   ALK PHOS U/L 52 53 65   ALT U/L 23 21 22   AST U/L 15 15 16   GLUCOSE mg/dL 94 101* 108*     Results from last 7 days   Lab Units 12/10/24  0409 12/09/24  0405 12/08/24  0356   MAGNESIUM mg/dL 2.07 2.08 2.07     Assessment/Plan     Nancie Armenta is a 59 y.o. male presenting with  relapsed Mantle cell lymphoma admitted on 11/29/24 for his CAR-T cell therapy on CASE 2419 (T0=12/6), prepped with fludarabine/cyclophosphamide.     Currently T+5 today (T0=12/6)     ONC:   #Hx/o Stage IV Mantle cell lymphoma involving the GI tract   - initially treated with [place] regimen and consolidation autograft with BEAM prep regimen April 2012.  -Relapsed 2019 w/presentation of leukocytosis. Had colonoscopy 8/2019 with multiple biopsies including ileum, cecum, appendix, and rectosigmoid c/w MCL, Treated with Ibrutinib and Venetoclax w/complete response.  -Recurrent MCL in July 2024 presenting with dropping Plt counts and bmbx (7/25/24) showing focal involvement w/MCL (NGS cyclin D1 positive and SOX 11 positive. BIRC+ and TP53 positive w/a VAF of 3% BIRC3 positive). FISH neg for 17p deletion.  - CT imaging (8/7/24) showing thickening of sigmoid colon, development of mild to moderate retroperitoneal adenopathy, splenomegaly, and poss splenic infarct.  - Started Pirtobrutinib salvage on 8/12/24. Added Venetoclax starting 10/13/24 since spleen size was increasing on repeat CT imaging and on physical exam.  - Bmbx (10/23/24) with low level involvement by mantle cells about 3% Mantle cells by flow.  - PET/CT (11/4/24) with FDG avid lymphadenopathy throughout the neck, chest, abdomen, and pelvis c/w lymphomatous involvement. Intense FDG avid splenomegaly. Peripheral regions of photopenia likely reflect areas of infarction.  - Pirtobrutinib and Venetoclax stopped 11/14/24 in preparation for CART collection  - Admitted for his CART cell therapy on CASE 2419 (T0=12/6)  - Received CAR-T infusion on 12/6 at 11:00. Around 18:50, patient experienced chills and rigors. Vital signs otherwise stable, rescue diphenhydramine and Pepcid administered. Methylpred held per Dr. Bowie's instructions. Chills and rigors resolved.       CAR-T   - Conditioning with Flu/Cy   - Cyclophosphamide 60 mg/kg IV T-6   - Fludarabine 25 mg/m2  IV T-5, T-4, T-3    - Plan Allopurinol and Keppra per protocol     - Baseline CARTOX Score: 10/10  - Current CARTOX score: 10/10 (12/11)    - ECOG Score: 0- Fully active, able to carry on all pre-disease performance w/o restriction.     # CRS Grade: G2 on 12/7 (febrile, tachycardic)  - Organ/System affected by CRS: fever, tachycardia   - Date of CRS onset: 12/7   - Date of highest CRS Grade: 12/7   - Date of CRS resolution to Grade 1 or lower: 12/8  - CRS management: infectious workup, vitals q2h + telemetry, started cefepime, supportive care. Toci x1 given (12/7 @ 2100).      # ICE Score:   - Date of Neurotoxicity onset: --   - Date of highest Neurotoxicity Grade: --   - Date of Neurotoxicity resolution to Grade 1 or lower: --   - Neurotox management: --      # CAR-T Labs   - CMV DNA PCR ordered on admit (11/30) ND  - IgG level ordered on admit (11/30) 1040  - CBC, RFP, Mg, LFTs, coag screen, fibrinogen, LDH: Daily   - Haptoglobin: Weekly       HEME:  # Pancytopenia, neutropenic secondary to malignancy and treatment  - Monitor CBC w/diff   - transfuse if hgb<7, plt<10  - Recv'd 2 units prbc for hgb 6.5 on 12/8, hgb (12/9) 6.2 ??  Tx 1 unit prbc (12/9), repeat hgb after Tx 6.5, tx additional prbc early 12/10 w post tx hgb 7.3  - Hemolysis labs ordered (haptoglobin, LDH, Retic, Unconj bili, YOKASTA, UA)      12/9 : , T.bili 1.6, previous 0.9     12/10: , T bili 1.4, D.bili 0.4, Haptoglobin <30, UA neg, Retic 0.011, YOKASTA poly - neg)  - Prob splenic sequestration of rbc's d/t massive spleen - CT (10/10/24) - splenomegaly 22.6 cm.  PET/CT (11/4/24) w/ intense FDG avid splenomegaly  - Recv'd plt tx (12/8-11)  - Head CT (12/9) - no ICH     ID:  #PPX: acyclovir, fluconazole  - levaquin (12/5-12/7)  # neutropenic fever (12/7)  - vitals q2h per SOP  - telemetry x 3 days  - bl cx x2 (12/7) NGTD  - UA/UC (12/7) negative  - CXR (12/7): No definite focal infiltrate   - cefepime (12/7-p)     FEN/GI:  - Admit wt: 97.2 kg,  current wt: 94 kg (12/8)  # Monitor electrolytes and replete as needed  # PPI ppx: protonix  # Hx/o CKD (Baseline Cr: 1.20)  # Volume overload. Gave Lasix 20mg IV once (12/1).     CV:  - ECHO (10/23/24) with low normal LVEF 53%   # HLD. Cont daily Atorvastatin  #Sinus tachycardia   - EKG (12/7) sinus tach  - likely due to fever vs CRS vs dehydration, poor PO intake  - on tele   - 500cc bolus x1 (12/7), received another total 750cc overnight/early AM 12/8  # symptomatic orthostatic hypotension 12/8  - LR 125ml/hr x 8hr, then NS 100ml/hr x 30hr   -  ml bolus for asympt orthostatic VS (12/9 am)    :  # BPH. Held Tadalafil on admit. Started Flomax (11/30), HOLD I/s/o orthostatic hypotension (12/8)     DISP:  - Patient of Dr. Yvonne Hobbs  - PICC line re-placed (12/2)  - PT ordered    Patient seen, examined, and discussed with Dr. Edith Blair PA-C

## 2024-12-11 NOTE — CARE PLAN
Problem: Infection related to problem list condition  Goal: Infection will resolve through treatment  Outcome: Progressing     Problem: Fall/Injury  Goal: Not fall by end of shift  Outcome: Progressing  Goal: Be free from injury by end of the shift  Outcome: Progressing  Goal: Verbalize understanding of personal risk factors for fall in the hospital  Outcome: Progressing  Goal: Verbalize understanding of risk factor reduction measures to prevent injury from fall in the home  Outcome: Progressing  Goal: Use assistive devices by end of the shift  Outcome: Progressing  Goal: Pace activities to prevent fatigue by end of the shift  Outcome: Progressing     Problem: Pain - Adult  Goal: Verbalizes/displays adequate comfort level or baseline comfort level  Outcome: Progressing     Problem: Safety - Adult  Goal: Free from fall injury  Outcome: Progressing   The patient's goals for the shift include      The clinical goals for the shift include patient will be free from falls and injury this shift

## 2024-12-12 LAB
ABO GROUP (TYPE) IN BLOOD: NORMAL
ADENOVIRUS RVP, VIRC: NOT DETECTED
ALBUMIN SERPL BCP-MCNC: 3.2 G/DL (ref 3.4–5)
ALP SERPL-CCNC: 56 U/L (ref 33–136)
ALT SERPL W P-5'-P-CCNC: 29 U/L (ref 10–52)
ANION GAP SERPL CALC-SCNC: 12 MMOL/L (ref 10–20)
ANTIBODY SCREEN: NORMAL
APTT PPP: 25 SECONDS (ref 27–38)
AST SERPL W P-5'-P-CCNC: 14 U/L (ref 9–39)
BASOPHILS # BLD AUTO: 0.02 X10*3/UL (ref 0–0.1)
BASOPHILS NFR BLD AUTO: 0.6 %
BILIRUB DIRECT SERPL-MCNC: 0.3 MG/DL (ref 0–0.3)
BILIRUB SERPL-MCNC: 1 MG/DL (ref 0–1.2)
BUN SERPL-MCNC: 15 MG/DL (ref 6–23)
CALCIUM SERPL-MCNC: 7.9 MG/DL (ref 8.6–10.6)
CHLORIDE SERPL-SCNC: 108 MMOL/L (ref 98–107)
CO2 SERPL-SCNC: 23 MMOL/L (ref 21–32)
CREAT SERPL-MCNC: 0.9 MG/DL (ref 0.5–1.3)
DACRYOCYTES BLD QL SMEAR: NORMAL
EGFRCR SERPLBLD CKD-EPI 2021: >90 ML/MIN/1.73M*2
ENTEROVIRUS/RHINOVIRUS RVP, VIRC: POSITIVE
EOSINOPHIL # BLD AUTO: 0.01 X10*3/UL (ref 0–0.7)
EOSINOPHIL NFR BLD AUTO: 0.3 %
ERYTHROCYTE [DISTWIDTH] IN BLOOD BY AUTOMATED COUNT: 16.2 % (ref 11.5–14.5)
FIBRINOGEN PPP-MCNC: 138 MG/DL (ref 200–400)
GLUCOSE SERPL-MCNC: 95 MG/DL (ref 74–99)
HAPTOGLOB SERPL NEPH-MCNC: <30 MG/DL (ref 30–200)
HCT VFR BLD AUTO: 21.2 % (ref 41–52)
HGB BLD-MCNC: 7.3 G/DL (ref 13.5–17.5)
HGB RETIC QN: 40 PG (ref 28–38)
HUMAN BOCAVIRUS RVP, VIRC: NOT DETECTED
HUMAN CORONAVIRUS RVP, VIRC: NOT DETECTED
IMM GRANULOCYTES # BLD AUTO: 0 X10*3/UL (ref 0–0.7)
IMM GRANULOCYTES NFR BLD AUTO: 0 % (ref 0–0.9)
IMMATURE RETIC FRACTION: 5.9 %
INFLUENZA A , VIRC: NOT DETECTED
INFLUENZA A H1N1-09 , VIRC: NOT DETECTED
INFLUENZA B PCR, VIRC: NOT DETECTED
INR PPP: 1.2 (ref 0.9–1.1)
LDH SERPL L TO P-CCNC: 140 U/L (ref 84–246)
LYMPHOCYTES # BLD AUTO: 2.55 X10*3/UL (ref 1.2–4.8)
LYMPHOCYTES NFR BLD AUTO: 81 %
MAGNESIUM SERPL-MCNC: 2.22 MG/DL (ref 1.6–2.4)
MCH RBC QN AUTO: 30.3 PG (ref 26–34)
MCHC RBC AUTO-ENTMCNC: 34.4 G/DL (ref 32–36)
MCV RBC AUTO: 88 FL (ref 80–100)
METAPNEUMOVIRUS , VIRC: NOT DETECTED
MONOCYTES # BLD AUTO: 0.57 X10*3/UL (ref 0.1–1)
MONOCYTES NFR BLD AUTO: 18.1 %
NEUTROPHILS # BLD AUTO: 0 X10*3/UL (ref 1.2–7.7)
NEUTROPHILS NFR BLD AUTO: 0 %
NRBC BLD-RTO: 0 /100 WBCS (ref 0–0)
OVALOCYTES BLD QL SMEAR: NORMAL
PARAINFLUENZA PCR, VIRC: NOT DETECTED
PLATELET # BLD AUTO: 8 X10*3/UL (ref 150–450)
POTASSIUM SERPL-SCNC: 3.5 MMOL/L (ref 3.5–5.3)
PROT SERPL-MCNC: 5.1 G/DL (ref 6.4–8.2)
PROTHROMBIN TIME: 13.3 SECONDS (ref 9.8–12.8)
RBC # BLD AUTO: 2.41 X10*6/UL (ref 4.5–5.9)
RBC MORPH BLD: NORMAL
RETICS #: 0.01 X10*6/UL (ref 0.02–0.12)
RETICS/RBC NFR AUTO: 0.5 % (ref 0.5–2)
RH FACTOR (ANTIGEN D): NORMAL
RSV PCR, RVP, VIRC: NOT DETECTED
SODIUM SERPL-SCNC: 139 MMOL/L (ref 136–145)
WBC # BLD AUTO: 3.2 X10*3/UL (ref 4.4–11.3)

## 2024-12-12 PROCEDURE — 85384 FIBRINOGEN ACTIVITY: CPT | Performed by: PHYSICIAN ASSISTANT

## 2024-12-12 PROCEDURE — P9073 PLATELETS PHERESIS PATH REDU: HCPCS

## 2024-12-12 PROCEDURE — 83010 ASSAY OF HAPTOGLOBIN QUANT: CPT | Performed by: PHYSICIAN ASSISTANT

## 2024-12-12 PROCEDURE — 86900 BLOOD TYPING SEROLOGIC ABO: CPT

## 2024-12-12 PROCEDURE — 85045 AUTOMATED RETICULOCYTE COUNT: CPT | Performed by: PHYSICIAN ASSISTANT

## 2024-12-12 PROCEDURE — 99233 SBSQ HOSP IP/OBS HIGH 50: CPT | Performed by: STUDENT IN AN ORGANIZED HEALTH CARE EDUCATION/TRAINING PROGRAM

## 2024-12-12 PROCEDURE — 36430 TRANSFUSION BLD/BLD COMPNT: CPT

## 2024-12-12 PROCEDURE — 85025 COMPLETE CBC W/AUTO DIFF WBC: CPT | Performed by: PHYSICIAN ASSISTANT

## 2024-12-12 PROCEDURE — 82248 BILIRUBIN DIRECT: CPT | Performed by: PHYSICIAN ASSISTANT

## 2024-12-12 PROCEDURE — 2500000004 HC RX 250 GENERAL PHARMACY W/ HCPCS (ALT 636 FOR OP/ED): Mod: JZ

## 2024-12-12 PROCEDURE — 85610 PROTHROMBIN TIME: CPT | Performed by: PHYSICIAN ASSISTANT

## 2024-12-12 PROCEDURE — 2500000001 HC RX 250 WO HCPCS SELF ADMINISTERED DRUGS (ALT 637 FOR MEDICARE OP): Performed by: INTERNAL MEDICINE

## 2024-12-12 PROCEDURE — 2500000001 HC RX 250 WO HCPCS SELF ADMINISTERED DRUGS (ALT 637 FOR MEDICARE OP): Performed by: PHYSICIAN ASSISTANT

## 2024-12-12 PROCEDURE — 83615 LACTATE (LD) (LDH) ENZYME: CPT | Performed by: PHYSICIAN ASSISTANT

## 2024-12-12 PROCEDURE — 80053 COMPREHEN METABOLIC PANEL: CPT | Performed by: PHYSICIAN ASSISTANT

## 2024-12-12 PROCEDURE — 97110 THERAPEUTIC EXERCISES: CPT | Mod: GP

## 2024-12-12 PROCEDURE — 83735 ASSAY OF MAGNESIUM: CPT | Performed by: PHYSICIAN ASSISTANT

## 2024-12-12 PROCEDURE — 1170000001 HC PRIVATE ONCOLOGY ROOM DAILY

## 2024-12-12 ASSESSMENT — PAIN - FUNCTIONAL ASSESSMENT
PAIN_FUNCTIONAL_ASSESSMENT: 0-10
PAIN_FUNCTIONAL_ASSESSMENT: 0-10

## 2024-12-12 ASSESSMENT — PAIN SCALES - GENERAL
PAINLEVEL_OUTOF10: 0 - NO PAIN

## 2024-12-12 ASSESSMENT — COGNITIVE AND FUNCTIONAL STATUS - GENERAL
CLIMB 3 TO 5 STEPS WITH RAILING: A LITTLE
MOVING TO AND FROM BED TO CHAIR: A LITTLE
WALKING IN HOSPITAL ROOM: A LITTLE
DAILY ACTIVITIY SCORE: 24
DAILY ACTIVITIY SCORE: 24
MOBILITY SCORE: 20
MOBILITY SCORE: 24
MOBILITY SCORE: 24
STANDING UP FROM CHAIR USING ARMS: A LITTLE

## 2024-12-12 NOTE — PROGRESS NOTES
Physical Therapy    Physical Therapy Treatment    Patient Name: Nancie Armenta  MRN: [id number]  Department: [facility]  Room: [address] [place] [address]  Today's Date: 12/12/2024  Time Calculation  Start Time: 1231  Stop Time: 1245  Time Calculation (min): 14 min         Assessment/Plan   PT Assessment  Evaluation/Treatment Tolerance: Patient tolerated treatment well  Medical Staff Made Aware: Yes  End of Session Communication: Bedside nurse  Assessment Comment: Pt perform pedaler for 10 minutes and tolerates well with no complaints.  End of Session Patient Position:  (sitting on couch)     PT Plan  Treatment/Interventions: Bed mobility, Transfer training, Gait training, Stair training, Neuromuscular re-education, Balance training, Strengthening, Range of motion, Endurance training, Therapeutic exercise, Therapeutic activity, Home exercise program, Positioning, Postural re-education  PT Plan: Ongoing PT  PT Frequency: 1 time per week  PT Discharge Recommendations: No PT needed after discharge  PT Recommended Transfer Status: Independent      General Visit Information:   PT  Visit  PT Received On: 12/12/24  General  Prior to Session Communication: Bedside nurse  Patient Position Received: Bed, 3 rail up, Alarm off, not on at start of session  Preferred Learning Style: verbal, visual  General Comment: Pt is pleasant, cooperative, and willing to participate in therapy    Subjective   Precautions:  Precautions  Medical Precautions: Fall precautions  Precautions Comment: protective precautions (H/H: 7.3/21.2; Plts: 8; WBC: 3.2; received platelets prior to session)            Objective   Pain:  Pain Assessment  Pain Assessment: 0-10  0-10 (Numeric) Pain Score: 0 - No pain  Cognition:  Cognition  Overall Cognitive Status: Within Functional Limits  Orientation Level: Oriented X4  Coordination:  Movements are Fluid and Coordinated: Yes  Postural Control:  Static Sitting Balance  Static Sitting-Balance Support: Feet supported, No upper  extremity supported  Static Sitting-Level of Assistance: Distant supervision  Dynamic Sitting Balance  Dynamic Sitting-Balance Support: Feet supported, No upper extremity supported  Dynamic Sitting-Level of Assistance: Distant supervision  Static Standing Balance  Static Standing-Balance Support: No upper extremity supported  Static Standing-Level of Assistance: Close supervision  Dynamic Standing Balance  Dynamic Standing-Balance Support: No upper extremity supported  Dynamic Standing-Level of Assistance: Close supervision    Activity Tolerance:  Activity Tolerance  Endurance: Tolerates 10 - 20 min exercise with multiple rests  Treatments:  Therapeutic Exercise  Therapeutic Exercise Performed: Yes  Therapeutic Exercise Activity 1: pedaler for 10 minutes, tolerates well with no complains, forward 8 minutes, backwards 2 minutes    Bed Mobility  Bed Mobility: Yes  Bed Mobility 1  Bed Mobility 1: Supine to sitting  Level of Assistance 1: Distant supervision  Bed Mobility Comments 1: HOB elevated    Ambulation/Gait Training  Ambulation/Gait Training Performed: Yes  Ambulation/Gait Training 1  Surface 1: Level tile  Device 1: No device  Assistance 1: Close supervision  Quality of Gait 1: Decreased step length, Inconsistent stride length  Comments/Distance (ft) 1: 5 ft  Transfers  Transfer: Yes  Transfer 1  Transfer From 1: Sit to, Stand to  Transfer to 1: Stand, Sit  Technique 1: Sit to stand, Stand to sit  Transfer Level of Assistance 1: Close supervision  Trials/Comments 1: VCs, good control of COM over SANDRA    Stairs  Stairs: No    Outcome Measures:  [facility] Basic Mobility  Turning from your back to your side while in a flat bed without using bedrails: None  Moving from lying on your back to sitting on the side of a flat bed without using bedrails: None  Moving to and from bed to chair (including a wheelchair): A little  Standing up from a chair using your arms (e.g. wheelchair or bedside chair): A little  To walk in  hospital room: A little  Climbing 3-5 steps with railing: A little  Basic Mobility - Total Score: 20    Education Documentation  Precautions, taught by Caity Valentin, PT at 12/12/2024  2:51 PM.  Learner: Patient  Readiness: Acceptance  Method: Explanation  Response: Verbalizes Understanding  Comment: bed mobility, transfers, gait, ther ex    Mobility Training, taught by Caity Valentin, PT at 12/12/2024  2:51 PM.  Learner: Patient  Readiness: Acceptance  Method: Explanation  Response: Verbalizes Understanding  Comment: bed mobility, transfers, gait, ther ex    Education Comments  No comments found.        OP EDUCATION:       Encounter Problems       Encounter Problems (Active)       Balance       Pt will score a 25/28 or greater on the Tinetti balance assessment in order to demonstrate low fall risk.  (Progressing)       Start:  12/02/24    Expected End:  12/23/24               Mobility       STG - Patient will ambulate >1,800 ft independently with no LOB, progress as able and appropriate  (Progressing)       Start:  12/02/24    Expected End:  12/23/24            STG - Patient will ascend and descend a flight of stairs independently  (Progressing)       Start:  12/02/24    Expected End:  12/23/24            Pt will be able to ambulate 1,450 ft independently within 6 minutes in order to demonstrate functional endurance of a community dweller.  (Progressing)       Start:  12/02/24    Expected End:  12/23/24               PT Transfers       STG - Patient will perform bed mobility independently  (Progressing)       Start:  12/02/24    Expected End:  12/23/24            STG - Patient will transfer sit to and from stand independently  (Progressing)       Start:  12/02/24    Expected End:  12/23/24            Pt will be able to perform 16 STS transfers in 30 seconds to demonstrate average LE strength of community dweller  (Progressing)       Start:  12/02/24    Expected End:  12/23/24               Pain - Adult               12/12/24 at 2:52 PM - Caity Valentin, PT

## 2024-12-12 NOTE — CARE PLAN
Problem: Infection related to problem list condition  Goal: Infection will resolve through treatment  Outcome: Progressing     Problem: Fall/Injury  Goal: Not fall by end of shift  Outcome: Progressing  Goal: Be free from injury by end of the shift  Outcome: Progressing  Goal: Verbalize understanding of personal risk factors for fall in the hospital  Outcome: Progressing  Goal: Verbalize understanding of risk factor reduction measures to prevent injury from fall in the home  Outcome: Progressing  Goal: Use assistive devices by end of the shift  Outcome: Progressing  Goal: Pace activities to prevent fatigue by end of the shift  Outcome: Progressing     Problem: Pain - Adult  Goal: Verbalizes/displays adequate comfort level or baseline comfort level  Outcome: Progressing     Problem: Safety - Adult  Goal: Free from fall injury  Outcome: Progressing     Problem: Discharge Planning  Goal: Discharge to home or other facility with appropriate resources  Outcome: Progressing     Problem: Chronic Conditions and Co-morbidities  Goal: Patient's chronic conditions and co-morbidity symptoms are monitored and maintained or improved  Outcome: Progressing   The patient's goals for the shift include      The clinical goals for the shift include Pt will be free from falls

## 2024-12-12 NOTE — PROGRESS NOTES
Nancie Armenta is a 59 y.o. male on day 12 of admission presenting with Mantle cell lymphoma.    Subjective   Afebrile.  Denies headache or vision changes. Appetite improved overall per patient.  Drinking fluids well.  Denies any other acute complaints.     ANC dipped back to 0.00 today, dsicussed plans for Neupogen on T+10 if ANC has not begun to recover by then.    Denies recent sx of Ha, fever, chills, sinus pain, anorexia, stomatitis, odynophagia, CP, Sob, dyspnea, abd pain, GERD, diarrhea, constipation, dysuria, bleeding, unexplained bruising, skin lesions, rashes, swelling, edema, paresthesia, & diff w balance, coordination, & gait.     Objective          Physical Exam  Constitutional:       General: He is not in acute distress.  HENT:      Head: Normocephalic and atraumatic.      Mouth/Throat:      Mouth: Mucous membranes are moist.   Eyes:      Extraocular Movements: Extraocular movements intact.      Pupils: Pupils are equal, round, and reactive to light.   Cardiovascular:      Rate and Rhythm: Normal rate and regular rhythm.   Pulmonary:      Effort: Pulmonary effort is normal. No respiratory distress.      Breath sounds: Normal breath sounds.   Abdominal:      General: Bowel sounds are normal.      Palpations: Abdomen is soft. There is splenomegaly.      Tenderness: There is no abdominal tenderness.   Musculoskeletal:         General: Normal range of motion.      Right lower leg: No edema.      Left lower leg: No edema.   Skin:     General: Skin is warm and dry.   Neurological:      Mental Status: He is alert and oriented to person, place, and time.   Psychiatric:         Mood and Affect: Mood normal.         Behavior: Behavior normal.     Scheduled medications  acyclovir, 400 mg, oral, q12h  allopurinol, 300 mg, oral, Daily  atorvastatin, 40 mg, oral, q AM  cefepime, 2 g, intravenous, q8h  fluconazole, 400 mg, oral, Daily  levETIRAcetam, 500 mg, oral, BID  pantoprazole, 40 mg, oral, Daily before  breakfast  [Held by provider] tamsulosin, 0.4 mg, oral, Daily    Continuous medications     PRN medications  PRN medications: albuterol, alteplase, alteplase, benzonatate, dextrose, EPINEPHrine HCl, guaiFENesin, hydrocortisone, methylPREDNISolone sodium succinate (PF), ondansetron, prochlorperazine, sodium chloride, sodium chloride   This patient has a central line   Reason for the central line remaining today?  Electrolytes and blood products    Results for orders placed or performed during the hospital encounter of 11/29/24 (from the past 24 hours)   CBC and Auto Differential   Result Value Ref Range    WBC 3.2 (L) 4.4 - 11.3 x10*3/uL    nRBC 0.0 0.0 - 0.0 /100 WBCs    RBC 2.41 (L) 4.50 - 5.90 x10*6/uL    Hemoglobin 7.3 (L) 13.5 - 17.5 g/dL    Hematocrit 21.2 (L) 41.0 - 52.0 %    MCV 88 80 - 100 fL    MCH 30.3 26.0 - 34.0 pg    MCHC 34.4 32.0 - 36.0 g/dL    RDW 16.2 (H) 11.5 - 14.5 %    Platelets 8 (LL) 150 - 450 x10*3/uL    Neutrophils % 0.0 40.0 - 80.0 %    Immature Granulocytes %, Automated 0.0 0.0 - 0.9 %    Lymphocytes % 81.0 13.0 - 44.0 %    Monocytes % 18.1 2.0 - 10.0 %    Eosinophils % 0.3 0.0 - 6.0 %    Basophils % 0.6 0.0 - 2.0 %    Neutrophils Absolute 0.00 (L) 1.20 - 7.70 x10*3/uL    Immature Granulocytes Absolute, Automated 0.00 0.00 - 0.70 x10*3/uL    Lymphocytes Absolute 2.55 1.20 - 4.80 x10*3/uL    Monocytes Absolute 0.57 0.10 - 1.00 x10*3/uL    Eosinophils Absolute 0.01 0.00 - 0.70 x10*3/uL    Basophils Absolute 0.02 0.00 - 0.10 x10*3/uL   Magnesium   Result Value Ref Range    Magnesium 2.22 1.60 - 2.40 mg/dL   Comprehensive metabolic panel   Result Value Ref Range    Glucose 95 74 - 99 mg/dL    Sodium 139 136 - 145 mmol/L    Potassium 3.5 3.5 - 5.3 mmol/L    Chloride 108 (H) 98 - 107 mmol/L    Bicarbonate 23 21 - 32 mmol/L    Anion Gap 12 10 - 20 mmol/L    Urea Nitrogen 15 6 - 23 mg/dL    Creatinine 0.90 0.50 - 1.30 mg/dL    eGFR >90 >60 mL/min/1.73m*2    Calcium 7.9 (L) 8.6 - 10.6 mg/dL     Albumin 3.2 (L) 3.4 - 5.0 g/dL    Alkaline Phosphatase 56 33 - 136 U/L    Total Protein 5.1 (L) 6.4 - 8.2 g/dL    AST 14 9 - 39 U/L    Bilirubin, Total 1.0 0.0 - 1.2 mg/dL    ALT 29 10 - 52 U/L   Lactate Dehydrogenase   Result Value Ref Range     84 - 246 U/L   Coagulation Screen   Result Value Ref Range    Protime 13.3 (H) 9.8 - 12.8 seconds    INR 1.2 (H) 0.9 - 1.1    aPTT 25 (L) 27 - 38 seconds   Haptoglobin   Result Value Ref Range    Haptoglobin <30 (L) 30 - 200 mg/dL   Reticulocytes   Result Value Ref Range    Retic % 0.5 0.5 - 2.0 %    Retic Absolute 0.012 (L) 0.022 - 0.118 x10*6/uL    Reticulocyte Hemoglobin 40 (H) 28 - 38 pg    Immature Retic fraction 5.9 <=16.0 %   Bilirubin, direct   Result Value Ref Range    Bilirubin, Direct 0.3 0.0 - 0.3 mg/dL   Morphology   Result Value Ref Range    RBC Morphology See Below     Ovalocytes Few     Teardrop Cells Few    Prepare Platelets: 1 Units, Irradiated, Leukocytes Reduced (CMV reduced risk)   Result Value Ref Range    PRODUCT CODE U3015H85     Unit Number [id number]     Unit ABO B     Unit RH POS     Dispense Status IS     Blood Expiration Date 12/12/2024 11:59:00 PM EST     PRODUCT BLOOD TYPE 7300     UNIT VOLUME 273    Fibrinogen   Result Value Ref Range    Fibrinogen 138 (L) 200 - 400 mg/dL   Type and screen   Result Value Ref Range    ABO TYPE B     Rh TYPE POS     ANTIBODY SCREEN NEG        Assessment/Plan     Nancie Armenta is a 59 y.o. male presenting with relapsed Mantle cell lymphoma admitted on 11/29/24 for his CAR-T cell therapy on CASE 2419 (T0=12/6), prepped with fludarabine/cyclophosphamide.     Currently T+6 today (T0=12/6), ANC 0.00 today     ONC:   #Hx/o Stage IV Mantle cell lymphoma involving the GI tract   - initially treated with [place] regimen and consolidation autograft with BEAM prep regimen April 2012.  -Relapsed 2019 w/presentation of leukocytosis. Had colonoscopy 8/2019 with multiple biopsies including ileum, cecum,  appendix, and rectosigmoid c/w MCL, Treated with Ibrutinib and Venetoclax w/complete response.  -Recurrent MCL in July 2024 presenting with dropping Plt counts and bmbx (7/25/24) showing focal involvement w/MCL (NGS cyclin D1 positive and SOX 11 positive. BIRC+ and TP53 positive w/a VAF of 3% BIRC3 positive). FISH neg for 17p deletion.  - CT imaging (8/7/24) showing thickening of sigmoid colon, development of mild to moderate retroperitoneal adenopathy, splenomegaly, and poss splenic infarct.  - Started Pirtobrutinib salvage on 8/12/24. Added Venetoclax starting 10/13/24 since spleen size was increasing on repeat CT imaging and on physical exam.  - Bmbx (10/23/24) with low level involvement by mantle cells about 3% Mantle cells by flow.  - PET/CT (11/4/24) with FDG avid lymphadenopathy throughout the neck, chest, abdomen, and pelvis c/w lymphomatous involvement. Intense FDG avid splenomegaly. Peripheral regions of photopenia likely reflect areas of infarction.  - Pirtobrutinib and Venetoclax stopped 11/14/24 in preparation for CART collection  - Admitted for his CART cell therapy on CASE 2419 (T0=12/6)  - Received CAR-T infusion on 12/6 at 11:00. Around 18:50, patient experienced chills and rigors. Vital signs otherwise stable, rescue diphenhydramine and Pepcid administered. Methylpred held per Dr. Bowie's instructions. Chills and rigors resolved.    - Plan to start Neupogen on T+10 if ANC not recovering.      CAR-T   - Conditioning with Flu/Cy   - Cyclophosphamide 60 mg/kg IV T-6   - Fludarabine 25 mg/m2 IV T-5, T-4, T-3    - Plan Allopurinol and Keppra per protocol     - Baseline CARTOX Score: 10/10  - Current CARTOX score: 10/10 (12/12)    - ECOG Score: 0- Fully active, able to carry on all pre-disease performance w/o restriction.     # CRS Grade: G2 on 12/7 (febrile, tachycardic)  - Organ/System affected by CRS: fever, tachycardia   - Date of CRS onset: 12/7   - Date of highest CRS Grade: 12/7   - Date of CRS  resolution to Grade 1 or lower: 12/8  - CRS management: infectious workup, vitals q2h + telemetry, started cefepime, supportive care. Toci x1 given (12/7 @ 2100).      # ICE Score:   - Date of Neurotoxicity onset: --   - Date of highest Neurotoxicity Grade: --   - Date of Neurotoxicity resolution to Grade 1 or lower: --   - Neurotox management: --      # CAR-T Labs   - CMV DNA PCR ordered on admit (11/30) ND  - IgG level ordered on admit (11/30) 1040  - CBC, RFP, Mg, LFTs, coag screen, fibrinogen, LDH: Daily   - Haptoglobin: Weekly       HEME:  # Pancytopenia, neutropenic secondary to malignancy and treatment  - Monitor CBC w/diff   - transfuse if hgb<7, plt<10  - Recv'd 2 units prbc for hgb 6.5 on 12/8, hgb (12/9) 6.2 ??  Tx 1 unit prbc (12/9), repeat hgb after Tx 6.5, tx additional prbc early 12/10 w post tx hgb 7.3  - Hemolysis labs ordered (haptoglobin, LDH, Retic, Unconj bili, YOKASTA, UA)      12/9 : , T.bili 1.6, previous 0.9     12/10: , T bili 1.4, D.bili 0.4, Haptoglobin <30, UA neg, Retic 0.011, YOKASTA poly - neg)  - Prob splenic sequestration of rbc's d/t massive spleen - CT (10/10/24) - splenomegaly 22.6 cm.  PET/CT (11/4/24) w/ intense FDG avid splenomegaly  - Recv'd plt tx (12/8-12/12)  - Head CT (12/9) - no ICH     ID:  #PPX: acyclovir, fluconazole  - levaquin (12/5-12/7)  # neutropenic fever (12/7)  - afebrile since 12/07  - bl cx x2 (12/7) NGTD  - UA/UC (12/7) negative  - CXR (12/7): No definite focal infiltrate   - cefepime (12/7-p)     FEN/GI:  - Admit wt: 97.2 kg, current wt: 95.8 kg (12/12)  # Monitor electrolytes and replete as needed  # PPI ppx: protonix  # Hx/o CKD (Baseline Cr: 1.20)  # Volume overload. Gave Lasix 20mg IV once (12/1).     CV:  - ECHO (10/23/24) with low normal LVEF 53%   # HLD. Cont daily Atorvastatin  #Sinus tachycardia   - EKG (12/7) sinus tach  - likely due to fever vs CRS vs dehydration, poor PO intake  - on tele   - 500cc bolus x1 (12/7), received another total  750cc overnight/early AM 12/8  # symptomatic orthostatic hypotension 12/8  - LR 125ml/hr x 8hr, then NS 100ml/hr x 30hr   -  ml bolus for asympt orthostatic VS (12/9 am)    :  # BPH. Held Tadalafil on admit. Started Flomax (11/30), HOLD I/s/o orthostatic hypotension (12/8)     DISP:  - Patient of Dr. Yvonne Hobbs  - PICC line re-placed (12/2)  - PT ordered    Patient seen, examined, and discussed with Dr. Edith Blair, PA-C

## 2024-12-12 NOTE — RESEARCH NOTES
Research Note      Nancie Armenta is currently admitted for treatment on [id number].  Today is T+6 visit (T=0 12.6.24). Procedures completed per protocol. AE's reviewed with patient.  Patient reports appetite has improved and feeling well.  Denies any discomfort or any concerns.  Patient is aware of continued follow up plan.         Education Documentation  Treatment Plan and Schedule, taught by Chloe Ivey RN at 12/12/2024 10:40 AM.  Learner: Patient  Readiness: Acceptance  Method: Explanation  Response: Verbalizes Understanding    Supportive Medications, taught by Chloe Ivey RN at 12/12/2024 10:40 AM.  Learner: Patient  Readiness: Acceptance  Method: Explanation  Response: Verbalizes Understanding    Complete Blood Count with Differential (CBC w/ Diff), taught by Chloe Ivey RN at 12/12/2024 10:40 AM.  Learner: Patient  Readiness: Acceptance  Method: Explanation  Response: Verbalizes Understanding    Education Comments  No comments found.

## 2024-12-13 LAB
ALBUMIN SERPL BCP-MCNC: 3.2 G/DL (ref 3.4–5)
ALP SERPL-CCNC: 53 U/L (ref 33–136)
ALT SERPL W P-5'-P-CCNC: 28 U/L (ref 10–52)
ANION GAP SERPL CALC-SCNC: 8 MMOL/L (ref 10–20)
APTT PPP: 23 SECONDS (ref 27–38)
AST SERPL W P-5'-P-CCNC: 13 U/L (ref 9–39)
ATRIAL RATE: 119 BPM
BASOPHILS # BLD MANUAL: 0 X10*3/UL (ref 0–0.1)
BASOPHILS NFR BLD MANUAL: 0 %
BILIRUB SERPL-MCNC: 1 MG/DL (ref 0–1.2)
BLOOD EXPIRATION DATE: NORMAL
BUN SERPL-MCNC: 15 MG/DL (ref 6–23)
CALCIUM SERPL-MCNC: 7.8 MG/DL (ref 8.6–10.6)
CHLORIDE SERPL-SCNC: 107 MMOL/L (ref 98–107)
CO2 SERPL-SCNC: 25 MMOL/L (ref 21–32)
CREAT SERPL-MCNC: 0.9 MG/DL (ref 0.5–1.3)
CRP SERPL-MCNC: 0.31 MG/DL
DISPENSE STATUS: NORMAL
EGFRCR SERPLBLD CKD-EPI 2021: >90 ML/MIN/1.73M*2
EOSINOPHIL # BLD MANUAL: 0.03 X10*3/UL (ref 0–0.7)
EOSINOPHIL NFR BLD MANUAL: 0.8 %
ERYTHROCYTE [DISTWIDTH] IN BLOOD BY AUTOMATED COUNT: 15.9 % (ref 11.5–14.5)
FERRITIN SERPL-MCNC: 1540 NG/ML (ref 20–300)
FIBRINOGEN PPP-MCNC: 125 MG/DL (ref 200–400)
GLUCOSE SERPL-MCNC: 91 MG/DL (ref 74–99)
HCT VFR BLD AUTO: 21.1 % (ref 41–52)
HGB BLD-MCNC: 7.2 G/DL (ref 13.5–17.5)
IMM GRANULOCYTES # BLD AUTO: 0 X10*3/UL (ref 0–0.7)
IMM GRANULOCYTES NFR BLD AUTO: 0 % (ref 0–0.9)
INR PPP: 1.2 (ref 0.9–1.1)
LDH SERPL L TO P-CCNC: 157 U/L (ref 84–246)
LYMPHOCYTES # BLD MANUAL: 2.68 X10*3/UL (ref 1.2–4.8)
LYMPHOCYTES NFR BLD MANUAL: 68.6 %
MAGNESIUM SERPL-MCNC: 2.17 MG/DL (ref 1.6–2.4)
MCH RBC QN AUTO: 30 PG (ref 26–34)
MCHC RBC AUTO-ENTMCNC: 34.1 G/DL (ref 32–36)
MCV RBC AUTO: 88 FL (ref 80–100)
MONOCYTES # BLD MANUAL: 0 X10*3/UL (ref 0.1–1)
MONOCYTES NFR BLD MANUAL: 0 %
NEUTS SEG # BLD MANUAL: 0 X10*3/UL (ref 1.2–7)
NEUTS SEG NFR BLD MANUAL: 0 %
NRBC BLD-RTO: 0 /100 WBCS (ref 0–0)
OVALOCYTES BLD QL SMEAR: ABNORMAL
P AXIS: 51 DEGREES
P OFFSET: 191 MS
P ONSET: 145 MS
PATH REVIEW-CBC DIFFERENTIAL: NORMAL
PLATELET # BLD AUTO: 9 X10*3/UL (ref 150–450)
POTASSIUM SERPL-SCNC: 3.3 MMOL/L (ref 3.5–5.3)
PR INTERVAL: 138 MS
PRODUCT BLOOD TYPE: 7300
PRODUCT CODE: NORMAL
PROT SERPL-MCNC: 5.2 G/DL (ref 6.4–8.2)
PROTHROMBIN TIME: 13.5 SECONDS (ref 9.8–12.8)
Q ONSET: 214 MS
QRS COUNT: 20 BEATS
QRS DURATION: 90 MS
QT INTERVAL: 332 MS
QTC CALCULATION(BAZETT): 467 MS
QTC FREDERICIA: 417 MS
R AXIS: -15 DEGREES
RBC # BLD AUTO: 2.4 X10*6/UL (ref 4.5–5.9)
RBC MORPH BLD: ABNORMAL
SODIUM SERPL-SCNC: 137 MMOL/L (ref 136–145)
T AXIS: 38 DEGREES
T OFFSET: 380 MS
TOTAL CELLS COUNTED BLD: 121
UNIT ABO: NORMAL
UNIT NUMBER: NORMAL
UNIT RH: NORMAL
UNIT VOLUME: 255
UNIT VOLUME: 273
UNIT VOLUME: 88
VARIANT LYMPHS # BLD MANUAL: 1.19 X10*3/UL (ref 0–0.5)
VARIANT LYMPHS NFR BLD: 30.6 %
VENTRICULAR RATE: 119 BPM
WBC # BLD AUTO: 3.9 X10*3/UL (ref 4.4–11.3)

## 2024-12-13 PROCEDURE — 85610 PROTHROMBIN TIME: CPT | Performed by: PHYSICIAN ASSISTANT

## 2024-12-13 PROCEDURE — P9073 PLATELETS PHERESIS PATH REDU: HCPCS

## 2024-12-13 PROCEDURE — 80053 COMPREHEN METABOLIC PANEL: CPT | Performed by: PHYSICIAN ASSISTANT

## 2024-12-13 PROCEDURE — 2500000001 HC RX 250 WO HCPCS SELF ADMINISTERED DRUGS (ALT 637 FOR MEDICARE OP): Performed by: PHYSICIAN ASSISTANT

## 2024-12-13 PROCEDURE — 85007 BL SMEAR W/DIFF WBC COUNT: CPT | Performed by: PHYSICIAN ASSISTANT

## 2024-12-13 PROCEDURE — 2500000004 HC RX 250 GENERAL PHARMACY W/ HCPCS (ALT 636 FOR OP/ED): Mod: JZ

## 2024-12-13 PROCEDURE — 2500000004 HC RX 250 GENERAL PHARMACY W/ HCPCS (ALT 636 FOR OP/ED)

## 2024-12-13 PROCEDURE — 85027 COMPLETE CBC AUTOMATED: CPT | Performed by: PHYSICIAN ASSISTANT

## 2024-12-13 PROCEDURE — 99233 SBSQ HOSP IP/OBS HIGH 50: CPT | Performed by: STUDENT IN AN ORGANIZED HEALTH CARE EDUCATION/TRAINING PROGRAM

## 2024-12-13 PROCEDURE — 83735 ASSAY OF MAGNESIUM: CPT | Performed by: PHYSICIAN ASSISTANT

## 2024-12-13 PROCEDURE — 2500000001 HC RX 250 WO HCPCS SELF ADMINISTERED DRUGS (ALT 637 FOR MEDICARE OP): Performed by: INTERNAL MEDICINE

## 2024-12-13 PROCEDURE — 82728 ASSAY OF FERRITIN: CPT | Performed by: INTERNAL MEDICINE

## 2024-12-13 PROCEDURE — 83615 LACTATE (LD) (LDH) ENZYME: CPT | Performed by: PHYSICIAN ASSISTANT

## 2024-12-13 PROCEDURE — P9012 CRYOPRECIPITATE EACH UNIT: HCPCS

## 2024-12-13 PROCEDURE — 1170000001 HC PRIVATE ONCOLOGY ROOM DAILY

## 2024-12-13 PROCEDURE — 36430 TRANSFUSION BLD/BLD COMPNT: CPT

## 2024-12-13 PROCEDURE — 86140 C-REACTIVE PROTEIN: CPT | Performed by: REGISTERED NURSE

## 2024-12-13 PROCEDURE — 85384 FIBRINOGEN ACTIVITY: CPT | Performed by: PHYSICIAN ASSISTANT

## 2024-12-13 RX ORDER — POTASSIUM CHLORIDE 29.8 MG/ML
40 INJECTION INTRAVENOUS ONCE
Status: COMPLETED | OUTPATIENT
Start: 2024-12-13 | End: 2024-12-13

## 2024-12-13 ASSESSMENT — PAIN SCALES - GENERAL
PAINLEVEL_OUTOF10: 0 - NO PAIN
PAINLEVEL_OUTOF10: 0 - NO PAIN

## 2024-12-13 ASSESSMENT — COGNITIVE AND FUNCTIONAL STATUS - GENERAL
DAILY ACTIVITIY SCORE: 24
MOBILITY SCORE: 24
MOBILITY SCORE: 24
DAILY ACTIVITIY SCORE: 24

## 2024-12-13 ASSESSMENT — PAIN - FUNCTIONAL ASSESSMENT: PAIN_FUNCTIONAL_ASSESSMENT: 0-10

## 2024-12-13 NOTE — CARE PLAN
The patient's goals for the shift include      Problem: Infection related to problem list condition  Goal: Infection will resolve through treatment  Outcome: Progressing     Problem: Fall/Injury  Goal: Not fall by end of shift  Outcome: Progressing  Goal: Be free from injury by end of the shift  Outcome: Progressing  Goal: Verbalize understanding of personal risk factors for fall in the hospital  Outcome: Progressing  Goal: Verbalize understanding of risk factor reduction measures to prevent injury from fall in the home  Outcome: Progressing  Goal: Use assistive devices by end of the shift  Outcome: Progressing  Goal: Pace activities to prevent fatigue by end of the shift  Outcome: Progressing     Problem: Pain - Adult  Goal: Verbalizes/displays adequate comfort level or baseline comfort level  Outcome: Progressing     Problem: Safety - Adult  Goal: Free from fall injury  Outcome: Progressing     Problem: Discharge Planning  Goal: Discharge to home or other facility with appropriate resources  Outcome: Progressing     Problem: Chronic Conditions and Co-morbidities  Goal: Patient's chronic conditions and co-morbidity symptoms are monitored and maintained or improved  Outcome: Progressing     The clinical goals for the shift include Pt will be free from falls

## 2024-12-13 NOTE — PROGRESS NOTES
"Nancie Armenta is a 59 y.o. male on day 14 of admission presenting with Mantle cell lymphoma.    Subjective   Feels well today. ROS unremarkable.     Objective     Physical Exam  Constitutional:       Appearance: Normal appearance.   HENT:      Head: Normocephalic and atraumatic.      Nose: Nose normal.      Mouth/Throat:      Mouth: Mucous membranes are moist.      Pharynx: Oropharynx is clear.   Eyes:      Conjunctiva/sclera: Conjunctivae normal.      Pupils: Pupils are equal, round, and reactive to light.   Cardiovascular:      Rate and Rhythm: Normal rate and regular rhythm.      Pulses: Normal pulses.      Heart sounds: Normal heart sounds.   Pulmonary:      Effort: Pulmonary effort is normal.      Breath sounds: Normal breath sounds.   Abdominal:      General: Bowel sounds are normal.      Palpations: Abdomen is soft.   Musculoskeletal:         General: Normal range of motion.      Cervical back: Normal range of motion and neck supple.   Skin:     General: Skin is warm and dry.      Capillary Refill: Capillary refill takes 2 to 3 seconds.      Comments: L arm DL PICC. Entry site slightly oozy but contained.    Neurological:      General: No focal deficit present.      Mental Status: He is alert. Mental status is at baseline. He is disoriented.   Psychiatric:         Behavior: Behavior normal.         Last Recorded Vitals  Blood pressure 103/70, pulse 81, temperature 36.1 °C (97 °F), temperature source Temporal, resp. rate 20, height 1.839 m (6' 0.4\"), weight 95.6 kg (210 lb 12.2 oz), SpO2 98%.  Intake/Output last 3 Shifts:  I/O last 3 completed shifts:  In: 400 (4.2 mL/kg) [Blood:350; IV Piggyback:50]  Out: 175 (1.8 mL/kg) [Urine:175 (0.1 mL/kg/hr)]  Weight: 95.8 kg     Daily labs reviewed (12/13): Platelets, cryo and potassium ordered    Assessment/Plan   Assessment & Plan  Mantle cell lymphoma    MCL (mantle cell lymphoma) (Multi)    Nancie Armenta is a 59 y.o. male presenting with relapsed Mantle Cell " Lymphoma. Admitted 11/29/24 for his CAR-T cell therapy on CASE 2419 (T0=12/6/24). Prepped with Fludarabine/Cyclophosphamide.     Currently T+7 today (T0=12/6/24)     ONC:   #Hx/o Stage IV Mantle cell lymphoma involving the GI tract   - Initially treated with [place] regimen and consolidation autograft with BEAM prep regimen April 2012.  - Relapsed 2019 w/presentation of leukocytosis. Had colonoscopy 8/2019 with multiple biopsies including ileum, cecum, appendix, and rectosigmoid c/w MCL, Treated with Ibrutinib and Venetoclax w/complete response.  - Recurrent MCL in July 2024 presenting with dropping Plt counts and bmbx (7/25/24) showing focal involvement w/MCL (NGS cyclin D1 positive and SOX 11 positive. BIRC+ and TP53 positive w/a VAF of 3% BIRC3 positive). FISH neg for 17p deletion.  - CT imaging (8/7/24) showing thickening of sigmoid colon, development of mild to moderate retroperitoneal adenopathy, splenomegaly, and poss splenic infarct.  - Started Pirtobrutinib salvage on 8/12/24. Added Venetoclax starting 10/13/24 since spleen size was increasing on repeat CT imaging and on physical exam.  - BMBx (10/23/24) with low level involvement by mantle cells about 3% Mantle cells by flow.  - PET/CT (11/4/24) with FDG avid lymphadenopathy throughout the neck, chest, abdomen, and pelvis c/w lymphomatous involvement. Intense FDG avid splenomegaly. Peripheral regions of photopenia likely reflect areas of infarction.  - Pirtobrutinib and Venetoclax stopped 11/14/24 in preparation for CART collection  - Admitted for his CART cell therapy on CASE 2419 (T0=12/6/24)  - Received CAR-T infusion on 12/6 at 11:00. Around 18:50, patient experienced chills and rigors. Vital signs otherwise stable, rescue Diphenhydramine and Pepcid administered. Methylpred held per Dr. Bowie's instructions. Chills and rigors resolved.    - Plan to start Neupogen on T+10 if ANC not recovering.      CAR-T   - Conditioning with Flu/Cy   -  Cyclophosphamide 60 mg/kg IV T-6   - Fludarabine 25 mg/m2 IV T-5, T-4, T-3    - Plan Allopurinol and Keppra per protocol     - Baseline CARTOX Score: 10/10  - Current CARTOX score: 10/10 (12/13)     - ECOG Score: 0- Fully active, able to carry on all pre-disease performance w/o restriction.     # CRS Grade: G2 on 12/7 (febrile, tachycardic)  - Organ/System affected by CRS: fever, tachycardia   - Date of CRS onset: 12/7   - Date of highest CRS Grade: 12/7   - Date of CRS resolution to Grade 1 or lower: 12/8  - CRS management: infectious workup, vitals q2h + telemetry, started Cefepime, supportive care.      Toci x1 dose given (12/7 @ 2100).      # ICE Score:   - Date of Neurotoxicity onset: --   - Date of highest Neurotoxicity Grade: --   - Date of Neurotoxicity resolution to Grade 1 or lower: --   - Neurotox management: --      # CAR-T Labs   - CMV DNA PCR (11/30): ND  - IgG level (11/30): 1040  - CBC, RFP, Mg, LFTs, coag screen, fibrinogen, ferritin, CRP & LDH: Daily   - Haptoglobin: Weekly       HEME:  # Pancytopenia, neutropenic secondary to malignancy and treatment  - Monitor CBC w/diff   - transfuse if hgb<7, plt<10  - S/p PRBC. 12/8 (2), 12/9 (1), 12/10 (1)  - Hemolysis labs:      12/9 : , T.bili 1.6, previous 0.9     12/10: , T.bili 1.4, D.bili 0.4, Haptoglobin <30, UA neg, Retic 0.011, YOKASTA poly - neg)  - Prob splenic sequestration of rbc's d/t massive spleen  - CT (10/10/24) - splenomegaly 22.6 cm.    - PET/CT (11/4/24) w/ intense FDG avid splenomegaly  - S/p PLTS: 12/8-12/13  - S/p CRYO: 12/13  - Head CT (12/9) - no ICH     ID:  #PPX: Acyclovir, Fluconazole  - S/p Levaquin (12/5-12/7)  # Neutropenic fever (12/7)  - Afebrile since 12/07  - Bld cxs x 2 (12/7) NGTD  - UA/UC (12/7) negative  - CXR (12/7): No definite focal infiltrate   - Cefepime started d/t fever, orthostatic hypotension and CRS (12/7->)  # Rhinovirus positive (12/10)    FEN/GI:  - Admit wt: 97.2 kg. Most current wt: 95.8 kg  (12/12)  # Monitor electrolytes and replete as needed  # PPI ppx: protonix  # Hx/o CKD (Baseline Cr: 1.20)  # Volume overload. Gave Lasix 20mg IV once (12/1).     CV:  - ECHO (10/23/24) with low normal LVEF 53%   # HLD. Cont daily Atorvastatin  #Sinus tachycardia   - EKG (12/7) sinus tach  - likely due to fever vs CRS vs dehydration and poor PO intake  - on telemetry   - 500cc bolus x1 (12/7), 750cc on (12/8)  # Symptomatic orthostatic hypotension 12/8  - LR 125ml/hr x 8hr, then NS 100ml/hr x 30hr   -  ml bolus for asympt orthostatic VS (12/9 am), resolved     :  # BPH. Held Tadalafil on admit. Started Flomax (11/30), HOLD I/s/o orthostatic hypotension (12/8)     DISPO:  - Full Code  - Patient of Dr. Yvonne Hobbs  - PICC line re-placed (12/2/24)  - PT ordered     Patient seen, examined, and discussed with Dr. Sharma       I spent 45 minutes in the professional and overall care of this patient.      THA Scott-CNP

## 2024-12-13 NOTE — ASSESSMENT & PLAN NOTE
Nancie Armenta is a 59 y.o. male presenting with relapsed Mantle Cell Lymphoma. Admitted 11/29/24 for his CAR-T cell therapy on CASE 2419 (T0=12/6/24). Prepped with Fludarabine/Cyclophosphamide.     Currently T+7 today (T0=12/6/24)     ONC:   #Hx/o Stage IV Mantle cell lymphoma involving the GI tract   - Initially treated with [place] regimen and consolidation autograft with BEAM prep regimen April 2012.  - Relapsed 2019 w/presentation of leukocytosis. Had colonoscopy 8/2019 with multiple biopsies including ileum, cecum, appendix, and rectosigmoid c/w MCL, Treated with Ibrutinib and Venetoclax w/complete response.  - Recurrent MCL in July 2024 presenting with dropping Plt counts and bmbx (7/25/24) showing focal involvement w/MCL (NGS cyclin D1 positive and SOX 11 positive. BIRC+ and TP53 positive w/a VAF of 3% BIRC3 positive). FISH neg for 17p deletion.  - CT imaging (8/7/24) showing thickening of sigmoid colon, development of mild to moderate retroperitoneal adenopathy, splenomegaly, and poss splenic infarct.  - Started Pirtobrutinib salvage on 8/12/24. Added Venetoclax starting 10/13/24 since spleen size was increasing on repeat CT imaging and on physical exam.  - BMBx (10/23/24) with low level involvement by mantle cells about 3% Mantle cells by flow.  - PET/CT (11/4/24) with FDG avid lymphadenopathy throughout the neck, chest, abdomen, and pelvis c/w lymphomatous involvement. Intense FDG avid splenomegaly. Peripheral regions of photopenia likely reflect areas of infarction.  - Pirtobrutinib and Venetoclax stopped 11/14/24 in preparation for CART collection  - Admitted for his CART cell therapy on CASE 2419 (T0=12/6/24)  - Received CAR-T infusion on 12/6 at 11:00. Around 18:50, patient experienced chills and rigors. Vital signs otherwise stable, rescue Diphenhydramine and Pepcid administered. Methylpred held per Dr. Bowie's instructions. Chills and rigors resolved.    - Plan to start Neupogen on T+10 if ANC  not recovering.      CAR-T   - Conditioning with Flu/Cy   - Cyclophosphamide 60 mg/kg IV T-6   - Fludarabine 25 mg/m2 IV T-5, T-4, T-3    - Plan Allopurinol and Keppra per protocol     - Baseline CARTOX Score: 10/10  - Current CARTOX score: 10/10 (12/13)     - ECOG Score: 0- Fully active, able to carry on all pre-disease performance w/o restriction.     # CRS Grade: G2 on 12/7 (febrile, tachycardic)  - Organ/System affected by CRS: fever, tachycardia   - Date of CRS onset: 12/7   - Date of highest CRS Grade: 12/7   - Date of CRS resolution to Grade 1 or lower: 12/8  - CRS management: infectious workup, vitals q2h + telemetry, started Cefepime, supportive care.      Toci x1 dose given (12/7 @ 2100).      # ICE Score:   - Date of Neurotoxicity onset: --   - Date of highest Neurotoxicity Grade: --   - Date of Neurotoxicity resolution to Grade 1 or lower: --   - Neurotox management: --      # CAR-T Labs   - CMV DNA PCR (11/30): ND  - IgG level (11/30): 1040  - CBC, RFP, Mg, LFTs, coag screen, fibrinogen, ferritin, CRP & LDH: Daily   - Haptoglobin: Weekly       HEME:  # Pancytopenia, neutropenic secondary to malignancy and treatment  - Monitor CBC w/diff   - transfuse if hgb<7, plt<10  - S/p PRBC. 12/8 (2), 12/9 (1), 12/10 (1)  - Hemolysis labs:      12/9 : , T.bili 1.6, previous 0.9     12/10: , T.bili 1.4, D.bili 0.4, Haptoglobin <30, UA neg, Retic 0.011, YOKASTA poly - neg)  - Prob splenic sequestration of rbc's d/t massive spleen  - CT (10/10/24) - splenomegaly 22.6 cm.    - PET/CT (11/4/24) w/ intense FDG avid splenomegaly  - S/p PLTS: 12/8-12/13  - S/p CRYO: 12/13  - Head CT (12/9) - no ICH     ID:  #PPX: Acyclovir, Fluconazole  - S/p Levaquin (12/5-12/7)  # Neutropenic fever (12/7)  - Afebrile since 12/07  - Bld cxs x 2 (12/7) NGTD  - UA/UC (12/7) negative  - CXR (12/7): No definite focal infiltrate   - Cefepime started d/t fever, orthostatic hypotension and CRS (12/7->)  # Rhinovirus positive  (12/10)    FEN/GI:  - Admit wt: 97.2 kg. Most current wt: 95.8 kg (12/12)  # Monitor electrolytes and replete as needed  # PPI ppx: protonix  # Hx/o CKD (Baseline Cr: 1.20)  # Volume overload. Gave Lasix 20mg IV once (12/1).     CV:  - ECHO (10/23/24) with low normal LVEF 53%   # HLD. Cont daily Atorvastatin  #Sinus tachycardia   - EKG (12/7) sinus tach  - likely due to fever vs CRS vs dehydration and poor PO intake  - on telemetry   - 500cc bolus x1 (12/7), 750cc on (12/8)  # Symptomatic orthostatic hypotension 12/8  - LR 125ml/hr x 8hr, then NS 100ml/hr x 30hr   -  ml bolus for asympt orthostatic VS (12/9 am), resolved     :  # BPH. Held Tadalafil on admit. Started Flomax (11/30), HOLD I/s/o orthostatic hypotension (12/8)     DISPO:  - Full Code  - Patient of Dr. Yvonne Hobbs  - PICC line re-placed (12/2/24)  - PT ordered     Patient seen, examined, and discussed with Dr. Sharma

## 2024-12-14 LAB
ALBUMIN SERPL BCP-MCNC: 3.4 G/DL (ref 3.4–5)
ALP SERPL-CCNC: 54 U/L (ref 33–136)
ALT SERPL W P-5'-P-CCNC: 25 U/L (ref 10–52)
ANION GAP SERPL CALC-SCNC: 10 MMOL/L (ref 10–20)
APTT PPP: 29 SECONDS (ref 27–38)
AST SERPL W P-5'-P-CCNC: 13 U/L (ref 9–39)
BASOPHILS # BLD AUTO: 0 X10*3/UL (ref 0–0.1)
BASOPHILS NFR BLD AUTO: 0 %
BILIRUB SERPL-MCNC: 1.1 MG/DL (ref 0–1.2)
BLOOD EXPIRATION DATE: NORMAL
BUN SERPL-MCNC: 15 MG/DL (ref 6–23)
CALCIUM SERPL-MCNC: 7.9 MG/DL (ref 8.6–10.6)
CHLORIDE SERPL-SCNC: 108 MMOL/L (ref 98–107)
CO2 SERPL-SCNC: 24 MMOL/L (ref 21–32)
CREAT SERPL-MCNC: 0.89 MG/DL (ref 0.5–1.3)
CRP SERPL-MCNC: 0.22 MG/DL
DISPENSE STATUS: NORMAL
EGFRCR SERPLBLD CKD-EPI 2021: >90 ML/MIN/1.73M*2
EOSINOPHIL # BLD AUTO: 0.02 X10*3/UL (ref 0–0.7)
EOSINOPHIL NFR BLD AUTO: 0.7 %
ERYTHROCYTE [DISTWIDTH] IN BLOOD BY AUTOMATED COUNT: 15.9 % (ref 11.5–14.5)
FERRITIN SERPL-MCNC: 1596 NG/ML (ref 20–300)
FIBRINOGEN PPP-MCNC: 133 MG/DL (ref 200–400)
GLUCOSE SERPL-MCNC: 92 MG/DL (ref 74–99)
HCT VFR BLD AUTO: 20.9 % (ref 41–52)
HGB BLD-MCNC: 7 G/DL (ref 13.5–17.5)
IMM GRANULOCYTES # BLD AUTO: 0.01 X10*3/UL (ref 0–0.7)
IMM GRANULOCYTES NFR BLD AUTO: 0.3 % (ref 0–0.9)
INR PPP: 1.2 (ref 0.9–1.1)
LDH SERPL L TO P-CCNC: 172 U/L (ref 84–246)
LYMPHOCYTES # BLD AUTO: 2.74 X10*3/UL (ref 1.2–4.8)
LYMPHOCYTES NFR BLD AUTO: 91.3 %
MAGNESIUM SERPL-MCNC: 2.24 MG/DL (ref 1.6–2.4)
MCH RBC QN AUTO: 30 PG (ref 26–34)
MCHC RBC AUTO-ENTMCNC: 33.5 G/DL (ref 32–36)
MCV RBC AUTO: 90 FL (ref 80–100)
MONOCYTES # BLD AUTO: 0.19 X10*3/UL (ref 0.1–1)
MONOCYTES NFR BLD AUTO: 6.3 %
NEUTROPHILS # BLD AUTO: 0.04 X10*3/UL (ref 1.2–7.7)
NEUTROPHILS NFR BLD AUTO: 1.4 %
NRBC BLD-RTO: 0 /100 WBCS (ref 0–0)
OVALOCYTES BLD QL SMEAR: NORMAL
PLATELET # BLD AUTO: 9 X10*3/UL (ref 150–450)
POTASSIUM SERPL-SCNC: 3.7 MMOL/L (ref 3.5–5.3)
PRODUCT BLOOD TYPE: 5100
PRODUCT CODE: NORMAL
PROT SERPL-MCNC: 5.4 G/DL (ref 6.4–8.2)
PROTHROMBIN TIME: 13.4 SECONDS (ref 9.8–12.8)
RBC # BLD AUTO: 2.33 X10*6/UL (ref 4.5–5.9)
RBC MORPH BLD: NORMAL
SODIUM SERPL-SCNC: 138 MMOL/L (ref 136–145)
UNIT ABO: NORMAL
UNIT NUMBER: NORMAL
UNIT RH: NORMAL
UNIT VOLUME: 175
WBC # BLD AUTO: 3 X10*3/UL (ref 4.4–11.3)

## 2024-12-14 PROCEDURE — 86140 C-REACTIVE PROTEIN: CPT | Performed by: REGISTERED NURSE

## 2024-12-14 PROCEDURE — 36430 TRANSFUSION BLD/BLD COMPNT: CPT

## 2024-12-14 PROCEDURE — 99233 SBSQ HOSP IP/OBS HIGH 50: CPT | Performed by: STUDENT IN AN ORGANIZED HEALTH CARE EDUCATION/TRAINING PROGRAM

## 2024-12-14 PROCEDURE — 82728 ASSAY OF FERRITIN: CPT | Performed by: REGISTERED NURSE

## 2024-12-14 PROCEDURE — 85610 PROTHROMBIN TIME: CPT | Performed by: PHYSICIAN ASSISTANT

## 2024-12-14 PROCEDURE — 85025 COMPLETE CBC W/AUTO DIFF WBC: CPT | Performed by: PHYSICIAN ASSISTANT

## 2024-12-14 PROCEDURE — 1170000001 HC PRIVATE ONCOLOGY ROOM DAILY

## 2024-12-14 PROCEDURE — 83615 LACTATE (LD) (LDH) ENZYME: CPT | Performed by: PHYSICIAN ASSISTANT

## 2024-12-14 PROCEDURE — P9073 PLATELETS PHERESIS PATH REDU: HCPCS

## 2024-12-14 PROCEDURE — 2500000004 HC RX 250 GENERAL PHARMACY W/ HCPCS (ALT 636 FOR OP/ED): Mod: JZ

## 2024-12-14 PROCEDURE — 2500000001 HC RX 250 WO HCPCS SELF ADMINISTERED DRUGS (ALT 637 FOR MEDICARE OP): Performed by: PHYSICIAN ASSISTANT

## 2024-12-14 PROCEDURE — 83735 ASSAY OF MAGNESIUM: CPT | Performed by: PHYSICIAN ASSISTANT

## 2024-12-14 PROCEDURE — 80053 COMPREHEN METABOLIC PANEL: CPT | Performed by: PHYSICIAN ASSISTANT

## 2024-12-14 PROCEDURE — 85384 FIBRINOGEN ACTIVITY: CPT | Performed by: PHYSICIAN ASSISTANT

## 2024-12-14 PROCEDURE — 2500000002 HC RX 250 W HCPCS SELF ADMINISTERED DRUGS (ALT 637 FOR MEDICARE OP, ALT 636 FOR OP/ED)

## 2024-12-14 PROCEDURE — 2500000001 HC RX 250 WO HCPCS SELF ADMINISTERED DRUGS (ALT 637 FOR MEDICARE OP): Performed by: INTERNAL MEDICINE

## 2024-12-14 RX ORDER — TAMSULOSIN HYDROCHLORIDE 0.4 MG/1
0.4 CAPSULE ORAL DAILY
Status: DISCONTINUED | OUTPATIENT
Start: 2024-12-14 | End: 2024-12-18 | Stop reason: HOSPADM

## 2024-12-14 ASSESSMENT — PAIN SCALES - GENERAL: PAINLEVEL_OUTOF10: 0 - NO PAIN

## 2024-12-14 ASSESSMENT — PAIN - FUNCTIONAL ASSESSMENT: PAIN_FUNCTIONAL_ASSESSMENT: 0-10

## 2024-12-14 NOTE — ASSESSMENT & PLAN NOTE
Nancie Armenta is a 59 y.o. male presenting with relapsed Mantle Cell Lymphoma. Admitted 11/29/24 for his CAR-T cell therapy on CASE 2419 (T0=12/6/24). Prepped with Fludarabine/Cyclophosphamide.     Currently T+8 today (T0=12/6/24). ANC 0.04 Today (12/14).     ONC:   #Hx/o Stage IV Mantle cell lymphoma involving the GI tract   - Initially treated with [place] regimen and consolidation autograft with BEAM prep regimen April 2012.  - Relapsed 2019 w/presentation of leukocytosis. Had colonoscopy 8/2019 with multiple biopsies including ileum, cecum, appendix, and rectosigmoid c/w MCL, Treated with Ibrutinib and Venetoclax w/complete response.  - Recurrent MCL in July 2024 presenting with dropping Plt counts and bmbx (7/25/24) showing focal involvement w/MCL (NGS cyclin D1 positive and SOX 11 positive. BIRC+ and TP53 positive w/a VAF of 3% BIRC3 positive). FISH neg for 17p deletion.  - CT imaging (8/7/24) showing thickening of sigmoid colon, development of mild to moderate retroperitoneal adenopathy, splenomegaly, and poss splenic infarct.  - Started Pirtobrutinib salvage on 8/12/24. Added Venetoclax starting 10/13/24 since spleen size was increasing on repeat CT imaging and on physical exam.  - BMBx (10/23/24) with low level involvement by mantle cells about 3% Mantle cells by flow.  - PET/CT (11/4/24) with FDG avid lymphadenopathy throughout the neck, chest, abdomen, and pelvis c/w lymphomatous involvement. Intense FDG avid splenomegaly. Peripheral regions of photopenia likely reflect areas of infarction.  - Pirtobrutinib and Venetoclax stopped 11/14/24 in preparation for CART collection  - Admitted for his CART cell therapy on CASE 2419 (T0=12/6/24)  - Received CAR-T infusion on 12/6 at 11:00. Around 18:50, patient experienced chills and rigors. Vital signs otherwise stable, rescue Diphenhydramine and Pepcid administered. Methylpred held per Dr. Bowie's instructions. Chills and rigors resolved.    - Plan to  start Neupogen on T+10 if ANC not recovering.      CAR-T   - Conditioning with Flu/Cy   - Cyclophosphamide 60 mg/kg IV T-6   - Fludarabine 25 mg/m2 IV T-5, T-4, T-3    - Plan Allopurinol and Keppra per protocol     - Baseline CARTOX Score: 10/10  - Current CARTOX score: 10/10 (12/14)     - ECOG Score: 0- Fully active, able to carry on all pre-disease performance w/o restriction.     # CRS Grade: G2 on 12/7 (febrile, tachycardic)  - Organ/System affected by CRS: fever, tachycardia   - Date of CRS onset: 12/7   - Date of highest CRS Grade: 12/7   - Date of CRS resolution to Grade 1 or lower: 12/8  - CRS management: infectious workup, vitals q2h + telemetry, started Cefepime, supportive care.      - Toci x1 dose given (12/7 @ 2100).      # ICE Score:   - Date of Neurotoxicity onset: --   - Date of highest Neurotoxicity Grade: --   - Date of Neurotoxicity resolution to Grade 1 or lower: --   - Neurotox management: --      # CAR-T Labs   - CMV DNA PCR (11/30): ND  - IgG level (11/30): 1040  - CBC, RFP, Mg, LFTs, coag screen, fibrinogen, ferritin, CRP & LDH: Daily   - Haptoglobin: Weekly       HEME:  # Pancytopenia, neutropenic secondary to malignancy and treatment  - Monitor CBC w/diff   - transfuse if hgb<7, plt<10  - S/p PRBC. 12/8 (2), 12/9 (1), 12/10 (1)  - Hemolysis labs:      12/9 : , T.bili 1.6, previous 0.9     12/10: , T.bili 1.4, D.bili 0.4, Haptoglobin <30, UA neg, Retic 0.011, YOKASTA poly - neg)  - Prob splenic sequestration of rbc's d/t massive spleen  - CT (10/10/24) - splenomegaly 22.6 cm.    - PET/CT (11/4/24) w/ intense FDG avid splenomegaly  - S/p PLTS: 12/8-12/13  - S/p CRYO: 12/13  - Head CT (12/9) - no ICH     ID:  #PPX: Acyclovir, Fluconazole  - S/p Levaquin (12/5-12/7)  # Neutropenic fever (12/7)  - Afebrile since 12/07  - Bld cxs x 2 (12/7) NGTD  - UA/UC (12/7) negative  - CXR (12/7): No definite focal infiltrate   - Cefepime started d/t fever, orthostatic hypotension and CRS  (12/7->)  # Rhinovirus positive (12/10)    FEN/GI:  - Admit wt: 97.2 kg. Most current wt: 95.8 kg (12/12)  # Monitor electrolytes and replete as needed  # PPI ppx: protonix  # Hx/o CKD (Baseline Cr: 1.20)  # Volume overload. Gave Lasix 20mg IV once (12/1).     CV:  - ECHO (10/23/24) with low normal LVEF 53%   # HLD. Cont daily Atorvastatin  #Sinus tachycardia   - EKG (12/7) sinus tach  - likely due to fever vs CRS vs dehydration and poor PO intake  - on telemetry   - 500cc bolus x1 (12/7), 750cc on (12/8)  # Symptomatic orthostatic hypotension 12/8  - LR 125ml/hr x 8hr, then NS 100ml/hr x 30hr   -  ml bolus for asympt orthostatic VS (12/9 am), resolved     :  # BPH. Held Tadalafil on admit. Started Flomax (11/30), HOLD I/s/o orthostatic hypotension (12/8)     DISPO:  - Full Code  - Patient of Dr. Yvonne Hobbs  - PICC line re-placed (12/2/24)  - PT ordered     Patient seen, examined, and discussed with Dr. Sharma

## 2024-12-14 NOTE — CARE PLAN
Problem: Infection related to problem list condition  Goal: Infection will resolve through treatment  Outcome: Progressing     Problem: Fall/Injury  Goal: Not fall by end of shift  Outcome: Progressing  Goal: Be free from injury by end of the shift  Outcome: Progressing  Goal: Verbalize understanding of personal risk factors for fall in the hospital  Outcome: Progressing  Goal: Verbalize understanding of risk factor reduction measures to prevent injury from fall in the home  Outcome: Progressing  Goal: Use assistive devices by end of the shift  Outcome: Progressing  Goal: Pace activities to prevent fatigue by end of the shift  Outcome: Progressing     Problem: Pain - Adult  Goal: Verbalizes/displays adequate comfort level or baseline comfort level  Outcome: Progressing     Problem: Safety - Adult  Goal: Free from fall injury  Outcome: Progressing     Problem: Discharge Planning  Goal: Discharge to home or other facility with appropriate resources  Outcome: Progressing     Problem: Chronic Conditions and Co-morbidities  Goal: Patient's chronic conditions and co-morbidity symptoms are monitored and maintained or improved  Outcome: Progressing      The clinical goals for the shift include patient will be free from falls and injury this shift

## 2024-12-14 NOTE — PROGRESS NOTES
"Nancie Armenta is a 59 y.o. male on day 15 of admission presenting with Mantle cell lymphoma.    Subjective   Feels well today. Cough from rhinovirus resolved. No acute complaints. ROS unremarkable.     Objective     Physical Exam  Constitutional:       Appearance: Normal appearance.   HENT:      Head: Normocephalic and atraumatic.      Nose: Nose normal.      Mouth/Throat:      Mouth: Mucous membranes are moist.      Pharynx: Oropharynx is clear.   Eyes:      Conjunctiva/sclera: Conjunctivae normal.      Pupils: Pupils are equal, round, and reactive to light.   Cardiovascular:      Rate and Rhythm: Normal rate and regular rhythm.      Pulses: Normal pulses.      Heart sounds: Normal heart sounds.   Pulmonary:      Effort: Pulmonary effort is normal.      Breath sounds: Normal breath sounds.   Abdominal:      General: Bowel sounds are normal.      Palpations: Abdomen is soft.   Musculoskeletal:         General: Normal range of motion.      Cervical back: Normal range of motion and neck supple.   Skin:     General: Skin is warm and dry.      Capillary Refill: Capillary refill takes 2 to 3 seconds.      Comments: L arm DL PICC. Entry site slightly oozy but contained.    Neurological:      General: No focal deficit present.      Mental Status: He is alert. Mental status is at baseline. He is disoriented.   Psychiatric:         Behavior: Behavior normal.         Last Recorded Vitals  Blood pressure 105/71, pulse 89, temperature 36.5 °C (97.7 °F), temperature source Temporal, resp. rate 16, height 1.839 m (6' 0.4\"), weight 94.6 kg (208 lb 8.9 oz), SpO2 98%.  Intake/Output last 3 Shifts:  I/O last 3 completed shifts:  In: 1405 (14.7 mL/kg) [P.O.:720; Blood:335; IV Piggyback:350]  Out: - (0 mL/kg)   Weight: 95.6 kg     Daily labs reviewed (12/13): Platelets, cryo and potassium ordered    Assessment/Plan   Assessment & Plan  Mantle cell lymphoma    MCL (mantle cell lymphoma) (Multi)    Nancie Armenta is a 59 y.o. male " presenting with relapsed Mantle Cell Lymphoma. Admitted 11/29/24 for his CAR-T cell therapy on CASE 2419 (T0=12/6/24). Prepped with Fludarabine/Cyclophosphamide.     Currently T+8 today (T0=12/6/24). ANC 0.04 Today (12/14).     ONC:   #Hx/o Stage IV Mantle cell lymphoma involving the GI tract   - Initially treated with [place] regimen and consolidation autograft with BEAM prep regimen April 2012.  - Relapsed 2019 w/presentation of leukocytosis. Had colonoscopy 8/2019 with multiple biopsies including ileum, cecum, appendix, and rectosigmoid c/w MCL, Treated with Ibrutinib and Venetoclax w/complete response.  - Recurrent MCL in July 2024 presenting with dropping Plt counts and bmbx (7/25/24) showing focal involvement w/MCL (NGS cyclin D1 positive and SOX 11 positive. BIRC+ and TP53 positive w/a VAF of 3% BIRC3 positive). FISH neg for 17p deletion.  - CT imaging (8/7/24) showing thickening of sigmoid colon, development of mild to moderate retroperitoneal adenopathy, splenomegaly, and poss splenic infarct.  - Started Pirtobrutinib salvage on 8/12/24. Added Venetoclax starting 10/13/24 since spleen size was increasing on repeat CT imaging and on physical exam.  - BMBx (10/23/24) with low level involvement by mantle cells about 3% Mantle cells by flow.  - PET/CT (11/4/24) with FDG avid lymphadenopathy throughout the neck, chest, abdomen, and pelvis c/w lymphomatous involvement. Intense FDG avid splenomegaly. Peripheral regions of photopenia likely reflect areas of infarction.  - Pirtobrutinib and Venetoclax stopped 11/14/24 in preparation for CART collection  - Admitted for his CART cell therapy on CASE 2419 (T0=12/6/24)  - Received CAR-T infusion on 12/6 at 11:00. Around 18:50, patient experienced chills and rigors. Vital signs otherwise stable, rescue Diphenhydramine and Pepcid administered. Methylpred held per Dr. Bowie's instructions. Chills and rigors resolved.    - Plan to start Neupogen on T+10 if ANC not  recovering.      CAR-T   - Conditioning with Flu/Cy   - Cyclophosphamide 60 mg/kg IV T-6   - Fludarabine 25 mg/m2 IV T-5, T-4, T-3    - Plan Allopurinol and Keppra per protocol     - Baseline CARTOX Score: 10/10  - Current CARTOX score: 10/10 (12/14)     - ECOG Score: 0- Fully active, able to carry on all pre-disease performance w/o restriction.     # CRS Grade: G2 on 12/7 (febrile, tachycardic)  - Organ/System affected by CRS: fever, tachycardia   - Date of CRS onset: 12/7   - Date of highest CRS Grade: 12/7   - Date of CRS resolution to Grade 1 or lower: 12/8  - CRS management: infectious workup, vitals q2h + telemetry, started Cefepime, supportive care.      - Toci x1 dose given (12/7 @ 2100).      # ICE Score:   - Date of Neurotoxicity onset: --   - Date of highest Neurotoxicity Grade: --   - Date of Neurotoxicity resolution to Grade 1 or lower: --   - Neurotox management: --      # CAR-T Labs   - CMV DNA PCR (11/30): ND  - IgG level (11/30): 1040  - CBC, RFP, Mg, LFTs, coag screen, fibrinogen, ferritin, CRP & LDH: Daily   - Haptoglobin: Weekly       HEME:  # Pancytopenia, neutropenic secondary to malignancy and treatment  - Monitor CBC w/diff   - transfuse if hgb<7, plt<10  - S/p PRBC. 12/8 (2), 12/9 (1), 12/10 (1)  - Hemolysis labs:      12/9 : , T.bili 1.6, previous 0.9     12/10: , T.bili 1.4, D.bili 0.4, Haptoglobin <30, UA neg, Retic 0.011, YOKASTA poly - neg)  - Prob splenic sequestration of rbc's d/t massive spleen  - CT (10/10/24) - splenomegaly 22.6 cm.    - PET/CT (11/4/24) w/ intense FDG avid splenomegaly  - S/p PLTS: 12/8-12/13  - S/p CRYO: 12/13  - Head CT (12/9) - no ICH     ID:  #PPX: Acyclovir, Fluconazole  - S/p Levaquin (12/5-12/7)  # Neutropenic fever (12/7)  - Afebrile since 12/07  - Bld cxs x 2 (12/7) NGTD  - UA/UC (12/7) negative  - CXR (12/7): No definite focal infiltrate   - Cefepime started d/t fever, orthostatic hypotension and CRS (12/7->)  # Rhinovirus positive  (12/10)    FEN/GI:  - Admit wt: 97.2 kg. Most current wt: 95.8 kg (12/12)  # Monitor electrolytes and replete as needed  # PPI ppx: protonix  # Hx/o CKD (Baseline Cr: 1.20)  # Volume overload. Gave Lasix 20mg IV once (12/1).     CV:  - ECHO (10/23/24) with low normal LVEF 53%   # HLD. Cont daily Atorvastatin  #Sinus tachycardia   - EKG (12/7) sinus tach  - likely due to fever vs CRS vs dehydration and poor PO intake  - on telemetry   - 500cc bolus x1 (12/7), 750cc on (12/8)  # Symptomatic orthostatic hypotension 12/8  - LR 125ml/hr x 8hr, then NS 100ml/hr x 30hr   -  ml bolus for asympt orthostatic VS (12/9 am), resolved     :  # BPH. Held Tadalafil on admit. Started Flomax (11/30), HOLD I/s/o orthostatic hypotension (12/8)     DISPO:  - Full Code  - Patient of Dr. Yvonne Hobbs  - PICC line re-placed (12/2/24)  - PT ordered     Patient seen, examined, and discussed with Dr. Sharma       I spent 45 minutes in the professional and overall care of this patient.      Bryant Blair PA-C

## 2024-12-14 NOTE — CARE PLAN
Problem: Infection related to problem list condition  Goal: Infection will resolve through treatment  Outcome: Progressing     Problem: Fall/Injury  Goal: Not fall by end of shift  Outcome: Progressing  Goal: Be free from injury by end of the shift  Outcome: Progressing  Goal: Verbalize understanding of personal risk factors for fall in the hospital  Outcome: Progressing  Goal: Verbalize understanding of risk factor reduction measures to prevent injury from fall in the home  Outcome: Progressing  Goal: Use assistive devices by end of the shift  Outcome: Progressing  Goal: Pace activities to prevent fatigue by end of the shift  Outcome: Progressing     Problem: Pain - Adult  Goal: Verbalizes/displays adequate comfort level or baseline comfort level  Outcome: Progressing     Problem: Safety - Adult  Goal: Free from fall injury  Outcome: Progressing     Problem: Chronic Conditions and Co-morbidities  Goal: Patient's chronic conditions and co-morbidity symptoms are monitored and maintained or improved  Outcome: Progressing   The patient's goals for the shift include      The clinical goals for the shift include patient will be free from falls and injury this shift

## 2024-12-15 LAB
ALBUMIN SERPL BCP-MCNC: 3.7 G/DL (ref 3.4–5)
ALP SERPL-CCNC: 50 U/L (ref 33–136)
ALT SERPL W P-5'-P-CCNC: 24 U/L (ref 10–52)
ANION GAP SERPL CALC-SCNC: 10 MMOL/L (ref 10–20)
APTT PPP: 29 SECONDS (ref 27–38)
AST SERPL W P-5'-P-CCNC: 14 U/L (ref 9–39)
BACTERIA BPU CULT: NORMAL
BASOPHILS # BLD MANUAL: 0 X10*3/UL (ref 0–0.1)
BASOPHILS NFR BLD MANUAL: 0 %
BILIRUB SERPL-MCNC: 1.2 MG/DL (ref 0–1.2)
BUN SERPL-MCNC: 13 MG/DL (ref 6–23)
CALCIUM SERPL-MCNC: 8.1 MG/DL (ref 8.6–10.6)
CHLORIDE SERPL-SCNC: 105 MMOL/L (ref 98–107)
CO2 SERPL-SCNC: 26 MMOL/L (ref 21–32)
CREAT SERPL-MCNC: 0.91 MG/DL (ref 0.5–1.3)
CRP SERPL-MCNC: 0.16 MG/DL
EGFRCR SERPLBLD CKD-EPI 2021: >90 ML/MIN/1.73M*2
EOSINOPHIL # BLD MANUAL: 0.03 X10*3/UL (ref 0–0.7)
EOSINOPHIL NFR BLD MANUAL: 0.9 %
ERYTHROCYTE [DISTWIDTH] IN BLOOD BY AUTOMATED COUNT: 15.7 % (ref 11.5–14.5)
FERRITIN SERPL-MCNC: 1681 NG/ML (ref 20–300)
FIBRINOGEN PPP-MCNC: 130 MG/DL (ref 200–400)
GLUCOSE SERPL-MCNC: 96 MG/DL (ref 74–99)
HCT VFR BLD AUTO: 21.6 % (ref 41–52)
HGB BLD-MCNC: 7.3 G/DL (ref 13.5–17.5)
IMM GRANULOCYTES # BLD AUTO: 0.01 X10*3/UL (ref 0–0.7)
IMM GRANULOCYTES NFR BLD AUTO: 0.3 % (ref 0–0.9)
INR PPP: 1.2 (ref 0.9–1.1)
LDH SERPL L TO P-CCNC: 182 U/L (ref 84–246)
LYMPHOCYTES # BLD MANUAL: 2.48 X10*3/UL (ref 1.2–4.8)
LYMPHOCYTES NFR BLD MANUAL: 77.6 %
MAGNESIUM SERPL-MCNC: 2.3 MG/DL (ref 1.6–2.4)
MCH RBC QN AUTO: 29.9 PG (ref 26–34)
MCHC RBC AUTO-ENTMCNC: 33.8 G/DL (ref 32–36)
MCV RBC AUTO: 89 FL (ref 80–100)
METAMYELOCYTES # BLD MANUAL: 0.03 X10*3/UL
METAMYELOCYTES NFR BLD MANUAL: 0.9 %
MONOCYTES # BLD MANUAL: 0.39 X10*3/UL (ref 0.1–1)
MONOCYTES NFR BLD MANUAL: 12.1 %
NEUTS SEG # BLD MANUAL: 0.03 X10*3/UL (ref 1.2–7)
NEUTS SEG NFR BLD MANUAL: 0.8 %
NRBC BLD-RTO: 0.6 /100 WBCS (ref 0–0)
OVALOCYTES BLD QL SMEAR: ABNORMAL
PLATELET # BLD AUTO: 14 X10*3/UL (ref 150–450)
POTASSIUM SERPL-SCNC: 3.8 MMOL/L (ref 3.5–5.3)
PROT SERPL-MCNC: 5.7 G/DL (ref 6.4–8.2)
PROTHROMBIN TIME: 13.6 SECONDS (ref 9.8–12.8)
RBC # BLD AUTO: 2.44 X10*6/UL (ref 4.5–5.9)
RBC MORPH BLD: ABNORMAL
SODIUM SERPL-SCNC: 137 MMOL/L (ref 136–145)
TOTAL CELLS COUNTED BLD: 116
VARIANT LYMPHS # BLD MANUAL: 0.25 X10*3/UL (ref 0–0.5)
VARIANT LYMPHS NFR BLD: 7.7 %
WBC # BLD AUTO: 3.2 X10*3/UL (ref 4.4–11.3)

## 2024-12-15 PROCEDURE — 85730 THROMBOPLASTIN TIME PARTIAL: CPT | Performed by: PHYSICIAN ASSISTANT

## 2024-12-15 PROCEDURE — 86140 C-REACTIVE PROTEIN: CPT | Performed by: REGISTERED NURSE

## 2024-12-15 PROCEDURE — 85384 FIBRINOGEN ACTIVITY: CPT | Performed by: PHYSICIAN ASSISTANT

## 2024-12-15 PROCEDURE — 2500000004 HC RX 250 GENERAL PHARMACY W/ HCPCS (ALT 636 FOR OP/ED): Mod: JZ

## 2024-12-15 PROCEDURE — 83735 ASSAY OF MAGNESIUM: CPT | Performed by: PHYSICIAN ASSISTANT

## 2024-12-15 PROCEDURE — 85007 BL SMEAR W/DIFF WBC COUNT: CPT | Performed by: PHYSICIAN ASSISTANT

## 2024-12-15 PROCEDURE — 83615 LACTATE (LD) (LDH) ENZYME: CPT | Performed by: PHYSICIAN ASSISTANT

## 2024-12-15 PROCEDURE — 99233 SBSQ HOSP IP/OBS HIGH 50: CPT | Performed by: STUDENT IN AN ORGANIZED HEALTH CARE EDUCATION/TRAINING PROGRAM

## 2024-12-15 PROCEDURE — 2500000002 HC RX 250 W HCPCS SELF ADMINISTERED DRUGS (ALT 637 FOR MEDICARE OP, ALT 636 FOR OP/ED)

## 2024-12-15 PROCEDURE — 2500000001 HC RX 250 WO HCPCS SELF ADMINISTERED DRUGS (ALT 637 FOR MEDICARE OP): Performed by: PHYSICIAN ASSISTANT

## 2024-12-15 PROCEDURE — 82728 ASSAY OF FERRITIN: CPT | Performed by: REGISTERED NURSE

## 2024-12-15 PROCEDURE — 2500000001 HC RX 250 WO HCPCS SELF ADMINISTERED DRUGS (ALT 637 FOR MEDICARE OP): Performed by: INTERNAL MEDICINE

## 2024-12-15 PROCEDURE — 80053 COMPREHEN METABOLIC PANEL: CPT | Performed by: PHYSICIAN ASSISTANT

## 2024-12-15 PROCEDURE — 1170000001 HC PRIVATE ONCOLOGY ROOM DAILY

## 2024-12-15 PROCEDURE — 85027 COMPLETE CBC AUTOMATED: CPT | Performed by: PHYSICIAN ASSISTANT

## 2024-12-15 ASSESSMENT — COGNITIVE AND FUNCTIONAL STATUS - GENERAL
MOBILITY SCORE: 24
DAILY ACTIVITIY SCORE: 24
DAILY ACTIVITIY SCORE: 24
MOBILITY SCORE: 24

## 2024-12-15 ASSESSMENT — PAIN SCALES - GENERAL
PAINLEVEL_OUTOF10: 0 - NO PAIN

## 2024-12-15 ASSESSMENT — PAIN - FUNCTIONAL ASSESSMENT: PAIN_FUNCTIONAL_ASSESSMENT: 0-10

## 2024-12-15 NOTE — PROGRESS NOTES
"Nancie Armenta is a 59 y.o. male on day 16 of admission presenting with Mantle cell lymphoma.    Subjective   Doing well today. ANC dropped slightly from 0/04 to 0/03, will plan to start Neupogen shots dialy. Nancie is agreeable to this plan. No acute complaints. Remainder of ROS is negative.     Objective     Physical Exam  Constitutional:       Appearance: Normal appearance.   HENT:      Head: Normocephalic and atraumatic.      Nose: Nose normal.      Mouth/Throat:      Mouth: Mucous membranes are moist.      Pharynx: Oropharynx is clear.   Eyes:      Conjunctiva/sclera: Conjunctivae normal.      Pupils: Pupils are equal, round, and reactive to light.   Cardiovascular:      Rate and Rhythm: Normal rate and regular rhythm.      Pulses: Normal pulses.      Heart sounds: Normal heart sounds.   Pulmonary:      Effort: Pulmonary effort is normal.      Breath sounds: Normal breath sounds.   Abdominal:      General: Bowel sounds are normal.      Palpations: Abdomen is soft.   Musculoskeletal:         General: Normal range of motion.      Cervical back: Normal range of motion and neck supple.   Skin:     General: Skin is warm and dry.      Capillary Refill: Capillary refill takes 2 to 3 seconds.      Comments: L arm DL PICC. Entry site slightly oozy but contained.    Neurological:      General: No focal deficit present.      Mental Status: He is alert. Mental status is at baseline. He is disoriented.   Psychiatric:         Behavior: Behavior normal.         Last Recorded Vitals  Blood pressure 95/65, pulse 90, temperature 36.2 °C (97.2 °F), temperature source Temporal, resp. rate 16, height 1.839 m (6' 0.4\"), weight 94.6 kg (208 lb 8.9 oz), SpO2 100%.  Intake/Output last 3 Shifts:  I/O last 3 completed shifts:  In: 325 (3.4 mL/kg) [Blood:175; IV Piggyback:150]  Out: - (0 mL/kg)   Weight: 94.6 kg     Daily labs reviewed (12/13): Platelets, cryo and potassium ordered    Assessment/Plan   Assessment & Plan  Mantle cell " lymphoma    MCL (mantle cell lymphoma) (Multi)    Nancie Armenta is a 59 y.o. male presenting with relapsed Mantle Cell Lymphoma. Admitted 11/29/24 for his CAR-T cell therapy on CASE 2419 (T0=12/6/24). Prepped with Fludarabine/Cyclophosphamide.     Currently T+9 today (T0=12/6/24). ANC 0.03 Today (12/15).     ONC:   #Hx/o Stage IV Mantle cell lymphoma involving the GI tract   - Initially treated with [place] regimen and consolidation autograft with BEAM prep regimen April 2012.  - Relapsed 2019 w/presentation of leukocytosis. Had colonoscopy 8/2019 with multiple biopsies including ileum, cecum, appendix, and rectosigmoid c/w MCL, Treated with Ibrutinib and Venetoclax w/complete response.  - Recurrent MCL in July 2024 presenting with dropping Plt counts and bmbx (7/25/24) showing focal involvement w/MCL (NGS cyclin D1 positive and SOX 11 positive. BIRC+ and TP53 positive w/a VAF of 3% BIRC3 positive). FISH neg for 17p deletion.  - CT imaging (8/7/24) showing thickening of sigmoid colon, development of mild to moderate retroperitoneal adenopathy, splenomegaly, and poss splenic infarct.  - Started Pirtobrutinib salvage on 8/12/24. Added Venetoclax starting 10/13/24 since spleen size was increasing on repeat CT imaging and on physical exam.  - BMBx (10/23/24) with low level involvement by mantle cells about 3% Mantle cells by flow.  - PET/CT (11/4/24) with FDG avid lymphadenopathy throughout the neck, chest, abdomen, and pelvis c/w lymphomatous involvement. Intense FDG avid splenomegaly. Peripheral regions of photopenia likely reflect areas of infarction.  - Pirtobrutinib and Venetoclax stopped 11/14/24 in preparation for CART collection  - Admitted for his CART cell therapy on CASE 2419 (T0=12/6/24)  - Received CAR-T infusion on 12/6 at 11:00. Around 18:50, patient experienced chills and rigors. Vital signs otherwise stable, rescue Diphenhydramine and Pepcid administered. Methylpred held per Dr. Bowie's  instructions. Chills and rigors resolved.    - Starting Neupogen today with ANC 0.03 on T+9.       CAR-T   - Conditioning with Flu/Cy   - Cyclophosphamide 60 mg/kg IV T-6   - Fludarabine 25 mg/m2 IV T-5, T-4, T-3    - Plan Allopurinol and Keppra per protocol     - Baseline CARTOX Score: 10/10  - Current CARTOX score: 10/10 (12/14)     - ECOG Score: 0- Fully active, able to carry on all pre-disease performance w/o restriction.     # CRS Grade: G2 on 12/7 (febrile, tachycardic)  - Organ/System affected by CRS: fever, tachycardia   - Date of CRS onset: 12/7   - Date of highest CRS Grade: 12/7   - Date of CRS resolution to Grade 1 or lower: 12/8  - CRS management: infectious workup, vitals q2h + telemetry, started Cefepime, supportive care.      - Toci x1 dose given (12/7 @ 2100).      # ICE Score:   - Date of Neurotoxicity onset: --   - Date of highest Neurotoxicity Grade: --   - Date of Neurotoxicity resolution to Grade 1 or lower: --   - Neurotox management: --      # CAR-T Labs   - CMV DNA PCR (11/30): ND  - IgG level (11/30): 1040  - CBC, RFP, Mg, LFTs, coag screen, fibrinogen, ferritin, CRP & LDH: Daily   - Haptoglobin: Weekly       HEME:  # Pancytopenia, neutropenic secondary to malignancy and treatment  - Monitor CBC w/diff   - transfuse if hgb<7, plt<10  - S/p PRBC. 12/8 (2), 12/9 (1), 12/10 (1)  - Hemolysis labs:      12/9 : , T.bili 1.6, previous 0.9     12/10: , T.bili 1.4, D.bili 0.4, Haptoglobin <30, UA neg, Retic 0.011, YOKASTA poly - neg)  - Prob splenic sequestration of rbc's d/t massive spleen  - CT (10/10/24) - splenomegaly 22.6 cm.    - PET/CT (11/4/24) w/ intense FDG avid splenomegaly  - S/p PLTS: 12/8-12/13  - S/p CRYO: 12/13  - Head CT (12/9) - no ICH     ID:  #PPX: Acyclovir, Fluconazole  - S/p Levaquin (12/5-12/7)  # Neutropenic fever (12/7)  - Afebrile since 12/07  - Bld cxs x 2 (12/7) NGTD  - UA/UC (12/7) negative  - CXR (12/7): No definite focal infiltrate   - Cefepime started d/t  fever, orthostatic hypotension and CRS (12/7->)  # Rhinovirus positive (12/10)    FEN/GI:  - Admit wt: 97.2 kg. Most current wt: 95.8 kg (12/12)  # Monitor electrolytes and replete as needed  # PPI ppx: protonix  # Hx/o CKD (Baseline Cr: 1.20)  # Volume overload. Gave Lasix 20mg IV once (12/1).     CV:  - ECHO (10/23/24) with low normal LVEF 53%   # HLD. Cont daily Atorvastatin  #Sinus tachycardia   - EKG (12/7) sinus tach  - likely due to fever vs CRS vs dehydration and poor PO intake  - on telemetry   - 500cc bolus x1 (12/7), 750cc on (12/8)  # Symptomatic orthostatic hypotension 12/8  - LR 125ml/hr x 8hr, then NS 100ml/hr x 30hr   -  ml bolus for asympt orthostatic VS (12/9 am), resolved     :  # BPH. Held Tadalafil on admit. Started Flomax (11/30), HOLD I/s/o orthostatic hypotension (12/8)     DISPO:  - Full Code  - Patient of Dr. Yvonne Hobbs  - PICC line re-placed (12/2/24)  - PT ordered     Patient seen, examined, and discussed with Dr. Sharma       I spent 35 minutes in the professional and overall care of this patient.      Bryant Blair PA-C

## 2024-12-15 NOTE — CARE PLAN
Problem: Infection related to problem list condition  Goal: Infection will resolve through treatment  Outcome: Progressing     Problem: Fall/Injury  Goal: Not fall by end of shift  Outcome: Progressing  Goal: Be free from injury by end of the shift  Outcome: Progressing  Goal: Verbalize understanding of personal risk factors for fall in the hospital  Outcome: Progressing  Goal: Verbalize understanding of risk factor reduction measures to prevent injury from fall in the home  Outcome: Progressing  Goal: Use assistive devices by end of the shift  Outcome: Progressing  Goal: Pace activities to prevent fatigue by end of the shift  Outcome: Progressing     Problem: Pain - Adult  Goal: Verbalizes/displays adequate comfort level or baseline comfort level  Outcome: Progressing     Problem: Safety - Adult  Goal: Free from fall injury  Outcome: Progressing     Problem: Discharge Planning  Goal: Discharge to home or other facility with appropriate resources  Outcome: Progressing     Problem: Chronic Conditions and Co-morbidities  Goal: Patient's chronic conditions and co-morbidity symptoms are monitored and maintained or improved  Outcome: Progressing    The clinical goals for the shift include Pt will remain free from falls throughouts shift

## 2024-12-15 NOTE — ASSESSMENT & PLAN NOTE
Nancie Armenta is a 59 y.o. male presenting with relapsed Mantle Cell Lymphoma. Admitted 11/29/24 for his CAR-T cell therapy on CASE 2419 (T0=12/6/24). Prepped with Fludarabine/Cyclophosphamide.     Currently T+9 today (T0=12/6/24). ANC 0.03 Today (12/15).     ONC:   #Hx/o Stage IV Mantle cell lymphoma involving the GI tract   - Initially treated with [place] regimen and consolidation autograft with BEAM prep regimen April 2012.  - Relapsed 2019 w/presentation of leukocytosis. Had colonoscopy 8/2019 with multiple biopsies including ileum, cecum, appendix, and rectosigmoid c/w MCL, Treated with Ibrutinib and Venetoclax w/complete response.  - Recurrent MCL in July 2024 presenting with dropping Plt counts and bmbx (7/25/24) showing focal involvement w/MCL (NGS cyclin D1 positive and SOX 11 positive. BIRC+ and TP53 positive w/a VAF of 3% BIRC3 positive). FISH neg for 17p deletion.  - CT imaging (8/7/24) showing thickening of sigmoid colon, development of mild to moderate retroperitoneal adenopathy, splenomegaly, and poss splenic infarct.  - Started Pirtobrutinib salvage on 8/12/24. Added Venetoclax starting 10/13/24 since spleen size was increasing on repeat CT imaging and on physical exam.  - BMBx (10/23/24) with low level involvement by mantle cells about 3% Mantle cells by flow.  - PET/CT (11/4/24) with FDG avid lymphadenopathy throughout the neck, chest, abdomen, and pelvis c/w lymphomatous involvement. Intense FDG avid splenomegaly. Peripheral regions of photopenia likely reflect areas of infarction.  - Pirtobrutinib and Venetoclax stopped 11/14/24 in preparation for CART collection  - Admitted for his CART cell therapy on CASE 2419 (T0=12/6/24)  - Received CAR-T infusion on 12/6 at 11:00. Around 18:50, patient experienced chills and rigors. Vital signs otherwise stable, rescue Diphenhydramine and Pepcid administered. Methylpred held per Dr. Bowie's instructions. Chills and rigors resolved.    - Starting  Neupogen today with ANC 0.03 on T+9.       CAR-T   - Conditioning with Flu/Cy   - Cyclophosphamide 60 mg/kg IV T-6   - Fludarabine 25 mg/m2 IV T-5, T-4, T-3    - Plan Allopurinol and Keppra per protocol     - Baseline CARTOX Score: 10/10  - Current CARTOX score: 10/10 (12/14)     - ECOG Score: 0- Fully active, able to carry on all pre-disease performance w/o restriction.     # CRS Grade: G2 on 12/7 (febrile, tachycardic)  - Organ/System affected by CRS: fever, tachycardia   - Date of CRS onset: 12/7   - Date of highest CRS Grade: 12/7   - Date of CRS resolution to Grade 1 or lower: 12/8  - CRS management: infectious workup, vitals q2h + telemetry, started Cefepime, supportive care.      - Toci x1 dose given (12/7 @ 2100).      # ICE Score:   - Date of Neurotoxicity onset: --   - Date of highest Neurotoxicity Grade: --   - Date of Neurotoxicity resolution to Grade 1 or lower: --   - Neurotox management: --      # CAR-T Labs   - CMV DNA PCR (11/30): ND  - IgG level (11/30): 1040  - CBC, RFP, Mg, LFTs, coag screen, fibrinogen, ferritin, CRP & LDH: Daily   - Haptoglobin: Weekly       HEME:  # Pancytopenia, neutropenic secondary to malignancy and treatment  - Monitor CBC w/diff   - transfuse if hgb<7, plt<10  - S/p PRBC. 12/8 (2), 12/9 (1), 12/10 (1)  - Hemolysis labs:      12/9 : , T.bili 1.6, previous 0.9     12/10: , T.bili 1.4, D.bili 0.4, Haptoglobin <30, UA neg, Retic 0.011, YOKASTA poly - neg)  - Prob splenic sequestration of rbc's d/t massive spleen  - CT (10/10/24) - splenomegaly 22.6 cm.    - PET/CT (11/4/24) w/ intense FDG avid splenomegaly  - S/p PLTS: 12/8-12/13  - S/p CRYO: 12/13  - Head CT (12/9) - no ICH     ID:  #PPX: Acyclovir, Fluconazole  - S/p Levaquin (12/5-12/7)  # Neutropenic fever (12/7)  - Afebrile since 12/07  - Bld cxs x 2 (12/7) NGTD  - UA/UC (12/7) negative  - CXR (12/7): No definite focal infiltrate   - Cefepime started d/t fever, orthostatic hypotension and CRS (12/7->)  #  Rhinovirus positive (12/10)    FEN/GI:  - Admit wt: 97.2 kg. Most current wt: 95.8 kg (12/12)  # Monitor electrolytes and replete as needed  # PPI ppx: protonix  # Hx/o CKD (Baseline Cr: 1.20)  # Volume overload. Gave Lasix 20mg IV once (12/1).     CV:  - ECHO (10/23/24) with low normal LVEF 53%   # HLD. Cont daily Atorvastatin  #Sinus tachycardia   - EKG (12/7) sinus tach  - likely due to fever vs CRS vs dehydration and poor PO intake  - on telemetry   - 500cc bolus x1 (12/7), 750cc on (12/8)  # Symptomatic orthostatic hypotension 12/8  - LR 125ml/hr x 8hr, then NS 100ml/hr x 30hr   -  ml bolus for asympt orthostatic VS (12/9 am), resolved     :  # BPH. Held Tadalafil on admit. Started Flomax (11/30), HOLD I/s/o orthostatic hypotension (12/8)     DISPO:  - Full Code  - Patient of Dr. Yvonne Hobbs  - PICC line re-placed (12/2/24)  - PT ordered     Patient seen, examined, and discussed with Dr. Sharma

## 2024-12-16 LAB
ALBUMIN SERPL BCP-MCNC: 3.7 G/DL (ref 3.4–5)
ALP SERPL-CCNC: 49 U/L (ref 33–136)
ALT SERPL W P-5'-P-CCNC: 21 U/L (ref 10–52)
ANION GAP SERPL CALC-SCNC: 11 MMOL/L (ref 10–20)
APTT PPP: 26 SECONDS (ref 27–38)
AST SERPL W P-5'-P-CCNC: 13 U/L (ref 9–39)
BASOPHILS # BLD MANUAL: 0 X10*3/UL (ref 0–0.1)
BASOPHILS NFR BLD MANUAL: 0 %
BILIRUB SERPL-MCNC: 1.5 MG/DL (ref 0–1.2)
BUN SERPL-MCNC: 12 MG/DL (ref 6–23)
CALCIUM SERPL-MCNC: 8.1 MG/DL (ref 8.6–10.6)
CHLORIDE SERPL-SCNC: 105 MMOL/L (ref 98–107)
CO2 SERPL-SCNC: 24 MMOL/L (ref 21–32)
CREAT SERPL-MCNC: 0.91 MG/DL (ref 0.5–1.3)
CRP SERPL-MCNC: 0.1 MG/DL
DACRYOCYTES BLD QL SMEAR: ABNORMAL
EGFRCR SERPLBLD CKD-EPI 2021: >90 ML/MIN/1.73M*2
EOSINOPHIL # BLD MANUAL: 0 X10*3/UL (ref 0–0.7)
EOSINOPHIL NFR BLD MANUAL: 0 %
ERYTHROCYTE [DISTWIDTH] IN BLOOD BY AUTOMATED COUNT: 15.6 % (ref 11.5–14.5)
FERRITIN SERPL-MCNC: 1489 NG/ML (ref 20–300)
FIBRINOGEN PPP-MCNC: 131 MG/DL (ref 200–400)
GLUCOSE SERPL-MCNC: 97 MG/DL (ref 74–99)
HAPTOGLOB SERPL NEPH-MCNC: <30 MG/DL (ref 30–200)
HCT VFR BLD AUTO: 21.1 % (ref 41–52)
HGB BLD-MCNC: 7.1 G/DL (ref 13.5–17.5)
IMM GRANULOCYTES # BLD AUTO: 0.08 X10*3/UL (ref 0–0.7)
IMM GRANULOCYTES NFR BLD AUTO: 3.1 % (ref 0–0.9)
INR PPP: 1.2 (ref 0.9–1.1)
LDH SERPL L TO P-CCNC: 174 U/L (ref 84–246)
LYMPHOCYTES # BLD MANUAL: 2.02 X10*3/UL (ref 1.2–4.8)
LYMPHOCYTES NFR BLD MANUAL: 77.6 %
MAGNESIUM SERPL-MCNC: 2.37 MG/DL (ref 1.6–2.4)
MCH RBC QN AUTO: 30.9 PG (ref 26–34)
MCHC RBC AUTO-ENTMCNC: 33.6 G/DL (ref 32–36)
MCV RBC AUTO: 92 FL (ref 80–100)
MONOCYTES # BLD MANUAL: 0.15 X10*3/UL (ref 0.1–1)
MONOCYTES NFR BLD MANUAL: 5.6 %
NEUTS SEG # BLD MANUAL: 0.24 X10*3/UL (ref 1.2–7)
NEUTS SEG NFR BLD MANUAL: 9.3 %
NRBC BLD-RTO: 0.8 /100 WBCS (ref 0–0)
OVALOCYTES BLD QL SMEAR: ABNORMAL
PLATELET # BLD AUTO: 16 X10*3/UL (ref 150–450)
POTASSIUM SERPL-SCNC: 3.6 MMOL/L (ref 3.5–5.3)
PROT SERPL-MCNC: 5.6 G/DL (ref 6.4–8.2)
PROTHROMBIN TIME: 13.4 SECONDS (ref 9.8–12.8)
RBC # BLD AUTO: 2.3 X10*6/UL (ref 4.5–5.9)
RBC MORPH BLD: ABNORMAL
SODIUM SERPL-SCNC: 136 MMOL/L (ref 136–145)
TOTAL CELLS COUNTED BLD: 107
VARIANT LYMPHS # BLD MANUAL: 0.2 X10*3/UL (ref 0–0.5)
VARIANT LYMPHS NFR BLD: 7.5 %
WBC # BLD AUTO: 2.6 X10*3/UL (ref 4.4–11.3)

## 2024-12-16 PROCEDURE — 85384 FIBRINOGEN ACTIVITY: CPT | Performed by: PHYSICIAN ASSISTANT

## 2024-12-16 PROCEDURE — 2500000001 HC RX 250 WO HCPCS SELF ADMINISTERED DRUGS (ALT 637 FOR MEDICARE OP): Performed by: INTERNAL MEDICINE

## 2024-12-16 PROCEDURE — 99233 SBSQ HOSP IP/OBS HIGH 50: CPT | Performed by: STUDENT IN AN ORGANIZED HEALTH CARE EDUCATION/TRAINING PROGRAM

## 2024-12-16 PROCEDURE — 2500000002 HC RX 250 W HCPCS SELF ADMINISTERED DRUGS (ALT 637 FOR MEDICARE OP, ALT 636 FOR OP/ED)

## 2024-12-16 PROCEDURE — 80053 COMPREHEN METABOLIC PANEL: CPT | Performed by: PHYSICIAN ASSISTANT

## 2024-12-16 PROCEDURE — 83010 ASSAY OF HAPTOGLOBIN QUANT: CPT | Performed by: PHYSICIAN ASSISTANT

## 2024-12-16 PROCEDURE — 1170000001 HC PRIVATE ONCOLOGY ROOM DAILY

## 2024-12-16 PROCEDURE — 83735 ASSAY OF MAGNESIUM: CPT | Performed by: PHYSICIAN ASSISTANT

## 2024-12-16 PROCEDURE — 86140 C-REACTIVE PROTEIN: CPT | Performed by: REGISTERED NURSE

## 2024-12-16 PROCEDURE — 2500000004 HC RX 250 GENERAL PHARMACY W/ HCPCS (ALT 636 FOR OP/ED): Mod: JZ

## 2024-12-16 PROCEDURE — 85007 BL SMEAR W/DIFF WBC COUNT: CPT | Performed by: PHYSICIAN ASSISTANT

## 2024-12-16 PROCEDURE — 85027 COMPLETE CBC AUTOMATED: CPT | Performed by: PHYSICIAN ASSISTANT

## 2024-12-16 PROCEDURE — 82728 ASSAY OF FERRITIN: CPT | Performed by: REGISTERED NURSE

## 2024-12-16 PROCEDURE — 2500000001 HC RX 250 WO HCPCS SELF ADMINISTERED DRUGS (ALT 637 FOR MEDICARE OP): Performed by: PHYSICIAN ASSISTANT

## 2024-12-16 PROCEDURE — 83615 LACTATE (LD) (LDH) ENZYME: CPT | Performed by: PHYSICIAN ASSISTANT

## 2024-12-16 PROCEDURE — 85610 PROTHROMBIN TIME: CPT | Performed by: PHYSICIAN ASSISTANT

## 2024-12-16 ASSESSMENT — COGNITIVE AND FUNCTIONAL STATUS - GENERAL
MOBILITY SCORE: 24
DAILY ACTIVITIY SCORE: 24

## 2024-12-16 ASSESSMENT — PAIN - FUNCTIONAL ASSESSMENT
PAIN_FUNCTIONAL_ASSESSMENT: 0-10

## 2024-12-16 ASSESSMENT — PAIN SCALES - GENERAL
PAINLEVEL_OUTOF10: 0 - NO PAIN

## 2024-12-16 NOTE — CARE PLAN
The patient's goals for the shift include      The clinical goals for the shift include Pt will be safe, comfortable, and HD stable overnight.      Problem: Infection related to problem list condition  Goal: Infection will resolve through treatment  Outcome: Progressing     Problem: Fall/Injury  Goal: Not fall by end of shift  Outcome: Progressing  Goal: Be free from injury by end of the shift  Outcome: Progressing  Goal: Verbalize understanding of personal risk factors for fall in the hospital  Outcome: Progressing  Goal: Verbalize understanding of risk factor reduction measures to prevent injury from fall in the home  Outcome: Progressing  Goal: Use assistive devices by end of the shift  Outcome: Progressing  Goal: Pace activities to prevent fatigue by end of the shift  Outcome: Progressing     Problem: Pain - Adult  Goal: Verbalizes/displays adequate comfort level or baseline comfort level  Outcome: Progressing     Problem: Safety - Adult  Goal: Free from fall injury  Outcome: Progressing     Problem: Discharge Planning  Goal: Discharge to home or other facility with appropriate resources  Outcome: Progressing     Problem: Chronic Conditions and Co-morbidities  Goal: Patient's chronic conditions and co-morbidity symptoms are monitored and maintained or improved  Outcome: Progressing

## 2024-12-16 NOTE — ASSESSMENT & PLAN NOTE
Nancie Armenta is a 59 y.o. male presenting with relapsed Mantle Cell Lymphoma. Admitted 11/29/24 for his CAR-T cell therapy on CASE 2419 (T0=12/6/24). Prepped with Fludarabine/Cyclophosphamide.     Currently T+10 today (T0=12/6/24). ANC 0.24 Today (12/15).     ONC:   #Hx/o Stage IV Mantle cell lymphoma involving the GI tract   - Initially treated with [place] regimen and consolidation autograft with BEAM prep regimen April 2012.  - Relapsed 2019 w/presentation of leukocytosis. Had colonoscopy 8/2019 with multiple biopsies including ileum, cecum, appendix, and rectosigmoid c/w MCL, Treated with Ibrutinib and Venetoclax w/complete response.  - Recurrent MCL in July 2024 presenting with dropping Plt counts and bmbx (7/25/24) showing focal involvement w/MCL (NGS cyclin D1 positive and SOX 11 positive. BIRC+ and TP53 positive w/a VAF of 3% BIRC3 positive). FISH neg for 17p deletion.  - CT imaging (8/7/24) showing thickening of sigmoid colon, development of mild to moderate retroperitoneal adenopathy, splenomegaly, and poss splenic infarct.  - Started Pirtobrutinib salvage on 8/12/24. Added Venetoclax starting 10/13/24 since spleen size was increasing on repeat CT imaging and on physical exam.  - BMBx (10/23/24) with low level involvement by mantle cells about 3% Mantle cells by flow.  - PET/CT (11/4/24) with FDG avid lymphadenopathy throughout the neck, chest, abdomen, and pelvis c/w lymphomatous involvement. Intense FDG avid splenomegaly. Peripheral regions of photopenia likely reflect areas of infarction.  - Pirtobrutinib and Venetoclax stopped 11/14/24 in preparation for CART collection  - Admitted for his CART cell therapy on CASE 2419 (T0=12/6/24)  - Received CAR-T infusion on 12/6 at 11:00. Around 18:50, patient experienced chills and rigors. Vital signs otherwise stable, rescue Diphenhydramine and Pepcid administered. Methylpred held per Dr. Bowie's instructions. Chills and rigors resolved.    - Starting  Neupogen today with ANC 0.03 on T+9.       CAR-T   - Conditioning with Flu/Cy   - Cyclophosphamide 60 mg/kg IV T-6   - Fludarabine 25 mg/m2 IV T-5, T-4, T-3    - Plan Allopurinol and Keppra per protocol     - Baseline CARTOX Score: 10/10  - Current CARTOX score: 10/10 (12/15)     - ECOG Score: 0- Fully active, able to carry on all pre-disease performance w/o restriction.     # CRS Grade: G2 on 12/7 (febrile, tachycardic)  - Organ/System affected by CRS: fever, tachycardia   - Date of CRS onset: 12/7   - Date of highest CRS Grade: 12/7   - Date of CRS resolution to Grade 1 or lower: 12/8  - CRS management: infectious workup, vitals q2h + telemetry, started Cefepime, supportive care.      - Toci x1 dose given (12/7 @ 2100).      # ICE Score:   - Date of Neurotoxicity onset: --   - Date of highest Neurotoxicity Grade: --   - Date of Neurotoxicity resolution to Grade 1 or lower: --   - Neurotox management: --      # CAR-T Labs   - CMV DNA PCR (11/30): ND  - IgG level (11/30): 1040  - CBC, RFP, Mg, LFTs, coag screen, fibrinogen, ferritin, CRP & LDH: Daily   - Haptoglobin: Weekly       HEME:  # Pancytopenia, neutropenic secondary to malignancy and treatment  - Monitor CBC w/diff   - transfuse if hgb<7, plt<10  - S/p PRBC. 12/8 (2), 12/9 (1), 12/10 (1)  - Hemolysis labs:      12/9 : , T.bili 1.6, previous 0.9     12/10: , T.bili 1.4, D.bili 0.4, Haptoglobin <30, UA neg, Retic 0.011, YOKASTA poly - neg)  - Prob splenic sequestration of rbc's d/t massive spleen  - CT (10/10/24) - splenomegaly 22.6 cm.    - PET/CT (11/4/24) w/ intense FDG avid splenomegaly  - S/p PLTS: 12/8-12/13  - S/p CRYO: 12/13  - Head CT (12/9) - no ICH     ID:  #PPX: Acyclovir, Fluconazole  - S/p Levaquin (12/5-12/7)  # Neutropenic fever (12/7)  - Afebrile since 12/07  - Bld cxs x 2 (12/7) NGTD  - UA/UC (12/7) negative  - CXR (12/7): No definite focal infiltrate   - Cefepime started d/t fever, orthostatic hypotension and CRS (12/7->12/16)  #  Rhinovirus positive (12/10)    FEN/GI:  - Admit wt: 97.2 kg. Most current wt: 95.8 kg (12/12)  # Monitor electrolytes and replete as needed  # PPI ppx: protonix  # Hx/o CKD (Baseline Cr: 1.20)  # Volume overload. Gave Lasix 20mg IV once (12/1).     CV:  - ECHO (10/23/24) with low normal LVEF 53%   # HLD. Cont daily Atorvastatin  #Sinus tachycardia   - EKG (12/7) sinus tach  - likely due to fever vs CRS vs dehydration and poor PO intake  - on telemetry   - 500cc bolus x1 (12/7), 750cc on (12/8)  # Symptomatic orthostatic hypotension 12/8  - LR 125ml/hr x 8hr, then NS 100ml/hr x 30hr   -  ml bolus for asympt orthostatic VS (12/9 am), resolved     :  # BPH. Held Tadalafil on admit. Started Flomax (11/30), HOLD I/s/o orthostatic hypotension (12/8)     DISPO:  - Full Code  - Patient of Dr. Yvonne Hobbs  - PICC line re-placed (12/2/24)  - PT ordered  - Plan for mid-week discharge. Med rec and appointments completed     Patient seen, examined, and discussed with Dr. Roca

## 2024-12-16 NOTE — PROGRESS NOTES
"Nancie Armenta is a 59 y.o. male on day 17 of admission presenting with Mantle cell lymphoma.    Subjective   Doing good today. Some nausea this morning which subsided. ANC dropped slightly from 0/04 to 0/03, will plan to start Neupogen shots dialy. Nancie is agreeable to this plan. No acute complaints. Remainder of ROS is negative.     Objective     Physical Exam  Constitutional:       Appearance: Normal appearance.   HENT:      Head: Normocephalic and atraumatic.      Nose: Nose normal.      Mouth/Throat:      Mouth: Mucous membranes are moist.      Pharynx: Oropharynx is clear.   Eyes:      Conjunctiva/sclera: Conjunctivae normal.      Pupils: Pupils are equal, round, and reactive to light.   Cardiovascular:      Rate and Rhythm: Normal rate and regular rhythm.      Pulses: Normal pulses.      Heart sounds: Normal heart sounds.   Pulmonary:      Effort: Pulmonary effort is normal.      Breath sounds: Normal breath sounds.   Abdominal:      General: Bowel sounds are normal.      Palpations: Abdomen is soft.   Musculoskeletal:         General: Normal range of motion.      Cervical back: Normal range of motion and neck supple.   Skin:     General: Skin is warm and dry.      Capillary Refill: Capillary refill takes 2 to 3 seconds.      Comments: L arm DL PICC. Entry site slightly oozy but contained.    Neurological:      General: No focal deficit present.      Mental Status: He is alert. Mental status is at baseline. He is disoriented.   Psychiatric:         Behavior: Behavior normal.         Last Recorded Vitals  Blood pressure 95/63, pulse 96, temperature 36.7 °C (98.1 °F), temperature source Temporal, resp. rate 18, height 1.839 m (6' 0.4\"), weight 94.6 kg (208 lb 8.9 oz), SpO2 100%.  Intake/Output last 3 Shifts:  I/O last 3 completed shifts:  In: 200 (2.1 mL/kg) [IV Piggyback:200]  Out: - (0 mL/kg)   Weight: 94.6 kg     Daily labs reviewed (12/13): Platelets, cryo and potassium ordered    Assessment/Plan "   Assessment & Plan  Mantle cell lymphoma    MCL (mantle cell lymphoma) (Multi)    Nancie Armenta is a 59 y.o. male presenting with relapsed Mantle Cell Lymphoma. Admitted 11/29/24 for his CAR-T cell therapy on CASE 2419 (T0=12/6/24). Prepped with Fludarabine/Cyclophosphamide.     Currently T+10 today (T0=12/6/24). ANC 0.24 Today (12/15).     ONC:   #Hx/o Stage IV Mantle cell lymphoma involving the GI tract   - Initially treated with [place] regimen and consolidation autograft with BEAM prep regimen April 2012.  - Relapsed 2019 w/presentation of leukocytosis. Had colonoscopy 8/2019 with multiple biopsies including ileum, cecum, appendix, and rectosigmoid c/w MCL, Treated with Ibrutinib and Venetoclax w/complete response.  - Recurrent MCL in July 2024 presenting with dropping Plt counts and bmbx (7/25/24) showing focal involvement w/MCL (NGS cyclin D1 positive and SOX 11 positive. BIRC+ and TP53 positive w/a VAF of 3% BIRC3 positive). FISH neg for 17p deletion.  - CT imaging (8/7/24) showing thickening of sigmoid colon, development of mild to moderate retroperitoneal adenopathy, splenomegaly, and poss splenic infarct.  - Started Pirtobrutinib salvage on 8/12/24. Added Venetoclax starting 10/13/24 since spleen size was increasing on repeat CT imaging and on physical exam.  - BMBx (10/23/24) with low level involvement by mantle cells about 3% Mantle cells by flow.  - PET/CT (11/4/24) with FDG avid lymphadenopathy throughout the neck, chest, abdomen, and pelvis c/w lymphomatous involvement. Intense FDG avid splenomegaly. Peripheral regions of photopenia likely reflect areas of infarction.  - Pirtobrutinib and Venetoclax stopped 11/14/24 in preparation for CART collection  - Admitted for his CART cell therapy on CASE 2419 (T0=12/6/24)  - Received CAR-T infusion on 12/6 at 11:00. Around 18:50, patient experienced chills and rigors. Vital signs otherwise stable, rescue Diphenhydramine and Pepcid administered. Methylpred  held per Dr. Bowie's instructions. Chills and rigors resolved.    - Starting Neupogen today with ANC 0.03 on T+9.       CAR-T   - Conditioning with Flu/Cy   - Cyclophosphamide 60 mg/kg IV T-6   - Fludarabine 25 mg/m2 IV T-5, T-4, T-3    - Plan Allopurinol and Keppra per protocol     - Baseline CARTOX Score: 10/10  - Current CARTOX score: 10/10 (12/15)     - ECOG Score: 0- Fully active, able to carry on all pre-disease performance w/o restriction.     # CRS Grade: G2 on 12/7 (febrile, tachycardic)  - Organ/System affected by CRS: fever, tachycardia   - Date of CRS onset: 12/7   - Date of highest CRS Grade: 12/7   - Date of CRS resolution to Grade 1 or lower: 12/8  - CRS management: infectious workup, vitals q2h + telemetry, started Cefepime, supportive care.      - Toci x1 dose given (12/7 @ 2100).      # ICE Score:   - Date of Neurotoxicity onset: --   - Date of highest Neurotoxicity Grade: --   - Date of Neurotoxicity resolution to Grade 1 or lower: --   - Neurotox management: --      # CAR-T Labs   - CMV DNA PCR (11/30): ND  - IgG level (11/30): 1040  - CBC, RFP, Mg, LFTs, coag screen, fibrinogen, ferritin, CRP & LDH: Daily   - Haptoglobin: Weekly       HEME:  # Pancytopenia, neutropenic secondary to malignancy and treatment  - Monitor CBC w/diff   - transfuse if hgb<7, plt<10  - S/p PRBC. 12/8 (2), 12/9 (1), 12/10 (1)  - Hemolysis labs:      12/9 : , T.bili 1.6, previous 0.9     12/10: , T.bili 1.4, D.bili 0.4, Haptoglobin <30, UA neg, Retic 0.011, YOKASTA poly - neg)  - Prob splenic sequestration of rbc's d/t massive spleen  - CT (10/10/24) - splenomegaly 22.6 cm.    - PET/CT (11/4/24) w/ intense FDG avid splenomegaly  - S/p PLTS: 12/8-12/13  - S/p CRYO: 12/13  - Head CT (12/9) - no ICH     ID:  #PPX: Acyclovir, Fluconazole  - S/p Levaquin (12/5-12/7)  # Neutropenic fever (12/7)  - Afebrile since 12/07  - Bld cxs x 2 (12/7) NGTD  - UA/UC (12/7) negative  - CXR (12/7): No definite focal infiltrate    - Cefepime started d/t fever, orthostatic hypotension and CRS (12/7->12/16)  # Rhinovirus positive (12/10)    FEN/GI:  - Admit wt: 97.2 kg. Most current wt: 95.8 kg (12/12)  # Monitor electrolytes and replete as needed  # PPI ppx: protonix  # Hx/o CKD (Baseline Cr: 1.20)  # Volume overload. Gave Lasix 20mg IV once (12/1).     CV:  - ECHO (10/23/24) with low normal LVEF 53%   # HLD. Cont daily Atorvastatin  #Sinus tachycardia   - EKG (12/7) sinus tach  - likely due to fever vs CRS vs dehydration and poor PO intake  - on telemetry   - 500cc bolus x1 (12/7), 750cc on (12/8)  # Symptomatic orthostatic hypotension 12/8  - LR 125ml/hr x 8hr, then NS 100ml/hr x 30hr   -  ml bolus for asympt orthostatic VS (12/9 am), resolved     :  # BPH. Held Tadalafil on admit. Started Flomax (11/30), HOLD I/s/o orthostatic hypotension (12/8)     DISPO:  - Full Code  - Patient of Dr. Yvonne Hobbs  - PICC line re-placed (12/2/24)  - PT ordered  - Plan for mid-week discharge. Med rec and appointments completed     Patient seen, examined, and discussed with Dr. Roca       I spent 35 minutes in the professional and overall care of this patient.      Bryant Blair PA-C

## 2024-12-17 ENCOUNTER — DOCUMENTATION (OUTPATIENT)
Dept: OTHER | Facility: HOSPITAL | Age: 59
End: 2024-12-17
Payer: COMMERCIAL

## 2024-12-17 LAB
ALBUMIN SERPL BCP-MCNC: 3.9 G/DL (ref 3.4–5)
ALP SERPL-CCNC: 48 U/L (ref 33–136)
ALT SERPL W P-5'-P-CCNC: 18 U/L (ref 10–52)
ANION GAP SERPL CALC-SCNC: 10 MMOL/L (ref 10–20)
APTT PPP: 24 SECONDS (ref 27–38)
AST SERPL W P-5'-P-CCNC: 13 U/L (ref 9–39)
BASOPHILS # BLD AUTO: 0.01 X10*3/UL (ref 0–0.1)
BASOPHILS NFR BLD AUTO: 0.3 %
BILIRUB SERPL-MCNC: 1.6 MG/DL (ref 0–1.2)
BUN SERPL-MCNC: 13 MG/DL (ref 6–23)
CALCIUM SERPL-MCNC: 8.4 MG/DL (ref 8.6–10.6)
CHLORIDE SERPL-SCNC: 105 MMOL/L (ref 98–107)
CO2 SERPL-SCNC: 26 MMOL/L (ref 21–32)
CREAT SERPL-MCNC: 0.9 MG/DL (ref 0.5–1.3)
CRP SERPL-MCNC: <0.1 MG/DL
EGFRCR SERPLBLD CKD-EPI 2021: >90 ML/MIN/1.73M*2
EOSINOPHIL # BLD AUTO: 0.02 X10*3/UL (ref 0–0.7)
EOSINOPHIL NFR BLD AUTO: 0.6 %
ERYTHROCYTE [DISTWIDTH] IN BLOOD BY AUTOMATED COUNT: 16.5 % (ref 11.5–14.5)
FERRITIN SERPL-MCNC: 1485 NG/ML (ref 20–300)
FIBRINOGEN PPP-MCNC: 123 MG/DL (ref 200–400)
GLUCOSE SERPL-MCNC: 97 MG/DL (ref 74–99)
HCT VFR BLD AUTO: 22.3 % (ref 41–52)
HGB BLD-MCNC: 7.3 G/DL (ref 13.5–17.5)
IMM GRANULOCYTES # BLD AUTO: 0.15 X10*3/UL (ref 0–0.7)
IMM GRANULOCYTES NFR BLD AUTO: 4.2 % (ref 0–0.9)
INR PPP: 1.2 (ref 0.9–1.1)
LDH SERPL L TO P-CCNC: 182 U/L (ref 84–246)
LYMPHOCYTES # BLD AUTO: 2.51 X10*3/UL (ref 1.2–4.8)
LYMPHOCYTES NFR BLD AUTO: 69.9 %
MAGNESIUM SERPL-MCNC: 2.29 MG/DL (ref 1.6–2.4)
MCH RBC QN AUTO: 30 PG (ref 26–34)
MCHC RBC AUTO-ENTMCNC: 32.7 G/DL (ref 32–36)
MCV RBC AUTO: 92 FL (ref 80–100)
MONOCYTES # BLD AUTO: 0.46 X10*3/UL (ref 0.1–1)
MONOCYTES NFR BLD AUTO: 12.8 %
NEUTROPHILS # BLD AUTO: 0.44 X10*3/UL (ref 1.2–7.7)
NEUTROPHILS NFR BLD AUTO: 12.2 %
NRBC BLD-RTO: 0.6 /100 WBCS (ref 0–0)
OVALOCYTES BLD QL SMEAR: NORMAL
PLATELET # BLD AUTO: 20 X10*3/UL (ref 150–450)
POLYCHROMASIA BLD QL SMEAR: NORMAL
POTASSIUM SERPL-SCNC: 3.7 MMOL/L (ref 3.5–5.3)
PROT SERPL-MCNC: 5.8 G/DL (ref 6.4–8.2)
PROTHROMBIN TIME: 14 SECONDS (ref 9.8–12.8)
RBC # BLD AUTO: 2.43 X10*6/UL (ref 4.5–5.9)
RBC MORPH BLD: NORMAL
SODIUM SERPL-SCNC: 137 MMOL/L (ref 136–145)
WBC # BLD AUTO: 3.6 X10*3/UL (ref 4.4–11.3)

## 2024-12-17 PROCEDURE — 2500000001 HC RX 250 WO HCPCS SELF ADMINISTERED DRUGS (ALT 637 FOR MEDICARE OP): Performed by: PHYSICIAN ASSISTANT

## 2024-12-17 PROCEDURE — 80053 COMPREHEN METABOLIC PANEL: CPT | Performed by: PHYSICIAN ASSISTANT

## 2024-12-17 PROCEDURE — 82728 ASSAY OF FERRITIN: CPT | Performed by: REGISTERED NURSE

## 2024-12-17 PROCEDURE — 85384 FIBRINOGEN ACTIVITY: CPT | Performed by: PHYSICIAN ASSISTANT

## 2024-12-17 PROCEDURE — 2500000004 HC RX 250 GENERAL PHARMACY W/ HCPCS (ALT 636 FOR OP/ED): Mod: JZ

## 2024-12-17 PROCEDURE — RXMED WILLOW AMBULATORY MEDICATION CHARGE

## 2024-12-17 PROCEDURE — 1170000001 HC PRIVATE ONCOLOGY ROOM DAILY

## 2024-12-17 PROCEDURE — 83735 ASSAY OF MAGNESIUM: CPT | Performed by: PHYSICIAN ASSISTANT

## 2024-12-17 PROCEDURE — 2500000001 HC RX 250 WO HCPCS SELF ADMINISTERED DRUGS (ALT 637 FOR MEDICARE OP): Performed by: INTERNAL MEDICINE

## 2024-12-17 PROCEDURE — 86140 C-REACTIVE PROTEIN: CPT | Performed by: REGISTERED NURSE

## 2024-12-17 PROCEDURE — 99233 SBSQ HOSP IP/OBS HIGH 50: CPT | Performed by: STUDENT IN AN ORGANIZED HEALTH CARE EDUCATION/TRAINING PROGRAM

## 2024-12-17 PROCEDURE — 83615 LACTATE (LD) (LDH) ENZYME: CPT | Performed by: PHYSICIAN ASSISTANT

## 2024-12-17 PROCEDURE — 2500000002 HC RX 250 W HCPCS SELF ADMINISTERED DRUGS (ALT 637 FOR MEDICARE OP, ALT 636 FOR OP/ED)

## 2024-12-17 PROCEDURE — 2500000001 HC RX 250 WO HCPCS SELF ADMINISTERED DRUGS (ALT 637 FOR MEDICARE OP): Performed by: NURSE PRACTITIONER

## 2024-12-17 PROCEDURE — 85610 PROTHROMBIN TIME: CPT | Performed by: PHYSICIAN ASSISTANT

## 2024-12-17 PROCEDURE — 85025 COMPLETE CBC W/AUTO DIFF WBC: CPT | Performed by: PHYSICIAN ASSISTANT

## 2024-12-17 RX ORDER — TAMSULOSIN HYDROCHLORIDE 0.4 MG/1
0.4 CAPSULE ORAL NIGHTLY
Qty: 14 CAPSULE | Refills: 0 | Status: SHIPPED | OUTPATIENT
Start: 2024-12-17 | End: 2024-12-20 | Stop reason: ALTCHOICE

## 2024-12-17 RX ORDER — LEVETIRACETAM 500 MG/1
500 TABLET ORAL 2 TIMES DAILY
Qty: 36 TABLET | Refills: 0 | Status: SHIPPED | OUTPATIENT
Start: 2024-12-17

## 2024-12-17 RX ORDER — FLUCONAZOLE 200 MG/1
400 TABLET ORAL DAILY
Qty: 36 TABLET | Refills: 0 | Status: SHIPPED | OUTPATIENT
Start: 2024-12-18

## 2024-12-17 RX ORDER — LEVOFLOXACIN 500 MG/1
500 TABLET, FILM COATED ORAL EVERY 24 HOURS
Qty: 14 TABLET | Refills: 0 | Status: SHIPPED | OUTPATIENT
Start: 2024-12-18 | End: 2024-12-20 | Stop reason: ALTCHOICE

## 2024-12-17 RX ORDER — ACYCLOVIR 400 MG/1
400 TABLET ORAL 2 TIMES DAILY
Qty: 60 TABLET | Refills: 3 | Status: SHIPPED | OUTPATIENT
Start: 2024-12-17

## 2024-12-17 RX ORDER — LEVOFLOXACIN 500 MG/1
500 TABLET, FILM COATED ORAL EVERY 24 HOURS
Status: DISCONTINUED | OUTPATIENT
Start: 2024-12-17 | End: 2024-12-18 | Stop reason: HOSPADM

## 2024-12-17 ASSESSMENT — COGNITIVE AND FUNCTIONAL STATUS - GENERAL
MOBILITY SCORE: 24
DAILY ACTIVITIY SCORE: 24

## 2024-12-17 ASSESSMENT — PAIN - FUNCTIONAL ASSESSMENT
PAIN_FUNCTIONAL_ASSESSMENT: 0-10

## 2024-12-17 ASSESSMENT — PAIN SCALES - GENERAL
PAINLEVEL_OUTOF10: 0 - NO PAIN

## 2024-12-17 ASSESSMENT — ACTIVITIES OF DAILY LIVING (ADL): LACK_OF_TRANSPORTATION: NO

## 2024-12-17 NOTE — HOSPITAL COURSE
Mr. Nancie Armenta is a 59 y.o. male presenting with relapsed Mantle Cell Lymphoma. Admitted 11/29/24 for his CAR-T cell therapy, on CASE 2419 (T0=12/6/24), prepped with Fludarabine/Cyclophosphamide.     Hosp Course:  - Grade 2 CRS (fever, tachycardia) on 12/7, gave Toxi x1, infectious workup negative. Resolved by 12/8.  - Symptomatic Orthostatic Hypotension on 12/8, resolved with fluids by 12/9.    - Power PICC (pt requested to keep), homecare ordered  - Primary Onc: Dr. Yvonne Hobbs  - Prophy: Acyclovir, Fluconazole, Levofloxacin, Keppra  - Has counts/post BMT on Fri 12/20.    Discharged to home on 12/18/24.

## 2024-12-17 NOTE — PROGRESS NOTES
12/17/24 1100   Discharge Planning   Living Arrangements Spouse/significant other   Support Systems Spouse/significant other   Type of Residence Private residence   Home or Post Acute Services In home services   Type of Home Care Services   ([facility] pharmacy only for PICC supplies)   Expected Discharge Disposition Home H   Does the patient need discharge transport arranged? No   Financial Resource Strain   How hard is it for you to pay for the very basics like food, housing, medical care, and heating? Not hard   Housing Stability   In the last 12 months, was there a time when you were not able to pay the mortgage or rent on time? N   In the past 12 months, how many times have you moved where you were living? 0   At any time in the past 12 months, were you homeless or living in a shelter (including now)? N   Transportation Needs   In the past 12 months, has lack of transportation kept you from medical appointments or from getting medications? no   In the past 12 months, has lack of transportation kept you from meetings, work, or from getting things needed for daily living? No     Pt with Mantle Cell Lymphoma is T+11 s/p  CAR-T CASE 2419.   Anticipate that the pt will be ready for discharge tomorrow so provided him the CAR-T discharge packet and ED card. Reviewed the discharge plan and information with him.  He will be returning to his home in [place], where he lives with his wife, who will be his caregiver. He is still independent and doesn't use any assistive devices but has a shower chair at home if needed. He has a DL Power PICC, which he prefers to keep in after discharge, so will need new home care. He has had a PICC in the past so states he will not need a RN, so can use [facility], pharmacy only for supplies.  The team is aware and placing [facility]CO today.  His MD is Dr. Yvonne Hobbs.  Bea Gonzalez, RN, TCC

## 2024-12-17 NOTE — PROGRESS NOTES
Research Note Follow Up Visit       Nancie Armenta remains on SCC 3 today  for continued follow up on [id number]. Today is T+11. Patient feels good. He expressed readiness to be home. Possible discharge tomorrow. Patient expressed desire to keep his PICC post discharge for lab draws and supportive care. This RN spoke with discharge coordinator and the plan is for patient to keep his PICC in upon discharge. ECOG 1. Follow up procedures completed per protocol. Patient is aware of continued follow up plan.      Education Documentation  Treatment Plan and Schedule, taught by Truong Chong RN at 12/17/2024  2:21 PM.  Learner: Patient  Readiness: Eager  Method: Explanation  Response: Verbalizes Understanding    Complete Blood Count with Differential (CBC w/ Diff), taught by Truong Chong RN at 12/17/2024  2:21 PM.  Learner: Patient  Readiness: Eager  Method: Explanation  Response: Verbalizes Understanding    Education Comments  No comments found.

## 2024-12-17 NOTE — CARE PLAN
The patient's goals for the shift include      Problem: Infection related to problem list condition  Goal: Infection will resolve through treatment  Outcome: Progressing     Problem: Fall/Injury  Goal: Not fall by end of shift  Outcome: Progressing  Goal: Be free from injury by end of the shift  Outcome: Progressing  Goal: Verbalize understanding of personal risk factors for fall in the hospital  Outcome: Progressing  Goal: Verbalize understanding of risk factor reduction measures to prevent injury from fall in the home  Outcome: Progressing  Goal: Use assistive devices by end of the shift  Outcome: Progressing  Goal: Pace activities to prevent fatigue by end of the shift  Outcome: Progressing     Problem: Pain - Adult  Goal: Verbalizes/displays adequate comfort level or baseline comfort level  Outcome: Progressing     Problem: Safety - Adult  Goal: Free from fall injury  Outcome: Progressing     Problem: Discharge Planning  Goal: Discharge to home or other facility with appropriate resources  Outcome: Progressing     Problem: Chronic Conditions and Co-morbidities  Goal: Patient's chronic conditions and co-morbidity symptoms are monitored and maintained or improved  Outcome: Progressing     The clinical goals for the shift include Pt will be safe, comfortable, and HD stable overnight.

## 2024-12-17 NOTE — CARE PLAN
The patient's goals for the shift include      The clinical goals for the shift include Pt will be safe, comfortable, and HD stable overnight.      Problem: Infection related to problem list condition  Goal: Infection will resolve through treatment  Outcome: Progressing     Problem: Fall/Injury  Goal: Not fall by end of shift  Outcome: Progressing  Goal: Be free from injury by end of the shift  Outcome: Progressing  Goal: Verbalize understanding of personal risk factors for fall in the hospital  Outcome: Progressing  Goal: Verbalize understanding of risk factor reduction measures to prevent injury from fall in the home  Outcome: Progressing  Goal: Use assistive devices by end of the shift  Outcome: Progressing  Goal: Pace activities to prevent fatigue by end of the shift  Outcome: Progressing     Problem: Pain - Adult  Goal: Verbalizes/displays adequate comfort level or baseline comfort level  Outcome: Progressing     Problem: Discharge Planning  Goal: Discharge to home or other facility with appropriate resources  Outcome: Progressing     Problem: Chronic Conditions and Co-morbidities  Goal: Patient's chronic conditions and co-morbidity symptoms are monitored and maintained or improved  Outcome: Progressing

## 2024-12-17 NOTE — PROGRESS NOTES
"Nancie Armenta is a 59 y.o. male on day 18 of admission presenting with Mantle cell lymphoma.    Subjective   Pt feeling well today, counts are recovering slowly. Will plan to discharge home tmrw if no issues. Pt would like to keep PICC line.    Objective     Physical Exam  Constitutional:       Appearance: Normal appearance.   HENT:      Head: Normocephalic and atraumatic.      Nose: Nose normal.      Mouth/Throat:      Mouth: Mucous membranes are moist.      Pharynx: Oropharynx is clear.   Eyes:      Conjunctiva/sclera: Conjunctivae normal.      Pupils: Pupils are equal, round, and reactive to light.   Cardiovascular:      Rate and Rhythm: Normal rate and regular rhythm.      Pulses: Normal pulses.      Heart sounds: Normal heart sounds.   Pulmonary:      Effort: Pulmonary effort is normal.      Breath sounds: Normal breath sounds.   Abdominal:      General: Bowel sounds are normal.      Palpations: Abdomen is soft.   Musculoskeletal:         General: Normal range of motion.      Cervical back: Normal range of motion and neck supple.   Skin:     General: Skin is warm and dry.      Capillary Refill: Capillary refill takes 2 to 3 seconds.      Comments: L arm DL PICC. Entry site slightly oozy but contained.    Neurological:      General: No focal deficit present.      Mental Status: He is alert. Mental status is at baseline. He is disoriented.   Psychiatric:         Behavior: Behavior normal.         Last Recorded Vitals  Blood pressure 104/70, pulse 107, temperature 36.1 °C (97 °F), temperature source Temporal, resp. rate 16, height 1.839 m (6' 0.4\"), weight 91.4 kg (201 lb 8 oz), SpO2 98%.  Intake/Output last 3 Shifts:  I/O last 3 completed shifts:  In: 50 (0.5 mL/kg) [IV Piggyback:50]  Out: - (0 mL/kg)   Weight: 94.6 kg     Results for orders placed or performed during the hospital encounter of 11/29/24 (from the past 24 hours)   Haptoglobin   Result Value Ref Range    Haptoglobin <30 (L) 30 - 200 mg/dL   CBC " and Auto Differential   Result Value Ref Range    WBC 3.6 (L) 4.4 - 11.3 x10*3/uL    nRBC 0.6 (H) 0.0 - 0.0 /100 WBCs    RBC 2.43 (L) 4.50 - 5.90 x10*6/uL    Hemoglobin 7.3 (L) 13.5 - 17.5 g/dL    Hematocrit 22.3 (L) 41.0 - 52.0 %    MCV 92 80 - 100 fL    MCH 30.0 26.0 - 34.0 pg    MCHC 32.7 32.0 - 36.0 g/dL    RDW 16.5 (H) 11.5 - 14.5 %    Platelets 20 (LL) 150 - 450 x10*3/uL    Neutrophils % 12.2 40.0 - 80.0 %    Immature Granulocytes %, Automated 4.2 (H) 0.0 - 0.9 %    Lymphocytes % 69.9 13.0 - 44.0 %    Monocytes % 12.8 2.0 - 10.0 %    Eosinophils % 0.6 0.0 - 6.0 %    Basophils % 0.3 0.0 - 2.0 %    Neutrophils Absolute 0.44 (L) 1.20 - 7.70 x10*3/uL    Immature Granulocytes Absolute, Automated 0.15 0.00 - 0.70 x10*3/uL    Lymphocytes Absolute 2.51 1.20 - 4.80 x10*3/uL    Monocytes Absolute 0.46 0.10 - 1.00 x10*3/uL    Eosinophils Absolute 0.02 0.00 - 0.70 x10*3/uL    Basophils Absolute 0.01 0.00 - 0.10 x10*3/uL   Magnesium   Result Value Ref Range    Magnesium 2.29 1.60 - 2.40 mg/dL   Comprehensive metabolic panel   Result Value Ref Range    Glucose 97 74 - 99 mg/dL    Sodium 137 136 - 145 mmol/L    Potassium 3.7 3.5 - 5.3 mmol/L    Chloride 105 98 - 107 mmol/L    Bicarbonate 26 21 - 32 mmol/L    Anion Gap 10 10 - 20 mmol/L    Urea Nitrogen 13 6 - 23 mg/dL    Creatinine 0.90 0.50 - 1.30 mg/dL    eGFR >90 >60 mL/min/1.73m*2    Calcium 8.4 (L) 8.6 - 10.6 mg/dL    Albumin 3.9 3.4 - 5.0 g/dL    Alkaline Phosphatase 48 33 - 136 U/L    Total Protein 5.8 (L) 6.4 - 8.2 g/dL    AST 13 9 - 39 U/L    Bilirubin, Total 1.6 (H) 0.0 - 1.2 mg/dL    ALT 18 10 - 52 U/L   Lactate Dehydrogenase   Result Value Ref Range     84 - 246 U/L   Fibrinogen   Result Value Ref Range    Fibrinogen 123 (L) 200 - 400 mg/dL   Coagulation Screen   Result Value Ref Range    Protime 14.0 (H) 9.8 - 12.8 seconds    INR 1.2 (H) 0.9 - 1.1    aPTT 24 (L) 27 - 38 seconds   Ferritin   Result Value Ref Range    Ferritin 1,485 (H) 20 - 300 ng/mL    C-reactive protein   Result Value Ref Range    C-Reactive Protein <0.10 <1.00 mg/dL   Morphology   Result Value Ref Range    RBC Morphology See Below     Polychromasia Mild     Ovalocytes Few      *Note: Due to a large number of results and/or encounters for the requested time period, some results have not been displayed. A complete set of results can be found in Results Review.         Assessment/Plan   Assessment & Plan  Mantle cell lymphoma    MCL (mantle cell lymphoma) (Multi)        Nancie Armenta is a 59 y.o. male presenting with relapsed Mantle Cell Lymphoma. Admitted 11/29/24 for his CAR-T cell therapy on CASE 2419 (T0=12/6/24). Prepped with Fludarabine/Cyclophosphamide.     T+11  CAR-T (CASE 2419)     ONC:   # Stage IV Mantle Cell Lymphoma   - Involving the GI tract   - Initially treated with [place] regimen and consolidation autograft with BEAM prep regimen April 2012.  - Relapsed 2019 w/presentation of leukocytosis. Had colonoscopy 8/2019 with multiple biopsies including ileum, cecum, appendix, and rectosigmoid c/w MCL, Treated with Ibrutinib and Venetoclax w/complete response.  - Recurrent MCL in July 2024 presenting with dropping Plt counts and bmbx (7/25/24) showing focal involvement w/MCL (NGS cyclin D1 positive and SOX 11 positive. BIRC+ and TP53 positive w/a VAF of 3% BIRC3 positive). FISH neg for 17p deletion.  - CT imaging (8/7/24) showing thickening of sigmoid colon, development of mild to moderate retroperitoneal adenopathy, splenomegaly, and poss splenic infarct.  - Started Pirtobrutinib salvage on 8/12/24. Added Venetoclax starting 10/13/24 since spleen size was increasing on repeat CT imaging and on physical exam.  - BMBx (10/23/24) with low level involvement by mantle cells about 3% Mantle cells by flow.  - PET/CT (11/4/24) with FDG avid lymphadenopathy throughout the neck, chest, abdomen, and pelvis c/w lymphomatous involvement. Intense FDG avid splenomegaly. Peripheral regions of  photopenia likely reflect areas of infarction.  - Pirtobrutinib and Venetoclax stopped 11/14/24 in preparation for CART collection  - Admitted for his CART cell therapy on CASE 2419 (T0=12/6/24)  - Received CAR-T infusion on 12/6 at 11:00. Around 18:50, patient experienced chills and rigors. Vital signs otherwise stable, rescue Diphenhydramine and Pepcid administered. Methylpred held per Dr. Bowie's instructions. Chills and rigors resolved.    - Starting Neupogen today with ANC 0.03 on T+9.       CAR-T   - Conditioning with Flu/Cy   - Cyclophosphamide 60 mg/kg IV T-6   - Fludarabine 25 mg/m2 IV T-5, T-4, T-3    - Plan Allopurinol and Keppra per protocol     - Baseline CARTOX Score: 10/10  - Current CARTOX score: 10/10 (12/17)     - ECOG Score: 0- Fully active, able to carry on all pre-disease performance w/o restriction.     # CRS Grade: G2 on 12/7 (febrile, tachycardic)  - Organ/System affected by CRS: fever, tachycardia   - Date of CRS onset: 12/7   - Date of highest CRS Grade: 12/7   - Date of CRS resolution to Grade 1 or lower: 12/8  - CRS management: infectious workup, vitals q2h + telemetry, started Cefepime, supportive care.      - Toci x1 dose given (12/7 @ 2100).      # ICE Score:   - Date of Neurotoxicity onset: --   - Date of highest Neurotoxicity Grade: --   - Date of Neurotoxicity resolution to Grade 1 or lower: --   - Neurotox management: --      # CAR-T Labs   - CMV DNA PCR (11/30): ND  - IgG level (11/30): 1040  - CBC, RFP, Mg, LFTs, coag screen, fibrinogen, ferritin, CRP & LDH: Daily   - Haptoglobin: Weekly       HEME:  # Pancytopenia, neutropenic secondary to malignancy and treatment  - Monitor CBC w/diff   - transfuse if hgb<7, plt<10  - S/p PRBC. 12/8 (2), 12/9 (1), 12/10 (1)  - Hemolysis labs:      12/9 : , T.bili 1.6, previous 0.9     12/10: , T.bili 1.4, D.bili 0.4, Haptoglobin <30, UA neg, Retic 0.011, YOKASTA poly - neg)  - Prob splenic sequestration of rbc's d/t massive  spleen  - CT (10/10/24) - splenomegaly 22.6 cm.    - PET/CT (11/4/24) w/ intense FDG avid splenomegaly  - S/p PLTS: 12/8-12/13  - S/p CRYO: 12/13  - Head CT (12/9) - no ICH     ID:  # Prophy: Acyclovir, Fluconazole, Levaquin  # Neutropenic fever (12/7)  - Afebrile since 12/07  - Bld cxs x 2 (12/7) NGTD  - UA/UC (12/7) negative  - CXR (12/7): No definite focal infiltrate   - Cefepime started d/t fever, orthostatic hypotension and CRS (12/7->12/16)  # Rhinovirus positive (12/10)     FEN/GI:  - Admit wt: 97.2 kg. Most current wt: 91.4 kg (12/17)  # Monitor electrolytes and replete as needed  # PPI ppx: protonix  # Hx/o CKD (Baseline Cr: 1.20)  # Volume overload  - Gave Lasix 20mg IV once (12/1).     CV:  - ECHO (10/23/24) with low normal LVEF 53%   # HLD. Cont daily Atorvastatin  # Sinus tachycardia   - EKG (12/7) sinus tach  - likely due to fever vs CRS vs dehydration and poor PO intake  - on telemetry   - 500cc bolus x1 (12/7), 750cc on (12/8)  # Symptomatic orthostatic hypotension 12/8  - LR 125ml/hr x 8hr, then NS 100ml/hr x 30hr   -  ml bolus for asympt orthostatic VS (12/9 am), resolved     :  # BPH.   - Held Tadalafil for orthostatic hypotension  - Cont Flomax at discharge since BP still in 100s     DISPO:  - Full Code  - Patient of Dr. Yvonne Hobbs  - PICC line re-placed (12/2/24)  - PT ordered  - Plan discharge on Wed 12/18  - Meds to beds on 12/18     Pt seen, examined, and discussed w/ Dr. Olegario Roca.    THA Gipson-CNP

## 2024-12-18 ENCOUNTER — HOME INFUSION (OUTPATIENT)
Dept: INFUSION THERAPY | Age: 59
End: 2024-12-18
Payer: COMMERCIAL

## 2024-12-18 ENCOUNTER — TELEPHONE (OUTPATIENT)
Dept: INFUSION THERAPY | Age: 59
End: 2024-12-18
Payer: COMMERCIAL

## 2024-12-18 ENCOUNTER — PHARMACY VISIT (OUTPATIENT)
Dept: PHARMACY | Facility: CLINIC | Age: 59
End: 2024-12-18
Payer: COMMERCIAL

## 2024-12-18 VITALS
OXYGEN SATURATION: 99 % | TEMPERATURE: 96.6 F | WEIGHT: 201.5 LBS | DIASTOLIC BLOOD PRESSURE: 74 MMHG | RESPIRATION RATE: 16 BRPM | BODY MASS INDEX: 27.29 KG/M2 | HEART RATE: 90 BPM | HEIGHT: 72 IN | SYSTOLIC BLOOD PRESSURE: 113 MMHG

## 2024-12-18 LAB
ABO GROUP (TYPE) IN BLOOD: NORMAL
ALBUMIN SERPL BCP-MCNC: 3.8 G/DL (ref 3.4–5)
ALP SERPL-CCNC: 50 U/L (ref 33–136)
ALT SERPL W P-5'-P-CCNC: 16 U/L (ref 10–52)
ANION GAP SERPL CALC-SCNC: 12 MMOL/L (ref 10–20)
ANTIBODY SCREEN: NORMAL
APTT PPP: 29 SECONDS (ref 27–38)
AST SERPL W P-5'-P-CCNC: 10 U/L (ref 9–39)
BASOPHILS # BLD AUTO: 0.01 X10*3/UL (ref 0–0.1)
BASOPHILS NFR BLD AUTO: 0.2 %
BILIRUB DIRECT SERPL-MCNC: 0.4 MG/DL (ref 0–0.3)
BILIRUB SERPL-MCNC: 1.6 MG/DL (ref 0–1.2)
BLOOD EXPIRATION DATE: NORMAL
BUN SERPL-MCNC: 12 MG/DL (ref 6–23)
CALCIUM SERPL-MCNC: 8.4 MG/DL (ref 8.6–10.6)
CHLORIDE SERPL-SCNC: 105 MMOL/L (ref 98–107)
CO2 SERPL-SCNC: 26 MMOL/L (ref 21–32)
CREAT SERPL-MCNC: 0.92 MG/DL (ref 0.5–1.3)
CRP SERPL-MCNC: <0.1 MG/DL
DISPENSE STATUS: NORMAL
EGFRCR SERPLBLD CKD-EPI 2021: >90 ML/MIN/1.73M*2
EOSINOPHIL # BLD AUTO: 0.02 X10*3/UL (ref 0–0.7)
EOSINOPHIL NFR BLD AUTO: 0.5 %
ERYTHROCYTE [DISTWIDTH] IN BLOOD BY AUTOMATED COUNT: 17.4 % (ref 11.5–14.5)
FERRITIN SERPL-MCNC: 1459 NG/ML (ref 20–300)
FIBRINOGEN PPP-MCNC: 113 MG/DL (ref 200–400)
GLUCOSE SERPL-MCNC: 89 MG/DL (ref 74–99)
HCT VFR BLD AUTO: 21.2 % (ref 41–52)
HGB BLD-MCNC: 7 G/DL (ref 13.5–17.5)
IMM GRANULOCYTES # BLD AUTO: 0.04 X10*3/UL (ref 0–0.7)
IMM GRANULOCYTES NFR BLD AUTO: 0.9 % (ref 0–0.9)
INR PPP: 1.3 (ref 0.9–1.1)
LDH SERPL L TO P-CCNC: 160 U/L (ref 84–246)
LYMPHOCYTES # BLD AUTO: 2.78 X10*3/UL (ref 1.2–4.8)
LYMPHOCYTES NFR BLD AUTO: 64.8 %
MAGNESIUM SERPL-MCNC: 2.22 MG/DL (ref 1.6–2.4)
MCH RBC QN AUTO: 30.7 PG (ref 26–34)
MCHC RBC AUTO-ENTMCNC: 33 G/DL (ref 32–36)
MCV RBC AUTO: 93 FL (ref 80–100)
MONOCYTES # BLD AUTO: 0.57 X10*3/UL (ref 0.1–1)
MONOCYTES NFR BLD AUTO: 13.3 %
NEUTROPHILS # BLD AUTO: 0.87 X10*3/UL (ref 1.2–7.7)
NEUTROPHILS NFR BLD AUTO: 20.3 %
NRBC BLD-RTO: 0.7 /100 WBCS (ref 0–0)
PLATELET # BLD AUTO: 27 X10*3/UL (ref 150–450)
POTASSIUM SERPL-SCNC: 3.7 MMOL/L (ref 3.5–5.3)
PRODUCT BLOOD TYPE: 7300
PRODUCT CODE: NORMAL
PROT SERPL-MCNC: 5.8 G/DL (ref 6.4–8.2)
PROTHROMBIN TIME: 14.1 SECONDS (ref 9.8–12.8)
RBC # BLD AUTO: 2.28 X10*6/UL (ref 4.5–5.9)
RBC MORPH BLD: NORMAL
RH FACTOR (ANTIGEN D): NORMAL
SODIUM SERPL-SCNC: 139 MMOL/L (ref 136–145)
UNIT ABO: NORMAL
UNIT NUMBER: NORMAL
UNIT RH: NORMAL
UNIT VOLUME: 350
WBC # BLD AUTO: 4.3 X10*3/UL (ref 4.4–11.3)
XM INTEP: NORMAL

## 2024-12-18 PROCEDURE — 2500000001 HC RX 250 WO HCPCS SELF ADMINISTERED DRUGS (ALT 637 FOR MEDICARE OP): Performed by: INTERNAL MEDICINE

## 2024-12-18 PROCEDURE — 80053 COMPREHEN METABOLIC PANEL: CPT | Performed by: PHYSICIAN ASSISTANT

## 2024-12-18 PROCEDURE — 83615 LACTATE (LD) (LDH) ENZYME: CPT | Performed by: PHYSICIAN ASSISTANT

## 2024-12-18 PROCEDURE — 82248 BILIRUBIN DIRECT: CPT | Performed by: REGISTERED NURSE

## 2024-12-18 PROCEDURE — 2500000001 HC RX 250 WO HCPCS SELF ADMINISTERED DRUGS (ALT 637 FOR MEDICARE OP): Performed by: PHYSICIAN ASSISTANT

## 2024-12-18 PROCEDURE — 86140 C-REACTIVE PROTEIN: CPT

## 2024-12-18 PROCEDURE — 86140 C-REACTIVE PROTEIN: CPT | Performed by: NURSE PRACTITIONER

## 2024-12-18 PROCEDURE — 83735 ASSAY OF MAGNESIUM: CPT | Performed by: NURSE PRACTITIONER

## 2024-12-18 PROCEDURE — P9040 RBC LEUKOREDUCED IRRADIATED: HCPCS

## 2024-12-18 PROCEDURE — 85610 PROTHROMBIN TIME: CPT | Performed by: PHYSICIAN ASSISTANT

## 2024-12-18 PROCEDURE — 83735 ASSAY OF MAGNESIUM: CPT | Performed by: PHYSICIAN ASSISTANT

## 2024-12-18 PROCEDURE — 85025 COMPLETE CBC W/AUTO DIFF WBC: CPT | Performed by: PHYSICIAN ASSISTANT

## 2024-12-18 PROCEDURE — 86923 COMPATIBILITY TEST ELECTRIC: CPT

## 2024-12-18 PROCEDURE — 99238 HOSP IP/OBS DSCHRG MGMT 30/<: CPT | Performed by: STUDENT IN AN ORGANIZED HEALTH CARE EDUCATION/TRAINING PROGRAM

## 2024-12-18 PROCEDURE — 2500000001 HC RX 250 WO HCPCS SELF ADMINISTERED DRUGS (ALT 637 FOR MEDICARE OP): Performed by: NURSE PRACTITIONER

## 2024-12-18 PROCEDURE — 2500000002 HC RX 250 W HCPCS SELF ADMINISTERED DRUGS (ALT 637 FOR MEDICARE OP, ALT 636 FOR OP/ED)

## 2024-12-18 PROCEDURE — 82728 ASSAY OF FERRITIN: CPT

## 2024-12-18 PROCEDURE — 85384 FIBRINOGEN ACTIVITY: CPT | Performed by: PHYSICIAN ASSISTANT

## 2024-12-18 PROCEDURE — 2500000002 HC RX 250 W HCPCS SELF ADMINISTERED DRUGS (ALT 637 FOR MEDICARE OP, ALT 636 FOR OP/ED): Performed by: REGISTERED NURSE

## 2024-12-18 PROCEDURE — 86901 BLOOD TYPING SEROLOGIC RH(D): CPT

## 2024-12-18 PROCEDURE — 2500000004 HC RX 250 GENERAL PHARMACY W/ HCPCS (ALT 636 FOR OP/ED): Mod: JZ

## 2024-12-18 PROCEDURE — 36430 TRANSFUSION BLD/BLD COMPNT: CPT

## 2024-12-18 RX ORDER — POTASSIUM CHLORIDE 20 MEQ/1
20 TABLET, EXTENDED RELEASE ORAL ONCE
Status: COMPLETED | OUTPATIENT
Start: 2024-12-18 | End: 2024-12-18

## 2024-12-18 ASSESSMENT — COGNITIVE AND FUNCTIONAL STATUS - GENERAL
DAILY ACTIVITIY SCORE: 24
MOBILITY SCORE: 24

## 2024-12-18 ASSESSMENT — PAIN - FUNCTIONAL ASSESSMENT: PAIN_FUNCTIONAL_ASSESSMENT: 0-10

## 2024-12-18 ASSESSMENT — PAIN SCALES - GENERAL: PAINLEVEL_OUTOF10: 0 - NO PAIN

## 2024-12-18 NOTE — NURSING NOTE
"RN contacted provider Stephania Womack with concern of new drainage at PICC site and potential leaking. PICC line dressing changed earlier in the day, new drainage at site looks serosanguineous.  Provider to order xray for placement confirmation.  RN contacted PICC team to assess site as well. Per PICC team RN, Pat Yadav via secure text with this RN and provider, \"might be going thru a lymphnode and that's the cause of drainage. Please monitor overnight and let me know if it continues to drain that way. And if it does please place an order for a new one. \" This RN confirmed to pass along to night shift RN, but also expressed concern regarding patient needing chemotherapy in the morning.   "
Chemotherapy drug fludarabine initiated through 20 G PIV in LAC at prescribed rate. Blood returned noted in PIV before initiation of chemotherapy.  
Dose # 1 of 3 of Fludarabine chemotherapy 56mg in 112.24mL given over 30 min via double lumen power picc catheter.  Dose up at 0941 and down at 1016.  Pre-medicated with zofran.  Patient, dose and rate verified with second RN, Julienne Begum.  + BBR obtained via syringe aspiration before and after administration.  Patient with no side effects.    
Dose # 3 of 3 of Fludarabine 56mg in 113mL given over 30 minutes via PICC line catheter.  Pre-medicated with zofran 8mg IVP.  Patient, dose and rate verified with second RN, Sofi Enriquez.  + BBR obtained via syringe aspiration before and after administration.  Patient with no side effects.    
Investigational Product # [id number] delivered by Cellular Therapy personnel and verified at bedside with Zara Hodgson CPT. Patient premedicated per orders. Patient identification verified against protocol with second RN Cherrie Man RN, using name, MRN, and . Verified with pharmacy that 2 doses of tocilizumab are present on the site prior to initiating infusion. Dr. Uche Dooley present at the start of the infusion. Product infused through Lt UE double lumen Solo PICC. +BBR obtained prior to and following infusion. Infusion started at 11:15 and completed at 11:26. Pt. received a total of 59.3 ml and a total dose of 2.03 x10^6 CAR-T cell/kg. Vitals remained stable throughout infusion. No complications noted. Patient instructed not to ambulate without supervision until cleared by medical team. Patient verbalized understanding of this safety measure. Patient to be monitored for at least seven days following infusion for signs and symptoms of CRS and neurological toxicities. Baseline ICE score 10/10.   
Patient safely discharged to home with wife.  Upcoming appointments, medications and home instructions provided to patient and wife. Patient asked appropriate questions and stated understanding.  Wife instructed on home care of PICC line, including properly cleansing the hub, flushing the line, and line care during showering/bathing. Wife given opportunity to flush line with RN present; stating and showing understanding of procedure. Patient brought to front of building via wheelchair and ambulated safely to car.   
normal...

## 2024-12-18 NOTE — PROGRESS NOTES
EVIEWED PT INFO AS CORRECT.   DX: Mantle Cell Lymphoma  REVIEWED ALLERGIES: Augmentin    No Lab Orders  PT HAS 2L PICC   FLUSH PER [facility] PROTOCOL.    CARE PLAN DONE TODAY    Patient is a 59 year old male admitted to homecare for flushes for 2L PICC    Homecare RN service not ordered    Dressing changes at [facility]  No lab orders    Please contact patient to schedule delivery of cath care supplies - please include one set of dressing change supplies

## 2024-12-18 NOTE — TELEPHONE ENCOUNTER
Patient is new line care patient who has a DL PICC .      Have spoken with patient's wife. Delivery will be tomorrow by 6 pm Opti Marco A ok. Address has been confirmed.  to call on the way. Initial delivery-signature requested.   Ok to leave on front porch if no answer.     Sending 1 month worth of NS and blue Heparin flushes. Also sending 1 set of dressing supplies per [facility] note. Patient normally has dressing changed at clinic.    All introductory information given. Follow up in 3 Weeks. ~1/9.     Patient's wife had no questions for [facility]

## 2024-12-18 NOTE — CARE PLAN
The patient's goals for the shift include      Problem: Infection related to problem list condition  Goal: Infection will resolve through treatment  Outcome: Progressing     Problem: Fall/Injury  Goal: Not fall by end of shift  Outcome: Progressing  Goal: Be free from injury by end of the shift  Outcome: Progressing  Goal: Verbalize understanding of personal risk factors for fall in the hospital  Outcome: Progressing  Goal: Verbalize understanding of risk factor reduction measures to prevent injury from fall in the home  Outcome: Progressing  Goal: Use assistive devices by end of the shift  Outcome: Progressing  Goal: Pace activities to prevent fatigue by end of the shift  Outcome: Progressing     Problem: Pain - Adult  Goal: Verbalizes/displays adequate comfort level or baseline comfort level  Outcome: Progressing     Problem: Safety - Adult  Goal: Free from fall injury  Outcome: Progressing     Problem: Discharge Planning  Goal: Discharge to home or other facility with appropriate resources  Outcome: Progressing     Problem: Chronic Conditions and Co-morbidities  Goal: Patient's chronic conditions and co-morbidity symptoms are monitored and maintained or improved  Outcome: Progressing     The clinical goals for the shift include pt will remain safe and HDS throughout shift

## 2024-12-18 NOTE — DISCHARGE SUMMARY
Discharge Diagnosis  Mantle cell lymphoma    Issues Requiring Follow-Up  Labs listed below, by study nurse.    Test Results Pending At Discharge  Pending Labs       Order Current Status    CBC Collected (12/09/24 1805)    POCT UA (nonautomated) manually resulted In process    POCT UA (nonautomated) manually resulted In process    Research collection: 10mL Lavender EDTA - Research Collect Correlative Assays; Post-Dose In process    Research collection: 10mL Lavender EDTA - Research Collect Correlative Assays; Post-Dose In process    Research collection: 10mL Lavender EDTA - Research Collect Correlative Assays; Post-Dose In process    Research collection: 10mL Lavender EDTA - Research Collect Correlative Assays; Post-Dose In process    Research collection: 10mL Lavender EDTA - Research Collect Correlative Assays; Post-Dose In process    Research collection: 10mL Lavender EDTA - Research Collect Correlative Assays; Post-Dose In process    Research collection: 10mL Lavender EDTA - Research Collect Correlative Assays; Post-Dose In process    Research collection: 10mL Lavender EDTA - Research Collect Correlative Assays; Post-Dose In process            Hospital Course  Mr. Nancie Armenta is a 59 y.o. male presenting with relapsed Mantle Cell Lymphoma. Admitted 11/29/24 for his CAR-T cell therapy, on CASE 2419 (T0=12/6/24), prepped with Fludarabine/Cyclophosphamide.     Hosp Course:  - Grade 2 CRS (fever, tachycardia) on 12/7, gave Toxi x1, infectious workup negative. Resolved by 12/8.  - Symptomatic Orthostatic Hypotension on 12/8, resolved with fluids by 12/9.    - Power PICC (pt requested to keep), homecare ordered  - Primary Onc: Dr. Yvonne Hobbs  - Prophy: Acyclovir, Fluconazole, Levofloxacin, Keppra  - Has counts/post BMT on Fri 12/20.    Discharged to home on 12/18/24.    Pertinent Physical Exam At Time of Discharge  Physical Exam  Constitutional:       Appearance: Normal appearance.   HENT:      Head:  Normocephalic and atraumatic.      Nose: Nose normal.      Mouth/Throat:      Mouth: Mucous membranes are moist.      Pharynx: Oropharynx is clear.   Eyes:      Conjunctiva/sclera: Conjunctivae normal.      Pupils: Pupils are equal, round, and reactive to light.   Cardiovascular:      Rate and Rhythm: Normal rate and regular rhythm.      Pulses: Normal pulses.      Heart sounds: Normal heart sounds.   Pulmonary:      Effort: Pulmonary effort is normal.      Breath sounds: Normal breath sounds.   Abdominal:      General: Bowel sounds are normal.      Palpations: Abdomen is soft.   Musculoskeletal:         General: Normal range of motion.      Cervical back: Normal range of motion and neck supple.   Skin:     General: Skin is warm and dry.      Capillary Refill: Capillary refill takes 2 to 3 seconds.      Comments: DL PICC. Entry site c/d/i   Neurological:      General: No focal deficit present.      Mental Status: He is alert and oriented to person, place, and time.   Psychiatric:         Mood and Affect: Mood normal.         Behavior: Behavior normal.         Home Medications     Medication List      START taking these medications     fluconazole 200 mg tablet; Commonly known as: Diflucan; Take 2 tablets   (400 mg) by mouth once daily.   levETIRAcetam 500 mg tablet; Commonly known as: Keppra; Take 1 tablet   (500 mg) by mouth 2 times a day.   levoFLOXacin 500 mg tablet; Commonly known as: Levaquin; Take 1 tablet   (500 mg) by mouth once every 24 hours.   tamsulosin 0.4 mg 24 hr capsule; Commonly known as: Flomax; Take 1   capsule (0.4 mg) by mouth once daily at bedtime.     CONTINUE taking these medications     acyclovir 400 mg tablet; Commonly known as: Zovirax; Take 1 tablet (400   mg) by mouth 2 times a day.   atorvastatin 40 mg tablet; Commonly known as: Lipitor; Take 1 tablet (40   mg) by mouth once daily in the morning.   hydrocortisone 1 % cream   ondansetron 8 mg tablet; Commonly known as: Zofran; Take 1  tablet (8 mg)   by mouth every 8 hours if needed for nausea or vomiting.   prochlorperazine 10 mg tablet; Commonly known as: Compazine; Take 1   tablet (10 mg) by mouth every 6 hours if needed for nausea.     STOP taking these medications     tadalafil 5 mg tablet; Commonly known as: Cialis       Outpatient Follow-Up  Future Appointments   Date Time Provider Department Center   12/20/2024  8:00 AM [facility] SCT PROCEDURE ROOM 5 [id number] [facility]   12/20/2024  9:00 AM [facility] 2F BMT POST TRANSPLANT [id number] [facility]   12/23/2024 11:00 AM THA Enriquez-CNP [id number] [facility]   12/23/2024  1:30 PM INF 30 [facility] [id number] [facility]   1/2/2025  9:00 AM [facility] SCC ADMIN ROOM PET CT 1 CMCSCCNM [facility]   1/2/2025  9:45 AM [facility] SCC PET CT 2 CMCSCCNM [facility]   1/2/2025 12:00 PM INF 30 [facility] SCCLBINF [facility]   1/6/2025 11:00 AM NORBERT Enriquez [id number] [facility]       THA Scott-CNP

## 2024-12-19 PROBLEM — Z76.82 STEM CELL TRANSPLANT CANDIDATE: Status: RESOLVED | Noted: 2024-10-23 | Resolved: 2024-12-19

## 2024-12-19 PROBLEM — Z92.21 STATUS POST ADMINISTRATION OF CARDIOTOXIC CHEMOTHERAPY: Status: RESOLVED | Noted: 2024-10-23 | Resolved: 2024-12-19

## 2024-12-19 PROBLEM — Z92.850 HISTORY OF CHIMERIC ANTIGEN RECEPTOR T-CELL THERAPY: Status: ACTIVE | Noted: 2024-12-19

## 2024-12-19 PROBLEM — C83.10: Status: RESOLVED | Noted: 2024-11-29 | Resolved: 2024-12-19

## 2024-12-19 LAB — BACTERIA BPU CULT: NORMAL

## 2024-12-19 NOTE — PROGRESS NOTES
Patient ID: Nancie Armenta is a 59 y.o. male.    Subjective    Patient presents today, accompanied by his wife, for follow up.  Reports feeling well.  He is eating and drinking well.  He does occasionally get lightheaded when standing up too fast.  He reports that he is very careful when changing positions.       Objective    CAR-T product:  CAR-T [id number]  Cell infusion date: 12/6/2024  Day: 14     Physical Exam  Constitutional:       Appearance: Normal appearance.   HENT:      Head: Normocephalic.   Eyes:      Pupils: Pupils are equal, round, and reactive to light.   Cardiovascular:      Rate and Rhythm: Normal rate and regular rhythm.   Pulmonary:      Effort: Pulmonary effort is normal.      Breath sounds: Normal breath sounds.   Abdominal:      General: Bowel sounds are normal.      Palpations: Abdomen is soft.   Musculoskeletal:         General: Normal range of motion.      Cervical back: Normal range of motion and neck supple.   Lymphadenopathy:      Comments: No lymphadenopathy   Skin:     General: Skin is warm and dry.      Findings: No lesion or rash.   Neurological:      General: No focal deficit present.      Mental Status: He is alert and oriented to person, place, and time. Mental status is at baseline.      Comments: No numbness or tingling   Psychiatric:         Mood and Affect: Mood normal.       Performance Status:  Karnofsky Score: 90 - Able to carry on normal activity; minor signs or symptoms of disease   Assessment & Plan  History of chimeric antigen receptor T-cell therapy  Mr. Nancie Armenta is a 59 y.o. male presenting with relapsed Mantle Cell Lymphoma. Admitted 11/29/24 for his CAR-T cell therapy, on CASE 2419 (T0=12/6/24), prepped with Fludarabine/Cyclophosphamide.     CAR T cell therapy:   Toxicity monitoring:  Lab Results   Component Value Date    FERRITIN 1,944 (H) 12/20/2024    FIBRINOGEN 121 (L) 12/20/2024    CRP <0.10 12/18/2024    URICACID 4.8 12/20/2024    IGG 1,040 11/30/2024       Response monitoring:   Day 30 response monitoring: scheduled 1/2/25 PET/CT and Bmbx   Day 60 response monitoring: scheduled ####   Day 90 response monitoring: scheduled ####  Supportive Care:    Levetiracetam (500-750mg) every 12h x 30 days   Ursodiol 300mg every 8 hours x 30 days   Infectious prophylaxis:    Antiviral prophylaxis Acyclovir. Continue for at least 6 months.    PCP prophylaxis. Start Bactrim with further count recovery.  Plan to continue PJP prophylaxis through Day 90.                Antifungal prophylaxis Fluconazole. Continue antifungal prophylaxis through Day 90.               Levofloxacin until ANC is sustained at >500    Wallet Card: Verified that the patient was discharged with CAR T Cell wallet card at first outpatient visit. Reinforced instructions for use.  The patient was reminded not to drive for 8 weeks following CAR T cell therapy    Oncology History Overview Note           Patient Visit Information:   Visit Type: Follow Up Visit      Cancer History:   Treatment Synopsis:     1. Stage IV A mantle cell non-Hodgkin lymphoma involving the gastrointestinal tract diagnosed in October 2011, with diffuse gastrointestinal involvement. The patient was treated  with RCHOP alternating with Rituxan and high-dose Mamie-C and received total of 6 cycles of chemotherapy which he finished in February 2012.   Allergic to Augmentin, causes hives.     2. Patient underwent beam chemotherapy followed by autologous peripheral stem cell transplant in April 2012, by Dr. Yvonne Hobbs at [facility], was also involved in phase 3 clinical trial up killed shingles vaccine versus placebo ([employer]-1 Z10  study).     3.The patient developed shingles in January 2014, involving left flank.      4. 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      5.  Recurrent mantle cell lymphoma June 2019 assx presentation with leukocytosis. Peripheral blood flow which showed a CD5 positve clonal lymphocytosis. 6/2/19 CT of chest abdomen and pelvis revealed  splenomegaly of 15 cm compared to 12.8 cm last evaluation. Recurrence confirmed by FISH on peripheral blood earlier this month.  BM biopsy which showed 5% Mantle cell involvement although peripheral blood shows 30% involvement. CT imaging showed splenomegaly. PET scan declined by insurance twice.  Patient underwent colonoscopy  with Dr. Ac ( [place]) 8/8/19 which showed cobblestoning of colon and multiple biopsies including terminal ileum, appendix, cecum, rectosigmoid positive for mantle cell lymphoma.     6. Initiation of acalabrutinib August 2019 with minimal response. Switched to ibrutinib and venetoclax 12/20/20 with clearing of t(11, 14) by by FISH ( 1/2020) and resolution of involvement of colon by endoscopy and pathology on exam 3/2/2020! BM biopsy  4/2020 HEATHER.     C-scope (3/11/22): at OSH: negative, no evidence of disease. PET scan ( 3/14/22): no abnormal uptake. Deauville 1. BMBx (3/17/22): negative, no evidence of MCL.   March 2022, ibrutinib and venetoclax discontinued.    July 2024 development of Tpenia, bone marrow biopsy 7/25/24 showed focal involvement with mantle cell lymphoma (NGS cyclin D1 positive and Sox 11 positive. BIRC+ and TP 53 positve with a VAF of 3%, BIRC3 positive). FISH negative for 17p deletion  CT imaging done on 8/7/24 shows thickening of sigmoid colon, development of mild to moderate retroperitoneal adenopathy, splenomegally and possible splenic infarct.    Pirotbrutib 200 mg daily initiated 8/12/24.  Due to inadequate response, he started the ventoclax (50 mg) on Adolfo 10/13, then increased to 100 mg , 200 mg and 400 mg      7. S/P  COVID 19 infection 12/1/2020, no sequelae                          Lymphoma   10/20/2011 Initial Diagnosis    Lymphoma (Multi)      Cellular Therapy    Mr. Nancie Armenta is a 59 y.o. male presenting with relapsed Mantle Cell Lymphoma. CAR-T cell therapy, on CASE 2419 (T0=12/6/24), prepped with Fludarabine/Cyclophosphamide.      Hosp Course:  - Grade 2 CRS (fever, tachycardia) on 12/7, gave Toxi x1, infectious workup negative. Resolved by 12/8.  - Symptomatic Orthostatic Hypotension on 12/8, resolved with fluids by 12/9.     Mantle cell lymphoma of spleen (Multi)   10/13/2016 Initial Diagnosis    Mantle cell lymphoma of spleen (CMS/HCC)     Mantle cell lymphoma   11/18/2024 Initial Diagnosis    Mantle cell lymphoma     11/30/2024 -  Bone Marrow Transplant        Cellular Therapy    Mr. Nancie Armenta is a 59 y.o. male presenting with relapsed Mantle Cell Lymphoma. CAR-T cell therapy, on CASE 2419 (T0=12/6/24), prepped with Fludarabine/Cyclophosphamide.      Hosp Course:  - Grade 2 CRS (fever, tachycardia) on 12/7, gave Toxi x1, infectious workup negative. Resolved by 12/8.  - Symptomatic Orthostatic Hypotension on 12/8, resolved with fluids by 12/9.       RTC:   M/Th count checks  12/23 and 1/6 [place] CNP follow up  1/2 PET/CT and Bmbx    Charlene Abdalla, APRN-CNP

## 2024-12-19 NOTE — ASSESSMENT & PLAN NOTE
Mr. Nancie Armenta is a 59 y.o. male presenting with relapsed Mantle Cell Lymphoma. Admitted 11/29/24 for his CAR-T cell therapy, on CASE 2419 (T0=12/6/24), prepped with Fludarabine/Cyclophosphamide.     CAR T cell therapy:   Toxicity monitoring:  Lab Results   Component Value Date    FERRITIN 1,944 (H) 12/20/2024    FIBRINOGEN 121 (L) 12/20/2024    CRP <0.10 12/18/2024    URICACID 4.8 12/20/2024    IGG 1,040 11/30/2024      Response monitoring:   Day 30 response monitoring: scheduled 1/2/25 PET/CT and Bmbx   Day 60 response monitoring: scheduled ####   Day 90 response monitoring: scheduled ####  Supportive Care:    Levetiracetam (500-750mg) every 12h x 30 days   Ursodiol 300mg every 8 hours x 30 days   Infectious prophylaxis:    Antiviral prophylaxis Acyclovir. Continue for at least 6 months.    PCP prophylaxis. Start Bactrim with further count recovery.  Plan to continue PJP prophylaxis through Day 90.                Antifungal prophylaxis Fluconazole. Continue antifungal prophylaxis through Day 90.               Levofloxacin until ANC is sustained at >500    Wallet Card: Verified that the patient was discharged with CAR T Cell wallet card at first outpatient visit. Reinforced instructions for use.  The patient was reminded not to drive for 8 weeks following CAR T cell therapy

## 2024-12-19 NOTE — PROGRESS NOTES
POST-CELLULAR THERAPY ONCOLOGY PHARMACY MEDICATION EDUCATION NOTE   Nancie Armenta is a 59 y.o. male currently day + 14 following CAR T-cell therapy (CASE 2419) for a diagnosis of mantle cell lymphoma. Pharmacist was consulted to provide home medication education.     Lab Results   Component Value Date    WBC 5.4 12/20/2024    ANC 1.30 12/20/2024    HGB 9.4 (L) 12/20/2024    PLT 44 (L) 12/20/2024      Lab Results   Component Value Date    GLUCOSE 103 (H) 12/20/2024    K 3.9 12/20/2024    MG 2.18 12/20/2024    CREATININE 1.06 12/20/2024       Medication Education: Education was provided regarding all home medication changes. In addition, patient was given written daily medication schedule/calendar. The schedule was discussed in detail with the patient, including drug name, use and dose, and the appropriate timing of self-administration.   - Medication changes: stopped levofloxacin since ANC > 500, pt has been taking tadalafil @ home instead of tamsulosin, stopped both since pt lightheaded at home and frequent urination    PJP Prophylaxis: The patient is not currently on PJP prophylaxis. Continue to monitor counts to determine appropriate timing to start therapy closer to day + 30. A prescription will be sent to the patient's local [facility] pharmacy today. Follow-up to make sure the patient receives the medication.    The patient demonstrated Level of Understanding : Good for their current medication list.    All questions were answered. Patient/family verbalized understanding of the plan of care and information provided. Will follow as necessary. Time spent with patient/family and/or coordinating care: 30 minutes.      Bea Duke, PharmD, [facility]  Ambulatory Stem Cell Transplant Transplant Pharmacist    Current Outpatient Medications   Medication Instructions    acyclovir (ZOVIRAX) 400 mg, oral, 2 times daily    atorvastatin (LIPITOR) 40 mg, oral, Every morning    fluconazole (DIFLUCAN) 400 mg, oral, Daily     hydrocortisone 1 % cream Apply 1 Application topically 2 times a day as needed (hemorrhoids).    levETIRAcetam (KEPPRA) 500 mg, oral, 2 times daily    ondansetron (ZOFRAN) 8 mg, oral, Every 8 hours PRN    prochlorperazine (COMPAZINE) 10 mg, oral, Every 6 hours PRN    sulfamethoxazole-trimethoprim (Bactrim DS) 800-160 mg tablet 1 tablet, oral, 3 times weekly, Take on Mondays, Wednesdays, and Fridays. Do not start until instructed.

## 2024-12-20 ENCOUNTER — INFUSION (OUTPATIENT)
Dept: OTHER | Facility: HOSPITAL | Age: 59
End: 2024-12-20
Payer: COMMERCIAL

## 2024-12-20 ENCOUNTER — OFFICE VISIT (OUTPATIENT)
Dept: HEMATOLOGY/ONCOLOGY | Facility: HOSPITAL | Age: 59
End: 2024-12-20
Payer: COMMERCIAL

## 2024-12-20 ENCOUNTER — OFFICE VISIT (OUTPATIENT)
Dept: OTHER | Facility: HOSPITAL | Age: 59
End: 2024-12-20
Payer: COMMERCIAL

## 2024-12-20 VITALS
RESPIRATION RATE: 18 BRPM | BODY MASS INDEX: 26.35 KG/M2 | SYSTOLIC BLOOD PRESSURE: 110 MMHG | DIASTOLIC BLOOD PRESSURE: 64 MMHG | TEMPERATURE: 97.3 F | WEIGHT: 196.43 LBS | HEART RATE: 88 BPM | OXYGEN SATURATION: 96 %

## 2024-12-20 DIAGNOSIS — C83.10 MANTLE CELL LYMPHOMA, UNSPECIFIED BODY REGION (MULTI): ICD-10-CM

## 2024-12-20 DIAGNOSIS — Z92.850 HISTORY OF CHIMERIC ANTIGEN RECEPTOR T-CELL THERAPY: ICD-10-CM

## 2024-12-20 DIAGNOSIS — C83.17 MANTLE CELL LYMPHOMA OF SPLEEN (MULTI): ICD-10-CM

## 2024-12-20 DIAGNOSIS — C83.10: ICD-10-CM

## 2024-12-20 DIAGNOSIS — Z94.84 HX OF AUTOLOGOUS STEM CELL TRANSPLANT: ICD-10-CM

## 2024-12-20 DIAGNOSIS — Z94.84 HX OF AUTOLOGOUS STEM CELL TRANSPLANT: Primary | ICD-10-CM

## 2024-12-20 LAB
ALBUMIN SERPL BCP-MCNC: 4.4 G/DL (ref 3.4–5)
ALP SERPL-CCNC: 53 U/L (ref 33–136)
ALT SERPL W P-5'-P-CCNC: 19 U/L (ref 10–52)
ANION GAP SERPL CALC-SCNC: 12 MMOL/L (ref 10–20)
APTT PPP: 24 SECONDS (ref 27–38)
APTT PPP: 24 SECONDS (ref 27–38)
AST SERPL W P-5'-P-CCNC: 14 U/L (ref 9–39)
BASOPHILS # BLD MANUAL: 0.05 X10*3/UL (ref 0–0.1)
BASOPHILS NFR BLD MANUAL: 1 %
BILIRUB SERPL-MCNC: 1.4 MG/DL (ref 0–1.2)
BUN SERPL-MCNC: 16 MG/DL (ref 6–23)
CALCIUM SERPL-MCNC: 8.9 MG/DL (ref 8.6–10.3)
CHLORIDE SERPL-SCNC: 104 MMOL/L (ref 98–107)
CO2 SERPL-SCNC: 28 MMOL/L (ref 21–32)
CREAT SERPL-MCNC: 1.06 MG/DL (ref 0.5–1.3)
CRP SERPL-MCNC: <0.1 MG/DL
DACRYOCYTES BLD QL SMEAR: ABNORMAL
EGFRCR SERPLBLD CKD-EPI 2021: 80 ML/MIN/1.73M*2
EOSINOPHIL # BLD MANUAL: 0 X10*3/UL (ref 0–0.7)
EOSINOPHIL NFR BLD MANUAL: 0 %
ERYTHROCYTE [DISTWIDTH] IN BLOOD BY AUTOMATED COUNT: 20.1 % (ref 11.5–14.5)
FERRITIN SERPL-MCNC: 1944 NG/ML (ref 20–300)
FIBRINOGEN PPP-MCNC: 121 MG/DL (ref 200–400)
GLUCOSE SERPL-MCNC: 103 MG/DL (ref 74–99)
HCT VFR BLD AUTO: 26.8 % (ref 41–52)
HGB BLD-MCNC: 9.4 G/DL (ref 13.5–17.5)
IMM GRANULOCYTES # BLD AUTO: 0.25 X10*3/UL (ref 0–0.7)
IMM GRANULOCYTES NFR BLD AUTO: 4.6 % (ref 0–0.9)
INR PPP: 1.1 (ref 0.9–1.1)
INR PPP: 1.1 (ref 0.9–1.1)
LDH SERPL L TO P-CCNC: 187 U/L (ref 84–246)
LYMPHOCYTES # BLD MANUAL: 3.4 X10*3/UL (ref 1.2–4.8)
LYMPHOCYTES NFR BLD MANUAL: 63 %
MAGNESIUM SERPL-MCNC: 2.18 MG/DL (ref 1.6–2.4)
MAGNESIUM SERPL-MCNC: 2.24 MG/DL (ref 1.6–2.4)
MCH RBC QN AUTO: 31 PG (ref 26–34)
MCHC RBC AUTO-ENTMCNC: 35.1 G/DL (ref 32–36)
MCV RBC AUTO: 88 FL (ref 80–100)
MONOCYTES # BLD MANUAL: 0.49 X10*3/UL (ref 0.1–1)
MONOCYTES NFR BLD MANUAL: 9 %
MYELOCYTES # BLD MANUAL: 0.11 X10*3/UL
MYELOCYTES NFR BLD MANUAL: 2 %
NEUTROPHILS # BLD MANUAL: 1.3 X10*3/UL (ref 1.2–7.7)
NEUTS BAND # BLD MANUAL: 0.22 X10*3/UL (ref 0–0.7)
NEUTS BAND NFR BLD MANUAL: 4 %
NEUTS SEG # BLD MANUAL: 1.08 X10*3/UL (ref 1.2–7)
NEUTS SEG NFR BLD MANUAL: 20 %
NRBC BLD-RTO: 0.6 /100 WBCS (ref 0–0)
OVALOCYTES BLD QL SMEAR: ABNORMAL
PHOSPHATE SERPL-MCNC: 3.7 MG/DL (ref 2.5–4.9)
PLATELET # BLD AUTO: 44 X10*3/UL (ref 150–450)
POLYCHROMASIA BLD QL SMEAR: ABNORMAL
POTASSIUM SERPL-SCNC: 3.9 MMOL/L (ref 3.5–5.3)
PROT SERPL-MCNC: 6.5 G/DL (ref 6.4–8.2)
PROTHROMBIN TIME: 12.4 SECONDS (ref 9.8–12.8)
PROTHROMBIN TIME: 12.4 SECONDS (ref 9.8–12.8)
RBC # BLD AUTO: 3.03 X10*6/UL (ref 4.5–5.9)
RBC MORPH BLD: ABNORMAL
SODIUM SERPL-SCNC: 140 MMOL/L (ref 136–145)
TOTAL CELLS COUNTED BLD: 100
URATE SERPL-MCNC: 4.8 MG/DL (ref 4–7.5)
VARIANT LYMPHS # BLD MANUAL: 0.05 X10*3/UL (ref 0–0.5)
VARIANT LYMPHS NFR BLD: 1 %
WBC # BLD AUTO: 5.4 X10*3/UL (ref 4.4–11.3)

## 2024-12-20 PROCEDURE — 85007 BL SMEAR W/DIFF WBC COUNT: CPT

## 2024-12-20 PROCEDURE — 84550 ASSAY OF BLOOD/URIC ACID: CPT

## 2024-12-20 PROCEDURE — 99215 OFFICE O/P EST HI 40 MIN: CPT | Performed by: NURSE PRACTITIONER

## 2024-12-20 PROCEDURE — 84100 ASSAY OF PHOSPHORUS: CPT

## 2024-12-20 PROCEDURE — 85730 THROMBOPLASTIN TIME PARTIAL: CPT | Performed by: INTERNAL MEDICINE

## 2024-12-20 PROCEDURE — 83735 ASSAY OF MAGNESIUM: CPT | Performed by: INTERNAL MEDICINE

## 2024-12-20 PROCEDURE — 83615 LACTATE (LD) (LDH) ENZYME: CPT

## 2024-12-20 PROCEDURE — 85730 THROMBOPLASTIN TIME PARTIAL: CPT

## 2024-12-20 PROCEDURE — 85610 PROTHROMBIN TIME: CPT | Performed by: INTERNAL MEDICINE

## 2024-12-20 PROCEDURE — 80053 COMPREHEN METABOLIC PANEL: CPT

## 2024-12-20 PROCEDURE — 82728 ASSAY OF FERRITIN: CPT | Performed by: INTERNAL MEDICINE

## 2024-12-20 PROCEDURE — 85027 COMPLETE CBC AUTOMATED: CPT

## 2024-12-20 PROCEDURE — 36591 DRAW BLOOD OFF VENOUS DEVICE: CPT

## 2024-12-20 PROCEDURE — 85384 FIBRINOGEN ACTIVITY: CPT

## 2024-12-20 RX ORDER — SULFAMETHOXAZOLE AND TRIMETHOPRIM 800; 160 MG/1; MG/1
1 TABLET ORAL 3 TIMES WEEKLY
Qty: 12 TABLET | Refills: 2 | Status: SHIPPED | OUTPATIENT
Start: 2024-12-20

## 2024-12-20 NOTE — PROGRESS NOTES
Pt arrived to SCT unit via wheelchair. Pt denies pain today but complains of fatigue. Vitals obtained, blood drawn and sent to lab. Charlene Abdalla NP came to see patient. Blood work came back WNL and no supportive care necessary per treatment plan. Pt left off unit without complaints via wheelchair.

## 2024-12-22 LAB
CMV DNA SERPL NAA+PROBE-LOG IU: NORMAL {LOG_IU}/ML
LABORATORY COMMENT REPORT: NOT DETECTED

## 2024-12-22 ASSESSMENT — ENCOUNTER SYMPTOMS
FEVER: 0
DIARRHEA: 0
BLOOD IN STOOL: 0
VOMITING: 0
NAUSEA: 0
ABDOMINAL PAIN: 0
WOUND: 0
FREQUENCY: 0
HEMATURIA: 0
NUMBNESS: 0
UNEXPECTED WEIGHT CHANGE: 1
CONSTIPATION: 0
DIAPHORESIS: 0
DYSURIA: 0

## 2024-12-22 NOTE — PROGRESS NOTES
Patient ID: Nancie Armenta is a 59 y.o. male.    Treatment:   Oncology History Overview Note           Patient Visit Information:   Visit Type: Follow Up Visit      Cancer History:   Treatment Synopsis:     1. Stage IV A mantle cell non-Hodgkin lymphoma involving the gastrointestinal tract diagnosed in October 2011, with diffuse gastrointestinal involvement. The patient was treated  with RCHOP alternating with Rituxan and high-dose Mamie-C and received total of 6 cycles of chemotherapy which he finished in February 2012.   Allergic to Augmentin, causes hives.     2. Patient underwent beam chemotherapy followed by autologous peripheral stem cell transplant in April 2012, by Dr. Yvonne Hobbs at [facility], was also involved in phase 3 clinical trial up killed shingles vaccine versus placebo ([employer]-1 Z10  study).     3.The patient developed shingles in January 2014, involving left flank.      4. 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      5.  Recurrent mantle cell lymphoma June 2019 assx presentation with leukocytosis. Peripheral blood flow which showed a CD5 positve clonal lymphocytosis. 6/2/19 CT of chest abdomen and pelvis revealed  splenomegaly of 15 cm compared to 12.8 cm last evaluation. Recurrence confirmed by FISH on peripheral blood earlier this month.  BM biopsy which showed 5% Mantle cell involvement although peripheral blood shows 30% involvement. CT imaging showed splenomegaly. PET scan declined by insurance twice.  Patient underwent colonoscopy  with Dr. Ac ( [place]) 8/8/19 which showed cobblestoning of colon and multiple biopsies including terminal ileum, appendix, cecum,  rectosigmoid positive for mantle cell lymphoma.     6. Initiation of acalabrutinib August 2019 with minimal response. Switched to ibrutinib and venetoclax 12/20/20 with clearing of t(11, 14) by by FISH ( 1/2020) and resolution of involvement of colon by endoscopy and pathology on exam 3/2/2020! BM biopsy  4/2020 HEATHER.     C-scope (3/11/22): at OSH: negative, no evidence of disease. PET scan ( 3/14/22): no abnormal uptake. Deauville 1. BMBx (3/17/22): negative, no evidence of MCL.   March 2022, ibrutinib and venetoclax discontinued.    July 2024 development of Tpenia, bone marrow biopsy 7/25/24 showed focal involvement with mantle cell lymphoma (NGS cyclin D1 positive and Sox 11 positive. BIRC+ and TP 53 positve with a VAF of 3%, BIRC3 positive). FISH negative for 17p deletion  CT imaging done on 8/7/24 shows thickening of sigmoid colon, development of mild to moderate retroperitoneal adenopathy, splenomegally and possible splenic infarct.    Pirotbrutib 200 mg daily initiated 8/12/24.  Due to inadequate response, he started the ventoclax (50 mg) on Adolfo 10/13, then increased to 100 mg , 200 mg and 400 mg      7. S/P COVID 19 infection 12/1/2020, no sequelae                          Lymphoma   10/20/2011 Initial Diagnosis    Lymphoma (Multi)      Cellular Therapy    Mr. Nancie Armenta is a 59 y.o. male presenting with relapsed Mantle Cell Lymphoma. CAR-T cell therapy, on CASE 2419 (T0=12/6/24), prepped with Fludarabine/Cyclophosphamide.      Hosp Course:  - Grade 2 CRS (fever, tachycardia) on 12/7, gave Toxi x1, infectious workup negative. Resolved by 12/8.  - Symptomatic Orthostatic Hypotension on 12/8, resolved with fluids by 12/9.     Mantle cell lymphoma of spleen (Multi)   10/13/2016 Initial Diagnosis    Mantle cell lymphoma of spleen (CMS/HCC)     Mantle cell lymphoma   11/18/2024 Initial Diagnosis    Mantle cell lymphoma     11/30/2024 -  Bone Marrow Transplant        Cellular Therapy    Mr. [place]  Geovany is a 59 y.o. male presenting with relapsed Mantle Cell Lymphoma. CAR-T cell therapy, on CASE 2419 (T0=12/6/24), prepped with Fludarabine/Cyclophosphamide.      Hosp Course:  - Grade 2 CRS (fever, tachycardia) on 12/7, gave Toxi x1, infectious workup negative. Resolved by 12/8.  - Symptomatic Orthostatic Hypotension on 12/8, resolved with fluids by 12/9.         Response:     Past Medical History:  Renal insufficiency.   Past Medical History:   Diagnosis Date    Fourth (trochlear) nerve palsy, right eye 06/08/2017    Right-sided fourth cranial nerve palsy    Fourth (trochlear) nerve palsy, right eye 06/08/2017    Right-sided fourth cranial nerve palsy    Malignant neoplasm of connective and soft tissue, unspecified 06/08/2017    Cancer of blood vessel     Surgical History:   Past Surgical History:   Procedure Laterality Date    IR CVC PICC  11/29/2024    IR CVC PICC 11/29/2024 [facility] ANGIO    SHOULDER SURGERY  07/13/2017    Shoulder Surgery        Family History:   Family History   Problem Relation Name Age of Onset    Cataracts Mother      Cataracts Father      Other (chronic lymphoid leukemia) Father       Social History:  .  Working full time for [employer].    Social History     Tobacco Use    Smoking status: Never    Smokeless tobacco: Never   Vaping Use    Vaping status: Never Used   Substance Use Topics    Alcohol use: Never    Drug use: Never   -------------------------------------------------------------------------------------------------------  Subjective       HPI    Nancie Armenta is a 59 year old man with PMH of hyperglycemia, renal insufficiency, and stage IV A mantle cell non hodgkin lymphoma involving the gastrointestinal tract, s/p autologous SCT (April 2012).  Early in July 2024 patient noted to have dropping platelet counts and bone marrow biopsy 7/25/24 showed focal involvement with mantle cell lymphoma (NGS cyclin D1 positive and Sox 11 positive. BIRC+ ad TP 53 positve with  a VAF of 3%, BIRC3 positive). FISH negative for 17p deletion  CT imaging done on 8/7/24 shows thickening of sigmoid colon, development of mild to moderate retroperitoneal adenopathy, splenomegally and possible splenic infarct.  Started Pirtobrutinib salvage on 8/12/24.  Added Venetoclax starting 10/13/24 since spleen size was increasing on repeat CT imaging and on physical exam.  BMBx (10/23/24) with low level involvement by mantle cells about 3% Mantle cells by flow.  PET/CT (11/4/24) with FDG avid lymphadenopathy throughout the neck, chest, abdomen, and pelvis c/w lymphomatous involvement. Intense FDG avid splenomegaly. Peripheral regions of photopenia likely reflect areas of infarction.  Pirtobrutinib and Venetoclax stopped 11/14/24 in preparation for CART collection.  Admitted 11/29/24 for his CAR-T cell therapy, on CASE 2419 (T0=12/6/24), prepped with Fludarabine/Cyclophosphamide. Hospital course included grade 2 CRS (fever, tachycardia) on 12/7, received Toxi x1, infectious workup negative.  He also had symptomatic orthostatic hypotension on 12/8, resolved with fluids by 12/9.  He was discharged home on 12/18/24.      Nancie presents to the clinic today (12/23/24) with his wife for followup evaluation after his CAR-T for for relapsed mantle cell lymphoma and count check.  Stated he felt light headed and legs felt wobbly walking in for his appointment.  Balance was a little off today.  States fast movements make him dizzy.  No falls.  Energy level is lower, gets tired.  Takes naps during the day.  Appetite is lower. Weight is down about 25 pounds in the last month.  Appetite is lower.  Hasn't ate yet today, but states he has been able to tolerate increased food intake.  Drinking over 1 L of fluids per day, typically drinks body armor, bottle of water, milk.      Has been feeling cold, mild shaking until coverings up.  Checks temperature about 4 times per day, no elevated temperatures (97-98 degrees). Has been  holding tadalafil and flomax due to hypotension in the hospital.  Having nose bleeds that stop quickly.  PICC line okay to left arm.  Wife is flushing PICC line daily with saline at home.      Review of Systems   Constitutional:  Positive for appetite change, chills, fatigue and unexpected weight change. Negative for diaphoresis and fever.   Respiratory: Negative.     Cardiovascular: Negative.    Gastrointestinal:  Negative for abdominal pain, blood in stool, constipation, diarrhea, nausea and vomiting.   Genitourinary:  Negative for dysuria, frequency, hematuria and nocturia.    Musculoskeletal:  Positive for gait problem.   Skin:  Negative for rash and wound.   Neurological:  Positive for dizziness, gait problem and light-headedness. Negative for numbness.   Hematological:  Negative for adenopathy. Bruises/bleeds easily.   -------------------------------------------------------------------------------------------------------  Objective   BSA: There is no height or weight on file to calculate BSA.  /78 (Patient Position: 3 minute standing)   Pulse (!) 119   Temp 36.1 °C (97 °F)   Resp 16   SpO2 100%     Physical Exam  Vitals reviewed.   Constitutional:       General: He is not in acute distress.  HENT:      Head: Normocephalic and atraumatic.      Mouth/Throat:      Mouth: Mucous membranes are moist.   Eyes:      Extraocular Movements: Extraocular movements intact.      Conjunctiva/sclera: Conjunctivae normal.      Pupils: Pupils are equal, round, and reactive to light.   Cardiovascular:      Rate and Rhythm: Normal rate and regular rhythm.      Pulses: Normal pulses.      Heart sounds: Normal heart sounds.   Pulmonary:      Effort: Pulmonary effort is normal. No respiratory distress.      Breath sounds: Normal breath sounds. No wheezing.   Abdominal:      General: Abdomen is flat. Bowel sounds are normal. There is no distension.      Palpations: Abdomen is soft. There is splenomegaly. There is no mass.       Tenderness: There is no abdominal tenderness.   Musculoskeletal:         General: No swelling. Normal range of motion.   Lymphadenopathy:      Comments: No lymphadenopathy   Skin:     General: Skin is warm and dry.   Neurological:      General: No focal deficit present.      Mental Status: He is alert and oriented to person, place, and time.   Psychiatric:         Mood and Affect: Mood normal.         Behavior: Behavior normal.         Thought Content: Thought content normal.         Judgment: Judgment normal.     Performance Status:  ECOG performance status: 0 fully active    CAR-T product:  CAR-T [id number]  Cell infusion date: 12/6/2024  Day: 17   -------------------------------------------------------------------------------------------------------  Assessment/Plan      1. Stage IV mantle cell lymphoma itreated with Nordic regimen and consolidation autograft with BEAM preparative regimen April 2012.  Relapse 2019 treated with ibrutinib and venetoclax with complete pathology response until 3/ 2022. See above.     Recurrence July 2024 presenting with dropping platelet counts and bone marrow biopsy 7/25/24 showed focal involvement with mantle cell lymphoma (NGS cyclin D1 positive and Sox 11 positive. BIRC+ ad TP 53 positve with a VAF of 3%, BIRC3 positive). FISH negative for 17p deletion  CT imaging done on 8/7/24 shows thickening of sigmoid colon, development of mild to moderate retroperitoneal adenopathy, splenomegally and possible splenic infarct.      Patient started pirtobrutinib salvage August 12,2024 and feels better. However spleen size was increasing per CT imaging (8/6/24)  and physical exam so Dr. Hobbs planned to add venetoclax as follows, starting 10/13/24:  - 50 mg for first week, then 100 (10/20), and 200 mg (10/27) for 7 days each, then 400 mg (11/3/24- )  -increased anemia with hgb 8.5, platlets 37K, no bleeding.     Pre CART staging:    BM biopsy low level involvment by mantle cells about 3%  mantle cells by flow. Absence of CD19 on this  flow sample likely reflects insufficient sampling and positive for CD 19 on prior bone marrow sample of 7/24/24..    PET imaging 11/4/24   1. FDG avid lymphadenopathy throughout the neck, chest, abdomen and  pelvis as detailed above, consistent with lymphomatous involvement.  2. Intense FDG avid splenomegaly, consistent with lymphomatous  involvement. Peripheral regions of photopenia likely reflect areas of  infarction.  3. No PET evidence of FDG avid extranodal disease.on     ECHO 10/23/24 low normal LVEF 53%, cleared by Dr. Izaguirre.     - Plan to proceed to CART cell therapy ASAP, discussed CASE 2419, anticipate collection on 11/25 and start of LD shortly thereafter. Patient instructed to take last dose of pirto and venetoclax on 11/14/24 to allow 10 day washout.  Met with Research RN and CAR-T coordinator. Due to anemia, ordered prbcs to be transfused Friday before intended collection on 11/25/24.    Admitted 11/29/24 for his CAR-T cell therapy, on CASE 2419 (T0=12/6/24), prepped with Fludarabine/Cyclophosphamide.   Hosp Course:  - Grade 2 CRS (fever, tachycardia) on 12/7, gave Toxi x1, infectious workup negative. Resolved by 12/8.  - Symptomatic Orthostatic Hypotension on 12/8, resolved with fluids by 12/9.  Discharged home on 12/18/24.      12/23/24:  Today is T+ 17 from CAR-T.  Improvement in CBC results with hgb 10.3, plt 68, WBC 4.6, and ANC 1.09.  Nancie is having dizziness today and has positive orthostatic VS-see hypotension below.  Plan for PET scan and BM biopsy on 1/2/2025.  Plan for provider visits and count checks twice weekly.  Patient was also evaluated today by Dr. Hobbs.      CAR T cell therapy:   Toxicity monitoring:   Ferritin   Date Value Ref Range Status   12/23/2024 2,021 (H) 20 - 300 ng/mL Final   12/20/2024 1,944 (H) 20 - 300 ng/mL Final   12/18/2024 1,459 (H) 20 - 300 ng/mL Final     Fibrinogen   Date Value Ref Range Status   12/23/2024  129 (L) 200 - 400 mg/dL Final   12/20/2024 121 (L) 200 - 400 mg/dL Final   12/18/2024 113 (L) 200 - 400 mg/dL Final     C-Reactive Protein   Date Value Ref Range Status   12/23/2024 <0.10 <1.00 mg/dL Final   12/18/2024 <0.10 <1.00 mg/dL Final   12/18/2024 <0.10 <1.00 mg/dL Final     IgG   Date Value Ref Range Status   11/30/2024 1,040 700 - 1,600 mg/dL Final     Comment:     MONOCLONAL PROTEINS MAY CAUSE FALSELY LOW  RESULTS IN THIS ASSAY. SERUM PROTEIN  ELECTROPHORESIS SHOULD BE DONE AS THE  FIRST TEST TO EVALUATE MONOCLONAL GAMMOPATHY.       Total IgG   Date Value Ref Range Status   12/29/2021 929 700 - 1,600 mg/dL Final     Comment:     MONOCLONAL PROTEINS MAY CAUSE FALSELY LOW  RESULTS IN THIS ASSAY. SERUM PROTEIN  ELECTROPHORESIS SHOULD BE DONE AS THE  FIRST TEST TO EVALUATE MONOCLONAL GAMMOPATHY.     10/28/2021 1,010 700 - 1,600 mg/dL Final     Comment:     MONOCLONAL PROTEINS MAY CAUSE FALSELY LOW  RESULTS IN THIS ASSAY. SERUM PROTEIN  ELECTROPHORESIS SHOULD BE DONE AS THE  FIRST TEST TO EVALUATE MONOCLONAL GAMMOPATHY.     08/19/2021 941 700 - 1,600 mg/dL Final     Comment:     MONOCLONAL PROTEINS MAY CAUSE FALSELY LOW  RESULTS IN THIS ASSAY. SERUM PROTEIN  ELECTROPHORESIS SHOULD BE DONE AS THE  FIRST TEST TO EVALUATE MONOCLONAL GAMMOPATHY.       Response monitoring:  Day 30 response monitoring: scheduled 1/2/25 PET/CT and Bmbx  Day 60 response monitoring: scheduled ####  Day 90 response monitoring: scheduled ####    Supportive Care:   Levetiracetam (500-750mg) every 12h x 30 days    Infectious prophylaxis:   Antiviral prophylaxis Acyclovir. Continue for at least 6 months.   PCP prophylaxis. Start Bactrim with further count recovery.  Plan to continue PJP prophylaxis through Day 90.  Prescription sent, will advised when to start.    Antifungal prophylaxis Fluconazole. Continue antifungal prophylaxis through Day 90.   Levofloxacin until ANC is sustained at >500-discontinued 12/20/24.       Wallet Card:  Verified that the patient was discharged with CAR T Cell wallet card at first outpatient visit. Reinforced instructions for use.  The patient was reminded not to drive for 8 weeks following CAR T cell therapy    Vaccines:  Will plan to start vaccines with influenza and covid vaccine about 3-4 months after CAR-T and will start post transplant vaccines around 6 months.      Dizziness/Orthostatic Hypotension:  12/23/24:  Patient reports feeling dizziness ambulating into appointment today.  Oral intake has been over 1 L daily.  Positive orthostatic vital signs, sitting /95 HR 81, 3 minute standing /78 and .  Administered 1 L NS bolus in infusion.  Continue to hold tadalafil and tamsulosin.  Advised to increase oral fluid intake and electrolyte replacement drinks and to include more sodium by adding table salt to foods having foods such as soups.      BPH:  Reports his previous urinary frequency and nocturia symptoms has greatly improved after starting tadalafil 5 mg daily.   12/23/24:  Continue to hold tadalafil.      Renal failure:   sCr stable at 1.09 (12/23/24).     Weight Loss:  Hospital admission weight 97.2 kg (11/29/24).    12/23/24:  Weight today 87 kg.  Nancie reports his appetite is slowly increasing and he is tolerating more food over the last few days.  Discussed ways to increase calories into diet.  Monitoring.    Health maintenance.   Colonoscopy completed 7/14/23: normal except internal hemorrhoids.  Recommendations to repeat in 2 years.    - Some aortic calcifications on imaging, started on prevastatin-managed by his PCP.  PCP increased pravastatin dose to 40 mg daily.  PCP switched to atorvastatin 40 mg daily.    Central Line:  Left power PICC line placed 12/2/24.  Discharged home on 12/18/24 with PICC, wife is flushing PICC line daily with NS flushes and dressing changes completed in infusion center.    12/23/24:  PICC line with no redness, tenderness, swelling, or drainage.       RTC:   Monday/Thursday count checks  12/27/24 and 12/30/24:  Post BMT provider and infusion appt  1/2/25:  PET/CT and Bmbx  1/6/25:  Central line, follow up visit with Zainab Sullivan CNP, and infusion.    Requested Post BMT provider and infusion appt for 1/13/25-needs scheduled.  -------------------------------------------------------------------------------------------  NORBERT Enriquez

## 2024-12-23 ENCOUNTER — INFUSION (OUTPATIENT)
Dept: HEMATOLOGY/ONCOLOGY | Facility: HOSPITAL | Age: 59
End: 2024-12-23
Payer: COMMERCIAL

## 2024-12-23 ENCOUNTER — TREATMENT (OUTPATIENT)
Dept: HEMATOLOGY/ONCOLOGY | Facility: HOSPITAL | Age: 59
End: 2024-12-23
Payer: COMMERCIAL

## 2024-12-23 ENCOUNTER — LAB (OUTPATIENT)
Dept: HEMATOLOGY/ONCOLOGY | Facility: HOSPITAL | Age: 59
End: 2024-12-23
Payer: COMMERCIAL

## 2024-12-23 VITALS
OXYGEN SATURATION: 100 % | TEMPERATURE: 97 F | SYSTOLIC BLOOD PRESSURE: 100 MMHG | DIASTOLIC BLOOD PRESSURE: 78 MMHG | HEART RATE: 119 BPM | RESPIRATION RATE: 16 BRPM

## 2024-12-23 VITALS — BODY MASS INDEX: 25.72 KG/M2 | WEIGHT: 191.8 LBS

## 2024-12-23 DIAGNOSIS — I95.1 ORTHOSTATIC HYPOTENSION: ICD-10-CM

## 2024-12-23 DIAGNOSIS — R42 DIZZINESS: ICD-10-CM

## 2024-12-23 DIAGNOSIS — Z92.850 HISTORY OF CHIMERIC ANTIGEN RECEPTOR T-CELL THERAPY: ICD-10-CM

## 2024-12-23 DIAGNOSIS — Z94.84 HX OF AUTOLOGOUS STEM CELL TRANSPLANT: ICD-10-CM

## 2024-12-23 DIAGNOSIS — C83.17 MANTLE CELL LYMPHOMA OF SPLEEN (MULTI): ICD-10-CM

## 2024-12-23 DIAGNOSIS — R63.4 WEIGHT LOSS: ICD-10-CM

## 2024-12-23 DIAGNOSIS — C83.17 MANTLE CELL LYMPHOMA OF SPLEEN (MULTI): Primary | ICD-10-CM

## 2024-12-23 LAB
ALBUMIN SERPL BCP-MCNC: 4.7 G/DL (ref 3.4–5)
ALP SERPL-CCNC: 52 U/L (ref 33–136)
ALT SERPL W P-5'-P-CCNC: 21 U/L (ref 10–52)
ANION GAP SERPL CALC-SCNC: 12 MMOL/L (ref 10–20)
APTT PPP: 28 SECONDS (ref 27–38)
AST SERPL W P-5'-P-CCNC: 15 U/L (ref 9–39)
BASOPHILS # BLD AUTO: 0.03 X10*3/UL (ref 0–0.1)
BASOPHILS NFR BLD AUTO: 0.7 %
BILIRUB SERPL-MCNC: 1.3 MG/DL (ref 0–1.2)
BUN SERPL-MCNC: 19 MG/DL (ref 6–23)
CALCIUM SERPL-MCNC: 9.3 MG/DL (ref 8.6–10.3)
CHLORIDE SERPL-SCNC: 105 MMOL/L (ref 98–107)
CO2 SERPL-SCNC: 28 MMOL/L (ref 21–32)
CREAT SERPL-MCNC: 1.09 MG/DL (ref 0.5–1.3)
CRP SERPL-MCNC: <0.1 MG/DL
DACRYOCYTES BLD QL SMEAR: NORMAL
EGFRCR SERPLBLD CKD-EPI 2021: 78 ML/MIN/1.73M*2
EOSINOPHIL # BLD AUTO: 0 X10*3/UL (ref 0–0.7)
EOSINOPHIL NFR BLD AUTO: 0 %
ERYTHROCYTE [DISTWIDTH] IN BLOOD BY AUTOMATED COUNT: 23.2 % (ref 11.5–14.5)
FERRITIN SERPL-MCNC: 2021 NG/ML (ref 20–300)
FIBRINOGEN PPP-MCNC: 129 MG/DL (ref 200–400)
GLUCOSE SERPL-MCNC: 109 MG/DL (ref 74–99)
HCT VFR BLD AUTO: 31 % (ref 41–52)
HGB BLD-MCNC: 10.3 G/DL (ref 13.5–17.5)
IGG SERPL-MCNC: 994 MG/DL (ref 700–1600)
IMM GRANULOCYTES # BLD AUTO: 0.12 X10*3/UL (ref 0–0.7)
IMM GRANULOCYTES NFR BLD AUTO: 2.6 % (ref 0–0.9)
INR PPP: 1.1 (ref 0.9–1.1)
LDH SERPL L TO P-CCNC: 172 U/L (ref 84–246)
LYMPHOCYTES # BLD AUTO: 2.77 X10*3/UL (ref 1.2–4.8)
LYMPHOCYTES NFR BLD AUTO: 60.1 %
MAGNESIUM SERPL-MCNC: 2.31 MG/DL (ref 1.6–2.4)
MCH RBC QN AUTO: 31.8 PG (ref 26–34)
MCHC RBC AUTO-ENTMCNC: 33.2 G/DL (ref 32–36)
MCV RBC AUTO: 96 FL (ref 80–100)
MONOCYTES # BLD AUTO: 0.6 X10*3/UL (ref 0.1–1)
MONOCYTES NFR BLD AUTO: 13 %
NEUTROPHILS # BLD AUTO: 1.09 X10*3/UL (ref 1.2–7.7)
NEUTROPHILS NFR BLD AUTO: 23.6 %
NRBC BLD-RTO: 0 /100 WBCS (ref 0–0)
OVALOCYTES BLD QL SMEAR: NORMAL
PHOSPHATE SERPL-MCNC: 3.9 MG/DL (ref 2.5–4.9)
PLATELET # BLD AUTO: 68 X10*3/UL (ref 150–450)
POLYCHROMASIA BLD QL SMEAR: NORMAL
POTASSIUM SERPL-SCNC: 3.9 MMOL/L (ref 3.5–5.3)
PROT SERPL-MCNC: 6.7 G/DL (ref 6.4–8.2)
PROTHROMBIN TIME: 12 SECONDS (ref 9.8–12.8)
RBC # BLD AUTO: 3.24 X10*6/UL (ref 4.5–5.9)
RBC MORPH BLD: NORMAL
SCHISTOCYTES BLD QL SMEAR: NORMAL
SODIUM SERPL-SCNC: 141 MMOL/L (ref 136–145)
URATE SERPL-MCNC: 7.4 MG/DL (ref 4–7.5)
WBC # BLD AUTO: 4.6 X10*3/UL (ref 4.4–11.3)

## 2024-12-23 PROCEDURE — 85025 COMPLETE CBC W/AUTO DIFF WBC: CPT

## 2024-12-23 PROCEDURE — 86140 C-REACTIVE PROTEIN: CPT

## 2024-12-23 PROCEDURE — 83735 ASSAY OF MAGNESIUM: CPT

## 2024-12-23 PROCEDURE — 84100 ASSAY OF PHOSPHORUS: CPT

## 2024-12-23 PROCEDURE — 82784 ASSAY IGA/IGD/IGG/IGM EACH: CPT

## 2024-12-23 PROCEDURE — 82728 ASSAY OF FERRITIN: CPT

## 2024-12-23 PROCEDURE — 85384 FIBRINOGEN ACTIVITY: CPT

## 2024-12-23 PROCEDURE — 80053 COMPREHEN METABOLIC PANEL: CPT

## 2024-12-23 PROCEDURE — 96360 HYDRATION IV INFUSION INIT: CPT | Mod: INF

## 2024-12-23 PROCEDURE — 84550 ASSAY OF BLOOD/URIC ACID: CPT

## 2024-12-23 PROCEDURE — 99214 OFFICE O/P EST MOD 30 MIN: CPT | Performed by: INTERNAL MEDICINE

## 2024-12-23 PROCEDURE — 2500000004 HC RX 250 GENERAL PHARMACY W/ HCPCS (ALT 636 FOR OP/ED): Performed by: INTERNAL MEDICINE

## 2024-12-23 PROCEDURE — 83615 LACTATE (LD) (LDH) ENZYME: CPT

## 2024-12-23 PROCEDURE — 36591 DRAW BLOOD OFF VENOUS DEVICE: CPT

## 2024-12-23 PROCEDURE — 2500000004 HC RX 250 GENERAL PHARMACY W/ HCPCS (ALT 636 FOR OP/ED)

## 2024-12-23 PROCEDURE — 85610 PROTHROMBIN TIME: CPT

## 2024-12-23 RX ORDER — HEPARIN SODIUM,PORCINE/PF 10 UNIT/ML
50 SYRINGE (ML) INTRAVENOUS AS NEEDED
Status: DISCONTINUED | OUTPATIENT
Start: 2024-12-23 | End: 2024-12-23 | Stop reason: HOSPADM

## 2024-12-23 RX ORDER — HEPARIN 100 UNIT/ML
500 SYRINGE INTRAVENOUS AS NEEDED
OUTPATIENT
Start: 2024-12-23

## 2024-12-23 RX ORDER — HEPARIN SODIUM,PORCINE/PF 10 UNIT/ML
50 SYRINGE (ML) INTRAVENOUS AS NEEDED
OUTPATIENT
Start: 2024-12-23

## 2024-12-23 ASSESSMENT — ENCOUNTER SYMPTOMS
APPETITE CHANGE: 1
BRUISES/BLEEDS EASILY: 1
RESPIRATORY NEGATIVE: 1
FATIGUE: 1
ADENOPATHY: 0
LIGHT-HEADEDNESS: 1
CARDIOVASCULAR NEGATIVE: 1
DIZZINESS: 1
CHILLS: 1

## 2024-12-23 ASSESSMENT — PAIN SCALES - GENERAL: PAINLEVEL_OUTOF10: 0-NO PAIN

## 2024-12-23 NOTE — PROGRESS NOTES
Patient in for count check , labs results within parameters . Patient verbalizes lightheadedness and  feel weak . 1 liter of NS infused as ordered and patient verbalizes feeling better after the infusion . Patient discharged accompanied by wife in a stable condition .

## 2024-12-25 LAB
CMV DNA SERPL NAA+PROBE-LOG IU: NORMAL {LOG_IU}/ML
LABORATORY COMMENT REPORT: NOT DETECTED

## 2024-12-26 ENCOUNTER — TELEPHONE (OUTPATIENT)
Dept: HEMATOLOGY/ONCOLOGY | Facility: CLINIC | Age: 59
End: 2024-12-26
Payer: COMMERCIAL

## 2024-12-26 NOTE — TELEPHONE ENCOUNTER
MD Terese Whitney MA; Divya Priest, ELIZABETH  Make sure patient is doing well and hoping for the best    Call placed to patient.   States he is doing well and continues with frequent blood checks as ordered by the transplant team.  Call back instructions reviewed.  Patient verbalized understanding.

## 2024-12-27 ENCOUNTER — DOCUMENTATION (OUTPATIENT)
Dept: HEMATOLOGY/ONCOLOGY | Facility: HOSPITAL | Age: 59
End: 2024-12-27
Payer: COMMERCIAL

## 2024-12-27 ENCOUNTER — INFUSION (OUTPATIENT)
Dept: OTHER | Facility: HOSPITAL | Age: 59
End: 2024-12-27
Payer: COMMERCIAL

## 2024-12-27 VITALS
SYSTOLIC BLOOD PRESSURE: 103 MMHG | TEMPERATURE: 97.3 F | OXYGEN SATURATION: 100 % | BODY MASS INDEX: 25.93 KG/M2 | DIASTOLIC BLOOD PRESSURE: 72 MMHG | HEART RATE: 77 BPM | RESPIRATION RATE: 18 BRPM | WEIGHT: 193.34 LBS

## 2024-12-27 DIAGNOSIS — Z92.850 HISTORY OF CHIMERIC ANTIGEN RECEPTOR T-CELL THERAPY: ICD-10-CM

## 2024-12-27 DIAGNOSIS — C81.90 HODGKIN LYMPHOMA, UNSPECIFIED HODGKIN LYMPHOMA TYPE, UNSPECIFIED BODY REGION (MULTI): ICD-10-CM

## 2024-12-27 DIAGNOSIS — C83.10 MANTLE CELL LYMPHOMA, UNSPECIFIED BODY REGION (MULTI): ICD-10-CM

## 2024-12-27 DIAGNOSIS — C83.17 MANTLE CELL LYMPHOMA OF SPLEEN (MULTI): ICD-10-CM

## 2024-12-27 DIAGNOSIS — Z94.84 HX OF AUTOLOGOUS STEM CELL TRANSPLANT: ICD-10-CM

## 2024-12-27 LAB
ALBUMIN SERPL BCP-MCNC: 4.2 G/DL (ref 3.4–5)
ALP SERPL-CCNC: 46 U/L (ref 33–136)
ALT SERPL W P-5'-P-CCNC: 16 U/L (ref 10–52)
ANION GAP SERPL CALC-SCNC: 12 MMOL/L (ref 10–20)
APTT PPP: 25 SECONDS (ref 27–38)
AST SERPL W P-5'-P-CCNC: 12 U/L (ref 9–39)
BASOPHILS # BLD AUTO: 0.04 X10*3/UL (ref 0–0.1)
BASOPHILS NFR BLD AUTO: 1.5 %
BILIRUB SERPL-MCNC: 0.9 MG/DL (ref 0–1.2)
BUN SERPL-MCNC: 15 MG/DL (ref 6–23)
CALCIUM SERPL-MCNC: 8.9 MG/DL (ref 8.6–10.6)
CHLORIDE SERPL-SCNC: 104 MMOL/L (ref 98–107)
CO2 SERPL-SCNC: 28 MMOL/L (ref 21–32)
CREAT SERPL-MCNC: 1.01 MG/DL (ref 0.5–1.3)
CRP SERPL-MCNC: <0.1 MG/DL
DACRYOCYTES BLD QL SMEAR: NORMAL
EGFRCR SERPLBLD CKD-EPI 2021: 85 ML/MIN/1.73M*2
EOSINOPHIL # BLD AUTO: 0 X10*3/UL (ref 0–0.7)
EOSINOPHIL NFR BLD AUTO: 0 %
ERYTHROCYTE [DISTWIDTH] IN BLOOD BY AUTOMATED COUNT: 23.7 % (ref 11.5–14.5)
FERRITIN SERPL-MCNC: 1533 NG/ML (ref 20–300)
FIBRINOGEN PPP-MCNC: 115 MG/DL (ref 200–400)
GLUCOSE SERPL-MCNC: 142 MG/DL (ref 74–99)
HCT VFR BLD AUTO: 27.2 % (ref 41–52)
HGB BLD-MCNC: 9.2 G/DL (ref 13.5–17.5)
HOLD SPECIMEN: NORMAL
IGG SERPL-MCNC: 811 MG/DL (ref 700–1600)
IMM GRANULOCYTES # BLD AUTO: 0.04 X10*3/UL (ref 0–0.7)
IMM GRANULOCYTES NFR BLD AUTO: 1.5 % (ref 0–0.9)
INR PPP: 1.1 (ref 0.9–1.1)
LDH SERPL L TO P-CCNC: 146 U/L (ref 84–246)
LYMPHOCYTES # BLD AUTO: 1.3 X10*3/UL (ref 1.2–4.8)
LYMPHOCYTES NFR BLD AUTO: 48.1 %
MAGNESIUM SERPL-MCNC: 2.02 MG/DL (ref 1.6–2.4)
MCH RBC QN AUTO: 32.6 PG (ref 26–34)
MCHC RBC AUTO-ENTMCNC: 33.8 G/DL (ref 32–36)
MCV RBC AUTO: 97 FL (ref 80–100)
MONOCYTES # BLD AUTO: 0.27 X10*3/UL (ref 0.1–1)
MONOCYTES NFR BLD AUTO: 10 %
NEUTROPHILS # BLD AUTO: 1.05 X10*3/UL (ref 1.2–7.7)
NEUTROPHILS NFR BLD AUTO: 38.9 %
NRBC BLD-RTO: 0 /100 WBCS (ref 0–0)
OVALOCYTES BLD QL SMEAR: NORMAL
PHOSPHATE SERPL-MCNC: 3.8 MG/DL (ref 2.5–4.9)
PLATELET # BLD AUTO: 62 X10*3/UL (ref 150–450)
POLYCHROMASIA BLD QL SMEAR: NORMAL
POTASSIUM SERPL-SCNC: 3.9 MMOL/L (ref 3.5–5.3)
PROT SERPL-MCNC: 6.1 G/DL (ref 6.4–8.2)
PROTHROMBIN TIME: 12.3 SECONDS (ref 9.8–12.8)
RBC # BLD AUTO: 2.82 X10*6/UL (ref 4.5–5.9)
RBC MORPH BLD: NORMAL
SCHISTOCYTES BLD QL SMEAR: NORMAL
SODIUM SERPL-SCNC: 140 MMOL/L (ref 136–145)
URATE SERPL-MCNC: 6.1 MG/DL (ref 4–7.5)
WBC # BLD AUTO: 2.7 X10*3/UL (ref 4.4–11.3)

## 2024-12-27 PROCEDURE — 82728 ASSAY OF FERRITIN: CPT

## 2024-12-27 PROCEDURE — 83735 ASSAY OF MAGNESIUM: CPT

## 2024-12-27 PROCEDURE — 84550 ASSAY OF BLOOD/URIC ACID: CPT

## 2024-12-27 PROCEDURE — 85610 PROTHROMBIN TIME: CPT

## 2024-12-27 PROCEDURE — 84100 ASSAY OF PHOSPHORUS: CPT

## 2024-12-27 PROCEDURE — 82784 ASSAY IGA/IGD/IGG/IGM EACH: CPT

## 2024-12-27 PROCEDURE — 85384 FIBRINOGEN ACTIVITY: CPT

## 2024-12-27 PROCEDURE — 86140 C-REACTIVE PROTEIN: CPT

## 2024-12-27 PROCEDURE — 96360 HYDRATION IV INFUSION INIT: CPT

## 2024-12-27 PROCEDURE — 80053 COMPREHEN METABOLIC PANEL: CPT

## 2024-12-27 PROCEDURE — 85025 COMPLETE CBC W/AUTO DIFF WBC: CPT

## 2024-12-27 PROCEDURE — 83615 LACTATE (LD) (LDH) ENZYME: CPT

## 2024-12-27 NOTE — RESEARCH NOTES
Research Note Follow Up Visit       Nancie Armenta is here today for T+21 visit for [id number].  Follow up procedures completed per protocol. Patient is aware of continued follow up plan.  Patient overall feeling better since last count check visit on 12.23.24.  Appetite improving.  Reports intermittent dizziness (denies any falls).  Patient to receive additional IV fluids (1 liter NS) per primary team.  Follow-up appointments reviewed with patient and wife.  Patient to return for follow-up visit on Monday 12.30.24.        Education Documentation  Treatment Plan and Schedule, taught by Chloe Ivey RN at 12/27/2024  9:35 AM.  Learner: Significant Other, Patient  Readiness: Acceptance  Method: Explanation  Response: Verbalizes Understanding    Supportive Medications, taught by Chloe Ivey RN at 12/27/2024  9:35 AM.  Learner: Significant Other, Patient  Readiness: Acceptance  Method: Explanation  Response: Verbalizes Understanding    Comprehensive Metabolic Panel (CMP), taught by Chloe Ivey RN at 12/27/2024  9:35 AM.  Learner: Significant Other, Patient  Readiness: Acceptance  Method: Explanation  Response: Verbalizes Understanding    Complete Blood Count with Differential (CBC w/ Diff), taught by Chloe Ivey RN at 12/27/2024  9:35 AM.  Learner: Significant Other, Patient  Readiness: Acceptance  Method: Explanation  Response: Verbalizes Understanding    Education Comments  No comments found.

## 2024-12-27 NOTE — PROGRESS NOTES
Pt ambulated to SCT unit without assistance. Spouse at bedside. Pt denies pain. Pt reports feeling unsteady while walking at times. Provider, Sonia Quinones, made aware. Vitals obtained, blood drawn via left double lumen PICC and sent to lab. Orthostatic vitals attempted without success. Provider aware that pt unable to stand for 1 minutes without feeling dizzy. Sonia Quinones NP came to see patient. Pt tolerated 1 L of NS well. Central line dressing and caps changed without incident. Pt given a copy of lab results. Pt ambulated off unit without complaints or assistance.

## 2024-12-29 LAB
CMV DNA SERPL NAA+PROBE-LOG IU: NORMAL {LOG_IU}/ML
LABORATORY COMMENT REPORT: NOT DETECTED

## 2024-12-30 ENCOUNTER — INFUSION (OUTPATIENT)
Dept: OTHER | Facility: HOSPITAL | Age: 59
End: 2024-12-30
Payer: COMMERCIAL

## 2024-12-30 ENCOUNTER — OFFICE VISIT (OUTPATIENT)
Dept: HEMATOLOGY/ONCOLOGY | Facility: HOSPITAL | Age: 59
End: 2024-12-30
Payer: COMMERCIAL

## 2024-12-30 VITALS
SYSTOLIC BLOOD PRESSURE: 102 MMHG | WEIGHT: 197.97 LBS | HEART RATE: 102 BPM | OXYGEN SATURATION: 100 % | DIASTOLIC BLOOD PRESSURE: 76 MMHG | RESPIRATION RATE: 18 BRPM | TEMPERATURE: 97 F | BODY MASS INDEX: 26.55 KG/M2

## 2024-12-30 DIAGNOSIS — Z92.850 HISTORY OF CHIMERIC ANTIGEN RECEPTOR T-CELL THERAPY: ICD-10-CM

## 2024-12-30 DIAGNOSIS — Z94.84 HX OF AUTOLOGOUS STEM CELL TRANSPLANT: ICD-10-CM

## 2024-12-30 DIAGNOSIS — C83.17 MANTLE CELL LYMPHOMA OF SPLEEN (MULTI): ICD-10-CM

## 2024-12-30 DIAGNOSIS — C83.17 MANTLE CELL LYMPHOMA OF SPLEEN (MULTI): Primary | ICD-10-CM

## 2024-12-30 LAB
ALBUMIN SERPL BCP-MCNC: 4.4 G/DL (ref 3.4–5)
ALP SERPL-CCNC: 39 U/L (ref 33–136)
ALT SERPL W P-5'-P-CCNC: 14 U/L (ref 10–52)
ANION GAP SERPL CALC-SCNC: 12 MMOL/L (ref 10–20)
AST SERPL W P-5'-P-CCNC: 14 U/L (ref 9–39)
BASO STIPL BLD QL SMEAR: PRESENT
BASOPHILS # BLD AUTO: 0.03 X10*3/UL (ref 0–0.1)
BASOPHILS NFR BLD AUTO: 0.7 %
BILIRUB SERPL-MCNC: 0.9 MG/DL (ref 0–1.2)
BUN SERPL-MCNC: 19 MG/DL (ref 6–23)
CALCIUM SERPL-MCNC: 8 MG/DL (ref 8.6–10.3)
CHLORIDE SERPL-SCNC: 105 MMOL/L (ref 98–107)
CO2 SERPL-SCNC: 27 MMOL/L (ref 21–32)
CREAT SERPL-MCNC: 0.94 MG/DL (ref 0.5–1.3)
CRP SERPL-MCNC: <0.1 MG/DL
DACRYOCYTES BLD QL SMEAR: NORMAL
EGFRCR SERPLBLD CKD-EPI 2021: >90 ML/MIN/1.73M*2
EOSINOPHIL # BLD AUTO: 0.01 X10*3/UL (ref 0–0.7)
EOSINOPHIL NFR BLD AUTO: 0.2 %
ERYTHROCYTE [DISTWIDTH] IN BLOOD BY AUTOMATED COUNT: 23.9 % (ref 11.5–14.5)
FERRITIN SERPL-MCNC: 1167 NG/ML (ref 20–300)
FIBRINOGEN PPP-MCNC: 130 MG/DL (ref 200–400)
GLUCOSE SERPL-MCNC: 134 MG/DL (ref 74–99)
HCT VFR BLD AUTO: 27.6 % (ref 41–52)
HGB BLD-MCNC: 9.5 G/DL (ref 13.5–17.5)
IMM GRANULOCYTES # BLD AUTO: 0.01 X10*3/UL (ref 0–0.7)
IMM GRANULOCYTES NFR BLD AUTO: 0.2 % (ref 0–0.9)
LDH SERPL L TO P-CCNC: 153 U/L (ref 84–246)
LYMPHOCYTES # BLD AUTO: 1.04 X10*3/UL (ref 1.2–4.8)
LYMPHOCYTES NFR BLD AUTO: 24 %
MAGNESIUM SERPL-MCNC: 1.83 MG/DL (ref 1.6–2.4)
MCH RBC QN AUTO: 33.1 PG (ref 26–34)
MCHC RBC AUTO-ENTMCNC: 34.4 G/DL (ref 32–36)
MCV RBC AUTO: 96 FL (ref 80–100)
MONOCYTES # BLD AUTO: 0.31 X10*3/UL (ref 0.1–1)
MONOCYTES NFR BLD AUTO: 7.2 %
NEUTROPHILS # BLD AUTO: 2.93 X10*3/UL (ref 1.2–7.7)
NEUTROPHILS NFR BLD AUTO: 67.7 %
NRBC BLD-RTO: 0 /100 WBCS (ref 0–0)
OVALOCYTES BLD QL SMEAR: NORMAL
PHOSPHATE SERPL-MCNC: 3.2 MG/DL (ref 2.5–4.9)
PLATELET # BLD AUTO: 66 X10*3/UL (ref 150–450)
POLYCHROMASIA BLD QL SMEAR: NORMAL
POTASSIUM SERPL-SCNC: 3.5 MMOL/L (ref 3.5–5.3)
PROT SERPL-MCNC: 6 G/DL (ref 6.4–8.2)
RBC # BLD AUTO: 2.87 X10*6/UL (ref 4.5–5.9)
RBC MORPH BLD: NORMAL
SCHISTOCYTES BLD QL SMEAR: NORMAL
SODIUM SERPL-SCNC: 140 MMOL/L (ref 136–145)
URATE SERPL-MCNC: 5.7 MG/DL (ref 4–7.5)
WBC # BLD AUTO: 4.3 X10*3/UL (ref 4.4–11.3)

## 2024-12-30 PROCEDURE — 99215 OFFICE O/P EST HI 40 MIN: CPT | Performed by: STUDENT IN AN ORGANIZED HEALTH CARE EDUCATION/TRAINING PROGRAM

## 2024-12-30 PROCEDURE — 82728 ASSAY OF FERRITIN: CPT

## 2024-12-30 PROCEDURE — 84100 ASSAY OF PHOSPHORUS: CPT

## 2024-12-30 PROCEDURE — 36591 DRAW BLOOD OFF VENOUS DEVICE: CPT

## 2024-12-30 PROCEDURE — 84550 ASSAY OF BLOOD/URIC ACID: CPT

## 2024-12-30 PROCEDURE — 86140 C-REACTIVE PROTEIN: CPT

## 2024-12-30 PROCEDURE — 80053 COMPREHEN METABOLIC PANEL: CPT

## 2024-12-30 PROCEDURE — 83615 LACTATE (LD) (LDH) ENZYME: CPT

## 2024-12-30 PROCEDURE — 85025 COMPLETE CBC W/AUTO DIFF WBC: CPT

## 2024-12-30 PROCEDURE — 85384 FIBRINOGEN ACTIVITY: CPT

## 2024-12-30 PROCEDURE — 83735 ASSAY OF MAGNESIUM: CPT

## 2024-12-30 RX ORDER — HEPARIN SODIUM,PORCINE/PF 10 UNIT/ML
50 SYRINGE (ML) INTRAVENOUS AS NEEDED
Status: DISCONTINUED | OUTPATIENT
Start: 2024-12-30 | End: 2024-12-30 | Stop reason: HOSPADM

## 2024-12-30 RX ORDER — HEPARIN 100 UNIT/ML
500 SYRINGE INTRAVENOUS AS NEEDED
OUTPATIENT
Start: 2024-12-30

## 2024-12-30 RX ORDER — HEPARIN SODIUM,PORCINE/PF 10 UNIT/ML
50 SYRINGE (ML) INTRAVENOUS AS NEEDED
OUTPATIENT
Start: 2024-12-30

## 2024-12-30 ASSESSMENT — ENCOUNTER SYMPTOMS
ADENOPATHY: 0
HEADACHES: 0
WOUND: 0
DEPRESSION: 0
WHEEZING: 0
MYALGIAS: 0
APPETITE CHANGE: 0
DIZZINESS: 0
NAUSEA: 1
PALPITATIONS: 0
COUGH: 0
DIARRHEA: 1
LIGHT-HEADEDNESS: 1
FATIGUE: 0
SHORTNESS OF BREATH: 0
DYSURIA: 0
NERVOUS/ANXIOUS: 0
CONSTIPATION: 1
FREQUENCY: 0
CHEST TIGHTNESS: 0
ARTHRALGIAS: 0
LEG SWELLING: 0
BRUISES/BLEEDS EASILY: 0
ABDOMINAL PAIN: 0
CHILLS: 0
CONFUSION: 0
FEVER: 0

## 2024-12-30 NOTE — PROGRESS NOTES
Pt ambulated to SCT unit without assistance with wife. Pt denies complaints or pain today. Vitals obtained, blood drawn via PEREZ PICC and sent to lab. Orthostatic vitals obtained. MAL Chahal came to see patient. Pt required no further interventions. Pt sent home with stool collection kit. Pt ambulated off unit without complaints or assistance.

## 2024-12-30 NOTE — PROGRESS NOTES
Patient ID: Nancie Armenta is a 59 y.o. male.    Treatment:     Oncology History Overview Note           Patient Visit Information:   Visit Type: Follow Up Visit      Cancer History:   Treatment Synopsis:     1. Stage IV A mantle cell non-Hodgkin lymphoma involving the gastrointestinal tract diagnosed in October 2011, with diffuse gastrointestinal involvement. The patient was treated  with RCHOP alternating with Rituxan and high-dose Mamie-C and received total of 6 cycles of chemotherapy which he finished in February 2012.   Allergic to Augmentin, causes hives.     2. Patient underwent beam chemotherapy followed by autologous peripheral stem cell transplant in April 2012, by Dr. Yvonne Hobbs at [facility], was also involved in phase 3 clinical trial up killed shingles vaccine versus placebo ([employer]ck-1 Z10  study).     3.The patient developed shingles in January 2014, involving left flank.      4. 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      5.  Recurrent mantle cell lymphoma June 2019 assx presentation with leukocytosis. Peripheral blood flow which showed a CD5 positve clonal lymphocytosis. 6/2/19 CT of chest abdomen and pelvis revealed  splenomegaly of 15 cm compared to 12.8 cm last evaluation. Recurrence confirmed by FISH on peripheral blood earlier this month.  BM biopsy which showed 5% Mantle cell involvement although peripheral blood shows 30% involvement. CT imaging showed splenomegaly. PET scan declined by insurance twice.  Patient underwent colonoscopy  with Dr. Ac ( [place]) 8/8/19 which showed cobblestoning of colon and multiple biopsies including terminal ileum, appendix, cecum,  rectosigmoid positive for mantle cell lymphoma.     6. Initiation of acalabrutinib August 2019 with minimal response. Switched to ibrutinib and venetoclax 12/20/20 with clearing of t(11, 14) by by FISH ( 1/2020) and resolution of involvement of colon by endoscopy and pathology on exam 3/2/2020! BM biopsy  4/2020 HEATHER.     C-scope (3/11/22): at OSH: negative, no evidence of disease. PET scan ( 3/14/22): no abnormal uptake. Deauville 1. BMBx (3/17/22): negative, no evidence of MCL.   March 2022, ibrutinib and venetoclax discontinued.    July 2024 development of Tpenia, bone marrow biopsy 7/25/24 showed focal involvement with mantle cell lymphoma (NGS cyclin D1 positive and Sox 11 positive. BIRC+ and TP 53 positve with a VAF of 3%, BIRC3 positive). FISH negative for 17p deletion  CT imaging done on 8/7/24 shows thickening of sigmoid colon, development of mild to moderate retroperitoneal adenopathy, splenomegally and possible splenic infarct.    Pirotbrutib 200 mg daily initiated 8/12/24.  Due to inadequate response, he started the ventoclax (50 mg) on Adolfo 10/13, then increased to 100 mg , 200 mg and 400 mg      7. S/P COVID 19 infection 12/1/2020, no sequelae                          Lymphoma   10/20/2011 Initial Diagnosis    Lymphoma (Multi)      Cellular Therapy    Mr. Nancie Armenta is a 59 y.o. male presenting with relapsed Mantle Cell Lymphoma. CAR-T cell therapy, on CASE 2419 (T0=12/6/24), prepped with Fludarabine/Cyclophosphamide.      Hosp Course:  - Grade 2 CRS (fever, tachycardia) on 12/7, gave Toxi x1, infectious workup negative. Resolved by 12/8.  - Symptomatic Orthostatic Hypotension on 12/8, resolved with fluids by 12/9.     Mantle cell lymphoma of spleen (Multi)   10/13/2016 Initial Diagnosis    Mantle cell lymphoma of spleen (CMS/HCC)     Mantle cell lymphoma   11/18/2024 Initial Diagnosis    Mantle cell lymphoma     11/30/2024 -  Bone Marrow Transplant        Cellular Therapy    Mr. [place]  Geovany is a 59 y.o. male presenting with relapsed Mantle Cell Lymphoma. CAR-T cell therapy, on CASE 2419 (T0=12/6/24), prepped with Fludarabine/Cyclophosphamide.      Hosp Course:  - Grade 2 CRS (fever, tachycardia) on 12/7, gave Toxi x1, infectious workup negative. Resolved by 12/8.  - Symptomatic Orthostatic Hypotension on 12/8, resolved with fluids by 12/9.           Emmy PADILLA  Presents today for follow up visit, accompanied by his wife. He states he is doing well. He had some nausea last night but denies any vomiting. He said it started with some constipation after dinner and then he felt nauseous and took zofran which did not abort his symptoms. He then had about 2-3 episodes of diarrhea. He states it was loose stool but not excessively watery. He denies any blood or mucous in the diarrhea. He denies any fevers, chills, night sweats, chest pain, dyspnea, cough. He went to an Italian restaurant for dinner and had some meat and cheeses which he thinks could have contributed to his constipation. He took one tablet of compazine this morning because he still felt nauseous and his symptoms finally went away. He states his appetite has continued to improve. He also states his dizziness and lightheadedness has also drastically improved since last visit. His wife states he has been able to walk up the steps without feeling lightheaded.     Past Medical History:  Renal insufficiency.       Past Medical History:   Diagnosis Date    Fourth (trochlear) nerve palsy, right eye 06/08/2017    Right-sided fourth cranial nerve palsy    Fourth (trochlear) nerve palsy, right eye 06/08/2017    Right-sided fourth cranial nerve palsy    Malignant neoplasm of connective and soft tissue, unspecified 06/08/2017    Cancer of blood vessel     Past Surgical History:   Procedure Laterality Date    IR CVC PICC  11/29/2024    IR CVC PICC 11/29/2024 [facility] ANGIO    SHOULDER SURGERY  07/13/2017    Shoulder Surgery     Family History    Problem Relation Name Age of Onset    Cataracts Mother      Cataracts Father      Other (chronic lymphoid leukemia) Father        Social History     Tobacco Use    Smoking status: Never    Smokeless tobacco: Never   Vaping Use    Vaping status: Never Used   Substance Use Topics    Alcohol use: Never    Drug use: Never    . Working full time for [employer].       Review of Systems   Constitutional:  Negative for appetite change, chills, fatigue and fever.   Respiratory:  Negative for chest tightness, cough, shortness of breath and wheezing.    Cardiovascular:  Negative for chest pain, leg swelling and palpitations.   Gastrointestinal:  Positive for constipation, diarrhea and nausea. Negative for abdominal pain.   Genitourinary:  Negative for dysuria, frequency and nocturia.    Musculoskeletal:  Negative for arthralgias and myalgias.   Skin:  Negative for itching, rash and wound.   Neurological:  Positive for light-headedness (He states this has drastically improved. He only got slightly lightheaded during orthostatics but was able to complete orthostatics). Negative for dizziness and headaches.   Hematological:  Negative for adenopathy. Does not bruise/bleed easily.   Psychiatric/Behavioral:  Negative for confusion and depression. The patient is not nervous/anxious.        Objective    BSA: There is no height or weight on file to calculate BSA.  There were no vitals taken for this visit.  Vital signs reviewed today.      Physical Exam  Constitutional:       General: He is not in acute distress.     Appearance: Normal appearance. He is not ill-appearing.   Cardiovascular:      Rate and Rhythm: Normal rate and regular rhythm.      Pulses: Normal pulses.      Heart sounds: Normal heart sounds.   Pulmonary:      Effort: Pulmonary effort is normal. No respiratory distress.      Breath sounds: Normal breath sounds. No wheezing.   Abdominal:      General: There is no distension.      Palpations: Abdomen is  soft. There is no mass.      Tenderness: There is no abdominal tenderness.   Skin:     General: Skin is warm.      Coloration: Skin is not jaundiced or pale.      Findings: Bruising (bruise on L arm from when he fell at discharge from inpatient stay for CART) present. No erythema.   Neurological:      General: No focal deficit present.      Mental Status: He is alert and oriented to person, place, and time.   Psychiatric:         Mood and Affect: Mood normal.         Behavior: Behavior normal.         Thought Content: Thought content normal.         Judgment: Judgment normal.         Performance Status:  Karnofsky Score: 90 - Able to carry on normal activity; minor signs or symptoms of disease       Assessment/Plan        Oncology History Overview Note           Patient Visit Information:   Visit Type: Follow Up Visit      Cancer History:   Treatment Synopsis:     1. Stage IV A mantle cell non-Hodgkin lymphoma involving the gastrointestinal tract diagnosed in October 2011, with diffuse gastrointestinal involvement. The patient was treated  with RCHOP alternating with Rituxan and high-dose Mamie-C and received total of 6 cycles of chemotherapy which he finished in February 2012.   Allergic to Augmentin, causes hives.     2. Patient underwent beam chemotherapy followed by autologous peripheral stem cell transplant in April 2012, by Dr. Yvonne Hobbs at [facility], was also involved in phase 3 clinical trial up killed shingles vaccine versus placebo ([employer]ck-1 Z10  study).     3.The patient developed shingles in January 2014, involving left flank.      4. 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       5.  Recurrent mantle cell lymphoma June 2019 assx presentation with leukocytosis. Peripheral blood flow which showed a CD5 positve clonal lymphocytosis. 6/2/19 CT of chest abdomen and pelvis revealed  splenomegaly of 15 cm compared to 12.8 cm last evaluation. Recurrence confirmed by FISH on peripheral blood earlier this month.  BM biopsy which showed 5% Mantle cell involvement although peripheral blood shows 30% involvement. CT imaging showed splenomegaly. PET scan declined by insurance twice.  Patient underwent colonoscopy  with Dr. Ac ( [place]) 8/8/19 which showed cobblestoning of colon and multiple biopsies including terminal ileum, appendix, cecum, rectosigmoid positive for mantle cell lymphoma.     6. Initiation of acalabrutinib August 2019 with minimal response. Switched to ibrutinib and venetoclax 12/20/20 with clearing of t(11, 14) by by FISH ( 1/2020) and resolution of involvement of colon by endoscopy and pathology on exam 3/2/2020! BM biopsy  4/2020 HEATHER.     C-scope (3/11/22): at OSH: negative, no evidence of disease. PET scan ( 3/14/22): no abnormal uptake. Deauville 1. BMBx (3/17/22): negative, no evidence of MCL.   March 2022, ibrutinib and venetoclax discontinued.    July 2024 development of Tpenia, bone marrow biopsy 7/25/24 showed focal involvement with mantle cell lymphoma (NGS cyclin D1 positive and Sox 11 positive. BIRC+ and TP 53 positve with a VAF of 3%, BIRC3 positive). FISH negative for 17p deletion  CT imaging done on 8/7/24 shows thickening of sigmoid colon, development of mild to moderate retroperitoneal adenopathy, splenomegally and possible splenic infarct.    Pirotbrutib 200 mg daily initiated 8/12/24.  Due to inadequate response, he started the ventoclax (50 mg) on Adolfo 10/13, then increased to 100 mg , 200 mg and 400 mg      7. S/P COVID 19 infection 12/1/2020, no sequelae                          Lymphoma   10/20/2011 Initial Diagnosis    Lymphoma (Multi)      Cellular Therapy     Mr. Nancie Armenta is a 59 y.o. male presenting with relapsed Mantle Cell Lymphoma. CAR-T cell therapy, on CASE 2419 (T0=12/6/24), prepped with Fludarabine/Cyclophosphamide.      Hosp Course:  - Grade 2 CRS (fever, tachycardia) on 12/7, gave Toxi x1, infectious workup negative. Resolved by 12/8.  - Symptomatic Orthostatic Hypotension on 12/8, resolved with fluids by 12/9.     Mantle cell lymphoma of spleen (Multi)   10/13/2016 Initial Diagnosis    Mantle cell lymphoma of spleen (CMS/HCC)     Mantle cell lymphoma   11/18/2024 Initial Diagnosis    Mantle cell lymphoma     11/30/2024 -  Bone Marrow Transplant        Cellular Therapy    Mr. Nancie Armenta is a 59 y.o. male presenting with relapsed Mantle Cell Lymphoma. CAR-T cell therapy, on CASE 2419 (T0=12/6/24), prepped with Fludarabine/Cyclophosphamide.      Hosp Course:  - Grade 2 CRS (fever, tachycardia) on 12/7, gave Toxi x1, infectious workup negative. Resolved by 12/8.  - Symptomatic Orthostatic Hypotension on 12/8, resolved with fluids by 12/9.        1. Stage IV mantle cell lymphoma itreated with Nordic regimen and consolidation autograft with BEAM preparative regimen April 2012.  Relapse 2019 treated with ibrutinib and venetoclax with complete pathology response until 3/ 2022. See above.     Recurrence July 2024 presenting with dropping platelet counts and bone marrow biopsy 7/25/24 showed focal involvement with mantle cell lymphoma (NGS cyclin D1 positive and Sox 11 positive. BIRC+ ad TP 53 positve with a VAF of 3%, BIRC3 positive). FISH negative for 17p deletion  CT imaging done on 8/7/24 shows thickening of sigmoid colon, development of mild to moderate retroperitoneal adenopathy, splenomegally and possible splenic infarct.       Patient started pirtobrutinib salvage August 12,2024 and feels better. However spleen size was increasing per CT imaging (8/6/24)  and physical exam so Dr. Hobbs planned to add venetoclax as follows, starting 10/13/24:  -  50 mg for first week, then 100 (10/20), and 200 mg (10/27) for 7 days each, then 400 mg (11/3/24- )  -increased anemia with hgb 8.5, platlets 37K, no bleeding.      Pre CART staging:     BM biopsy low level involvment by mantle cells about 3% mantle cells by flow. Absence of CD19 on this  flow sample likely reflects insufficient sampling and positive for CD 19 on prior bone marrow sample of 7/24/24..     PET imaging 11/4/24   1. FDG avid lymphadenopathy throughout the neck, chest, abdomen and  pelvis as detailed above, consistent with lymphomatous involvement.  2. Intense FDG avid splenomegaly, consistent with lymphomatous  involvement. Peripheral regions of photopenia likely reflect areas of  infarction.  3. No PET evidence of FDG avid extranodal disease.on      ECHO 10/23/24 low normal LVEF 53%, cleared by Dr. Izaguirre.      - Plan to proceed to CART cell therapy ASAP, discussed CASE 2419, anticipate collection on 11/25 and start of LD shortly thereafter. Patient instructed to take last dose of pirto and venetoclax on 11/14/24 to allow 10 day washout.  Met with Research RN and CAR-T coordinator. Due to anemia, ordered prbcs to be transfused Friday before intended collection on 11/25/24.     Admitted 11/29/24 for his CAR-T cell therapy, on CASE 2419 (T0=12/6/24), prepped with Fludarabine/Cyclophosphamide.   Hosp Course:  - Grade 2 CRS (fever, tachycardia) on 12/7, gave Toxi x1, infectious workup negative. Resolved by 12/8.  - Symptomatic Orthostatic Hypotension on 12/8, resolved with fluids by 12/9.  Discharged home on 12/18/24.       12/30/24:  Today is T+ 24 from CAR-T.  Improvement in CBC results with hgb 9.5, plt 66, WBC 4.3, and ANC 2.93. Plan for PET scan and BM biopsy on 1/2/2025.  Plan for provider visits and count checks twice weekly.        CARTOX score: 10/10        Ferritin   Date Value Ref Range Status   12/23/2024 2,021 (H) 20 - 300 ng/mL Final   12/20/2024 1,944 (H) 20 - 300 ng/mL Final    12/18/2024 1,459 (H) 20 - 300 ng/mL Final            Fibrinogen   Date Value Ref Range Status   12/23/2024 129 (L) 200 - 400 mg/dL Final   12/20/2024 121 (L) 200 - 400 mg/dL Final   12/18/2024 113 (L) 200 - 400 mg/dL Final            C-Reactive Protein   Date Value Ref Range Status   12/23/2024 <0.10 <1.00 mg/dL Final   12/18/2024 <0.10 <1.00 mg/dL Final   12/18/2024 <0.10 <1.00 mg/dL Final              IgG   Date Value Ref Range Status   11/30/2024 1,040 700 - 1,600 mg/dL Final       Comment:       MONOCLONAL PROTEINS MAY CAUSE FALSELY LOW  RESULTS IN THIS ASSAY. SERUM PROTEIN  ELECTROPHORESIS SHOULD BE DONE AS THE  FIRST TEST TO EVALUATE MONOCLONAL GAMMOPATHY.                 Total IgG   Date Value Ref Range Status   12/29/2021 929 700 - 1,600 mg/dL Final       Comment:       MONOCLONAL PROTEINS MAY CAUSE FALSELY LOW  RESULTS IN THIS ASSAY. SERUM PROTEIN  ELECTROPHORESIS SHOULD BE DONE AS THE  FIRST TEST TO EVALUATE MONOCLONAL GAMMOPATHY.      10/28/2021 1,010 700 - 1,600 mg/dL Final       Comment:       MONOCLONAL PROTEINS MAY CAUSE FALSELY LOW  RESULTS IN THIS ASSAY. SERUM PROTEIN  ELECTROPHORESIS SHOULD BE DONE AS THE  FIRST TEST TO EVALUATE MONOCLONAL GAMMOPATHY.      08/19/2021 941 700 - 1,600 mg/dL Final       Comment:       MONOCLONAL PROTEINS MAY CAUSE FALSELY LOW  RESULTS IN THIS ASSAY. SERUM PROTEIN  ELECTROPHORESIS SHOULD BE DONE AS THE  FIRST TEST TO EVALUATE MONOCLONAL GAMMOPATHY.         Response monitoring:  Day 30 response monitoring: scheduled 1/2/25 PET/CT and Bmbx  Day 60 response monitoring: scheduled ####  Day 90 response monitoring: scheduled ####     Supportive Care:   Levetiracetam (500-750mg) every 12h x 30 days     Infectious prophylaxis:   Antiviral prophylaxis Acyclovir. Continue for at least 6 months.   PCP prophylaxis. Start Bactrim with further count recovery.  Plan to continue PJP prophylaxis through Day 90.  Prescription sent, will advised when to start.    Antifungal prophylaxis  Fluconazole. Continue antifungal prophylaxis through Day 90.   Levofloxacin until ANC is sustained at >500-discontinued 24. ANC today was 2.93     Wallet Card: Verified that the patient was discharged with CAR T Cell wallet card at first outpatient visit. Reinforced instructions for use.  The patient was reminded not to drive for 8 weeks following CAR T cell therapy     Vaccines:  Will plan to start vaccines with influenza and covid vaccine about 3-4 months after CAR-T and will start post transplant vaccines around 6 months.      Diarrhea   24: Gave patient stool sample kit to be collected at home and can be dropped off for stool eval   Educated on hydration and diet      Dizziness/Orthostatic Hypotension:  24:  Patient reports dizziness and lightheadedness has been significantly improving. Continue to hold tadalafil and tamsulosin.  Advised to increase oral fluid intake and electrolyte replacement drinks and to include more sodium by adding table salt to foods having foods such as soups.      Orthostatics:  layin/63; 77HR  Standing 1 minute: 107/62; 107 HR  Standing 4 minutes: 102/76; 102 HR      BPH:  Reports his previous urinary frequency and nocturia symptoms has greatly improved after starting tadalafil 5 mg daily.   24:  Continue to hold tadalafil.       Renal failure:   sCr stable at 0.94 (24).      Weight Loss:  Hospital admission weight 97.2 kg (24).    24:  Weight today 89.8 kg.  Nancie reports his appetite has been continuously improving and he is eating more and more solid foods over the last week. Discussed ways to increase calories into diet.  Monitoring.     Health maintenance.   Colonoscopy completed 23: normal except internal hemorrhoids.  Recommendations to repeat in 2 years.    - Some aortic calcifications on imaging, started on prevastatin-managed by his PCP.  PCP increased pravastatin dose to 40 mg daily.  PCP switched to atorvastatin 40 mg  daily.     Central Line:  Left power PICC line placed 12/2/24.  Discharged home on 12/18/24 with PICC, wife is flushing PICC line daily with NS flushes and dressing changes completed in infusion center.    12/30/24:  PICC line with no redness, tenderness, swelling, or drainage.       RTC:   Monday/Thursday count checks  1/2/25:  PET/CT and Bmbx, post BMT visit   1/6/25:  Central line, follow up visit with Zainab Sullivan CNP, and infusion.  1/9/25: infusion and post BMT visit  1/13/25: infusion and post BMT visit     Advised patient that if the diarrhea persists and any fevers, dyspnea, and chest pain develop to call the answering service.        Kim Vasques PA-C

## 2024-12-31 LAB
CMV DNA SERPL NAA+PROBE-LOG IU: NORMAL {LOG_IU}/ML
LABORATORY COMMENT REPORT: NOT DETECTED

## 2025-01-02 ENCOUNTER — HOSPITAL ENCOUNTER (OUTPATIENT)
Dept: RADIOLOGY | Facility: HOSPITAL | Age: 60
Discharge: HOME | End: 2025-01-02
Payer: COMMERCIAL

## 2025-01-02 ENCOUNTER — PROCEDURE VISIT (OUTPATIENT)
Dept: HEMATOLOGY/ONCOLOGY | Facility: HOSPITAL | Age: 60
End: 2025-01-02
Payer: COMMERCIAL

## 2025-01-02 ENCOUNTER — OFFICE VISIT (OUTPATIENT)
Dept: HEMATOLOGY/ONCOLOGY | Facility: HOSPITAL | Age: 60
End: 2025-01-02
Payer: COMMERCIAL

## 2025-01-02 VITALS
RESPIRATION RATE: 18 BRPM | BODY MASS INDEX: 26.42 KG/M2 | TEMPERATURE: 97.9 F | DIASTOLIC BLOOD PRESSURE: 78 MMHG | WEIGHT: 197 LBS | OXYGEN SATURATION: 99 % | SYSTOLIC BLOOD PRESSURE: 119 MMHG | HEART RATE: 85 BPM

## 2025-01-02 DIAGNOSIS — C81.90 HODGKIN LYMPHOMA, UNSPECIFIED HODGKIN LYMPHOMA TYPE, UNSPECIFIED BODY REGION (MULTI): ICD-10-CM

## 2025-01-02 DIAGNOSIS — C83.17 MANTLE CELL LYMPHOMA OF SPLEEN (MULTI): ICD-10-CM

## 2025-01-02 DIAGNOSIS — C83.10 MANTLE CELL LYMPHOMA, UNSPECIFIED BODY REGION (MULTI): ICD-10-CM

## 2025-01-02 DIAGNOSIS — Z92.850 HISTORY OF CHIMERIC ANTIGEN RECEPTOR T-CELL THERAPY: Primary | ICD-10-CM

## 2025-01-02 LAB
ALBUMIN SERPL BCP-MCNC: 4.3 G/DL (ref 3.4–5)
ALP SERPL-CCNC: 38 U/L (ref 33–136)
ALT SERPL W P-5'-P-CCNC: 14 U/L (ref 10–52)
ANION GAP SERPL CALC-SCNC: 11 MMOL/L (ref 10–20)
APTT PPP: 24 SECONDS (ref 27–38)
AST SERPL W P-5'-P-CCNC: 13 U/L (ref 9–39)
BASOPHILS # BLD AUTO: 0.03 X10*3/UL (ref 0–0.1)
BASOPHILS NFR BLD AUTO: 1 %
BILIRUB SERPL-MCNC: 0.9 MG/DL (ref 0–1.2)
BUN SERPL-MCNC: 14 MG/DL (ref 6–23)
CALCIUM SERPL-MCNC: 9 MG/DL (ref 8.6–10.3)
CHLORIDE SERPL-SCNC: 104 MMOL/L (ref 98–107)
CO2 SERPL-SCNC: 28 MMOL/L (ref 21–32)
CREAT SERPL-MCNC: 0.95 MG/DL (ref 0.5–1.3)
DACRYOCYTES BLD QL SMEAR: NORMAL
EGFRCR SERPLBLD CKD-EPI 2021: >90 ML/MIN/1.73M*2
EOSINOPHIL # BLD AUTO: 0.02 X10*3/UL (ref 0–0.7)
EOSINOPHIL NFR BLD AUTO: 0.6 %
ERYTHROCYTE [DISTWIDTH] IN BLOOD BY AUTOMATED COUNT: 23.7 % (ref 11.5–14.5)
FERRITIN SERPL-MCNC: 1025 NG/ML (ref 20–300)
GLUCOSE BLD MANUAL STRIP-MCNC: 100 MG/DL (ref 74–99)
GLUCOSE SERPL-MCNC: 98 MG/DL (ref 74–99)
HCT VFR BLD AUTO: 27.1 % (ref 41–52)
HGB BLD-MCNC: 9.3 G/DL (ref 13.5–17.5)
HYPOCHROMIA BLD QL SMEAR: NORMAL
IMM GRANULOCYTES # BLD AUTO: 0.01 X10*3/UL (ref 0–0.7)
IMM GRANULOCYTES NFR BLD AUTO: 0.3 % (ref 0–0.9)
INR PPP: 1.1 (ref 0.9–1.1)
LYMPHOCYTES # BLD AUTO: 0.96 X10*3/UL (ref 1.2–4.8)
LYMPHOCYTES NFR BLD AUTO: 30.5 %
MAGNESIUM SERPL-MCNC: 1.85 MG/DL (ref 1.6–2.4)
MCH RBC QN AUTO: 33.8 PG (ref 26–34)
MCHC RBC AUTO-ENTMCNC: 34.3 G/DL (ref 32–36)
MCV RBC AUTO: 99 FL (ref 80–100)
MONOCYTES # BLD AUTO: 0.39 X10*3/UL (ref 0.1–1)
MONOCYTES NFR BLD AUTO: 12.4 %
NEUTROPHILS # BLD AUTO: 1.74 X10*3/UL (ref 1.2–7.7)
NEUTROPHILS NFR BLD AUTO: 55.2 %
NRBC BLD-RTO: 0 /100 WBCS (ref 0–0)
OVALOCYTES BLD QL SMEAR: NORMAL
PLATELET # BLD AUTO: 65 X10*3/UL (ref 150–450)
POLYCHROMASIA BLD QL SMEAR: NORMAL
POTASSIUM SERPL-SCNC: 3.8 MMOL/L (ref 3.5–5.3)
PROT SERPL-MCNC: 5.6 G/DL (ref 6.4–8.2)
PROTHROMBIN TIME: 12 SECONDS (ref 9.8–12.8)
RBC # BLD AUTO: 2.75 X10*6/UL (ref 4.5–5.9)
RBC MORPH BLD: NORMAL
SCHISTOCYTES BLD QL SMEAR: NORMAL
SODIUM SERPL-SCNC: 139 MMOL/L (ref 136–145)
WBC # BLD AUTO: 3.2 X10*3/UL (ref 4.4–11.3)

## 2025-01-02 PROCEDURE — 83735 ASSAY OF MAGNESIUM: CPT

## 2025-01-02 PROCEDURE — A9552 F18 FDG: HCPCS | Performed by: INTERNAL MEDICINE

## 2025-01-02 PROCEDURE — 85097 BONE MARROW INTERPRETATION: CPT

## 2025-01-02 PROCEDURE — 85025 COMPLETE CBC W/AUTO DIFF WBC: CPT

## 2025-01-02 PROCEDURE — 85730 THROMBOPLASTIN TIME PARTIAL: CPT

## 2025-01-02 PROCEDURE — 82728 ASSAY OF FERRITIN: CPT

## 2025-01-02 PROCEDURE — 38222 DX BONE MARROW BX & ASPIR: CPT | Mod: LT

## 2025-01-02 PROCEDURE — 85610 PROTHROMBIN TIME: CPT

## 2025-01-02 PROCEDURE — 99215 OFFICE O/P EST HI 40 MIN: CPT

## 2025-01-02 PROCEDURE — 82947 ASSAY GLUCOSE BLOOD QUANT: CPT | Mod: 59 | Performed by: STUDENT IN AN ORGANIZED HEALTH CARE EDUCATION/TRAINING PROGRAM

## 2025-01-02 PROCEDURE — 78815 PET IMAGE W/CT SKULL-THIGH: CPT | Mod: PS

## 2025-01-02 PROCEDURE — 3430000001 HC RX 343 DIAGNOSTIC RADIOPHARMACEUTICALS: Performed by: INTERNAL MEDICINE

## 2025-01-02 PROCEDURE — 2500000004 HC RX 250 GENERAL PHARMACY W/ HCPCS (ALT 636 FOR OP/ED): Performed by: PHYSICIAN ASSISTANT

## 2025-01-02 PROCEDURE — 84075 ASSAY ALKALINE PHOSPHATASE: CPT

## 2025-01-02 RX ORDER — FLUCONAZOLE 200 MG/1
400 TABLET ORAL DAILY
Qty: 120 TABLET | Refills: 0 | Status: SHIPPED | OUTPATIENT
Start: 2025-01-02

## 2025-01-02 RX ORDER — HEPARIN SODIUM,PORCINE/PF 10 UNIT/ML
50 SYRINGE (ML) INTRAVENOUS AS NEEDED
OUTPATIENT
Start: 2025-01-02

## 2025-01-02 RX ORDER — FLUDEOXYGLUCOSE F 18 200 MCI/ML
9.97 INJECTION, SOLUTION INTRAVENOUS
Status: COMPLETED | OUTPATIENT
Start: 2025-01-02 | End: 2025-01-02

## 2025-01-02 RX ORDER — LORAZEPAM 2 MG/ML
1 INJECTION INTRAMUSCULAR ONCE
Status: COMPLETED | OUTPATIENT
Start: 2025-01-02 | End: 2025-01-02

## 2025-01-02 RX ORDER — HEPARIN 100 UNIT/ML
500 SYRINGE INTRAVENOUS AS NEEDED
OUTPATIENT
Start: 2025-01-02

## 2025-01-02 RX ORDER — LIDOCAINE HYDROCHLORIDE 20 MG/ML
10 INJECTION, SOLUTION INFILTRATION; PERINEURAL ONCE
Status: COMPLETED | OUTPATIENT
Start: 2025-01-02 | End: 2025-01-02

## 2025-01-02 RX ADMIN — FLUDEOXYGLUCOSE F 18 9.97 MILLICURIE: 200 INJECTION, SOLUTION INTRAVENOUS at 09:15

## 2025-01-02 RX ADMIN — LORAZEPAM 1 MG: 2 INJECTION INTRAMUSCULAR; INTRAVENOUS at 12:53

## 2025-01-02 RX ADMIN — LIDOCAINE HYDROCHLORIDE 10 ML: 20 INJECTION, SOLUTION INFILTRATION; PERINEURAL at 13:00

## 2025-01-02 ASSESSMENT — ENCOUNTER SYMPTOMS
HEMATURIA: 0
FATIGUE: 1
WOUND: 0
CHILLS: 0
CONSTIPATION: 0
DIARRHEA: 0
UNEXPECTED WEIGHT CHANGE: 1
NAUSEA: 0
FEVER: 0
NUMBNESS: 0
APPETITE CHANGE: 0
CARDIOVASCULAR NEGATIVE: 1
RESPIRATORY NEGATIVE: 1
FREQUENCY: 0
BLOOD IN STOOL: 0
DIZZINESS: 0
ABDOMINAL PAIN: 0
DYSURIA: 0
ADENOPATHY: 0
DIAPHORESIS: 0
LIGHT-HEADEDNESS: 0
VOMITING: 0
BRUISES/BLEEDS EASILY: 1

## 2025-01-02 ASSESSMENT — PAIN SCALES - GENERAL: PAINLEVEL_OUTOF10: 0-NO PAIN

## 2025-01-02 NOTE — PROGRESS NOTES
Patient ID: Nancie Armenta is a 59 y.o. male.    Treatment:   Oncology History Overview Note           Patient Visit Information:   Visit Type: Follow Up Visit      Cancer History:   Treatment Synopsis:     1. Stage IV A mantle cell non-Hodgkin lymphoma involving the gastrointestinal tract diagnosed in October 2011, with diffuse gastrointestinal involvement. The patient was treated  with RCHOP alternating with Rituxan and high-dose Mamie-C and received total of 6 cycles of chemotherapy which he finished in February 2012.   Allergic to Augmentin, causes hives.     2. Patient underwent beam chemotherapy followed by autologous peripheral stem cell transplant in April 2012, by Dr. Yvonne Hobbs at [facility], was also involved in phase 3 clinical trial up killed shingles vaccine versus placebo ([employer]-1 Z10  study).     3.The patient developed shingles in January 2014, involving left flank.      4. 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      5.  Recurrent mantle cell lymphoma June 2019 assx presentation with leukocytosis. Peripheral blood flow which showed a CD5 positve clonal lymphocytosis. 6/2/19 CT of chest abdomen and pelvis revealed  splenomegaly of 15 cm compared to 12.8 cm last evaluation. Recurrence confirmed by FISH on peripheral blood earlier this month.  BM biopsy which showed 5% Mantle cell involvement although peripheral blood shows 30% involvement. CT imaging showed splenomegaly. PET scan declined by insurance twice.  Patient underwent colonoscopy  with Dr. Ac ( [place]) 8/8/19 which showed cobblestoning of colon and multiple biopsies including terminal ileum, appendix, cecum,  rectosigmoid positive for mantle cell lymphoma.     6. Initiation of acalabrutinib August 2019 with minimal response. Switched to ibrutinib and venetoclax 12/20/20 with clearing of t(11, 14) by by FISH ( 1/2020) and resolution of involvement of colon by endoscopy and pathology on exam 3/2/2020! BM biopsy  4/2020 HEATHER.     C-scope (3/11/22): at OSH: negative, no evidence of disease. PET scan ( 3/14/22): no abnormal uptake. Deauville 1. BMBx (3/17/22): negative, no evidence of MCL.   March 2022, ibrutinib and venetoclax discontinued.    July 2024 development of Tpenia, bone marrow biopsy 7/25/24 showed focal involvement with mantle cell lymphoma (NGS cyclin D1 positive and Sox 11 positive. BIRC+ and TP 53 positve with a VAF of 3%, BIRC3 positive). FISH negative for 17p deletion  CT imaging done on 8/7/24 shows thickening of sigmoid colon, development of mild to moderate retroperitoneal adenopathy, splenomegally and possible splenic infarct.    Pirotbrutib 200 mg daily initiated 8/12/24.  Due to inadequate response, he started the ventoclax (50 mg) on Adolfo 10/13, then increased to 100 mg , 200 mg and 400 mg      7. S/P COVID 19 infection 12/1/2020, no sequelae                          Lymphoma   10/20/2011 Initial Diagnosis    Lymphoma (Multi)      Cellular Therapy    Mr. Nancie Armenta is a 59 y.o. male presenting with relapsed Mantle Cell Lymphoma. CAR-T cell therapy, on CASE 2419 (T0=12/6/24), prepped with Fludarabine/Cyclophosphamide.      Hosp Course:  - Grade 2 CRS (fever, tachycardia) on 12/7, gave Toxi x1, infectious workup negative. Resolved by 12/8.  - Symptomatic Orthostatic Hypotension on 12/8, resolved with fluids by 12/9.     Mantle cell lymphoma of spleen (Multi)   10/13/2016 Initial Diagnosis    Mantle cell lymphoma of spleen (CMS/HCC)     Mantle cell lymphoma   11/18/2024 Initial Diagnosis    Mantle cell lymphoma     11/30/2024 -  Bone Marrow Transplant        Cellular Therapy    Mr. [place]  Geovany is a 59 y.o. male presenting with relapsed Mantle Cell Lymphoma. CAR-T cell therapy, on CASE 2419 (T0=12/6/24), prepped with Fludarabine/Cyclophosphamide.      Hosp Course:  - Grade 2 CRS (fever, tachycardia) on 12/7, gave Toxi x1, infectious workup negative. Resolved by 12/8.  - Symptomatic Orthostatic Hypotension on 12/8, resolved with fluids by 12/9.         Response:     Past Medical History:  Renal insufficiency.   Past Medical History:   Diagnosis Date    Fourth (trochlear) nerve palsy, right eye 06/08/2017    Right-sided fourth cranial nerve palsy    Fourth (trochlear) nerve palsy, right eye 06/08/2017    Right-sided fourth cranial nerve palsy    Malignant neoplasm of connective and soft tissue, unspecified 06/08/2017    Cancer of blood vessel     Surgical History:   Past Surgical History:   Procedure Laterality Date    IR CVC PICC  11/29/2024    IR CVC PICC 11/29/2024 [facility] ANGIO    SHOULDER SURGERY  07/13/2017    Shoulder Surgery        Family History:   Family History   Problem Relation Name Age of Onset    Cataracts Mother      Cataracts Father      Other (chronic lymphoid leukemia) Father       Social History:  .  Working full time for [employer].    Social History     Tobacco Use    Smoking status: Never    Smokeless tobacco: Never   Vaping Use    Vaping status: Never Used   Substance Use Topics    Alcohol use: Never    Drug use: Never   -------------------------------------------------------------------------------------------------------  Subjective       HPI    Nancie Armenta is a 59 year old man with PMH of hyperglycemia, renal insufficiency, and stage IV A mantle cell non hodgkin lymphoma involving the gastrointestinal tract, s/p autologous SCT (April 2012).  Early in July 2024 patient noted to have dropping platelet counts and bone marrow biopsy 7/25/24 showed focal involvement with mantle cell lymphoma (NGS cyclin D1 positive and Sox 11 positive. BIRC+ ad TP 53 positve with  a VAF of 3%, BIRC3 positive). FISH negative for 17p deletion  CT imaging done on 8/7/24 shows thickening of sigmoid colon, development of mild to moderate retroperitoneal adenopathy, splenomegally and possible splenic infarct.  Started Pirtobrutinib salvage on 8/12/24.  Added Venetoclax starting 10/13/24 since spleen size was increasing on repeat CT imaging and on physical exam.  BMBx (10/23/24) with low level involvement by mantle cells about 3% Mantle cells by flow.  PET/CT (11/4/24) with FDG avid lymphadenopathy throughout the neck, chest, abdomen, and pelvis c/w lymphomatous involvement. Intense FDG avid splenomegaly. Peripheral regions of photopenia likely reflect areas of infarction.  Pirtobrutinib and Venetoclax stopped 11/14/24 in preparation for CART collection.  Admitted 11/29/24 for his CAR-T cell therapy, on CASE 2419 (T0=12/6/24), prepped with Fludarabine/Cyclophosphamide. Hospital course included grade 2 CRS (fever, tachycardia) on 12/7, received Toxi x1, infectious workup negative.  He also had symptomatic orthostatic hypotension on 12/8, resolved with fluids by 12/9.  He was discharged home on 12/18/24.      Nancie presents to the clinic today (12/23/24) with his wife for followup evaluation after his CAR-T for for relapsed mantle cell lymphoma and count check.  Stated he felt light headed and legs felt wobbly walking in for his appointment.  Balance was a little off today.  States fast movements make him dizzy.  No falls.  Energy level is lower, gets tired.  Takes naps during the day.  Appetite is lower. Weight is down about 25 pounds in the last month.  Appetite is lower.  Hasn't ate yet today, but states he has been able to tolerate increased food intake.  Drinking over 1 L of fluids per day, typically drinks body armor, bottle of water, milk.      Has been feeling cold, mild shaking until coverings up.  Checks temperature about 4 times per day, no elevated temperatures (97-98 degrees). Has been  holding tadalafil and flomax due to hypotension in the hospital.  Having nose bleeds that stop quickly.  PICC line okay to left arm.  Wife is flushing PICC line daily with saline at home.      1/2/25: patient presents for T+27 post CAR-T. He is feeling well today, states his diarrhea/ dehydration has improved. Gained 5-6 lbs over the last week. He is increasing physical activity and notes it is easier to go up and down stairs. Denies fevers, chills, n/v/d/c, confusion, dizziness, chest pain, SOB.     Review of Systems   Constitutional:  Positive for fatigue and unexpected weight change. Negative for appetite change, chills, diaphoresis and fever.   Respiratory: Negative.     Cardiovascular: Negative.    Gastrointestinal:  Negative for abdominal pain, blood in stool, constipation, diarrhea, nausea and vomiting.   Genitourinary:  Negative for dysuria, frequency, hematuria and nocturia.    Musculoskeletal:  Positive for gait problem.   Skin:  Negative for rash and wound.   Neurological:  Positive for gait problem. Negative for dizziness, light-headedness and numbness.   Hematological:  Negative for adenopathy. Bruises/bleeds easily.   -------------------------------------------------------------------------------------------------------  Objective   BSA: There is no height or weight on file to calculate BSA.  There were no vitals taken for this visit.  VS reviewed in flowsheets     Physical Exam  Vitals reviewed.   Constitutional:       General: He is not in acute distress.  HENT:      Head: Normocephalic and atraumatic.      Mouth/Throat:      Mouth: Mucous membranes are moist.   Eyes:      Extraocular Movements: Extraocular movements intact.      Conjunctiva/sclera: Conjunctivae normal.      Pupils: Pupils are equal, round, and reactive to light.   Cardiovascular:      Rate and Rhythm: Normal rate and regular rhythm.      Pulses: Normal pulses.      Heart sounds: Normal heart sounds.   Pulmonary:      Effort:  Pulmonary effort is normal. No respiratory distress.      Breath sounds: Normal breath sounds. No wheezing.   Abdominal:      General: Abdomen is flat. Bowel sounds are normal. There is no distension.      Palpations: Abdomen is soft. There is splenomegaly. There is no mass.      Tenderness: There is no abdominal tenderness.   Musculoskeletal:         General: No swelling. Normal range of motion.   Lymphadenopathy:      Comments: No lymphadenopathy   Skin:     General: Skin is warm and dry.   Neurological:      General: No focal deficit present.      Mental Status: He is alert and oriented to person, place, and time.   Psychiatric:         Mood and Affect: Mood normal.         Behavior: Behavior normal.         Thought Content: Thought content normal.         Judgment: Judgment normal.     Performance Status:  ECOG performance status: 0 fully active    CAR-T product:  CAR-T [id number]  Cell infusion date: 12/6/2024  Day: 17   -------------------------------------------------------------------------------------------------------  Assessment/Plan      1. Stage IV mantle cell lymphoma itreated with Nordic regimen and consolidation autograft with BEAM preparative regimen April 2012.  Relapse 2019 treated with ibrutinib and venetoclax with complete pathology response until 3/ 2022. See above.     Recurrence July 2024 presenting with dropping platelet counts and bone marrow biopsy 7/25/24 showed focal involvement with mantle cell lymphoma (NGS cyclin D1 positive and Sox 11 positive. BIRC+ ad TP 53 positve with a VAF of 3%, BIRC3 positive). FISH negative for 17p deletion  CT imaging done on 8/7/24 shows thickening of sigmoid colon, development of mild to moderate retroperitoneal adenopathy, splenomegally and possible splenic infarct.      Patient started pirtobrutinib salvage August 12,2024 and feels better. However spleen size was increasing per CT imaging (8/6/24)  and physical exam so Dr. Hobbs planned to add venetoclax  as follows, starting 10/13/24:  - 50 mg for first week, then 100 (10/20), and 200 mg (10/27) for 7 days each, then 400 mg (11/3/24- )  -increased anemia with hgb 8.5, platlets 37K, no bleeding.     Pre CART staging:    BM biopsy low level involvment by mantle cells about 3% mantle cells by flow. Absence of CD19 on this  flow sample likely reflects insufficient sampling and positive for CD 19 on prior bone marrow sample of 7/24/24..    PET imaging 11/4/24   1. FDG avid lymphadenopathy throughout the neck, chest, abdomen and  pelvis as detailed above, consistent with lymphomatous involvement.  2. Intense FDG avid splenomegaly, consistent with lymphomatous  involvement. Peripheral regions of photopenia likely reflect areas of  infarction.  3. No PET evidence of FDG avid extranodal disease.on     ECHO 10/23/24 low normal LVEF 53%, cleared by Dr. Izaguirre.     - Plan to proceed to CART cell therapy ASAP, discussed CASE 2419, anticipate collection on 11/25 and start of LD shortly thereafter. Patient instructed to take last dose of pirto and venetoclax on 11/14/24 to allow 10 day washout.  Met with Research RN and CAR-T coordinator. Due to anemia, ordered prbcs to be transfused Friday before intended collection on 11/25/24.    Admitted 11/29/24 for his CAR-T cell therapy, on CASE 2419 (T0=12/6/24), prepped with Fludarabine/Cyclophosphamide.   Hosp Course:  - Grade 2 CRS (fever, tachycardia) on 12/7, gave Toxi x1, infectious workup negative. Resolved by 12/8.  - Symptomatic Orthostatic Hypotension on 12/8, resolved with fluids by 12/9.  Discharged home on 12/18/24.      12/23/24:  Today is T+ 17 from CAR-T.  Improvement in CBC results with hgb 10.3, plt 68, WBC 4.6, and ANC 1.09.  Nancie is having dizziness today and has positive orthostatic VS-see hypotension below.  Plan for PET scan and BM biopsy on 1/2/2025.  Plan for provider visits and count checks twice weekly.  Patient was also evaluated today by Dr. Hobbs.       CAR T cell therapy:   Toxicity monitoring:   Ferritin   Date Value Ref Range Status   12/30/2024 1,167 (H) 20 - 300 ng/mL Final   12/27/2024 1,533 (H) 20 - 300 ng/mL Final   12/23/2024 2,021 (H) 20 - 300 ng/mL Final     Fibrinogen   Date Value Ref Range Status   12/30/2024 130 (L) 200 - 400 mg/dL Final   12/27/2024 115 (L) 200 - 400 mg/dL Final   12/23/2024 129 (L) 200 - 400 mg/dL Final     C-Reactive Protein   Date Value Ref Range Status   12/30/2024 <0.10 <1.00 mg/dL Final   12/27/2024 <0.10 <1.00 mg/dL Final   12/23/2024 <0.10 <1.00 mg/dL Final     IgG   Date Value Ref Range Status   12/27/2024 811 700 - 1,600 mg/dL Final     Comment:     MONOCLONAL PROTEINS MAY CAUSE FALSELY LOW  RESULTS IN THIS ASSAY. SERUM PROTEIN  ELECTROPHORESIS SHOULD BE DONE AS THE  FIRST TEST TO EVALUATE MONOCLONAL GAMMOPATHY.     12/23/2024 994 700 - 1,600 mg/dL Final     Comment:     MONOCLONAL PROTEINS MAY CAUSE FALSELY LOW  RESULTS IN THIS ASSAY. SERUM PROTEIN  ELECTROPHORESIS SHOULD BE DONE AS THE  FIRST TEST TO EVALUATE MONOCLONAL GAMMOPATHY.     11/30/2024 1,040 700 - 1,600 mg/dL Final     Comment:     MONOCLONAL PROTEINS MAY CAUSE FALSELY LOW  RESULTS IN THIS ASSAY. SERUM PROTEIN  ELECTROPHORESIS SHOULD BE DONE AS THE  FIRST TEST TO EVALUATE MONOCLONAL GAMMOPATHY.       Response monitoring:  Day 30 response monitoring: scheduled 1/2/25 PET/CT and Bmbx  Day 60 response monitoring: scheduled ####  Day 90 response monitoring: scheduled ####    Supportive Care:   Levetiracetam (500-750mg) every 12h x 30 days. Discontinued 1/5/25    Infectious prophylaxis:   Antiviral prophylaxis Acyclovir. Continue for at least 6 months.   PCP prophylaxis. Bactrim Started 1/3/25.   Antifungal prophylaxis Fluconazole. Continue antifungal prophylaxis through Day 90.   Levofloxacin until ANC is sustained at >500-discontinued 12/20/24.       Wallet Card: Verified that the patient was discharged with CAR T Cell wallet card at first outpatient visit.  Reinforced instructions for use.  The patient was reminded not to drive for 8 weeks following CAR T cell therapy    Vaccines:  Will plan to start vaccines with influenza and covid vaccine about 3-4 months after CAR-T and will start post transplant vaccines around 6 months.         BPH:  Reports his previous urinary frequency and nocturia symptoms has greatly improved after starting tadalafil 5 mg daily.   12/23/24:  Continue to hold tadalafil.      Renal failure:   sCr stable at 1.09 (12/23/24).     Weight Loss:  Hospital admission weight 97.2 kg (11/29/24).    12/23/24:  Weight today 87 kg.  Nancie reports his appetite is slowly increasing and he is tolerating more food over the last few days.  Discussed ways to increase calories into diet.  Monitoring.  1/2/25: Wt. Today 89.4kg    Health maintenance.   Colonoscopy completed 7/14/23: normal except internal hemorrhoids.  Recommendations to repeat in 2 years.    - Some aortic calcifications on imaging, started on prevastatin-managed by his PCP.  PCP increased pravastatin dose to 40 mg daily.  PCP switched to atorvastatin 40 mg daily.    Central Line:  Left power PICC line placed 12/2/24.  Discharged home on 12/18/24 with PICC, wife is flushing PICC line daily with NS flushes and dressing changes completed in infusion center.    12/23/24:  PICC line with no redness, tenderness, swelling, or drainage.      RTC:   Monday/Thursday count checks  12/27/24 and 12/30/24:  Post BMT provider and infusion appt  1/2/25:  PET/CT and Bmbx  1/6/25:  Central line, follow up visit with Zainab Sullivan CNP, and infusion.    Requested Post BMT provider and infusion appt for 1/13/25-needs scheduled.  -------------------------------------------------------------------------------------------  NORBERT Arizmendi

## 2025-01-02 NOTE — PROGRESS NOTES
POST-CELLULAR THERAPY ONCOLOGY PHARMACY MEDICATION EDUCATION NOTE   Nancie Armenta is a 59 y.o. male currently day +27 following CAR T-cell therapy (CASE 2419) for a diagnosis of mantle cell lymphoma. Pharmacist was consulted to provide home medication education.     Lab Results   Component Value Date    WBC 3.2 (L) 01/02/2025    NEUTROABS 1.74 01/02/2025    HGB 9.3 (L) 01/02/2025    PLT 65 (L) 01/02/2025      Lab Results   Component Value Date    GLUCOSE 98 01/02/2025    K 3.8 01/02/2025    MG 1.85 01/02/2025    CREATININE 0.95 01/02/2025       Medication Education: Education was provided regarding all home medication changes. In addition, patient was given written daily medication schedule/calendar. The schedule was discussed in detail with the patient, including drug name, use and dose, and the appropriate timing of self-administration.   - Medication changes: stopping levetiracetam 1/5/2025 & starting bactrim 1/3/2025    The patient demonstrated Level of Understanding : Excellent for their current medication list.    All questions were answered. Patient/family verbalized understanding of the plan of care and information provided. Will follow as necessary. Time spent with patient/family and/or coordinating care: 15 minutes.      Diana Iyer, PharmD  PGY2 Oncology Pharmacy Resident     Current Outpatient Medications   Medication Instructions    acyclovir (ZOVIRAX) 400 mg, oral, 2 times daily    atorvastatin (LIPITOR) 40 mg, oral, Every morning    fluconazole (DIFLUCAN) 400 mg, oral, Daily    hydrocortisone 1 % cream Apply 1 Application topically 2 times a day as needed (hemorrhoids).    levETIRAcetam (KEPPRA) 500 mg, oral, 2 times daily    ondansetron (ZOFRAN) 8 mg, oral, Every 8 hours PRN    prochlorperazine (COMPAZINE) 10 mg, oral, Every 6 hours PRN    sulfamethoxazole-trimethoprim (Bactrim DS) 800-160 mg tablet 1 tablet, oral, 3 times weekly, Take on Mondays, Wednesdays, and Fridays. Do not start  until instructed.

## 2025-01-02 NOTE — PROGRESS NOTES
Patient ID: Nancie Armenta is a 59 y.o. male.    Biopsy bone marrow    Date/Time: 1/2/2025 1:00 PM    Performed by: Kim Vasques PA-C  Authorized by: Kim Vasques PA-C    Consent:     Consent obtained:  Verbal    Consent given by:  Patient    Risks, benefits, and alternatives were discussed: yes      Risks discussed:  Bleeding, infection and pain    Alternatives discussed:  Observation  Universal protocol:     Procedure explained and questions answered to patient or proxy's satisfaction: yes      Relevant documents present and verified: yes      Test results available: no      Imaging studies available: no      Required blood products, implants, devices, and special equipment available: no      Site/side marked: yes      Immediately prior to procedure, a time out was called: yes      Patient identity confirmed:  Verbally with patient  Indications:     Indications:  Day + 30 BM assessment post CAR T  Pre-procedure details:     Skin preparation:  Chlorhexidine  Sedation:     Sedation type:  None  Anesthesia:     Anesthesia method:  Local infiltration    Local anesthetic:  Lidocaine 1% w/o epi and lidocaine 2% w/o epi  Procedure specific details:      Informed consent was obtained and potential risks including bleeding, infection and pain were reviewed with the patient.     Patient pre-medicated with 1 mg lorazepam IV.     The patient was placed in the prone position, and the Left posterior iliac crest was prepped with chlorhexidine.     The skin, subcutaneous tissues, and periosteum were anesthetized with 3mL of 1% lidocaine and 10mL of 2% lidocaine.     A small incision was made with a #15 scalpel, and the Jamshidi needle was advanced through the periosteum into the intramedullary space.    10 mL bone marrow was aspirated; the needle was then advanced further and a 1.25 cm core biopsy obtained. The specimen was sent for morphology, flow cytometry, cytogenetics, and FISH/molecular per Hematopathology.    The  needle was removed and hemostasis achieved.     The procedure was tolerated well and there were no complications.    Procedure completed by: Kim Vasques PA-C    Post-procedure details:     Procedure completion:  Tolerated well, no immediate complications

## 2025-01-05 ASSESSMENT — ENCOUNTER SYMPTOMS
RESPIRATORY NEGATIVE: 1
NUMBNESS: 0
DIZZINESS: 0
LIGHT-HEADEDNESS: 0
ABDOMINAL PAIN: 0
DIARRHEA: 0
DIAPHORESIS: 0
APPETITE CHANGE: 0
CARDIOVASCULAR NEGATIVE: 1
BLOOD IN STOOL: 0
CONSTIPATION: 0
VOMITING: 0
FEVER: 0
CHILLS: 0

## 2025-01-05 NOTE — PROGRESS NOTES
Patient ID: Nancie Armenta is a 59 y.o. male.    Treatment:   Oncology History Overview Note           Patient Visit Information:   Visit Type: Follow Up Visit      Cancer History:   Treatment Synopsis:     1. Stage IV A mantle cell non-Hodgkin lymphoma involving the gastrointestinal tract diagnosed in October 2011, with diffuse gastrointestinal involvement. The patient was treated  with RCHOP alternating with Rituxan and high-dose Mamie-C and received total of 6 cycles of chemotherapy which he finished in February 2012.   Allergic to Augmentin, causes hives.     2. Patient underwent beam chemotherapy followed by autologous peripheral stem cell transplant in April 2012, by Dr. Yvonne Hobbs at [facility], was also involved in phase 3 clinical trial up killed shingles vaccine versus placebo ([employer]-1 Z10  study).     3.The patient developed shingles in January 2014, involving left flank.      4. 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      5.  Recurrent mantle cell lymphoma June 2019 assx presentation with leukocytosis. Peripheral blood flow which showed a CD5 positve clonal lymphocytosis. 6/2/19 CT of chest abdomen and pelvis revealed  splenomegaly of 15 cm compared to 12.8 cm last evaluation. Recurrence confirmed by FISH on peripheral blood earlier this month.  BM biopsy which showed 5% Mantle cell involvement although peripheral blood shows 30% involvement. CT imaging showed splenomegaly. PET scan declined by insurance twice.  Patient underwent colonoscopy  with Dr. Ac ( [place]) 8/8/19 which showed cobblestoning of colon and multiple biopsies including terminal ileum, appendix, cecum,  rectosigmoid positive for mantle cell lymphoma.     6. Initiation of acalabrutinib August 2019 with minimal response. Switched to ibrutinib and venetoclax 12/20/20 with clearing of t(11, 14) by by FISH ( 1/2020) and resolution of involvement of colon by endoscopy and pathology on exam 3/2/2020! BM biopsy  4/2020 HEATHER.     C-scope (3/11/22): at OSH: negative, no evidence of disease. PET scan ( 3/14/22): no abnormal uptake. Deauville 1. BMBx (3/17/22): negative, no evidence of MCL.   March 2022, ibrutinib and venetoclax discontinued.    July 2024 development of Tpenia, bone marrow biopsy 7/25/24 showed focal involvement with mantle cell lymphoma (NGS cyclin D1 positive and Sox 11 positive. BIRC+ and TP 53 positve with a VAF of 3%, BIRC3 positive). FISH negative for 17p deletion  CT imaging done on 8/7/24 shows thickening of sigmoid colon, development of mild to moderate retroperitoneal adenopathy, splenomegally and possible splenic infarct.    Pirotbrutib 200 mg daily initiated 8/12/24.  Due to inadequate response, he started the ventoclax (50 mg) on Adolfo 10/13, then increased to 100 mg , 200 mg and 400 mg      7. S/P COVID 19 infection 12/1/2020, no sequelae                          Lymphoma   10/20/2011 Initial Diagnosis    Lymphoma (Multi)      Cellular Therapy    Mr. Nancie Armenta is a 59 y.o. male presenting with relapsed Mantle Cell Lymphoma. CAR-T cell therapy, on CASE 2419 (T0=12/6/24), prepped with Fludarabine/Cyclophosphamide.      Hosp Course:  - Grade 2 CRS (fever, tachycardia) on 12/7, gave Toxi x1, infectious workup negative. Resolved by 12/8.  - Symptomatic Orthostatic Hypotension on 12/8, resolved with fluids by 12/9.     Mantle cell lymphoma of spleen (Multi)   10/13/2016 Initial Diagnosis    Mantle cell lymphoma of spleen (CMS/HCC)     Mantle cell lymphoma   11/18/2024 Initial Diagnosis    Mantle cell lymphoma     11/30/2024 -  Bone Marrow Transplant        Cellular Therapy    Mr. [place]  Geovany is a 59 y.o. male presenting with relapsed Mantle Cell Lymphoma. CAR-T cell therapy, on CASE 2419 (T0=12/6/24), prepped with Fludarabine/Cyclophosphamide.      Hosp Course:  - Grade 2 CRS (fever, tachycardia) on 12/7, gave Toxi x1, infectious workup negative. Resolved by 12/8.  - Symptomatic Orthostatic Hypotension on 12/8, resolved with fluids by 12/9.         Response:     Past Medical History:  Renal insufficiency.   Past Medical History:   Diagnosis Date    Fourth (trochlear) nerve palsy, right eye 06/08/2017    Right-sided fourth cranial nerve palsy    Fourth (trochlear) nerve palsy, right eye 06/08/2017    Right-sided fourth cranial nerve palsy    Malignant neoplasm of connective and soft tissue, unspecified 06/08/2017    Cancer of blood vessel     Surgical History:   Past Surgical History:   Procedure Laterality Date    IR CVC PICC  11/29/2024    IR CVC PICC 11/29/2024 [facility] ANGIO    SHOULDER SURGERY  07/13/2017    Shoulder Surgery      Family History:   Family History   Problem Relation Name Age of Onset    Cataracts Mother      Cataracts Father      Other (chronic lymphoid leukemia) Father       Social History:  .  Working full time for [employer].    Social History     Tobacco Use    Smoking status: Never    Smokeless tobacco: Never   Vaping Use    Vaping status: Never Used   Substance Use Topics    Alcohol use: Never    Drug use: Never   -------------------------------------------------------------------------------------------------------  Subjective       HPI    Nancie Armenta is a 59 year old man with PMH of hyperglycemia, renal insufficiency, and stage IV A mantle cell non hodgkin lymphoma involving the gastrointestinal tract, s/p autologous SCT (April 2012).  Early in July 2024 patient noted to have dropping platelet counts and bone marrow biopsy 7/25/24 showed focal involvement with mantle cell lymphoma (NGS cyclin D1 positive and Sox 11 positive. BIRC+ ad TP 53 positve with a  VAF of 3%, BIRC3 positive). FISH negative for 17p deletion  CT imaging done on 8/7/24 shows thickening of sigmoid colon, development of mild to moderate retroperitoneal adenopathy, splenomegally and possible splenic infarct.  Started Pirtobrutinib salvage on 8/12/24.  Added Venetoclax starting 10/13/24 since spleen size was increasing on repeat CT imaging and on physical exam.  BMBx (10/23/24) with low level involvement by mantle cells about 3% Mantle cells by flow.  PET/CT (11/4/24) with FDG avid lymphadenopathy throughout the neck, chest, abdomen, and pelvis c/w lymphomatous involvement. Intense FDG avid splenomegaly. Peripheral regions of photopenia likely reflect areas of infarction.  Pirtobrutinib and Venetoclax stopped 11/14/24 in preparation for CART collection.  Admitted 11/29/24 for his CAR-T cell therapy, on CASE 2419 (T0=12/6/24), prepped with Fludarabine/Cyclophosphamide. Hospital course included grade 2 CRS (fever, tachycardia) on 12/7, received Toxi x1, infectious workup negative.  He also had symptomatic orthostatic hypotension on 12/8, resolved with fluids by 12/9.  He was discharged home on 12/18/24.      Nancie presents to the clinic today (1/6/25) with his wife for followup evaluation after his CAR-T for for relapsed mantle cell lymphoma and count check.  Today is T+31 from CAR-T.  Reports he is feeling better.  Energy level is lower but is slowly improving.  Balance feels more steady.  Did not require wheelchair to come in for appointment today.  Shoveled a little bit of snow the other day and going up stairs has been easier.  Appetite is doing better.  Gained a few pounds since last visit.  Drinking about 70 ounces of fluids a day.      Having head cold symptoms for about 1 weeks with congestion, fatigue, and feeling cold.  Reports no fevers or chills.  No shortness of breath or cough.  Using neti pot and Vicks vapor rub.  Not sleeping well, not able to fall asleep well and is staying up late  and sleeping from 4:30am-7:30 am.  Taking a short nap.  Had unset stomach this morning, didn't need antiemetics.  Bowel movements are formed now.  PICC line doing well to left arm.  Wife flushing PICC line daily.      Review of Systems   Constitutional:  Positive for fatigue. Negative for appetite change, chills, diaphoresis, fever and unexpected weight change.   Respiratory: Negative.     Cardiovascular: Negative.    Gastrointestinal:  Positive for nausea. Negative for abdominal pain, blood in stool, constipation, diarrhea and vomiting.   Genitourinary: Negative.     Musculoskeletal:  Negative for gait problem.   Skin: Negative.    Neurological:  Negative for dizziness, gait problem, light-headedness and numbness.   Hematological: Negative.    -------------------------------------------------------------------------------------------------------  Objective   BSA: There is no height or weight on file to calculate BSA.  There were no vitals taken for this visit.    Physical Exam  Vitals reviewed.   Constitutional:       General: He is not in acute distress.  HENT:      Head: Normocephalic and atraumatic.      Mouth/Throat:      Mouth: Mucous membranes are moist.   Eyes:      Extraocular Movements: Extraocular movements intact.      Conjunctiva/sclera: Conjunctivae normal.      Pupils: Pupils are equal, round, and reactive to light.   Cardiovascular:      Rate and Rhythm: Normal rate and regular rhythm.      Pulses: Normal pulses.      Heart sounds: Normal heart sounds.   Pulmonary:      Effort: Pulmonary effort is normal. No respiratory distress.      Breath sounds: Normal breath sounds. No wheezing.   Abdominal:      General: Abdomen is flat. Bowel sounds are normal. There is no distension.      Palpations: Abdomen is soft. There is splenomegaly. There is no mass.      Tenderness: There is no abdominal tenderness.   Musculoskeletal:         General: No swelling. Normal range of motion.   Lymphadenopathy:       Comments: No lymphadenopathy   Skin:     General: Skin is warm and dry.      Comments: PICC line to left arm, site WNL.   Neurological:      General: No focal deficit present.      Mental Status: He is alert and oriented to person, place, and time.   Psychiatric:         Mood and Affect: Mood normal.         Behavior: Behavior normal.         Thought Content: Thought content normal.         Judgment: Judgment normal.     Performance Status:  ECOG performance status: 0 fully active    CAR-T product:  CAR-T [id number]  Cell infusion date: 12/6/2024  Day: 31  -------------------------------------------------------------------------------------------------------  Assessment/Plan      1. Stage IV mantle cell lymphoma itreated with Nordic regimen and consolidation autograft with BEAM preparative regimen April 2012.  Relapse 2019 treated with ibrutinib and venetoclax with complete pathology response until 3/ 2022. See above.     Recurrence July 2024 presenting with dropping platelet counts and bone marrow biopsy 7/25/24 showed focal involvement with mantle cell lymphoma (NGS cyclin D1 positive and Sox 11 positive. BIRC+ ad TP 53 positve with a VAF of 3%, BIRC3 positive). FISH negative for 17p deletion  CT imaging done on 8/7/24 shows thickening of sigmoid colon, development of mild to moderate retroperitoneal adenopathy, splenomegally and possible splenic infarct.      Patient started pirtobrutinib salvage August 12,2024 and feels better. However spleen size was increasing per CT imaging (8/6/24)  and physical exam so Dr. Hobbs planned to add venetoclax as follows, starting 10/13/24:  - 50 mg for first week, then 100 (10/20), and 200 mg (10/27) for 7 days each, then 400 mg (11/3/24- )  -increased anemia with hgb 8.5, platlets 37K, no bleeding.     Pre CART staging:    BM biopsy low level involvment by mantle cells about 3% mantle cells by flow. Absence of CD19 on this  flow sample likely reflects insufficient sampling and  positive for CD 19 on prior bone marrow sample of 7/24/24..    PET imaging 11/4/24   1. FDG avid lymphadenopathy throughout the neck, chest, abdomen and  pelvis as detailed above, consistent with lymphomatous involvement.  2. Intense FDG avid splenomegaly, consistent with lymphomatous  involvement. Peripheral regions of photopenia likely reflect areas of  infarction.  3. No PET evidence of FDG avid extranodal disease.on     ECHO 10/23/24 low normal LVEF 53%, cleared by Dr. Izaguirre.     - Plan to proceed to CART cell therapy ASAP, discussed CASE 2419, anticipate collection on 11/25 and start of LD shortly thereafter. Patient instructed to take last dose of pirto and venetoclax on 11/14/24 to allow 10 day washout.  Met with Research RN and CAR-T coordinator. Due to anemia, ordered prbcs to be transfused Friday before intended collection on 11/25/24.    Admitted 11/29/24 for his CAR-T cell therapy, on CASE 2419 (T0=12/6/24), prepped with Fludarabine/Cyclophosphamide.   Hosp Course:  - Grade 2 CRS (fever, tachycardia) on 12/7, gave Toxi x1, infectious workup negative. Resolved by 12/8.  - Symptomatic Orthostatic Hypotension on 12/8, resolved with fluids by 12/9.  Discharged home on 12/18/24.      PET scan 1/2/25:   IMPRESSION:  1.  Interval resolution of hypermetabolic lymphadenopathy above and below the diaphragm. Interval resolution hypermetabolic activity within the spleen with improving splenomegaly. (Deauville score of 1).  2. No evidence of new hypermetabolic robbie or extranodal lymphomatous disease.    1/6/25:  Today is T+ 31 from CAR-T.  Blood counts improved, hgb 11.0, plt 78, WBC 4.3, and ANC 2.37.  Bone marrow biopsy performed 1/2/25-results in process.  Reviewed PET scan results from 1/2/25 with patient and wife.  Will discuss with Dr. Hobbs plans for T+60 restaging regarding imaging and BMbx.  Per clinical trial nurse, 60 day bone marrow biopsy is only required if previously involved by disease and not  in complete remission in prior bone marrow biopsy.  Awaiting results of most recent BM.  Nancie is doing better in regards to energy level and has gained weight since last visit.  Will likely be able to transition to weekly appointment soon.  Kept visit for 1/9/25 to monitor cold symptoms.  If feeling better okay to remove PICC line at next visit and transition to weekly provider visits.      CAR T cell therapy:   Toxicity monitoring:   Ferritin   Date Value Ref Range Status   01/06/2025 1,113 (H) 20 - 300 ng/mL Final   01/02/2025 1,025 (H) 20 - 300 ng/mL Final   12/30/2024 1,167 (H) 20 - 300 ng/mL Final     Fibrinogen   Date Value Ref Range Status   01/06/2025 150 (L) 200 - 400 mg/dL Final   12/30/2024 130 (L) 200 - 400 mg/dL Final   12/27/2024 115 (L) 200 - 400 mg/dL Final     C-Reactive Protein   Date Value Ref Range Status   01/06/2025 <0.10 <1.00 mg/dL Final   12/30/2024 <0.10 <1.00 mg/dL Final   12/27/2024 <0.10 <1.00 mg/dL Final     IgG   Date Value Ref Range Status   12/27/2024 811 700 - 1,600 mg/dL Final     Comment:     MONOCLONAL PROTEINS MAY CAUSE FALSELY LOW  RESULTS IN THIS ASSAY. SERUM PROTEIN  ELECTROPHORESIS SHOULD BE DONE AS THE  FIRST TEST TO EVALUATE MONOCLONAL GAMMOPATHY.     12/23/2024 994 700 - 1,600 mg/dL Final     Comment:     MONOCLONAL PROTEINS MAY CAUSE FALSELY LOW  RESULTS IN THIS ASSAY. SERUM PROTEIN  ELECTROPHORESIS SHOULD BE DONE AS THE  FIRST TEST TO EVALUATE MONOCLONAL GAMMOPATHY.     11/30/2024 1,040 700 - 1,600 mg/dL Final     Comment:     MONOCLONAL PROTEINS MAY CAUSE FALSELY LOW  RESULTS IN THIS ASSAY. SERUM PROTEIN  ELECTROPHORESIS SHOULD BE DONE AS THE  FIRST TEST TO EVALUATE MONOCLONAL GAMMOPATHY.       Response monitoring:  Day 30 response monitoring: obtained PET/CT and Bmbx on 1/2/25.  Day 60 response monitoring: scheduled ####  Day 90 response monitoring: scheduled ####    Supportive Care:   Levetiracetam (500-750mg) every 12h x 30 days. Discontinued  1/5/25    Infectious prophylaxis:   Antiviral prophylaxis Acyclovir. Continue for at least 6 months.   PCP prophylaxis. Bactrim Started 1/3/25.   Antifungal prophylaxis Fluconazole. Continue antifungal prophylaxis through Day 90.   Levofloxacin until ANC is sustained at >500-discontinued 12/20/24.       Wallet Card: Verified that the patient was discharged with CAR T Cell wallet card at first outpatient visit. Reinforced instructions for use.  The patient was reminded not to drive for 8 weeks following CAR T cell therapy    Vaccines:  Will plan to start vaccines with influenza and covid vaccine about 3-4 months after CAR-T and will start post transplant vaccines around 6 months.       BPH:  Reports his previous urinary frequency and nocturia symptoms has greatly improved after starting tadalafil 5 mg daily.   Tadalafil and flomax held during hospitalization due to orthostatic hypotension.      Renal failure:   sCr stable at 1.04 (1/6/25).     Weight Loss:  Hospital admission weight 97.2 kg (11/29/24).    12/23/24:  Weight today 87 kg.  Nancie reports his appetite is slowly increasing and he is tolerating more food over the last few days.  Discussed ways to increase calories into diet.  Monitoring.  1/2/25: Wt. Today 89.4kg  1/6/25:  Weight today 89.5 kg.  Monitoring.    Health maintenance.   Colonoscopy completed 7/14/23: normal except internal hemorrhoids.  Recommendations to repeat in 2 years.    - Some aortic calcifications on imaging, started on prevastatin-managed by his PCP.  PCP increased pravastatin dose to 40 mg daily.  PCP switched to atorvastatin 40 mg daily.    Central Line:  Left power PICC line placed 12/2/24.  Discharged home on 12/18/24 with PICC, wife is flushing PICC line daily with NS flushes and dressing changes completed in infusion center.    1/6/25:  PICC line with no redness, tenderness, swelling, or drainage.  Okay to remove PICC at next appointment if not requiring IV replacements.       RTC:   Monday/Thursday count checks, transition to weekly visits soon  1/9/25, 1/13/25, 1/20/25:  Post BMT provider and infusion appt  1/27/25:  Follow up visit with Zainab Sullivan CNP  2/6/25:  Follow up visit with Dr. Hobbs   -------------------------------------------------------------------------------------------  THA Enriquez-LETI

## 2025-01-06 ENCOUNTER — DOCUMENTATION (OUTPATIENT)
Dept: OTHER | Facility: HOSPITAL | Age: 60
End: 2025-01-06

## 2025-01-06 ENCOUNTER — OFFICE VISIT (OUTPATIENT)
Dept: HEMATOLOGY/ONCOLOGY | Facility: HOSPITAL | Age: 60
End: 2025-01-06
Payer: COMMERCIAL

## 2025-01-06 ENCOUNTER — INFUSION (OUTPATIENT)
Dept: OTHER | Facility: HOSPITAL | Age: 60
End: 2025-01-06
Payer: COMMERCIAL

## 2025-01-06 VITALS
RESPIRATION RATE: 18 BRPM | HEART RATE: 91 BPM | OXYGEN SATURATION: 100 % | TEMPERATURE: 97.2 F | SYSTOLIC BLOOD PRESSURE: 112 MMHG | WEIGHT: 197.31 LBS | BODY MASS INDEX: 26.46 KG/M2 | DIASTOLIC BLOOD PRESSURE: 77 MMHG

## 2025-01-06 DIAGNOSIS — D84.9 IMMUNOCOMPROMISED: ICD-10-CM

## 2025-01-06 DIAGNOSIS — C83.17 MANTLE CELL LYMPHOMA OF SPLEEN (MULTI): Primary | ICD-10-CM

## 2025-01-06 DIAGNOSIS — R63.4 WEIGHT LOSS: ICD-10-CM

## 2025-01-06 DIAGNOSIS — Z92.850 HISTORY OF CHIMERIC ANTIGEN RECEPTOR T-CELL THERAPY: ICD-10-CM

## 2025-01-06 DIAGNOSIS — N40.1 BENIGN NON-NODULAR PROSTATIC HYPERPLASIA WITH LOWER URINARY TRACT SYMPTOMS: ICD-10-CM

## 2025-01-06 DIAGNOSIS — C83.17 MANTLE CELL LYMPHOMA OF SPLEEN (MULTI): ICD-10-CM

## 2025-01-06 DIAGNOSIS — Z94.84 HX OF AUTOLOGOUS STEM CELL TRANSPLANT: ICD-10-CM

## 2025-01-06 LAB
ALBUMIN SERPL BCP-MCNC: 4.7 G/DL (ref 3.4–5)
ALP SERPL-CCNC: 42 U/L (ref 33–136)
ALT SERPL W P-5'-P-CCNC: 16 U/L (ref 10–52)
ANION GAP SERPL CALC-SCNC: 13 MMOL/L (ref 10–20)
APTT PPP: 26 SECONDS (ref 27–38)
AST SERPL W P-5'-P-CCNC: 17 U/L (ref 9–39)
BASO STIPL BLD QL SMEAR: PRESENT
BASOPHILS # BLD AUTO: 0.05 X10*3/UL (ref 0–0.1)
BASOPHILS NFR BLD AUTO: 1.2 %
BILIRUB SERPL-MCNC: 0.9 MG/DL (ref 0–1.2)
BUN SERPL-MCNC: 18 MG/DL (ref 6–23)
CALCIUM SERPL-MCNC: 9.6 MG/DL (ref 8.6–10.3)
CHLORIDE SERPL-SCNC: 104 MMOL/L (ref 98–107)
CO2 SERPL-SCNC: 27 MMOL/L (ref 21–32)
CREAT SERPL-MCNC: 1.04 MG/DL (ref 0.5–1.3)
CRP SERPL-MCNC: <0.1 MG/DL
DACRYOCYTES BLD QL SMEAR: NORMAL
EGFRCR SERPLBLD CKD-EPI 2021: 82 ML/MIN/1.73M*2
EOSINOPHIL # BLD AUTO: 0.06 X10*3/UL (ref 0–0.7)
EOSINOPHIL NFR BLD AUTO: 1.4 %
ERYTHROCYTE [DISTWIDTH] IN BLOOD BY AUTOMATED COUNT: 21.9 % (ref 11.5–14.5)
FERRITIN SERPL-MCNC: 1113 NG/ML (ref 20–300)
FIBRINOGEN PPP-MCNC: 150 MG/DL (ref 200–400)
GLUCOSE SERPL-MCNC: 99 MG/DL (ref 74–99)
HCT VFR BLD AUTO: 31.3 % (ref 41–52)
HGB BLD-MCNC: 11 G/DL (ref 13.5–17.5)
IMM GRANULOCYTES # BLD AUTO: 0.03 X10*3/UL (ref 0–0.7)
IMM GRANULOCYTES NFR BLD AUTO: 0.7 % (ref 0–0.9)
INR PPP: 1 (ref 0.9–1.1)
LDH SERPL L TO P-CCNC: 170 U/L (ref 84–246)
LYMPHOCYTES # BLD AUTO: 1.16 X10*3/UL (ref 1.2–4.8)
LYMPHOCYTES NFR BLD AUTO: 26.7 %
MAGNESIUM SERPL-MCNC: 2.07 MG/DL (ref 1.6–2.4)
MCH RBC QN AUTO: 34.4 PG (ref 26–34)
MCHC RBC AUTO-ENTMCNC: 35.1 G/DL (ref 32–36)
MCV RBC AUTO: 98 FL (ref 80–100)
MONOCYTES # BLD AUTO: 0.67 X10*3/UL (ref 0.1–1)
MONOCYTES NFR BLD AUTO: 15.4 %
NEUTROPHILS # BLD AUTO: 2.37 X10*3/UL (ref 1.2–7.7)
NEUTROPHILS NFR BLD AUTO: 54.6 %
NRBC BLD-RTO: 0 /100 WBCS (ref 0–0)
OVALOCYTES BLD QL SMEAR: NORMAL
PLATELET # BLD AUTO: 78 X10*3/UL (ref 150–450)
POLYCHROMASIA BLD QL SMEAR: NORMAL
POTASSIUM SERPL-SCNC: 4.3 MMOL/L (ref 3.5–5.3)
PROT SERPL-MCNC: 6.2 G/DL (ref 6.4–8.2)
PROTHROMBIN TIME: 11 SECONDS (ref 9.8–12.8)
RBC # BLD AUTO: 3.2 X10*6/UL (ref 4.5–5.9)
RBC MORPH BLD: NORMAL
SCHISTOCYTES BLD QL SMEAR: NORMAL
SODIUM SERPL-SCNC: 140 MMOL/L (ref 136–145)
URATE SERPL-MCNC: 5.6 MG/DL (ref 4–7.5)
WBC # BLD AUTO: 4.3 X10*3/UL (ref 4.4–11.3)

## 2025-01-06 PROCEDURE — 82728 ASSAY OF FERRITIN: CPT

## 2025-01-06 PROCEDURE — 83615 LACTATE (LD) (LDH) ENZYME: CPT

## 2025-01-06 PROCEDURE — 99215 OFFICE O/P EST HI 40 MIN: CPT

## 2025-01-06 PROCEDURE — 85384 FIBRINOGEN ACTIVITY: CPT

## 2025-01-06 PROCEDURE — 86140 C-REACTIVE PROTEIN: CPT

## 2025-01-06 PROCEDURE — 85025 COMPLETE CBC W/AUTO DIFF WBC: CPT

## 2025-01-06 PROCEDURE — 83735 ASSAY OF MAGNESIUM: CPT

## 2025-01-06 PROCEDURE — 84550 ASSAY OF BLOOD/URIC ACID: CPT

## 2025-01-06 PROCEDURE — 36591 DRAW BLOOD OFF VENOUS DEVICE: CPT

## 2025-01-06 PROCEDURE — 85610 PROTHROMBIN TIME: CPT

## 2025-01-06 PROCEDURE — 80053 COMPREHEN METABOLIC PANEL: CPT

## 2025-01-06 RX ORDER — HEPARIN SODIUM,PORCINE/PF 10 UNIT/ML
50 SYRINGE (ML) INTRAVENOUS AS NEEDED
OUTPATIENT
Start: 2025-01-06

## 2025-01-06 RX ORDER — HEPARIN 100 UNIT/ML
500 SYRINGE INTRAVENOUS AS NEEDED
OUTPATIENT
Start: 2025-01-06

## 2025-01-06 ASSESSMENT — PAIN - FUNCTIONAL ASSESSMENT: PAIN_FUNCTIONAL_ASSESSMENT: 0-10

## 2025-01-06 ASSESSMENT — ENCOUNTER SYMPTOMS
NAUSEA: 1
FATIGUE: 1
UNEXPECTED WEIGHT CHANGE: 0
HEMATOLOGIC/LYMPHATIC NEGATIVE: 1

## 2025-01-06 ASSESSMENT — PAIN SCALES - GENERAL: PAINLEVEL_OUTOF10: 0 - NO PAIN

## 2025-01-06 NOTE — PROGRESS NOTES
Research Note Non-Treatment Day    Nancie Armenta is here today. Patient is on [id number]. Today is T+31. Procedures completed per protocol. AE's and con-meds reviewed with patient.  Patient is feeling good. His energy level is improving. He does report periods of being cold and then hot, but no fever. He continues to monitor his temperature 2-3x/day. Patient is reporting some nasal congestion, but states it is improving from last week. ECOG 1. Patient is aware of treatment plan. Reviewed PET scan results with patient and wife. Bone marrow biopsy pending. Will continue to follow patient per protocol calendar and as needed.     Education Documentation  Infection Control, taught by Truong Chong RN at 1/6/2025 12:52 PM.  Learner: Significant Other, Patient  Readiness: Eager  Method: Explanation  Response: Verbalizes Understanding    Treatment Plan and Schedule, taught by Truong Chong RN at 1/6/2025 12:52 PM.  Learner: Significant Other, Patient  Readiness: Eager  Method: Explanation  Response: Verbalizes Understanding    Comprehensive Metabolic Panel (CMP), taught by Truong Chong RN at 1/6/2025 12:52 PM.  Learner: Significant Other, Patient  Readiness: Eager  Method: Explanation  Response: Verbalizes Understanding    Complete Blood Count with Differential (CBC w/ Diff), taught by Truong Chong RN at 1/6/2025 12:52 PM.  Learner: Significant Other, Patient  Readiness: Eager  Method: Explanation  Response: Verbalizes Understanding    Education Comments  No comments found.

## 2025-01-06 NOTE — PROGRESS NOTES
Pt ambulated to SCT unit without assistance. Pt accompanied by spouse. Pt denies complaints of pain today. Pt reports having a head cold over the last week, drainage from nose yellowish/greenish, NP made aware. Vitals obtained, blood drawn via LUE PICC and sent to lab. Zainab Sullivan NP came to see patient. PICC line dressing and caps changed per protocol.  Pt given copy of lab results. Pt ambulated off unit without complaints or assistance.

## 2025-01-07 LAB
CMV DNA SERPL NAA+PROBE-LOG IU: NORMAL {LOG_IU}/ML
LABORATORY COMMENT REPORT: NOT DETECTED

## 2025-01-08 PROBLEM — D84.9 IMMUNOCOMPROMISED: Status: ACTIVE | Noted: 2025-01-08

## 2025-01-09 ENCOUNTER — OFFICE VISIT (OUTPATIENT)
Dept: OTHER | Facility: HOSPITAL | Age: 60
End: 2025-01-09
Payer: COMMERCIAL

## 2025-01-09 ENCOUNTER — TREATMENT (OUTPATIENT)
Dept: OTHER | Facility: HOSPITAL | Age: 60
End: 2025-01-09
Payer: COMMERCIAL

## 2025-01-09 VITALS
OXYGEN SATURATION: 100 % | DIASTOLIC BLOOD PRESSURE: 76 MMHG | WEIGHT: 198.97 LBS | TEMPERATURE: 97.7 F | RESPIRATION RATE: 18 BRPM | BODY MASS INDEX: 26.69 KG/M2 | SYSTOLIC BLOOD PRESSURE: 109 MMHG | HEART RATE: 97 BPM

## 2025-01-09 DIAGNOSIS — C83.17 MANTLE CELL LYMPHOMA OF SPLEEN (MULTI): ICD-10-CM

## 2025-01-09 DIAGNOSIS — Z92.850 HISTORY OF CHIMERIC ANTIGEN RECEPTOR T-CELL THERAPY: ICD-10-CM

## 2025-01-09 DIAGNOSIS — J01.00 ACUTE NON-RECURRENT MAXILLARY SINUSITIS: Primary | ICD-10-CM

## 2025-01-09 DIAGNOSIS — Z94.84 HX OF AUTOLOGOUS STEM CELL TRANSPLANT: ICD-10-CM

## 2025-01-09 LAB
ALBUMIN SERPL BCP-MCNC: 4.7 G/DL (ref 3.4–5)
ALP SERPL-CCNC: 43 U/L (ref 33–136)
ALT SERPL W P-5'-P-CCNC: 18 U/L (ref 10–52)
ANION GAP SERPL CALC-SCNC: 13 MMOL/L (ref 10–20)
AST SERPL W P-5'-P-CCNC: 16 U/L (ref 9–39)
BASOPHILS # BLD AUTO: 0.07 X10*3/UL (ref 0–0.1)
BASOPHILS NFR BLD AUTO: 1.1 %
BILIRUB SERPL-MCNC: 0.9 MG/DL (ref 0–1.2)
BUN SERPL-MCNC: 24 MG/DL (ref 6–23)
CALCIUM SERPL-MCNC: 9.4 MG/DL (ref 8.6–10.3)
CHLORIDE SERPL-SCNC: 103 MMOL/L (ref 98–107)
CO2 SERPL-SCNC: 25 MMOL/L (ref 21–32)
CREAT SERPL-MCNC: 1.22 MG/DL (ref 0.5–1.3)
CRP SERPL-MCNC: <0.1 MG/DL
DACRYOCYTES BLD QL SMEAR: NORMAL
EGFRCR SERPLBLD CKD-EPI 2021: 68 ML/MIN/1.73M*2
EOSINOPHIL # BLD AUTO: 0.13 X10*3/UL (ref 0–0.7)
EOSINOPHIL NFR BLD AUTO: 2 %
ERYTHROCYTE [DISTWIDTH] IN BLOOD BY AUTOMATED COUNT: 20.4 % (ref 11.5–14.5)
FERRITIN SERPL-MCNC: 1114 NG/ML (ref 20–300)
FIBRINOGEN PPP-MCNC: 161 MG/DL (ref 200–400)
FLUAV RNA RESP QL NAA+PROBE: NOT DETECTED
FLUBV RNA RESP QL NAA+PROBE: NOT DETECTED
GLUCOSE SERPL-MCNC: 123 MG/DL (ref 74–99)
HADV DNA SPEC QL NAA+PROBE: NOT DETECTED
HCT VFR BLD AUTO: 29.8 % (ref 41–52)
HGB BLD-MCNC: 11.1 G/DL (ref 13.5–17.5)
HMPV RNA SPEC QL NAA+PROBE: NOT DETECTED
HPIV1 RNA SPEC QL NAA+PROBE: NOT DETECTED
HPIV2 RNA SPEC QL NAA+PROBE: NOT DETECTED
HPIV3 RNA SPEC QL NAA+PROBE: NOT DETECTED
HPIV4 RNA SPEC QL NAA+PROBE: NOT DETECTED
IGG SERPL-MCNC: 700 MG/DL (ref 700–1600)
IMM GRANULOCYTES # BLD AUTO: 0.03 X10*3/UL (ref 0–0.7)
IMM GRANULOCYTES NFR BLD AUTO: 0.5 % (ref 0–0.9)
LYMPHOCYTES # BLD AUTO: 1.3 X10*3/UL (ref 1.2–4.8)
LYMPHOCYTES NFR BLD AUTO: 20.2 %
MAGNESIUM SERPL-MCNC: 2.05 MG/DL (ref 1.6–2.4)
MCH RBC QN AUTO: 35.4 PG (ref 26–34)
MCHC RBC AUTO-ENTMCNC: 37.2 G/DL (ref 32–36)
MCV RBC AUTO: 95 FL (ref 80–100)
MONOCYTES # BLD AUTO: 0.73 X10*3/UL (ref 0.1–1)
MONOCYTES NFR BLD AUTO: 11.4 %
NEUTROPHILS # BLD AUTO: 4.17 X10*3/UL (ref 1.2–7.7)
NEUTROPHILS NFR BLD AUTO: 64.8 %
NRBC BLD-RTO: 0 /100 WBCS (ref 0–0)
OVALOCYTES BLD QL SMEAR: NORMAL
PLATELET # BLD AUTO: 96 X10*3/UL (ref 150–450)
POLYCHROMASIA BLD QL SMEAR: NORMAL
POTASSIUM SERPL-SCNC: 4.1 MMOL/L (ref 3.5–5.3)
PROT SERPL-MCNC: 6.3 G/DL (ref 6.4–8.2)
RBC # BLD AUTO: 3.14 X10*6/UL (ref 4.5–5.9)
RBC MORPH BLD: NORMAL
RSV RNA RESP QL NAA+PROBE: NOT DETECTED
SARS-COV-2 RNA RESP QL NAA+PROBE: NOT DETECTED
SCHISTOCYTES BLD QL SMEAR: NORMAL
SODIUM SERPL-SCNC: 137 MMOL/L (ref 136–145)
URATE SERPL-MCNC: 5.9 MG/DL (ref 4–7.5)
WBC # BLD AUTO: 6.4 X10*3/UL (ref 4.4–11.3)

## 2025-01-09 PROCEDURE — 84075 ASSAY ALKALINE PHOSPHATASE: CPT

## 2025-01-09 PROCEDURE — 87486 CHLMYD PNEUM DNA AMP PROBE: CPT

## 2025-01-09 PROCEDURE — 82784 ASSAY IGA/IGD/IGG/IGM EACH: CPT

## 2025-01-09 PROCEDURE — 99215 OFFICE O/P EST HI 40 MIN: CPT | Performed by: NURSE PRACTITIONER

## 2025-01-09 PROCEDURE — 36591 DRAW BLOOD OFF VENOUS DEVICE: CPT

## 2025-01-09 PROCEDURE — 86140 C-REACTIVE PROTEIN: CPT

## 2025-01-09 PROCEDURE — 87631 RESP VIRUS 3-5 TARGETS: CPT

## 2025-01-09 PROCEDURE — 83735 ASSAY OF MAGNESIUM: CPT

## 2025-01-09 PROCEDURE — 87798 DETECT AGENT NOS DNA AMP: CPT

## 2025-01-09 PROCEDURE — 85384 FIBRINOGEN ACTIVITY: CPT

## 2025-01-09 PROCEDURE — 87635 SARS-COV-2 COVID-19 AMP PRB: CPT

## 2025-01-09 PROCEDURE — 84550 ASSAY OF BLOOD/URIC ACID: CPT

## 2025-01-09 PROCEDURE — 85025 COMPLETE CBC W/AUTO DIFF WBC: CPT

## 2025-01-09 PROCEDURE — 82728 ASSAY OF FERRITIN: CPT

## 2025-01-09 RX ORDER — DOXYCYCLINE 100 MG/1
100 TABLET ORAL 2 TIMES DAILY
Qty: 14 TABLET | Refills: 0 | Status: SHIPPED | OUTPATIENT
Start: 2025-01-09 | End: 2025-01-16

## 2025-01-09 ASSESSMENT — ENCOUNTER SYMPTOMS
DIZZINESS: 0
APPETITE CHANGE: 0
FEVER: 0
DIAPHORESIS: 0
FATIGUE: 0
TROUBLE SWALLOWING: 0
CONFUSION: 0
VOMITING: 0
DIFFICULTY URINATING: 0
WOUND: 0
CHILLS: 0
LIGHT-HEADEDNESS: 0
ABDOMINAL PAIN: 0
WHEEZING: 0
BRUISES/BLEEDS EASILY: 0
DYSURIA: 0
COUGH: 0
LEG SWELLING: 0
NAUSEA: 0
SORE THROAT: 0
HEADACHES: 0
DIARRHEA: 0
PALPITATIONS: 0
SLEEP DISTURBANCE: 0
ADENOPATHY: 0
NERVOUS/ANXIOUS: 0
CONSTIPATION: 0
SHORTNESS OF BREATH: 0

## 2025-01-09 NOTE — PROGRESS NOTES
Pt ambulated to SCT unit without assistance accompanied by wife. Pt denies pain, however he continues to have nasal congestion. Vitals obtained, blood drawn via RAC PICCand sent to lab. Shari Metzger NP and Kim RESENDEZ came to see patient. Respiratory panel completed on patient as ordered. Patient provided a handout with today's lab results and his upcoming appointments. Pt ambulated off unit without complaints or assistance.

## 2025-01-09 NOTE — PROGRESS NOTES
Patient ID: Nancie Armenta is a 59 y.o. male.    Subjective    HPI  Nancie Armenta is a 59 year old man with PMH of hyperglycemia, renal insufficiency, and stage IV A mantle cell non hodgkin lymphoma involving the gastrointestinal tract, s/p autologous SCT (April 2012).  Early in July 2024 patient noted to have dropping platelet counts and bone marrow biopsy 7/25/24 showed focal involvement with mantle cell lymphoma (NGS cyclin D1 positive and Sox 11 positive. BIRC+ ad TP 53 positve with a VAF of 3%, BIRC3 positive). FISH negative for 17p deletion  CT imaging done on 8/7/24 shows thickening of sigmoid colon, development of mild to moderate retroperitoneal adenopathy, splenomegally and possible splenic infarct.  Started Pirtobrutinib salvage on 8/12/24.  Added Venetoclax starting 10/13/24 since spleen size was increasing on repeat CT imaging and on physical exam.  BMBx (10/23/24) with low level involvement by mantle cells about 3% Mantle cells by flow.  PET/CT (11/4/24) with FDG avid lymphadenopathy throughout the neck, chest, abdomen, and pelvis c/w lymphomatous involvement. Intense FDG avid splenomegaly. Peripheral regions of photopenia likely reflect areas of infarction.  Pirtobrutinib and Venetoclax stopped 11/14/24 in preparation for CART collection.  Admitted 11/29/24 for his CAR-T cell therapy, on CASE 2419 (T0=12/6/24), prepped with Fludarabine/Cyclophosphamide. Hospital course included grade 2 CRS (fever, tachycardia) on 12/7, received Toxi x1, infectious workup negative.  He also had symptomatic orthostatic hypotension on 12/8, resolved with fluids by 12/9.  He was discharged home on 12/18/24.      1/9/25: Presents today for follow up visit, accompanied by his wife. He is T +34 s/p CAR T on CASE 2419. Overall he states he is doing well. His appetite is steadily improving and weight continues to increase. He denies any N/V/D. He states his energy has been good and he has even been able to shovel a bit of  snow. He denies any fevers, chills, night sweats, dyspnea, cough, sore throat, sick contacts, chest pain, and palpitations. He does admit to sinus congestion that has been going on for about two weeks now. He states he was using the indra pot for a sinus rinse and nasal spray but has not taken anything OTC. He states he has sinus pressure and was experiencing post nasal drip but the post nasal drip has gotten better over the last few days. He is still having some green/yellow colored congestion. PICC line doing well on left arm and his wife continues to flush it daily.       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  Working full time for [employer].    Social History[]Expand by Default   Social History           Tobacco Use    Smoking status: Never    Smokeless tobacco: Never   Vaping Use    Vaping status: Never Used   Substance Use Topics    Alcohol use: Never    Drug use: Never          Review of Systems   Constitutional:  Negative for appetite change, chills, diaphoresis, fatigue and fever.   HENT:   Negative for mouth sores, sore throat and trouble swallowing.         Sinus congestion    Respiratory:  Negative for cough, shortness of breath and wheezing.    Cardiovascular:  Negative for chest pain, leg swelling and palpitations.   Gastrointestinal:  Negative for abdominal pain, constipation, diarrhea, nausea and vomiting.   Genitourinary:  Negative for difficulty urinating and dysuria.    Skin:  Negative for itching, rash and wound.    Neurological:  Negative for dizziness, headaches and light-headedness.   Hematological:  Negative for adenopathy. Does not bruise/bleed easily.   Psychiatric/Behavioral:  Negative for confusion, sleep disturbance and suicidal ideas. The patient is not nervous/anxious.        Objective    BSA: There is no height or weight on file to calculate BSA.  There were no vitals taken for this visit.  Vital signs reviewed today.      Physical Exam  Constitutional:       General: He is not in acute distress.     Appearance: Normal appearance. He is not ill-appearing.   HENT:      Mouth/Throat:      Mouth: Mucous membranes are moist.      Pharynx: Oropharynx is clear. No oropharyngeal exudate.   Cardiovascular:      Rate and Rhythm: Normal rate and regular rhythm.      Pulses: Normal pulses.      Heart sounds: Normal heart sounds.   Pulmonary:      Effort: Pulmonary effort is normal. No respiratory distress.      Breath sounds: Normal breath sounds. No wheezing.   Abdominal:      General: There is no distension.      Palpations: Abdomen is soft. There is no mass.      Tenderness: There is no abdominal tenderness.   Musculoskeletal:         General: No swelling, tenderness or signs of injury. Normal range of motion.   Skin:     General: Skin is warm.      Coloration: Skin is not jaundiced.      Findings: No bruising or erythema.   Neurological:      Mental Status: He is alert and oriented to person, place, and time.   Psychiatric:         Mood and Affect: Mood normal.         Behavior: Behavior normal.         Thought Content: Thought content normal.         Judgment: Judgment normal.         Performance Status:  Karnofsky Score: 90 - Able to carry on normal activity; minor signs or symptoms of disease       Assessment/Plan        Oncology History Overview Note           Patient Visit Information:   Visit Type: Follow Up Visit      Cancer History:   Treatment Synopsis:     1. Stage IV A mantle cell non-Hodgkin lymphoma  involving the gastrointestinal tract diagnosed in October 2011, with diffuse gastrointestinal involvement. The patient was treated  with RCHOP alternating with Rituxan and high-dose Mamie-C and received total of 6 cycles of chemotherapy which he finished in February 2012.   Allergic to Augmentin, causes hives.     2. Patient underwent beam chemotherapy followed by autologous peripheral stem cell transplant in April 2012, by Dr. Yvonne Hobbs at [facility], was also involved in phase 3 clinical trial up killed shingles vaccine versus placebo ([employer]-1 Z10  study).     3.The patient developed shingles in January 2014, involving left flank.      4. 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 but Patient had a syncopal episode in January 2017 without any clear explanation  went to [facility] emergency room when he was orthostatic and also injured his head, negative MRI and LP.      5.  Recurrent mantle cell lymphoma June 2019 assx presentation with leukocytosis. Peripheral blood flow which showed a CD5 positve clonal lymphocytosis. 6/2/19 CT of chest abdomen and pelvis revealed  splenomegaly of 15 cm compared to 12.8 cm last evaluation. Recurrence confirmed by FISH on peripheral blood earlier this month.  BM biopsy which showed 5% Mantle cell involvement although peripheral blood shows 30% involvement. CT imaging showed splenomegaly. PET scan declined by insurance twice.  Patient underwent colonoscopy  with Dr. Ac ( [place]) 8/8/19 which showed cobblestoning of colon and multiple biopsies including terminal ileum, appendix, cecum, rectosigmoid positive for mantle cell lymphoma.     6. Initiation of acalabrutinib August 2019 with minimal response. Switched to ibrutinib and venetoclax 12/20/20 with clearing of t(11, 14) by by FISH ( 1/2020) and resolution of involvement of colon by  endoscopy and pathology on exam 3/2/2020! BM biopsy  4/2020 HEATHER.     C-scope (3/11/22): at OSH: negative, no evidence of disease. PET scan ( 3/14/22): no abnormal uptake. Nakul 1. BMBx (3/17/22): negative, no evidence of MCL.   March 2022, ibrutinib and venetoclax discontinued.    July 2024 development of Tpenia, bone marrow biopsy 7/25/24 showed focal involvement with mantle cell lymphoma (NGS cyclin D1 positive and Sox 11 positive. BIRC+ and TP 53 positve with a VAF of 3%, BIRC3 positive). FISH negative for 17p deletion  CT imaging done on 8/7/24 shows thickening of sigmoid colon, development of mild to moderate retroperitoneal adenopathy, splenomegally and possible splenic infarct.    Pirotbrutib 200 mg daily initiated 8/12/24.  Due to inadequate response, he started the ventoclax (50 mg) on Adolfo 10/13, then increased to 100 mg , 200 mg and 400 mg      7. S/P COVID 19 infection 12/1/2020, no sequelae                          Lymphoma   10/20/2011 Initial Diagnosis    Lymphoma (Multi)      Cellular Therapy    Mr. Nancie Armenta is a 59 y.o. male presenting with relapsed Mantle Cell Lymphoma. CAR-T cell therapy, on CASE 2419 (T0=12/6/24), prepped with Fludarabine/Cyclophosphamide.      Hosp Course:  - Grade 2 CRS (fever, tachycardia) on 12/7, gave Toxi x1, infectious workup negative. Resolved by 12/8.  - Symptomatic Orthostatic Hypotension on 12/8, resolved with fluids by 12/9.     Mantle cell lymphoma of spleen (Multi)   10/13/2016 Initial Diagnosis    Mantle cell lymphoma of spleen (CMS/HCC)     Mantle cell lymphoma   11/18/2024 Initial Diagnosis    Mantle cell lymphoma     11/30/2024 -  Bone Marrow Transplant        Cellular Therapy    Mr. Nancie Armenta is a 59 y.o. male presenting with relapsed Mantle Cell Lymphoma. CAR-T cell therapy, on CASE 2419 (T0=12/6/24), prepped with Fludarabine/Cyclophosphamide.      Hosp Course:  - Grade 2 CRS (fever, tachycardia) on 12/7, gave Toxi x1, infectious workup  negative. Resolved by 12/8.  - Symptomatic Orthostatic Hypotension on 12/8, resolved with fluids by 12/9.        1. Stage IV mantle cell lymphoma itreated with Nordic regimen and consolidation autograft with BEAM preparative regimen April 2012.  Relapse 2019 treated with ibrutinib and venetoclax with complete pathology response until 3/ 2022. See above.     Recurrence July 2024 presenting with dropping platelet counts and bone marrow biopsy 7/25/24 showed focal involvement with mantle cell lymphoma (NGS cyclin D1 positive and Sox 11 positive. BIRC+ ad TP 53 positve with a VAF of 3%, BIRC3 positive). FISH negative for 17p deletion  CT imaging done on 8/7/24 shows thickening of sigmoid colon, development of mild to moderate retroperitoneal adenopathy, splenomegally and possible splenic infarct.       Patient started pirtobrutinib salvage August 12,2024 and feels better. However spleen size was increasing per CT imaging (8/6/24)  and physical exam so Dr. Hobbs planned to add venetoclax as follows, starting 10/13/24:  - 50 mg for first week, then 100 (10/20), and 200 mg (10/27) for 7 days each, then 400 mg (11/3/24- )  -increased anemia with hgb 8.5, platlets 37K, no bleeding.      Pre CART staging:     BM biopsy low level involvment by mantle cells about 3% mantle cells by flow. Absence of CD19 on this  flow sample likely reflects insufficient sampling and positive for CD 19 on prior bone marrow sample of 7/24/24..     PET imaging 11/4/24   1. FDG avid lymphadenopathy throughout the neck, chest, abdomen and  pelvis as detailed above, consistent with lymphomatous involvement.  2. Intense FDG avid splenomegaly, consistent with lymphomatous  involvement. Peripheral regions of photopenia likely reflect areas of  infarction.  3. No PET evidence of FDG avid extranodal disease.on      ECHO 10/23/24 low normal LVEF 53%, cleared by Dr. Izaguirre.      - Plan to proceed to CART cell therapy ASAP, discussed CASE 2419,  anticipate collection on 11/25 and start of LD shortly thereafter. Patient instructed to take last dose of pirto and venetoclax on 11/14/24 to allow 10 day washout.  Met with Research RN and CAR-T coordinator. Due to anemia, ordered prbcs to be transfused Friday before intended collection on 11/25/24.     Admitted 11/29/24 for his CAR-T cell therapy, on CASE 2419 (T0=12/6/24), prepped with Fludarabine/Cyclophosphamide.   Hosp Course:  - Grade 2 CRS (fever, tachycardia) on 12/7, gave Toxi x1, infectious workup negative. Resolved by 12/8.  - Symptomatic Orthostatic Hypotension on 12/8, resolved with fluids by 12/9.  Discharged home on 12/18/24.       PET scan 1/2/25:   IMPRESSION:  1.  Interval resolution of hypermetabolic lymphadenopathy above and below the diaphragm. Interval resolution hypermetabolic activity within the spleen with improving splenomegaly. (Deauville score of 1).  2. No evidence of new hypermetabolic robbie or extranodal lymphomatous disease.     1/9/25:  Today is T+ 34 from CAR-T.  Blood counts improved, hgb 11.1, plt 96, WBC 6.4, and ANC 4.17.  Bone marrow biopsy performed 1/2/25-results in process. PET/CT showed Deauville score of 1. Will discuss with Dr. Hobbs plans for T+60 restaging regarding imaging and BMbx.  Per clinical trial nurse, 60 day bone marrow biopsy is only required if previously involved by disease and not in complete remission in prior bone marrow biopsy.  Awaiting results of most recent BM.  Nancie is doing better in regards to energy level and has gained weight since last visit.  Will likely be able to transition to weekly appointment soon.     CARTOX Monitoring:   Ferritin:   Lab Results   Component Value Date    FERRITIN 1,114 (H) 01/09/2025     Fibrinogen:   Lab Results   Component Value Date    FIBRINOGEN 161 (L) 01/09/2025     CRP:   Lab Results   Component Value Date    CRP <0.10 01/09/2025     Uric Acid:   Lab Results   Component Value Date    URICACID 5.9 01/09/2025      IgG (weekly):   Lab Results   Component Value Date     12/27/2024        Response monitoring:  Day 30 response monitoring: obtained PET/CT and Bmbx on 1/2/25. Awaiting bmbx results. PET/CT Deauville score of 1.   Day 60 response monitoring: scheduled ####  Day 90 response monitoring: scheduled ####     Sinusitis  -Take doxycycline 100mg twice daily for one week  -Outpatient respiratory panel pending   -Take mucinex DM as needed for symptom control   -Rest and hydration  -Saline nasal spray   -Advised to not use indra pot     Supportive Care:   Levetiracetam (500-750mg) every 12h x 30 days. Discontinued 1/5/25     Infectious prophylaxis:   Antiviral prophylaxis Acyclovir. Continue for at least 6 months.   PCP prophylaxis. Bactrim Started 1/3/25.   Antifungal prophylaxis Fluconazole. Continue antifungal prophylaxis through Day 90.   Levofloxacin until ANC is sustained at >500-discontinued 12/20/24.       Wallet Card: Verified that the patient was discharged with CAR T Cell wallet card at first outpatient visit. Reinforced instructions for use.  The patient was reminded not to drive for 8 weeks following CAR T cell therapy     Vaccines:  Will plan to start vaccines with influenza and covid vaccine about 3-4 months after CAR-T and will start post transplant vaccines around 6 months.       BPH:  Reports his previous urinary frequency and nocturia symptoms has greatly improved after starting tadalafil 5 mg daily.   Tadalafil and flomax held during hospitalization due to orthostatic hypotension.       Renal failure:   sCr stable at 1.22 (1/9/25).      Weight Loss:  Hospital admission weight 97.2 kg (11/29/24).    1/6/25:  Weight today 89.5 kg.  Monitoring.  1/9/25: weight today 90.2kg     Health maintenance.   Colonoscopy completed 7/14/23: normal except internal hemorrhoids.  Recommendations to repeat in 2 years.    - Some aortic calcifications on imaging, started on prevastatin-managed by his PCP.  PCP  increased pravastatin dose to 40 mg daily.  PCP switched to atorvastatin 40 mg daily.     Central Line:  Left power PICC line placed 12/2/24.  Discharged home on 12/18/24 with PICC, wife is flushing PICC line daily with NS flushes and dressing changes completed in infusion center.    1/6/25:  PICC line with no redness, tenderness, swelling, or drainage. Keeping PICC in until visit on Monday. If cold symptoms begin to resolve PICC can most likely be removed.     RTC:   1/13 post BMT and infusion   1/20 post BMT and infusion   1/27 appt with Zainab Sullivan, CNP   2/6/25 appt with Dr. Anjel Vasques PA-C

## 2025-01-10 LAB
CHROM ANALY OVERALL INTERP-IMP: NORMAL
CHROM ANALY OVERALL INTERP-IMP: NORMAL
ELECTRONICALLY COSIGNED BY CYTOGENETICS: NORMAL
ELECTRONICALLY COSIGNED BY CYTOGENETICS: NORMAL
ELECTRONICALLY SIGNED BY CYTOGENETICS: NORMAL
ELECTRONICALLY SIGNED BY CYTOGENETICS: NORMAL
FISH ISCN RESULTS: NORMAL
FISH ISCN RESULTS: NORMAL
RHINOVIRUS RNA UPPER RESP QL NAA+PROBE: DETECTED

## 2025-01-11 LAB
CELL COUNT (BLOOD): 4.58 X10*3/UL
CELL POPULATIONS: NORMAL
DIAGNOSIS: NORMAL
FLOW DIFFERENTIAL: NORMAL
FLOW TEST ORDERED: NORMAL
LAB TEST METHOD: NORMAL
NUMBER OF CELLS COLLECTED: NORMAL PER TUBE
PATH REPORT.COMMENTS IMP SPEC: NORMAL
PATH REPORT.FINAL DX SPEC: NORMAL
PATH REPORT.GROSS SPEC: NORMAL
PATH REPORT.MICROSCOPIC SPEC OTHER STN: NORMAL
PATH REPORT.RELEVANT HX SPEC: NORMAL
PATH REPORT.TOTAL CANCER: NORMAL
PATH REPORT.TOTAL CANCER: NORMAL
SIGNATURE COMMENT: NORMAL
SPECIMEN VIABILITY: NORMAL

## 2025-01-13 ENCOUNTER — TREATMENT (OUTPATIENT)
Dept: OTHER | Facility: HOSPITAL | Age: 60
End: 2025-01-13
Payer: COMMERCIAL

## 2025-01-13 ENCOUNTER — OFFICE VISIT (OUTPATIENT)
Dept: OTHER | Facility: HOSPITAL | Age: 60
End: 2025-01-13
Payer: COMMERCIAL

## 2025-01-13 VITALS
DIASTOLIC BLOOD PRESSURE: 83 MMHG | SYSTOLIC BLOOD PRESSURE: 122 MMHG | HEART RATE: 99 BPM | TEMPERATURE: 97.2 F | RESPIRATION RATE: 18 BRPM | OXYGEN SATURATION: 100 %

## 2025-01-13 DIAGNOSIS — Z92.850 HISTORY OF CHIMERIC ANTIGEN RECEPTOR T-CELL THERAPY: ICD-10-CM

## 2025-01-13 DIAGNOSIS — C83.17 MANTLE CELL LYMPHOMA OF SPLEEN (MULTI): ICD-10-CM

## 2025-01-13 DIAGNOSIS — Z94.84 HX OF AUTOLOGOUS STEM CELL TRANSPLANT: ICD-10-CM

## 2025-01-13 LAB
ALBUMIN SERPL BCP-MCNC: 4.5 G/DL (ref 3.4–5)
ALP SERPL-CCNC: 44 U/L (ref 33–136)
ALT SERPL W P-5'-P-CCNC: 16 U/L (ref 10–52)
ANION GAP SERPL CALC-SCNC: 12 MMOL/L (ref 10–20)
APTT PPP: 28 SECONDS (ref 27–38)
AST SERPL W P-5'-P-CCNC: 15 U/L (ref 9–39)
BASOPHILS # BLD AUTO: 0.06 X10*3/UL (ref 0–0.1)
BASOPHILS NFR BLD AUTO: 1 %
BILIRUB SERPL-MCNC: 0.7 MG/DL (ref 0–1.2)
BUN SERPL-MCNC: 22 MG/DL (ref 6–23)
CALCIUM SERPL-MCNC: 9.1 MG/DL (ref 8.6–10.3)
CHLORIDE SERPL-SCNC: 105 MMOL/L (ref 98–107)
CO2 SERPL-SCNC: 26 MMOL/L (ref 21–32)
CREAT SERPL-MCNC: 1.08 MG/DL (ref 0.5–1.3)
CRP SERPL-MCNC: <0.1 MG/DL
EGFRCR SERPLBLD CKD-EPI 2021: 79 ML/MIN/1.73M*2
EOSINOPHIL # BLD AUTO: 0.27 X10*3/UL (ref 0–0.7)
EOSINOPHIL NFR BLD AUTO: 4.6 %
ERYTHROCYTE [DISTWIDTH] IN BLOOD BY AUTOMATED COUNT: 19 % (ref 11.5–14.5)
FERRITIN SERPL-MCNC: 1044 NG/ML (ref 20–300)
FIBRINOGEN PPP-MCNC: 173 MG/DL (ref 200–400)
GLUCOSE SERPL-MCNC: 80 MG/DL (ref 74–99)
HCT VFR BLD AUTO: 29.5 % (ref 41–52)
HGB BLD-MCNC: 10.9 G/DL (ref 13.5–17.5)
IGG SERPL-MCNC: 674 MG/DL (ref 700–1600)
IMM GRANULOCYTES # BLD AUTO: 0.03 X10*3/UL (ref 0–0.7)
IMM GRANULOCYTES NFR BLD AUTO: 0.5 % (ref 0–0.9)
INR PPP: 1 (ref 0.9–1.1)
LDH SERPL L TO P-CCNC: 153 U/L (ref 84–246)
LYMPHOCYTES # BLD AUTO: 1.18 X10*3/UL (ref 1.2–4.8)
LYMPHOCYTES NFR BLD AUTO: 20 %
MAGNESIUM SERPL-MCNC: 1.94 MG/DL (ref 1.6–2.4)
MCH RBC QN AUTO: 34.8 PG (ref 26–34)
MCHC RBC AUTO-ENTMCNC: 36.9 G/DL (ref 32–36)
MCV RBC AUTO: 94 FL (ref 80–100)
MONOCYTES # BLD AUTO: 0.82 X10*3/UL (ref 0.1–1)
MONOCYTES NFR BLD AUTO: 13.9 %
NEUTROPHILS # BLD AUTO: 3.53 X10*3/UL (ref 1.2–7.7)
NEUTROPHILS NFR BLD AUTO: 60 %
NRBC BLD-RTO: 0 /100 WBCS (ref 0–0)
PATH REPORT.ADDENDUM SPEC: NORMAL
PATH REPORT.COMMENTS IMP SPEC: NORMAL
PATH REPORT.FINAL DX SPEC: NORMAL
PATH REPORT.GROSS SPEC: NORMAL
PATH REPORT.MICROSCOPIC SPEC OTHER STN: NORMAL
PATH REPORT.RELEVANT HX SPEC: NORMAL
PATH REPORT.TOTAL CANCER: NORMAL
PLATELET # BLD AUTO: 83 X10*3/UL (ref 150–450)
POTASSIUM SERPL-SCNC: 4.1 MMOL/L (ref 3.5–5.3)
PROT SERPL-MCNC: 6.2 G/DL (ref 6.4–8.2)
PROTHROMBIN TIME: 11.2 SECONDS (ref 9.8–12.8)
RBC # BLD AUTO: 3.13 X10*6/UL (ref 4.5–5.9)
SODIUM SERPL-SCNC: 139 MMOL/L (ref 136–145)
URATE SERPL-MCNC: 5.8 MG/DL (ref 4–7.5)
WBC # BLD AUTO: 5.9 X10*3/UL (ref 4.4–11.3)

## 2025-01-13 PROCEDURE — 85025 COMPLETE CBC W/AUTO DIFF WBC: CPT

## 2025-01-13 PROCEDURE — 82784 ASSAY IGA/IGD/IGG/IGM EACH: CPT

## 2025-01-13 PROCEDURE — 36591 DRAW BLOOD OFF VENOUS DEVICE: CPT

## 2025-01-13 PROCEDURE — 82728 ASSAY OF FERRITIN: CPT

## 2025-01-13 PROCEDURE — 99214 OFFICE O/P EST MOD 30 MIN: CPT

## 2025-01-13 PROCEDURE — 83615 LACTATE (LD) (LDH) ENZYME: CPT

## 2025-01-13 PROCEDURE — 83735 ASSAY OF MAGNESIUM: CPT

## 2025-01-13 PROCEDURE — 86140 C-REACTIVE PROTEIN: CPT

## 2025-01-13 PROCEDURE — 80053 COMPREHEN METABOLIC PANEL: CPT

## 2025-01-13 PROCEDURE — 84550 ASSAY OF BLOOD/URIC ACID: CPT

## 2025-01-13 PROCEDURE — 85610 PROTHROMBIN TIME: CPT

## 2025-01-13 PROCEDURE — 85384 FIBRINOGEN ACTIVITY: CPT

## 2025-01-13 ASSESSMENT — ENCOUNTER SYMPTOMS
CONFUSION: 0
BRUISES/BLEEDS EASILY: 0
CONSTIPATION: 0
SHORTNESS OF BREATH: 0
ARTHRALGIAS: 0
BACK PAIN: 0
APPETITE CHANGE: 0
PALPITATIONS: 0
ADENOPATHY: 0
LIGHT-HEADEDNESS: 0
WHEEZING: 0
UNEXPECTED WEIGHT CHANGE: 0
DIZZINESS: 0
NERVOUS/ANXIOUS: 0
FREQUENCY: 0
DIAPHORESIS: 0
SLEEP DISTURBANCE: 0
NAUSEA: 0
FEVER: 0
HEMATURIA: 0
FATIGUE: 0
DIARRHEA: 0
ABDOMINAL PAIN: 0
CHILLS: 0
VOMITING: 0
COUGH: 0
SORE THROAT: 0
HEADACHES: 0
DYSURIA: 0

## 2025-01-13 NOTE — PROGRESS NOTES
Patient tolerated PICC removal well, no active bleeding at the site, patient sitting in supine position for 30 min post removal.  Pressure applied, with coban dressing.

## 2025-01-13 NOTE — PROGRESS NOTES
Patient ID: Nancie Armenta is a 59 y.o. male.    Subjective    HPI  Nancie Armenta is a 59 year old man with PMH of hyperglycemia, renal insufficiency, and stage IV A mantle cell non hodgkin lymphoma involving the gastrointestinal tract, s/p autologous SCT (April 2012).  Early in July 2024 patient noted to have dropping platelet counts and bone marrow biopsy 7/25/24 showed focal involvement with mantle cell lymphoma (NGS cyclin D1 positive and Sox 11 positive. BIRC+ ad TP 53 positve with a VAF of 3%, BIRC3 positive). FISH negative for 17p deletion  CT imaging done on 8/7/24 shows thickening of sigmoid colon, development of mild to moderate retroperitoneal adenopathy, splenomegally and possible splenic infarct.  Started Pirtobrutinib salvage on 8/12/24.  Added Venetoclax starting 10/13/24 since spleen size was increasing on repeat CT imaging and on physical exam.  BMBx (10/23/24) with low level involvement by mantle cells about 3% Mantle cells by flow.  PET/CT (11/4/24) with FDG avid lymphadenopathy throughout the neck, chest, abdomen, and pelvis c/w lymphomatous involvement. Intense FDG avid splenomegaly. Peripheral regions of photopenia likely reflect areas of infarction.  Pirtobrutinib and Venetoclax stopped 11/14/24 in preparation for CART collection.  Admitted 11/29/24 for his CAR-T cell therapy, on CASE 2419 (T0=12/6/24), prepped with Fludarabine/Cyclophosphamide. Hospital course included grade 2 CRS (fever, tachycardia) on 12/7, received Toxi x1, infectious workup negative.  He also had symptomatic orthostatic hypotension on 12/8, resolved with fluids by 12/9.  He was discharged home on 12/18/24.      1/13/25: Presents today for follow up visit, accompanied by his wife. Overall he is doing well and has no complaints. His appetite continues to improve and continues to maintain weight. His energy level continues to improve. He states he has been shoveling his snow throughout the week and has been having no  issues. His sinus congestion is almost completely resolved. He states his last dose of doxycycline for his sinus infection is Thursday. He denies fever, chills, night sweats, chest pain, dyspnea, cough, abdominal pain, N/V/D, and edema.  Overall doing well and counts have been stable. PICC will be removed today.     Review of Systems   Constitutional:  Negative for appetite change, chills, diaphoresis, fatigue, fever and unexpected weight change.   HENT:   Negative for mouth sores and sore throat.    Respiratory:  Negative for cough, shortness of breath and wheezing.    Cardiovascular:  Negative for chest pain and palpitations.   Gastrointestinal:  Negative for abdominal pain, constipation, diarrhea, nausea and vomiting.   Genitourinary:  Negative for dysuria, frequency and hematuria.    Musculoskeletal:  Negative for arthralgias and back pain.   Skin:  Negative for itching and rash.   Neurological:  Negative for dizziness, headaches and light-headedness.   Hematological:  Negative for adenopathy. Does not bruise/bleed easily.   Psychiatric/Behavioral:  Negative for confusion and sleep disturbance. The patient is not nervous/anxious.        Objective    BSA: There is no height or weight on file to calculate BSA.  There were no vitals taken for this visit.  Vital signs reviewed today.      Physical Exam  Constitutional:       General: He is not in acute distress.     Appearance: Normal appearance. He is not ill-appearing.   HENT:      Mouth/Throat:      Mouth: Mucous membranes are moist.      Pharynx: Oropharynx is clear. No oropharyngeal exudate or posterior oropharyngeal erythema.   Cardiovascular:      Rate and Rhythm: Normal rate and regular rhythm.      Pulses: Normal pulses.      Heart sounds: Normal heart sounds.   Pulmonary:      Effort: Pulmonary effort is normal. No respiratory distress.      Breath sounds: Normal breath sounds. No wheezing.   Abdominal:      General: There is no distension.       Palpations: Abdomen is soft. There is no mass.      Tenderness: There is no abdominal tenderness.   Musculoskeletal:         General: No swelling, tenderness or deformity. Normal range of motion.      Comments: 1+ nonpitting edema   Skin:     General: Skin is warm.      Coloration: Skin is not jaundiced.      Findings: No bruising.   Neurological:      General: No focal deficit present.      Mental Status: He is alert and oriented to person, place, and time.   Psychiatric:         Mood and Affect: Mood normal.         Behavior: Behavior normal.         Thought Content: Thought content normal.         Judgment: Judgment normal.         Performance Status:  Karnofsky Score: 90 - Able to carry on normal activity; minor signs or symptoms of disease       Assessment/Plan        Oncology History Overview Note           Patient Visit Information:   Visit Type: Follow Up Visit      Cancer History:   Treatment Synopsis:     1. Stage IV A mantle cell non-Hodgkin lymphoma involving the gastrointestinal tract diagnosed in October 2011, with diffuse gastrointestinal involvement. The patient was treated  with RCHOP alternating with Rituxan and high-dose Mamie-C and received total of 6 cycles of chemotherapy which he finished in February 2012.   Allergic to Augmentin, causes hives.     2. Patient underwent beam chemotherapy followed by autologous peripheral stem cell transplant in April 2012, by Dr. Yvonne Hobbs at [facility], was also involved in phase 3 clinical trial up killed shingles vaccine versus placebo ([employer]-1 Z10  study).     3.The patient developed shingles in January 2014, involving left flank.      4. 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      5.  Recurrent mantle cell lymphoma June 2019 assx presentation with leukocytosis. Peripheral blood flow which showed a CD5 positve clonal lymphocytosis. 6/2/19 CT of chest abdomen and pelvis revealed  splenomegaly of 15 cm compared to 12.8 cm last evaluation. Recurrence confirmed by FISH on peripheral blood earlier this month.  BM biopsy which showed 5% Mantle cell involvement although peripheral blood shows 30% involvement. CT imaging showed splenomegaly. PET scan declined by insurance twice.  Patient underwent colonoscopy  with Dr. Ac ( [place]) 8/8/19 which showed cobblestoning of colon and multiple biopsies including terminal ileum, appendix, cecum, rectosigmoid positive for mantle cell lymphoma.     6. Initiation of acalabrutinib August 2019 with minimal response. Switched to ibrutinib and venetoclax 12/20/20 with clearing of t(11, 14) by by FISH ( 1/2020) and resolution of involvement of colon by endoscopy and pathology on exam 3/2/2020! BM biopsy  4/2020 HEATHER.     C-scope (3/11/22): at OSH: negative, no evidence of disease. PET scan ( 3/14/22): no abnormal uptake. [place] 1. BMBx (3/17/22): negative, no evidence of MCL.   March 2022, ibrutinib and venetoclax discontinued.    July 2024 development of Tpenia, bone marrow biopsy 7/25/24 showed focal involvement with mantle cell lymphoma (NGS cyclin D1 positive and Sox 11 positive. BIRC+ and TP 53 positve with a VAF of 3%, BIRC3 positive). FISH negative for 17p deletion  CT imaging done on 8/7/24 shows thickening of sigmoid colon, development of mild to moderate retroperitoneal adenopathy, splenomegally and possible splenic infarct.    Pirotbrutib 200 mg daily initiated 8/12/24.  Due to inadequate response, he started the ventoclax (50 mg) on Adolfo 10/13, then increased to 100 mg , 200 mg and 400 mg      7. S/P COVID 19 infection 12/1/2020, no sequelae                          Lymphoma   10/20/2011 Initial  Diagnosis    Lymphoma (Multi)      Cellular Therapy    Mr. Nancie Armenta is a 59 y.o. male presenting with relapsed Mantle Cell Lymphoma. CAR-T cell therapy, on CASE 2419 (T0=12/6/24), prepped with Fludarabine/Cyclophosphamide.      Hosp Course:  - Grade 2 CRS (fever, tachycardia) on 12/7, gave Toxi x1, infectious workup negative. Resolved by 12/8.  - Symptomatic Orthostatic Hypotension on 12/8, resolved with fluids by 12/9.     Mantle cell lymphoma of spleen (Multi)   10/13/2016 Initial Diagnosis    Mantle cell lymphoma of spleen (CMS/HCC)     Mantle cell lymphoma   11/18/2024 Initial Diagnosis    Mantle cell lymphoma     11/30/2024 - 12/20/2024 Bone Marrow Transplant        Cellular Therapy    Mr. Nancie Armenta is a 59 y.o. male presenting with relapsed Mantle Cell Lymphoma. CAR-T cell therapy, on CASE 2419 (T0=12/6/24), prepped with Fludarabine/Cyclophosphamide.      Hosp Course:  - Grade 2 CRS (fever, tachycardia) on 12/7, gave Toxi x1, infectious workup negative. Resolved by 12/8.  - Symptomatic Orthostatic Hypotension on 12/8, resolved with fluids by 12/9.           1. Stage IV mantle cell lymphoma itreated with Nordic regimen and consolidation autograft with BEAM preparative regimen April 2012.  Relapse 2019 treated with ibrutinib and venetoclax with complete pathology response until 3/ 2022. See above.     Recurrence July 2024 presenting with dropping platelet counts and bone marrow biopsy 7/25/24 showed focal involvement with mantle cell lymphoma (NGS cyclin D1 positive and Sox 11 positive. BIRC+ ad TP 53 positve with a VAF of 3%, BIRC3 positive). FISH negative for 17p deletion  CT imaging done on 8/7/24 shows thickening of sigmoid colon, development of mild to moderate retroperitoneal adenopathy, splenomegally and possible splenic infarct.       Patient started pirtobrutinib salvage August 12,2024 and feels better. However spleen size was increasing per CT imaging (8/6/24)  and physical exam so   Anjel planned to add venetoclax as follows, starting 10/13/24:  - 50 mg for first week, then 100 (10/20), and 200 mg (10/27) for 7 days each, then 400 mg (11/3/24- )  -increased anemia with hgb 8.5, platlets 37K, no bleeding.      Pre CART staging:     BM biopsy low level involvment by mantle cells about 3% mantle cells by flow. Absence of CD19 on this  flow sample likely reflects insufficient sampling and positive for CD 19 on prior bone marrow sample of 7/24/24..     PET imaging 11/4/24   1. FDG avid lymphadenopathy throughout the neck, chest, abdomen and  pelvis as detailed above, consistent with lymphomatous involvement.  2. Intense FDG avid splenomegaly, consistent with lymphomatous  involvement. Peripheral regions of photopenia likely reflect areas of  infarction.  3. No PET evidence of FDG avid extranodal disease.on      ECHO 10/23/24 low normal LVEF 53%, cleared by Dr. Izaguirre.      - Plan to proceed to CART cell therapy ASAP, discussed CASE 2419, anticipate collection on 11/25 and start of LD shortly thereafter. Patient instructed to take last dose of pirto and venetoclax on 11/14/24 to allow 10 day washout.  Met with Research RN and CAR-T coordinator. Due to anemia, ordered prbcs to be transfused Friday before intended collection on 11/25/24.     Admitted 11/29/24 for his CAR-T cell therapy, on CASE 2419 (T0=12/6/24), prepped with Fludarabine/Cyclophosphamide.   Hosp Course:  - Grade 2 CRS (fever, tachycardia) on 12/7, gave Toxi x1, infectious workup negative. Resolved by 12/8.  - Symptomatic Orthostatic Hypotension on 12/8, resolved with fluids by 12/9.  Discharged home on 12/18/24.       PET scan 1/2/25:   IMPRESSION:  1.  Interval resolution of hypermetabolic lymphadenopathy above and below the diaphragm. Interval resolution hypermetabolic activity within the spleen with improving splenomegaly. (Deauville score of 1).  2. No evidence of new hypermetabolic robbie or extranodal lymphomatous disease.      1/13/25:  Today is T+ 38 from CAR-T on CASE 2419. Blood counts hgb 10.9, plt 83, WBC 5.9, and ANC 3.53.  Bone marrow biopsy performed 1/2/25-aspirate showed 1% mantle cells in marrow. Showed hypocellular bone marrow (30% cellular). PET/CT showed Deauville score of 1. Discussed with Dr. Hobbs plans for T+60 bone marrow biopsy and PET/CT.  Per clinical trial nurse, 60 day bone marrow biopsy is only required if previously involved by disease and not in complete remission in prior bone marrow biopsy. Nancie is doing better in regards to energy level and appetite. Will likely be able to transition to weekly appointment soon.      CAR T cell therapy:   CARTOX Monitoring:   Score: 10/10  Ferritin:   Lab Results   Component Value Date    FERRITIN 1,044 (H) 01/13/2025     Fibrinogen:   Lab Results   Component Value Date    FIBRINOGEN 173 (L) 01/13/2025     CRP:   Lab Results   Component Value Date    CRP <0.10 01/13/2025     Uric Acid:   Lab Results   Component Value Date    URICACID 5.8 01/13/2025     IgG (weekly):   Lab Results   Component Value Date     01/09/2025      Response monitoring:  Day 30 response monitoring: obtained PET/CT and Bmbx on 1/2/25.  -Bone marrow aspirate 1% mantle cells, showed 30% hypocellular   Day 60 response monitoring: requested for 2/6/25  Day 90 response monitoring: scheduled ####     Supportive Care:   Levetiracetam (500-750mg) every 12h x 30 days. Discontinued 1/5/25     Infectious prophylaxis:   Antiviral prophylaxis Acyclovir. Continue for at least 6 months.   PCP prophylaxis. Bactrim Started 1/3/25.   Antifungal prophylaxis Fluconazole. Continue antifungal prophylaxis through Day 90.   Levofloxacin until ANC is sustained at >500-discontinued 12/20/24.       Wallet Card: Verified that the patient was discharged with CAR T Cell wallet card at first outpatient visit. Reinforced instructions for use.  The patient was reminded not to drive for 8 weeks following CAR T cell therapy      Vaccines:  Will plan to start vaccines with influenza and covid vaccine about 3-4 months after CAR-T and will start post transplant vaccines around 6 months.       BPH:  Reports his previous urinary frequency and nocturia symptoms has greatly improved after starting tadalafil 5 mg daily.   Tadalafil and flomax continued to being held due to orthostatic hypotension.       Renal failure:   sCr stable at 1.08 (1/13/25)     Weight Loss:  Hospital admission weight 97.2 kg (11/29/24).    12/23/24:  Weight today 87 kg.  Nancie reports his appetite is slowly increasing and he is tolerating more food over the last few days.  Discussed ways to increase calories into diet.  Monitoring.  1/2/25: Wt. Today 89.4kg  1/9/25: 90.2kg     Health maintenance.   Colonoscopy completed 7/14/23: normal except internal hemorrhoids.  Recommendations to repeat in 2 years.    - Some aortic calcifications on imaging, started on prevastatin-managed by his PCP.  PCP increased pravastatin dose to 40 mg daily.  PCP switched to atorvastatin 40 mg daily.     Central Line:  Left power PICC line placed 12/2/24.  Discharged home on 12/18/24 with PICC, wife is flushing PICC line daily with NS flushes and dressing changes completed in infusion center.    1/13/25: Feeling well and PICC without erythema, dry, and intact. PICC removed today.      RTC:   1/20/25:  Post BMT provider and infusion appt  1/27/25:  Follow up visit with Zainab Sullivan CNP  2/6/25:  Follow up visit with Dr. Hobbs and requested day 60 bmbx        Kim Vasques PA-C

## 2025-01-14 LAB
CMV DNA SERPL NAA+PROBE-LOG IU: NORMAL {LOG_IU}/ML
LABORATORY COMMENT REPORT: NOT DETECTED

## 2025-01-15 LAB
CHLAMYDIA.PNEUMONIAE PCR, VIRC: NOT DETECTED
MYCOPLASMA.PNEUMONIAE PCR, VIRC: NOT DETECTED

## 2025-01-20 ENCOUNTER — TREATMENT (OUTPATIENT)
Dept: OTHER | Facility: HOSPITAL | Age: 60
End: 2025-01-20
Payer: COMMERCIAL

## 2025-01-20 ENCOUNTER — OFFICE VISIT (OUTPATIENT)
Dept: OTHER | Facility: HOSPITAL | Age: 60
End: 2025-01-20
Payer: COMMERCIAL

## 2025-01-20 VITALS
SYSTOLIC BLOOD PRESSURE: 128 MMHG | RESPIRATION RATE: 18 BRPM | TEMPERATURE: 97 F | WEIGHT: 207.67 LBS | OXYGEN SATURATION: 100 % | HEART RATE: 94 BPM | BODY MASS INDEX: 27.85 KG/M2 | DIASTOLIC BLOOD PRESSURE: 77 MMHG

## 2025-01-20 DIAGNOSIS — C83.17 MANTLE CELL LYMPHOMA OF SPLEEN (MULTI): Primary | ICD-10-CM

## 2025-01-20 DIAGNOSIS — C83.17 MANTLE CELL LYMPHOMA OF SPLEEN (MULTI): ICD-10-CM

## 2025-01-20 DIAGNOSIS — Z92.850 HISTORY OF CHIMERIC ANTIGEN RECEPTOR T-CELL THERAPY: ICD-10-CM

## 2025-01-20 DIAGNOSIS — Z94.84 HX OF AUTOLOGOUS STEM CELL TRANSPLANT: ICD-10-CM

## 2025-01-20 LAB
ALBUMIN SERPL BCP-MCNC: 4.8 G/DL (ref 3.4–5)
ALP SERPL-CCNC: 52 U/L (ref 33–136)
ALT SERPL W P-5'-P-CCNC: 19 U/L (ref 10–52)
ANION GAP SERPL CALC-SCNC: 14 MMOL/L (ref 10–20)
APTT PPP: 30 SECONDS (ref 27–38)
AST SERPL W P-5'-P-CCNC: 17 U/L (ref 9–39)
BASOPHILS # BLD AUTO: 0.04 X10*3/UL (ref 0–0.1)
BASOPHILS NFR BLD AUTO: 0.6 %
BILIRUB SERPL-MCNC: 0.8 MG/DL (ref 0–1.2)
BUN SERPL-MCNC: 23 MG/DL (ref 6–23)
CALCIUM SERPL-MCNC: 9.5 MG/DL (ref 8.6–10.3)
CHLORIDE SERPL-SCNC: 104 MMOL/L (ref 98–107)
CO2 SERPL-SCNC: 24 MMOL/L (ref 21–32)
CREAT SERPL-MCNC: 1.06 MG/DL (ref 0.5–1.3)
CRP SERPL-MCNC: 0.13 MG/DL
EGFRCR SERPLBLD CKD-EPI 2021: 80 ML/MIN/1.73M*2
EOSINOPHIL # BLD AUTO: 0.23 X10*3/UL (ref 0–0.7)
EOSINOPHIL NFR BLD AUTO: 3.6 %
ERYTHROCYTE [DISTWIDTH] IN BLOOD BY AUTOMATED COUNT: 17.4 % (ref 11.5–14.5)
FERRITIN SERPL-MCNC: 1082 NG/ML (ref 20–300)
FIBRINOGEN PPP-MCNC: 304 MG/DL (ref 200–400)
GLUCOSE SERPL-MCNC: 61 MG/DL (ref 74–99)
HCT VFR BLD AUTO: 33.4 % (ref 41–52)
HGB BLD-MCNC: 12 G/DL (ref 13.5–17.5)
IMM GRANULOCYTES # BLD AUTO: 0.02 X10*3/UL (ref 0–0.7)
IMM GRANULOCYTES NFR BLD AUTO: 0.3 % (ref 0–0.9)
INR PPP: 1 (ref 0.9–1.1)
LDH SERPL L TO P-CCNC: 155 U/L (ref 84–246)
LYMPHOCYTES # BLD AUTO: 0.95 X10*3/UL (ref 1.2–4.8)
LYMPHOCYTES NFR BLD AUTO: 14.9 %
MAGNESIUM SERPL-MCNC: 2.08 MG/DL (ref 1.6–2.4)
MCH RBC QN AUTO: 35 PG (ref 26–34)
MCHC RBC AUTO-ENTMCNC: 35.9 G/DL (ref 32–36)
MCV RBC AUTO: 97 FL (ref 80–100)
MONOCYTES # BLD AUTO: 0.82 X10*3/UL (ref 0.1–1)
MONOCYTES NFR BLD AUTO: 12.8 %
NEUTROPHILS # BLD AUTO: 4.33 X10*3/UL (ref 1.2–7.7)
NEUTROPHILS NFR BLD AUTO: 67.8 %
NRBC BLD-RTO: 0 /100 WBCS (ref 0–0)
PLATELET # BLD AUTO: 98 X10*3/UL (ref 150–450)
POTASSIUM SERPL-SCNC: 3.9 MMOL/L (ref 3.5–5.3)
PROT SERPL-MCNC: 7 G/DL (ref 6.4–8.2)
PROTHROMBIN TIME: 11 SECONDS (ref 9.8–12.8)
RBC # BLD AUTO: 3.43 X10*6/UL (ref 4.5–5.9)
SODIUM SERPL-SCNC: 138 MMOL/L (ref 136–145)
URATE SERPL-MCNC: 6.4 MG/DL (ref 4–7.5)
WBC # BLD AUTO: 6.4 X10*3/UL (ref 4.4–11.3)

## 2025-01-20 PROCEDURE — 83615 LACTATE (LD) (LDH) ENZYME: CPT

## 2025-01-20 PROCEDURE — 85025 COMPLETE CBC W/AUTO DIFF WBC: CPT

## 2025-01-20 PROCEDURE — 86140 C-REACTIVE PROTEIN: CPT

## 2025-01-20 PROCEDURE — 36415 COLL VENOUS BLD VENIPUNCTURE: CPT

## 2025-01-20 PROCEDURE — 85610 PROTHROMBIN TIME: CPT

## 2025-01-20 PROCEDURE — 84550 ASSAY OF BLOOD/URIC ACID: CPT

## 2025-01-20 PROCEDURE — 85384 FIBRINOGEN ACTIVITY: CPT

## 2025-01-20 PROCEDURE — 80053 COMPREHEN METABOLIC PANEL: CPT

## 2025-01-20 PROCEDURE — 83735 ASSAY OF MAGNESIUM: CPT

## 2025-01-20 PROCEDURE — 82728 ASSAY OF FERRITIN: CPT

## 2025-01-20 PROCEDURE — 99214 OFFICE O/P EST MOD 30 MIN: CPT

## 2025-01-20 RX ORDER — ACYCLOVIR 400 MG/1
400 TABLET ORAL 2 TIMES DAILY
Qty: 60 TABLET | Refills: 3 | Status: SHIPPED | OUTPATIENT
Start: 2025-01-20

## 2025-01-20 ASSESSMENT — ENCOUNTER SYMPTOMS
VOMITING: 0
GASTROINTESTINAL NEGATIVE: 1
DIAPHORESIS: 0
CONSTIPATION: 0
FATIGUE: 0
DIZZINESS: 0
CARDIOVASCULAR NEGATIVE: 1
NUMBNESS: 0
DIARRHEA: 0
HEADACHES: 0
RESPIRATORY NEGATIVE: 1
CHEST TIGHTNESS: 0
SPEECH DIFFICULTY: 0
MUSCULOSKELETAL NEGATIVE: 1
NEUROLOGICAL NEGATIVE: 1
CONSTITUTIONAL NEGATIVE: 1
BLOOD IN STOOL: 0
LEG SWELLING: 0
PALPITATIONS: 0
HEMATOLOGIC/LYMPHATIC NEGATIVE: 1
CHILLS: 0
NAUSEA: 0
PSYCHIATRIC NEGATIVE: 1
SHORTNESS OF BREATH: 0
EYES NEGATIVE: 1
COUGH: 0
FEVER: 0
LIGHT-HEADEDNESS: 0
APPETITE CHANGE: 0

## 2025-01-20 NOTE — PROGRESS NOTES
Patient ID: Nancie Armenta is a 59 y.o. male.  Referring Physician: Sonia Quinones, APRN-CNP  [id number] [place] [address]  Primary Care Provider: Maurice Nunez MD    Date of Service:  1/20/2025    Oncologic History  Nancie Armenta is a 59 year old man with PMH of hyperglycemia, renal insufficiency, and stage IV A mantle cell non hodgkin lymphoma involving the gastrointestinal tract, s/p autologous SCT (April 2012).  Early in July 2024 patient noted to have dropping platelet counts and bone marrow biopsy 7/25/24 showed focal involvement with mantle cell lymphoma (NGS cyclin D1 positive and Sox 11 positive. BIRC+ ad TP 53 positve with a VAF of 3%, BIRC3 positive). FISH negative for 17p deletion  CT imaging done on 8/7/24 shows thickening of sigmoid colon, development of mild to moderate retroperitoneal adenopathy, splenomegally and possible splenic infarct.  Started Pirtobrutinib salvage on 8/12/24.  Added Venetoclax starting 10/13/24 since spleen size was increasing on repeat CT imaging and on physical exam.  BMBx (10/23/24) with low level involvement by mantle cells about 3% Mantle cells by flow.  PET/CT (11/4/24) with FDG avid lymphadenopathy throughout the neck, chest, abdomen, and pelvis c/w lymphomatous involvement. Intense FDG avid splenomegaly. Peripheral regions of photopenia likely reflect areas of infarction.  Pirtobrutinib and Venetoclax stopped 11/14/24 in preparation for CART collection.  Admitted 11/29/24 for his CAR-T cell therapy, on CASE 2419 (T0=12/6/24), prepped with Fludarabine/Cyclophosphamide. Hospital course included grade 2 CRS (fever, tachycardia) on 12/7, received Toxi x1, infectious workup negative.  He also had symptomatic orthostatic hypotension on 12/8, resolved with fluids by 12/9.  He was discharged home on 12/18/24.      Pre CART staging:     BM biopsy low level involvment by mantle cells about 3% mantle cells by flow. Absence of CD19 on this  flow sample  likely reflects insufficient sampling and positive for CD 19 on prior bone marrow sample of 7/24/24..     PET imaging 11/4/24   1. FDG avid lymphadenopathy throughout the neck, chest, abdomen and  pelvis as detailed above, consistent with lymphomatous involvement.  2. Intense FDG avid splenomegaly, consistent with lymphomatous  involvement. Peripheral regions of photopenia likely reflect areas of  infarction.  3. No PET evidence of FDG avid extranodal disease.on      ECHO 10/23/24 low normal LVEF 53%, cleared by Dr. Izaguirre.     PET scan 1/2/25:   IMPRESSION:  1.  Interval resolution of hypermetabolic lymphadenopathy above and below the diaphragm. Interval resolution hypermetabolic activity within the spleen with improving splenomegaly. (Deauville score of 1).  2. No evidence of new hypermetabolic robbie or extranodal lymphomatous disease.    SUBJECTIVE:  Nancie Armenta presents today for follow up accompanied by his wife. Day +45 of Car-T. Overall doing well no new complaints. Congestion getting better slowly. Appetite is good, gaining weight. Has energy to do things. Denies nausea, vomiting, diarrhea or constipations. Has no B symptoms. Bowels are normal. Does have occasional drops of blood after blowing nose if blows to hard but stops soon after it starts.  Will try a blow lightly.       Review of Systems   Constitutional: Negative.  Negative for appetite change, chills, diaphoresis, fatigue and fever.   HENT:   Positive for nosebleeds (occasional drops of blood if blows too hard).    Eyes: Negative.    Respiratory: Negative.  Negative for chest tightness, cough and shortness of breath.    Cardiovascular: Negative.  Negative for chest pain, leg swelling and palpitations.   Gastrointestinal: Negative.  Negative for blood in stool, constipation, diarrhea, nausea and vomiting.   Genitourinary: Negative.     Musculoskeletal: Negative.    Skin: Negative.    Neurological: Negative.  Negative for dizziness,  headaches, light-headedness, numbness and speech difficulty.   Hematological: Negative.    Psychiatric/Behavioral: Negative.           Oncology History Overview Note           Patient Visit Information:   Visit Type: Follow Up Visit      Cancer History:   Treatment Synopsis:     1. Stage IV A mantle cell non-Hodgkin lymphoma involving the gastrointestinal tract diagnosed in October 2011, with diffuse gastrointestinal involvement. The patient was treated  with RCHOP alternating with Rituxan and high-dose Mamie-C and received total of 6 cycles of chemotherapy which he finished in February 2012.   Allergic to Augmentin, causes hives.     2. Patient underwent beam chemotherapy followed by autologous peripheral stem cell transplant in April 2012, by Dr. Yvonne Hobbs at [facility], was also involved in phase 3 clinical trial up killed shingles vaccine versus placebo ([employer]-1 Z10  study).     3.The patient developed shingles in January 2014, involving left flank.      4. 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      5.  Recurrent mantle cell lymphoma June 2019 assx presentation with leukocytosis. Peripheral blood flow which showed a CD5 positve clonal lymphocytosis. 6/2/19 CT of chest abdomen and pelvis revealed  splenomegaly of 15 cm compared to 12.8 cm last evaluation. Recurrence confirmed by FISH on peripheral blood earlier this month.  BM biopsy which showed 5% Mantle cell involvement although peripheral blood shows 30% involvement. CT imaging showed splenomegaly. PET scan declined by insurance twice.  Patient underwent colonoscopy  with Dr. Ac ( [place]) 8/8/19 which showed cobblestoning of colon  and multiple biopsies including terminal ileum, appendix, cecum, rectosigmoid positive for mantle cell lymphoma.     6. Initiation of acalabrutinib August 2019 with minimal response. Switched to ibrutinib and venetoclax 12/20/20 with clearing of t(11, 14) by by FISH ( 1/2020) and resolution of involvement of colon by endoscopy and pathology on exam 3/2/2020! BM biopsy  4/2020 HEATHER.     C-scope (3/11/22): at OSH: negative, no evidence of disease. PET scan ( 3/14/22): no abnormal uptake. Deauville 1. BMBx (3/17/22): negative, no evidence of MCL.   March 2022, ibrutinib and venetoclax discontinued.    July 2024 development of Tpenia, bone marrow biopsy 7/25/24 showed focal involvement with mantle cell lymphoma (NGS cyclin D1 positive and Sox 11 positive. BIRC+ and TP 53 positve with a VAF of 3%, BIRC3 positive). FISH negative for 17p deletion  CT imaging done on 8/7/24 shows thickening of sigmoid colon, development of mild to moderate retroperitoneal adenopathy, splenomegally and possible splenic infarct.    Pirotbrutib 200 mg daily initiated 8/12/24.  Due to inadequate response, he started the ventoclax (50 mg) on Adolfo 10/13, then increased to 100 mg , 200 mg and 400 mg      7. S/P COVID 19 infection 12/1/2020, no sequelae                          Lymphoma   10/20/2011 Initial Diagnosis    Lymphoma (Multi)      Cellular Therapy    Mr. Nancie Armenta is a 59 y.o. male presenting with relapsed Mantle Cell Lymphoma. CAR-T cell therapy, on CASE 2419 (T0=12/6/24), prepped with Fludarabine/Cyclophosphamide.      Hosp Course:  - Grade 2 CRS (fever, tachycardia) on 12/7, gave Toxi x1, infectious workup negative. Resolved by 12/8.  - Symptomatic Orthostatic Hypotension on 12/8, resolved with fluids by 12/9.     Mantle cell lymphoma of spleen (Multi)   10/13/2016 Initial Diagnosis    Mantle cell lymphoma of spleen (CMS/HCC)     Mantle cell lymphoma   11/18/2024 Initial Diagnosis    Mantle cell lymphoma     11/30/2024 -  12/20/2024 Bone Marrow Transplant        Cellular Therapy    Mr. Nancie Armenta is a 59 y.o. male presenting with relapsed Mantle Cell Lymphoma. CAR-T cell therapy, on CASE 2419 (T0=12/6/24), prepped with Fludarabine/Cyclophosphamide.      Hosp Course:  - Grade 2 CRS (fever, tachycardia) on 12/7, gave Toxi x1, infectious workup negative. Resolved by 12/8.  - Symptomatic Orthostatic Hypotension on 12/8, resolved with fluids by 12/9.          OBJECTIVE:  KPS: Karnofsky Score: 90 - Able to carry on normal activity; minor signs or symptoms of disease    VS:  There were no vitals taken for this visit.  BSA: There is no height or weight on file to calculate BSA.    Physical Exam  Vitals reviewed.   Constitutional:       Appearance: Normal appearance.   HENT:      Head: Normocephalic and atraumatic.      Mouth/Throat:      Mouth: Mucous membranes are moist.      Pharynx: Oropharynx is clear.   Eyes:      Conjunctiva/sclera: Conjunctivae normal.      Pupils: Pupils are equal, round, and reactive to light.   Cardiovascular:      Rate and Rhythm: Normal rate and regular rhythm.      Pulses: Normal pulses.      Heart sounds: Normal heart sounds.   Pulmonary:      Effort: Pulmonary effort is normal.      Breath sounds: Normal breath sounds.   Abdominal:      General: Bowel sounds are normal.      Palpations: Abdomen is soft.   Musculoskeletal:         General: Normal range of motion.      Cervical back: Normal range of motion and neck supple.      Right lower leg: No edema.      Left lower leg: No edema.   Skin:     General: Skin is warm and dry.      Capillary Refill: Capillary refill takes less than 2 seconds.   Neurological:      General: No focal deficit present.      Mental Status: He is alert and oriented to person, place, and time. Mental status is at baseline.   Psychiatric:         Mood and Affect: Mood normal.         Behavior: Behavior normal.         Thought Content: Thought content normal.         Judgment:  Judgment normal.         Assessment & Plan  Mantle cell lymphoma of spleen (Multi)  1. Stage IV mantle cell lymphoma treated with Nordic regimen and consolidation autograft with BEAM preparative regimen April 2012.  Relapse 2019 treated with ibrutinib and venetoclax with complete pathology response until 3/ 2022. See above.     Recurrence July 2024 presenting with dropping platelet counts and bone marrow biopsy 7/25/24 showed focal involvement with mantle cell lymphoma (NGS cyclin D1 positive and Sox 11 positive. BIRC+ ad TP 53 positve with a VAF of 3%, BIRC3 positive). FISH negative for 17p deletion  CT imaging done on 8/7/24 shows thickening of sigmoid colon, development of mild to moderate retroperitoneal adenopathy, splenomegally and possible splenic infarct.       Patient started pirtobrutinib salvage August 12,2024 and feels better. However spleen size was increasing per CT imaging (8/6/24)  and physical exam so Dr. Hobbs planned to add venetoclax as follows, starting 10/13/24:  - 50 mg for first week, then 100 (10/20), and 200 mg (10/27) for 7 days each, then 400 mg (11/3/24- )  -increased anemia with hgb 8.5, platlets 37K, no bleeding.   History of chimeric antigen receptor T-cell therapy  Admitted 11/29/24 for his CAR-T cell therapy, on CASE 2419 (T0=12/6/24), prepped with Fludarabine/Cyclophosphamide.   Hosp Course:  - Grade 2 CRS (fever, tachycardia) on 12/7, gave Toxi x1, infectious workup negative. Resolved by 12/8.  - Symptomatic Orthostatic Hypotension on 12/8, resolved with fluids by 12/9.  Discharged home on 12/18/24.      -1/20/2025: Day +45 of Car-T, CASE 2419. Counts improving, no blood products needed. 60 day BmBx scheduled for 2/6/2025. Overall doing well.  Response monitoring:  Day 30 response monitoring: obtained PET/CT and Bmbx on 1/2/25.  -Bone marrow aspirate 1% mantle cells, showed 30% hypocellular   Day 60 response monitoring: scheduled for 2/6/25  Day 90 response monitoring: scheduled  ####     Supportive Care:   Levetiracetam (500-750mg) every 12h x 30 days. Discontinued 1/5/25     Infectious prophylaxis:   Antiviral prophylaxis Acyclovir. Continue for at least 6 months.   PCP prophylaxis. Bactrim Started 1/3/25.   Antifungal prophylaxis Fluconazole. Continue antifungal prophylaxis through Day 90.   Levofloxacin until ANC is sustained at >500-discontinued 12/20/24.       Wallet Card: Verified that the patient was discharged with CAR T Cell wallet card at first outpatient visit. Reinforced instructions for use.  The patient was reminded not to drive for 8 weeks following CAR T cell therapy     Vaccines:  Will plan to start vaccines with influenza and covid vaccine about 3-4 months after CAR-T and will start post transplant vaccines around 6 months.       BPH:  Reports his previous urinary frequency and nocturia symptoms has greatly improved after starting tadalafil 5 mg daily.   Tadalafil and flomax continued to being held due to orthostatic hypotension.       Renal failure:   sCr stable at 1.06 (1/20/25)     Weight Loss:  Hospital admission weight 97.2 kg (11/29/24).    12/23/24:  Weight today 87 kg.  Nancie reports his appetite is slowly increasing and he is tolerating more food over the last few days.  Discussed ways to increase calories into diet.  Monitoring.  1/2/25: Wt. Today 89.4kg  1/9/25: 90.2kg  1/20/2025: 94.2 kg      Health maintenance.   Colonoscopy completed 7/14/23: normal except internal hemorrhoids.  Recommendations to repeat in 2 years.    - Some aortic calcifications on imaging, started on prevastatin-managed by his PCP.  PCP increased pravastatin dose to 40 mg daily.  PCP switched to atorvastatin 40 mg daily.     Central Line:  Left power PICC line placed 12/2/24.  Discharged home on 12/18/24 with PICC, wife is flushing PICC line daily with NS flushes and dressing changes completed in infusion center.    1/13/25: Feeling well and PICC without erythema, dry, and intact. PICC  removed today.          Current Outpatient Medications   Medication Instructions    acyclovir (ZOVIRAX) 400 mg, oral, 2 times daily    atorvastatin (LIPITOR) 40 mg, oral, Every morning    fluconazole (DIFLUCAN) 400 mg, oral, Daily    hydrocortisone 1 % cream Apply 1 Application topically 2 times a day as needed (hemorrhoids).    ondansetron (ZOFRAN) 8 mg, oral, Every 8 hours PRN    sulfamethoxazole-trimethoprim (Bactrim DS) 800-160 mg tablet 1 tablet, oral, 3 times weekly, Take on Mondays, Wednesdays, and Fridays. Do not start until instructed.        Laboratory:  The pertinent laboratory results were reviewed and discussed with the patient.    Lab Results   Component Value Date    WBC 6.4 01/20/2025    HCT 33.4 (L) 01/20/2025    HGB 12.0 (L) 01/20/2025    PLT 98 (L) 01/20/2025    K 3.9 01/20/2025    CALCIUM 9.5 01/20/2025     01/20/2025    MG 2.08 01/20/2025    BILITOT 0.8 01/20/2025    ALT 19 01/20/2025    AST 17 01/20/2025    BUN 23 01/20/2025    CREATININE 1.06 01/20/2025    PHOS 3.2 12/30/2024      Note: for a comprehensive list of the patient's lab results, access the Results Review activity.    RTC:   1/27/25:  Follow up visit with Zainab Sullivan CNP  2/6/25:  Follow up visit with Dr. Hobbs and requested day 60 bmbx   Requested weekly Monday appointments through 2/24/2025    THA Wan-CNP

## 2025-01-20 NOTE — ASSESSMENT & PLAN NOTE
Admitted 11/29/24 for his CAR-T cell therapy, on CASE 2419 (T0=12/6/24), prepped with Fludarabine/Cyclophosphamide.   Hosp Course:  - Grade 2 CRS (fever, tachycardia) on 12/7, gave Toxi x1, infectious workup negative. Resolved by 12/8.  - Symptomatic Orthostatic Hypotension on 12/8, resolved with fluids by 12/9.  Discharged home on 12/18/24.      -1/20/2025: Day +45 of Car-T, CASE 2419. Counts improving, no blood products needed. 60 day BmBx scheduled for 2/6/2025. Overall doing well.

## 2025-01-20 NOTE — ASSESSMENT & PLAN NOTE
1. Stage IV mantle cell lymphoma treated with Nordic regimen and consolidation autograft with BEAM preparative regimen April 2012.  Relapse 2019 treated with ibrutinib and venetoclax with complete pathology response until 3/ 2022. See above.     Recurrence July 2024 presenting with dropping platelet counts and bone marrow biopsy 7/25/24 showed focal involvement with mantle cell lymphoma (NGS cyclin D1 positive and Sox 11 positive. BIRC+ ad TP 53 positve with a VAF of 3%, BIRC3 positive). FISH negative for 17p deletion  CT imaging done on 8/7/24 shows thickening of sigmoid colon, development of mild to moderate retroperitoneal adenopathy, splenomegally and possible splenic infarct.       Patient started pirtobrutinib salvage August 12,2024 and feels better. However spleen size was increasing per CT imaging (8/6/24)  and physical exam so Dr. Hobbs planned to add venetoclax as follows, starting 10/13/24:  - 50 mg for first week, then 100 (10/20), and 200 mg (10/27) for 7 days each, then 400 mg (11/3/24- )  -increased anemia with hgb 8.5, platlets 37K, no bleeding.

## 2025-01-20 NOTE — PROGRESS NOTES
Pt ambulated to SCT unit without assistance. Pt accompanied by spouse. Pt denies complaints or pain today. Vitals obtained, blood drawn via venipuncture and sent to lab. Pat Valerio NP came to see patient. Pt ambulated off unit without complaints or assistance.

## 2025-01-21 ENCOUNTER — APPOINTMENT (OUTPATIENT)
Dept: HEMATOLOGY/ONCOLOGY | Facility: CLINIC | Age: 60
End: 2025-01-21
Payer: COMMERCIAL

## 2025-01-21 LAB
CMV DNA SERPL NAA+PROBE-LOG IU: NORMAL {LOG_IU}/ML
LABORATORY COMMENT REPORT: NOT DETECTED

## 2025-01-27 ENCOUNTER — LAB (OUTPATIENT)
Dept: LAB | Facility: HOSPITAL | Age: 60
End: 2025-01-27
Payer: COMMERCIAL

## 2025-01-27 ENCOUNTER — OFFICE VISIT (OUTPATIENT)
Dept: HEMATOLOGY/ONCOLOGY | Facility: HOSPITAL | Age: 60
End: 2025-01-27
Payer: COMMERCIAL

## 2025-01-27 ENCOUNTER — APPOINTMENT (OUTPATIENT)
Dept: HEMATOLOGY/ONCOLOGY | Facility: HOSPITAL | Age: 60
End: 2025-01-27
Payer: COMMERCIAL

## 2025-01-27 ENCOUNTER — INFUSION (OUTPATIENT)
Dept: OTHER | Facility: HOSPITAL | Age: 60
End: 2025-01-27
Payer: COMMERCIAL

## 2025-01-27 VITALS
OXYGEN SATURATION: 100 % | DIASTOLIC BLOOD PRESSURE: 54 MMHG | SYSTOLIC BLOOD PRESSURE: 114 MMHG | BODY MASS INDEX: 28.42 KG/M2 | TEMPERATURE: 96.7 F | WEIGHT: 211.86 LBS | RESPIRATION RATE: 18 BRPM | HEART RATE: 88 BPM

## 2025-01-27 DIAGNOSIS — C83.17 MANTLE CELL LYMPHOMA OF SPLEEN (MULTI): ICD-10-CM

## 2025-01-27 DIAGNOSIS — Z92.850 HISTORY OF CHIMERIC ANTIGEN RECEPTOR T-CELL THERAPY: ICD-10-CM

## 2025-01-27 DIAGNOSIS — Z94.84 HX OF AUTOLOGOUS STEM CELL TRANSPLANT: ICD-10-CM

## 2025-01-27 LAB
ALBUMIN SERPL BCP-MCNC: 4.7 G/DL (ref 3.4–5)
ALP SERPL-CCNC: 58 U/L (ref 33–136)
ALT SERPL W P-5'-P-CCNC: 22 U/L (ref 10–52)
ANION GAP SERPL CALC-SCNC: 14 MMOL/L (ref 10–20)
APTT PPP: 29 SECONDS (ref 27–38)
AST SERPL W P-5'-P-CCNC: 19 U/L (ref 9–39)
BASOPHILS # BLD AUTO: 0.03 X10*3/UL (ref 0–0.1)
BASOPHILS NFR BLD AUTO: 0.4 %
BILIRUB SERPL-MCNC: 0.8 MG/DL (ref 0–1.2)
BUN SERPL-MCNC: 28 MG/DL (ref 6–23)
CALCIUM SERPL-MCNC: 9.6 MG/DL (ref 8.6–10.3)
CHLORIDE SERPL-SCNC: 106 MMOL/L (ref 98–107)
CO2 SERPL-SCNC: 25 MMOL/L (ref 21–32)
CREAT SERPL-MCNC: 1.13 MG/DL (ref 0.5–1.3)
CRP SERPL-MCNC: 0.3 MG/DL
EGFRCR SERPLBLD CKD-EPI 2021: 74 ML/MIN/1.73M*2
EOSINOPHIL # BLD AUTO: 0.22 X10*3/UL (ref 0–0.7)
EOSINOPHIL NFR BLD AUTO: 3 %
ERYTHROCYTE [DISTWIDTH] IN BLOOD BY AUTOMATED COUNT: 16.6 % (ref 11.5–14.5)
FERRITIN SERPL-MCNC: 950 NG/ML (ref 20–300)
FIBRINOGEN PPP-MCNC: 302 MG/DL (ref 200–400)
GLUCOSE SERPL-MCNC: 82 MG/DL (ref 74–99)
HCT VFR BLD AUTO: 33.8 % (ref 41–52)
HGB BLD-MCNC: 12.1 G/DL (ref 13.5–17.5)
IMM GRANULOCYTES # BLD AUTO: 0.03 X10*3/UL (ref 0–0.7)
IMM GRANULOCYTES NFR BLD AUTO: 0.4 % (ref 0–0.9)
INR PPP: 1 (ref 0.9–1.1)
LDH SERPL L TO P-CCNC: 154 U/L (ref 84–246)
LYMPHOCYTES # BLD AUTO: 0.92 X10*3/UL (ref 1.2–4.8)
LYMPHOCYTES NFR BLD AUTO: 12.4 %
MAGNESIUM SERPL-MCNC: 2.06 MG/DL (ref 1.6–2.4)
MCH RBC QN AUTO: 35.2 PG (ref 26–34)
MCHC RBC AUTO-ENTMCNC: 35.8 G/DL (ref 32–36)
MCV RBC AUTO: 98 FL (ref 80–100)
MONOCYTES # BLD AUTO: 0.68 X10*3/UL (ref 0.1–1)
MONOCYTES NFR BLD AUTO: 9.1 %
NEUTROPHILS # BLD AUTO: 5.56 X10*3/UL (ref 1.2–7.7)
NEUTROPHILS NFR BLD AUTO: 74.7 %
NRBC BLD-RTO: 0 /100 WBCS (ref 0–0)
PLATELET # BLD AUTO: 109 X10*3/UL (ref 150–450)
POTASSIUM SERPL-SCNC: 4.3 MMOL/L (ref 3.5–5.3)
PROT SERPL-MCNC: 6.8 G/DL (ref 6.4–8.2)
PROTHROMBIN TIME: 11 SECONDS (ref 9.8–12.8)
RBC # BLD AUTO: 3.44 X10*6/UL (ref 4.5–5.9)
SODIUM SERPL-SCNC: 141 MMOL/L (ref 136–145)
WBC # BLD AUTO: 7.4 X10*3/UL (ref 4.4–11.3)

## 2025-01-27 PROCEDURE — 82728 ASSAY OF FERRITIN: CPT

## 2025-01-27 PROCEDURE — 85610 PROTHROMBIN TIME: CPT

## 2025-01-27 PROCEDURE — 84520 ASSAY OF UREA NITROGEN: CPT

## 2025-01-27 PROCEDURE — 85025 COMPLETE CBC W/AUTO DIFF WBC: CPT

## 2025-01-27 PROCEDURE — 83615 LACTATE (LD) (LDH) ENZYME: CPT

## 2025-01-27 PROCEDURE — 86140 C-REACTIVE PROTEIN: CPT

## 2025-01-27 PROCEDURE — 99211 OFF/OP EST MAY X REQ PHY/QHP: CPT

## 2025-01-27 PROCEDURE — 85384 FIBRINOGEN ACTIVITY: CPT

## 2025-01-27 PROCEDURE — 99214 OFFICE O/P EST MOD 30 MIN: CPT

## 2025-01-27 PROCEDURE — 36415 COLL VENOUS BLD VENIPUNCTURE: CPT

## 2025-01-27 PROCEDURE — 83735 ASSAY OF MAGNESIUM: CPT

## 2025-01-27 ASSESSMENT — ENCOUNTER SYMPTOMS
DIAPHORESIS: 0
BLOOD IN STOOL: 0
CHILLS: 0
NUMBNESS: 0
RESPIRATORY NEGATIVE: 1
UNEXPECTED WEIGHT CHANGE: 0
NAUSEA: 1
FATIGUE: 1
FEVER: 0
VOMITING: 0
APPETITE CHANGE: 0
CARDIOVASCULAR NEGATIVE: 1
DIARRHEA: 0
CONSTIPATION: 0
ABDOMINAL PAIN: 0
LIGHT-HEADEDNESS: 0
DIZZINESS: 0
HEMATOLOGIC/LYMPHATIC NEGATIVE: 1

## 2025-01-27 ASSESSMENT — PAIN SCALES - GENERAL: PAINLEVEL_OUTOF10: 0-NO PAIN

## 2025-01-27 NOTE — PROGRESS NOTES
Pt ambulated to SCT unit without assistance. Pt denies complaints or pain today. Vitals obtained, blood drawn at lab before arrival. Kim RESENDEZ came to see patient. Labs today are unremarkable and no supportive care necessary per treatment plan. Pt provided a copy of labs and AVS and educated on plan of cre. Pt ambulated off unit without complaints or assistance.

## 2025-01-27 NOTE — PROGRESS NOTES
Patient ID: Nancie Armenta is a 59 y.o. male.    Treatment:   Oncology History Overview Note           Patient Visit Information:   Visit Type: Follow Up Visit      Cancer History:   Treatment Synopsis:     1. Stage IV A mantle cell non-Hodgkin lymphoma involving the gastrointestinal tract diagnosed in October 2011, with diffuse gastrointestinal involvement. The patient was treated  with RCHOP alternating with Rituxan and high-dose Mamie-C and received total of 6 cycles of chemotherapy which he finished in February 2012.   Allergic to Augmentin, causes hives.     2. Patient underwent beam chemotherapy followed by autologous peripheral stem cell transplant in April 2012, by Dr. Yvonne Hobbs at [facility], was also involved in phase 3 clinical trial up killed shingles vaccine versus placebo ([employer]-1 Z10  study).     3.The patient developed shingles in January 2014, involving left flank.      4. 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      5.  Recurrent mantle cell lymphoma June 2019 assx presentation with leukocytosis. Peripheral blood flow which showed a CD5 positve clonal lymphocytosis. 6/2/19 CT of chest abdomen and pelvis revealed  splenomegaly of 15 cm compared to 12.8 cm last evaluation. Recurrence confirmed by FISH on peripheral blood earlier this month.  BM biopsy which showed 5% Mantle cell involvement although peripheral blood shows 30% involvement. CT imaging showed splenomegaly. PET scan declined by insurance twice.  Patient underwent colonoscopy  with Dr. Ac ( [place]) 8/8/19 which showed cobblestoning of colon and multiple biopsies including terminal ileum, appendix, cecum,  rectosigmoid positive for mantle cell lymphoma.     6. Initiation of acalabrutinib August 2019 with minimal response. Switched to ibrutinib and venetoclax 12/20/20 with clearing of t(11, 14) by by FISH ( 1/2020) and resolution of involvement of colon by endoscopy and pathology on exam 3/2/2020! BM biopsy  4/2020 HEATHER.     C-scope (3/11/22): at OSH: negative, no evidence of disease. PET scan ( 3/14/22): no abnormal uptake. Deauville 1. BMBx (3/17/22): negative, no evidence of MCL.   March 2022, ibrutinib and venetoclax discontinued.    July 2024 development of Tpenia, bone marrow biopsy 7/25/24 showed focal involvement with mantle cell lymphoma (NGS cyclin D1 positive and Sox 11 positive. BIRC+ and TP 53 positve with a VAF of 3%, BIRC3 positive). FISH negative for 17p deletion  CT imaging done on 8/7/24 shows thickening of sigmoid colon, development of mild to moderate retroperitoneal adenopathy, splenomegally and possible splenic infarct.    Pirotbrutib 200 mg daily initiated 8/12/24.  Due to inadequate response, he started the ventoclax (50 mg) on Adolfo 10/13, then increased to 100 mg , 200 mg and 400 mg      7. S/P COVID 19 infection 12/1/2020, no sequelae                          Lymphoma   10/20/2011 Initial Diagnosis    Lymphoma (Multi)      Cellular Therapy    Mr. Nancie Armenta is a 59 y.o. male presenting with relapsed Mantle Cell Lymphoma. CAR-T cell therapy, on CASE 2419 (T0=12/6/24), prepped with Fludarabine/Cyclophosphamide.      Hosp Course:  - Grade 2 CRS (fever, tachycardia) on 12/7, gave Toxi x1, infectious workup negative. Resolved by 12/8.  - Symptomatic Orthostatic Hypotension on 12/8, resolved with fluids by 12/9.     Mantle cell lymphoma of spleen (Multi)   10/13/2016 Initial Diagnosis    Mantle cell lymphoma of spleen (CMS/HCC)     Mantle cell lymphoma   11/18/2024 Initial Diagnosis    Mantle cell lymphoma     11/30/2024 - 12/20/2024 Bone Marrow Transplant        Cellular Therapy      Nancie Armenta is a 59 y.o. male presenting with relapsed Mantle Cell Lymphoma. CAR-T cell therapy, on CASE 2419 (T0=12/6/24), prepped with Fludarabine/Cyclophosphamide.      Hosp Course:  - Grade 2 CRS (fever, tachycardia) on 12/7, gave Toxi x1, infectious workup negative. Resolved by 12/8.  - Symptomatic Orthostatic Hypotension on 12/8, resolved with fluids by 12/9.         Response:     Past Medical History:  Renal insufficiency.   Past Medical History:   Diagnosis Date    Fourth (trochlear) nerve palsy, right eye 06/08/2017    Right-sided fourth cranial nerve palsy    Fourth (trochlear) nerve palsy, right eye 06/08/2017    Right-sided fourth cranial nerve palsy    Malignant neoplasm of connective and soft tissue, unspecified 06/08/2017    Cancer of blood vessel     Surgical History:   Past Surgical History:   Procedure Laterality Date    IR CVC PICC  11/29/2024    IR CVC PICC 11/29/2024 [facility] ANGIO    SHOULDER SURGERY  07/13/2017    Shoulder Surgery      Family History:   Family History   Problem Relation Name Age of Onset    Cataracts Mother      Cataracts Father      Other (chronic lymphoid leukemia) Father       Social History:  .  Working full time for [employer].    Social History     Tobacco Use    Smoking status: Never    Smokeless tobacco: Never   Vaping Use    Vaping status: Never Used   Substance Use Topics    Alcohol use: Never    Drug use: Never   -------------------------------------------------------------------------------------------------------  Subjective       HPI    Nancie Armenta is a 59 year old man with PMH of hyperglycemia, renal insufficiency, and stage IV A mantle cell non hodgkin lymphoma involving the gastrointestinal tract, s/p autologous SCT (April 2012).  Early in July 2024 patient noted to have dropping platelet counts and bone marrow biopsy 7/25/24 showed focal involvement with mantle cell lymphoma (NGS cyclin D1 positive and Sox 11 positive. BIRC+ ad TP 53  positve with a VAF of 3%, BIRC3 positive). FISH negative for 17p deletion  CT imaging done on 8/7/24 shows thickening of sigmoid colon, development of mild to moderate retroperitoneal adenopathy, splenomegally and possible splenic infarct.  Started Pirtobrutinib salvage on 8/12/24.  Added Venetoclax starting 10/13/24 since spleen size was increasing on repeat CT imaging and on physical exam.  BMBx (10/23/24) with low level involvement by mantle cells about 3% Mantle cells by flow.  PET/CT (11/4/24) with FDG avid lymphadenopathy throughout the neck, chest, abdomen, and pelvis c/w lymphomatous involvement. Intense FDG avid splenomegaly. Peripheral regions of photopenia likely reflect areas of infarction.  Pirtobrutinib and Venetoclax stopped 11/14/24 in preparation for CART collection.  Admitted 11/29/24 for his CAR-T cell therapy, on CASE 2419 (T0=12/6/24), prepped with Fludarabine/Cyclophosphamide. Hospital course included grade 2 CRS (fever, tachycardia) on 12/7, received Toxi x1, infectious workup negative.  He also had symptomatic orthostatic hypotension on 12/8, resolved with fluids by 12/9.  He was discharged home on 12/18/24.      Nancie presents to the clinic today (1/27/25) for routine post transplant follow up. He has no complaints. His appetite has been stable and is continuing to maintain weight. Energy levels have also been improving. He states he has started to do light weight/bodyweight workouts again and has been feeling great. He denies any consitutional symptoms, chest pain, palpitations, leg swelling, abdominal pain, N/V/D. Does admit to some dyspnea after over exerting himself. He was having some nosebleeds due to a scab being present. but this has since resolved. Overall doing well and counts have been stable.     Review of Systems   Constitutional:  Positive for fatigue. Negative for appetite change, chills, diaphoresis, fever and unexpected weight change.   Respiratory: Negative.      Cardiovascular: Negative.    Gastrointestinal:  Positive for nausea. Negative for abdominal pain, blood in stool, constipation, diarrhea and vomiting.   Genitourinary: Negative.     Musculoskeletal:  Negative for gait problem.   Skin: Negative.    Neurological:  Negative for dizziness, gait problem, light-headedness and numbness.   Hematological: Negative.    -------------------------------------------------------------------------------------------------------  Objective   BSA: There is no height or weight on file to calculate BSA.  There were no vitals taken for this visit.    Physical Exam  Vitals reviewed.   Constitutional:       General: He is not in acute distress.     Appearance: Normal appearance. He is not ill-appearing.   HENT:      Head: Normocephalic and atraumatic.      Nose: Nose normal.      Mouth/Throat:      Mouth: Mucous membranes are moist.      Pharynx: Oropharynx is clear. No oropharyngeal exudate.   Cardiovascular:      Rate and Rhythm: Normal rate and regular rhythm.      Pulses: Normal pulses.      Heart sounds: Normal heart sounds.   Pulmonary:      Effort: Pulmonary effort is normal. No respiratory distress.      Breath sounds: Normal breath sounds. No wheezing.   Abdominal:      General: Abdomen is flat. There is no distension.      Palpations: Abdomen is soft. There is splenomegaly. There is no mass.      Tenderness: There is no abdominal tenderness.   Musculoskeletal:         General: No swelling. Normal range of motion.   Lymphadenopathy:      Comments: No lymphadenopathy   Skin:     General: Skin is warm and dry.   Neurological:      General: No focal deficit present.      Mental Status: He is alert and oriented to person, place, and time.   Psychiatric:         Mood and Affect: Mood normal.         Behavior: Behavior normal.         Thought Content: Thought content normal.         Judgment: Judgment normal.     Performance Status:  ECOG performance status: 0 fully active    CAR-T  product:  CAR-T [id number]  Cell infusion date: 12/6/2024  Day: 52  -------------------------------------------------------------------------------------------------------  Assessment/Plan      1. Stage IV mantle cell lymphoma itreated with Nordic regimen and consolidation autograft with BEAM preparative regimen April 2012.  Relapse 2019 treated with ibrutinib and venetoclax with complete pathology response until 3/ 2022. See above.     Recurrence July 2024 presenting with dropping platelet counts and bone marrow biopsy 7/25/24 showed focal involvement with mantle cell lymphoma (NGS cyclin D1 positive and Sox 11 positive. BIRC+ ad TP 53 positve with a VAF of 3%, BIRC3 positive). FISH negative for 17p deletion  CT imaging done on 8/7/24 shows thickening of sigmoid colon, development of mild to moderate retroperitoneal adenopathy, splenomegally and possible splenic infarct.      Patient started pirtobrutinib salvage August 12,2024 and feels better. However spleen size was increasing per CT imaging (8/6/24)  and physical exam so Dr. Hobbs planned to add venetoclax as follows, starting 10/13/24:  - 50 mg for first week, then 100 (10/20), and 200 mg (10/27) for 7 days each, then 400 mg (11/3/24- )  -increased anemia with hgb 8.5, platlets 37K, no bleeding.     Pre CART staging:    BM biopsy low level involvment by mantle cells about 3% mantle cells by flow. Absence of CD19 on this  flow sample likely reflects insufficient sampling and positive for CD 19 on prior bone marrow sample of 7/24/24..    PET imaging 11/4/24   1. FDG avid lymphadenopathy throughout the neck, chest, abdomen and  pelvis as detailed above, consistent with lymphomatous involvement.  2. Intense FDG avid splenomegaly, consistent with lymphomatous  involvement. Peripheral regions of photopenia likely reflect areas of  infarction.  3. No PET evidence of FDG avid extranodal disease.on     ECHO 10/23/24 low normal LVEF 53%, cleared by Dr. Izaguirre.     -  Plan to proceed to CART cell therapy ASAP, discussed CASE 2419, anticipate collection on 11/25 and start of LD shortly thereafter. Patient instructed to take last dose of pirto and venetoclax on 11/14/24 to allow 10 day washout.  Met with Research RN and CAR-T coordinator. Due to anemia, ordered prbcs to be transfused Friday before intended collection on 11/25/24.    Admitted 11/29/24 for his CAR-T cell therapy, on CASE 2419 (T0=12/6/24), prepped with Fludarabine/Cyclophosphamide.   Hosp Course:  - Grade 2 CRS (fever, tachycardia) on 12/7, gave Toxi x1, infectious workup negative. Resolved by 12/8.  - Symptomatic Orthostatic Hypotension on 12/8, resolved with fluids by 12/9.  Discharged home on 12/18/24.      PET scan 1/2/25:   IMPRESSION:  1.  Interval resolution of hypermetabolic lymphadenopathy above and below the diaphragm. Interval resolution hypermetabolic activity within the spleen with improving splenomegaly. (Deauville score of 1).  2. No evidence of new hypermetabolic robbie or extranodal lymphomatous disease.    1/27/25:  Today is T+ 52 from CAR-T.  Blood counts improved, hgb 12.1, plt 109, WBC 7.4, and ANC 5.56. T +30  Bone marrow biopsy performed 1/2/25. Reviewed PET scan results from 1/2/25 with patient and wife.  T + 60 bone marrow biopsy scheduled for 2/6/25. Can continue with weekly visits. Counts stable.   CAR T cell therapy: CASE 2419   Toxicity monitoring:   Ferritin   Date Value Ref Range Status   01/20/2025 1,082 (H) 20 - 300 ng/mL Final   01/13/2025 1,044 (H) 20 - 300 ng/mL Final   01/09/2025 1,114 (H) 20 - 300 ng/mL Final     Fibrinogen   Date Value Ref Range Status   01/27/2025 302 200 - 400 mg/dL Final   01/20/2025 304 200 - 400 mg/dL Final   01/13/2025 173 (L) 200 - 400 mg/dL Final     C-Reactive Protein   Date Value Ref Range Status   01/20/2025 0.13 <1.00 mg/dL Final   01/13/2025 <0.10 <1.00 mg/dL Final   01/09/2025 <0.10 <1.00 mg/dL Final     IgG   Date Value Ref Range Status    01/13/2025 674 (L) 700 - 1,600 mg/dL Final     Comment:     MONOCLONAL PROTEINS MAY CAUSE FALSELY LOW  RESULTS IN THIS ASSAY. SERUM PROTEIN  ELECTROPHORESIS SHOULD BE DONE AS THE  FIRST TEST TO EVALUATE MONOCLONAL GAMMOPATHY.     01/09/2025 700 700 - 1,600 mg/dL Final     Comment:     MONOCLONAL PROTEINS MAY CAUSE FALSELY LOW  RESULTS IN THIS ASSAY. SERUM PROTEIN  ELECTROPHORESIS SHOULD BE DONE AS THE  FIRST TEST TO EVALUATE MONOCLONAL GAMMOPATHY.     12/27/2024 811 700 - 1,600 mg/dL Final     Comment:     MONOCLONAL PROTEINS MAY CAUSE FALSELY LOW  RESULTS IN THIS ASSAY. SERUM PROTEIN  ELECTROPHORESIS SHOULD BE DONE AS THE  FIRST TEST TO EVALUATE MONOCLONAL GAMMOPATHY.       Response monitoring:  Day 30 response monitoring: obtained PET/CT and Bmbx on 1/2/25.  -Results showed hypocellular bone marrow with very low level involvement involvement (1% mantle cells). Flow showed no immunophenotypic evidence of a lymphoproliferative disorder and no discrete blast population. FISH negative for t (11:14).   -PET/CT showed interval resolution of hypermetabolic LAD above and below the diaphragm. Interval resolution hypermetabolic activity within the spleen with improving splenomegaly. Deauville score of 1. No evidence of new hypermetabolic robbie or extranodal lymphomatous disease.   Day 60 response monitoring: scheduled: bmbx 2/6/25  Day 90 response monitoring: scheduled ####    Supportive Care:   Levetiracetam (500-750mg) every 12h x 30 days. Discontinued 1/5/25    Infectious prophylaxis:   Antiviral prophylaxis Acyclovir. Continue for at least 6 months.   PCP prophylaxis. Bactrim Started 1/3/25.   Antifungal prophylaxis Fluconazole. Continue antifungal prophylaxis through Day 90.   Levofloxacin until ANC is sustained at >500-discontinued 12/20/24.       Wallet Card: Verified that the patient was discharged with CAR T Cell wallet card at first outpatient visit. Reinforced instructions for use.  The patient was reminded not  to drive for 8 weeks following CAR T cell therapy    Vaccines:  Will plan to start vaccines with influenza and covid vaccine about 3-4 months after CAR-T and will start post transplant vaccines around 6 months.       BPH:  Reports his previous urinary frequency and nocturia symptoms has greatly improved after starting tadalafil 5 mg daily.   Tadalafil and flomax held during hospitalization due to orthostatic hypotension. Continuing to hold tadalafil and flomax 91/27/25)    Renal failure:   sCr stable at 1.13 (1/27/25).     Weight Loss:  Hospital admission weight 97.2 kg (11/29/24).    12/23/24:  Weight today 87 kg.  Nancie reports his appetite is slowly increasing and he is tolerating more food over the last few days.  Discussed ways to increase calories into diet.  Monitoring.  1/2/25: Wt. Today 89.4kg  1/6/25:  Weight today 96.1 kg. Continuing to gain weight. Monitoring.    Health maintenance.   Colonoscopy completed 7/14/23: normal except internal hemorrhoids.  Recommendations to repeat in 2 years.    - Some aortic calcifications on imaging, started on prevastatin-managed by his PCP.  PCP increased pravastatin dose to 40 mg daily.  PCP switched to atorvastatin 40 mg daily.    Central Line:  Left power PICC line placed 12/2/24.  Discharged home on 12/18/24 with PICC, wife is flushing PICC line daily with NS flushes and dressing changes completed in infusion center. PICC removed on 1/13/25.    RTC:   2/6/25:  Follow up visit with Dr. Hobbs and EVELYN + Kelsie bmbx   2/11, 2/17, 2/24 post BMT and infusion visits   -------------------------------------------------------------------------------------------  Kim Vasques PA-C

## 2025-01-28 LAB
CMV DNA SERPL NAA+PROBE-LOG IU: NORMAL {LOG_IU}/ML
LABORATORY COMMENT REPORT: NOT DETECTED

## 2025-01-30 DIAGNOSIS — C83.10 MANTLE CELL LYMPHOMA, UNSPECIFIED BODY REGION (MULTI): ICD-10-CM

## 2025-02-05 ASSESSMENT — ENCOUNTER SYMPTOMS
CARDIOVASCULAR NEGATIVE: 1
CONSTIPATION: 0
LIGHT-HEADEDNESS: 0
APPETITE CHANGE: 0
VOMITING: 0
RESPIRATORY NEGATIVE: 1
FEVER: 0
ABDOMINAL PAIN: 0
CHILLS: 0
NUMBNESS: 0
HEMATOLOGIC/LYMPHATIC NEGATIVE: 1
DIAPHORESIS: 0
BLOOD IN STOOL: 0
DIARRHEA: 0
DIZZINESS: 0
UNEXPECTED WEIGHT CHANGE: 0

## 2025-02-05 NOTE — PROGRESS NOTES
Patient ID: Nancie Armenta is a 60 y.o. male.    Treatment:   Oncology History Overview Note           Patient Visit Information:   Visit Type: Follow Up Visit      Cancer History:   Treatment Synopsis:     1. Stage IV A mantle cell non-Hodgkin lymphoma involving the gastrointestinal tract diagnosed in October 2011, with diffuse gastrointestinal involvement. The patient was treated  with RCHOP alternating with Rituxan and high-dose Mamie-C and received total of 6 cycles of chemotherapy which he finished in February 2012.   Allergic to Augmentin, causes hives.     2. Patient underwent beam chemotherapy followed by autologous peripheral stem cell transplant in April 2012, by Dr. Yvonne Hobbs at [facility], was also involved in phase 3 clinical trial up killed shingles vaccine versus placebo ([employer]-1 Z10  study).     3.The patient developed shingles in January 2014, involving left flank.      4. 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      5.  Recurrent mantle cell lymphoma June 2019 assx presentation with leukocytosis. Peripheral blood flow which showed a CD5 positve clonal lymphocytosis. 6/2/19 CT of chest abdomen and pelvis revealed  splenomegaly of 15 cm compared to 12.8 cm last evaluation. Recurrence confirmed by FISH on peripheral blood earlier this month.  BM biopsy which showed 5% Mantle cell involvement although peripheral blood shows 30% involvement. CT imaging showed splenomegaly. PET scan declined by insurance twice.  Patient underwent colonoscopy  with Dr. Ac ( [place]) 8/8/19 which showed cobblestoning of colon and multiple biopsies including terminal ileum, appendix, cecum,  rectosigmoid positive for mantle cell lymphoma.     6. Initiation of acalabrutinib August 2019 with minimal response. Switched to ibrutinib and venetoclax 12/20/20 with clearing of t(11, 14) by by FISH ( 1/2020) and resolution of involvement of colon by endoscopy and pathology on exam 3/2/2020! BM biopsy  4/2020 HEATHER.     C-scope (3/11/22): at OSH: negative, no evidence of disease. PET scan ( 3/14/22): no abnormal uptake. Deauville 1. BMBx (3/17/22): negative, no evidence of MCL.   March 2022, ibrutinib and venetoclax discontinued.    July 2024 development of Tpenia, bone marrow biopsy 7/25/24 showed focal involvement with mantle cell lymphoma (NGS cyclin D1 positive and Sox 11 positive. BIRC+ and TP 53 positve with a VAF of 3%, BIRC3 positive). FISH negative for 17p deletion  CT imaging done on 8/7/24 shows thickening of sigmoid colon, development of mild to moderate retroperitoneal adenopathy, splenomegally and possible splenic infarct.    Pirotbrutib 200 mg daily initiated 8/12/24.  Due to inadequate response, he started the ventoclax (50 mg) on Adolfo 10/13, then increased to 100 mg , 200 mg and 400 mg      7. S/P COVID 19 infection 12/1/2020, no sequelae                          Lymphoma   10/20/2011 Initial Diagnosis    Lymphoma (Multi)      Cellular Therapy    Mr. Nancie Armenta is a 59 y.o. male presenting with relapsed Mantle Cell Lymphoma. CAR-T cell therapy, on CASE 2419 (T0=12/6/24), prepped with Fludarabine/Cyclophosphamide.      Hosp Course:  - Grade 2 CRS (fever, tachycardia) on 12/7, gave Toxi x1, infectious workup negative. Resolved by 12/8.  - Symptomatic Orthostatic Hypotension on 12/8, resolved with fluids by 12/9.     Mantle cell lymphoma of spleen (Multi)   10/13/2016 Initial Diagnosis    Mantle cell lymphoma of spleen (CMS/HCC)     Mantle cell lymphoma   11/18/2024 Initial Diagnosis    Mantle cell lymphoma     11/30/2024 - 12/20/2024 Bone Marrow Transplant        Cellular Therapy      Nancie Armenta is a 59 y.o. male presenting with relapsed Mantle Cell Lymphoma. CAR-T cell therapy, on CASE 2419 (T0=12/6/24), prepped with Fludarabine/Cyclophosphamide.      Hosp Course:  - Grade 2 CRS (fever, tachycardia) on 12/7, gave Toxi x1, infectious workup negative. Resolved by 12/8.  - Symptomatic Orthostatic Hypotension on 12/8, resolved with fluids by 12/9.         Response:     Past Medical History:  Renal insufficiency.   Past Medical History:   Diagnosis Date    Fourth (trochlear) nerve palsy, right eye 06/08/2017    Right-sided fourth cranial nerve palsy    Fourth (trochlear) nerve palsy, right eye 06/08/2017    Right-sided fourth cranial nerve palsy    Malignant neoplasm of connective and soft tissue, unspecified 06/08/2017    Cancer of blood vessel     Surgical History:   Past Surgical History:   Procedure Laterality Date    IR CVC PICC  11/29/2024    IR CVC PICC 11/29/2024 [facility] SCC ANGIO    SHOULDER SURGERY  07/13/2017    Shoulder Surgery      Family History:   Family History   Problem Relation Name Age of Onset    Cataracts Mother      Cataracts Father      Other (chronic lymphoid leukemia) Father       Social History:  .  Working full time for [employer].    Social History     Tobacco Use    Smoking status: Never    Smokeless tobacco: Never   Vaping Use    Vaping status: Never Used   Substance Use Topics    Alcohol use: Never    Drug use: Never   -------------------------------------------------------------------------------------------------------  Subjective       HPI    Nancie Armenta is a 59 year old man with PMH of hyperglycemia, renal insufficiency, and stage IV A mantle cell non hodgkin lymphoma involving the gastrointestinal tract, s/p autologous SCT (April 2012).  Early in July 2024 patient noted to have dropping platelet counts and bone marrow biopsy 7/25/24 showed focal involvement with mantle cell lymphoma (NGS cyclin D1 positive and Sox 11 positive. BIRC+ ad TP 53  positve with a VAF of 3%, BIRC3 positive). FISH negative for 17p deletion  CT imaging done on 8/7/24 shows thickening of sigmoid colon, development of mild to moderate retroperitoneal adenopathy, splenomegally and possible splenic infarct.  Started Pirtobrutinib salvage on 8/12/24.  Added Venetoclax starting 10/13/24 since spleen size was increasing on repeat CT imaging and on physical exam.  BMBx (10/23/24) with low level involvement by mantle cells about 3% Mantle cells by flow.  PET/CT (11/4/24) with FDG avid lymphadenopathy throughout the neck, chest, abdomen, and pelvis c/w lymphomatous involvement. Intense FDG avid splenomegaly. Peripheral regions of photopenia likely reflect areas of infarction.  Pirtobrutinib and Venetoclax stopped 11/14/24 in preparation for CART collection.  Admitted 11/29/24 for his CAR-T cell therapy, on CASE 2419 (T0=12/6/24), prepped with Fludarabine/Cyclophosphamide. Hospital course included grade 2 CRS (fever, tachycardia) on 12/7, received Toxi x1, infectious workup negative.  He also had symptomatic orthostatic hypotension on 12/8, resolved with fluids by 12/9.  He was discharged home on 12/18/24.      Nancie presents to the clinic today (2/6/25) with his wife for followup evaluation after his CAR-T for for relapsed mantle cell lymphoma and count check.  Today is T+62 from CAR-T.  Reports he is feeling better he is exercising every day and has gained 27 pounds. Occasional off balance but hasn't fallen.     Having head cold symptoms for about 1 weeks with congestion, fatigue, and feeling cold.  PCP put him on clindamycin 4x a day. Sleeping okay  tested negative fo COVID on 1/9/25.    Review of Systems   Constitutional:  Negative for appetite change, chills, diaphoresis, fatigue, fever and unexpected weight change.   Respiratory: Negative.     Cardiovascular: Negative.    Gastrointestinal:  Negative for abdominal pain, blood in stool, constipation, diarrhea, nausea and vomiting.    Genitourinary: Negative.     Musculoskeletal:  Negative for gait problem.   Skin: Negative.    Neurological:  Negative for dizziness, gait problem, light-headedness and numbness.   Hematological: Negative.    -------------------------------------------------------------------------------------------------------  Objective   BSA: 2.24 meters squared  /82   Pulse 92   Temp 36.2 °C (97.2 °F)   Resp 18   Wt 98.6 kg (217 lb 6 oz)   SpO2 100%   BMI 29.15 kg/m²     Physical Exam  Vitals reviewed.   Constitutional:       General: He is not in acute distress.  HENT:      Head: Normocephalic and atraumatic.      Mouth/Throat:      Mouth: Mucous membranes are moist.   Eyes:      Extraocular Movements: Extraocular movements intact.      Conjunctiva/sclera: Conjunctivae normal.      Pupils: Pupils are equal, round, and reactive to light.   Cardiovascular:      Rate and Rhythm: Normal rate and regular rhythm.      Pulses: Normal pulses.      Heart sounds: Normal heart sounds.   Pulmonary:      Effort: Pulmonary effort is normal. No respiratory distress.      Breath sounds: Normal breath sounds. No wheezing.   Abdominal:      General: Abdomen is flat. Bowel sounds are normal. There is no distension.      Palpations: Abdomen is soft. There is splenomegaly. There is no mass.      Tenderness: There is no abdominal tenderness.   Musculoskeletal:         General: No swelling. Normal range of motion.   Lymphadenopathy:      Comments: No lymphadenopathy   Skin:     General: Skin is warm and dry.      Comments: PICC line to left arm, site WNL.   Neurological:      General: No focal deficit present.      Mental Status: He is alert and oriented to person, place, and time.   Psychiatric:         Mood and Affect: Mood normal.         Behavior: Behavior normal.         Thought Content: Thought content normal.         Judgment: Judgment normal.   Performance Status:  ECOG performance status: 0 fully active    CAR-T product:  CAR-T  [id number]  Cell infusion date: 12/6/2024  Day: 31  -------------------------------------------------------------------------------------------------------  Assessment/Plan      1. Stage IV mantle cell lymphoma itreated with Nordic regimen and consolidation autograft with BEAM preparative regimen April 2012.  Relapse 2019 treated with ibrutinib and venetoclax with complete pathology response until 3/ 2022. See above.     Recurrence July 2024 presenting with dropping platelet counts and bone marrow biopsy 7/25/24 showed focal involvement with mantle cell lymphoma (NGS cyclin D1 positive and Sox 11 positive. BIRC+ ad TP 53 positve with a VAF of 3%, BIRC3 positive). FISH negative for 17p deletion  CT imaging done on 8/7/24 shows thickening of sigmoid colon, development of mild to moderate retroperitoneal adenopathy, splenomegally and possible splenic infarct.      Patient started pirtobrutinib salvage August 12,2024 and feels better. However spleen size was increasing per CT imaging (8/6/24)  and physical exam so Dr. Hobbs planned to add venetoclax as follows, starting 10/13/24:  - 50 mg for first week, then 100 (10/20), and 200 mg (10/27) for 7 days each, then 400 mg (11/3/24- )  -increased anemia with hgb 8.5, platlets 37K, no bleeding.     Pre CART staging:    BM biopsy low level involvment by mantle cells about 3% mantle cells by flow. Absence of CD19 on this  flow sample likely reflects insufficient sampling and positive for CD 19 on prior bone marrow sample of 7/24/24..    PET imaging 11/4/24   1. FDG avid lymphadenopathy throughout the neck, chest, abdomen and  pelvis as detailed above, consistent with lymphomatous involvement.  2. Intense FDG avid splenomegaly, consistent with lymphomatous  involvement. Peripheral regions of photopenia likely reflect areas of  infarction.  3. No PET evidence of FDG avid extranodal disease.on     ECHO 10/23/24 low normal LVEF 53%, cleared by Dr. Izaguirre.     - Plan to proceed  to CART cell therapy ASAP, discussed CASE 2419, anticipate collection on 11/25 and start of LD shortly thereafter. Patient instructed to take last dose of pirto and venetoclax on 11/14/24 to allow 10 day washout.  Met with Research RN and CAR-T coordinator. Due to anemia, ordered prbcs to be transfused Friday before intended collection on 11/25/24.    Admitted 11/29/24 for his CAR-T cell therapy, on CASE 2419 (T0=12/6/24), prepped with Fludarabine/Cyclophosphamide.   Hosp Course:  - Grade 2 CRS (fever, tachycardia) on 12/7, gave Toxi x1, infectious workup negative. Resolved by 12/8.  - Symptomatic Orthostatic Hypotension on 12/8, resolved with fluids by 12/9.  Discharged home on 12/18/24.      PET scan 1/2/25:   IMPRESSION:  1.  Interval resolution of hypermetabolic lymphadenopathy above and below the diaphragm. Interval resolution hypermetabolic activity within the spleen with improving splenomegaly. (Deauville score of 1).  2. No evidence of new hypermetabolic robbie or extranodal lymphomatous disease.    2/6/25:  Today is T+ 61 from CAR-T.   Bone marrow biopsy performed 1/2/25 showed 1% mantle cells by IHC for SOX 11 and cycle D1 but nothing by flow.    Day 60 BM to be repeated today. Plan to return 3/10/25 to review day 90 restaging.       CAR T cell therapy:   Toxicity monitoring:   Ferritin   Date Value Ref Range Status   01/27/2025 950 (H) 20 - 300 ng/mL Final   01/20/2025 1,082 (H) 20 - 300 ng/mL Final   01/13/2025 1,044 (H) 20 - 300 ng/mL Final     Fibrinogen   Date Value Ref Range Status   01/27/2025 302 200 - 400 mg/dL Final   01/20/2025 304 200 - 400 mg/dL Final   01/13/2025 173 (L) 200 - 400 mg/dL Final     C-Reactive Protein   Date Value Ref Range Status   01/27/2025 0.30 <1.00 mg/dL Final   01/20/2025 0.13 <1.00 mg/dL Final   01/13/2025 <0.10 <1.00 mg/dL Final     IgG   Date Value Ref Range Status   01/13/2025 674 (L) 700 - 1,600 mg/dL Final     Comment:     MONOCLONAL PROTEINS MAY CAUSE FALSELY  LOW  RESULTS IN THIS ASSAY. SERUM PROTEIN  ELECTROPHORESIS SHOULD BE DONE AS THE  FIRST TEST TO EVALUATE MONOCLONAL GAMMOPATHY.     01/09/2025 700 700 - 1,600 mg/dL Final     Comment:     MONOCLONAL PROTEINS MAY CAUSE FALSELY LOW  RESULTS IN THIS ASSAY. SERUM PROTEIN  ELECTROPHORESIS SHOULD BE DONE AS THE  FIRST TEST TO EVALUATE MONOCLONAL GAMMOPATHY.     12/27/2024 811 700 - 1,600 mg/dL Final     Comment:     MONOCLONAL PROTEINS MAY CAUSE FALSELY LOW  RESULTS IN THIS ASSAY. SERUM PROTEIN  ELECTROPHORESIS SHOULD BE DONE AS THE  FIRST TEST TO EVALUATE MONOCLONAL GAMMOPATHY.       Response monitoring:  Day 30 response monitoring: obtained PET/CT and Bmbx on 1/2/25.  Day 60 response monitoring: scheduled ####  Day 90 response monitoring: scheduled ####    Supportive Care:   Levetiracetam (500-750mg) every 12h x 30 days. Discontinued 1/5/25    Infectious prophylaxis:   Antiviral prophylaxis Acyclovir. Continue for at least 6 months.   PCP prophylaxis. Bactrim Started 1/3/25 continue 6 months   Antifungal prophylaxis Fluconazole. Continue antifungal prophylaxis through Day 90.   Levofloxacin until ANC is sustained at >500-discontinued 12/20/24.       Wallet Card: Verified that the patient was discharged with CAR T Cell wallet card at first outpatient visit. Reinforced instructions for use.  The patient was reminded not to drive for 8 weeks following CAR T cell therapy    Vaccines:  Will plan to start vaccines with influenza and covid vaccine about 3-4 months after CAR-T and will start post transplant vaccines around 6 months.       BPH:  Reports his previous urinary frequency and nocturia symptoms has greatly improved after starting tadalafil 5 mg daily.   Tadalafil and flomax held during hospitalization due to orthostatic hypotension.      Renal failure:   sCr stable at 1.04 (1/6/25).     Weight Loss:  Hospital admission weight 97.2 kg (11/29/24).    12/23/24:  Weight today 87 kg.  Nancie reports his appetite is  slowly increasing and he is tolerating more food over the last few days.  Discussed ways to increase calories into diet.  Monitoring.  1/2/25: Wt. Today 89.4kg  1/6/25:  Weight today 89.5 kg.  Monitoring.    Health maintenance.   Colonoscopy completed 7/14/23: normal except internal hemorrhoids.  Recommendations to repeat in 2 years.    - Some aortic calcifications on imaging, started on prevastatin-managed by his PCP.  PCP increased pravastatin dose to 40 mg daily.  PCP switched to atorvastatin 40 mg daily.    Central Line:  Left power PICC line placed 12/2/24.  Discharged home on 12/18/24 with PICC, wife is flushing PICC line daily with NS flushes and dressing changes completed in infusion center.    1/6/25:  PICC line with no redness, tenderness, swelling, or drainage.  Okay to remove PICC at next appointment if not requiring IV replacements.      RTC: 3/10/25 T+90 PET scan prior    -------------------------------------------------------------------------------------------  Yvonne Hobbs MD

## 2025-02-06 ENCOUNTER — PROCEDURE VISIT (OUTPATIENT)
Dept: HEMATOLOGY/ONCOLOGY | Facility: HOSPITAL | Age: 60
End: 2025-02-06
Payer: COMMERCIAL

## 2025-02-06 ENCOUNTER — OFFICE VISIT (OUTPATIENT)
Dept: HEMATOLOGY/ONCOLOGY | Facility: HOSPITAL | Age: 60
End: 2025-02-06
Payer: COMMERCIAL

## 2025-02-06 ENCOUNTER — DOCUMENTATION (OUTPATIENT)
Dept: HEMATOLOGY/ONCOLOGY | Facility: HOSPITAL | Age: 60
End: 2025-02-06
Payer: COMMERCIAL

## 2025-02-06 ENCOUNTER — LAB (OUTPATIENT)
Dept: LAB | Facility: HOSPITAL | Age: 60
End: 2025-02-06
Payer: COMMERCIAL

## 2025-02-06 VITALS
BODY MASS INDEX: 29.15 KG/M2 | SYSTOLIC BLOOD PRESSURE: 142 MMHG | OXYGEN SATURATION: 100 % | RESPIRATION RATE: 18 BRPM | DIASTOLIC BLOOD PRESSURE: 82 MMHG | HEART RATE: 92 BPM | WEIGHT: 217.37 LBS | TEMPERATURE: 97.2 F

## 2025-02-06 DIAGNOSIS — C83.17 MANTLE CELL LYMPHOMA OF SPLEEN (MULTI): ICD-10-CM

## 2025-02-06 DIAGNOSIS — Z92.850 HISTORY OF CHIMERIC ANTIGEN RECEPTOR T-CELL THERAPY: ICD-10-CM

## 2025-02-06 DIAGNOSIS — C83.17 MANTLE CELL LYMPHOMA OF SPLEEN (MULTI): Primary | ICD-10-CM

## 2025-02-06 DIAGNOSIS — C83.10 MANTLE CELL LYMPHOMA, UNSPECIFIED BODY REGION (MULTI): ICD-10-CM

## 2025-02-06 LAB
ALBUMIN SERPL BCP-MCNC: 4.6 G/DL (ref 3.4–5)
ALP SERPL-CCNC: 72 U/L (ref 33–136)
ALT SERPL W P-5'-P-CCNC: 23 U/L (ref 10–52)
ANION GAP SERPL CALC-SCNC: 13 MMOL/L (ref 10–20)
APTT PPP: 29 SECONDS (ref 27–38)
AST SERPL W P-5'-P-CCNC: 18 U/L (ref 9–39)
BASOPHILS # BLD AUTO: 0.02 X10*3/UL (ref 0–0.1)
BASOPHILS NFR BLD AUTO: 0.3 %
BILIRUB SERPL-MCNC: 0.7 MG/DL (ref 0–1.2)
BUN SERPL-MCNC: 26 MG/DL (ref 6–23)
CALCIUM SERPL-MCNC: 9.6 MG/DL (ref 8.6–10.3)
CHLORIDE SERPL-SCNC: 104 MMOL/L (ref 98–107)
CO2 SERPL-SCNC: 27 MMOL/L (ref 21–32)
CREAT SERPL-MCNC: 1.07 MG/DL (ref 0.5–1.3)
CRP SERPL-MCNC: 0.35 MG/DL
EGFRCR SERPLBLD CKD-EPI 2021: 79 ML/MIN/1.73M*2
EOSINOPHIL # BLD AUTO: 0.17 X10*3/UL (ref 0–0.7)
EOSINOPHIL NFR BLD AUTO: 2.5 %
ERYTHROCYTE [DISTWIDTH] IN BLOOD BY AUTOMATED COUNT: 15.1 % (ref 11.5–14.5)
FERRITIN SERPL-MCNC: 914 NG/ML (ref 20–300)
FIBRINOGEN PPP-MCNC: 420 MG/DL (ref 200–400)
GLUCOSE SERPL-MCNC: 83 MG/DL (ref 74–99)
HCT VFR BLD AUTO: 34.7 % (ref 41–52)
HGB BLD-MCNC: 12.4 G/DL (ref 13.5–17.5)
HOLD SPECIMEN: NORMAL
IGG SERPL-MCNC: 583 MG/DL (ref 700–1600)
IMM GRANULOCYTES # BLD AUTO: 0.03 X10*3/UL (ref 0–0.7)
IMM GRANULOCYTES NFR BLD AUTO: 0.4 % (ref 0–0.9)
INR PPP: 1 (ref 0.9–1.1)
LDH SERPL L TO P-CCNC: 152 U/L (ref 84–246)
LYMPHOCYTES # BLD AUTO: 0.68 X10*3/UL (ref 1.2–4.8)
LYMPHOCYTES NFR BLD AUTO: 9.9 %
MAGNESIUM SERPL-MCNC: 2.08 MG/DL (ref 1.6–2.4)
MCH RBC QN AUTO: 34.6 PG (ref 26–34)
MCHC RBC AUTO-ENTMCNC: 35.7 G/DL (ref 32–36)
MCV RBC AUTO: 97 FL (ref 80–100)
MONOCYTES # BLD AUTO: 0.75 X10*3/UL (ref 0.1–1)
MONOCYTES NFR BLD AUTO: 10.9 %
NEUTROPHILS # BLD AUTO: 5.24 X10*3/UL (ref 1.2–7.7)
NEUTROPHILS NFR BLD AUTO: 76 %
NRBC BLD-RTO: 0 /100 WBCS (ref 0–0)
PLATELET # BLD AUTO: 114 X10*3/UL (ref 150–450)
POTASSIUM SERPL-SCNC: 4.4 MMOL/L (ref 3.5–5.3)
PROT SERPL-MCNC: 7 G/DL (ref 6.4–8.2)
PROTHROMBIN TIME: 10.7 SECONDS (ref 9.8–12.8)
RBC # BLD AUTO: 3.58 X10*6/UL (ref 4.5–5.9)
SODIUM SERPL-SCNC: 140 MMOL/L (ref 136–145)
WBC # BLD AUTO: 6.9 X10*3/UL (ref 4.4–11.3)

## 2025-02-06 PROCEDURE — 83735 ASSAY OF MAGNESIUM: CPT

## 2025-02-06 PROCEDURE — 85610 PROTHROMBIN TIME: CPT

## 2025-02-06 PROCEDURE — 83615 LACTATE (LD) (LDH) ENZYME: CPT

## 2025-02-06 PROCEDURE — 85384 FIBRINOGEN ACTIVITY: CPT

## 2025-02-06 PROCEDURE — 36415 COLL VENOUS BLD VENIPUNCTURE: CPT

## 2025-02-06 PROCEDURE — 82728 ASSAY OF FERRITIN: CPT

## 2025-02-06 PROCEDURE — 80053 COMPREHEN METABOLIC PANEL: CPT

## 2025-02-06 PROCEDURE — 86140 C-REACTIVE PROTEIN: CPT

## 2025-02-06 PROCEDURE — 99214 OFFICE O/P EST MOD 30 MIN: CPT | Performed by: INTERNAL MEDICINE

## 2025-02-06 PROCEDURE — 85730 THROMBOPLASTIN TIME PARTIAL: CPT

## 2025-02-06 PROCEDURE — 1036F TOBACCO NON-USER: CPT | Performed by: INTERNAL MEDICINE

## 2025-02-06 PROCEDURE — 38222 DX BONE MARROW BX & ASPIR: CPT | Mod: RT | Performed by: NURSE PRACTITIONER

## 2025-02-06 PROCEDURE — 85025 COMPLETE CBC W/AUTO DIFF WBC: CPT

## 2025-02-06 PROCEDURE — 38222 DX BONE MARROW BX & ASPIR: CPT | Performed by: NURSE PRACTITIONER

## 2025-02-06 PROCEDURE — 2500000001 HC RX 250 WO HCPCS SELF ADMINISTERED DRUGS (ALT 637 FOR MEDICARE OP): Performed by: INTERNAL MEDICINE

## 2025-02-06 PROCEDURE — 82784 ASSAY IGA/IGD/IGG/IGM EACH: CPT

## 2025-02-06 RX ORDER — CLINDAMYCIN HYDROCHLORIDE 150 MG/1
300 CAPSULE ORAL 4 TIMES DAILY
COMMUNITY
Start: 2025-02-05 | End: 2025-02-12

## 2025-02-06 RX ORDER — LORAZEPAM 1 MG/1
1 TABLET ORAL ONCE
Status: COMPLETED | OUTPATIENT
Start: 2025-02-06 | End: 2025-02-06

## 2025-02-06 RX ORDER — LORAZEPAM 1 MG/1
1 TABLET ORAL ONCE
Status: CANCELLED | OUTPATIENT
Start: 2025-02-06 | End: 2025-02-06

## 2025-02-06 RX ADMIN — LORAZEPAM 1 MG: 1 TABLET ORAL at 12:17

## 2025-02-06 ASSESSMENT — ENCOUNTER SYMPTOMS
NAUSEA: 0
FATIGUE: 0

## 2025-02-06 ASSESSMENT — PAIN SCALES - GENERAL: PAINLEVEL_OUTOF10: 0-NO PAIN

## 2025-02-06 NOTE — RESEARCH NOTES
Research Note Follow Up Visit       Nancie Armenta is here today for T+60 visit on [id number].  Follow up procedures completed per protocol. Patient feeling well overall and denies any new complaints.  Bone marrow biopsy scheduled for today.  Patient is aware of continued follow up plan.  Patient will return for T+90 visit in March.  PET/CT scheduled for 3.3.25.        Education Documentation  [id number] Follow-Up Schedule, taught by Chloe Ivey RN at 2/6/2025 10:25 AM.  Learner: Significant Other, Patient  Readiness: Acceptance  Method: Explanation  Response: Verbalizes Understanding    Diagnostic Studies, taught by Chloe Ivey RN at 2/6/2025 10:25 AM.  Learner: Significant Other, Patient  Readiness: Acceptance  Method: Explanation  Response: Verbalizes Understanding    Comprehensive Metabolic Panel (CMP), taught by Chloe Ivey RN at 2/6/2025 10:25 AM.  Learner: Significant Other, Patient  Readiness: Acceptance  Method: Explanation  Response: Verbalizes Understanding    Complete Blood Count with Differential (CBC w/ Diff), taught by Chloe Ivey RN at 2/6/2025 10:25 AM.  Learner: Significant Other, Patient  Readiness: Acceptance  Method: Explanation  Response: Verbalizes Understanding    Education Comments  No comments found.

## 2025-02-06 NOTE — PROGRESS NOTES
Bone Marrow Biopsy and Aspiration Procedure Note     Informed consent was obtained and potential risks including bleeding, infection and pain were reviewed with the patient.     Patient pre-medicated with 1 mg Lorazepam PO. Pt is accompanied by wife, Cheri.    The Right posterior iliac crest was prepped with chlorhexidine, however, pt was consented for L side.    10 ml of lidocaine 1% local anesthesia infiltrated into the subcutaneous tissue.    Right bone marrow biopsy and right bone marrow aspirate was obtained.     The procedure was tolerated well and there were no complications.    Specimens sent for: routine histopathologic stains and sectioning, flow cytometry, cytogenetics, and molecular analysis    Procedure completed by: THA Gipson-CNP

## 2025-02-07 LAB
CMV DNA SERPL NAA+PROBE-LOG IU: NORMAL {LOG_IU}/ML
LABORATORY COMMENT REPORT: NOT DETECTED

## 2025-02-11 ENCOUNTER — APPOINTMENT (OUTPATIENT)
Dept: OTHER | Facility: HOSPITAL | Age: 60
End: 2025-02-11
Payer: COMMERCIAL

## 2025-02-16 LAB
CELL COUNT (BLOOD): 9.47 X10*3/UL
CELL POPULATIONS: NORMAL
DIAGNOSIS: NORMAL
FLOW DIFFERENTIAL: NORMAL
FLOW TEST ORDERED: NORMAL
LAB TEST METHOD: NORMAL
NUMBER OF CELLS COLLECTED: NORMAL PER TUBE
PATH REPORT.COMMENTS IMP SPEC: NORMAL
PATH REPORT.FINAL DX SPEC: NORMAL
PATH REPORT.GROSS SPEC: NORMAL
PATH REPORT.MICROSCOPIC SPEC OTHER STN: NORMAL
PATH REPORT.RELEVANT HX SPEC: NORMAL
PATH REPORT.TOTAL CANCER: NORMAL
PATH REPORT.TOTAL CANCER: NORMAL
SIGNATURE COMMENT: NORMAL
SPECIMEN VIABILITY: NORMAL

## 2025-02-17 ENCOUNTER — APPOINTMENT (OUTPATIENT)
Dept: OTHER | Facility: HOSPITAL | Age: 60
End: 2025-02-17
Payer: COMMERCIAL

## 2025-02-17 ENCOUNTER — TELEPHONE (OUTPATIENT)
Dept: ADMISSION | Facility: HOSPITAL | Age: 60
End: 2025-02-17
Payer: COMMERCIAL

## 2025-02-18 ENCOUNTER — OFFICE VISIT (OUTPATIENT)
Dept: HEMATOLOGY/ONCOLOGY | Facility: CLINIC | Age: 60
End: 2025-02-18
Payer: COMMERCIAL

## 2025-02-18 VITALS
BODY MASS INDEX: 29.64 KG/M2 | HEART RATE: 88 BPM | RESPIRATION RATE: 16 BRPM | OXYGEN SATURATION: 98 % | SYSTOLIC BLOOD PRESSURE: 132 MMHG | TEMPERATURE: 95.2 F | WEIGHT: 221.01 LBS | DIASTOLIC BLOOD PRESSURE: 83 MMHG

## 2025-02-18 DIAGNOSIS — C83.17 MANTLE CELL LYMPHOMA OF SPLEEN (MULTI): Primary | ICD-10-CM

## 2025-02-18 PROCEDURE — 99215 OFFICE O/P EST HI 40 MIN: CPT | Performed by: INTERNAL MEDICINE

## 2025-02-18 PROCEDURE — 99417 PROLNG OP E/M EACH 15 MIN: CPT | Performed by: INTERNAL MEDICINE

## 2025-02-18 ASSESSMENT — PAIN SCALES - GENERAL: PAINLEVEL_OUTOF10: 0-NO PAIN

## 2025-02-18 NOTE — PATIENT INSTRUCTIONS
ORDERS & PATIENT INSTRUCTIONS:     Give information about Rituxan to patient  I will talk to Dr. Yvonne Hobbs and will decide

## 2025-02-18 NOTE — TELEPHONE ENCOUNTER
Patient received notification of results of bone marrow biopsy on MY Chart would like an update about results. He does have a FUV scheduled 3/13, but results of biopsy would be appreciated  Forwarding to team

## 2025-02-18 NOTE — PROGRESS NOTES
Patient to follow-up with Dr Hobbs as scheduled.  Call back instructions reviewed.  Patient verbalized understanding.

## 2025-02-18 NOTE — PROGRESS NOTES
Patient ID: Nancie Armenta is a 60 y.o. male.  Referring Physician: No referring provider defined for this encounter.  Primary Care Provider: Maurice Nunez MD    ORDERS & PATIENT INSTRUCTIONS:    Continue follow-up with Dr. Yvonne Hobbs  Follow-up with me as needed    ASSESSMENT, PROBLEM LIST, DECISION MAKING, PLAN.      1. Stage ANDREA mantle cell non-Hodgkin lymphoma involving the gastrointestinal tract diagnosed in October 2011, with diffuse gastrointestinal involvement. The patient was treated with RCHOP alternating with Rituxan and high-dose Mamie-C and received total of 6 cycles of chemotherapy which he finished in February 2012.   2. Patient underwent beam chemotherapy followed by autologous peripheral stem cell transplant in April 2012, by Dr. Yvonne Hobbs at [facility], was also involved in phase 3 clinical trial up killed shingles vaccine versus placebo ([facility]-1 Z10 study).       3. Patient developed progressive disease in June 2019 with positive bone marrow, and significant GI involvement and he has started Alcalabrutinib on August 21, 2019 but didnt get expected response especially peripheral blood flow cytometric continued to show abnormal lymphocytes so was switched to Imbruvica 560 mg and Venetoclax in December 2019 . Patient's peripheral blood FISH is completely negative for mantle cell DNA so CAR-T was not considered. And has been followed by Dr. Yvonne Hobbs.   Pt remained in CR   Pts PET, BM Bx at  and Colonoscopy at [facility] in March 2022 was -ve so Imbruvica and Venetoclax was d/ed March 24, 2022    -Patient was seen by me on July 23, 2024, and on routine lab he was found to have thrombocytopenia, patient later was evaluated by Dr. Hobbs.  Bone marrow aspirate and biopsy on 25 July 2024 showed focal involvement with mantle cell lymphoma, FISH was negative for 17P deletion, CT in August 2024 showed thickening of sigmoid colon and mild to moderate retroperitoneal lymphadenopathies,  splenomegaly, possible splenic infarct, patient was started on pirubrutinib, but had a worsening size of spleen in October 2024 so venetoclax was added..  Patient later had a CAR-T clinical trial case [id number].  Oral chemotherapy was discontinued on 14 November 2024.  Patient was admitted on November 29, 2024, had CAR-T after fludarabine/cyclophosphamide preparation regimen on December 6, 2024.  He had grade 2 CRS but later improved and was discharged home on December 18, 2024.  Patient had a follow-up bone marrow biopsy done 2 months after CAR-T on January 2, 2025, showed 1% mantle cell by IHC but nothing by flow cytometry.  Patient had a repeat bone marrow aspirate and biopsy done on June 6, 2025, which showed involvement with mantle cell lymphoma 3 to 5% which was positive for CD20, CD5, SOX11 and cyclin D1 overall suggestive of mantle cell lymphoma.        3. The patient developed shingles in January 2014, involving left flank.     4.  Patient had Covid in December 2020 but fortunately it was mild and did not require any hospital admissions and continued his anticancer medication during that time.    4. 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 but Patient had a syncopal episode in January 2017 without any clear explanation went to [facility] emergency room when he was orthostatic and also injured his head and further workup is underway to determine fourth cranial nerve palsy left side, MRI of the brain was overall stable, was evaluated by Dr Vern St/neuro-ophthalmologist and thinks that patient probably has myasthenia gravis and has been placed on pyridostigmine in June 2017, and seems to have responded  He was also evaluated with MRI of the brain and spinal tap which was showing minimal increased protein but cytology, flow cytometry were all normal and has been followed by  neurologist at [facility]. Patient's symptoms are improving with pyridostigmine, although later he discontinued and patient became asymptomatic, on watchful waiting      4. Allergic to Augmentin, causes hives      INTERVAL HISTORY:   Patient was seen by me on July 23, 2024, and on routine lab he was found to have thrombocytopenia, patient later was evaluated by Dr. Hobbs.  Bone marrow aspirate and biopsy on 25 July 2024 showed focal involvement with mantle cell lymphoma, FISH was negative for 17P deletion, CT in August 2024 showed thickening of sigmoid colon and mild to moderate retroperitoneal lymphadenopathies, splenomegaly, possible splenic infarct, patient was started on pirubrutinib, but had a worsening size of spleen in October 2024 so venetoclax was added..  Patient later had a CAR-T clinical trial case 2419.  Oral chemotherapy was discontinued on 14 November 2024.  Patient was admitted on November 29, 2024, had CAR-T after fludarabine/cyclophosphamide preparation regimen on December 6, 2024.  He had grade 2 CRS but later improved and was discharged home on December 18, 2024.  Patient had a follow-up bone marrow biopsy done 2 months after CAR-T on January 2, 2025, showed 1% mantle cell by IHC but nothing by flow cytometry.  Patient had a repeat bone marrow aspirate and biopsy done on June 6, 2025, which showed involvement with mantle cell lymphoma 3 to 5% which was positive for CD20, CD5, SOX11 and cyclin D1 overall suggestive of mantle cell lymphoma.    Patient talked to Dr. Yvonne Hobbs today who wants to review bone marrow slides and wants to wait for PET scan before making any decision.  Patient was feeling somewhat anxious and upset so he came to see me.    Patient although he has been feeling okay and he is gaining weight denies any fever weight loss or night sweats.        General: Conscious, alert, oriented x3, not in acute distress.  HEENT:    No icterus.    Chest:Bilateral symmetrical,      CVS:  S1, S2.    Abdomen:  Soft, no palpable mass   Extremities: No clubbing, cyanosis,     Skin: No petechial rash.    ASSESSMENT AND PLAN:     1. Mr. Armenta with recurrent mantle cell lymphoma.,  Had a recurrent disease in July 2024 after workup of thrombocytopenia. patient later was evaluated by Dr. Hobbs.  Bone marrow aspirate and biopsy on 25 July 2024 showed focal involvement with mantle cell lymphoma, FISH was negative for 17P deletion, CT in August 2024 showed thickening of sigmoid colon and mild to moderate retroperitoneal lymphadenopathies, splenomegaly, possible splenic infarct, patient was started on pirubrutinib, but had a worsening size of spleen in October 2024 so venetoclax was added..  Patient later had a CAR-T clinical trial case 2419.  Oral chemotherapy was discontinued on 14 November 2024.  Patient was admitted on November 29, 2024, had CAR-T after fludarabine/cyclophosphamide preparation regimen on December 6, 2024.  He had grade 2 CRS but later improved and was discharged home on December 18, 2024.  Patient had a follow-up bone marrow biopsy done 2 months after CAR-T on January 2, 2025, showed 1% mantle cell by IHC but nothing by flow cytometry.  Patient had a repeat bone marrow aspirate and biopsy done on June 6, 2025, which showed involvement with mantle cell lymphoma 3 to 5% which was positive for CD20, CD5, SOX11 and cyclin D1 overall suggestive of mantle cell lymphoma.    I have reviewed records from [facility] and explained to the patient that I will be discussing this with Dr. Hobbs and will make further recommendation.      Addendum  I have discussed with Dr. Hobbs who recommended no further intervention at this time, continued watchful waiting, his PET scan is scheduled in 2 weeks and depending on the PET scan will make further recommendation, one of the option could be to consider Rituxan as a single agent for maintenance although looking at small bone marrow aspirate  and biopsy specimen and very small lymphoid aggregate showing 3 to 5% mantle cell lymphoma and recent CAR-T, do not want to make any decision at this time which I believe is reasonable.  I have communicated this to patient as well on the phone again.  Patient agreed with the plan    VITALS:   2.26 meters squared /83   Pulse 88   Temp 35.1 °C (95.2 °F)   Resp 16   Wt 100 kg (221 lb 0.2 oz)   SpO2 98%   BMI 29.64 kg/m²     LABS:    CBC:  Recent Labs     02/06/25  0950 01/27/25  0916 01/20/25  0915 01/13/25  0928 01/09/25  1428 12/23/24  1119 12/20/24  0816 12/07/24  0458 12/06/24  0406 12/05/24  0443 12/04/24  0417 12/03/24  0449 12/02/24  0721 10/16/24  0813 10/14/24  1321   WBC 6.9 7.4 6.4 5.9 6.4   < > 5.4   < > 0.3*   < > 1.2*   < > 1.4*   < > 9.3   ANC  --   --   --   --   --   --  1.30  --  0.26*  --  1.15*  --  1.18*  --  6.32   HGB 12.4* 12.1* 12.0* 10.9* 11.1*   < > 9.4*   < > 7.4*   < > 7.6*   < > 9.0*   < > 10.1*   HCT 34.7* 33.8* 33.4* 29.5* 29.8*   < > 26.8*   < > 22.1*   < > 22.8*   < > 27.5*   < > 31.8*   * 109* 98* 83* 96*   < > 44*   < > 18*   < > 35*   < > 35*   < > 50*   MCV 97 98 97 94 95   < > 88   < > 96   < > 95   < > 96   < > 99    < > = values in this interval not displayed.       CMP:  Recent Labs     02/06/25  0950 01/27/25  0916 01/20/25  0915 01/13/25  0928 01/09/25  1428    141 138 139 137   K 4.4 4.3 3.9 4.1 4.1    106 104 105 103   CO2 27 25 24 26 25   ANIONGAP 13 14 14 12 13   BUN 26* 28* 23 22 24*   CREATININE 1.07 1.13 1.06 1.08 1.22   EGFR 79 74 80 79 68   MG 2.08 2.06 2.08 1.94 2.05     Recent Labs     02/06/25  0950 01/27/25  0916 01/20/25  0915 01/13/25  0928 01/09/25  1428   ALBUMIN 4.6 4.7 4.8 4.5 4.7   ALKPHOS 72 58 52 44 43   ALT 23 22 19 16 18   AST 18 19 17 15 16   BILITOT 0.7 0.8 0.8 0.7 0.9       HEME/ENDO:  Recent Labs     02/06/25  0950 01/27/25  0916 01/20/25  0915 01/13/25  0928 11/30/24  0825 06/28/24  0755   FERRITIN 914* 950* 1,082*  1,044*   < >  --    TSH  --   --   --   --   --  3.58    < > = values in this interval not displayed.        MICRO:   Recent Labs     02/06/25  0950 01/27/25  0916 01/20/25  0915   CRP 0.35 0.30 0.13     No results found for the last 90 days.        TUMOR MARKERS:  Prostate Specific AG (ng/mL)   Date Value   06/28/2024 0.99                IMAGING:         NM PET CT lymphoma staging  Narrative: Interpreted By:  Magdi Davis  and Zainab Milan   STUDY:  NM PET CT LYMPHOMA STAGING; 1/2/2025 10:34 am      INDICATION:  Signs/Symptoms:restaging, s/p CAR T, clinical trial participant.  ,C83.17 Mantle cell lymphoma, spleen (Multi)      COMPARISON:  PET-CT dated 11/04/2024, 03/14/2022 and 08/22/2020      ACCESSION NUMBER(S):  [id number]      ORDERING CLINICIAN:  FLASH CHAIREZ      TECHNIQUE:  DIVISION OF NUCLEAR MEDICINE  POSITRON EMISSION TOMOGRAPHY (PET-CT)      The patient received an intravenous dose of 10.0 mCi of Fluorine-18  fluorodeoxyglucose (FDG). Positron emission tomographic (PET) images  from mid-thigh to skull base were then acquired after a one hour  delay. Also acquired was a contemporaneous low dose non-contrast CT  scan performed for attenuation correction of PET images and anatomic  localization.  The PET and CT images were digitally fused for  display.  All images were acquired on a combined PET-CT scanner unit.  Some areas of FDG accumulation may be described in standardized  uptake value (SUV) units.      CODING:  Subsequent Treatment Strategy (PS)      CALIBRATION:  Dose Injection-to-Scan Interval (mins): 55 min  Mediastinal bloodpool SUV (normal 1.5-2.5): 2.4  Blood glucose: 100 mg/dL      FINDINGS:  NECK:  No focal hypermetabolic soft tissue lesion is seen in the neck.  Interval resolution of hypermetabolic lymphadenopathy within the neck  prior examination including within left supraclavicular lymph nodes.  No new hypermetabolic cervical lymphadenopathy.      CHEST:  No focal hypermetabolic lesion  is seen in the lung parenchyma.  Interval resolution hypermetabolic axillary, mediastinal, and  bilateral internal mammary lymphadenopathy seen on prior examination.  No new hypermetabolic lymphadenopathy.      ABDOMEN AND PELVIS:  Interval resolution of hypermetabolic activity within the spleen  which is decreased in size currently measuring 17.5 cm compared to  211 cm on prior PET-CT. No new hypermetabolic soft tissue lesions  within the abdomen or pelvis. Status post cholecystectomy.  Interval resolution of hypermetabolic lymphadenopathy within the  abdomen seen on prior examination. No new hypermetabolic activity  within the abdomen or pelvis. Interval resolution of metabolic  activity within bilateral inguinal lymph nodes. Physiologic  radiotracer uptake is present in the liver with excretion into the  bowel loops and the genitourinary tract.      MUSCULOSKELETAL:  There is no focal hypermetabolic lesion to suggest osseous metastasis.      Impression: 1.  Interval resolution of hypermetabolic lymphadenopathy above and  below the diaphragm. Interval resolution hypermetabolic activity  within the spleen with improving splenomegaly. (Deauville score of 1).  2. No evidence of new hypermetabolic robbie or extranodal lymphomatous  disease.          I personally reviewed the image(s) / study and agree with the  findings and interpretation as stated. This study was interpreted at  [facility].          Signed by: Magdi Davis 1/2/2025 12:43 PM  Dictation workstation:   [id number]       Current Outpatient Medications   Medication Sig Dispense Refill    acyclovir (Zovirax) 400 mg tablet Take 1 tablet (400 mg) by mouth 2 times a day. 60 tablet 3    atorvastatin (Lipitor) 40 mg tablet Take 1 tablet (40 mg) by mouth once daily in the morning. 30 tablet 2    fluconazole (Diflucan) 200 mg tablet Take 2 tablets (400 mg) by mouth once daily. 120 tablet 0    hydrocortisone 1 % cream Apply 1  Application topically 2 times a day as needed (hemorrhoids).      ondansetron (Zofran) 8 mg tablet Take 1 tablet (8 mg) by mouth every 8 hours if needed for nausea or vomiting. 30 tablet 5    sulfamethoxazole-trimethoprim (Bactrim DS) 800-160 mg tablet Take 1 tablet by mouth 3 times a week. Take on Mondays, Wednesdays, and Fridays. Do not start until instructed. 12 tablet 2     No current facility-administered medications for this visit.            FAMILY HISTORY:   Family History   Problem Relation Name Age of Onset    Cataracts Mother      Cataracts Father      Other (chronic lymphoid leukemia) Father          ALLERGY: Amoxicillin-pot clavulanate    SOCIAL HISTORY: He  reports no history of alcohol use. He  reports no history of drug use.            Medication reviewed in e-chart  Patient is monitored for medication toxicity  labs reviewed and interpreted independently, X rays independently reviewed  Notes from other physicians involved in care were reviewed  Spent 70 minutes total time including both face-to-face examining patient, discussing treatment options, educating and counseling as well as non-face-to-face time including previsit and post visit time including reviewing test results, communicating with other healthcare providers involved, coordinating care and documenting patient's care in EMR.  Charting was completed using voice recognition technology and may include unintended errors.    JUSTIN BELTRAN MD, VALENTINA.    Octavia Landrum Master Clinician in Hematology and Oncology  Medical Director, [facility] at [facility].  [place] office  Phone [phone]  Fax      [phone]  [facility].  Phone [phone]  Fax     [phone]

## 2025-02-19 NOTE — TUMOR BOARD NOTE
TUMOR BOARD DISCUSSION SUMMARY    PRESENTER: Dr. Yvonne Hobsb    DIAGNOSIS: mantle cell non-Hodgkin lymphoma    SUMMARY/PRESENTATION: Nancie Armenta is a 60 y.o. male patient who presents with stage IV A mantle cell non-Hodgkin lymphoma involving the gastrointestinal tract    History: shingles     Previous treatment: RCHOP alternating with Rituxan and high-dose Mamie-C, beam chemotherapy followed by autologous peripheral stem cell transplant in April 2012  CART cell therapy December 2024      Information reviewed:     Radiology:         Pathology: Bone Marrow Biopsy 02/06/25    -- WITH INVOLVEMENT BY MANTLE CELL LYMPHOMA 3-5% OF MARROW CELLULAR ELEMENTS IN A LIMITED MARROW, SEE NOTE  -- VARIABLY CELLULAR MARROW RANGING FROM 10% TO 30% OVERALL AVERAGE  HYPOCELLULAR MARROW (20% CELLULAR)  -- MATURING TRILINEAGE HEMATOPOIESIS     NOTE: The bone marrow biopsy is predominantly subcortical and limited marrow is available for analysis. The bone marrow core biopsy shows one lymphoid aggregate comprising 3-5% of the marrow elements that are positive for CD20, CD5, SOX11 and cyclinD1. Findings are overall supportive of the above diagnosis. Clinical and molecular results correlation is suggested.     Bone marrow aspirate, flow cytometry:      --No evidence of a lymphoproliferative disorder     Note: B-cells are absent, likely due to therapy effect. Clinical and correlation with the bone marrow biopsy is suggested.       RECOMMENDATIONS:   continue close followup, flow cytometry of blood, repeat BM and PET assessment, hold off additional treatment at this time until trajectory of disease more defined           Disclaimer     [facility] tumor board recommendations represent the consensus opinion of physicians present at a weekly patient care conference. The treating SCC physician is not always present, and many of the physicians formulating the recommendation have not personally seen or examined the patient under discussion. It is  understood that the treating SCC physician considers the expertise of the Tumor Board Recommendation in formulating his/her plan for the patient. However, in many situations, based on individualized patient considerations, a different plan is determined by the treating physician to be the optimal medical management.     Scribe Attestation  By signing my name below, Eileen PHILIP Scribe   attest that this documentation has been prepared under the direction and in the presence of MALIGNANT HEME TUMOR BOARD.

## 2025-02-20 ENCOUNTER — TUMOR BOARD CONFERENCE (OUTPATIENT)
Dept: HEMATOLOGY/ONCOLOGY | Facility: HOSPITAL | Age: 60
End: 2025-02-20
Payer: COMMERCIAL

## 2025-02-20 DIAGNOSIS — C83.17 MANTLE CELL LYMPHOMA OF SPLEEN (MULTI): ICD-10-CM

## 2025-02-24 ENCOUNTER — APPOINTMENT (OUTPATIENT)
Dept: OTHER | Facility: HOSPITAL | Age: 60
End: 2025-02-24
Payer: COMMERCIAL

## 2025-03-03 ENCOUNTER — LAB (OUTPATIENT)
Dept: LAB | Facility: HOSPITAL | Age: 60
End: 2025-03-03
Payer: COMMERCIAL

## 2025-03-03 ENCOUNTER — HOSPITAL ENCOUNTER (OUTPATIENT)
Dept: RADIOLOGY | Facility: HOSPITAL | Age: 60
Discharge: HOME | End: 2025-03-03
Payer: COMMERCIAL

## 2025-03-03 ENCOUNTER — PROCEDURE VISIT (OUTPATIENT)
Dept: HEMATOLOGY/ONCOLOGY | Facility: HOSPITAL | Age: 60
End: 2025-03-03
Payer: COMMERCIAL

## 2025-03-03 VITALS
TEMPERATURE: 97.9 F | DIASTOLIC BLOOD PRESSURE: 80 MMHG | RESPIRATION RATE: 18 BRPM | BODY MASS INDEX: 28.97 KG/M2 | WEIGHT: 216 LBS | OXYGEN SATURATION: 99 % | SYSTOLIC BLOOD PRESSURE: 124 MMHG | HEART RATE: 88 BPM

## 2025-03-03 DIAGNOSIS — Z92.850 HISTORY OF CHIMERIC ANTIGEN RECEPTOR T-CELL THERAPY: ICD-10-CM

## 2025-03-03 DIAGNOSIS — C83.17 MANTLE CELL LYMPHOMA OF SPLEEN (MULTI): ICD-10-CM

## 2025-03-03 LAB
ALBUMIN SERPL BCP-MCNC: 4.8 G/DL (ref 3.4–5)
ALP SERPL-CCNC: 62 U/L (ref 33–136)
ALT SERPL W P-5'-P-CCNC: 19 U/L (ref 10–52)
ANION GAP SERPL CALC-SCNC: 14 MMOL/L (ref 10–20)
APTT PPP: 28 SECONDS (ref 26–36)
AST SERPL W P-5'-P-CCNC: 23 U/L (ref 9–39)
BASOPHILS # BLD AUTO: 0.02 X10*3/UL (ref 0–0.1)
BASOPHILS NFR BLD AUTO: 0.6 %
BILIRUB SERPL-MCNC: 0.9 MG/DL (ref 0–1.2)
BUN SERPL-MCNC: 25 MG/DL (ref 6–23)
CALCIUM SERPL-MCNC: 9.6 MG/DL (ref 8.6–10.6)
CHLORIDE SERPL-SCNC: 105 MMOL/L (ref 98–107)
CHROM ANALY OVERALL INTERP-IMP: NORMAL
CO2 SERPL-SCNC: 26 MMOL/L (ref 21–32)
CREAT SERPL-MCNC: 1.28 MG/DL (ref 0.5–1.3)
CRP SERPL-MCNC: 0.34 MG/DL
EGFRCR SERPLBLD CKD-EPI 2021: 64 ML/MIN/1.73M*2
ELECTRONICALLY COSIGNED BY CYTOGENETICS: NORMAL
ELECTRONICALLY SIGNED BY CYTOGENETICS: NORMAL
EOSINOPHIL # BLD AUTO: 0.06 X10*3/UL (ref 0–0.7)
EOSINOPHIL NFR BLD AUTO: 1.8 %
ERYTHROCYTE [DISTWIDTH] IN BLOOD BY AUTOMATED COUNT: 14.8 % (ref 11.5–14.5)
FERRITIN SERPL-MCNC: 1009 NG/ML (ref 20–300)
FIBRINOGEN PPP-MCNC: 259 MG/DL (ref 200–400)
FISH ISCN RESULTS: NORMAL
GLUCOSE BLD MANUAL STRIP-MCNC: 112 MG/DL (ref 74–99)
GLUCOSE SERPL-MCNC: 106 MG/DL (ref 74–99)
HCT VFR BLD AUTO: 35.2 % (ref 41–52)
HGB BLD-MCNC: 12.4 G/DL (ref 13.5–17.5)
IGG SERPL-MCNC: 579 MG/DL (ref 700–1600)
IMM GRANULOCYTES # BLD AUTO: 0.08 X10*3/UL (ref 0–0.7)
IMM GRANULOCYTES NFR BLD AUTO: 2.4 % (ref 0–0.9)
INR PPP: 1 (ref 0.9–1.1)
LDH SERPL L TO P-CCNC: 253 U/L (ref 84–246)
LYMPHOCYTES # BLD AUTO: 0.53 X10*3/UL (ref 1.2–4.8)
LYMPHOCYTES NFR BLD AUTO: 16.1 %
MAGNESIUM SERPL-MCNC: 2.27 MG/DL (ref 1.6–2.4)
MCH RBC QN AUTO: 33.6 PG (ref 26–34)
MCHC RBC AUTO-ENTMCNC: 35.2 G/DL (ref 32–36)
MCV RBC AUTO: 95 FL (ref 80–100)
MONOCYTES # BLD AUTO: 0.55 X10*3/UL (ref 0.1–1)
MONOCYTES NFR BLD AUTO: 16.7 %
NEUTROPHILS # BLD AUTO: 2.05 X10*3/UL (ref 1.2–7.7)
NEUTROPHILS NFR BLD AUTO: 62.4 %
NRBC BLD-RTO: 0 /100 WBCS (ref 0–0)
PLATELET # BLD AUTO: 104 X10*3/UL (ref 150–450)
POTASSIUM SERPL-SCNC: 4.2 MMOL/L (ref 3.5–5.3)
PROT SERPL-MCNC: 7.1 G/DL (ref 6.4–8.2)
PROTHROMBIN TIME: 11.1 SECONDS (ref 9.8–12.4)
RBC # BLD AUTO: 3.69 X10*6/UL (ref 4.5–5.9)
SODIUM SERPL-SCNC: 141 MMOL/L (ref 136–145)
WBC # BLD AUTO: 3.3 X10*3/UL (ref 4.4–11.3)

## 2025-03-03 PROCEDURE — 36415 COLL VENOUS BLD VENIPUNCTURE: CPT

## 2025-03-03 PROCEDURE — 82947 ASSAY GLUCOSE BLOOD QUANT: CPT

## 2025-03-03 PROCEDURE — 83735 ASSAY OF MAGNESIUM: CPT

## 2025-03-03 PROCEDURE — 86140 C-REACTIVE PROTEIN: CPT

## 2025-03-03 PROCEDURE — 2500000004 HC RX 250 GENERAL PHARMACY W/ HCPCS (ALT 636 FOR OP/ED): Performed by: NURSE PRACTITIONER

## 2025-03-03 PROCEDURE — 80053 COMPREHEN METABOLIC PANEL: CPT

## 2025-03-03 PROCEDURE — 82728 ASSAY OF FERRITIN: CPT

## 2025-03-03 PROCEDURE — 78815 PET IMAGE W/CT SKULL-THIGH: CPT | Mod: PS

## 2025-03-03 PROCEDURE — 85610 PROTHROMBIN TIME: CPT

## 2025-03-03 PROCEDURE — 82784 ASSAY IGA/IGD/IGG/IGM EACH: CPT

## 2025-03-03 PROCEDURE — 85384 FIBRINOGEN ACTIVITY: CPT

## 2025-03-03 PROCEDURE — 85025 COMPLETE CBC W/AUTO DIFF WBC: CPT

## 2025-03-03 PROCEDURE — A9552 F18 FDG: HCPCS | Performed by: INTERNAL MEDICINE

## 2025-03-03 PROCEDURE — 38222 DX BONE MARROW BX & ASPIR: CPT | Mod: LT | Performed by: NURSE PRACTITIONER

## 2025-03-03 PROCEDURE — 85730 THROMBOPLASTIN TIME PARTIAL: CPT

## 2025-03-03 PROCEDURE — 36415 COLL VENOUS BLD VENIPUNCTURE: CPT | Performed by: NURSE PRACTITIONER

## 2025-03-03 PROCEDURE — 85097 BONE MARROW INTERPRETATION: CPT | Performed by: NURSE PRACTITIONER

## 2025-03-03 PROCEDURE — 83615 LACTATE (LD) (LDH) ENZYME: CPT

## 2025-03-03 PROCEDURE — 3430000001 HC RX 343 DIAGNOSTIC RADIOPHARMACEUTICALS: Performed by: INTERNAL MEDICINE

## 2025-03-03 RX ORDER — LORAZEPAM 2 MG/ML
0.5 INJECTION INTRAMUSCULAR ONCE
Status: COMPLETED | OUTPATIENT
Start: 2025-03-03 | End: 2025-03-03

## 2025-03-03 RX ORDER — FLUDEOXYGLUCOSE F 18 200 MCI/ML
11.5 INJECTION, SOLUTION INTRAVENOUS
Status: COMPLETED | OUTPATIENT
Start: 2025-03-03 | End: 2025-03-03

## 2025-03-03 RX ADMIN — LORAZEPAM 0.5 MG: 2 INJECTION INTRAMUSCULAR; INTRAVENOUS at 12:43

## 2025-03-03 RX ADMIN — FLUDEOXYGLUCOSE F 18 11.5 MILLICURIE: 200 INJECTION, SOLUTION INTRAVENOUS at 08:53

## 2025-03-03 ASSESSMENT — PAIN SCALES - GENERAL: PAINLEVEL_OUTOF10: 0-NO PAIN

## 2025-03-03 NOTE — PROGRESS NOTES
Patient ID: Nancie Armenta is a 60 y.o. male.    Biopsy bone marrow    Date/Time: 3/3/2025 2:00 PM    Performed by: NORBERT Brown  Authorized by: NORBERT Brown    Consent:     Consent obtained:  Verbal and written    Consent given by:  Patient    Risks, benefits, and alternatives were discussed: yes      Risks discussed:  Bleeding, infection and pain  Universal protocol:     Procedure explained and questions answered to patient or proxy's satisfaction: yes      Relevant documents present and verified: yes      Test results available: yes      Site/side marked: yes      Immediately prior to procedure, a time out was called: yes      Patient identity confirmed:  Verbally with patient and provided demographic data  Indications:     Indications:  Disease evaluation  Pre-procedure details:     Skin preparation:  Chlorhexidine    Preparation: Patient was prepped and draped in the usual sterile fashion    Sedation:     Sedation type: Ativan 0.5mg IV x 1 as premed.  Anesthesia:     Anesthesia method:  Local infiltration    Local anesthetic:  Lidocaine 1% w/o epi  Procedure specific details:      Patient lay in the prone position and the left iliac crest was accessed. A total of 10 ml of lidocaine was utilized for the procedure. Aspirate samples and core biopsy were obtained without difficulty using the BD Trek Powered Bone System. Pressure dressing applied and no s/sx bleeding noted.     Post-procedure details:     Procedure completion:  Tolerated well, no immediate complications

## 2025-03-03 NOTE — PROGRESS NOTES
Patient in for BMBx , labs reviewed with patient and copy provided . No treatment or replacement needed at this time . Patient received 0.5 mg Ativan IV as ordered for premed .

## 2025-03-04 LAB
CMV DNA SERPL NAA+PROBE-LOG IU: NORMAL {LOG_IU}/ML
LABORATORY COMMENT REPORT: NOT DETECTED

## 2025-03-06 ENCOUNTER — APPOINTMENT (OUTPATIENT)
Dept: HEMATOLOGY/ONCOLOGY | Facility: HOSPITAL | Age: 60
End: 2025-03-06
Payer: COMMERCIAL

## 2025-03-06 DIAGNOSIS — Z92.850 HISTORY OF CHIMERIC ANTIGEN RECEPTOR T-CELL THERAPY: ICD-10-CM

## 2025-03-07 LAB
PATH REPORT.COMMENTS IMP SPEC: NORMAL
PATH REPORT.FINAL DX SPEC: NORMAL
PATH REPORT.GROSS SPEC: NORMAL
PATH REPORT.MICROSCOPIC SPEC OTHER STN: NORMAL
PATH REPORT.RELEVANT HX SPEC: NORMAL
PATH REPORT.TOTAL CANCER: NORMAL

## 2025-03-09 DIAGNOSIS — C83.17 MANTLE CELL LYMPHOMA OF SPLEEN (MULTI): Primary | ICD-10-CM

## 2025-03-10 LAB
CELL COUNT (BLOOD): 2.6 X10*3/UL
CELL POPULATIONS: NORMAL
DIAGNOSIS: NORMAL
FLOW DIFFERENTIAL: NORMAL
FLOW TEST ORDERED: NORMAL
LAB TEST METHOD: NORMAL
NUMBER OF CELLS COLLECTED: NORMAL PER TUBE
PATH REPORT.TOTAL CANCER: NORMAL
SIGNATURE COMMENT: NORMAL
SPECIMEN VIABILITY: NORMAL

## 2025-03-11 NOTE — TUMOR BOARD NOTE
TUMOR BOARD DISCUSSION SUMMARY    PRESENTER: Dr. Yvonne Hobbs    DIAGNOSIS: Stage Chelsey mantle cell non-Hodgkin lymphoma    SUMMARY/PRESENTATION: Nancie Armenta is a 60 y.o. male patient who presents with recurrent stage Chelsey mantle cell non-Hodgkin lymphoma involving the gastrointestinal tract diagnosed in October 2011     History: hyperglycemia, renal insufficiency     Previous treatment: RCHOP alternating with Rituxan and high-dose Mamie-C, 6 cycles of chemotherapy, beam chemotherapy followed by autologous peripheral stem cell transplant in 04/2012, CAR-T cell therapy, pirtobrutinib salvage with venetoclax        Information reviewed: Radiology Review and Pathology Review    Radiology: PET CT 03/03/25  IMPRESSION:  1. No evidence of new areas of hypermetabolic robbie or extranodal  disease with improving splenomegaly (Deauville score of 1).    Pathology: Bone Marrow Biopsy 03/03/25  -- NORMOCELLULAR BONE MARROW (40%) WITH ERYTHROID-PREDOMINANT MATURING TRILINEAGE HEMATOPOIESIS AND MULTI-FOCAL INVOLVEMENT BY MANTLE CELL LYMPHOMA (APPROXIMATELY 10-15%), SEE NOTE.     NOTE: The patient's history of mantle cell lymphoma is noted. Multiple lymphoid aggregates are present demonstrating a population of cells expression of PAX-5, CD20 and cyclin D1 consistent with persistent disease. Notably the cells are CD19 negative by flow cytometry and immunohistochemistry. Clinical correlation is recommended.     --Very small population (0.02%) of CD20+, CD19-, CD5+ lambda+ (partial) cells most consistent with low level involvement by CD5+ B cell lymphoma, see note.     Note: The population detected is very small and appears to lack CD19. CD20 expression is moderate to bright and the cells appear to be lambda+ with a subset light chain negative. The findings are most compatible with low level minimal residual mantle cell lymphoma. Clinical and morphologic correlation is suggested.      RECOMMENDATIONS: Consider Glofitamib with  obinotuzumab treatment  (off label) vs Allogenic transplant, Revlimid, rituxan also a consideration.           Disclaimer     SCC tumor board recommendations represent the consensus opinion of physicians present at a weekly patient care conference. The treating SCC physician is not always present, and many of the physicians formulating the recommendation have not personally seen or examined the patient under discussion. It is understood that the treating SCC physician considers the expertise of the Tumor Board Recommendation in formulating his/her plan for the patient. However, in many situations, based on individualized patient considerations, a different plan is determined by the treating physician to be the optimal medical management.     Scribe Attestation  By signing my name below, IEileen Scribe   attest that this documentation has been prepared under the direction and in the presence of MALIGNANT HEME TUMOR BOARD.

## 2025-03-12 ASSESSMENT — ENCOUNTER SYMPTOMS
BLOOD IN STOOL: 0
UNEXPECTED WEIGHT CHANGE: 0
NAUSEA: 0
DIZZINESS: 0
LIGHT-HEADEDNESS: 0
DIAPHORESIS: 0
CHILLS: 0
CARDIOVASCULAR NEGATIVE: 1
FEVER: 0
FATIGUE: 0
ABDOMINAL PAIN: 0
CONSTIPATION: 0
APPETITE CHANGE: 0
DIARRHEA: 0
RESPIRATORY NEGATIVE: 1
NUMBNESS: 0
HEMATOLOGIC/LYMPHATIC NEGATIVE: 1
VOMITING: 0

## 2025-03-13 ENCOUNTER — LAB (OUTPATIENT)
Dept: LAB | Facility: HOSPITAL | Age: 60
End: 2025-03-13
Payer: COMMERCIAL

## 2025-03-13 ENCOUNTER — DOCUMENTATION (OUTPATIENT)
Dept: OTHER | Facility: HOSPITAL | Age: 60
End: 2025-03-13

## 2025-03-13 ENCOUNTER — OFFICE VISIT (OUTPATIENT)
Dept: HEMATOLOGY/ONCOLOGY | Facility: HOSPITAL | Age: 60
End: 2025-03-13
Payer: COMMERCIAL

## 2025-03-13 ENCOUNTER — TUMOR BOARD CONFERENCE (OUTPATIENT)
Dept: HEMATOLOGY/ONCOLOGY | Facility: HOSPITAL | Age: 60
End: 2025-03-13
Payer: COMMERCIAL

## 2025-03-13 VITALS
SYSTOLIC BLOOD PRESSURE: 115 MMHG | WEIGHT: 219.7 LBS | DIASTOLIC BLOOD PRESSURE: 81 MMHG | RESPIRATION RATE: 15 BRPM | HEART RATE: 78 BPM | TEMPERATURE: 96.1 F | OXYGEN SATURATION: 100 % | BODY MASS INDEX: 29.47 KG/M2

## 2025-03-13 DIAGNOSIS — C83.10 MANTLE CELL LYMPHOMA, UNSPECIFIED BODY REGION (MULTI): ICD-10-CM

## 2025-03-13 DIAGNOSIS — C83.17 MANTLE CELL LYMPHOMA OF SPLEEN (MULTI): Primary | ICD-10-CM

## 2025-03-13 DIAGNOSIS — Z76.82 STEM CELL TRANSPLANT CANDIDATE: ICD-10-CM

## 2025-03-13 DIAGNOSIS — C83.17 MANTLE CELL LYMPHOMA OF SPLEEN: ICD-10-CM

## 2025-03-13 DIAGNOSIS — Z92.850 HISTORY OF CHIMERIC ANTIGEN RECEPTOR T-CELL THERAPY: ICD-10-CM

## 2025-03-13 LAB
ALBUMIN SERPL BCP-MCNC: 4.9 G/DL (ref 3.4–5)
ALP SERPL-CCNC: 58 U/L (ref 33–136)
ALT SERPL W P-5'-P-CCNC: 18 U/L (ref 10–52)
ANION GAP SERPL CALC-SCNC: 14 MMOL/L (ref 10–20)
APTT PPP: 28 SECONDS (ref 26–36)
AST SERPL W P-5'-P-CCNC: 22 U/L (ref 9–39)
BASOPHILS # BLD AUTO: 0.03 X10*3/UL (ref 0–0.1)
BASOPHILS NFR BLD AUTO: 0.9 %
BILIRUB SERPL-MCNC: 1 MG/DL (ref 0–1.2)
BUN SERPL-MCNC: 26 MG/DL (ref 6–23)
CALCIUM SERPL-MCNC: 9.5 MG/DL (ref 8.6–10.3)
CHLORIDE SERPL-SCNC: 102 MMOL/L (ref 98–107)
CHROM ANALY OVERALL INTERP-IMP: NORMAL
CO2 SERPL-SCNC: 27 MMOL/L (ref 21–32)
CREAT SERPL-MCNC: 1.4 MG/DL (ref 0.5–1.3)
CRP SERPL-MCNC: 0.18 MG/DL
EGFRCR SERPLBLD CKD-EPI 2021: 57 ML/MIN/1.73M*2
EOSINOPHIL # BLD AUTO: 0.05 X10*3/UL (ref 0–0.7)
EOSINOPHIL NFR BLD AUTO: 1.5 %
ERYTHROCYTE [DISTWIDTH] IN BLOOD BY AUTOMATED COUNT: 14.6 % (ref 11.5–14.5)
FERRITIN SERPL-MCNC: 1133 NG/ML (ref 20–300)
FIBRINOGEN PPP-MCNC: 264 MG/DL (ref 200–400)
GLUCOSE SERPL-MCNC: 111 MG/DL (ref 74–99)
HCT VFR BLD AUTO: 35.2 % (ref 41–52)
HGB BLD-MCNC: 12.4 G/DL (ref 13.5–17.5)
HOLD SPECIMEN: NORMAL
IGG SERPL-MCNC: 553 MG/DL (ref 700–1600)
IMM GRANULOCYTES # BLD AUTO: 0.05 X10*3/UL (ref 0–0.7)
IMM GRANULOCYTES NFR BLD AUTO: 1.5 % (ref 0–0.9)
INR PPP: 1.1 (ref 0.9–1.1)
LDH SERPL L TO P-CCNC: 253 U/L (ref 84–246)
LYMPHOCYTES # BLD AUTO: 0.64 X10*3/UL (ref 1.2–4.8)
LYMPHOCYTES NFR BLD AUTO: 18.8 %
MAGNESIUM SERPL-MCNC: 2.22 MG/DL (ref 1.6–2.4)
MCH RBC QN AUTO: 33.3 PG (ref 26–34)
MCHC RBC AUTO-ENTMCNC: 35.2 G/DL (ref 32–36)
MCV RBC AUTO: 95 FL (ref 80–100)
MONOCYTES # BLD AUTO: 0.6 X10*3/UL (ref 0.1–1)
MONOCYTES NFR BLD AUTO: 17.6 %
NEUTROPHILS # BLD AUTO: 2.04 X10*3/UL (ref 1.2–7.7)
NEUTROPHILS NFR BLD AUTO: 59.7 %
NRBC BLD-RTO: 0 /100 WBCS (ref 0–0)
PLATELET # BLD AUTO: 124 X10*3/UL (ref 150–450)
POTASSIUM SERPL-SCNC: 3.8 MMOL/L (ref 3.5–5.3)
PROT SERPL-MCNC: 7 G/DL (ref 6.4–8.2)
PROTHROMBIN TIME: 11.9 SECONDS (ref 9.8–12.4)
RBC # BLD AUTO: 3.72 X10*6/UL (ref 4.5–5.9)
SODIUM SERPL-SCNC: 139 MMOL/L (ref 136–145)
WBC # BLD AUTO: 3.4 X10*3/UL (ref 4.4–11.3)

## 2025-03-13 PROCEDURE — 82728 ASSAY OF FERRITIN: CPT

## 2025-03-13 PROCEDURE — 85610 PROTHROMBIN TIME: CPT

## 2025-03-13 PROCEDURE — 36415 COLL VENOUS BLD VENIPUNCTURE: CPT

## 2025-03-13 PROCEDURE — 81371 HLA I & II TYPE VERIFY LR: CPT | Mod: OUT | Performed by: INTERNAL MEDICINE

## 2025-03-13 PROCEDURE — 86140 C-REACTIVE PROTEIN: CPT

## 2025-03-13 PROCEDURE — 82784 ASSAY IGA/IGD/IGG/IGM EACH: CPT

## 2025-03-13 PROCEDURE — 83735 ASSAY OF MAGNESIUM: CPT

## 2025-03-13 PROCEDURE — 88189 FLOWCYTOMETRY/READ 16 & >: CPT | Performed by: INTERNAL MEDICINE

## 2025-03-13 PROCEDURE — 83615 LACTATE (LD) (LDH) ENZYME: CPT

## 2025-03-13 PROCEDURE — 88185 FLOWCYTOMETRY/TC ADD-ON: CPT | Mod: TC,59

## 2025-03-13 PROCEDURE — 86832 HLA CLASS I HIGH DEFIN QUAL: CPT

## 2025-03-13 PROCEDURE — 99215 OFFICE O/P EST HI 40 MIN: CPT | Performed by: INTERNAL MEDICINE

## 2025-03-13 PROCEDURE — 85025 COMPLETE CBC W/AUTO DIFF WBC: CPT

## 2025-03-13 PROCEDURE — 80053 COMPREHEN METABOLIC PANEL: CPT

## 2025-03-13 PROCEDURE — 85384 FIBRINOGEN ACTIVITY: CPT

## 2025-03-13 ASSESSMENT — PAIN SCALES - GENERAL: PAINLEVEL_OUTOF10: 0-NO PAIN

## 2025-03-13 NOTE — PROGRESS NOTES
Research Note Non-Treatment Day    Nancie Armenta is here today. Patient is on [id number]. Today is T+97. Procedures completed per protocol. AE's and con-meds reviewed with patient. ECOG 0. Dr. Hobbs discussed results of the bone marrow biopsy with patient. Per Dr. Hobbs, patient does have disease progression. Patient is aware of that he will continue long term follow up for the clinical trial. Dr. Hobbs discussed further treatment options. Patient will talk with pharmacist and transplant coordinator for SOC options today.     Education Documentation  Tumor Markers, taught by Truong Chong RN at 3/13/2025 12:43 PM.  Learner: Significant Other, Patient  Readiness: Eager  Method: Explanation  Response: Verbalizes Understanding    Complete Blood Count with Differential (CBC w/ Diff), taught by Truong Chong RN at 3/13/2025 12:43 PM.  Learner: Significant Other, Patient  Readiness: Eager  Method: Explanation  Response: Verbalizes Understanding    Education Comments  No comments found.

## 2025-03-13 NOTE — PROGRESS NOTES
Patient ID: Nancie Aremnta is a 60 y.o. male.    Treatment:   Oncology History Overview Note           Patient Visit Information:   Visit Type: Follow Up Visit      Cancer History:   Treatment Synopsis:     1. Stage IV A mantle cell non-Hodgkin lymphoma involving the gastrointestinal tract diagnosed in October 2011, with diffuse gastrointestinal involvement. The patient was treated  with RCHOP alternating with Rituxan and high-dose Mamie-C and received total of 6 cycles of chemotherapy which he finished in February 2012.   Allergic to Augmentin, causes hives.     2. Patient underwent beam chemotherapy followed by autologous peripheral stem cell transplant in April 2012, by Dr. Yvonne Hobbs at [facility], was also involved in phase 3 clinical trial up killed shingles vaccine versus placebo ([employer]-1 Z10  study).     3.The patient developed shingles in January 2014, involving left flank.      4. 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      5.  Recurrent mantle cell lymphoma June 2019 assx presentation with leukocytosis. Peripheral blood flow which showed a CD5 positve clonal lymphocytosis. 6/2/19 CT of chest abdomen and pelvis revealed  splenomegaly of 15 cm compared to 12.8 cm last evaluation. Recurrence confirmed by FISH on peripheral blood earlier this month.  BM biopsy which showed 5% Mantle cell involvement although peripheral blood shows 30% involvement. CT imaging showed splenomegaly. PET scan declined by insurance twice.  Patient underwent colonoscopy  with Dr. Ac ( [place]) 8/8/19 which showed cobblestoning of colon and multiple biopsies including terminal ileum, appendix, cecum,  rectosigmoid positive for mantle cell lymphoma.     6. Initiation of acalabrutinib August 2019 with minimal response. Switched to ibrutinib and venetoclax 12/20/20 with clearing of t(11, 14) by by FISH ( 1/2020) and resolution of involvement of colon by endoscopy and pathology on exam 3/2/2020! BM biopsy  4/2020 HEATHER.     C-scope (3/11/22): at OSH: negative, no evidence of disease. PET scan ( 3/14/22): no abnormal uptake. Deauville 1. BMBx (3/17/22): negative, no evidence of MCL.   March 2022, ibrutinib and venetoclax discontinued.    July 2024 development of Tpenia, bone marrow biopsy 7/25/24 showed focal involvement with mantle cell lymphoma (NGS cyclin D1 positive and Sox 11 positive. BIRC+ and TP 53 positve with a VAF of 3%, BIRC3 positive). FISH negative for 17p deletion  CT imaging done on 8/7/24 shows thickening of sigmoid colon, development of mild to moderate retroperitoneal adenopathy, splenomegally and possible splenic infarct.    Pirotbrutib 200 mg daily initiated 8/12/24.  Due to inadequate response, he started the ventoclax (50 mg) on Adolfo 10/13, then increased to 100 mg , 200 mg and 400 mg      7. S/P COVID 19 infection 12/1/2020, no sequelae                          Lymphoma   10/20/2011 Initial Diagnosis    Lymphoma (Multi)      Cellular Therapy    Mr. Nancie Armenta is a 59 y.o. male presenting with relapsed Mantle Cell Lymphoma. CAR-T cell therapy, on CASE 2419 (T0=12/6/24), prepped with Fludarabine/Cyclophosphamide.      Hosp Course:  - Grade 2 CRS (fever, tachycardia) on 12/7, gave Toxi x1, infectious workup negative. Resolved by 12/8.  - Symptomatic Orthostatic Hypotension on 12/8, resolved with fluids by 12/9.     Mantle cell lymphoma of spleen (Multi)   10/13/2016 Initial Diagnosis    Mantle cell lymphoma of spleen (CMS/HCC)     Mantle cell lymphoma   11/18/2024 Initial Diagnosis    Mantle cell lymphoma     11/30/2024 - 12/20/2024 Bone Marrow Transplant        Cellular Therapy      Nancie Armenta is a 59 y.o. male presenting with relapsed Mantle Cell Lymphoma. CAR-T cell therapy, on CASE 2419 (T0=12/6/24), prepped with Fludarabine/Cyclophosphamide.      Hosp Course:  - Grade 2 CRS (fever, tachycardia) on 12/7, gave Toxi x1, infectious workup negative. Resolved by 12/8.  - Symptomatic Orthostatic Hypotension on 12/8, resolved with fluids by 12/9.         Response:     Past Medical History:  Renal insufficiency.   Past Medical History:   Diagnosis Date    Fourth (trochlear) nerve palsy, right eye 06/08/2017    Right-sided fourth cranial nerve palsy    Fourth (trochlear) nerve palsy, right eye 06/08/2017    Right-sided fourth cranial nerve palsy    Malignant neoplasm of connective and soft tissue, unspecified 06/08/2017    Cancer of blood vessel     Surgical History:   Past Surgical History:   Procedure Laterality Date    IR CVC PICC  11/29/2024    IR CVC PICC 11/29/2024 [facility] ANGIO    SHOULDER SURGERY  07/13/2017    Shoulder Surgery      Family History:   Family History   Problem Relation Name Age of Onset    Cataracts Mother      Cataracts Father      Other (chronic lymphoid leukemia) Father       Social History:  .  Working full time for [employer].    Social History     Tobacco Use    Smoking status: Never    Smokeless tobacco: Never   Vaping Use    Vaping status: Never Used   Substance Use Topics    Alcohol use: Never    Drug use: Never   -------------------------------------------------------------------------------------------------------  Subjective       HPI    Nancie Armenta is a 60 year old man with PMH of hyperglycemia, renal insufficiency, and stage IV A mantle cell non hodgkin lymphoma involving the gastrointestinal tract, s/p autologous SCT (April 2012).  Early in July 2024 patient noted to have dropping platelet counts and bone marrow biopsy 7/25/24 showed focal involvement with mantle cell lymphoma (NGS cyclin D1 positive and Sox 11 positive. BIRC+ ad TP 53  positve with a VAF of 3%, BIRC3 positive). FISH negative for 17p deletion  CT imaging done on 8/7/24 shows thickening of sigmoid colon, development of mild to moderate retroperitoneal adenopathy, splenomegally and possible splenic infarct.  Started Pirtobrutinib salvage on 8/12/24.  Added Venetoclax starting 10/13/24 since spleen size was increasing on repeat CT imaging and on physical exam.  BMBx (10/23/24) with low level involvement by mantle cells about 3% Mantle cells by flow.  PET/CT (11/4/24) with FDG avid lymphadenopathy throughout the neck, chest, abdomen, and pelvis c/w lymphomatous involvement. Intense FDG avid splenomegaly. Peripheral regions of photopenia likely reflect areas of infarction.  Pirtobrutinib and Venetoclax stopped 11/14/24 in preparation for CART collection.  Admitted 11/29/24 for his CAR-T cell therapy, on CASE 2419 (T0=12/6/24), prepped with Fludarabine/Cyclophosphamide. Hospital course included grade 2 CRS (fever, tachycardia) on 12/7, received Toxi x1, infectious workup negative.  He also had symptomatic orthostatic hypotension on 12/8, resolved with fluids by 12/9.  He was discharged home on 12/18/24.      Nancie presents to the clinic today (3/13/25) with his wife for followup evaluation after his CAR-T for for relapsed mantle cell lymphoma and count check and to discuss treatment for residual marrow disease of about 15% on his most recent bone marrow biopsy.  Interestingly his PET scan continues to show decrease in spleen size and no evidence of disease .  Today is T+97 from CAR-T.  Reports he is feeling better he is exercising every day and has gained 27 pounds. He has returned to work.        Review of Systems   Constitutional:  Negative for appetite change, chills, diaphoresis, fatigue, fever and unexpected weight change.   Respiratory: Negative.     Cardiovascular: Negative.    Gastrointestinal:  Negative for abdominal pain, blood in stool, constipation, diarrhea, nausea and  vomiting.   Genitourinary: Negative.     Musculoskeletal:  Negative for gait problem.   Skin: Negative.    Neurological:  Negative for dizziness, gait problem, light-headedness and numbness.   Hematological: Negative.    -------------------------------------------------------------------------------------------------------  Objective   BSA: 2.26 meters squared  /81 (BP Location: Right arm, Patient Position: Sitting, BP Cuff Size: Adult)   Pulse 78   Temp 35.6 °C (96.1 °F) (Skin)   Resp 15   Wt 99.7 kg (219 lb 11.2 oz)   SpO2 100%   BMI 29.47 kg/m²       Physical Exam  Vitals reviewed.   Constitutional:       General: He is not in acute distress.  HENT:      Head: Normocephalic and atraumatic.      Mouth/Throat:      Mouth: Mucous membranes are moist.   Eyes:      Extraocular Movements: Extraocular movements intact.      Conjunctiva/sclera: Conjunctivae normal.      Pupils: Pupils are equal, round, and reactive to light.   Cardiovascular:      Rate and Rhythm: Normal rate and regular rhythm.      Pulses: Normal pulses.      Heart sounds: Normal heart sounds.   Pulmonary:      Effort: Pulmonary effort is normal. No respiratory distress.      Breath sounds: Normal breath sounds. No wheezing.   Abdominal:      General: Abdomen is flat. Bowel sounds are normal. There is no distension.      Palpations: Abdomen is soft. There is splenomegaly. There is no mass.      Tenderness: There is no abdominal tenderness.   Musculoskeletal:         General: No swelling. Normal range of motion.   Lymphadenopathy:      Comments: No lymphadenopathy   Skin:     General: Skin is warm and dry.      Comments: PICC line to left arm, site WNL.   Neurological:      General: No focal deficit present.      Mental Status: He is alert and oriented to person, place, and time.   Psychiatric:         Mood and Affect: Mood normal.         Behavior: Behavior normal.         Thought Content: Thought content normal.         Judgment: Judgment  normal.     Performance Status:  ECOG performance status: 0 fully active    CAR-T product:  CAR-T [id number]  Cell infusion date: 12/6/2024  Day: 31  -------------------------------------------------------------------------------------------------------  Assessment/Plan      1. Stage IV mantle cell lymphoma itreated with Nordic regimen and consolidation autograft with BEAM preparative regimen April 2012.  Relapse 2019 treated with ibrutinib and venetoclax with complete pathology response until 3/ 2022. See above.     Recurrence July 2024 presenting with dropping platelet counts and bone marrow biopsy 7/25/24 showed focal involvement with mantle cell lymphoma (NGS cyclin D1 positive and Sox 11 positive. BIRC+ ad TP 53 positve with a VAF of 3%, BIRC3 positive). FISH negative for 17p deletion  CT imaging done on 8/7/24 shows thickening of sigmoid colon, development of mild to moderate retroperitoneal adenopathy, splenomegally and possible splenic infarct.      Patient started pirtobrutinib salvage August 12,2024 and feels better. However spleen size was increasing per CT imaging (8/6/24)  and physical exam so Dr. Hobbs planned to add venetoclax as follows, starting 10/13/24:  - 50 mg for first week, then 100 (10/20), and 200 mg (10/27) for 7 days each, then 400 mg (11/3/24- )  -increased anemia with hgb 8.5, platlets 37K, no bleeding.     Pre CART staging:    BM biopsy low level involvment by mantle cells about 3% mantle cells by flow. Absence of CD19 on this  flow sample likely reflects insufficient sampling and positive for CD 19 on prior bone marrow sample of 7/24/24..    PET imaging 11/4/24   1. FDG avid lymphadenopathy throughout the neck, chest, abdomen and  pelvis as detailed above, consistent with lymphomatous involvement.  2. Intense FDG avid splenomegaly, consistent with lymphomatous  involvement. Peripheral regions of photopenia likely reflect areas of  infarction.  3. No PET evidence of FDG avid extranodal  disease.on     ECHO 10/23/24 low normal LVEF 53%, cleared by Dr. Izaguirre.     Admitted 11/29/24 for his CAR-T cell therapy, on CASE 2419 (T0=12/6/24), prepped with Fludarabine/Cyclophosphamide.   Hosp Course:  - Grade 2 CRS (fever, tachycardia) on 12/7, gave Toxi x1, infectious workup negative. Resolved by 12/8.  - Symptomatic Orthostatic Hypotension on 12/8, resolved with fluids by 12/9.  Discharged home on 12/18/24.      PET scan 1/2/25:   IMPRESSION:  1.  Interval resolution of hypermetabolic lymphadenopathy above and below the diaphragm. Interval resolution hypermetabolic activity within the spleen with improving splenomegaly. (Deauville score of 1).  2. No evidence of new hypermetabolic robbie or extranodal lymphomatous disease.    2/6/25:  Today is T+ 61 from CAR-T.   Bone marrow biopsy performed 1/2/25 showed 1% mantle cells by IHC for SOX 11 and cycle D1 but nothing by flow.        3/13/25 Day 90 restaging reviewed, BM shows about 15% lymphoma cells which are cyclin D1 positive, PET scan shows no lymphomatous involvement and continued decrease in spleen size.   Patients case reviewed at interdisciplinary tumor board with recommendation options as follows:    1.Patient would be an appropriate candidate for potentially curative allogeneic transplant. One brother typed at [facility] is a haplo match and he has 2 adult children who have not been typed yet. He has 4 excellent MUD matches in the [facility] database.  We briefly discussed the allograft procedure.      2. Obinotuzumab and glofitamab as recently reported by Ebenezer et al.JCO 43:3 [phone], 2024.  Two doses of each explored with obinotuzumab 2000 mg/m2 1 week prior to glofitamab ramp up to 30 mg and continued for 12 months the recommended phase 2 dose . Higher dose of obinotuzumab was associated with lower CRS.  CR rate 78% with median ALVIN of 15.4 months although at this follows up  40% remain in CR.    3. Lenalidomide and rituxan if bite not approved    At  this point patient is leaning toward delaying allograft. With pharm D will try to get institutional approval  of glofitamab for formulary.     All questions answered. Will send blood for flow to see if this may  be a more convenient way to follow disease.      Ferritin   Date Value Ref Range Status   03/13/2025 1,133 (H) 20 - 300 ng/mL Final   03/03/2025 1,009 (H) 20 - 300 ng/mL Final   02/06/2025 914 (H) 20 - 300 ng/mL Final     Fibrinogen   Date Value Ref Range Status   03/13/2025 264 200 - 400 mg/dL Final   03/03/2025 259 200 - 400 mg/dL Final   02/06/2025 420 (H) 200 - 400 mg/dL Final     C-Reactive Protein   Date Value Ref Range Status   03/13/2025 0.18 <1.00 mg/dL Final   03/03/2025 0.34 <1.00 mg/dL Final   02/06/2025 0.35 <1.00 mg/dL Final     IgG   Date Value Ref Range Status   03/03/2025 579 (L) 700 - 1,600 mg/dL Final     Comment:     MONOCLONAL PROTEINS MAY CAUSE FALSELY LOW  RESULTS IN THIS ASSAY. SERUM PROTEIN  ELECTROPHORESIS SHOULD BE DONE AS THE  FIRST TEST TO EVALUATE MONOCLONAL GAMMOPATHY.     02/06/2025 583 (L) 700 - 1,600 mg/dL Final     Comment:     MONOCLONAL PROTEINS MAY CAUSE FALSELY LOW  RESULTS IN THIS ASSAY. SERUM PROTEIN  ELECTROPHORESIS SHOULD BE DONE AS THE  FIRST TEST TO EVALUATE MONOCLONAL GAMMOPATHY.     01/13/2025 674 (L) 700 - 1,600 mg/dL Final     Comment:     MONOCLONAL PROTEINS MAY CAUSE FALSELY LOW  RESULTS IN THIS ASSAY. SERUM PROTEIN  ELECTROPHORESIS SHOULD BE DONE AS THE  FIRST TEST TO EVALUATE MONOCLONAL GAMMOPATHY.       Response monitoring:  Day 30 response monitoring: obtained PET/CT and Bmbx on 1/2/25.  Day 60 response monitoring: scheduled ####  Day 90 response monitoring: scheduled ####    Supportive Care:   Levetiracetam (500-750mg) every 12h x 30 days. Discontinued 1/5/25    Infectious prophylaxis:   Antiviral prophylaxis Acyclovir. Continue for at least 6 months.   PCP prophylaxis. Bactrim Started 1/3/25 continue 6 months   Antifungal prophylaxis Fluconazole.  Continue antifungal prophylaxis through Day 90.   Levofloxacin until ANC is sustained at >500-discontinued 12/20/24.       Wallet Card: Verified that the patient was discharged with CAR T Cell wallet card at first outpatient visit. Reinforced instructions for use.  The patient was reminded not to drive for 8 weeks following CAR T cell therapy    Vaccines:  Will plan to start vaccines with influenza and covid vaccine about 3-4 months after CAR-T and will start post transplant vaccines around 6 months.       BPH:  Reports his previous urinary frequency and nocturia symptoms has greatly improved after starting tadalafil 5 mg daily.   Tadalafil and flomax held during hospitalization due to orthostatic hypotension.      Renal failure:   sCr stable at 1.04 (1/6/25).     Weight Loss:  Hospital admission weight 97.2 kg (11/29/24).    12/23/24:  Weight today 87 kg.  Nancie reports his appetite is slowly increasing and he is tolerating more food over the last few days.  Discussed ways to increase calories into diet.  Monitoring.  1/2/25: Wt. Today 89.4kg  1/6/25:  Weight today 89.5 kg.  Monitoring.    Health maintenance.   Colonoscopy completed 7/14/23: normal except internal hemorrhoids.  Recommendations to repeat in 2 years.    - Some aortic calcifications on imaging, started on prevastatin-managed by his PCP.  PCP increased pravastatin dose to 40 mg daily.  PCP switched to atorvastatin 40 mg daily.    Central Line:  Left power PICC line placed 12/2/24.  Discharged home on 12/18/24 with PICC, wife is flushing PICC line daily with NS flushes and dressing changes completed in infusion center.    1/6/25:  PICC line with no redness, tenderness, swelling, or drainage.  Okay to remove PICC at next appointment if not requiring IV replacements.      RTC: 3/27/25   -------------------------------------------------------------------------------------------  Yvonne Hobbs MD

## 2025-03-13 NOTE — PATIENT INSTRUCTIONS
Options discussed today today include a regimen of obinotuzumab which is like rituxan, and glofitamab which is a bite therapy9 biologic molecule that binds CD20 which is on your mantle cells.      Allogeneic transplant is a good option as well as you are healthy and have multiple perfect matches in the registry.      If the first option is not feasible we can give a combination of rituxan and revlimid ( pill) while we are given some rest before transplant..

## 2025-03-14 LAB
CMV DNA SERPL NAA+PROBE-LOG IU: NORMAL {LOG_IU}/ML
LABORATORY COMMENT REPORT: NOT DETECTED

## 2025-03-17 LAB
CELL COUNT (BLOOD): 3.4 X10*3/UL
CELL POPULATIONS: NORMAL
DIAGNOSIS: NORMAL
FLOW DIFFERENTIAL: NORMAL
FLOW TEST ORDERED: NORMAL
HLA RESULTS: NORMAL
HLA-A+B+C AB NFR SER: NORMAL %
HLA-DP+DQ+DR AB NFR SER: NORMAL %
LAB TEST METHOD: NORMAL
NUMBER OF CELLS COLLECTED: NORMAL PER TUBE
PATH REPORT.TOTAL CANCER: NORMAL
SIGNATURE COMMENT: NORMAL
SPECIMEN VIABILITY: NORMAL

## 2025-03-19 LAB
BAND RESOLUTION: 400 BANDS
CHROM ANALY OVERALL INTERP-IMP: NORMAL
CHROMOSOME ANALYSIS CELLS ANALYZED: 20 CELLS
CHROMOSOME ANALYSIS CELLS IMAGED: 5 CELLS
CHROMOSOME ANALYSIS HYPERMODAL CELL COUNT: 0 CELLS
CHROMOSOME ANALYSIS HYPOMODAL CELL COUNT: 1 CELLS
CHROMOSOME ANALYSIS MODAL CHROMOSOME NO: 46 CHROMOSOMES
CHROMOSOME ANALYSIS STAINING METHOD: NORMAL
ELECTRONICALLY SIGNED BY CYTOGENETICS: NORMAL
KARYOTYP MAR: 2 CELLS
TOTAL CELLS COUNTED MAR: 20 CELLS

## 2025-03-20 ENCOUNTER — DOCUMENTATION (OUTPATIENT)
Dept: HEMATOLOGY/ONCOLOGY | Facility: HOSPITAL | Age: 60
End: 2025-03-20
Payer: COMMERCIAL

## 2025-03-20 ENCOUNTER — TELEMEDICINE (OUTPATIENT)
Dept: HEMATOLOGY/ONCOLOGY | Facility: HOSPITAL | Age: 60
End: 2025-03-20
Payer: COMMERCIAL

## 2025-03-20 DIAGNOSIS — C83.10 MANTLE CELL LYMPHOMA, UNSPECIFIED BODY REGION (MULTI): Primary | ICD-10-CM

## 2025-03-20 PROCEDURE — 99215 OFFICE O/P EST HI 40 MIN: CPT | Performed by: INTERNAL MEDICINE

## 2025-03-20 RX ORDER — DIPHENHYDRAMINE HYDROCHLORIDE 50 MG/ML
50 INJECTION, SOLUTION INTRAMUSCULAR; INTRAVENOUS AS NEEDED
OUTPATIENT
Start: 2025-04-24

## 2025-03-20 RX ORDER — EPINEPHRINE 0.3 MG/.3ML
0.3 INJECTION SUBCUTANEOUS EVERY 5 MIN PRN
OUTPATIENT
Start: 2025-04-24

## 2025-03-20 RX ORDER — DIPHENHYDRAMINE HCL 50 MG
50 CAPSULE ORAL ONCE
OUTPATIENT
Start: 2025-04-24

## 2025-03-20 RX ORDER — DIPHENHYDRAMINE HYDROCHLORIDE 50 MG/ML
50 INJECTION, SOLUTION INTRAMUSCULAR; INTRAVENOUS AS NEEDED
OUTPATIENT
Start: 2025-04-10

## 2025-03-20 RX ORDER — PROCHLORPERAZINE MALEATE 10 MG
10 TABLET ORAL EVERY 6 HOURS PRN
OUTPATIENT
Start: 2025-04-24

## 2025-03-20 RX ORDER — EPINEPHRINE 0.3 MG/.3ML
0.3 INJECTION SUBCUTANEOUS EVERY 5 MIN PRN
OUTPATIENT
Start: 2025-04-03

## 2025-03-20 RX ORDER — FAMOTIDINE 10 MG/ML
20 INJECTION, SOLUTION INTRAVENOUS ONCE AS NEEDED
OUTPATIENT
Start: 2025-04-17

## 2025-03-20 RX ORDER — FAMOTIDINE 10 MG/ML
20 INJECTION, SOLUTION INTRAVENOUS ONCE AS NEEDED
OUTPATIENT
Start: 2025-04-10

## 2025-03-20 RX ORDER — FAMOTIDINE 10 MG/ML
20 INJECTION, SOLUTION INTRAVENOUS ONCE AS NEEDED
OUTPATIENT
Start: 2025-04-24

## 2025-03-20 RX ORDER — PROCHLORPERAZINE EDISYLATE 5 MG/ML
10 INJECTION INTRAMUSCULAR; INTRAVENOUS EVERY 6 HOURS PRN
OUTPATIENT
Start: 2025-04-17

## 2025-03-20 RX ORDER — PROCHLORPERAZINE EDISYLATE 5 MG/ML
10 INJECTION INTRAMUSCULAR; INTRAVENOUS EVERY 6 HOURS PRN
OUTPATIENT
Start: 2025-04-03

## 2025-03-20 RX ORDER — ACETAMINOPHEN 325 MG/1
650 TABLET ORAL ONCE
OUTPATIENT
Start: 2025-04-03

## 2025-03-20 RX ORDER — ALBUTEROL SULFATE 0.83 MG/ML
3 SOLUTION RESPIRATORY (INHALATION) AS NEEDED
OUTPATIENT
Start: 2025-04-17

## 2025-03-20 RX ORDER — DIPHENHYDRAMINE HCL 50 MG
50 CAPSULE ORAL ONCE
OUTPATIENT
Start: 2025-04-17

## 2025-03-20 RX ORDER — PROCHLORPERAZINE EDISYLATE 5 MG/ML
10 INJECTION INTRAMUSCULAR; INTRAVENOUS EVERY 6 HOURS PRN
OUTPATIENT
Start: 2025-04-24

## 2025-03-20 RX ORDER — LENALIDOMIDE 20 MG/1
20 CAPSULE ORAL DAILY
Qty: 21 CAPSULE | Refills: 0 | Status: SHIPPED | OUTPATIENT
Start: 2025-03-20 | End: 2025-04-11

## 2025-03-20 RX ORDER — PROCHLORPERAZINE MALEATE 10 MG
10 TABLET ORAL EVERY 6 HOURS PRN
OUTPATIENT
Start: 2025-04-03

## 2025-03-20 RX ORDER — ACETAMINOPHEN 325 MG/1
650 TABLET ORAL ONCE
OUTPATIENT
Start: 2025-04-10

## 2025-03-20 RX ORDER — ALLOPURINOL 300 MG/1
300 TABLET ORAL DAILY
Qty: 30 TABLET | Refills: 0 | Status: SHIPPED | OUTPATIENT
Start: 2025-03-20 | End: 2025-09-16

## 2025-03-20 RX ORDER — ALBUTEROL SULFATE 0.83 MG/ML
3 SOLUTION RESPIRATORY (INHALATION) AS NEEDED
OUTPATIENT
Start: 2025-04-03

## 2025-03-20 RX ORDER — ASPIRIN 81 MG/1
81 TABLET ORAL DAILY
Qty: 30 TABLET | Refills: 11 | Status: SHIPPED | OUTPATIENT
Start: 2025-03-20 | End: 2026-03-20

## 2025-03-20 RX ORDER — PROCHLORPERAZINE MALEATE 10 MG
10 TABLET ORAL EVERY 6 HOURS PRN
OUTPATIENT
Start: 2025-04-17

## 2025-03-20 RX ORDER — FAMOTIDINE 10 MG/ML
20 INJECTION, SOLUTION INTRAVENOUS ONCE AS NEEDED
OUTPATIENT
Start: 2025-04-03

## 2025-03-20 RX ORDER — DIPHENHYDRAMINE HCL 50 MG
50 CAPSULE ORAL ONCE
OUTPATIENT
Start: 2025-04-03

## 2025-03-20 RX ORDER — PROCHLORPERAZINE EDISYLATE 5 MG/ML
10 INJECTION INTRAMUSCULAR; INTRAVENOUS EVERY 6 HOURS PRN
OUTPATIENT
Start: 2025-04-10

## 2025-03-20 RX ORDER — EPINEPHRINE 0.3 MG/.3ML
0.3 INJECTION SUBCUTANEOUS EVERY 5 MIN PRN
OUTPATIENT
Start: 2025-04-10

## 2025-03-20 RX ORDER — DIPHENHYDRAMINE HCL 50 MG
50 CAPSULE ORAL ONCE
OUTPATIENT
Start: 2025-04-10

## 2025-03-20 RX ORDER — PROCHLORPERAZINE MALEATE 10 MG
10 TABLET ORAL EVERY 6 HOURS PRN
OUTPATIENT
Start: 2025-04-10

## 2025-03-20 RX ORDER — ALBUTEROL SULFATE 0.83 MG/ML
3 SOLUTION RESPIRATORY (INHALATION) AS NEEDED
OUTPATIENT
Start: 2025-04-10

## 2025-03-20 RX ORDER — ACETAMINOPHEN 325 MG/1
650 TABLET ORAL ONCE
OUTPATIENT
Start: 2025-04-24

## 2025-03-20 RX ORDER — EPINEPHRINE 0.3 MG/.3ML
0.3 INJECTION SUBCUTANEOUS EVERY 5 MIN PRN
OUTPATIENT
Start: 2025-04-17

## 2025-03-20 RX ORDER — ACETAMINOPHEN 325 MG/1
650 TABLET ORAL ONCE
OUTPATIENT
Start: 2025-04-17

## 2025-03-20 RX ORDER — ALBUTEROL SULFATE 0.83 MG/ML
3 SOLUTION RESPIRATORY (INHALATION) AS NEEDED
OUTPATIENT
Start: 2025-04-24

## 2025-03-20 RX ORDER — DIPHENHYDRAMINE HYDROCHLORIDE 50 MG/ML
50 INJECTION, SOLUTION INTRAMUSCULAR; INTRAVENOUS AS NEEDED
OUTPATIENT
Start: 2025-04-03

## 2025-03-20 RX ORDER — DIPHENHYDRAMINE HYDROCHLORIDE 50 MG/ML
50 INJECTION, SOLUTION INTRAMUSCULAR; INTRAVENOUS AS NEEDED
OUTPATIENT
Start: 2025-04-17

## 2025-03-20 ASSESSMENT — ENCOUNTER SYMPTOMS
UNEXPECTED WEIGHT CHANGE: 0
CONSTIPATION: 0
FEVER: 0
NUMBNESS: 0
CHILLS: 0
DIAPHORESIS: 0
RESPIRATORY NEGATIVE: 1
ABDOMINAL PAIN: 0
FATIGUE: 0
APPETITE CHANGE: 0
DIZZINESS: 0
HEMATOLOGIC/LYMPHATIC NEGATIVE: 1
VOMITING: 0
LIGHT-HEADEDNESS: 0
BLOOD IN STOOL: 0
DIARRHEA: 0
CARDIOVASCULAR NEGATIVE: 1
NAUSEA: 0

## 2025-03-20 NOTE — PROGRESS NOTES
Patient ID: Nancie Armenta is a 60 y.o. male.  Virtual or Telephone Consent    An interactive audio and video telecommunication system which permits real time communications between the patient (at the originating site) and provider (at the distant site) was utilized to provide this telehealth service.   Verbal consent was requested and obtained from Nancie Armenta on this date, 03/21/25 for a telehealth visit and the patient's location was confirmed at the time of the visit.   Treatment:   Oncology History Overview Note           Patient Visit Information:   Visit Type: Follow Up Visit      Cancer History:   Treatment Synopsis:     1. Stage IV A mantle cell non-Hodgkin lymphoma involving the gastrointestinal tract diagnosed in October 2011, with diffuse gastrointestinal involvement. The patient was treated  with RCHOP alternating with Rituxan and high-dose Mamie-C and received total of 6 cycles of chemotherapy which he finished in February 2012.   Allergic to Augmentin, causes hives.     2. Patient underwent beam chemotherapy followed by autologous peripheral stem cell transplant in April 2012, by Dr. Yvonne Hobbs at [facility], was also involved in phase 3 clinical trial up killed shingles vaccine versus placebo ([employer]-1 Z10  study).     3.The patient developed shingles in January 2014, involving left flank.      4. 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      5.  Recurrent mantle cell lymphoma June 2019 assx presentation with leukocytosis. Peripheral blood flow which showed a CD5 positve clonal lymphocytosis. 6/2/19 CT of chest abdomen and pelvis revealed  splenomegaly of 15 cm compared  to 12.8 cm last evaluation. Recurrence confirmed by FISH on peripheral blood earlier this month.  BM biopsy which showed 5% Mantle cell involvement although peripheral blood shows 30% involvement. CT imaging showed splenomegaly. PET scan declined by insurance twice.  Patient underwent colonoscopy  with Dr. Yashira Pettit) 8/8/19 which showed cobblestoning of colon and multiple biopsies including terminal ileum, appendix, cecum, rectosigmoid positive for mantle cell lymphoma.     6. Initiation of acalabrutinib August 2019 with minimal response. Switched to ibrutinib and venetoclax 12/20/20 with clearing of t(11, 14) by by FISH ( 1/2020) and resolution of involvement of colon by endoscopy and pathology on exam 3/2/2020! BM biopsy  4/2020 HEATHER.     C-scope (3/11/22): at OSH: negative, no evidence of disease. PET scan ( 3/14/22): no abnormal uptake. Deauville 1. BMBx (3/17/22): negative, no evidence of MCL.   March 2022, ibrutinib and venetoclax discontinued.    July 2024 development of Tpenia, bone marrow biopsy 7/25/24 showed focal involvement with mantle cell lymphoma (NGS cyclin D1 positive and Sox 11 positive. BIRC+ and TP 53 positve with a VAF of 3%, BIRC3 positive). FISH negative for 17p deletion  CT imaging done on 8/7/24 shows thickening of sigmoid colon, development of mild to moderate retroperitoneal adenopathy, splenomegally and possible splenic infarct.    Pirotbrutib 200 mg daily initiated 8/12/24.  Due to inadequate response, he started the ventoclax (50 mg) on Adolfo 10/13, then increased to 100 mg , 200 mg and 400 mg      7. S/P COVID 19 infection 12/1/2020, no sequelae                          Lymphoma   10/20/2011 Initial Diagnosis    Lymphoma (Multi)      Cellular Therapy    Mr. Nancie Armenta is a 59 y.o. male presenting with relapsed Mantle Cell Lymphoma. CAR-T cell therapy, on CASE 2419 (T0=12/6/24), prepped with Fludarabine/Cyclophosphamide.      Hosp Course:  - Grade 2 CRS (fever, tachycardia)  on 12/7, gave Toxi x1, infectious workup negative. Resolved by 12/8.  - Symptomatic Orthostatic Hypotension on 12/8, resolved with fluids by 12/9.     Mantle cell lymphoma of spleen (Multi)   10/13/2016 Initial Diagnosis    Mantle cell lymphoma of spleen (CMS/HCC)     Mantle cell lymphoma   11/18/2024 Initial Diagnosis    Mantle cell lymphoma     11/30/2024 - 12/20/2024 Bone Marrow Transplant        Cellular Therapy    Mr. Nancie Armenta is a 59 y.o. male presenting with relapsed Mantle Cell Lymphoma. CAR-T cell therapy, on CASE 2419 (T0=12/6/24), prepped with Fludarabine/Cyclophosphamide.      Hosp Course:  - Grade 2 CRS (fever, tachycardia) on 12/7, gave Toxi x1, infectious workup negative. Resolved by 12/8.  - Symptomatic Orthostatic Hypotension on 12/8, resolved with fluids by 12/9.         Response:     Past Medical History:  Renal insufficiency.   Past Medical History:   Diagnosis Date    Fourth (trochlear) nerve palsy, right eye 06/08/2017    Right-sided fourth cranial nerve palsy    Fourth (trochlear) nerve palsy, right eye 06/08/2017    Right-sided fourth cranial nerve palsy    Malignant neoplasm of connective and soft tissue, unspecified 06/08/2017    Cancer of blood vessel     Surgical History:   Past Surgical History:   Procedure Laterality Date    IR CVC PICC  11/29/2024    IR CVC PICC 11/29/2024 [facility] ANGIO    SHOULDER SURGERY  07/13/2017    Shoulder Surgery      Family History:   Family History   Problem Relation Name Age of Onset    Cataracts Mother      Cataracts Father      Other (chronic lymphoid leukemia) Father       Social History:  .  Working full time for [employer].    Social History     Tobacco Use    Smoking status: Never    Smokeless tobacco: Never   Vaping Use    Vaping status: Never Used   Substance Use Topics    Alcohol use: Never    Drug use: Never   -------------------------------------------------------------------------------------------------------  Subjective        HPI    Nancie Armenta is a 60 year old man with PMH of hyperglycemia, renal insufficiency, and stage IV A mantle cell non hodgkin lymphoma involving the gastrointestinal tract, s/p autologous SCT (April 2012).      Early in July 2024 patient noted to have dropping platelet counts and bone marrow biopsy 7/25/24 showed focal involvement with mantle cell lymphoma (NGS cyclin D1 positive and Sox 11 positive. BIRC+ ad TP 53 positve with a VAF of 3%, BIRC3 positive). FISH negative for 17p deletion  CT imaging done on 8/7/24 shows thickening of sigmoid colon, development of mild to moderate retroperitoneal adenopathy, splenomegally and possible splenic infarct.  Started Pirtobrutinib salvage on 8/12/24.  Added Venetoclax starting 10/13/24 since spleen size was increasing on repeat CT imaging and on physical exam.  BMBx (10/23/24) with low level involvement by mantle cells about 3% Mantle cells by flow.      Pirtobrutinib and Venetoclax stopped 11/14/24 in preparation for CART collection.  Admitted 11/29/24 for his CAR-T cell therapy, on CASE 2419 (T0=12/6/24), prepped with     Fludarabine/Cyclophosphamide. Hospital course included grade 2 CRS (fever, tachycardia) on 12/7, received Toxi x1, infectious workup negative.  He also had symptomatic orthostatic hypotension on 12/8, resolved with fluids by 12/9.  He was discharged home on 12/18/24.      Nancie presents to the clinic virtually today (3/20/25) with his wife for followup evaluation after his CAR-T for for relapsed mantle cell lymphoma and count check and to discuss and consent fo treatment  for residual marrow disease of about 15% on his most recent bone marrow biopsy.  Interestingly his PET scan continues to show decrease in spleen size and no evidence of disease .  Today is T+105 from CAR-T.  Reports he is feeling well, he is exercising every day and has gained 27 pounds. He has returned to work.        Review of Systems   Constitutional:  Negative for  appetite change, chills, diaphoresis, fatigue, fever and unexpected weight change.   Respiratory: Negative.     Cardiovascular: Negative.    Gastrointestinal:  Negative for abdominal pain, blood in stool, constipation, diarrhea, nausea and vomiting.   Genitourinary: Negative.     Musculoskeletal:  Negative for gait problem.   Skin: Negative.    Neurological:  Negative for dizziness, gait problem, light-headedness and numbness.   Hematological: Negative.    -------------------------------------------------------------------------------------------------------  Objective   BSA: There is no height or weight on file to calculate BSA.  There were no vitals taken for this visit.    In no distress virtual visit.     Physical Exam  Vitals reviewed.   Constitutional:       General: He is not in acute distress.  HENT:      Head: Normocephalic and atraumatic.      Mouth/Throat:      Mouth: Mucous membranes are moist.   Eyes:      Extraocular Movements: Extraocular movements intact.      Conjunctiva/sclera: Conjunctivae normal.      Pupils: Pupils are equal, round, and reactive to light.   Cardiovascular:      Rate and Rhythm: Normal rate and regular rhythm.      Pulses: Normal pulses.      Heart sounds: Normal heart sounds.   Pulmonary:      Effort: Pulmonary effort is normal. No respiratory distress.      Breath sounds: Normal breath sounds. No wheezing.   Abdominal:      General: Abdomen is flat. Bowel sounds are normal. There is no distension.      Palpations: Abdomen is soft. There is splenomegaly. There is no mass.      Tenderness: There is no abdominal tenderness.   Musculoskeletal:         General: No swelling. Normal range of motion.   Lymphadenopathy:      Comments: No lymphadenopathy   Skin:     General: Skin is warm and dry.      Comments: PICC line to left arm, site WNL.   Neurological:      General: No focal deficit present.      Mental Status: He is alert and oriented to person, place, and time.   Psychiatric:          Mood and Affect: Mood normal.         Behavior: Behavior normal.         Thought Content: Thought content normal.         Judgment: Judgment normal.   Performance Status:  ECOG performance status: 0 fully active    CAR-T product:  CAR-T [id number]  Cell infusion date: 12/6/2024  Day: 31  -------------------------------------------------------------------------------------------------------  Assessment/Plan      1. Stage IV mantle cell lymphoma itreated with Nordic regimen and consolidation autograft with BEAM preparative regimen April 2012.  Relapse 2019 treated with ibrutinib and venetoclax with complete pathology response until 3/ 2022. See above.     Recurrence July 2024 presenting with dropping platelet counts and bone marrow biopsy 7/25/24 showed focal involvement with mantle cell lymphoma (NGS cyclin D1 positive and Sox 11 positive. BIRC+ ad TP 53 positve with a VAF of 3%, BIRC3 positive). FISH negative for 17p deletion  CT imaging done on 8/7/24 shows thickening of sigmoid colon, development of mild to moderate retroperitoneal adenopathy, splenomegally and possible splenic infarct.      Patient started pirtobrutinib salvage August 12,2024 and feels better. However spleen size was increasing per CT imaging (8/6/24)  and physical exam so Dr. Hobbs planned to add venetoclax as follows, starting 10/13/24:  - 50 mg for first week, then 100 (10/20), and 200 mg (10/27) for 7 days each, then 400 mg (11/3/24- )  -increased anemia with hgb 8.5, platlets 37K, no bleeding.     Pre CART staging:    BM biopsy low level involvment by mantle cells about 3% mantle cells by flow. Absence of CD19 on this  flow sample likely reflects insufficient sampling and positive for CD 19 on prior bone marrow sample of 7/24/24..    PET imaging 11/4/24   1. FDG avid lymphadenopathy throughout the neck, chest, abdomen and  pelvis as detailed above, consistent with lymphomatous involvement.  2. Intense FDG avid splenomegaly, consistent  with lymphomatous  involvement. Peripheral regions of photopenia likely reflect areas of  infarction.  3. No PET evidence of FDG avid extranodal disease.on     ECHO 10/23/24 low normal LVEF 53%, cleared by Dr. Izaguirre.     Admitted 11/29/24 for his CAR-T cell therapy, on CASE 2419 (T0=12/6/24), prepped with Fludarabine/Cyclophosphamide.   Hosp Course:  - Grade 2 CRS (fever, tachycardia) on 12/7, gave Toxi x1, infectious workup negative. Resolved by 12/8.  - Symptomatic Orthostatic Hypotension on 12/8, resolved with fluids by 12/9.  Discharged home on 12/18/24.      PET scan 1/2/25:   IMPRESSION:  1.  Interval resolution of hypermetabolic lymphadenopathy above and below the diaphragm. Interval resolution hypermetabolic activity within the spleen with improving splenomegaly. (Deauville score of 1).  2. No evidence of new hypermetabolic robbei or extranodal lymphomatous disease.     Bone marrow biopsy performed 1/2/25 showed 1% mantle cells by IHC for SOX 11 and cycle D1 but nothing by flow.        3/13/25 Day 90  BM shows about 15% lymphoma cells which are cyclin D1 positive, PET scan shows no lymphomatous involvement and continued decrease in spleen size.   Patients case reviewed at interdisciplinary tumor board with recommendation options as follows:    1.Patient would be an appropriate candidate for potentially curative allogeneic transplant. One brother typed at [facility] is a haplo match and he has 2 adult children who have not been typed yet. He has 4 excellent MUD matches in the [facility] database.  We briefly discussed the allograft procedure.      2. Obinotuzumab and glofitamab as recently reported by Ebenezer et al.JCO 43:3 [phone], 2024.  Two doses of each explored with obinotuzumab 2000 mg/m2 1 week prior to glofitamab ramp up to 30 mg and continued for 12 months the recommended phase 2 dose . Higher dose of obinotuzumab was associated with lower CRS.  CR rate 78% with median ALVIN of 15.4 months although at this  follows up  40% remain in CR.    3. Lenalidomide and rituxan if bite not approved    At this point patient is leaning toward delaying allograft.    3/20/25 Blood flow negative for MRD. Upon further review have opted to initiate salvage treatment with venetoclax R2. ( Cipriano et al. Blood Advances 8:(2) , p 407, 2024)  Phase 2 dose revlimid 20 mg 21/28 days, venetoclax 600 mg daily after ramp up and rituxan 275 mg/m2 weekly for 4 weeks, then q 8 weeks. Regimen associated with 60% RRat 6 months and median PFS of 21 momths with overall survival of 31 months. Opted to include ventoclax for pt as not clear he failed this agent before CART.  Side effects of agents reviewed including infusion reactions to rituxan, blood clots, rash, cytopenias and need for REM registration for revlimid and cytopenias from venetoclax.  Patient low risk for TLS as low tumor burden. Plan BM bx at day 30 after starting.  Chemo consent signed verbally with Terese Rogers as witness.  Scripts and orders placed for all agents.   .   All questions answered. Will send blood for flow to see if this may  be a more convenient way to follow disease.      Ferritin   Date Value Ref Range Status   03/13/2025 1,133 (H) 20 - 300 ng/mL Final   03/03/2025 1,009 (H) 20 - 300 ng/mL Final   02/06/2025 914 (H) 20 - 300 ng/mL Final     Fibrinogen   Date Value Ref Range Status   03/13/2025 264 200 - 400 mg/dL Final   03/03/2025 259 200 - 400 mg/dL Final   02/06/2025 420 (H) 200 - 400 mg/dL Final     C-Reactive Protein   Date Value Ref Range Status   03/13/2025 0.18 <1.00 mg/dL Final   03/03/2025 0.34 <1.00 mg/dL Final   02/06/2025 0.35 <1.00 mg/dL Final     IgG   Date Value Ref Range Status   03/13/2025 553 (L) 700 - 1,600 mg/dL Final     Comment:     MONOCLONAL PROTEINS MAY CAUSE FALSELY LOW  RESULTS IN THIS ASSAY. SERUM PROTEIN  ELECTROPHORESIS SHOULD BE DONE AS THE  FIRST TEST TO EVALUATE MONOCLONAL GAMMOPATHY.     03/03/2025 579 (L) 700 - 1,600 mg/dL Final      Comment:     MONOCLONAL PROTEINS MAY CAUSE FALSELY LOW  RESULTS IN THIS ASSAY. SERUM PROTEIN  ELECTROPHORESIS SHOULD BE DONE AS THE  FIRST TEST TO EVALUATE MONOCLONAL GAMMOPATHY.     02/06/2025 583 (L) 700 - 1,600 mg/dL Final     Comment:     MONOCLONAL PROTEINS MAY CAUSE FALSELY LOW  RESULTS IN THIS ASSAY. SERUM PROTEIN  ELECTROPHORESIS SHOULD BE DONE AS THE  FIRST TEST TO EVALUATE MONOCLONAL GAMMOPATHY.       Response monitoring:  Day 30 response monitoring: obtained PET/CT and Bmbx on 1/2/25.  Day 60 response monitoring: scheduled ####  Day 90 response monitoring: scheduled ####    Supportive Care:   Levetiracetam (500-750mg) every 12h x 30 days. Discontinued 1/5/25    Infectious prophylaxis:   Antiviral prophylaxis Acyclovir. Continue for at least 6 months.   PCP prophylaxis. Bactrim Started 1/3/25 continue 6 months   Antifungal prophylaxis Fluconazole. Continue antifungal prophylaxis through Day 90.   Levofloxacin until ANC is sustained at >500-discontinued 12/20/24.       Vaccines:  Will plan to start vaccines with influenza and covid vaccine about 3-4 months after CAR-T and will start post transplant vaccines around 6 months.       BPH:  Reports his previous urinary frequency and nocturia symptoms has greatly improved after starting tadalafil 5 mg daily.   Tadalafil and flomax held during hospitalization due to orthostatic hypotension.      Renal failure:   sCr stable at 1.04 (1/6/25).     Health maintenance.   Colonoscopy completed 7/14/23: normal except internal hemorrhoids.  Recommendations to repeat in 2 years.    - Some aortic calcifications on imaging, started on prevastatin-managed by his PCP.  PCP increased pravastatin dose to 40 mg daily.  PCP switched to atorvastatin 40 mg daily.    RTC: 3/27/25 possibly will start rituxan that day  -------------------------------------------------------------------------------------------  Yvonne Hobbs MD    ***Chief Addendum***    Agree with above A/P. 87yo M, HTN/HLD, CAD w ELADIO (03', 08', no longer on DAPT, ASA only), PSx open appy (70yrs ago) sent in by GI/PCP (Kay) for 24hrs obstipation, abdominal pain, unsure last passed flatus, last normal BM just prior to arrival in ER. VSS, softly distended, w generalized TTP w/o concern for peritonitis. Labs w slightly leukocytosis (11) w PMN left shift, lactate w/nl, hyponatremic (134), Cr near baseline. CT A/P consistent w partial vs complete SBO w TP in RLQ, favors adhesive disease given remote history open appendectomy. Will admit for nasogastric decompression, crystalloid fluid resuscitation, and TRES. If fails to progress clinically in timely fashion may require Gastrografin challenge verse operative intervention later in the week. A/P discussed with patient and attending surgeon.

## 2025-03-24 DIAGNOSIS — C83.10 MANTLE CELL LYMPHOMA, UNSPECIFIED BODY REGION (MULTI): Primary | ICD-10-CM

## 2025-03-24 PROCEDURE — RXMED WILLOW AMBULATORY MEDICATION CHARGE

## 2025-03-25 ENCOUNTER — SPECIALTY PHARMACY (OUTPATIENT)
Dept: PHARMACY | Facility: CLINIC | Age: 60
End: 2025-03-25

## 2025-03-25 ASSESSMENT — ENCOUNTER SYMPTOMS
CHILLS: 0
CARDIOVASCULAR NEGATIVE: 1
NUMBNESS: 0
APPETITE CHANGE: 0
DIZZINESS: 0
FEVER: 0
RESPIRATORY NEGATIVE: 1
VOMITING: 0
UNEXPECTED WEIGHT CHANGE: 0
BLOOD IN STOOL: 0
FATIGUE: 0
ABDOMINAL PAIN: 0
DIARRHEA: 0
NAUSEA: 0
DIAPHORESIS: 0
CONSTIPATION: 0
LIGHT-HEADEDNESS: 0
HEMATOLOGIC/LYMPHATIC NEGATIVE: 1

## 2025-03-25 NOTE — PROGRESS NOTES
Specialty Pharmacy    [facility]    This patient has scheduled their medication delivery with  Specialty Pharmacy.      They should receive their medication Thursday, 03.27.25.    Lenalidomide    Please transfer script to [facility].    Thank you,    Mayte Portillo [facility]  Pharmacy Support [facility]  [facility]  Specialty Pharmacy  [address] [place] [address], [id number]  T: [phone]  F: [phone]  [email or web address]

## 2025-03-26 ENCOUNTER — PHARMACY VISIT (OUTPATIENT)
Dept: PHARMACY | Facility: CLINIC | Age: 60
End: 2025-03-26
Payer: COMMERCIAL

## 2025-03-26 NOTE — PROGRESS NOTES
Patient ID: Nancie Armenta is a 60 y.o. male.    Treatment:   Oncology History Overview Note           Patient Visit Information:   Visit Type: Follow Up Visit      Cancer History:   Treatment Synopsis:     1. Stage IV A mantle cell non-Hodgkin lymphoma involving the gastrointestinal tract diagnosed in October 2011, with diffuse gastrointestinal involvement. The patient was treated  with RCHOP alternating with Rituxan and high-dose Mamie-C and received total of 6 cycles of chemotherapy which he finished in February 2012.   Allergic to Augmentin, causes hives.     2. Patient underwent beam chemotherapy followed by autologous peripheral stem cell transplant in April 2012, by Dr. Yvonne Hobbs at [facility], was also involved in phase 3 clinical trial up killed shingles vaccine versus placebo ([employer]-1 Z10  study).     3.The patient developed shingles in January 2014, involving left flank.      4. 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      5.  Recurrent mantle cell lymphoma June 2019 assx presentation with leukocytosis. Peripheral blood flow which showed a CD5 positve clonal lymphocytosis. 6/2/19 CT of chest abdomen and pelvis revealed  splenomegaly of 15 cm compared to 12.8 cm last evaluation. Recurrence confirmed by FISH on peripheral blood earlier this month.  BM biopsy which showed 5% Mantle cell involvement although peripheral blood shows 30% involvement. CT imaging showed splenomegaly. PET scan declined by insurance twice.  Patient underwent colonoscopy  with Dr. Ac ( [place]) 8/8/19 which showed cobblestoning of colon and multiple biopsies including terminal ileum, appendix, cecum,  rectosigmoid positive for mantle cell lymphoma.     6. Initiation of acalabrutinib August 2019 with minimal response. Switched to ibrutinib and venetoclax 12/20/20 with clearing of t(11, 14) by by FISH ( 1/2020) and resolution of involvement of colon by endoscopy and pathology on exam 3/2/2020! BM biopsy  4/2020 HEATHER.     C-scope (3/11/22): at OSH: negative, no evidence of disease. PET scan ( 3/14/22): no abnormal uptake. Deauville 1. BMBx (3/17/22): negative, no evidence of MCL.   March 2022, ibrutinib and venetoclax discontinued.    July 2024 development of Tpenia, bone marrow biopsy 7/25/24 showed focal involvement with mantle cell lymphoma (NGS cyclin D1 positive and Sox 11 positive. BIRC+ and TP 53 positve with a VAF of 3%, BIRC3 positive). FISH negative for 17p deletion  CT imaging done on 8/7/24 shows thickening of sigmoid colon, development of mild to moderate retroperitoneal adenopathy, splenomegally and possible splenic infarct.    Pirotbrutib 200 mg daily initiated 8/12/24.  Due to inadequate response, he started the ventoclax (50 mg) on Adolfo 10/13, then increased to 100 mg , 200 mg and 400 mg      BMBx (10/23/24) with low level involvement by mantle cells about 3% Mantle cells by flow.      PET/CT (11/4/24) with FDG avid lymphadenopathy throughout the neck, chest, abdomen, and pelvis c/w lymphomatous involvement. Intense FDG avid splenomegaly. Peripheral regions of photopenia likely reflect areas of infarction.      Pirtobrutinib and Venetoclax stopped 11/14/24 in preparation for CART collection.      Admitted 11/29/24 for his CAR-T cell therapy, on CASE 2419 (T0=12/6/24), prepped with Fludarabine/Cyclophosphamide. Hospital course included grade 2 CRS (fever, tachycardia) on 12/7, received Toxi x1, infectious workup negative.  He also had symptomatic orthostatic hypotension on 12/8, resolved with fluids by 12/9.  He was discharged home on 12/18/24.     T+ 90 progressive tumorous involvement of bone  marrow, PET continues to show CR.      7. S/P COVID 19 infection 12/1/2020, no sequelae                          Lymphoma   10/20/2011 Initial Diagnosis    Lymphoma (Multi)      Cellular Therapy    Mr. Nancie Armenta is a 59 y.o. male presenting with relapsed Mantle Cell Lymphoma. CAR-T cell therapy, on CASE 2419 (T0=12/6/24), prepped with Fludarabine/Cyclophosphamide.      Hosp Course:  - Grade 2 CRS (fever, tachycardia) on 12/7, gave Toxi x1, infectious workup negative. Resolved by 12/8.  - Symptomatic Orthostatic Hypotension on 12/8, resolved with fluids by 12/9.     Mantle cell lymphoma of spleen (Multi)   10/13/2016 Initial Diagnosis    Mantle cell lymphoma of spleen (CMS/HCC)     Mantle cell lymphoma   11/18/2024 Initial Diagnosis    Mantle cell lymphoma     11/30/2024 - 12/20/2024 Bone Marrow Transplant        Cellular Therapy    Mr. Nancie Armenta is a 59 y.o. male presenting with relapsed Mantle Cell Lymphoma. CAR-T cell therapy, on CASE 2419 (T0=12/6/24), prepped with Fludarabine/Cyclophosphamide.      Hosp Course:  - Grade 2 CRS (fever, tachycardia) on 12/7, gave Toxi x1, infectious workup negative. Resolved by 12/8.  - Symptomatic Orthostatic Hypotension on 12/8, resolved with fluids by 12/9.     4/3/2025 -  Chemotherapy    RiTUXimab (Weekly), 28 Day Cycle          Response:     Past Medical History:  Renal insufficiency.   Past Medical History:   Diagnosis Date    Fourth (trochlear) nerve palsy, right eye 06/08/2017    Right-sided fourth cranial nerve palsy    Fourth (trochlear) nerve palsy, right eye 06/08/2017    Right-sided fourth cranial nerve palsy    Malignant neoplasm of connective and soft tissue, unspecified 06/08/2017    Cancer of blood vessel     Surgical History:   Past Surgical History:   Procedure Laterality Date    IR CVC PICC  11/29/2024    IR CVC PICC 11/29/2024 [facility] ANGIO    SHOULDER SURGERY  07/13/2017    Shoulder Surgery      Family History:   Family History   Problem Relation  Name Age of Onset    Cataracts Mother      Cataracts Father      Other (chronic lymphoid leukemia) Father       Social History:  .  Working full time for [employer].    Social History     Tobacco Use    Smoking status: Never    Smokeless tobacco: Never   Vaping Use    Vaping status: Never Used   Substance Use Topics    Alcohol use: Never    Drug use: Never   -------------------------------------------------------------------------------------------------------  Subjective       HPI    Nancie Armenta is a 60 year old man with PMH of hyperglycemia, renal insufficiency, and stage IV A mantle cell non hodgkin lymphoma involving the gastrointestinal tract, s/p autologous SCT (April 2012).      Early in July 2024 patient noted to have dropping platelet counts and bone marrow biopsy 7/25/24 showed focal involvement with mantle cell lymphoma (NGS cyclin D1 positive and Sox 11 positive. BIRC+ ad TP 53 positve with a VAF of 3%, BIRC3 positive). FISH negative for 17p deletion  CT imaging done on 8/7/24 shows thickening of sigmoid colon, development of mild to moderate retroperitoneal adenopathy, splenomegally and possible splenic infarct.  Started Pirtobrutinib salvage on 8/12/24.  Added Venetoclax starting 10/13/24 since spleen size was increasing on repeat CT imaging and on physical exam.  BMBx (10/23/24) with low level involvement by mantle cells about 3% Mantle cells by flow.      Pirtobrutinib and Venetoclax stopped 11/14/24 in preparation for CART collection.  Admitted 11/29/24 for his CAR-T cell therapy, on CASE 2419 (T0=12/6/24), prepped with     Fludarabine/Cyclophosphamide. Hospital course included grade 2 CRS (fever, tachycardia) on 12/7, received Toxi x1, infectious workup negative.  He also had symptomatic orthostatic hypotension on 12/8, resolved with fluids by 12/9.  He was discharged home on 12/18/24.      Nancie presents to the clinic virtually today (3/27/25) with his wife for followup  evaluation after his CAR-T for for relapsed mantle cell lymphoma and count check and to discuss and consent fo treatment  for residual marrow disease of about 15% on his most recent bone marrow biopsy.  We have opted for rituxan, revlimid and venetoclax combination ( see below)  Patient feels great! Interestingly his PET scan continues to show decrease in spleen size and no evidence of disease .  Today is T+105 from CAR-T.  Reports he is feeling well, he is exercising every day and has gained 10 pounds since 3/13/25.   He has returned to work.        Review of Systems   Constitutional:  Negative for appetite change, chills, diaphoresis, fatigue, fever and unexpected weight change.   Respiratory: Negative.     Cardiovascular: Negative.    Gastrointestinal:  Negative for abdominal pain, blood in stool, constipation, diarrhea, nausea and vomiting.   Genitourinary: Negative.     Musculoskeletal:  Negative for gait problem.   Skin: Negative.    Neurological:  Negative for dizziness, gait problem, light-headedness and numbness.   Hematological: Negative.    -------------------------------------------------------------------------------------------------------  Objective   BSA: 2.3 meters squared  /76 (BP Location: Left arm, Patient Position: Sitting, BP Cuff Size: Adult)   Pulse 80   Temp 36.1 °C (97 °F) (Skin)   Resp 17   Wt 104 kg (229 lb 6.4 oz)   SpO2 100%   BMI 30.77 kg/m²     In no distress virtual visit.     Physical Exam  Vitals reviewed.   Constitutional:       General: He is not in acute distress.  HENT:      Head: Normocephalic and atraumatic.      Mouth/Throat:      Mouth: Mucous membranes are moist.   Eyes:      Extraocular Movements: Extraocular movements intact.      Conjunctiva/sclera: Conjunctivae normal.      Pupils: Pupils are equal, round, and reactive to light.   Cardiovascular:      Rate and Rhythm: Normal rate and regular rhythm.      Pulses: Normal pulses.      Heart sounds: Normal  heart sounds.   Pulmonary:      Effort: Pulmonary effort is normal. No respiratory distress.      Breath sounds: Normal breath sounds. No wheezing.   Abdominal:      General: Abdomen is flat. Bowel sounds are normal. There is no distension.      Palpations: Abdomen is soft. There is splenomegaly. There is no mass.      Tenderness: There is no abdominal tenderness.      Comments: Mild fullness LUQ   Musculoskeletal:         General: No swelling. Normal range of motion.   Lymphadenopathy:      Comments: No lymphadenopathy   Skin:     General: Skin is warm and dry.   Neurological:      General: No focal deficit present.      Mental Status: He is alert and oriented to person, place, and time.   Psychiatric:         Mood and Affect: Mood normal.         Behavior: Behavior normal.         Thought Content: Thought content normal.         Judgment: Judgment normal.     Performance Status:  ECOG performance status: 0 fully active    CAR-T product:  CAR-T [id number]  Cell infusion date: 12/6/2024  Day: 31  -------------------------------------------------------------------------------------------------------  Assessment/Plan      1. Stage IV mantle cell lymphoma itreated with Nordic regimen and consolidation autograft with BEAM preparative regimen April 2012.  Relapse 2019 treated with ibrutinib and venetoclax with complete pathology response until 3/ 2022. See above.     Recurrence July 2024 presenting with dropping platelet counts and bone marrow biopsy 7/25/24 showed focal involvement with mantle cell lymphoma (NGS cyclin D1 positive and Sox 11 positive. BIRC+ ad TP 53 positve with a VAF of 3%, BIRC3 positive). FISH negative for 17p deletion  CT imaging done on 8/7/24 shows thickening of sigmoid colon, development of mild to moderate retroperitoneal adenopathy, splenomegally and possible splenic infarct.      Patient started pirtobrutinib salvage August 12,2024 and feels better. However spleen size was increasing per CT  imaging (8/6/24)  and physical exam so Dr. Hobbs planned to add venetoclax as follows, starting 10/13/24:  - 50 mg for first week, then 100 (10/20), and 200 mg (10/27) for 7 days each, then 400 mg (11/3/24- )  -increased anemia with hgb 8.5, platlets 37K, no bleeding.     Pre CART staging:    BM biopsy low level involvment by mantle cells about 3% mantle cells by flow. Absence of CD19 on this  flow sample likely reflects insufficient sampling and positive for CD 19 on prior bone marrow sample of 7/24/24..    PET imaging 11/4/24   1. FDG avid lymphadenopathy throughout the neck, chest, abdomen and  pelvis as detailed above, consistent with lymphomatous involvement.  2. Intense FDG avid splenomegaly, consistent with lymphomatous  involvement. Peripheral regions of photopenia likely reflect areas of  infarction.  3. No PET evidence of FDG avid extranodal disease.on     ECHO 10/23/24 low normal LVEF 53%, cleared by Dr. Izaguirre.     Admitted 11/29/24 for his CAR-T cell therapy, on CASE 2419 (T0=12/6/24), prepped with Fludarabine/Cyclophosphamide.   Hosp Course:  - Grade 2 CRS (fever, tachycardia) on 12/7, gave Toxi x1, infectious workup negative. Resolved by 12/8.  - Symptomatic Orthostatic Hypotension on 12/8, resolved with fluids by 12/9.  Discharged home on 12/18/24.      PET scan 1/2/25:   IMPRESSION:  1.  Interval resolution of hypermetabolic lymphadenopathy above and below the diaphragm. Interval resolution hypermetabolic activity within the spleen with improving splenomegaly. (Deauville score of 1).  2. No evidence of new hypermetabolic robbie or extranodal lymphomatous disease.     Bone marrow biopsy performed 1/2/25 showed 1% mantle cells by IHC for SOX 11 and cycle D1 but nothing by flow.        3/13/25 Day 90  BM shows about 15% lymphoma cells which are cyclin D1 positive, PET scan shows no lymphomatous involvement and continued decrease in spleen size.   Patients case reviewed at interdisciplinary tumor  board with recommendation options as follows:    1.Patient would be an appropriate candidate for potentially curative allogeneic transplant. One brother typed at [facility] is a haplo match and he has 2 adult children who have not been typed yet. He has 4 excellent MUD matches in the [facility] database.  We briefly discussed the allograft procedure.      At this point patient is leaning toward delaying allograft.    3/27/25  Patient seen today for followup, feels great. Blood flow negative for MRD. Upon further review with patient and team have opted to initiate salvage treatment with venetoclax R2. ( Cipriano et al. Blood Advances 8:(2) , p 407, 2024)  Phase 2 dose revlimid 15 mg 21/28 days ( note dose change), venetoclax 600 mg daily after ramp up and rituxan 275 mg/m2 weekly for 4 weeks, then q 8 weeks. Regimen associated with 60% RRat 6 months and median PFS of 21 momths with overall survival of 31 months. Opted to include ventoclax for pt as not clear he failed this agent before CART.  Side effects of agents reviewed including infusion reactions to rituxan, blood clots, rash, cytopenias and need for REM registration for revlimid and cytopenias from venetoclax.  Patient low risk for TLS as low tumor burden. Plan BM bx at day 90 after starting.  Chemo consent signed  also reviewed need to take aspirin with revlimid to prevent clots.     Peripheral blood flow, negative for clonal b cell. Rituxan to start on 4/3/25. All questions answered.     Leukopenia,  etiology not clear, will  hold bactrim    Ferritin   Date Value Ref Range Status   03/27/2025 928 (H) 20 - 300 ng/mL Final   03/13/2025 1,133 (H) 20 - 300 ng/mL Final   03/03/2025 1,009 (H) 20 - 300 ng/mL Final     Fibrinogen   Date Value Ref Range Status   03/27/2025 256 200 - 400 mg/dL Final   03/13/2025 264 200 - 400 mg/dL Final   03/03/2025 259 200 - 400 mg/dL Final     C-Reactive Protein   Date Value Ref Range Status   03/27/2025 0.15 <1.00 mg/dL Final   03/13/2025  0.18 <1.00 mg/dL Final   03/03/2025 0.34 <1.00 mg/dL Final     IgG   Date Value Ref Range Status   03/27/2025 465 (L) 700 - 1,600 mg/dL Final     Comment:     MONOCLONAL PROTEINS MAY CAUSE FALSELY LOW  RESULTS IN THIS ASSAY. SERUM PROTEIN  ELECTROPHORESIS SHOULD BE DONE AS THE  FIRST TEST TO EVALUATE MONOCLONAL GAMMOPATHY.     03/13/2025 553 (L) 700 - 1,600 mg/dL Final     Comment:     MONOCLONAL PROTEINS MAY CAUSE FALSELY LOW  RESULTS IN THIS ASSAY. SERUM PROTEIN  ELECTROPHORESIS SHOULD BE DONE AS THE  FIRST TEST TO EVALUATE MONOCLONAL GAMMOPATHY.     03/03/2025 579 (L) 700 - 1,600 mg/dL Final     Comment:     MONOCLONAL PROTEINS MAY CAUSE FALSELY LOW  RESULTS IN THIS ASSAY. SERUM PROTEIN  ELECTROPHORESIS SHOULD BE DONE AS THE  FIRST TEST TO EVALUATE MONOCLONAL GAMMOPATHY.     Infectious prophylaxis:   Antiviral prophylaxis Acyclovir. Continue for at least 6 months.   PCP prophylaxis. Bactrim Started 1/3/25 continue 6 months held 3/27 for leukopenia  Antifungal prophylaxis Fluconazole discontinued.       Vaccines:  Will plan to start vaccines with influenza and covid vaccine about 3-4 months after CAR-T and will start post transplant vaccines around 6 months.  Would recommend influenza and covid vaccine.  Will determine vaccine schedule based on clinical situation.  IgG level borderline discussed may need to include as supportive medicine.        Health maintenance.   Colonoscopy completed 7/14/23: normal except internal hemorrhoids.  Recommendations to repeat in 2 years.    - Some aortic calcifications on imaging, started on prevastatin-managed by his PCP.  PCP increased pravastatin dose to 40 mg daily.  PCP switched to atorvastatin 40 mg daily.    RTC: 3/27/25 possibly will start rituxan that day  -------------------------------------------------------------------------------------------  Yvonne Hobbs MD

## 2025-03-27 ENCOUNTER — LAB (OUTPATIENT)
Dept: LAB | Facility: HOSPITAL | Age: 60
End: 2025-03-27
Payer: COMMERCIAL

## 2025-03-27 ENCOUNTER — DOCUMENTATION (OUTPATIENT)
Dept: OTHER | Facility: HOSPITAL | Age: 60
End: 2025-03-27
Payer: COMMERCIAL

## 2025-03-27 ENCOUNTER — OFFICE VISIT (OUTPATIENT)
Dept: HEMATOLOGY/ONCOLOGY | Facility: HOSPITAL | Age: 60
End: 2025-03-27
Payer: COMMERCIAL

## 2025-03-27 VITALS
RESPIRATION RATE: 17 BRPM | HEART RATE: 80 BPM | OXYGEN SATURATION: 100 % | BODY MASS INDEX: 30.77 KG/M2 | SYSTOLIC BLOOD PRESSURE: 127 MMHG | WEIGHT: 229.4 LBS | TEMPERATURE: 97 F | DIASTOLIC BLOOD PRESSURE: 76 MMHG

## 2025-03-27 DIAGNOSIS — Z92.850 HISTORY OF CHIMERIC ANTIGEN RECEPTOR T-CELL THERAPY: ICD-10-CM

## 2025-03-27 DIAGNOSIS — C83.10 MANTLE CELL LYMPHOMA, UNSPECIFIED BODY REGION (MULTI): ICD-10-CM

## 2025-03-27 DIAGNOSIS — C83.17 MANTLE CELL LYMPHOMA OF SPLEEN: ICD-10-CM

## 2025-03-27 LAB
ALBUMIN SERPL BCP-MCNC: 4.8 G/DL (ref 3.4–5)
ALP SERPL-CCNC: 61 U/L (ref 33–136)
ALT SERPL W P-5'-P-CCNC: 20 U/L (ref 10–52)
ANION GAP SERPL CALC-SCNC: 14 MMOL/L (ref 10–20)
APTT PPP: 29 SECONDS (ref 26–36)
AST SERPL W P-5'-P-CCNC: 20 U/L (ref 9–39)
BASOPHILS # BLD MANUAL: 0.02 X10*3/UL (ref 0–0.1)
BASOPHILS NFR BLD MANUAL: 1 %
BILIRUB SERPL-MCNC: 0.8 MG/DL (ref 0–1.2)
BUN SERPL-MCNC: 27 MG/DL (ref 6–23)
CALCIUM SERPL-MCNC: 9.3 MG/DL (ref 8.6–10.3)
CHLORIDE SERPL-SCNC: 104 MMOL/L (ref 98–107)
CO2 SERPL-SCNC: 27 MMOL/L (ref 21–32)
CREAT SERPL-MCNC: 1.21 MG/DL (ref 0.5–1.3)
CRP SERPL-MCNC: 0.15 MG/DL
DACRYOCYTES BLD QL SMEAR: ABNORMAL
EGFRCR SERPLBLD CKD-EPI 2021: 68 ML/MIN/1.73M*2
EOSINOPHIL # BLD MANUAL: 0.08 X10*3/UL (ref 0–0.7)
EOSINOPHIL NFR BLD MANUAL: 4 %
ERYTHROCYTE [DISTWIDTH] IN BLOOD BY AUTOMATED COUNT: 14.4 % (ref 11.5–14.5)
FERRITIN SERPL-MCNC: 928 NG/ML (ref 20–300)
FIBRINOGEN PPP-MCNC: 256 MG/DL (ref 200–400)
GLUCOSE SERPL-MCNC: 106 MG/DL (ref 74–99)
HCT VFR BLD AUTO: 35.8 % (ref 41–52)
HGB BLD-MCNC: 12.4 G/DL (ref 13.5–17.5)
HOLD SPECIMEN: NORMAL
IGG SERPL-MCNC: 465 MG/DL (ref 700–1600)
IMM GRANULOCYTES # BLD AUTO: 0.04 X10*3/UL (ref 0–0.7)
IMM GRANULOCYTES NFR BLD AUTO: 1.9 % (ref 0–0.9)
INR PPP: 1 (ref 0.9–1.1)
LDH SERPL L TO P-CCNC: 226 U/L (ref 84–246)
LYMPHOCYTES # BLD MANUAL: 0.74 X10*3/UL (ref 1.2–4.8)
LYMPHOCYTES NFR BLD MANUAL: 35 %
MAGNESIUM SERPL-MCNC: 2.24 MG/DL (ref 1.6–2.4)
MCH RBC QN AUTO: 32.9 PG (ref 26–34)
MCHC RBC AUTO-ENTMCNC: 34.6 G/DL (ref 32–36)
MCV RBC AUTO: 95 FL (ref 80–100)
MONOCYTES # BLD MANUAL: 0.19 X10*3/UL (ref 0.1–1)
MONOCYTES NFR BLD MANUAL: 9 %
MYELOCYTES # BLD MANUAL: 0.02 X10*3/UL
MYELOCYTES NFR BLD MANUAL: 1 %
NEUTROPHILS # BLD MANUAL: 1.06 X10*3/UL (ref 1.2–7.7)
NEUTS BAND # BLD MANUAL: 0.11 X10*3/UL (ref 0–0.7)
NEUTS BAND NFR BLD MANUAL: 5 %
NEUTS SEG # BLD MANUAL: 0.95 X10*3/UL (ref 1.2–7)
NEUTS SEG NFR BLD MANUAL: 45 %
NRBC BLD-RTO: 0 /100 WBCS (ref 0–0)
OVALOCYTES BLD QL SMEAR: ABNORMAL
PLATELET # BLD AUTO: 125 X10*3/UL (ref 150–450)
POTASSIUM SERPL-SCNC: 4.6 MMOL/L (ref 3.5–5.3)
PROT SERPL-MCNC: 6.7 G/DL (ref 6.4–8.2)
PROTHROMBIN TIME: 10.6 SECONDS (ref 9.8–12.4)
RBC # BLD AUTO: 3.77 X10*6/UL (ref 4.5–5.9)
RBC MORPH BLD: ABNORMAL
SODIUM SERPL-SCNC: 140 MMOL/L (ref 136–145)
TOTAL CELLS COUNTED BLD: 100
WBC # BLD AUTO: 2.1 X10*3/UL (ref 4.4–11.3)

## 2025-03-27 PROCEDURE — 83735 ASSAY OF MAGNESIUM: CPT

## 2025-03-27 PROCEDURE — 99214 OFFICE O/P EST MOD 30 MIN: CPT | Performed by: INTERNAL MEDICINE

## 2025-03-27 PROCEDURE — 88237 TISSUE CULTURE BONE MARROW: CPT

## 2025-03-27 PROCEDURE — 84075 ASSAY ALKALINE PHOSPHATASE: CPT

## 2025-03-27 PROCEDURE — 83615 LACTATE (LD) (LDH) ENZYME: CPT

## 2025-03-27 PROCEDURE — 82784 ASSAY IGA/IGD/IGG/IGM EACH: CPT

## 2025-03-27 PROCEDURE — 36415 COLL VENOUS BLD VENIPUNCTURE: CPT

## 2025-03-27 PROCEDURE — 85730 THROMBOPLASTIN TIME PARTIAL: CPT

## 2025-03-27 PROCEDURE — 82728 ASSAY OF FERRITIN: CPT

## 2025-03-27 PROCEDURE — 85007 BL SMEAR W/DIFF WBC COUNT: CPT

## 2025-03-27 PROCEDURE — 85384 FIBRINOGEN ACTIVITY: CPT

## 2025-03-27 PROCEDURE — 88185 FLOWCYTOMETRY/TC ADD-ON: CPT | Mod: TC

## 2025-03-27 PROCEDURE — 86140 C-REACTIVE PROTEIN: CPT

## 2025-03-27 PROCEDURE — 85027 COMPLETE CBC AUTOMATED: CPT

## 2025-03-27 RX ORDER — LENALIDOMIDE 15 MG/1
15 CAPSULE ORAL DAILY
Qty: 21 CAPSULE | Refills: 0 | Status: SHIPPED | OUTPATIENT
Start: 2025-03-27 | End: 2025-04-17

## 2025-03-27 RX ORDER — LENALIDOMIDE 15 MG/1
15 CAPSULE ORAL DAILY
Qty: 21 CAPSULE | Refills: 0 | Status: SHIPPED | OUTPATIENT
Start: 2025-03-27 | End: 2025-03-27

## 2025-03-27 ASSESSMENT — PAIN SCALES - GENERAL: PAINLEVEL_OUTOF10: 0-NO PAIN

## 2025-03-27 NOTE — PROGRESS NOTES
3/27/25 1:15PM    Met with patient at [facility] with Dr. Hobbs. Patient provided me with family HLA typing information on his two children. Form submitted.    Olive Enrique RN

## 2025-03-27 NOTE — PATIENT INSTRUCTIONS
Rituximab:  IV infusion, will receive one dose per week x 4 weeks, then transition to one dose every 8 weeks.     Venetoclax: Will ramp up from 50mg to 600mg.   Week 1: 50mg daily, take with food x 7 days   Week 2: 100mg daily, take with food x7 days   Week 3: 200mg daily, take with food x7 days   Week 4: 400mg daily, take with food x7 days   Week 6+: 600mg daily, take with food     Lenalidomide (Revlimid): 15mg daily on days 1-21 out of 28 day cycle. AKA: 3 weeks on, 1 week off.    May begin upon receipt, may not line up completely with rituximab/venetoclax cycle.

## 2025-03-28 ENCOUNTER — TELEPHONE (OUTPATIENT)
Dept: HEMATOLOGY/ONCOLOGY | Facility: HOSPITAL | Age: 60
End: 2025-03-28
Payer: COMMERCIAL

## 2025-03-28 ENCOUNTER — SPECIALTY PHARMACY (OUTPATIENT)
Dept: HEMATOLOGY/ONCOLOGY | Facility: HOSPITAL | Age: 60
End: 2025-03-28
Payer: COMMERCIAL

## 2025-03-28 LAB
CELL COUNT (BLOOD): 2.1 X10*3/UL
CELL POPULATIONS: NORMAL
CMV DNA SERPL NAA+PROBE-LOG IU: NORMAL {LOG_IU}/ML
CYTOGENETICS/MOLECULAR TEST ORDERED: NO
DIAGNOSIS: NORMAL
FLOW DIFFERENTIAL: NORMAL
FLOW TEST ORDERED: NORMAL
LAB TEST METHOD: NORMAL
LABORATORY COMMENT REPORT: NOT DETECTED
NUMBER OF CELLS COLLECTED: NORMAL
PATH REPORT.TOTAL CANCER: NORMAL
RBC MORPH BLD: NORMAL
SIGNATURE COMMENT: NORMAL
SPECIMEN VIABILITY: NORMAL
WBC MORPH BLD: NORMAL

## 2025-03-28 NOTE — TELEPHONE ENCOUNTER
Pharmacy called for [employer] auth#. Provided # from rx notes to pharmacy. Pharmacy states that is valid and no further needs at this time.

## 2025-03-31 ENCOUNTER — LAB REQUISITION (OUTPATIENT)
Dept: LAB | Facility: CLINIC | Age: 60
End: 2025-03-31
Payer: COMMERCIAL

## 2025-03-31 DIAGNOSIS — C92.00 ACUTE MYELOBLASTIC LEUKEMIA, NOT HAVING ACHIEVED REMISSION (MULTI): ICD-10-CM

## 2025-04-01 ASSESSMENT — ENCOUNTER SYMPTOMS
DIZZINESS: 0
NUMBNESS: 0
VOMITING: 0
APPETITE CHANGE: 0
UNEXPECTED WEIGHT CHANGE: 0
NAUSEA: 0
BLOOD IN STOOL: 0
CHILLS: 0
FATIGUE: 0
ABDOMINAL PAIN: 0
CONSTIPATION: 0
HEMATOLOGIC/LYMPHATIC NEGATIVE: 1
RESPIRATORY NEGATIVE: 1
LIGHT-HEADEDNESS: 0
DIAPHORESIS: 0
FEVER: 0
DIARRHEA: 0
CARDIOVASCULAR NEGATIVE: 1

## 2025-04-02 LAB
CHROM ANALY OVERALL INTERP-IMP: NORMAL
DNA CLASS I + II VERIFICATION TYPING: NORMAL
DNA CLASS I + II VERIFICATION TYPING: NORMAL
ELECTRONICALLY SIGNED BY CYTOGENETICS: NORMAL
HLA RESULTS: NORMAL
HLA RESULTS: NORMAL

## 2025-04-02 NOTE — PROGRESS NOTES
Patient ID: Nancie Armenta is a 60 y.o. male.    Treatment:   Oncology History Overview Note           Patient Visit Information:   Visit Type: Follow Up Visit      Cancer History:   Treatment Synopsis:     1. Stage IV A mantle cell non-Hodgkin lymphoma involving the gastrointestinal tract diagnosed in October 2011, with diffuse gastrointestinal involvement. The patient was treated  with RCHOP alternating with Rituxan and high-dose Mamie-C and received total of 6 cycles of chemotherapy which he finished in February 2012.   Allergic to Augmentin, causes hives.     2. Patient underwent beam chemotherapy followed by autologous peripheral stem cell transplant in April 2012, by Dr. Yvonne Hobbs at [facility], was also involved in phase 3 clinical trial up killed shingles vaccine versus placebo ([employer]-1 Z10  study).     3.The patient developed shingles in January 2014, involving left flank.      4. 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      5.  Recurrent mantle cell lymphoma June 2019 assx presentation with leukocytosis. Peripheral blood flow which showed a CD5 positve clonal lymphocytosis. 6/2/19 CT of chest abdomen and pelvis revealed  splenomegaly of 15 cm compared to 12.8 cm last evaluation. Recurrence confirmed by FISH on peripheral blood earlier this month.  BM biopsy which showed 5% Mantle cell involvement although peripheral blood shows 30% involvement. CT imaging showed splenomegaly. PET scan declined by insurance twice.  Patient underwent colonoscopy  with Dr. Ac ( [place]) 8/8/19 which showed cobblestoning of colon and multiple biopsies including terminal ileum, appendix, cecum,  rectosigmoid positive for mantle cell lymphoma.     6. Initiation of acalabrutinib August 2019 with minimal response. Switched to ibrutinib and venetoclax 12/20/20 with clearing of t(11, 14) by by FISH ( 1/2020) and resolution of involvement of colon by endoscopy and pathology on exam 3/2/2020! BM biopsy  4/2020 HEATHER.     C-scope (3/11/22): at OSH: negative, no evidence of disease. PET scan ( 3/14/22): no abnormal uptake. Deauville 1. BMBx (3/17/22): negative, no evidence of MCL.   March 2022, ibrutinib and venetoclax discontinued.    July 2024 development of Tpenia, bone marrow biopsy 7/25/24 showed focal involvement with mantle cell lymphoma (NGS cyclin D1 positive and Sox 11 positive. BIRC+ and TP 53 positve with a VAF of 3%, BIRC3 positive). FISH negative for 17p deletion  CT imaging done on 8/7/24 shows thickening of sigmoid colon, development of mild to moderate retroperitoneal adenopathy, splenomegally and possible splenic infarct.    Pirotbrutib 200 mg daily initiated 8/12/24.  Due to inadequate response, he started the ventoclax (50 mg) on Adolfo 10/13, then increased to 100 mg , 200 mg and 400 mg      BMBx (10/23/24) with low level involvement by mantle cells about 3% Mantle cells by flow.      PET/CT (11/4/24) with FDG avid lymphadenopathy throughout the neck, chest, abdomen, and pelvis c/w lymphomatous involvement. Intense FDG avid splenomegaly. Peripheral regions of photopenia likely reflect areas of infarction.      Pirtobrutinib and Venetoclax stopped 11/14/24 in preparation for CART collection.      Admitted 11/29/24 for his CAR-T cell therapy, on CASE 2419 (T0=12/6/24), prepped with Fludarabine/Cyclophosphamide. Hospital course included grade 2 CRS (fever, tachycardia) on 12/7, received Toxi x1, infectious workup negative.  He also had symptomatic orthostatic hypotension on 12/8, resolved with fluids by 12/9.  He was discharged home on 12/18/24.     T+ 90 progressive tumorous involvement of bone  marrow, PET continues to show CR.      7. S/P COVID 19 infection 12/1/2020, no sequelae                          Lymphoma   10/20/2011 Initial Diagnosis    Lymphoma (Multi)      Cellular Therapy    Mr. Nancie Armenta is a 59 y.o. male presenting with relapsed Mantle Cell Lymphoma. CAR-T cell therapy, on CASE 2419 (T0=12/6/24), prepped with Fludarabine/Cyclophosphamide.      Hosp Course:  - Grade 2 CRS (fever, tachycardia) on 12/7, gave Toxi x1, infectious workup negative. Resolved by 12/8.  - Symptomatic Orthostatic Hypotension on 12/8, resolved with fluids by 12/9.     Mantle cell lymphoma of spleen   10/13/2016 Initial Diagnosis    Mantle cell lymphoma of spleen (CMS/HCC)     Mantle cell lymphoma   11/18/2024 Initial Diagnosis    Mantle cell lymphoma     11/30/2024 - 12/20/2024 Bone Marrow Transplant        Cellular Therapy    Mr. Nancie Armenta is a 59 y.o. male presenting with relapsed Mantle Cell Lymphoma. CAR-T cell therapy, on CASE 2419 (T0=12/6/24), prepped with Fludarabine/Cyclophosphamide.      Hosp Course:  - Grade 2 CRS (fever, tachycardia) on 12/7, gave Toxi x1, infectious workup negative. Resolved by 12/8.  - Symptomatic Orthostatic Hypotension on 12/8, resolved with fluids by 12/9.     4/3/2025 -  Chemotherapy    RiTUXimab (Weekly), 28 Day Cycle        Response:     Past Medical History:  Renal insufficiency.   Past Medical History:   Diagnosis Date    Fourth (trochlear) nerve palsy, right eye 06/08/2017    Right-sided fourth cranial nerve palsy    Fourth (trochlear) nerve palsy, right eye 06/08/2017    Right-sided fourth cranial nerve palsy    Malignant neoplasm of connective and soft tissue, unspecified 06/08/2017    Cancer of blood vessel     Surgical History:   Past Surgical History:   Procedure Laterality Date    IR CVC PICC  11/29/2024    IR CVC PICC 11/29/2024 [facility] ANGIO    SHOULDER SURGERY  07/13/2017    Shoulder Surgery      Family History:   Family History   Problem Relation Name Age  of Onset    Cataracts Mother      Cataracts Father      Other (chronic lymphoid leukemia) Father       Social History:  .  Working full time for [employer].    Social History     Tobacco Use    Smoking status: Never    Smokeless tobacco: Never   Vaping Use    Vaping status: Never Used   Substance Use Topics    Alcohol use: Never    Drug use: Never   -------------------------------------------------------------------------------------------------------  Subjective       HPI    Nancie Armenta is a 60 year old man with PMH of hyperglycemia, renal insufficiency, and stage IV A mantle cell non hodgkin lymphoma involving the gastrointestinal tract, s/p autologous SCT (April 2012).      Early in July 2024 patient noted to have dropping platelet counts and bone marrow biopsy 7/25/24 showed focal involvement with mantle cell lymphoma (NGS cyclin D1 positive and Sox 11 positive. BIRC+ ad TP 53 positve with a VAF of 3%, BIRC3 positive). FISH negative for 17p deletion  CT imaging done on 8/7/24 shows thickening of sigmoid colon, development of mild to moderate retroperitoneal adenopathy, splenomegally and possible splenic infarct.  Started Pirtobrutinib salvage on 8/12/24.  Added Venetoclax starting 10/13/24 since spleen size was increasing on repeat CT imaging and on physical exam.  BMBx (10/23/24) with low level involvement by mantle cells about 3% Mantle cells by flow.      Pirtobrutinib and Venetoclax stopped 11/14/24 in preparation for CART collection.  Admitted 11/29/24 for his CAR-T cell therapy, on CASE 2419 (T0=12/6/24), prepped with     Fludarabine/Cyclophosphamide. Hospital course included grade 2 CRS (fever, tachycardia) on 12/7, received Toxi x1, infectious workup negative.  He also had symptomatic orthostatic hypotension on 12/8, resolved with fluids by 12/9.  He was discharged home on 12/18/24.      Nancie presents to the clinic today (4/3/25) with his wife for followup evaluation after his CAR-T  for for relapsed mantle cell lymphoma and count check.  BM biopsy from 3/13/25 showing residual marrow disease of about 15% on his most recent bone marrow biopsy.  We have opted for rituxan, revlimid and venetoclax combination ( see below).  PET scan continues to show decrease in spleen size and no evidence of disease.  Today is T+118 from CAR-T.  Is scheduled to receive revlimid shipment tomorrow to their house.  Took first dose of Venetoclax 50 mg today (4/3/25).  Verbalized understanding of venetoclax ramp up schedule.      Energy level is good.  Back to work full time, started a month ago.  Appetite is good and weight is stable.  Drinking about 70 ounces of fluids per day.  Continues to have sinus congestion for the last 4 months, worse in the morning.  Was on antibiotics a few months ago.  Has gotten better.  Taking claritin as needed and saline nasal spray.  No fevers, chills, or sinus pressure or pain.      Review of Systems   Constitutional:  Negative for appetite change, chills, diaphoresis, fatigue, fever and unexpected weight change.   Respiratory: Negative.     Cardiovascular: Negative.    Gastrointestinal:  Negative for abdominal pain, blood in stool, constipation, diarrhea, nausea and vomiting.   Genitourinary: Negative.     Musculoskeletal: Negative.  Negative for gait problem.   Skin: Negative.    Neurological:  Negative for dizziness, gait problem, light-headedness and numbness.   Hematological: Negative.    -------------------------------------------------------------------------------------------------------  Objective   BSA: There is no height or weight on file to calculate BSA.  There were no vitals taken for this visit.    Physical Exam  Vitals reviewed.   Constitutional:       General: He is not in acute distress.  HENT:      Head: Normocephalic and atraumatic.      Mouth/Throat:      Mouth: Mucous membranes are moist.   Eyes:      Extraocular Movements: Extraocular movements intact.       Conjunctiva/sclera: Conjunctivae normal.      Pupils: Pupils are equal, round, and reactive to light.   Cardiovascular:      Rate and Rhythm: Normal rate and regular rhythm.      Pulses: Normal pulses.      Heart sounds: Normal heart sounds.   Pulmonary:      Effort: Pulmonary effort is normal. No respiratory distress.      Breath sounds: Normal breath sounds. No wheezing.   Abdominal:      General: Abdomen is flat. Bowel sounds are normal. There is no distension.      Palpations: Abdomen is soft. There is splenomegaly. There is no mass.      Tenderness: There is no abdominal tenderness.      Comments: Mild fullness LUQ   Musculoskeletal:         General: No swelling. Normal range of motion.   Lymphadenopathy:      Comments: No lymphadenopathy   Skin:     General: Skin is warm and dry.   Neurological:      General: No focal deficit present.      Mental Status: He is alert and oriented to person, place, and time.   Psychiatric:         Mood and Affect: Mood normal.         Behavior: Behavior normal.         Thought Content: Thought content normal.         Judgment: Judgment normal.     Performance Status:  ECOG performance status: 0 fully active    CAR-T product:  CAR-T [id number]  Cell infusion date: 12/6/2024  Day: 118  -------------------------------------------------------------------------------------------------------  Assessment/Plan      1. Stage IV mantle cell lymphoma itreated with Nordic regimen and consolidation autograft with BEAM preparative regimen April 2012.  Relapse 2019 treated with ibrutinib and venetoclax with complete pathology response until 3/ 2022. See above.     Recurrence July 2024 presenting with dropping platelet counts and bone marrow biopsy 7/25/24 showed focal involvement with mantle cell lymphoma (NGS cyclin D1 positive and Sox 11 positive. BIRC+ ad TP 53 positve with a VAF of 3%, BIRC3 positive). FISH negative for 17p deletion  CT imaging done on 8/7/24 shows thickening of sigmoid  colon, development of mild to moderate retroperitoneal adenopathy, splenomegally and possible splenic infarct.      Patient started pirtobrutinib salvage August 12,2024 and feels better. However spleen size was increasing per CT imaging (8/6/24)  and physical exam so Dr. Hobbs planned to add venetoclax as follows, starting 10/13/24:  - 50 mg for first week, then 100 (10/20), and 200 mg (10/27) for 7 days each, then 400 mg (11/3/24- )  -increased anemia with hgb 8.5, platlets 37K, no bleeding.     Pre CART staging:    BM biopsy low level involvment by mantle cells about 3% mantle cells by flow. Absence of CD19 on this  flow sample likely reflects insufficient sampling and positive for CD 19 on prior bone marrow sample of 7/24/24..    PET imaging 11/4/24   1. FDG avid lymphadenopathy throughout the neck, chest, abdomen and  pelvis as detailed above, consistent with lymphomatous involvement.  2. Intense FDG avid splenomegaly, consistent with lymphomatous  involvement. Peripheral regions of photopenia likely reflect areas of  infarction.  3. No PET evidence of FDG avid extranodal disease.on     ECHO 10/23/24 low normal LVEF 53%, cleared by Dr. Izaguirre.     Admitted 11/29/24 for his CAR-T cell therapy, on CASE 2419 (T0=12/6/24), prepped with Fludarabine/Cyclophosphamide.   Hosp Course:  - Grade 2 CRS (fever, tachycardia) on 12/7, gave Toxi x1, infectious workup negative. Resolved by 12/8.  - Symptomatic Orthostatic Hypotension on 12/8, resolved with fluids by 12/9.  Discharged home on 12/18/24.      PET scan 1/2/25:   IMPRESSION:  1.  Interval resolution of hypermetabolic lymphadenopathy above and below the diaphragm. Interval resolution hypermetabolic activity within the spleen with improving splenomegaly. (Deauville score of 1).  2. No evidence of new hypermetabolic robbie or extranodal lymphomatous disease.    Bone marrow biopsy performed 1/2/25 showed 1% mantle cells by IHC for SOX 11 and cycle D1 but nothing by  flow.        3/13/25 Day 90  BM shows about 15% lymphoma cells which are cyclin D1 positive, PET scan shows no lymphomatous involvement and continued decrease in spleen size.   Patients case reviewed at interdisciplinary tumor board with recommendation options as follows:    1.Patient would be an appropriate candidate for potentially curative allogeneic transplant. One brother typed at [facility] is a haplo match and he has 2 adult children who have not been typed yet. He has 4 excellent MUD matches in the [facility] database.  We briefly discussed the allograft procedure.   At this point patient is leaning toward delaying allograft.    3/27/25  Patient seen today for followup, feels great. Blood flow negative for MRD. Upon further review with patient and team have opted to initiate salvage treatment with venetoclax R2. ( Cipriano et al. Blood Advances 8:(2) , p 407, 2024)  Phase 2 dose revlimid 15 mg 21/28 days ( note dose change), venetoclax 600 mg daily after ramp up and rituxan 275 mg/m2 weekly for 4 weeks, then q 8 weeks. Regimen associated with 60% RRat 6 months and median PFS of 21 momths with overall survival of 31 months. Opted to include ventoclax for pt as not clear he failed this agent before CART.  Side effects of agents reviewed including infusion reactions to rituxan, blood clots, rash, cytopenias and need for REM registration for revlimid and cytopenias from venetoclax.  Patient low risk for TLS as low tumor burden. Plan BM bx at day 90 after starting.  Chemo consent signed  also reviewed need to take aspirin with revlimid to prevent clots.     Peripheral blood flow, negative for clonal b cell. Rituxan to start on 4/3/25. All questions answered.   Leukopenia, etiology not clear, will hold bactrim.    4/3/25:  CBC results from today with concerning decline in WBC to 0.9, hgb 10.5, and plt to 90.  Serafino reports he is feeling good.  No concerning findings on physical examination.  Scheduled to receive C1D1  rituximab today.  Took first dose of venetoclax 50 mg today.  Scheduled to receive revlimid shipment on 4/4/25, with plans to start when receives medication.  Reviewed venetoclax ramp up schedule, advised to continue good oral fluid intake (at least 56 ounces per day).  Discussed CBC results with Dr. Hobbs.  Plan for BM biopsy with myeloid NGS in the next week, scheduled for 4/10/25. Start levofloxacin 500 mg daily due to neutropenia.  Follow up visit on 4/10/25.      Ferritin   Date Value Ref Range Status   03/27/2025 928 (H) 20 - 300 ng/mL Final   03/13/2025 1,133 (H) 20 - 300 ng/mL Final   03/03/2025 1,009 (H) 20 - 300 ng/mL Final     Fibrinogen   Date Value Ref Range Status   03/27/2025 256 200 - 400 mg/dL Final   03/13/2025 264 200 - 400 mg/dL Final   03/03/2025 259 200 - 400 mg/dL Final     C-Reactive Protein   Date Value Ref Range Status   03/27/2025 0.15 <1.00 mg/dL Final   03/13/2025 0.18 <1.00 mg/dL Final   03/03/2025 0.34 <1.00 mg/dL Final     IgG   Date Value Ref Range Status   03/27/2025 465 (L) 700 - 1,600 mg/dL Final     Comment:     MONOCLONAL PROTEINS MAY CAUSE FALSELY LOW  RESULTS IN THIS ASSAY. SERUM PROTEIN  ELECTROPHORESIS SHOULD BE DONE AS THE  FIRST TEST TO EVALUATE MONOCLONAL GAMMOPATHY.     03/13/2025 553 (L) 700 - 1,600 mg/dL Final     Comment:     MONOCLONAL PROTEINS MAY CAUSE FALSELY LOW  RESULTS IN THIS ASSAY. SERUM PROTEIN  ELECTROPHORESIS SHOULD BE DONE AS THE  FIRST TEST TO EVALUATE MONOCLONAL GAMMOPATHY.     03/03/2025 579 (L) 700 - 1,600 mg/dL Final     Comment:     MONOCLONAL PROTEINS MAY CAUSE FALSELY LOW  RESULTS IN THIS ASSAY. SERUM PROTEIN  ELECTROPHORESIS SHOULD BE DONE AS THE  FIRST TEST TO EVALUATE MONOCLONAL GAMMOPATHY.     Infectious prophylaxis:   Antiviral prophylaxis Acyclovir. Continue for at least 6 months.   PCP prophylaxis. Bactrim Started 1/3/25 continue 6 months held 3/27 for leukopenia  Antifungal prophylaxis Fluconazole discontinued.    Start levofloxacin 500  mg daily on 4/3/25 due to neutropenia.      Vaccines:  Will plan to start vaccines with influenza and covid vaccine about 3-4 months after CAR-T and will start post transplant vaccines around 6 months.  Would recommend influenza and covid vaccine.  Will determine vaccine schedule based on clinical situation.  IgG level borderline discussed may need to include as supportive medicine.      Health maintenance.   Colonoscopy completed 7/14/23: normal except internal hemorrhoids.  Recommendations to repeat in 2 years.    - Some aortic calcifications on imaging, started on prevastatin-managed by his PCP.  PCP increased pravastatin dose to 40 mg daily.  PCP switched to atorvastatin 40 mg daily.    RTC:   Start levofloxacin  Continue venetoclax ramp up, start taking revlimid once arrives.    4/10/25:  Bmbx with myeloid NGS, provider visit, and infusion appt.  May need to see pt down in infusion to accommodate rituximab infusion.    -----------------------------------------------------------------------------------------------------------------  THA Enriquez-LETI

## 2025-04-03 ENCOUNTER — INFUSION (OUTPATIENT)
Dept: HEMATOLOGY/ONCOLOGY | Facility: HOSPITAL | Age: 60
End: 2025-04-03
Payer: COMMERCIAL

## 2025-04-03 ENCOUNTER — OFFICE VISIT (OUTPATIENT)
Dept: HEMATOLOGY/ONCOLOGY | Facility: HOSPITAL | Age: 60
End: 2025-04-03
Payer: COMMERCIAL

## 2025-04-03 VITALS
WEIGHT: 227.3 LBS | TEMPERATURE: 98.1 F | DIASTOLIC BLOOD PRESSURE: 75 MMHG | BODY MASS INDEX: 30.12 KG/M2 | SYSTOLIC BLOOD PRESSURE: 122 MMHG | HEART RATE: 78 BPM | HEIGHT: 73 IN | RESPIRATION RATE: 16 BRPM | OXYGEN SATURATION: 100 %

## 2025-04-03 DIAGNOSIS — Z94.84 HX OF AUTOLOGOUS STEM CELL TRANSPLANT: ICD-10-CM

## 2025-04-03 DIAGNOSIS — C83.10 MANTLE CELL LYMPHOMA, UNSPECIFIED BODY REGION (MULTI): ICD-10-CM

## 2025-04-03 DIAGNOSIS — Z92.850 HISTORY OF CHIMERIC ANTIGEN RECEPTOR T-CELL THERAPY: ICD-10-CM

## 2025-04-03 DIAGNOSIS — D84.9 IMMUNOCOMPROMISED: ICD-10-CM

## 2025-04-03 DIAGNOSIS — Z51.12 ENCOUNTER FOR MONOCLONAL ANTIBODY TREATMENT FOR MALIGNANCY: ICD-10-CM

## 2025-04-03 DIAGNOSIS — C83.17 MANTLE CELL LYMPHOMA OF SPLEEN: Primary | ICD-10-CM

## 2025-04-03 LAB
ALBUMIN SERPL BCP-MCNC: 4.2 G/DL (ref 3.4–5)
ALP SERPL-CCNC: 51 U/L (ref 33–136)
ALT SERPL W P-5'-P-CCNC: 18 U/L (ref 10–52)
ANION GAP SERPL CALC-SCNC: 10 MMOL/L (ref 10–20)
AST SERPL W P-5'-P-CCNC: 17 U/L (ref 9–39)
BASOPHILS # BLD MANUAL: 0.01 X10*3/UL (ref 0–0.1)
BASOPHILS NFR BLD MANUAL: 1 %
BILIRUB SERPL-MCNC: 0.6 MG/DL (ref 0–1.2)
BUN SERPL-MCNC: 24 MG/DL (ref 6–23)
CALCIUM SERPL-MCNC: 8.4 MG/DL (ref 8.6–10.3)
CHLORIDE SERPL-SCNC: 108 MMOL/L (ref 98–107)
CO2 SERPL-SCNC: 26 MMOL/L (ref 21–32)
CREAT SERPL-MCNC: 1.11 MG/DL (ref 0.5–1.3)
EGFRCR SERPLBLD CKD-EPI 2021: 76 ML/MIN/1.73M*2
EOSINOPHIL # BLD MANUAL: 0.09 X10*3/UL (ref 0–0.7)
EOSINOPHIL NFR BLD MANUAL: 10 %
ERYTHROCYTE [DISTWIDTH] IN BLOOD BY AUTOMATED COUNT: 14.2 % (ref 11.5–14.5)
GLUCOSE SERPL-MCNC: 99 MG/DL (ref 74–99)
HCT VFR BLD AUTO: 30.7 % (ref 41–52)
HGB BLD-MCNC: 10.5 G/DL (ref 13.5–17.5)
IMM GRANULOCYTES # BLD AUTO: 0.05 X10*3/UL (ref 0–0.7)
IMM GRANULOCYTES NFR BLD AUTO: 5.8 % (ref 0–0.9)
LDH SERPL L TO P-CCNC: 180 U/L (ref 84–246)
LYMPHOCYTES # BLD MANUAL: 0.16 X10*3/UL (ref 1.2–4.8)
LYMPHOCYTES NFR BLD MANUAL: 18 %
MCH RBC QN AUTO: 32.5 PG (ref 26–34)
MCHC RBC AUTO-ENTMCNC: 34.2 G/DL (ref 32–36)
MCV RBC AUTO: 95 FL (ref 80–100)
MONOCYTES # BLD MANUAL: 0.31 X10*3/UL (ref 0.1–1)
MONOCYTES NFR BLD MANUAL: 34 %
NEUTS SEG # BLD MANUAL: 0.33 X10*3/UL (ref 1.2–7)
NEUTS SEG NFR BLD MANUAL: 37 %
NRBC BLD-RTO: 0 /100 WBCS (ref 0–0)
OVALOCYTES BLD QL SMEAR: ABNORMAL
PLATELET # BLD AUTO: 90 X10*3/UL (ref 150–450)
POLYCHROMASIA BLD QL SMEAR: ABNORMAL
POTASSIUM SERPL-SCNC: 3.4 MMOL/L (ref 3.5–5.3)
PROT SERPL-MCNC: 5.7 G/DL (ref 6.4–8.2)
RBC # BLD AUTO: 3.23 X10*6/UL (ref 4.5–5.9)
RBC MORPH BLD: ABNORMAL
SODIUM SERPL-SCNC: 141 MMOL/L (ref 136–145)
TOTAL CELLS COUNTED BLD: 100
URATE SERPL-MCNC: 4.7 MG/DL (ref 4–7.5)
WBC # BLD AUTO: 0.9 X10*3/UL (ref 4.4–11.3)

## 2025-04-03 PROCEDURE — 99215 OFFICE O/P EST HI 40 MIN: CPT

## 2025-04-03 PROCEDURE — 99001 SPECIMEN HANDLING PT-LAB: CPT | Mod: OUT | Performed by: INTERNAL MEDICINE

## 2025-04-03 PROCEDURE — 83615 LACTATE (LD) (LDH) ENZYME: CPT

## 2025-04-03 PROCEDURE — 80053 COMPREHEN METABOLIC PANEL: CPT

## 2025-04-03 PROCEDURE — 85007 BL SMEAR W/DIFF WBC COUNT: CPT

## 2025-04-03 PROCEDURE — 2500000004 HC RX 250 GENERAL PHARMACY W/ HCPCS (ALT 636 FOR OP/ED): Performed by: INTERNAL MEDICINE

## 2025-04-03 PROCEDURE — 84550 ASSAY OF BLOOD/URIC ACID: CPT

## 2025-04-03 PROCEDURE — 2500000001 HC RX 250 WO HCPCS SELF ADMINISTERED DRUGS (ALT 637 FOR MEDICARE OP): Performed by: INTERNAL MEDICINE

## 2025-04-03 PROCEDURE — 85027 COMPLETE CBC AUTOMATED: CPT

## 2025-04-03 RX ORDER — ALBUTEROL SULFATE 0.83 MG/ML
3 SOLUTION RESPIRATORY (INHALATION) AS NEEDED
Status: DISCONTINUED | OUTPATIENT
Start: 2025-04-03 | End: 2025-04-03 | Stop reason: HOSPADM

## 2025-04-03 RX ORDER — PROCHLORPERAZINE MALEATE 10 MG
10 TABLET ORAL EVERY 6 HOURS PRN
Status: DISCONTINUED | OUTPATIENT
Start: 2025-04-03 | End: 2025-04-03 | Stop reason: HOSPADM

## 2025-04-03 RX ORDER — LEVOFLOXACIN 500 MG/1
500 TABLET, FILM COATED ORAL DAILY
Qty: 30 TABLET | Refills: 1 | Status: SHIPPED | OUTPATIENT
Start: 2025-04-03 | End: 2025-06-02

## 2025-04-03 RX ORDER — EPINEPHRINE 0.3 MG/.3ML
0.3 INJECTION SUBCUTANEOUS EVERY 5 MIN PRN
Status: DISCONTINUED | OUTPATIENT
Start: 2025-04-03 | End: 2025-04-03 | Stop reason: HOSPADM

## 2025-04-03 RX ORDER — ACETAMINOPHEN 325 MG/1
650 TABLET ORAL ONCE
Status: COMPLETED | OUTPATIENT
Start: 2025-04-03 | End: 2025-04-03

## 2025-04-03 RX ORDER — FAMOTIDINE 10 MG/ML
20 INJECTION, SOLUTION INTRAVENOUS ONCE AS NEEDED
Status: DISCONTINUED | OUTPATIENT
Start: 2025-04-03 | End: 2025-04-03 | Stop reason: HOSPADM

## 2025-04-03 RX ORDER — PROCHLORPERAZINE EDISYLATE 5 MG/ML
10 INJECTION INTRAMUSCULAR; INTRAVENOUS EVERY 6 HOURS PRN
Status: DISCONTINUED | OUTPATIENT
Start: 2025-04-03 | End: 2025-04-03 | Stop reason: HOSPADM

## 2025-04-03 RX ORDER — DIPHENHYDRAMINE HCL 50 MG
50 CAPSULE ORAL ONCE
Status: COMPLETED | OUTPATIENT
Start: 2025-04-03 | End: 2025-04-03

## 2025-04-03 RX ORDER — DIPHENHYDRAMINE HYDROCHLORIDE 50 MG/ML
50 INJECTION, SOLUTION INTRAMUSCULAR; INTRAVENOUS AS NEEDED
Status: DISCONTINUED | OUTPATIENT
Start: 2025-04-03 | End: 2025-04-03 | Stop reason: HOSPADM

## 2025-04-03 RX ADMIN — SODIUM CHLORIDE 848 MG: 9 INJECTION, SOLUTION INTRAVENOUS at 09:17

## 2025-04-03 RX ADMIN — DIPHENHYDRAMINE HYDROCHLORIDE 50 MG: 50 CAPSULE ORAL at 08:29

## 2025-04-03 RX ADMIN — ACETAMINOPHEN 650 MG: 325 TABLET ORAL at 08:29

## 2025-04-03 ASSESSMENT — ENCOUNTER SYMPTOMS: MUSCULOSKELETAL NEGATIVE: 1

## 2025-04-03 NOTE — PROGRESS NOTES
-Patient presents to the clinic today for #1 Ritux  -Patient tolerated infusion well  -Patient discharged in stable condition. Reviewed calendar/next appointment, all questions answered.   -Patient ambulated out of clinic with ease with wife.   -Notes/Plan for next visit-

## 2025-04-04 PROBLEM — Z51.12 ENCOUNTER FOR MONOCLONAL ANTIBODY TREATMENT FOR MALIGNANCY: Status: ACTIVE | Noted: 2025-04-04

## 2025-04-04 PROCEDURE — 81382 HLA II TYPING 1 LOC HR: CPT | Mod: OUT | Performed by: INTERNAL MEDICINE

## 2025-04-09 ASSESSMENT — ENCOUNTER SYMPTOMS
CONSTIPATION: 0
UNEXPECTED WEIGHT CHANGE: 0
APPETITE CHANGE: 0
NUMBNESS: 0
RESPIRATORY NEGATIVE: 1
DIAPHORESIS: 0
NAUSEA: 0
MUSCULOSKELETAL NEGATIVE: 1
BLOOD IN STOOL: 0
HEMATOLOGIC/LYMPHATIC NEGATIVE: 1
CHILLS: 0
VOMITING: 0
ABDOMINAL PAIN: 0
CARDIOVASCULAR NEGATIVE: 1
FATIGUE: 0
FEVER: 0
DIZZINESS: 0
LIGHT-HEADEDNESS: 0
DIARRHEA: 0

## 2025-04-10 ENCOUNTER — OFFICE VISIT (OUTPATIENT)
Dept: HEMATOLOGY/ONCOLOGY | Facility: HOSPITAL | Age: 60
End: 2025-04-10
Payer: COMMERCIAL

## 2025-04-10 ENCOUNTER — INFUSION (OUTPATIENT)
Dept: HEMATOLOGY/ONCOLOGY | Facility: HOSPITAL | Age: 60
End: 2025-04-10
Payer: COMMERCIAL

## 2025-04-10 ENCOUNTER — LAB (OUTPATIENT)
Dept: LAB | Facility: HOSPITAL | Age: 60
End: 2025-04-10
Payer: COMMERCIAL

## 2025-04-10 VITALS
WEIGHT: 226.7 LBS | BODY MASS INDEX: 30.21 KG/M2 | HEART RATE: 77 BPM | RESPIRATION RATE: 18 BRPM | DIASTOLIC BLOOD PRESSURE: 83 MMHG | OXYGEN SATURATION: 100 % | TEMPERATURE: 97.2 F | SYSTOLIC BLOOD PRESSURE: 125 MMHG

## 2025-04-10 DIAGNOSIS — C83.17 MANTLE CELL LYMPHOMA OF SPLEEN: ICD-10-CM

## 2025-04-10 DIAGNOSIS — Z92.850 HISTORY OF CHIMERIC ANTIGEN RECEPTOR T-CELL THERAPY: ICD-10-CM

## 2025-04-10 DIAGNOSIS — C83.10 MANTLE CELL LYMPHOMA, UNSPECIFIED BODY REGION (MULTI): ICD-10-CM

## 2025-04-10 LAB
ALBUMIN SERPL BCP-MCNC: 4.8 G/DL (ref 3.4–5)
ALP SERPL-CCNC: 55 U/L (ref 33–136)
ALT SERPL W P-5'-P-CCNC: 24 U/L (ref 10–52)
ANION GAP SERPL CALC-SCNC: 14 MMOL/L (ref 10–20)
APTT PPP: 28 SECONDS (ref 26–36)
AST SERPL W P-5'-P-CCNC: 22 U/L (ref 9–39)
BASOPHILS # BLD MANUAL: 0 X10*3/UL (ref 0–0.1)
BASOPHILS NFR BLD MANUAL: 0 %
BILIRUB SERPL-MCNC: 1.3 MG/DL (ref 0–1.2)
BUN SERPL-MCNC: 26 MG/DL (ref 6–23)
CALCIUM SERPL-MCNC: 9.5 MG/DL (ref 8.6–10.6)
CHLORIDE SERPL-SCNC: 103 MMOL/L (ref 98–107)
CO2 SERPL-SCNC: 27 MMOL/L (ref 21–32)
CREAT SERPL-MCNC: 1.19 MG/DL (ref 0.5–1.3)
CRP SERPL-MCNC: 1.54 MG/DL
EGFRCR SERPLBLD CKD-EPI 2021: 69 ML/MIN/1.73M*2
EOSINOPHIL # BLD MANUAL: 0.17 X10*3/UL (ref 0–0.7)
EOSINOPHIL NFR BLD MANUAL: 9 %
ERYTHROCYTE [DISTWIDTH] IN BLOOD BY AUTOMATED COUNT: 14.1 % (ref 11.5–14.5)
FERRITIN SERPL-MCNC: 903 NG/ML (ref 20–300)
FIBRINOGEN PPP-MCNC: 306 MG/DL (ref 200–400)
GLUCOSE SERPL-MCNC: 122 MG/DL (ref 74–99)
HCT VFR BLD AUTO: 38.5 % (ref 41–52)
HGB BLD-MCNC: 13.1 G/DL (ref 13.5–17.5)
IGG SERPL-MCNC: 418 MG/DL (ref 700–1600)
IMM GRANULOCYTES # BLD AUTO: 0.01 X10*3/UL (ref 0–0.7)
IMM GRANULOCYTES NFR BLD AUTO: 0.5 % (ref 0–0.9)
INR PPP: 1 (ref 0.9–1.1)
LDH SERPL L TO P-CCNC: 211 U/L (ref 84–246)
LYMPHOCYTES # BLD MANUAL: 0.65 X10*3/UL (ref 1.2–4.8)
LYMPHOCYTES NFR BLD MANUAL: 34 %
MAGNESIUM SERPL-MCNC: 2.18 MG/DL (ref 1.6–2.4)
MCH RBC QN AUTO: 32.1 PG (ref 26–34)
MCHC RBC AUTO-ENTMCNC: 34 G/DL (ref 32–36)
MCV RBC AUTO: 94 FL (ref 80–100)
MONOCYTES # BLD MANUAL: 0.44 X10*3/UL (ref 0.1–1)
MONOCYTES NFR BLD MANUAL: 23 %
NEUTROPHILS # BLD MANUAL: 0.63 X10*3/UL (ref 1.2–7.7)
NEUTS BAND # BLD MANUAL: 0.23 X10*3/UL (ref 0–0.7)
NEUTS BAND NFR BLD MANUAL: 12 %
NEUTS SEG # BLD MANUAL: 0.4 X10*3/UL (ref 1.2–7)
NEUTS SEG NFR BLD MANUAL: 21 %
NRBC BLD-RTO: 0 /100 WBCS (ref 0–0)
OVALOCYTES BLD QL SMEAR: ABNORMAL
PLATELET # BLD AUTO: 114 X10*3/UL (ref 150–450)
POLYCHROMASIA BLD QL SMEAR: ABNORMAL
POTASSIUM SERPL-SCNC: 4.2 MMOL/L (ref 3.5–5.3)
PROT SERPL-MCNC: 6.8 G/DL (ref 6.4–8.2)
PROTHROMBIN TIME: 11.1 SECONDS (ref 9.8–12.4)
RBC # BLD AUTO: 4.08 X10*6/UL (ref 4.5–5.9)
RBC MORPH BLD: ABNORMAL
SCHISTOCYTES BLD QL SMEAR: ABNORMAL
SODIUM SERPL-SCNC: 140 MMOL/L (ref 136–145)
TOTAL CELLS COUNTED BLD: 100
URATE SERPL-MCNC: 4.9 MG/DL (ref 4–7.5)
VARIANT LYMPHS # BLD MANUAL: 0.02 X10*3/UL (ref 0–0.5)
VARIANT LYMPHS NFR BLD: 1 %
WBC # BLD AUTO: 1.9 X10*3/UL (ref 4.4–11.3)

## 2025-04-10 PROCEDURE — 99214 OFFICE O/P EST MOD 30 MIN: CPT | Performed by: INTERNAL MEDICINE

## 2025-04-10 PROCEDURE — 82728 ASSAY OF FERRITIN: CPT

## 2025-04-10 PROCEDURE — 99214 OFFICE O/P EST MOD 30 MIN: CPT | Mod: 25 | Performed by: INTERNAL MEDICINE

## 2025-04-10 PROCEDURE — 85384 FIBRINOGEN ACTIVITY: CPT

## 2025-04-10 PROCEDURE — 84550 ASSAY OF BLOOD/URIC ACID: CPT

## 2025-04-10 PROCEDURE — 2500000004 HC RX 250 GENERAL PHARMACY W/ HCPCS (ALT 636 FOR OP/ED): Mod: JW | Performed by: INTERNAL MEDICINE

## 2025-04-10 PROCEDURE — 85027 COMPLETE CBC AUTOMATED: CPT

## 2025-04-10 PROCEDURE — 83615 LACTATE (LD) (LDH) ENZYME: CPT

## 2025-04-10 PROCEDURE — 2500000001 HC RX 250 WO HCPCS SELF ADMINISTERED DRUGS (ALT 637 FOR MEDICARE OP): Performed by: INTERNAL MEDICINE

## 2025-04-10 PROCEDURE — 96413 CHEMO IV INFUSION 1 HR: CPT

## 2025-04-10 PROCEDURE — 84075 ASSAY ALKALINE PHOSPHATASE: CPT

## 2025-04-10 PROCEDURE — 85730 THROMBOPLASTIN TIME PARTIAL: CPT

## 2025-04-10 PROCEDURE — 82784 ASSAY IGA/IGD/IGG/IGM EACH: CPT

## 2025-04-10 PROCEDURE — 96415 CHEMO IV INFUSION ADDL HR: CPT

## 2025-04-10 PROCEDURE — 36415 COLL VENOUS BLD VENIPUNCTURE: CPT

## 2025-04-10 PROCEDURE — 83735 ASSAY OF MAGNESIUM: CPT

## 2025-04-10 PROCEDURE — 86140 C-REACTIVE PROTEIN: CPT

## 2025-04-10 PROCEDURE — 85007 BL SMEAR W/DIFF WBC COUNT: CPT

## 2025-04-10 PROCEDURE — 88185 FLOWCYTOMETRY/TC ADD-ON: CPT | Mod: TC

## 2025-04-10 RX ORDER — ACETAMINOPHEN 325 MG/1
650 TABLET ORAL ONCE
Status: COMPLETED | OUTPATIENT
Start: 2025-04-10 | End: 2025-04-10

## 2025-04-10 RX ORDER — DIPHENHYDRAMINE HYDROCHLORIDE 50 MG/ML
50 INJECTION, SOLUTION INTRAMUSCULAR; INTRAVENOUS AS NEEDED
Status: DISCONTINUED | OUTPATIENT
Start: 2025-04-10 | End: 2025-04-10 | Stop reason: HOSPADM

## 2025-04-10 RX ORDER — EPINEPHRINE 0.3 MG/.3ML
0.3 INJECTION SUBCUTANEOUS EVERY 5 MIN PRN
Status: DISCONTINUED | OUTPATIENT
Start: 2025-04-10 | End: 2025-04-10 | Stop reason: HOSPADM

## 2025-04-10 RX ORDER — PROCHLORPERAZINE MALEATE 10 MG
10 TABLET ORAL EVERY 6 HOURS PRN
Status: DISCONTINUED | OUTPATIENT
Start: 2025-04-10 | End: 2025-04-10 | Stop reason: HOSPADM

## 2025-04-10 RX ORDER — HEPARIN SODIUM,PORCINE/PF 10 UNIT/ML
50 SYRINGE (ML) INTRAVENOUS AS NEEDED
OUTPATIENT
Start: 2025-04-10

## 2025-04-10 RX ORDER — HEPARIN 100 UNIT/ML
500 SYRINGE INTRAVENOUS AS NEEDED
OUTPATIENT
Start: 2025-04-10

## 2025-04-10 RX ORDER — DIPHENHYDRAMINE HCL 50 MG
50 CAPSULE ORAL ONCE
Status: COMPLETED | OUTPATIENT
Start: 2025-04-10 | End: 2025-04-10

## 2025-04-10 RX ORDER — FAMOTIDINE 10 MG/ML
20 INJECTION, SOLUTION INTRAVENOUS ONCE AS NEEDED
Status: DISCONTINUED | OUTPATIENT
Start: 2025-04-10 | End: 2025-04-10 | Stop reason: HOSPADM

## 2025-04-10 RX ORDER — PROCHLORPERAZINE EDISYLATE 5 MG/ML
10 INJECTION INTRAMUSCULAR; INTRAVENOUS EVERY 6 HOURS PRN
Status: DISCONTINUED | OUTPATIENT
Start: 2025-04-10 | End: 2025-04-10 | Stop reason: HOSPADM

## 2025-04-10 RX ORDER — ALBUTEROL SULFATE 0.83 MG/ML
3 SOLUTION RESPIRATORY (INHALATION) AS NEEDED
Status: DISCONTINUED | OUTPATIENT
Start: 2025-04-10 | End: 2025-04-10 | Stop reason: HOSPADM

## 2025-04-10 RX ADMIN — ACETAMINOPHEN 650 MG: 325 TABLET ORAL at 09:40

## 2025-04-10 RX ADMIN — SODIUM CHLORIDE 848 MG: 9 INJECTION, SOLUTION INTRAVENOUS at 09:51

## 2025-04-10 RX ADMIN — DIPHENHYDRAMINE HYDROCHLORIDE 50 MG: 50 CAPSULE ORAL at 09:40

## 2025-04-10 NOTE — PROGRESS NOTES
Patient ID: Nancie Armenta is a 60 y.o. male.    Treatment:   Oncology History Overview Note           Patient Visit Information:   Visit Type: Follow Up Visit      Cancer History:   Treatment Synopsis:     1. Stage IV A mantle cell non-Hodgkin lymphoma involving the gastrointestinal tract diagnosed in October 2011, with diffuse gastrointestinal involvement. The patient was treated  with RCHOP alternating with Rituxan and high-dose Mamie-C and received total of 6 cycles of chemotherapy which he finished in February 2012.   Allergic to Augmentin, causes hives.     2. Patient underwent beam chemotherapy followed by autologous peripheral stem cell transplant in April 2012, by Dr. Yvonne Hobbs at [facility], was also involved in phase 3 clinical trial up killed shingles vaccine versus placebo ([employer]-1 Z10  study).     3.The patient developed shingles in January 2014, involving left flank.      4. 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      5.  Recurrent mantle cell lymphoma June 2019 assx presentation with leukocytosis. Peripheral blood flow which showed a CD5 positve clonal lymphocytosis. 6/2/19 CT of chest abdomen and pelvis revealed  splenomegaly of 15 cm compared to 12.8 cm last evaluation. Recurrence confirmed by FISH on peripheral blood earlier this month.  BM biopsy which showed 5% Mantle cell involvement although peripheral blood shows 30% involvement. CT imaging showed splenomegaly. PET scan declined by insurance twice.  Patient underwent colonoscopy  with Dr. Ac ( Normal) 8/8/19 which showed cobblestoning of colon and multiple biopsies including terminal ileum, appendix, cecum,  rectosigmoid positive for mantle cell lymphoma.     6. Initiation of acalabrutinib August 2019 with minimal response. Switched to ibrutinib and venetoclax 12/20/20 with clearing of t(11, 14) by by FISH ( 1/2020) and resolution of involvement of colon by endoscopy and pathology on exam 3/2/2020! BM biopsy  4/2020 HEATHER.     C-scope (3/11/22): at OSH: negative, no evidence of disease. PET scan ( 3/14/22): no abnormal uptake. Deauville 1. BMBx (3/17/22): negative, no evidence of MCL.   March 2022, ibrutinib and venetoclax discontinued.    July 2024 development of Tpenia, bone marrow biopsy 7/25/24 showed focal involvement with mantle cell lymphoma (NGS cyclin D1 positive and Sox 11 positive. BIRC+ and TP 53 positve with a VAF of 3%, BIRC3 positive). FISH negative for 17p deletion  CT imaging done on 8/7/24 shows thickening of sigmoid colon, development of mild to moderate retroperitoneal adenopathy, splenomegally and possible splenic infarct.    Pirotbrutib 200 mg daily initiated 8/12/24.  Due to inadequate response, he started the ventoclax (50 mg) on Adolfo 10/13, then increased to 100 mg , 200 mg and 400 mg      BMBx (10/23/24) with low level involvement by mantle cells about 3% Mantle cells by flow.      PET/CT (11/4/24) with FDG avid lymphadenopathy throughout the neck, chest, abdomen, and pelvis c/w lymphomatous involvement. Intense FDG avid splenomegaly. Peripheral regions of photopenia likely reflect areas of infarction.      Pirtobrutinib and Venetoclax stopped 11/14/24 in preparation for CART collection.      Admitted 11/29/24 for his CAR-T cell therapy, on CASE 2419 (T0=12/6/24), prepped with Fludarabine/Cyclophosphamide. Hospital course included grade 2 CRS (fever, tachycardia) on 12/7, received Toxi x1, infectious workup negative.  He also had symptomatic orthostatic hypotension on 12/8, resolved with fluids by 12/9.  He was discharged home on 12/18/24.     T+ 90 progressive tumorous involvement of bone  marrow, PET continues to show CR.      7. S/P COVID 19 infection 12/1/2020, no sequelae                          Lymphoma   10/20/2011 Initial Diagnosis    Lymphoma (Multi)      Cellular Therapy    Mr. Nancie Armenta is a 59 y.o. male presenting with relapsed Mantle Cell Lymphoma. CAR-T cell therapy, on CASE 2419 (T0=12/6/24), prepped with Fludarabine/Cyclophosphamide.      Hosp Course:  - Grade 2 CRS (fever, tachycardia) on 12/7, gave Toxi x1, infectious workup negative. Resolved by 12/8.  - Symptomatic Orthostatic Hypotension on 12/8, resolved with fluids by 12/9.     Mantle cell lymphoma of spleen   10/13/2016 Initial Diagnosis    Mantle cell lymphoma of spleen (CMS/HCC)     Mantle cell lymphoma   11/18/2024 Initial Diagnosis    Mantle cell lymphoma     11/30/2024 - 12/20/2024 Bone Marrow Transplant        Cellular Therapy    Mr. Nancie Armenta is a 59 y.o. male presenting with relapsed Mantle Cell Lymphoma. CAR-T cell therapy, on CASE 2419 (T0=12/6/24), prepped with Fludarabine/Cyclophosphamide.      Hosp Course:  - Grade 2 CRS (fever, tachycardia) on 12/7, gave Toxi x1, infectious workup negative. Resolved by 12/8.  - Symptomatic Orthostatic Hypotension on 12/8, resolved with fluids by 12/9.     4/3/2025 -  Chemotherapy    RiTUXimab (Weekly), 28 Day Cycle        Response:     Past Medical History:  Renal insufficiency.   Past Medical History:   Diagnosis Date   • Fourth (trochlear) nerve palsy, right eye 06/08/2017    Right-sided fourth cranial nerve palsy   • Fourth (trochlear) nerve palsy, right eye 06/08/2017    Right-sided fourth cranial nerve palsy   • Malignant neoplasm of connective and soft tissue, unspecified 06/08/2017    Cancer of blood vessel     Surgical History:   Past Surgical History:   Procedure Laterality Date   • IR CVC PICC  11/29/2024    IR CVC PICC 11/29/2024 [facility] SCC ANGIO   • SHOULDER SURGERY  07/13/2017    Shoulder Surgery      Family History:   Family History   Problem Relation Name  Age of Onset   • Cataracts Mother     • Cataracts Father     • Other (chronic lymphoid leukemia) Father       Social History:  .  Working full time for [employer].    Social History     Tobacco Use   • Smoking status: Never   • Smokeless tobacco: Never   Vaping Use   • Vaping status: Never Used   Substance Use Topics   • Alcohol use: Never   • Drug use: Never   -------------------------------------------------------------------------------------------------------  Subjective       HPI    Nancie Armenta is a 60 year old man with PMH of hyperglycemia, renal insufficiency, and stage IV A mantle cell non hodgkin lymphoma involving the gastrointestinal tract, s/p autologous SCT (April 2012).      Early in July 2024 patient noted to have dropping platelet counts and bone marrow biopsy 7/25/24 showed focal involvement with mantle cell lymphoma (NGS cyclin D1 positive and Sox 11 positive. BIRC+ ad TP 53 positve with a VAF of 3%, BIRC3 positive). FISH negative for 17p deletion  CT imaging done on 8/7/24 shows thickening of sigmoid colon, development of mild to moderate retroperitoneal adenopathy, splenomegally and possible splenic infarct.  Started Pirtobrutinib salvage on 8/12/24.  Added Venetoclax starting 10/13/24 since spleen size was increasing on repeat CT imaging and on physical exam.  BMBx (10/23/24) with low level involvement by mantle cells about 3% Mantle cells by flow.      Pirtobrutinib and Venetoclax stopped 11/14/24 in preparation for CART collection.  Admitted 11/29/24 for his CAR-T cell therapy, on CASE 2419 (T0=12/6/24), prepped with     Fludarabine/Cyclophosphamide. Hospital course included grade 2 CRS (fever, tachycardia) on 12/7, received Toxi x1, infectious workup negative.  He also had symptomatic orthostatic hypotension on 12/8, resolved with fluids by 12/9.  He was discharged home on 12/18/24.      Unfortunatley BM biopsy from 3/13/25 showing residual marrow disease of about 15% on his  most recent bone marrow biopsy.  We have opted for rituxan, revlimid and venetoclax combination ( see below), first dose of venetoclax 50 mg  and rituxan 4/3/25.    Nancie presents to the clinic today (4/10/25) with his wife for followup evaluation after his CAR-T day 126  for relapsed mantle cell lymphoma and count check and dose 2 of rituxan of salvage regimen.   He has started his revlimid and venetoclax which is currently dosed at 100 mg for ramp up.  Last week wbc was 0.9, but today up to 1.9 with improved hgb and platelets without intervention. He feels great, working   full time and gaining weight.         Review of Systems   Constitutional:  Negative for appetite change, chills, diaphoresis, fatigue, fever and unexpected weight change.   Respiratory: Negative.     Cardiovascular: Negative.    Gastrointestinal:  Negative for abdominal pain, blood in stool, constipation, diarrhea, nausea and vomiting.   Genitourinary: Negative.     Musculoskeletal: Negative.  Negative for gait problem.   Skin: Negative.    Neurological:  Negative for dizziness, gait problem, light-headedness and numbness.   Hematological: Negative.    -------------------------------------------------------------------------------------------------------  Objective   BSA: There is no height or weight on file to calculate BSA.  There were no vitals taken for this visit.    Physical Exam  Vitals reviewed.   Constitutional:       General: He is not in acute distress.     Comments: Looks well   HENT:      Head: Normocephalic and atraumatic.      Mouth/Throat:      Mouth: Mucous membranes are moist.   Eyes:      Extraocular Movements: Extraocular movements intact.      Conjunctiva/sclera: Conjunctivae normal.      Pupils: Pupils are equal, round, and reactive to light.   Cardiovascular:      Rate and Rhythm: Normal rate and regular rhythm.      Pulses: Normal pulses.      Heart sounds: Normal heart sounds.   Pulmonary:      Effort: Pulmonary  effort is normal. No respiratory distress.      Breath sounds: Normal breath sounds. No wheezing.   Abdominal:      General: Abdomen is flat. Bowel sounds are normal. There is no distension.      Palpations: Abdomen is soft. There is splenomegaly. There is no mass.      Tenderness: There is no abdominal tenderness.      Comments: ? Spleen tip   Musculoskeletal:         General: No swelling. Normal range of motion.   Lymphadenopathy:      Comments: No lymphadenopathy   Skin:     General: Skin is warm and dry.   Neurological:      General: No focal deficit present.      Mental Status: He is alert and oriented to person, place, and time.   Psychiatric:         Mood and Affect: Mood normal.         Behavior: Behavior normal.         Thought Content: Thought content normal.         Judgment: Judgment normal.   Performance Status:  ECOG performance status: 0 fully active    CAR-T product:  CAR-T [id number]  Cell infusion date: 12/6/2024    -------------------------------------------------------------------------------------------------------  Assessment/Plan      1. Stage IV mantle cell lymphoma itreated with Nordic regimen and consolidation autograft with BEAM preparative regimen April 2012.  Relapse 2019 treated with ibrutinib and venetoclax with complete pathology response until 3/ 2022. See above.     Recurrence July 2024 presenting with dropping platelet counts and bone marrow biopsy 7/25/24 showed focal involvement with mantle cell lymphoma (NGS cyclin D1 positive and Sox 11 positive. BIRC+ ad TP 53 positve with a VAF of 3%, BIRC3 positive). FISH negative for 17p deletion  CT imaging done on 8/7/24 shows thickening of sigmoid colon, development of mild to moderate retroperitoneal adenopathy, splenomegally and possible splenic infarct.      Patient started pirtobrutinib salvage August 12,2024 and feels better. However spleen size was increasing per CT imaging (8/6/24)  and physical exam so Dr. Hobbs planned to add  venetoclax as follows, starting 10/13/24:  - 50 mg for first week, then 100 (10/20), and 200 mg (10/27) for 7 days each, then 400 mg (11/3/24- )    Pre CART staging:    BM biopsy low level involvment by mantle cells about 3% mantle cells by flow. Absence of CD19 on this  flow sample likely reflects insufficient sampling and positive for CD 19 on prior bone marrow sample of 7/24/24..    PET imaging 11/4/24   1. FDG avid lymphadenopathy throughout the neck, chest, abdomen and  pelvis as detailed above, consistent with lymphomatous involvement.  2. Intense FDG avid splenomegaly, consistent with lymphomatous  involvement. Peripheral regions of photopenia likely reflect areas of  infarction.  3. No PET evidence of FDG avid extranodal disease.on     ECHO 10/23/24 low normal LVEF 53%, cleared by Dr. Izaguirre.     Admitted 11/29/24 for his CAR-T cell therapy, on CASE 2419 (T0=12/6/24), prepped with Fludarabine/Cyclophosphamide.     PET scan 1/2/25:   IMPRESSION:  1.  Interval resolution of hypermetabolic lymphadenopathy above and below the diaphragm. Interval resolution hypermetabolic activity within the spleen with improving splenomegaly. (Deauville score of 1).  2. No evidence of new hypermetabolic robbie or extranodal lymphomatous disease.    Bone marrow biopsy performed 1/2/25 showed 1% mantle cells by IHC for SOX 11 and cycle D1 but nothing by flow.        3/13/25 Day 90  BM shows about 15% lymphoma cells which are cyclin D1 positive, PET scan shows no lymphomatous involvement and continued decrease in spleen size.   Patients case reviewed at interdisciplinary tumor board with recommendation options as follows:    Patient and team have opted to initiate salvage treatment with venetoclax R2. ( Cipriano et al. Blood Advances 8:(2) , p 407, 2024)  Phase 2 dose revlimid 15 mg 21/28 days ( note dose change), venetoclax 600 mg daily after ramp up and rituxan 275 mg/m2 weekly for 4 weeks, then q 8 weeks. Regimen associated  with 60% RRat 6 months and median PFS of 21 momths with overall survival of 31 months. Plan BM bx at day 90 after starting.      Peripheral blood flow, negative for clonal b cell. Rituxan to start on 4/3/25. All questions answered.   Leukopenia, etiology not clear, will hold bactrim.    4/3/25:  C1D1 of rituxan and venetoclax 50 mg . WBC to 0.9, hgb 10.5, and plt to 90.  Nancie reports he is feeling good.  No concerning findings on physical examination.  Scheduled to receive C1D1 rituximab today.  Scheduled to receive revlimid shipment on 4/4/25, with plans to start when receives medication.  Reviewed venetoclax ramp up schedule, advised to continue good oral fluid intake (at least 56 ounces per day).  Start levofloxacin 500 mg daily due to neutropenia.  Follow up visit on 4/10/25.      4/10/25  week 2 of rituxan. feels well cytopenia recovering so postpone BM biopsy, continue levoquin, revlimid and rampup of venetoclax    Ferritin   Date Value Ref Range Status   03/27/2025 928 (H) 20 - 300 ng/mL Final   03/13/2025 1,133 (H) 20 - 300 ng/mL Final   03/03/2025 1,009 (H) 20 - 300 ng/mL Final     Fibrinogen   Date Value Ref Range Status   03/27/2025 256 200 - 400 mg/dL Final   03/13/2025 264 200 - 400 mg/dL Final   03/03/2025 259 200 - 400 mg/dL Final     C-Reactive Protein   Date Value Ref Range Status   03/27/2025 0.15 <1.00 mg/dL Final   03/13/2025 0.18 <1.00 mg/dL Final   03/03/2025 0.34 <1.00 mg/dL Final     IgG   Date Value Ref Range Status   03/27/2025 465 (L) 700 - 1,600 mg/dL Final     Comment:     MONOCLONAL PROTEINS MAY CAUSE FALSELY LOW  RESULTS IN THIS ASSAY. SERUM PROTEIN  ELECTROPHORESIS SHOULD BE DONE AS THE  FIRST TEST TO EVALUATE MONOCLONAL GAMMOPATHY.     03/13/2025 553 (L) 700 - 1,600 mg/dL Final     Comment:     MONOCLONAL PROTEINS MAY CAUSE FALSELY LOW  RESULTS IN THIS ASSAY. SERUM PROTEIN  ELECTROPHORESIS SHOULD BE DONE AS THE  FIRST TEST TO EVALUATE MONOCLONAL GAMMOPATHY.     03/03/2025 579 (L)  700 - 1,600 mg/dL Final     Comment:     MONOCLONAL PROTEINS MAY CAUSE FALSELY LOW  RESULTS IN THIS ASSAY. SERUM PROTEIN  ELECTROPHORESIS SHOULD BE DONE AS THE  FIRST TEST TO EVALUATE MONOCLONAL GAMMOPATHY.     Infectious prophylaxis:   •Antiviral prophylaxis Acyclovir. Continue for at least 6 months.   •PCP prophylaxis. Bactrim Started 1/3/25 continue 6 months held 3/27 for leukopenia  •Antifungal prophylaxis Fluconazole discontinued.    Start levofloxacin 500 mg daily on 4/3/25 due to neutropenia.   Bactrim stopped on 4/3 due to neutropenia.  Will need to initiate atovaquone in next few weeks if need to continue to hold for cytopenias.      Vaccines:  Will plan to start vaccines with influenza and covid vaccine about 3-4 months after CAR-T and will start post transplant vaccines around 6 months.  Would recommend influenza and covid vaccine.  Will determine vaccine schedule based on clinical situation.  IgG level borderline discussed may need to include as supportive medicine.      Health maintenance.   Colonoscopy completed 7/14/23: normal except internal hemorrhoids.  Recommendations to repeat in 2 years.    - Some aortic calcifications on imaging, started on prevastatin-managed by his PCP.  PCP increased pravastatin dose to 40 mg daily.  PCP switched to atorvastatin 40 mg daily.    RTC: 4/17/25 dose 3/4 rituxan, next provider visit 4/24/25   -----------------------------------------------------------------------------------------------------------------  Yvonne Hobbs MD

## 2025-04-10 NOTE — LETTER
April 11, 2025     No Recipients    Patient: Nancie Armenta   YOB: 1965   Date of Visit: 4/10/2025       Dear Dr. Quinonez Recipients:    Thank you for referring Nancie Armenta to me for evaluation. Below are my notes for this consultation.  If you have questions, please do not hesitate to call me. I look forward to following your patient along with you.       Sincerely,     Yvonne Hobbs MD      CC: No Recipients  ______________________________________________________________________________________    Patient ID: Nancie Armenta is a 60 y.o. male.    Treatment:   Oncology History Overview Note           Patient Visit Information:   Visit Type: Follow Up Visit      Cancer History:   Treatment Synopsis:     1. Stage IV A mantle cell non-Hodgkin lymphoma involving the gastrointestinal tract diagnosed in October 2011, with diffuse gastrointestinal involvement. The patient was treated  with RCHOP alternating with Rituxan and high-dose Mamie-C and received total of 6 cycles of chemotherapy which he finished in February 2012.   Allergic to Augmentin, causes hives.     2. Patient underwent beam chemotherapy followed by autologous peripheral stem cell transplant in April 2012, by Dr. Yvonne Hobbs at [facility], was also involved in phase 3 clinical trial up killed shingles vaccine versus placebo ([employer]-1 Z10  study).     3.The patient developed shingles in January 2014, involving left flank.      4. 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      5.  Recurrent mantle cell lymphoma June 2019 assx presentation with leukocytosis. Peripheral blood flow which showed a CD5 positve clonal  lymphocytosis. 6/2/19 CT of chest abdomen and pelvis revealed  splenomegaly of 15 cm compared to 12.8 cm last evaluation. Recurrence confirmed by FISH on peripheral blood earlier this month.  BM biopsy which showed 5% Mantle cell involvement although peripheral blood shows 30% involvement. CT imaging showed splenomegaly. PET scan declined by insurance twice.  Patient underwent colonoscopy  with Dr. Yashira Lacey Wilver) 8/8/19 which showed cobblestoning of colon and multiple biopsies including terminal ileum, appendix, cecum, rectosigmoid positive for mantle cell lymphoma.     6. Initiation of acalabrutinib August 2019 with minimal response. Switched to ibrutinib and venetoclax 12/20/20 with clearing of t(11, 14) by by FISH ( 1/2020) and resolution of involvement of colon by endoscopy and pathology on exam 3/2/2020! BM biopsy  4/2020 HEATHER.     C-scope (3/11/22): at OSH: negative, no evidence of disease. PET scan ( 3/14/22): no abnormal uptake. Nakul 1. BMBx (3/17/22): negative, no evidence of MCL.   March 2022, ibrutinib and venetoclax discontinued.    July 2024 development of Tpenia, bone marrow biopsy 7/25/24 showed focal involvement with mantle cell lymphoma (NGS cyclin D1 positive and Sox 11 positive. BIRC+ and TP 53 positve with a VAF of 3%, BIRC3 positive). FISH negative for 17p deletion  CT imaging done on 8/7/24 shows thickening of sigmoid colon, development of mild to moderate retroperitoneal adenopathy, splenomegally and possible splenic infarct.    Pirotbrutib 200 mg daily initiated 8/12/24.  Due to inadequate response, he started the ventoclax (50 mg) on Adolfo 10/13, then increased to 100 mg , 200 mg and 400 mg      BMBx (10/23/24) with low level involvement by mantle cells about 3% Mantle cells by flow.      PET/CT (11/4/24) with FDG avid lymphadenopathy throughout the neck, chest, abdomen, and pelvis c/w lymphomatous involvement. Intense FDG avid splenomegaly. Peripheral regions of photopenia likely  reflect areas of infarction.      Pirtobrutinib and Venetoclax stopped 11/14/24 in preparation for CART collection.      Admitted 11/29/24 for his CAR-T cell therapy, on CASE 2419 (T0=12/6/24), prepped with Fludarabine/Cyclophosphamide. Hospital course included grade 2 CRS (fever, tachycardia) on 12/7, received Toxi x1, infectious workup negative.  He also had symptomatic orthostatic hypotension on 12/8, resolved with fluids by 12/9.  He was discharged home on 12/18/24.     T+ 90 progressive tumorous involvement of bone marrow, PET continues to show CR.      7. S/P COVID 19 infection 12/1/2020, no sequelae                          Lymphoma   10/20/2011 Initial Diagnosis    Lymphoma (Multi)      Cellular Therapy    Mr. Nancie Armenta is a 59 y.o. male presenting with relapsed Mantle Cell Lymphoma. CAR-T cell therapy, on CASE 2419 (T0=12/6/24), prepped with Fludarabine/Cyclophosphamide.      Hosp Course:  - Grade 2 CRS (fever, tachycardia) on 12/7, gave Toxi x1, infectious workup negative. Resolved by 12/8.  - Symptomatic Orthostatic Hypotension on 12/8, resolved with fluids by 12/9.     Mantle cell lymphoma of spleen   10/13/2016 Initial Diagnosis    Mantle cell lymphoma of spleen (CMS/HCC)     Mantle cell lymphoma   11/18/2024 Initial Diagnosis    Mantle cell lymphoma     11/30/2024 - 12/20/2024 Bone Marrow Transplant        Cellular Therapy    Mr. Nancie Armenta is a 59 y.o. male presenting with relapsed Mantle Cell Lymphoma. CAR-T cell therapy, on CASE 2419 (T0=12/6/24), prepped with Fludarabine/Cyclophosphamide.      Hosp Course:  - Grade 2 CRS (fever, tachycardia) on 12/7, gave Toxi x1, infectious workup negative. Resolved by 12/8.  - Symptomatic Orthostatic Hypotension on 12/8, resolved with fluids by 12/9.     4/3/2025 -  Chemotherapy    RiTUXimab (Weekly), 28 Day Cycle        Response:     Past Medical History:  Renal insufficiency.   Past Medical History:   Diagnosis Date   • Fourth (trochlear) nerve  palsy, right eye 06/08/2017    Right-sided fourth cranial nerve palsy   • Fourth (trochlear) nerve palsy, right eye 06/08/2017    Right-sided fourth cranial nerve palsy   • Malignant neoplasm of connective and soft tissue, unspecified 06/08/2017    Cancer of blood vessel     Surgical History:   Past Surgical History:   Procedure Laterality Date   • IR CVC PICC  11/29/2024    IR CVC PICC 11/29/2024 [facility] ANGIO   • SHOULDER SURGERY  07/13/2017    Shoulder Surgery      Family History:   Family History   Problem Relation Name Age of Onset   • Cataracts Mother     • Cataracts Father     • Other (chronic lymphoid leukemia) Father       Social History:  .  Working full time for [employer].    Social History     Tobacco Use   • Smoking status: Never   • Smokeless tobacco: Never   Vaping Use   • Vaping status: Never Used   Substance Use Topics   • Alcohol use: Never   • Drug use: Never   -------------------------------------------------------------------------------------------------------  Subjective       HPI    Nancie Armenta is a 60 year old man with PMH of hyperglycemia, renal insufficiency, and stage IV A mantle cell non hodgkin lymphoma involving the gastrointestinal tract, s/p autologous SCT (April 2012).      Early in July 2024 patient noted to have dropping platelet counts and bone marrow biopsy 7/25/24 showed focal involvement with mantle cell lymphoma (NGS cyclin D1 positive and Sox 11 positive. BIRC+ ad TP 53 positve with a VAF of 3%, BIRC3 positive). FISH negative for 17p deletion  CT imaging done on 8/7/24 shows thickening of sigmoid colon, development of mild to moderate retroperitoneal adenopathy, splenomegally and possible splenic infarct.  Started Pirtobrutinib salvage on 8/12/24.  Added Venetoclax starting 10/13/24 since spleen size was increasing on repeat CT imaging and on physical exam.  BMBx (10/23/24) with low level involvement by mantle cells about 3% Mantle cells by flow.       Pirtobrutinib and Venetoclax stopped 11/14/24 in preparation for CART collection.  Admitted 11/29/24 for his CAR-T cell therapy, on CASE 2419 (T0=12/6/24), prepped with     Fludarabine/Cyclophosphamide. Hospital course included grade 2 CRS (fever, tachycardia) on 12/7, received Toxi x1, infectious workup negative.  He also had symptomatic orthostatic hypotension on 12/8, resolved with fluids by 12/9.  He was discharged home on 12/18/24.      Unfortunatley BM biopsy from 3/13/25 showing residual marrow disease of about 15% on his most recent bone marrow biopsy.  We have opted for rituxan, revlimid and venetoclax combination ( see below), first dose of venetoclax 50 mg  and rituxan 4/3/25.    Nancie presents to the clinic today (4/10/25) with his wife for followup evaluation after his CAR-T day 126  for relapsed mantle cell lymphoma and count check and dose 2 of rituxan of salvage regimen.   He has started his revlimid and venetoclax which is currently dosed at 100 mg for ramp up.  Last week wbc was 0.9, but today up to 1.9 with improved hgb and platelets without intervention. He feels great, working   full time and gaining weight.         Review of Systems   Constitutional:  Negative for appetite change, chills, diaphoresis, fatigue, fever and unexpected weight change.   Respiratory: Negative.     Cardiovascular: Negative.    Gastrointestinal:  Negative for abdominal pain, blood in stool, constipation, diarrhea, nausea and vomiting.   Genitourinary: Negative.     Musculoskeletal: Negative.  Negative for gait problem.   Skin: Negative.    Neurological:  Negative for dizziness, gait problem, light-headedness and numbness.   Hematological: Negative.    -------------------------------------------------------------------------------------------------------  Objective   BSA: There is no height or weight on file to calculate BSA.  There were no vitals taken for this visit.    Physical Exam  Vitals reviewed.    Constitutional:       General: He is not in acute distress.     Comments: Looks well   HENT:      Head: Normocephalic and atraumatic.      Mouth/Throat:      Mouth: Mucous membranes are moist.   Eyes:      Extraocular Movements: Extraocular movements intact.      Conjunctiva/sclera: Conjunctivae normal.      Pupils: Pupils are equal, round, and reactive to light.   Cardiovascular:      Rate and Rhythm: Normal rate and regular rhythm.      Pulses: Normal pulses.      Heart sounds: Normal heart sounds.   Pulmonary:      Effort: Pulmonary effort is normal. No respiratory distress.      Breath sounds: Normal breath sounds. No wheezing.   Abdominal:      General: Abdomen is flat. Bowel sounds are normal. There is no distension.      Palpations: Abdomen is soft. There is splenomegaly. There is no mass.      Tenderness: There is no abdominal tenderness.      Comments: ? Spleen tip   Musculoskeletal:         General: No swelling. Normal range of motion.   Lymphadenopathy:      Comments: No lymphadenopathy   Skin:     General: Skin is warm and dry.   Neurological:      General: No focal deficit present.      Mental Status: He is alert and oriented to person, place, and time.   Psychiatric:         Mood and Affect: Mood normal.         Behavior: Behavior normal.         Thought Content: Thought content normal.         Judgment: Judgment normal.   Performance Status:  ECOG performance status: 0 fully active    CAR-T product:  CAR-T [id number]  Cell infusion date: 12/6/2024    -------------------------------------------------------------------------------------------------------  Assessment/Plan      1. Stage IV mantle cell lymphoma itreated with Nordic regimen and consolidation autograft with BEAM preparative regimen April 2012.  Relapse 2019 treated with ibrutinib and venetoclax with complete pathology response until 3/ 2022. See above.     Recurrence July 2024 presenting with dropping platelet counts and bone marrow biopsy  7/25/24 showed focal involvement with mantle cell lymphoma (NGS cyclin D1 positive and Sox 11 positive. BIRC+ ad TP 53 positve with a VAF of 3%, BIRC3 positive). FISH negative for 17p deletion  CT imaging done on 8/7/24 shows thickening of sigmoid colon, development of mild to moderate retroperitoneal adenopathy, splenomegally and possible splenic infarct.      Patient started pirtobrutinib salvage August 12,2024 and feels better. However spleen size was increasing per CT imaging (8/6/24)  and physical exam so Dr. Hobbs planned to add venetoclax as follows, starting 10/13/24:  - 50 mg for first week, then 100 (10/20), and 200 mg (10/27) for 7 days each, then 400 mg (11/3/24- )    Pre CART staging:    BM biopsy low level involvment by mantle cells about 3% mantle cells by flow. Absence of CD19 on this  flow sample likely reflects insufficient sampling and positive for CD 19 on prior bone marrow sample of 7/24/24..    PET imaging 11/4/24   1. FDG avid lymphadenopathy throughout the neck, chest, abdomen and  pelvis as detailed above, consistent with lymphomatous involvement.  2. Intense FDG avid splenomegaly, consistent with lymphomatous  involvement. Peripheral regions of photopenia likely reflect areas of  infarction.  3. No PET evidence of FDG avid extranodal disease.on     ECHO 10/23/24 low normal LVEF 53%, cleared by Dr. Izaguirre.     Admitted 11/29/24 for his CAR-T cell therapy, on CASE 2419 (T0=12/6/24), prepped with Fludarabine/Cyclophosphamide.     PET scan 1/2/25:   IMPRESSION:  1.  Interval resolution of hypermetabolic lymphadenopathy above and below the diaphragm. Interval resolution hypermetabolic activity within the spleen with improving splenomegaly. (Deauville score of 1).  2. No evidence of new hypermetabolic robbie or extranodal lymphomatous disease.    Bone marrow biopsy performed 1/2/25 showed 1% mantle cells by IHC for SOX 11 and cycle D1 but nothing by flow.        3/13/25 Day 90  BM shows about  15% lymphoma cells which are cyclin D1 positive, PET scan shows no lymphomatous involvement and continued decrease in spleen size.   Patients case reviewed at interdisciplinary tumor board with recommendation options as follows:    Patient and team have opted to initiate salvage treatment with venetoclax R2. ( Cipriano et al. Blood Advances 8:(2) , p 407, 2024)  Phase 2 dose revlimid 15 mg 21/28 days ( note dose change), venetoclax 600 mg daily after ramp up and rituxan 275 mg/m2 weekly for 4 weeks, then q 8 weeks. Regimen associated with 60% RRat 6 months and median PFS of 21 momths with overall survival of 31 months. Plan BM bx at day 90 after starting.      Peripheral blood flow, negative for clonal b cell. Rituxan to start on 4/3/25. All questions answered.   Leukopenia, etiology not clear, will hold bactrim.    4/3/25:  C1D1 of rituxan and venetoclax 50 mg . WBC to 0.9, hgb 10.5, and plt to 90.  Serafino reports he is feeling good.  No concerning findings on physical examination.  Scheduled to receive C1D1 rituximab today.  Scheduled to receive revlimid shipment on 4/4/25, with plans to start when receives medication.  Reviewed venetoclax ramp up schedule, advised to continue good oral fluid intake (at least 56 ounces per day).  Start levofloxacin 500 mg daily due to neutropenia.  Follow up visit on 4/10/25.      4/10/25  week 2 of rituxan. feels well cytopenia recovering so postpone BM biopsy, continue levoquin, revlimid and rampup of venetoclax    Ferritin   Date Value Ref Range Status   03/27/2025 928 (H) 20 - 300 ng/mL Final   03/13/2025 1,133 (H) 20 - 300 ng/mL Final   03/03/2025 1,009 (H) 20 - 300 ng/mL Final     Fibrinogen   Date Value Ref Range Status   03/27/2025 256 200 - 400 mg/dL Final   03/13/2025 264 200 - 400 mg/dL Final   03/03/2025 259 200 - 400 mg/dL Final     C-Reactive Protein   Date Value Ref Range Status   03/27/2025 0.15 <1.00 mg/dL Final   03/13/2025 0.18 <1.00 mg/dL Final   03/03/2025  0.34 <1.00 mg/dL Final     IgG   Date Value Ref Range Status   03/27/2025 465 (L) 700 - 1,600 mg/dL Final     Comment:     MONOCLONAL PROTEINS MAY CAUSE FALSELY LOW  RESULTS IN THIS ASSAY. SERUM PROTEIN  ELECTROPHORESIS SHOULD BE DONE AS THE  FIRST TEST TO EVALUATE MONOCLONAL GAMMOPATHY.     03/13/2025 553 (L) 700 - 1,600 mg/dL Final     Comment:     MONOCLONAL PROTEINS MAY CAUSE FALSELY LOW  RESULTS IN THIS ASSAY. SERUM PROTEIN  ELECTROPHORESIS SHOULD BE DONE AS THE  FIRST TEST TO EVALUATE MONOCLONAL GAMMOPATHY.     03/03/2025 579 (L) 700 - 1,600 mg/dL Final     Comment:     MONOCLONAL PROTEINS MAY CAUSE FALSELY LOW  RESULTS IN THIS ASSAY. SERUM PROTEIN  ELECTROPHORESIS SHOULD BE DONE AS THE  FIRST TEST TO EVALUATE MONOCLONAL GAMMOPATHY.     Infectious prophylaxis:   •Antiviral prophylaxis Acyclovir. Continue for at least 6 months.   •PCP prophylaxis. Bactrim Started 1/3/25 continue 6 months held 3/27 for leukopenia  •Antifungal prophylaxis Fluconazole discontinued.    Start levofloxacin 500 mg daily on 4/3/25 due to neutropenia.   Bactrim stopped on 4/3 due to neutropenia.  Will need to initiate atovaquone in next few weeks if need to continue to hold for cytopenias.      Vaccines:  Will plan to start vaccines with influenza and covid vaccine about 3-4 months after CAR-T and will start post transplant vaccines around 6 months.  Would recommend influenza and covid vaccine.  Will determine vaccine schedule based on clinical situation.  IgG level borderline discussed may need to include as supportive medicine.      Health maintenance.   Colonoscopy completed 7/14/23: normal except internal hemorrhoids.  Recommendations to repeat in 2 years.    - Some aortic calcifications on imaging, started on prevastatin-managed by his PCP.  PCP increased pravastatin dose to 40 mg daily.  PCP switched to atorvastatin 40 mg daily.    RTC: 4/17/25 dose 3/4 rituxan, next provider visit 4/24/25    -----------------------------------------------------------------------------------------------------------------  Yvonne Hobbs MD

## 2025-04-10 NOTE — PROGRESS NOTES
Pt present today for C1D8.  Pt saw provider during visit.  See provider's note for full assessment.  Rituximab regimen infused with no issues.  Pt discharged to home in stable condition.

## 2025-04-10 NOTE — SIGNIFICANT EVENT

## 2025-04-11 LAB
CELL COUNT (BLOOD): 1.9 X10*3/UL
CELL POPULATIONS: NORMAL
CMV DNA SERPL NAA+PROBE-LOG IU: NORMAL {LOG_IU}/ML
DIAGNOSIS: NORMAL
FLOW DIFFERENTIAL: NORMAL
FLOW TEST ORDERED: NORMAL
LAB TEST METHOD: NORMAL
LABORATORY COMMENT REPORT: NOT DETECTED
NUMBER OF CELLS COLLECTED: NORMAL PER TUBE
PATH REPORT.TOTAL CANCER: NORMAL
SIGNATURE COMMENT: NORMAL
SPECIMEN VIABILITY: NORMAL

## 2025-04-14 ENCOUNTER — LAB REQUISITION (OUTPATIENT)
Dept: LAB | Facility: CLINIC | Age: 60
End: 2025-04-14
Payer: COMMERCIAL

## 2025-04-14 DIAGNOSIS — C83.10 MANTLE CELL LYMPHOMA, UNSPECIFIED SITE (MULTI): ICD-10-CM

## 2025-04-14 LAB
HLA CLS I TYP PNL BLD/T DONR HIGH RES: NORMAL
HLA RESULTS: NORMAL
HLA-DP2 QL: NORMAL
HLA-DQB1 HIGH RES: NORMAL
HLA-DRB1 HIGH RES: NORMAL
SPECIMEN HANDLING: NORMAL

## 2025-04-17 ENCOUNTER — LAB (OUTPATIENT)
Dept: LAB | Facility: HOSPITAL | Age: 60
End: 2025-04-17
Payer: COMMERCIAL

## 2025-04-17 ENCOUNTER — INFUSION (OUTPATIENT)
Dept: HEMATOLOGY/ONCOLOGY | Facility: HOSPITAL | Age: 60
End: 2025-04-17
Payer: COMMERCIAL

## 2025-04-17 VITALS
TEMPERATURE: 97.7 F | BODY MASS INDEX: 30.51 KG/M2 | DIASTOLIC BLOOD PRESSURE: 65 MMHG | RESPIRATION RATE: 18 BRPM | WEIGHT: 229 LBS | HEART RATE: 72 BPM | OXYGEN SATURATION: 100 % | SYSTOLIC BLOOD PRESSURE: 104 MMHG

## 2025-04-17 DIAGNOSIS — C83.10 MANTLE CELL LYMPHOMA, UNSPECIFIED BODY REGION (MULTI): ICD-10-CM

## 2025-04-17 DIAGNOSIS — C83.17 MANTLE CELL LYMPHOMA OF SPLEEN: ICD-10-CM

## 2025-04-17 DIAGNOSIS — Z92.850 HISTORY OF CHIMERIC ANTIGEN RECEPTOR T-CELL THERAPY: ICD-10-CM

## 2025-04-17 LAB
ALBUMIN SERPL BCP-MCNC: 4.5 G/DL (ref 3.4–5)
ALP SERPL-CCNC: 65 U/L (ref 33–136)
ALT SERPL W P-5'-P-CCNC: 27 U/L (ref 10–52)
ANION GAP SERPL CALC-SCNC: 11 MMOL/L (ref 10–20)
APTT PPP: 27 SECONDS (ref 26–36)
AST SERPL W P-5'-P-CCNC: 22 U/L (ref 9–39)
BASO STIPL BLD QL SMEAR: PRESENT
BASOPHILS # BLD MANUAL: 0.02 X10*3/UL (ref 0–0.1)
BASOPHILS NFR BLD MANUAL: 1 %
BILIRUB SERPL-MCNC: 1 MG/DL (ref 0–1.2)
BUN SERPL-MCNC: 22 MG/DL (ref 6–23)
BURR CELLS BLD QL SMEAR: ABNORMAL
CALCIUM SERPL-MCNC: 9 MG/DL (ref 8.6–10.3)
CHLORIDE SERPL-SCNC: 105 MMOL/L (ref 98–107)
CO2 SERPL-SCNC: 30 MMOL/L (ref 21–32)
CREAT SERPL-MCNC: 1.22 MG/DL (ref 0.5–1.3)
CRP SERPL-MCNC: 0.95 MG/DL
DOHLE BOD BLD QL SMEAR: PRESENT
EGFRCR SERPLBLD CKD-EPI 2021: 67 ML/MIN/1.73M*2
EOSINOPHIL # BLD MANUAL: 0.27 X10*3/UL (ref 0–0.7)
EOSINOPHIL NFR BLD MANUAL: 14 %
ERYTHROCYTE [DISTWIDTH] IN BLOOD BY AUTOMATED COUNT: 14.1 % (ref 11.5–14.5)
FERRITIN SERPL-MCNC: 846 NG/ML (ref 20–300)
FIBRINOGEN PPP-MCNC: 392 MG/DL (ref 200–400)
GLUCOSE SERPL-MCNC: 87 MG/DL (ref 74–99)
HCT VFR BLD AUTO: 35.4 % (ref 41–52)
HGB BLD-MCNC: 12 G/DL (ref 13.5–17.5)
IGG SERPL-MCNC: 397 MG/DL (ref 700–1600)
IMM GRANULOCYTES # BLD AUTO: 0.16 X10*3/UL (ref 0–0.7)
IMM GRANULOCYTES NFR BLD AUTO: 8.5 % (ref 0–0.9)
INR PPP: 0.9 (ref 0.9–1.1)
LDH SERPL L TO P-CCNC: 209 U/L (ref 84–246)
LYMPHOCYTES # BLD MANUAL: 0.38 X10*3/UL (ref 1.2–4.8)
LYMPHOCYTES NFR BLD MANUAL: 20 %
MAGNESIUM SERPL-MCNC: 2.17 MG/DL (ref 1.6–2.4)
MCH RBC QN AUTO: 31.9 PG (ref 26–34)
MCHC RBC AUTO-ENTMCNC: 33.9 G/DL (ref 32–36)
MCV RBC AUTO: 94 FL (ref 80–100)
METAMYELOCYTES # BLD MANUAL: 0.06 X10*3/UL
METAMYELOCYTES NFR BLD MANUAL: 3 %
MONOCYTES # BLD MANUAL: 0.27 X10*3/UL (ref 0.1–1)
MONOCYTES NFR BLD MANUAL: 14 %
MYELOCYTES # BLD MANUAL: 0.02 X10*3/UL
MYELOCYTES NFR BLD MANUAL: 1 %
NEUTROPHILS # BLD MANUAL: 0.85 X10*3/UL (ref 1.2–7.7)
NEUTS BAND # BLD MANUAL: 0.11 X10*3/UL (ref 0–0.7)
NEUTS BAND NFR BLD MANUAL: 6 %
NEUTS SEG # BLD MANUAL: 0.74 X10*3/UL (ref 1.2–7)
NEUTS SEG NFR BLD MANUAL: 39 %
NRBC BLD-RTO: 0 /100 WBCS (ref 0–0)
OVALOCYTES BLD QL SMEAR: ABNORMAL
PLATELET # BLD AUTO: 110 X10*3/UL (ref 150–450)
POLYCHROMASIA BLD QL SMEAR: ABNORMAL
POTASSIUM SERPL-SCNC: 3.8 MMOL/L (ref 3.5–5.3)
PROT SERPL-MCNC: 6.3 G/DL (ref 6.4–8.2)
PROTHROMBIN TIME: 10.4 SECONDS (ref 9.8–12.4)
RBC # BLD AUTO: 3.76 X10*6/UL (ref 4.5–5.9)
RBC MORPH BLD: ABNORMAL
SCHISTOCYTES BLD QL SMEAR: ABNORMAL
SODIUM SERPL-SCNC: 142 MMOL/L (ref 136–145)
TOTAL CELLS COUNTED BLD: 100
URATE SERPL-MCNC: 4.6 MG/DL (ref 4–7.5)
VARIANT LYMPHS # BLD MANUAL: 0.04 X10*3/UL (ref 0–0.5)
VARIANT LYMPHS NFR BLD: 2 %
WBC # BLD AUTO: 1.9 X10*3/UL (ref 4.4–11.3)

## 2025-04-17 PROCEDURE — 2500000004 HC RX 250 GENERAL PHARMACY W/ HCPCS (ALT 636 FOR OP/ED): Performed by: INTERNAL MEDICINE

## 2025-04-17 PROCEDURE — 85007 BL SMEAR W/DIFF WBC COUNT: CPT

## 2025-04-17 PROCEDURE — 86140 C-REACTIVE PROTEIN: CPT

## 2025-04-17 PROCEDURE — 85730 THROMBOPLASTIN TIME PARTIAL: CPT

## 2025-04-17 PROCEDURE — 84550 ASSAY OF BLOOD/URIC ACID: CPT

## 2025-04-17 PROCEDURE — 83735 ASSAY OF MAGNESIUM: CPT

## 2025-04-17 PROCEDURE — 82784 ASSAY IGA/IGD/IGG/IGM EACH: CPT

## 2025-04-17 PROCEDURE — 83615 LACTATE (LD) (LDH) ENZYME: CPT

## 2025-04-17 PROCEDURE — 96413 CHEMO IV INFUSION 1 HR: CPT

## 2025-04-17 PROCEDURE — 82728 ASSAY OF FERRITIN: CPT

## 2025-04-17 PROCEDURE — 36415 COLL VENOUS BLD VENIPUNCTURE: CPT

## 2025-04-17 PROCEDURE — 85027 COMPLETE CBC AUTOMATED: CPT

## 2025-04-17 PROCEDURE — 96415 CHEMO IV INFUSION ADDL HR: CPT

## 2025-04-17 PROCEDURE — 80048 BASIC METABOLIC PNL TOTAL CA: CPT

## 2025-04-17 PROCEDURE — 85384 FIBRINOGEN ACTIVITY: CPT

## 2025-04-17 PROCEDURE — 2500000001 HC RX 250 WO HCPCS SELF ADMINISTERED DRUGS (ALT 637 FOR MEDICARE OP): Performed by: INTERNAL MEDICINE

## 2025-04-17 RX ORDER — EPINEPHRINE 0.3 MG/.3ML
0.3 INJECTION SUBCUTANEOUS EVERY 5 MIN PRN
Status: DISCONTINUED | OUTPATIENT
Start: 2025-04-17 | End: 2025-04-17 | Stop reason: HOSPADM

## 2025-04-17 RX ORDER — ALBUTEROL SULFATE 0.83 MG/ML
3 SOLUTION RESPIRATORY (INHALATION) AS NEEDED
Status: DISCONTINUED | OUTPATIENT
Start: 2025-04-17 | End: 2025-04-17 | Stop reason: HOSPADM

## 2025-04-17 RX ORDER — ACETAMINOPHEN 325 MG/1
650 TABLET ORAL ONCE
Status: COMPLETED | OUTPATIENT
Start: 2025-04-17 | End: 2025-04-17

## 2025-04-17 RX ORDER — DIPHENHYDRAMINE HYDROCHLORIDE 50 MG/ML
50 INJECTION, SOLUTION INTRAMUSCULAR; INTRAVENOUS AS NEEDED
Status: DISCONTINUED | OUTPATIENT
Start: 2025-04-17 | End: 2025-04-17 | Stop reason: HOSPADM

## 2025-04-17 RX ORDER — PROCHLORPERAZINE EDISYLATE 5 MG/ML
10 INJECTION INTRAMUSCULAR; INTRAVENOUS EVERY 6 HOURS PRN
Status: DISCONTINUED | OUTPATIENT
Start: 2025-04-17 | End: 2025-04-17 | Stop reason: HOSPADM

## 2025-04-17 RX ORDER — FAMOTIDINE 10 MG/ML
20 INJECTION, SOLUTION INTRAVENOUS ONCE AS NEEDED
Status: DISCONTINUED | OUTPATIENT
Start: 2025-04-17 | End: 2025-04-17 | Stop reason: HOSPADM

## 2025-04-17 RX ORDER — PROCHLORPERAZINE MALEATE 10 MG
10 TABLET ORAL EVERY 6 HOURS PRN
Status: DISCONTINUED | OUTPATIENT
Start: 2025-04-17 | End: 2025-04-17 | Stop reason: HOSPADM

## 2025-04-17 RX ORDER — DIPHENHYDRAMINE HCL 50 MG
50 CAPSULE ORAL ONCE
Status: COMPLETED | OUTPATIENT
Start: 2025-04-17 | End: 2025-04-17

## 2025-04-17 RX ADMIN — DIPHENHYDRAMINE HYDROCHLORIDE 50 MG: 50 CAPSULE ORAL at 09:06

## 2025-04-17 RX ADMIN — SODIUM CHLORIDE 848 MG: 9 INJECTION, SOLUTION INTRAVENOUS at 09:47

## 2025-04-17 RX ADMIN — ACETAMINOPHEN 650 MG: 325 TABLET ORAL at 09:06

## 2025-04-17 NOTE — PROGRESS NOTES
Patient arrived ambulatory to infusion for scheduled tx of rituximab. Denies any new or worsening sx. Per Matti Richardson to do accelerated ritux rates.Tolerated infusion without issue. Patient made aware of upcoming appointments. Discharged in stable condition.

## 2025-04-18 ENCOUNTER — SPECIALTY PHARMACY (OUTPATIENT)
Dept: PHARMACY | Facility: CLINIC | Age: 60
End: 2025-04-18

## 2025-04-18 LAB
CMV DNA SERPL NAA+PROBE-LOG IU: NORMAL {LOG_IU}/ML
LABORATORY COMMENT REPORT: NOT DETECTED

## 2025-04-18 PROCEDURE — RXMED WILLOW AMBULATORY MEDICATION CHARGE

## 2025-04-22 ENCOUNTER — PHARMACY VISIT (OUTPATIENT)
Dept: PHARMACY | Facility: CLINIC | Age: 60
End: 2025-04-22
Payer: COMMERCIAL

## 2025-04-22 ENCOUNTER — LAB REQUISITION (OUTPATIENT)
Dept: LAB | Facility: CLINIC | Age: 60
End: 2025-04-22
Payer: COMMERCIAL

## 2025-04-22 DIAGNOSIS — C85.10 UNSPECIFIED B-CELL LYMPHOMA, UNSPECIFIED SITE: ICD-10-CM

## 2025-04-22 LAB
HLA CLS I TYP PNL BLD/T DONR HIGH RES: NORMAL
HLA RESULTS: NORMAL
HLA-DP2 QL: NORMAL
HLA-DQB1 HIGH RES: NORMAL
HLA-DRB1 HIGH RES: NORMAL

## 2025-04-22 ASSESSMENT — ENCOUNTER SYMPTOMS
APPETITE CHANGE: 0
FATIGUE: 0
CHILLS: 0
DIARRHEA: 0
CARDIOVASCULAR NEGATIVE: 1
BLOOD IN STOOL: 0
NUMBNESS: 0
UNEXPECTED WEIGHT CHANGE: 0
FEVER: 0
DIAPHORESIS: 0
CONSTIPATION: 0
DIZZINESS: 0
LIGHT-HEADEDNESS: 0
VOMITING: 0
ABDOMINAL PAIN: 0
HEMATOLOGIC/LYMPHATIC NEGATIVE: 1
NAUSEA: 0
MUSCULOSKELETAL NEGATIVE: 1

## 2025-04-23 NOTE — PROGRESS NOTES
Patient ID: Nancie Armenta is a 60 y.o. male.    Treatment:   Oncology History Overview Note           Patient Visit Information:   Visit Type: Follow Up Visit      Cancer History:   Treatment Synopsis:     1. Stage IV A mantle cell non-Hodgkin lymphoma involving the gastrointestinal tract diagnosed in October 2011, with diffuse gastrointestinal involvement. The patient was treated  with RCHOP alternating with Rituxan and high-dose Mamie-C and received total of 6 cycles of chemotherapy which he finished in February 2012.   Allergic to Augmentin, causes hives.     2. Patient underwent beam chemotherapy followed by autologous peripheral stem cell transplant in April 2012, by Dr. Yvonne Hobbs at [facility], was also involved in phase 3 clinical trial up killed shingles vaccine versus placebo ([employer]-1 Z10  study).     3.The patient developed shingles in January 2014, involving left flank.      4. 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      5.  Recurrent mantle cell lymphoma June 2019 assx presentation with leukocytosis. Peripheral blood flow which showed a CD5 positve clonal lymphocytosis. 6/2/19 CT of chest abdomen and pelvis revealed  splenomegaly of 15 cm compared to 12.8 cm last evaluation. Recurrence confirmed by FISH on peripheral blood earlier this month.  BM biopsy which showed 5% Mantle cell involvement although peripheral blood shows 30% involvement. CT imaging showed splenomegaly. PET scan declined by insurance twice.  Patient underwent colonoscopy  with Dr. Ac ( [place]) 8/8/19 which showed cobblestoning of colon and multiple biopsies including terminal ileum, appendix, cecum,  rectosigmoid positive for mantle cell lymphoma.     6. Initiation of acalabrutinib August 2019 with minimal response. Switched to ibrutinib and venetoclax 12/20/20 with clearing of t(11, 14) by by FISH ( 1/2020) and resolution of involvement of colon by endoscopy and pathology on exam 3/2/2020! BM biopsy  4/2020 HEATHER.     C-scope (3/11/22): at OSH: negative, no evidence of disease. PET scan ( 3/14/22): no abnormal uptake. Deauville 1. BMBx (3/17/22): negative, no evidence of MCL.   March 2022, ibrutinib and venetoclax discontinued.    July 2024 development of Tpenia, bone marrow biopsy 7/25/24 showed focal involvement with mantle cell lymphoma (NGS cyclin D1 positive and Sox 11 positive. BIRC+ and TP 53 positve with a VAF of 3%, BIRC3 positive). FISH negative for 17p deletion  CT imaging done on 8/7/24 shows thickening of sigmoid colon, development of mild to moderate retroperitoneal adenopathy, splenomegally and possible splenic infarct.    Pirotbrutib 200 mg daily initiated 8/12/24.  Due to inadequate response, he started the ventoclax (50 mg) on Adolfo 10/13, then increased to 100 mg , 200 mg and 400 mg      BMBx (10/23/24) with low level involvement by mantle cells about 3% Mantle cells by flow.      PET/CT (11/4/24) with FDG avid lymphadenopathy throughout the neck, chest, abdomen, and pelvis c/w lymphomatous involvement. Intense FDG avid splenomegaly. Peripheral regions of photopenia likely reflect areas of infarction.      Pirtobrutinib and Venetoclax stopped 11/14/24 in preparation for CART collection.      Admitted 11/29/24 for his CAR-T cell therapy, on CASE 2419 (T0=12/6/24), prepped with Fludarabine/Cyclophosphamide. Hospital course included grade 2 CRS (fever, tachycardia) on 12/7, received Toxi x1, infectious workup negative.  He also had symptomatic orthostatic hypotension on 12/8, resolved with fluids by 12/9.  He was discharged home on 12/18/24.     T+ 90 progressive tumorous involvement of bone  marrow, PET continues to show CR.      7. S/P COVID 19 infection 12/1/2020, no sequelae                          Lymphoma   10/20/2011 Initial Diagnosis    Lymphoma (Multi)      Cellular Therapy    Mr. Nancie Armenta is a 59 y.o. male presenting with relapsed Mantle Cell Lymphoma. CAR-T cell therapy, on CASE 2419 (T0=12/6/24), prepped with Fludarabine/Cyclophosphamide.      Hosp Course:  - Grade 2 CRS (fever, tachycardia) on 12/7, gave Toxi x1, infectious workup negative. Resolved by 12/8.  - Symptomatic Orthostatic Hypotension on 12/8, resolved with fluids by 12/9.     Mantle cell lymphoma of spleen   10/13/2016 Initial Diagnosis    Mantle cell lymphoma of spleen (CMS/HCC)     Mantle cell lymphoma   11/18/2024 Initial Diagnosis    Mantle cell lymphoma     11/30/2024 - 12/20/2024 Bone Marrow Transplant        Cellular Therapy    Mr. Nancie Armenta is a 59 y.o. male presenting with relapsed Mantle Cell Lymphoma. CAR-T cell therapy, on CASE 2419 (T0=12/6/24), prepped with Fludarabine/Cyclophosphamide.      Hosp Course:  - Grade 2 CRS (fever, tachycardia) on 12/7, gave Toxi x1, infectious workup negative. Resolved by 12/8.  - Symptomatic Orthostatic Hypotension on 12/8, resolved with fluids by 12/9.     4/3/2025 -  Chemotherapy    RiTUXimab (Weekly), 28 Day Cycle        Response:     Past Medical History:  Renal insufficiency.   Past Medical History:   Diagnosis Date    Fourth (trochlear) nerve palsy, right eye 06/08/2017    Right-sided fourth cranial nerve palsy    Fourth (trochlear) nerve palsy, right eye 06/08/2017    Right-sided fourth cranial nerve palsy    Malignant neoplasm of connective and soft tissue, unspecified 06/08/2017    Cancer of blood vessel     Surgical History:   Past Surgical History:   Procedure Laterality Date    IR CVC PICC  11/29/2024    IR CVC PICC 11/29/2024 [facility] SCC ANGIO    SHOULDER SURGERY  07/13/2017    Shoulder Surgery      Family History:   Family History   Problem Relation Name Age  of Onset    Cataracts Mother      Cataracts Father      Other (chronic lymphoid leukemia) Father       Social History:  .  Working full time for [employer].    Social History     Tobacco Use    Smoking status: Never    Smokeless tobacco: Never   Vaping Use    Vaping status: Never Used   Substance Use Topics    Alcohol use: Never    Drug use: Never   -------------------------------------------------------------------------------------------------------  Subjective       HPI    Nancie Armenta is a 60 year old man with PMH of hyperglycemia, renal insufficiency, and stage IV A mantle cell non hodgkin lymphoma involving the gastrointestinal tract, s/p autologous SCT (April 2012).      Early in July 2024 patient noted to have dropping platelet counts and bone marrow biopsy 7/25/24 showed focal involvement with mantle cell lymphoma (NGS cyclin D1 positive and Sox 11 positive. BIRC+ ad TP 53 positve with a VAF of 3%, BIRC3 positive). FISH negative for 17p deletion  CT imaging done on 8/7/24 shows thickening of sigmoid colon, development of mild to moderate retroperitoneal adenopathy, splenomegally and possible splenic infarct.  Started Pirtobrutinib salvage on 8/12/24.  Added Venetoclax starting 10/13/24 since spleen size was increasing on repeat CT imaging and on physical exam.  BMBx (10/23/24) with low level involvement by mantle cells about 3% Mantle cells by flow.      Pirtobrutinib and Venetoclax stopped 11/14/24 in preparation for CART collection.  Admitted 11/29/24 for his CAR-T cell therapy, on CASE 2419 (T0=12/6/24), prepped with     Fludarabine/Cyclophosphamide. Hospital course included grade 2 CRS (fever, tachycardia) on 12/7, received Toxi x1, infectious workup negative.  He also had symptomatic orthostatic hypotension on 12/8, resolved with fluids by 12/9.  He was discharged home on 12/18/24.      Unfortunatley BM biopsy from 3/13/25 showing residual marrow disease of about 15% on his most recent  bone marrow biopsy.  We have opted for rituxan, revlimid and venetoclax combination ( see below), first dose of venetoclax 50 mg and rituxan 4/3/25.    Nancie presents to the clinic today (4/23/25) with his wife for followup evaluation after his CAR-T T+139 days for relapsed mantle cell lymphoma and count check and dose 4 of rituxan of salvage regimen.  He is currently taking revlimid 15 mg on days 1-21 out of 28 days.  He started revlimid on 4/4/25 and today is day 21 of revlimid cycle.  For venetoclax ramp up he is currently on his last day of 200 mg and will be starting 400 mg daily starting tomorrow.      States he has been having sinus congestion since January.  He developed a productive cough and worsening congestion on Sunday.  No fevers, chills, or shortness of breath.  Didn't test self for flu or covid.  Taking throat lozenges.  No known sick contacts.  Energy level is good.  Busy week with work and had a party at his home.  Appetite is good.  Has good oral fluid intake. He has no there symptom complaints to report today.      Review of Systems   Constitutional:  Negative for appetite change, chills, diaphoresis, fatigue, fever and unexpected weight change.   Respiratory:  Positive for cough.    Cardiovascular: Negative.    Gastrointestinal:  Negative for abdominal pain, blood in stool, constipation, diarrhea, nausea and vomiting.   Genitourinary: Negative.     Musculoskeletal: Negative.  Negative for gait problem.   Skin: Negative.    Neurological:  Negative for dizziness, gait problem, light-headedness and numbness.   Hematological: Negative.    -------------------------------------------------------------------------------------------------------  Objective   BSA: 2.31 meters squared  /63 (BP Location: Right arm, Patient Position: Sitting, BP Cuff Size: Large adult)   Pulse 69   Temp 36.3 °C (97.3 °F) (Temporal)   Resp 16   Wt 104 kg (228 lb 8 oz)   SpO2 100%   BMI 30.45 kg/m²     Physical  Exam  Vitals reviewed.   Constitutional:       Appearance: Normal appearance.   HENT:      Head: Normocephalic and atraumatic.      Mouth/Throat:      Mouth: Mucous membranes are moist.   Eyes:      Extraocular Movements: Extraocular movements intact.      Conjunctiva/sclera: Conjunctivae normal.      Pupils: Pupils are equal, round, and reactive to light.   Cardiovascular:      Rate and Rhythm: Normal rate and regular rhythm.      Pulses: Normal pulses.      Heart sounds: Normal heart sounds.   Pulmonary:      Effort: Pulmonary effort is normal. No respiratory distress.      Breath sounds: Normal breath sounds. No wheezing.   Abdominal:      General: Abdomen is flat. Bowel sounds are normal. There is no distension.      Palpations: Abdomen is soft. There is no mass.      Tenderness: There is no abdominal tenderness.   Musculoskeletal:         General: No swelling. Normal range of motion.   Lymphadenopathy:      Comments: No lymphadenopathy   Skin:     General: Skin is warm and dry.   Neurological:      General: No focal deficit present.      Mental Status: He is alert and oriented to person, place, and time.   Psychiatric:         Mood and Affect: Mood normal.         Behavior: Behavior normal.         Thought Content: Thought content normal.         Judgment: Judgment normal.     Performance Status:  ECOG Score: 0- Fully active, able to carry on all pre-disease performance w/o restriction.     CAR-T product:  CAR-T [id number]  Cell infusion date: 12/6/2024  -------------------------------------------------------------------------------------------------------  Assessment/Plan      1. Stage IV mantle cell lymphoma itreated with Nordic regimen and consolidation autograft with BEAM preparative regimen April 2012.  Relapse 2019 treated with ibrutinib and venetoclax with complete pathology response until 3/ 2022. See above.     Recurrence July 2024 presenting with dropping platelet counts and bone marrow biopsy 7/25/24  showed focal involvement with mantle cell lymphoma (NGS cyclin D1 positive and Sox 11 positive. BIRC+ ad TP 53 positve with a VAF of 3%, BIRC3 positive). FISH negative for 17p deletion  CT imaging done on 8/7/24 shows thickening of sigmoid colon, development of mild to moderate retroperitoneal adenopathy, splenomegally and possible splenic infarct.      Patient started pirtobrutinib salvage August 12,2024 and feels better. However spleen size was increasing per CT imaging (8/6/24)  and physical exam so Dr. Hobbs planned to add venetoclax as follows, starting 10/13/24:  - 50 mg for first week, then 100 (10/20), and 200 mg (10/27) for 7 days each, then 400 mg (11/3/24- )    Pre CART staging:    BM biopsy low level involvment by mantle cells about 3% mantle cells by flow. Absence of CD19 on this  flow sample likely reflects insufficient sampling and positive for CD 19 on prior bone marrow sample of 7/24/24.    PET imaging 11/4/24   1. FDG avid lymphadenopathy throughout the neck, chest, abdomen and  pelvis as detailed above, consistent with lymphomatous involvement.  2. Intense FDG avid splenomegaly, consistent with lymphomatous  involvement. Peripheral regions of photopenia likely reflect areas of  infarction.  3. No PET evidence of FDG avid extranodal disease.on     ECHO 10/23/24 low normal LVEF 53%, cleared by Dr. Izaguirre.     Admitted 11/29/24 for his CAR-T cell therapy, on CASE 2419 (T0=12/6/24), prepped with Fludarabine/Cyclophosphamide.     PET scan 1/2/25:   IMPRESSION:  1.  Interval resolution of hypermetabolic lymphadenopathy above and below the diaphragm. Interval resolution hypermetabolic activity within the spleen with improving splenomegaly. (Deauville score of 1).  2. No evidence of new hypermetabolic robbie or extranodal lymphomatous disease.    Bone marrow biopsy performed 1/2/25 showed 1% mantle cells by IHC for SOX 11 and cycle D1 but nothing by flow.        3/13/25 Day 90  BM shows about 15%  lymphoma cells which are cyclin D1 positive, PET scan shows no lymphomatous involvement and continued decrease in spleen size.   Patients case reviewed at interdisciplinary tumor board with recommendation options as follows:    Patient and team have opted to initiate salvage treatment with venetoclax R2. ( Cipriano et al. Blood Advances 8:(2) , p 407, 2024)  Phase 2 dose revlimid 15 mg 21/28 days ( note dose change), venetoclax 600 mg daily after ramp up and rituxan 275 mg/m2 weekly for 4 weeks, then q 8 weeks. Regimen associated with 60% RRat 6 months and median PFS of 21 momths with overall survival of 31 months. Plan BM bx at day 90 after starting.      Peripheral blood flow, negative for clonal b cell. Rituxan to start on 4/3/25. All questions answered.   Leukopenia, etiology not clear, will hold bactrim.    Received C1D1 rituximab 4/3/25.  Started venetoclax ramp up on 4/3/25.  Started revlimid 15 mg daily on days 1-21 out of 28 days on 4/4/25.    4/24/25:  CBC results steady-see below.  Currently taking revlimid 15 mg on days 1-21 out of 28 days, today is day 21 of cycle.  Venetoclax taper: currently on 200 mg daily with plans to increase to 400 mg tomorrow for the next 9 days then will increase to 600 mg daily. Continue good oral fluid intake (at least 56 ounces per day).  Scheduled to receive dose 4 of 4 of rituximab today, but postponed by 2 weeks due to upper respiratory infection.  Plan for dose 4 of 4 of rituxan on 5/8/25.  Testing returned negative for influenza and covid, likely caused by viral infection.  Recommended supportive care, prescribed benzonatate 100 mg prn 3 times per day for cough.      Hemoglobin   Date Value Ref Range Status   04/24/2025 11.4 (L) 13.5 - 17.5 g/dL Final   04/17/2025 12.0 (L) 13.5 - 17.5 g/dL Final   04/10/2025 13.1 (L) 13.5 - 17.5 g/dL Final     Platelets   Date Value Ref Range Status   04/24/2025 136 (L) 150 - 450 x10*3/uL Final   04/17/2025 110 (L) 150 - 450 x10*3/uL  Final   04/10/2025 114 (L) 150 - 450 x10*3/uL Final     WBC   Date Value Ref Range Status   04/24/2025 1.4 (L) 4.4 - 11.3 x10*3/uL Final   04/17/2025 1.9 (L) 4.4 - 11.3 x10*3/uL Final   04/10/2025 1.9 (L) 4.4 - 11.3 x10*3/uL Final     Ferritin   Date Value Ref Range Status   04/24/2025 885 (H) 20 - 300 ng/mL Final   04/17/2025 846 (H) 20 - 300 ng/mL Final   04/10/2025 903 (H) 20 - 300 ng/mL Final     Fibrinogen   Date Value Ref Range Status   04/24/2025 533 (H) 200 - 400 mg/dL Final   04/17/2025 392 200 - 400 mg/dL Final   04/10/2025 306 200 - 400 mg/dL Final     C-Reactive Protein   Date Value Ref Range Status   04/24/2025 4.33 (H) <1.00 mg/dL Final   04/17/2025 0.95 <1.00 mg/dL Final   04/10/2025 1.54 (H) <1.00 mg/dL Final     IgG   Date Value Ref Range Status   04/17/2025 397 (L) 700 - 1,600 mg/dL Final     Comment:     MONOCLONAL PROTEINS MAY CAUSE FALSELY LOW  RESULTS IN THIS ASSAY. SERUM PROTEIN  ELECTROPHORESIS SHOULD BE DONE AS THE  FIRST TEST TO EVALUATE MONOCLONAL GAMMOPATHY.     04/10/2025 418 (L) 700 - 1,600 mg/dL Final     Comment:     MONOCLONAL PROTEINS MAY CAUSE FALSELY LOW  RESULTS IN THIS ASSAY. SERUM PROTEIN  ELECTROPHORESIS SHOULD BE DONE AS THE  FIRST TEST TO EVALUATE MONOCLONAL GAMMOPATHY.     03/27/2025 465 (L) 700 - 1,600 mg/dL Final     Comment:     MONOCLONAL PROTEINS MAY CAUSE FALSELY LOW  RESULTS IN THIS ASSAY. SERUM PROTEIN  ELECTROPHORESIS SHOULD BE DONE AS THE  FIRST TEST TO EVALUATE MONOCLONAL GAMMOPATHY.       Infectious prophylaxis:   Antiviral prophylaxis Acyclovir. Continue for at least 6 months.   Antifungal prophylaxis Fluconazole discontinued.    Start levofloxacin 500 mg daily on 4/3/25 due to neutropenia.   Bactrim stopped on 4/3 due to neutropenia.  Will need to initiate atovaquone in next few weeks if need to continue to hold for cytopenias.   Started atovaquone 4/24/25 for PJP prophylaxis.      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  Currently has upper respiratory  infection.  Plan to start IVIG infusion, can try for 5/8/25 if approved by insurance.  Educated Serafino and wife on rationale for IVIG infusions and possible side effects.  Understanding verbalized.       Post CAR-T Vaccines:  Will plan to start vaccines with influenza and covid vaccine about 3-4 months after CAR-T and will start post transplant vaccines around 6 months.   4/24/25:  Advised to obtain influenza (if able to get as flu season is over), and covid series from local pharmacy.  Provided printed paper with information regarding covid vaccination following CAR-T to give to pharmacy.  Advised to wait a few weeks until upper respiratory infection has cleared.    6 month vaccines to start around May 2025.       Health maintenance.   Colonoscopy completed 7/14/23: normal except internal hemorrhoids.  Recommendations to repeat in 2 years.    - Some aortic calcifications on imaging, started on prevastatin-managed by his PCP.  PCP increased pravastatin dose to 40 mg daily.  PCP switched to atorvastatin 40 mg daily.    RTC:   Postpone dose 4 of 4 of rituxan for 2 weeks due to upper respiratory infection, rescheduled for 5/8/25.  Continue revlimid 15 mg on days 1-21 out of 28 days.  Continue venetoclax ramp up.    Start atovaquone 1,500 mg daily.  Follow up visit with provider on 5/8/25.  Advised to obtain blood work prior to visit.  -----------------------------------------------------------------------------------------------------------------  NORBERT Enriquez

## 2025-04-24 ENCOUNTER — APPOINTMENT (OUTPATIENT)
Dept: HEMATOLOGY/ONCOLOGY | Facility: HOSPITAL | Age: 60
End: 2025-04-24
Payer: COMMERCIAL

## 2025-04-24 ENCOUNTER — LAB (OUTPATIENT)
Dept: LAB | Facility: HOSPITAL | Age: 60
End: 2025-04-24
Payer: COMMERCIAL

## 2025-04-24 ENCOUNTER — OFFICE VISIT (OUTPATIENT)
Dept: HEMATOLOGY/ONCOLOGY | Facility: HOSPITAL | Age: 60
End: 2025-04-24
Payer: COMMERCIAL

## 2025-04-24 VITALS
HEART RATE: 69 BPM | OXYGEN SATURATION: 100 % | TEMPERATURE: 97.3 F | SYSTOLIC BLOOD PRESSURE: 111 MMHG | RESPIRATION RATE: 16 BRPM | BODY MASS INDEX: 30.45 KG/M2 | WEIGHT: 228.5 LBS | DIASTOLIC BLOOD PRESSURE: 63 MMHG

## 2025-04-24 DIAGNOSIS — C83.17 MANTLE CELL LYMPHOMA OF SPLEEN: ICD-10-CM

## 2025-04-24 DIAGNOSIS — D84.9 IMMUNOCOMPROMISED: ICD-10-CM

## 2025-04-24 DIAGNOSIS — J06.9 UPPER RESPIRATORY TRACT INFECTION, UNSPECIFIED TYPE: ICD-10-CM

## 2025-04-24 DIAGNOSIS — Z92.850 HISTORY OF CHIMERIC ANTIGEN RECEPTOR T-CELL THERAPY: ICD-10-CM

## 2025-04-24 DIAGNOSIS — D80.1 HYPOGAMMAGLOBULINEMIA (MULTI): ICD-10-CM

## 2025-04-24 DIAGNOSIS — Z94.84 HX OF AUTOLOGOUS STEM CELL TRANSPLANT: ICD-10-CM

## 2025-04-24 DIAGNOSIS — C83.10 MANTLE CELL LYMPHOMA, UNSPECIFIED BODY REGION (MULTI): ICD-10-CM

## 2025-04-24 DIAGNOSIS — C83.10 MANTLE CELL LYMPHOMA, UNSPECIFIED BODY REGION (MULTI): Primary | ICD-10-CM

## 2025-04-24 LAB
ALBUMIN SERPL BCP-MCNC: 4.4 G/DL (ref 3.4–5)
ALP SERPL-CCNC: 75 U/L (ref 33–136)
ALT SERPL W P-5'-P-CCNC: 34 U/L (ref 10–52)
ANION GAP SERPL CALC-SCNC: 11 MMOL/L (ref 10–20)
APTT PPP: 28 SECONDS (ref 26–36)
AST SERPL W P-5'-P-CCNC: 24 U/L (ref 9–39)
BASOPHILS # BLD MANUAL: 0.01 X10*3/UL (ref 0–0.1)
BASOPHILS NFR BLD MANUAL: 1 %
BILIRUB SERPL-MCNC: 1.4 MG/DL (ref 0–1.2)
BUN SERPL-MCNC: 17 MG/DL (ref 6–23)
CALCIUM SERPL-MCNC: 9.2 MG/DL (ref 8.6–10.3)
CHLORIDE SERPL-SCNC: 103 MMOL/L (ref 98–107)
CO2 SERPL-SCNC: 29 MMOL/L (ref 21–32)
CREAT SERPL-MCNC: 1.28 MG/DL (ref 0.5–1.3)
CRP SERPL-MCNC: 4.33 MG/DL
DOHLE BOD BLD QL SMEAR: PRESENT
EGFRCR SERPLBLD CKD-EPI 2021: 64 ML/MIN/1.73M*2
EOSINOPHIL # BLD MANUAL: 0.11 X10*3/UL (ref 0–0.7)
EOSINOPHIL NFR BLD MANUAL: 8 %
ERYTHROCYTE [DISTWIDTH] IN BLOOD BY AUTOMATED COUNT: 13.9 % (ref 11.5–14.5)
FERRITIN SERPL-MCNC: 885 NG/ML (ref 20–300)
FIBRINOGEN PPP-MCNC: 533 MG/DL (ref 200–400)
FLUAV RNA RESP QL NAA+PROBE: NOT DETECTED
FLUBV RNA RESP QL NAA+PROBE: NOT DETECTED
GLUCOSE SERPL-MCNC: 111 MG/DL (ref 74–99)
HCT VFR BLD AUTO: 33.3 % (ref 41–52)
HGB BLD-MCNC: 11.4 G/DL (ref 13.5–17.5)
IMM GRANULOCYTES # BLD AUTO: 0.18 X10*3/UL (ref 0–0.7)
IMM GRANULOCYTES NFR BLD AUTO: 12.7 % (ref 0–0.9)
INR PPP: 1.1 (ref 0.9–1.1)
LDH SERPL L TO P-CCNC: 211 U/L (ref 84–246)
LYMPHOCYTES # BLD MANUAL: 0.39 X10*3/UL (ref 1.2–4.8)
LYMPHOCYTES NFR BLD MANUAL: 28 %
MAGNESIUM SERPL-MCNC: 2.13 MG/DL (ref 1.6–2.4)
MCH RBC QN AUTO: 31.8 PG (ref 26–34)
MCHC RBC AUTO-ENTMCNC: 34.2 G/DL (ref 32–36)
MCV RBC AUTO: 93 FL (ref 80–100)
MONOCYTES # BLD MANUAL: 0.24 X10*3/UL (ref 0.1–1)
MONOCYTES NFR BLD MANUAL: 17 %
NEUTROPHILS # BLD MANUAL: 0.65 X10*3/UL (ref 1.2–7.7)
NEUTS BAND # BLD MANUAL: 0.03 X10*3/UL (ref 0–0.7)
NEUTS BAND NFR BLD MANUAL: 2 %
NEUTS HYPERSEG BLD QL SMEAR: PRESENT
NEUTS SEG # BLD MANUAL: 0.62 X10*3/UL (ref 1.2–7)
NEUTS SEG NFR BLD MANUAL: 44 %
NRBC BLD-RTO: 0 /100 WBCS (ref 0–0)
OVALOCYTES BLD QL SMEAR: ABNORMAL
PLATELET # BLD AUTO: 136 X10*3/UL (ref 150–450)
POLYCHROMASIA BLD QL SMEAR: ABNORMAL
POTASSIUM SERPL-SCNC: 3.8 MMOL/L (ref 3.5–5.3)
PROT SERPL-MCNC: 6.4 G/DL (ref 6.4–8.2)
PROTHROMBIN TIME: 12.6 SECONDS (ref 9.8–12.4)
RBC # BLD AUTO: 3.59 X10*6/UL (ref 4.5–5.9)
RBC MORPH BLD: ABNORMAL
SARS-COV-2 RNA RESP QL NAA+PROBE: NOT DETECTED
SODIUM SERPL-SCNC: 139 MMOL/L (ref 136–145)
TOTAL CELLS COUNTED BLD: 100
URATE SERPL-MCNC: 5 MG/DL (ref 4–7.5)
WBC # BLD AUTO: 1.4 X10*3/UL (ref 4.4–11.3)

## 2025-04-24 PROCEDURE — 82728 ASSAY OF FERRITIN: CPT

## 2025-04-24 PROCEDURE — 85384 FIBRINOGEN ACTIVITY: CPT

## 2025-04-24 PROCEDURE — 85730 THROMBOPLASTIN TIME PARTIAL: CPT

## 2025-04-24 PROCEDURE — 85007 BL SMEAR W/DIFF WBC COUNT: CPT

## 2025-04-24 PROCEDURE — 84075 ASSAY ALKALINE PHOSPHATASE: CPT

## 2025-04-24 PROCEDURE — 84550 ASSAY OF BLOOD/URIC ACID: CPT

## 2025-04-24 PROCEDURE — 85027 COMPLETE CBC AUTOMATED: CPT

## 2025-04-24 PROCEDURE — 83615 LACTATE (LD) (LDH) ENZYME: CPT

## 2025-04-24 PROCEDURE — 99215 OFFICE O/P EST HI 40 MIN: CPT

## 2025-04-24 PROCEDURE — 83735 ASSAY OF MAGNESIUM: CPT

## 2025-04-24 PROCEDURE — 36415 COLL VENOUS BLD VENIPUNCTURE: CPT

## 2025-04-24 PROCEDURE — 86140 C-REACTIVE PROTEIN: CPT

## 2025-04-24 PROCEDURE — 87636 SARSCOV2 & INF A&B AMP PRB: CPT

## 2025-04-24 RX ORDER — LENALIDOMIDE 15 MG/1
15 CAPSULE ORAL DAILY
Qty: 21 CAPSULE | Refills: 0 | Status: SHIPPED | OUTPATIENT
Start: 2025-04-24 | End: 2025-05-15

## 2025-04-24 RX ORDER — BENZONATATE 100 MG/1
100 CAPSULE ORAL 3 TIMES DAILY PRN
Qty: 20 CAPSULE | Refills: 0 | Status: SHIPPED | OUTPATIENT
Start: 2025-04-24 | End: 2025-05-01

## 2025-04-24 ASSESSMENT — ENCOUNTER SYMPTOMS: COUGH: 1

## 2025-04-24 ASSESSMENT — PAIN SCALES - GENERAL: PAINLEVEL_OUTOF10: 0-NO PAIN

## 2025-04-24 NOTE — PROGRESS NOTES
ONCOLOGY MEDICATION REFILL NOTE:    [unfilled]  [facility] with Zainab Laurie LANDEROS  on 4/24/25 for management of their  mantle cell lymphoma .      [unfilled]  Current Outpatient Medications on File Prior to Visit   Medication Sig Dispense Refill    acyclovir (Zovirax) 400 mg tablet Take 1 tablet (400 mg) by mouth 2 times a day. 60 tablet 3    aspirin 81 mg EC tablet Take 1 tablet (81 mg) by mouth once daily. Take daily every day to prevent blood clots while receiving revlimid 30 tablet 11    atorvastatin (Lipitor) 40 mg tablet Take 1 tablet (40 mg) by mouth once daily in the morning. 30 tablet 2    hydrocortisone 1 % cream Apply 1 Application topically 2 times a day as needed (hemorrhoids).      levoFLOXacin (Levaquin) 500 mg tablet Take 1 tablet (500 mg) by mouth once daily. 30 tablet 1    ondansetron (Zofran) 8 mg tablet Take 1 tablet (8 mg) by mouth every 8 hours if needed for nausea or vomiting. 30 tablet 5    venetoclax (Venclexta) 100 mg tablet Take 1 tablet (100 mg total) by mouth once daily for 7 days, THEN 2 tablets (200 mg total) once daily for 7 days, THEN 4 tablets (400 mg total) once daily for 9 days. Take with food. Start after conclusion of 50mg tablet daily for 7 days. 56 tablet 0    [DISCONTINUED] lenalidomide (Revlimid) 15 mg capsule Take 1 capsule (15 mg total) by mouth once daily for 21 days. 21 capsule 0    sulfamethoxazole-trimethoprim (Bactrim DS) 800-160 mg tablet Take 1 tablet by mouth 3 times a week. Take on Mondays, Wednesdays, and Fridays. Do not start until instructed. (Patient not taking: Reported on 4/24/2025) 12 tablet 2    [START ON 4/30/2025] venetoclax (Venclexta) 100 mg tablet Take 6 tablets (600 mg total) by mouth once daily.  Take with food. (Patient not taking: Reported on 4/24/2025) 360 tablet 3    [DISCONTINUED] allopurinol (Zyloprim) 300 mg tablet Take 1 tablet (300 mg) by mouth once daily. Start on first day of rituxan for 30  "days (Patient not taking: Reported on 4/24/2025) 30 tablet 0     No current facility-administered medications on file prior to visit.      Relevant Myeloma Labs:   CBCd:  Recent Labs     04/24/25  0831 04/17/25  0821 04/10/25  0838   WBC 1.4* 1.9* 1.9*   ANC 0.65* 0.85* 0.63*   HGB 11.4* 12.0* 13.1*   HCT 33.3* 35.4* 38.5*   * 110* 114*   MCV 93 94 94       Serum Protein Electropheresis:  Recent Labs     04/24/25  0831 04/17/25  0821 04/10/25  0838   PROT 6.4 6.3* 6.8       Serum Free Light Chains:  No results for input(s): \"KAPPA\", \"LAMBDA\", \"KAPLS\", \"LIGHTCHAIN\" in the last 20030 hours.    Immunoglobulins:  Recent Labs     04/17/25  0821 04/10/25  0838   * 418*         24 Hour Urine Protein Electropheresis:   No results for input(s): \"EDPC20ZBE\", \"ALBPROTUR\", \"UPK01\", \"GIMLGFFJ823\", \"UPEP\", \"IFESU\", \"PR35\" in the last 81810 hours.       [unfilled]  Mr. Armenta continues to tolerate his R2 + venetoclax. He is holding rituximab due to illness- will resume in 2 weeks. WBC and HGB are trending down, will monitor.   Clot Prevention: Thromboprophylaxis: Low dose aspirin      Per Dr. Yvonne Hobbs authorization, on 04/24/25 I refilled lenalidomide 15mg PO daily on days 1-21/28.  QTY 21 No refills. Authorization # [id number] obtained, prescription was sent to Matt NixD, [facility]  Outpatient Hematology Pharmacist  [phone]  "

## 2025-04-25 PROBLEM — D80.1 HYPOGAMMAGLOBULINEMIA (MULTI): Status: ACTIVE | Noted: 2025-04-25

## 2025-04-25 PROBLEM — J06.9 UPPER RESPIRATORY TRACT INFECTION: Status: ACTIVE | Noted: 2025-04-25

## 2025-04-25 LAB
CMV DNA SERPL NAA+PROBE-LOG IU: NORMAL {LOG_IU}/ML
LABORATORY COMMENT REPORT: NOT DETECTED

## 2025-04-25 RX ORDER — ALBUTEROL SULFATE 0.83 MG/ML
3 SOLUTION RESPIRATORY (INHALATION) AS NEEDED
OUTPATIENT
Start: 2025-05-08

## 2025-04-25 RX ORDER — EPINEPHRINE 0.3 MG/.3ML
0.3 INJECTION SUBCUTANEOUS EVERY 5 MIN PRN
OUTPATIENT
Start: 2025-05-08

## 2025-04-25 RX ORDER — ATOVAQUONE 750 MG/5ML
1500 SUSPENSION ORAL DAILY
Qty: 300 ML | Refills: 1 | Status: SHIPPED | OUTPATIENT
Start: 2025-04-25 | End: 2025-06-24

## 2025-04-25 RX ORDER — FAMOTIDINE 10 MG/ML
20 INJECTION, SOLUTION INTRAVENOUS ONCE AS NEEDED
OUTPATIENT
Start: 2025-05-08

## 2025-04-25 RX ORDER — DIPHENHYDRAMINE HYDROCHLORIDE 50 MG/ML
50 INJECTION, SOLUTION INTRAMUSCULAR; INTRAVENOUS AS NEEDED
OUTPATIENT
Start: 2025-05-08

## 2025-04-25 RX ORDER — ACETAMINOPHEN 325 MG/1
650 TABLET ORAL ONCE
OUTPATIENT
Start: 2025-05-08

## 2025-04-25 RX ORDER — DIPHENHYDRAMINE HCL 25 MG
25 CAPSULE ORAL ONCE
OUTPATIENT
Start: 2025-05-08

## 2025-04-30 DIAGNOSIS — C83.10 MANTLE CELL LYMPHOMA, UNSPECIFIED BODY REGION (MULTI): Primary | ICD-10-CM

## 2025-05-01 RX ORDER — PROCHLORPERAZINE MALEATE 10 MG
10 TABLET ORAL EVERY 6 HOURS PRN
Qty: 30 TABLET | Refills: 5 | Status: SHIPPED | OUTPATIENT
Start: 2025-05-01

## 2025-05-06 ASSESSMENT — ENCOUNTER SYMPTOMS
COUGH: 1
NUMBNESS: 0
FEVER: 0
FATIGUE: 0
DIZZINESS: 0
DIAPHORESIS: 0
MUSCULOSKELETAL NEGATIVE: 1
CARDIOVASCULAR NEGATIVE: 1
UNEXPECTED WEIGHT CHANGE: 0
HEMATOLOGIC/LYMPHATIC NEGATIVE: 1
CONSTIPATION: 0
ABDOMINAL PAIN: 0
APPETITE CHANGE: 0
CHILLS: 0
BLOOD IN STOOL: 0
DIARRHEA: 0
VOMITING: 0
LIGHT-HEADEDNESS: 0

## 2025-05-07 ENCOUNTER — LAB REQUISITION (OUTPATIENT)
Dept: LAB | Facility: CLINIC | Age: 60
End: 2025-05-07
Payer: COMMERCIAL

## 2025-05-07 DIAGNOSIS — C83.10 MANTLE CELL LYMPHOMA, UNSPECIFIED SITE (MULTI): ICD-10-CM

## 2025-05-07 NOTE — PROGRESS NOTES
Patient ID: Nancie Armenta is a 60 y.o. male.    Treatment:   Oncology History Overview Note           Patient Visit Information:   Visit Type: Follow Up Visit      Cancer History:   Treatment Synopsis:     1. Stage IV A mantle cell non-Hodgkin lymphoma involving the gastrointestinal tract diagnosed in October 2011, with diffuse gastrointestinal involvement. The patient was treated  with RCHOP alternating with Rituxan and high-dose Mamie-C and received total of 6 cycles of chemotherapy which he finished in February 2012.   Allergic to Augmentin, causes hives.     2. Patient underwent beam chemotherapy followed by autologous peripheral stem cell transplant in April 2012, by Dr. Yvonne Hobbs at [facility], was also involved in phase 3 clinical trial up killed shingles vaccine versus placebo ([employer]-1 Z10  study).     3.The patient developed shingles in January 2014, involving left flank.      4. 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      5.  Recurrent mantle cell lymphoma June 2019 assx presentation with leukocytosis. Peripheral blood flow which showed a CD5 positve clonal lymphocytosis. 6/2/19 CT of chest abdomen and pelvis revealed  splenomegaly of 15 cm compared to 12.8 cm last evaluation. Recurrence confirmed by FISH on peripheral blood earlier this month.  BM biopsy which showed 5% Mantle cell involvement although peripheral blood shows 30% involvement. CT imaging showed splenomegaly. PET scan declined by insurance twice.  Patient underwent colonoscopy  with Dr. Ac ( [place]) 8/8/19 which showed cobblestoning of colon and multiple biopsies including terminal ileum, appendix, cecum,  rectosigmoid positive for mantle cell lymphoma.     6. Initiation of acalabrutinib August 2019 with minimal response. Switched to ibrutinib and venetoclax 12/20/20 with clearing of t(11, 14) by by FISH ( 1/2020) and resolution of involvement of colon by endoscopy and pathology on exam 3/2/2020! BM biopsy  4/2020 HEATHER.     C-scope (3/11/22): at OSH: negative, no evidence of disease. PET scan ( 3/14/22): no abnormal uptake. Deauville 1. BMBx (3/17/22): negative, no evidence of MCL.   March 2022, ibrutinib and venetoclax discontinued.    July 2024 development of Tpenia, bone marrow biopsy 7/25/24 showed focal involvement with mantle cell lymphoma (NGS cyclin D1 positive and Sox 11 positive. BIRC+ and TP 53 positve with a VAF of 3%, BIRC3 positive). FISH negative for 17p deletion  CT imaging done on 8/7/24 shows thickening of sigmoid colon, development of mild to moderate retroperitoneal adenopathy, splenomegally and possible splenic infarct.    Pirotbrutib 200 mg daily initiated 8/12/24.  Due to inadequate response, he started the ventoclax (50 mg) on Adolfo 10/13, then increased to 100 mg , 200 mg and 400 mg      BMBx (10/23/24) with low level involvement by mantle cells about 3% Mantle cells by flow.      PET/CT (11/4/24) with FDG avid lymphadenopathy throughout the neck, chest, abdomen, and pelvis c/w lymphomatous involvement. Intense FDG avid splenomegaly. Peripheral regions of photopenia likely reflect areas of infarction.      Pirtobrutinib and Venetoclax stopped 11/14/24 in preparation for CART collection.      Admitted 11/29/24 for his CAR-T cell therapy, on CASE 2419 (T0=12/6/24), prepped with Fludarabine/Cyclophosphamide. Hospital course included grade 2 CRS (fever, tachycardia) on 12/7, received Toxi x1, infectious workup negative.  He also had symptomatic orthostatic hypotension on 12/8, resolved with fluids by 12/9.  He was discharged home on 12/18/24.     T+ 90 progressive tumorous involvement of bone  marrow, PET continues to show CR.      7. S/P COVID 19 infection 12/1/2020, no sequelae          Lymphoma   10/20/2011 Initial Diagnosis    Lymphoma (Multi)      Cellular Therapy    Mr. Nancie Armenta is a 59 y.o. male presenting with relapsed Mantle Cell Lymphoma. CAR-T cell therapy, on CASE 2419 (T0=12/6/24), prepped with Fludarabine/Cyclophosphamide.      Hosp Course:  - Grade 2 CRS (fever, tachycardia) on 12/7, gave Toxi x1, infectious workup negative. Resolved by 12/8.  - Symptomatic Orthostatic Hypotension on 12/8, resolved with fluids by 12/9.     Mantle cell lymphoma of spleen   10/13/2016 Initial Diagnosis    Mantle cell lymphoma of spleen (CMS/HCC)     Mantle cell lymphoma   11/18/2024 Initial Diagnosis    Mantle cell lymphoma     11/30/2024 - 12/20/2024 Bone Marrow Transplant        Cellular Therapy    Mr. Nancie Armenta is a 59 y.o. male presenting with relapsed Mantle Cell Lymphoma. CAR-T cell therapy, on CASE 2419 (T0=12/6/24), prepped with Fludarabine/Cyclophosphamide.      Hosp Course:  - Grade 2 CRS (fever, tachycardia) on 12/7, gave Toxi x1, infectious workup negative. Resolved by 12/8.  - Symptomatic Orthostatic Hypotension on 12/8, resolved with fluids by 12/9.     4/3/2025 -  Chemotherapy    RiTUXimab (Weekly), 28 Day Cycle      7/10/2025 -  Chemotherapy    RiTUXimab, 8 Week Cycles - Maintenance       Response:     Past Medical History:  Renal insufficiency.   Past Medical History:   Diagnosis Date    Fourth (trochlear) nerve palsy, right eye 06/08/2017    Right-sided fourth cranial nerve palsy    Fourth (trochlear) nerve palsy, right eye 06/08/2017    Right-sided fourth cranial nerve palsy    Malignant neoplasm of connective and soft tissue, unspecified 06/08/2017    Cancer of blood vessel     Surgical History:   Past Surgical History:   Procedure Laterality Date    IR CVC PICC  11/29/2024    IR CVC PICC 11/29/2024 [facility] SCC ANGIO    SHOULDER SURGERY  07/13/2017    Shoulder Surgery       Family History:   Family History   Problem Relation Name Age of Onset    Cataracts Mother      Cataracts Father      Other (chronic lymphoid leukemia) Father       Social History:  .  Working full time for [employer].    Social History     Tobacco Use    Smoking status: Never    Smokeless tobacco: Never   Vaping Use    Vaping status: Never Used   Substance Use Topics    Alcohol use: Never    Drug use: Never   -------------------------------------------------------------------------------------------------------  Subjective       HPI    Nancie Armenta is a 60 year old man with PMH of hyperglycemia, renal insufficiency, and stage IV A mantle cell non hodgkin lymphoma involving the gastrointestinal tract, s/p autologous SCT (April 2012).      Early in July 2024 patient noted to have dropping platelet counts and bone marrow biopsy 7/25/24 showed focal involvement with mantle cell lymphoma (NGS cyclin D1 positive and Sox 11 positive. BIRC+ ad TP 53 positve with a VAF of 3%, BIRC3 positive). FISH negative for 17p deletion  CT imaging done on 8/7/24 shows thickening of sigmoid colon, development of mild to moderate retroperitoneal adenopathy, splenomegally and possible splenic infarct.  Started Pirtobrutinib salvage on 8/12/24.  Added Venetoclax starting 10/13/24 since spleen size was increasing on repeat CT imaging and on physical exam.  BMBx (10/23/24) with low level involvement by mantle cells about 3% Mantle cells by flow.      Pirtobrutinib and Venetoclax stopped 11/14/24 in preparation for CART collection.  Admitted 11/29/24 for his CAR-T cell therapy, on CASE 2419 (T0=12/6/24), prepped with     Fludarabine/Cyclophosphamide. Hospital course included grade 2 CRS (fever, tachycardia) on 12/7, received Toxi x1, infectious workup negative.  He also had symptomatic orthostatic hypotension on 12/8, resolved with fluids by 12/9.  He was discharged home on 12/18/24.      Unfortunatley BM biopsy from 3/13/25  showing residual marrow disease of about 15% on his most recent bone marrow biopsy.  We have opted for rituxan, revlimid and venetoclax combination ( see below), first dose of venetoclax 50 mg and rituxan 4/3/25.    Nancie presents to the clinic today (5/8/25) with his wife for followup evaluation after his CAR-T T+153 days ago for relapsed mantle cell lymphoma, count check, and is scheduled to receive dose 4 of 4 rituxan of salvage regimen.  He is currently taking revlimid 15 mg on days 1-21 out of 28 days.  He started revlimid on 4/4/25.  Today is day 6 of current revlimid cycle.  He is currently taking venetoclax 600 mg daily, started 600 mg dose on 5/3/25.  He was just approved for monthly IVIG infusions and will receive his first infusion today.      Continues to have intermittent sinus congestion that started around January-has allergies.  Cold symptoms have resolved except mild productive cough.  No fevers/chills, or shortness of breath.  Testing on 4/24/25 was negative for influenza and covid.  Energy level is okay.  Working full time.  He noticed mild nausea when started back on day 1 of revlimid, but this has improved.  Is planning to go to [place] for about 10 days around the end of July.    Review of Systems   Constitutional:  Negative for appetite change, chills, diaphoresis, fatigue, fever and unexpected weight change.   Respiratory:  Positive for cough. Negative for shortness of breath.    Cardiovascular: Negative.    Gastrointestinal:  Positive for nausea. Negative for abdominal pain, blood in stool, constipation, diarrhea and vomiting.   Genitourinary: Negative.     Musculoskeletal: Negative.  Negative for gait problem.   Skin: Negative.    Neurological:  Negative for dizziness, gait problem, light-headedness and numbness.   Hematological: Negative.    -------------------------------------------------------------------------------------------------------  Objective   BSA: There is no height or  weight on file to calculate BSA.  There were no vitals taken for this visit.    Physical Exam  Vitals reviewed.   Constitutional:       Appearance: Normal appearance.   HENT:      Head: Normocephalic and atraumatic.      Mouth/Throat:      Mouth: Mucous membranes are moist.   Eyes:      Extraocular Movements: Extraocular movements intact.      Conjunctiva/sclera: Conjunctivae normal.      Pupils: Pupils are equal, round, and reactive to light.   Cardiovascular:      Rate and Rhythm: Normal rate and regular rhythm.      Pulses: Normal pulses.      Heart sounds: Normal heart sounds.   Pulmonary:      Effort: Pulmonary effort is normal. No respiratory distress.      Breath sounds: Normal breath sounds. No wheezing.   Abdominal:      General: Abdomen is flat. Bowel sounds are normal. There is no distension.      Palpations: Abdomen is soft. There is no mass.      Tenderness: There is no abdominal tenderness.   Musculoskeletal:         General: No swelling. Normal range of motion.   Lymphadenopathy:      Comments: No lymphadenopathy   Skin:     General: Skin is warm and dry.   Neurological:      General: No focal deficit present.      Mental Status: He is alert and oriented to person, place, and time.   Psychiatric:         Mood and Affect: Mood normal.         Behavior: Behavior normal.         Thought Content: Thought content normal.         Judgment: Judgment normal.     Performance Status:  ECOG Score: 0- Fully active, able to carry on all pre-disease performance w/o restriction.     CAR-T product:  CAR-T [id number]  Cell infusion date: 12/6/2024  -------------------------------------------------------------------------------------------------------  Assessment/Plan      1. Stage IV mantle cell lymphoma itreated with Nordic regimen and consolidation autograft with BEAM preparative regimen April 2012.  Relapse 2019 treated with ibrutinib and venetoclax with complete pathology response until 3/ 2022. See above.      Recurrence July 2024 presenting with dropping platelet counts and bone marrow biopsy 7/25/24 showed focal involvement with mantle cell lymphoma (NGS cyclin D1 positive and Sox 11 positive. BIRC+ ad TP 53 positve with a VAF of 3%, BIRC3 positive). FISH negative for 17p deletion  CT imaging done on 8/7/24 shows thickening of sigmoid colon, development of mild to moderate retroperitoneal adenopathy, splenomegally and possible splenic infarct.      Patient started pirtobrutinib salvage August 12,2024 and feels better. However spleen size was increasing per CT imaging (8/6/24)  and physical exam so Dr. Hobbs planned to add venetoclax as follows, starting 10/13/24:  - 50 mg for first week, then 100 (10/20), and 200 mg (10/27) for 7 days each, then 400 mg (11/3/24- )    Pre CART staging:    BM biopsy low level involvment by mantle cells about 3% mantle cells by flow. Absence of CD19 on this  flow sample likely reflects insufficient sampling and positive for CD 19 on prior bone marrow sample of 7/24/24.    PET imaging 11/4/24   1. FDG avid lymphadenopathy throughout the neck, chest, abdomen and  pelvis as detailed above, consistent with lymphomatous involvement.  2. Intense FDG avid splenomegaly, consistent with lymphomatous  involvement. Peripheral regions of photopenia likely reflect areas of  infarction.  3. No PET evidence of FDG avid extranodal disease.on     ECHO 10/23/24 low normal LVEF 53%, cleared by Dr. Izaguirre.     Admitted 11/29/24 for his CAR-T cell therapy, on CASE 2419 (T0=12/6/24), prepped with Fludarabine/Cyclophosphamide.     PET scan 1/2/25:   IMPRESSION:  1.  Interval resolution of hypermetabolic lymphadenopathy above and below the diaphragm. Interval resolution hypermetabolic activity within the spleen with improving splenomegaly. (Deauville score of 1).  2. No evidence of new hypermetabolic robbie or extranodal lymphomatous disease.    Bone marrow biopsy performed 1/2/25 showed 1% mantle cells by  IHC for SOX 11 and cycle D1 but nothing by flow.        3/13/25 Day 90  BM shows about 15% lymphoma cells which are cyclin D1 positive, PET scan shows no lymphomatous involvement and continued decrease in spleen size.   Patients case reviewed at interdisciplinary tumor board with recommendation options as follows:    Patient and team have opted to initiate salvage treatment with venetoclax R2. ( Cipriano et al. Blood Advances 8:(2) , p 407, 2024)  Phase 2 dose revlimid 15 mg 21/28 days ( note dose change), venetoclax 600 mg daily after ramp up and rituxan 275 mg/m2 weekly for 4 weeks, then q 8 weeks. Regimen associated with 60% RRat 6 months and median PFS of 21 momths with overall survival of 31 months. Plan BM bx at day 90 after starting.      Peripheral blood flow, negative for clonal b cell. Rituxan to start on 4/3/25. All questions answered.   Leukopenia, etiology not clear, will hold bactrim.    Received dose 1 of 4 rituximab 4/3/25.  Started venetoclax ramp up on 4/3/25.  Started revlimid 15 mg daily on days 1-21 out of 28 days on 4/4/25.    5/8/25:  Continues to have neutropenia, ANC 0.63, hgb stable at 11.8 and plts stable at 181.  Cold symptoms have improved since last visit, continues to have mild cough.  No concerning findings on physical examination.  Currently taking revlimid 15 mg on days 1-21 out of 28 days, today is day 6 of cycle.  Venetoclax taper: currently on 600 mg daily. Continue good oral fluid intake (at least 56 ounces per day).  Scheduled to receive dose 4 of 4 of rituximab today, as was postponed by 2 weeks due to upper respiratory infection.    Plan for maintenance rituximab to start July 10th.  Follow up visit in 1 month.      Hemoglobin   Date Value Ref Range Status   05/08/2025 11.8 (L) 13.5 - 17.5 g/dL Final   04/24/2025 11.4 (L) 13.5 - 17.5 g/dL Final   04/17/2025 12.0 (L) 13.5 - 17.5 g/dL Final     Platelets   Date Value Ref Range Status   05/08/2025 181 150 - 450 x10*3/uL Final    04/24/2025 136 (L) 150 - 450 x10*3/uL Final   04/17/2025 110 (L) 150 - 450 x10*3/uL Final     WBC   Date Value Ref Range Status   05/08/2025 1.4 (L) 4.4 - 11.3 x10*3/uL Final   04/24/2025 1.4 (L) 4.4 - 11.3 x10*3/uL Final   04/17/2025 1.9 (L) 4.4 - 11.3 x10*3/uL Final     Ferritin   Date Value Ref Range Status   05/08/2025 1,078 (H) 20 - 300 ng/mL Final   04/24/2025 885 (H) 20 - 300 ng/mL Final   04/17/2025 846 (H) 20 - 300 ng/mL Final     Fibrinogen   Date Value Ref Range Status   05/08/2025 464 (H) 200 - 400 mg/dL Final   04/24/2025 533 (H) 200 - 400 mg/dL Final   04/17/2025 392 200 - 400 mg/dL Final     C-Reactive Protein   Date Value Ref Range Status   05/08/2025 0.78 <1.00 mg/dL Final   04/24/2025 4.33 (H) <1.00 mg/dL Final   04/17/2025 0.95 <1.00 mg/dL Final     IgG   Date Value Ref Range Status   05/08/2025 366 (L) 700 - 1,600 mg/dL Final     Comment:     MONOCLONAL PROTEINS MAY CAUSE FALSELY LOW  RESULTS IN THIS ASSAY. SERUM PROTEIN  ELECTROPHORESIS SHOULD BE DONE AS THE  FIRST TEST TO EVALUATE MONOCLONAL GAMMOPATHY.     04/17/2025 397 (L) 700 - 1,600 mg/dL Final     Comment:     MONOCLONAL PROTEINS MAY CAUSE FALSELY LOW  RESULTS IN THIS ASSAY. SERUM PROTEIN  ELECTROPHORESIS SHOULD BE DONE AS THE  FIRST TEST TO EVALUATE MONOCLONAL GAMMOPATHY.     04/10/2025 418 (L) 700 - 1,600 mg/dL Final     Comment:     MONOCLONAL PROTEINS MAY CAUSE FALSELY LOW  RESULTS IN THIS ASSAY. SERUM PROTEIN  ELECTROPHORESIS SHOULD BE DONE AS THE  FIRST TEST TO EVALUATE MONOCLONAL GAMMOPATHY.       Infectious prophylaxis:   Antiviral prophylaxis Acyclovir. Continue for at least 6 months.   Antifungal prophylaxis Fluconazole discontinued.    Start levofloxacin 500 mg daily on 4/3/25 due to neutropenia.   Bactrim stopped on 4/3 due to neutropenia.  Will need to initiate atovaquone in next few weeks if need to continue to hold for cytopenias.   Started atovaquone 4/24/25 for PJP prophylaxis.      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  Currently has upper respiratory infection.  Plan to start IVIG infusion, can try for 5/8/25 if approved by insurance.  Educated Serafino and wife on rationale for IVIG infusions and possible side effects.  Understanding verbalized.    5/8/25:  IgG level 366 today.  Monthly IVIG infusions approved by insurance.  Scheduled to receive his first IVIG infusion today.       Post CAR-T Vaccines:  Will plan to start vaccines with influenza and covid vaccine about 3-4 months after CAR-T and will start post transplant vaccines around 6 months.   4/24/25:  Advised to obtain influenza (if able to get as flu season is over), and covid series from local pharmacy.  Provided printed paper with information regarding covid vaccination following CAR-T to give to pharmacy.  Advised to wait a few weeks until upper respiratory infection has cleared.    Plan to start 6 month vaccines on 6/5/25.     Health maintenance.   Colonoscopy completed 7/14/23: normal except internal hemorrhoids.  Recommendations to repeat in 2 years.    - Some aortic calcifications on imaging, started on prevastatin-managed by his PCP.  PCP increased pravastatin dose to 40 mg daily.  PCP switched to atorvastatin 40 mg daily.    RTC:   Continue revlimid 15 mg on days 1-21 out of 28 days and venetoclax 600 mg daily.  6/5/25:  Follow up visit with provider, 6 month vaccines, and IVIG infusion.  Advised to obtain blood work prior to visit.  ----------------------------------------------------------------------------------------------------------------  NORBERT Enriquez

## 2025-05-08 ENCOUNTER — OFFICE VISIT (OUTPATIENT)
Dept: HEMATOLOGY/ONCOLOGY | Facility: HOSPITAL | Age: 60
End: 2025-05-08
Payer: COMMERCIAL

## 2025-05-08 ENCOUNTER — INFUSION (OUTPATIENT)
Dept: HEMATOLOGY/ONCOLOGY | Facility: HOSPITAL | Age: 60
End: 2025-05-08
Payer: COMMERCIAL

## 2025-05-08 ENCOUNTER — LAB (OUTPATIENT)
Dept: LAB | Facility: HOSPITAL | Age: 60
End: 2025-05-08
Payer: COMMERCIAL

## 2025-05-08 VITALS
WEIGHT: 223.11 LBS | HEART RATE: 70 BPM | DIASTOLIC BLOOD PRESSURE: 62 MMHG | BODY MASS INDEX: 29.73 KG/M2 | TEMPERATURE: 98.1 F | RESPIRATION RATE: 16 BRPM | OXYGEN SATURATION: 100 % | SYSTOLIC BLOOD PRESSURE: 112 MMHG

## 2025-05-08 DIAGNOSIS — Z92.850 HISTORY OF CHIMERIC ANTIGEN RECEPTOR T-CELL THERAPY: ICD-10-CM

## 2025-05-08 DIAGNOSIS — D84.9 IMMUNOCOMPROMISED: ICD-10-CM

## 2025-05-08 DIAGNOSIS — C83.10 MANTLE CELL LYMPHOMA, UNSPECIFIED BODY REGION (MULTI): ICD-10-CM

## 2025-05-08 DIAGNOSIS — D80.1 HYPOGAMMAGLOBULINEMIA (MULTI): ICD-10-CM

## 2025-05-08 DIAGNOSIS — C83.17 MANTLE CELL LYMPHOMA OF SPLEEN: ICD-10-CM

## 2025-05-08 DIAGNOSIS — Z51.12 ENCOUNTER FOR MONOCLONAL ANTIBODY TREATMENT FOR MALIGNANCY: ICD-10-CM

## 2025-05-08 DIAGNOSIS — C83.10 MANTLE CELL LYMPHOMA, UNSPECIFIED BODY REGION (MULTI): Primary | ICD-10-CM

## 2025-05-08 LAB
ALBUMIN SERPL BCP-MCNC: 4.3 G/DL (ref 3.4–5)
ALP SERPL-CCNC: 73 U/L (ref 33–136)
ALT SERPL W P-5'-P-CCNC: 22 U/L (ref 10–52)
ANION GAP SERPL CALC-SCNC: 13 MMOL/L (ref 10–20)
APTT PPP: 28 SECONDS (ref 26–36)
AST SERPL W P-5'-P-CCNC: 23 U/L (ref 9–39)
BASO STIPL BLD QL SMEAR: PRESENT
BASOPHILS # BLD MANUAL: 0.01 X10*3/UL (ref 0–0.1)
BASOPHILS NFR BLD MANUAL: 1 %
BILIRUB SERPL-MCNC: 1 MG/DL (ref 0–1.2)
BUN SERPL-MCNC: 19 MG/DL (ref 6–23)
BURR CELLS BLD QL SMEAR: ABNORMAL
CALCIUM SERPL-MCNC: 9.3 MG/DL (ref 8.6–10.3)
CHLORIDE SERPL-SCNC: 103 MMOL/L (ref 98–107)
CO2 SERPL-SCNC: 27 MMOL/L (ref 21–32)
CREAT SERPL-MCNC: 1.14 MG/DL (ref 0.5–1.3)
CRP SERPL-MCNC: 0.78 MG/DL
DOHLE BOD BLD QL SMEAR: PRESENT
EGFRCR SERPLBLD CKD-EPI 2021: 73 ML/MIN/1.73M*2
EOSINOPHIL # BLD MANUAL: 0.01 X10*3/UL (ref 0–0.7)
EOSINOPHIL NFR BLD MANUAL: 1 %
ERYTHROCYTE [DISTWIDTH] IN BLOOD BY AUTOMATED COUNT: 13.9 % (ref 11.5–14.5)
FERRITIN SERPL-MCNC: 1078 NG/ML (ref 20–300)
FIBRINOGEN PPP-MCNC: 464 MG/DL (ref 200–400)
GLUCOSE SERPL-MCNC: 76 MG/DL (ref 74–99)
HCT VFR BLD AUTO: 35.4 % (ref 41–52)
HGB BLD-MCNC: 11.8 G/DL (ref 13.5–17.5)
IGG SERPL-MCNC: 366 MG/DL (ref 700–1600)
IMM GRANULOCYTES # BLD AUTO: 0.17 X10*3/UL (ref 0–0.7)
IMM GRANULOCYTES NFR BLD AUTO: 12 % (ref 0–0.9)
INR PPP: 1 (ref 0.9–1.1)
LDH SERPL L TO P-CCNC: 225 U/L (ref 84–246)
LYMPHOCYTES # BLD MANUAL: 0.53 X10*3/UL (ref 1.2–4.8)
LYMPHOCYTES NFR BLD MANUAL: 38 %
MAGNESIUM SERPL-MCNC: 2.12 MG/DL (ref 1.6–2.4)
MCH RBC QN AUTO: 30.1 PG (ref 26–34)
MCHC RBC AUTO-ENTMCNC: 33.3 G/DL (ref 32–36)
MCV RBC AUTO: 90 FL (ref 80–100)
METAMYELOCYTES # BLD MANUAL: 0.01 X10*3/UL
METAMYELOCYTES NFR BLD MANUAL: 1 %
MONOCYTES # BLD MANUAL: 0.14 X10*3/UL (ref 0.1–1)
MONOCYTES NFR BLD MANUAL: 10 %
NEUTROPHILS # BLD MANUAL: 0.63 X10*3/UL (ref 1.2–7.7)
NEUTS BAND # BLD MANUAL: 0.04 X10*3/UL (ref 0–0.7)
NEUTS BAND NFR BLD MANUAL: 3 %
NEUTS SEG # BLD MANUAL: 0.59 X10*3/UL (ref 1.2–7)
NEUTS SEG NFR BLD MANUAL: 42 %
NEUTS VAC BLD QL SMEAR: PRESENT
NRBC BLD-RTO: 0 /100 WBCS (ref 0–0)
OVALOCYTES BLD QL SMEAR: ABNORMAL
PLATELET # BLD AUTO: 181 X10*3/UL (ref 150–450)
POTASSIUM SERPL-SCNC: 3.5 MMOL/L (ref 3.5–5.3)
PROT SERPL-MCNC: 6.5 G/DL (ref 6.4–8.2)
PROTHROMBIN TIME: 11.5 SECONDS (ref 9.8–12.4)
RBC # BLD AUTO: 3.92 X10*6/UL (ref 4.5–5.9)
RBC MORPH BLD: ABNORMAL
SODIUM SERPL-SCNC: 139 MMOL/L (ref 136–145)
TOTAL CELLS COUNTED BLD: 100
URATE SERPL-MCNC: 5.1 MG/DL (ref 4–7.5)
VARIANT LYMPHS # BLD MANUAL: 0.06 X10*3/UL (ref 0–0.5)
VARIANT LYMPHS NFR BLD: 4 %
WBC # BLD AUTO: 1.4 X10*3/UL (ref 4.4–11.3)

## 2025-05-08 PROCEDURE — 2500000004 HC RX 250 GENERAL PHARMACY W/ HCPCS (ALT 636 FOR OP/ED): Mod: JZ | Performed by: INTERNAL MEDICINE

## 2025-05-08 PROCEDURE — 99215 OFFICE O/P EST HI 40 MIN: CPT

## 2025-05-08 PROCEDURE — 2500000001 HC RX 250 WO HCPCS SELF ADMINISTERED DRUGS (ALT 637 FOR MEDICARE OP): Performed by: INTERNAL MEDICINE

## 2025-05-08 PROCEDURE — 96366 THER/PROPH/DIAG IV INF ADDON: CPT | Mod: INF

## 2025-05-08 PROCEDURE — 80053 COMPREHEN METABOLIC PANEL: CPT

## 2025-05-08 PROCEDURE — 84550 ASSAY OF BLOOD/URIC ACID: CPT

## 2025-05-08 PROCEDURE — 82784 ASSAY IGA/IGD/IGG/IGM EACH: CPT

## 2025-05-08 PROCEDURE — 96367 TX/PROPH/DG ADDL SEQ IV INF: CPT

## 2025-05-08 PROCEDURE — 2500000004 HC RX 250 GENERAL PHARMACY W/ HCPCS (ALT 636 FOR OP/ED): Mod: JZ

## 2025-05-08 PROCEDURE — 85730 THROMBOPLASTIN TIME PARTIAL: CPT

## 2025-05-08 PROCEDURE — 85027 COMPLETE CBC AUTOMATED: CPT

## 2025-05-08 PROCEDURE — 82728 ASSAY OF FERRITIN: CPT

## 2025-05-08 PROCEDURE — 83615 LACTATE (LD) (LDH) ENZYME: CPT

## 2025-05-08 PROCEDURE — 85384 FIBRINOGEN ACTIVITY: CPT

## 2025-05-08 PROCEDURE — 85007 BL SMEAR W/DIFF WBC COUNT: CPT

## 2025-05-08 PROCEDURE — 36415 COLL VENOUS BLD VENIPUNCTURE: CPT

## 2025-05-08 PROCEDURE — 96413 CHEMO IV INFUSION 1 HR: CPT

## 2025-05-08 PROCEDURE — 83735 ASSAY OF MAGNESIUM: CPT

## 2025-05-08 PROCEDURE — 86140 C-REACTIVE PROTEIN: CPT

## 2025-05-08 PROCEDURE — 96415 CHEMO IV INFUSION ADDL HR: CPT

## 2025-05-08 RX ORDER — FAMOTIDINE 10 MG/ML
20 INJECTION, SOLUTION INTRAVENOUS ONCE AS NEEDED
Status: DISCONTINUED | OUTPATIENT
Start: 2025-05-08 | End: 2025-05-08 | Stop reason: HOSPADM

## 2025-05-08 RX ORDER — ALBUTEROL SULFATE 0.83 MG/ML
3 SOLUTION RESPIRATORY (INHALATION) AS NEEDED
Status: DISCONTINUED | OUTPATIENT
Start: 2025-05-08 | End: 2025-05-08 | Stop reason: HOSPADM

## 2025-05-08 RX ORDER — PROCHLORPERAZINE MALEATE 10 MG
10 TABLET ORAL EVERY 6 HOURS PRN
Status: DISCONTINUED | OUTPATIENT
Start: 2025-05-08 | End: 2025-05-08 | Stop reason: HOSPADM

## 2025-05-08 RX ORDER — FAMOTIDINE 10 MG/ML
20 INJECTION, SOLUTION INTRAVENOUS ONCE AS NEEDED
OUTPATIENT
Start: 2025-06-05

## 2025-05-08 RX ORDER — EPINEPHRINE 0.3 MG/.3ML
0.3 INJECTION SUBCUTANEOUS EVERY 5 MIN PRN
Status: DISCONTINUED | OUTPATIENT
Start: 2025-05-08 | End: 2025-05-08 | Stop reason: HOSPADM

## 2025-05-08 RX ORDER — DIPHENHYDRAMINE HYDROCHLORIDE 50 MG/ML
50 INJECTION, SOLUTION INTRAMUSCULAR; INTRAVENOUS AS NEEDED
Status: DISCONTINUED | OUTPATIENT
Start: 2025-05-08 | End: 2025-05-08 | Stop reason: HOSPADM

## 2025-05-08 RX ORDER — PROCHLORPERAZINE EDISYLATE 5 MG/ML
10 INJECTION INTRAMUSCULAR; INTRAVENOUS EVERY 6 HOURS PRN
Status: DISCONTINUED | OUTPATIENT
Start: 2025-05-08 | End: 2025-05-08 | Stop reason: HOSPADM

## 2025-05-08 RX ORDER — ALBUTEROL SULFATE 0.83 MG/ML
3 SOLUTION RESPIRATORY (INHALATION) AS NEEDED
OUTPATIENT
Start: 2025-06-05

## 2025-05-08 RX ORDER — DIPHENHYDRAMINE HCL 25 MG
25 CAPSULE ORAL ONCE
OUTPATIENT
Start: 2025-06-05

## 2025-05-08 RX ORDER — DIPHENHYDRAMINE HYDROCHLORIDE 50 MG/ML
50 INJECTION, SOLUTION INTRAMUSCULAR; INTRAVENOUS AS NEEDED
OUTPATIENT
Start: 2025-06-05

## 2025-05-08 RX ORDER — DIPHENHYDRAMINE HCL 25 MG
25 CAPSULE ORAL ONCE
Status: DISCONTINUED | OUTPATIENT
Start: 2025-05-08 | End: 2025-05-08 | Stop reason: HOSPADM

## 2025-05-08 RX ORDER — DIPHENHYDRAMINE HCL 50 MG
50 CAPSULE ORAL ONCE
Status: COMPLETED | OUTPATIENT
Start: 2025-05-08 | End: 2025-05-08

## 2025-05-08 RX ORDER — EPINEPHRINE 0.3 MG/.3ML
0.3 INJECTION SUBCUTANEOUS EVERY 5 MIN PRN
OUTPATIENT
Start: 2025-06-05

## 2025-05-08 RX ORDER — ACETAMINOPHEN 325 MG/1
650 TABLET ORAL ONCE
OUTPATIENT
Start: 2025-06-05

## 2025-05-08 RX ORDER — ACETAMINOPHEN 325 MG/1
650 TABLET ORAL ONCE
Status: COMPLETED | OUTPATIENT
Start: 2025-05-08 | End: 2025-05-08

## 2025-05-08 RX ORDER — ACETAMINOPHEN 325 MG/1
650 TABLET ORAL ONCE
Status: DISCONTINUED | OUTPATIENT
Start: 2025-05-08 | End: 2025-05-08 | Stop reason: HOSPADM

## 2025-05-08 RX ADMIN — SODIUM CHLORIDE 848 MG: 0.9 INJECTION, SOLUTION INTRAVENOUS at 10:30

## 2025-05-08 RX ADMIN — IMMUNE GLOBULIN INFUSION (HUMAN) 30 G: 100 INJECTION, SOLUTION INTRAVENOUS; SUBCUTANEOUS at 12:10

## 2025-05-08 RX ADMIN — ACETAMINOPHEN 650 MG: 325 TABLET ORAL at 10:05

## 2025-05-08 RX ADMIN — DIPHENHYDRAMINE HYDROCHLORIDE 50 MG: 50 CAPSULE ORAL at 10:05

## 2025-05-08 ASSESSMENT — ENCOUNTER SYMPTOMS
SHORTNESS OF BREATH: 0
NAUSEA: 1

## 2025-05-08 ASSESSMENT — PAIN SCALES - GENERAL: PAINLEVEL_OUTOF10: 0-NO PAIN

## 2025-05-08 NOTE — PROGRESS NOTES
Rates verified with 2nd nurse, Sofia Nuno     1- 140.7 ml/hr (70.36 ml)  2-281.4 ml/hr (remainder vol)

## 2025-05-08 NOTE — PROGRESS NOTES
-Patient presents to the clinic today for ritux (last dose delayed due to congestion/iru sx, now improved)  -Patient tolerated infusion well  -Patient discharged in stable condition. Reviewed calendar/next appointment, all questions answered.   -Patient ambulated out of clinic with ease with wife.   -Notes/Plan for next visit-   **IVIG insurance auth still pending, working with provider team to coordinate when able to admin.

## 2025-05-08 NOTE — PROGRESS NOTES
-Patient presents to the clinic today for Ritux and IVIG (insurance approval this AM)  -Patient tolerated infusions very well  -Patient discharged in stable condition. Reviewed calendar/next appointment, all questions answered.   -Patient ambulated out of clinic with ease with wife.   -Notes/Plan for next visit-

## 2025-05-09 LAB
CMV DNA SERPL NAA+PROBE-LOG IU: NORMAL {LOG_IU}/ML
LABORATORY COMMENT REPORT: NOT DETECTED

## 2025-05-15 PROCEDURE — RXMED WILLOW AMBULATORY MEDICATION CHARGE

## 2025-05-19 DIAGNOSIS — C83.10 MANTLE CELL LYMPHOMA, UNSPECIFIED BODY REGION (MULTI): ICD-10-CM

## 2025-05-19 RX ORDER — LENALIDOMIDE 15 MG/1
15 CAPSULE ORAL DAILY
Qty: 21 CAPSULE | Refills: 0 | Status: SHIPPED | OUTPATIENT
Start: 2025-05-19 | End: 2025-06-09

## 2025-05-19 NOTE — PROGRESS NOTES
"ONCOLOGY MEDICATION REFILL NOTE:    [unfilled]  Nancie Geovany had a patient care encounter with Zainab Laurie LANDEROS  on 5/8/25 for management of their mantle cell lymphoma.      [unfilled]  Current Outpatient Medications on File Prior to Visit   Medication Sig Dispense Refill    acyclovir (Zovirax) 400 mg tablet Take 1 tablet (400 mg) by mouth 2 times a day. 60 tablet 3    aspirin 81 mg EC tablet Take 1 tablet (81 mg) by mouth once daily. Take daily every day to prevent blood clots while receiving revlimid 30 tablet 11    atorvastatin (Lipitor) 40 mg tablet Take 1 tablet (40 mg) by mouth once daily in the morning. 30 tablet 2    atovaquone (Mepron) 750 mg/5 mL suspension Take 10 mL (1,500 mg) by mouth once daily. 300 mL 1    hydrocortisone 1 % cream Apply 1 Application topically 2 times a day as needed (hemorrhoids).      levoFLOXacin (Levaquin) 500 mg tablet Take 1 tablet (500 mg) by mouth once daily. 30 tablet 1    ondansetron (Zofran) 8 mg tablet Take 1 tablet (8 mg) by mouth every 8 hours if needed for nausea or vomiting. 30 tablet 5    prochlorperazine (Compazine) 10 mg tablet Take 1 tablet (10 mg) by mouth every 6 hours if needed for nausea or vomiting. 30 tablet 5    venetoclax (Venclexta) 100 mg tablet Take 6 tablets (600 mg total) by mouth once daily.  Take with food. 360 tablet 3    [DISCONTINUED] lenalidomide (Revlimid) 15 mg capsule Take 1 capsule (15 mg total) by mouth once daily for 21 days. 21 capsule 0     No current facility-administered medications on file prior to visit.      Relevant Myeloma Labs:   CBCd:  Recent Labs     05/08/25  0935 04/24/25  0831 04/17/25  0821   WBC 1.4* 1.4* 1.9*   ANC 0.63* 0.65* 0.85*   HGB 11.8* 11.4* 12.0*   HCT 35.4* 33.3* 35.4*    136* 110*   MCV 90 93 94       Serum Protein Electropheresis:  Recent Labs     05/08/25  0935 04/24/25  0831 04/17/25  0821   PROT 6.5 6.4 6.3*       Serum Free Light Chains:  No results for input(s): \"KAPPA\", \"LAMBDA\", " "\"KAPLS\", \"LIGHTCHAIN\" in the last 60210 hours.    Immunoglobulins:  Recent Labs     05/08/25  0935 04/17/25  0821   * 397*         24 Hour Urine Protein Electropheresis:   No results for input(s): \"TIRY16QMP\", \"ALBPROTUR\", \"UPK01\", \"FIBQLOUX183\", \"UPEP\", \"IFESU\", \"PR35\" in the last 75731 hours.       @ASSESSMENTBanner Del E Webb Medical CenterBEGIN@  Mr. Armenta continues to tolerate his R2 + adriana regimen. Had a small delay due to viral illness, but feels well now. If he remains neutropenic will consider dose/ cycle length reduction of venetoclax.  Clot Prevention: Thromboprophylaxis: Low dose aspirin      Per Dr. Yvonne Hobbs authorization, on 05/19/25 I refilled lenalidomide 15mg PO daily on days 1-21/28.  QTY 21 No refills. Authorization # [id number] obtained, prescription was sent to Matt Donovan PharmD, [facility]  Outpatient Hematology Pharmacist  [phone]  "

## 2025-05-28 ENCOUNTER — PHARMACY VISIT (OUTPATIENT)
Dept: PHARMACY | Facility: CLINIC | Age: 60
End: 2025-05-28
Payer: COMMERCIAL

## 2025-05-30 ENCOUNTER — TELEPHONE (OUTPATIENT)
Dept: HEMATOLOGY/ONCOLOGY | Facility: HOSPITAL | Age: 60
End: 2025-05-30
Payer: COMMERCIAL

## 2025-05-30 DIAGNOSIS — Z92.850 HISTORY OF CHIMERIC ANTIGEN RECEPTOR T-CELL THERAPY: ICD-10-CM

## 2025-05-30 DIAGNOSIS — D80.1 HYPOGAMMAGLOBULINEMIA (MULTI): Primary | ICD-10-CM

## 2025-05-30 DIAGNOSIS — D84.9 IMMUNOCOMPROMISED: ICD-10-CM

## 2025-05-30 DIAGNOSIS — C83.17 MANTLE CELL LYMPHOMA OF SPLEEN: Primary | ICD-10-CM

## 2025-05-30 DIAGNOSIS — C83.17 MANTLE CELL LYMPHOMA OF SPLEEN: ICD-10-CM

## 2025-06-04 RX ORDER — PROCHLORPERAZINE MALEATE 10 MG
10 TABLET ORAL EVERY 6 HOURS PRN
OUTPATIENT
Start: 2025-07-10

## 2025-06-04 RX ORDER — PROCHLORPERAZINE EDISYLATE 5 MG/ML
10 INJECTION INTRAMUSCULAR; INTRAVENOUS EVERY 6 HOURS PRN
OUTPATIENT
Start: 2025-07-10

## 2025-06-04 RX ORDER — ACETAMINOPHEN 325 MG/1
650 TABLET ORAL ONCE
OUTPATIENT
Start: 2025-07-10

## 2025-06-04 RX ORDER — ALBUTEROL SULFATE 0.83 MG/ML
3 SOLUTION RESPIRATORY (INHALATION) AS NEEDED
OUTPATIENT
Start: 2025-07-10

## 2025-06-04 RX ORDER — DIPHENHYDRAMINE HCL 50 MG
50 CAPSULE ORAL ONCE
OUTPATIENT
Start: 2025-07-10

## 2025-06-04 RX ORDER — EPINEPHRINE 0.3 MG/.3ML
0.3 INJECTION SUBCUTANEOUS EVERY 5 MIN PRN
OUTPATIENT
Start: 2025-07-10

## 2025-06-04 RX ORDER — DIPHENHYDRAMINE HYDROCHLORIDE 50 MG/ML
50 INJECTION, SOLUTION INTRAMUSCULAR; INTRAVENOUS AS NEEDED
OUTPATIENT
Start: 2025-07-10

## 2025-06-04 RX ORDER — FAMOTIDINE 10 MG/ML
20 INJECTION, SOLUTION INTRAVENOUS ONCE AS NEEDED
OUTPATIENT
Start: 2025-07-10

## 2025-06-04 ASSESSMENT — ENCOUNTER SYMPTOMS
DIZZINESS: 0
NAUSEA: 1
FEVER: 0
SHORTNESS OF BREATH: 0
APPETITE CHANGE: 0
VOMITING: 0
NUMBNESS: 0
COUGH: 1
UNEXPECTED WEIGHT CHANGE: 0
ABDOMINAL PAIN: 0
HEMATOLOGIC/LYMPHATIC NEGATIVE: 1
CARDIOVASCULAR NEGATIVE: 1
CONSTIPATION: 0
DIAPHORESIS: 0
BLOOD IN STOOL: 0
LIGHT-HEADEDNESS: 0
FATIGUE: 0
MUSCULOSKELETAL NEGATIVE: 1
CHILLS: 0
DIARRHEA: 0

## 2025-06-04 NOTE — PROGRESS NOTES
Patient ID: Nancie Armenta is a 60 y.o. male.    Treatment:   Oncology History Overview Note           Patient Visit Information:   Visit Type: Follow Up Visit      Cancer History:   Treatment Synopsis:     1. Stage IV A mantle cell non-Hodgkin lymphoma involving the gastrointestinal tract diagnosed in October 2011, with diffuse gastrointestinal involvement. The patient was treated  with RCHOP alternating with Rituxan and high-dose Mamie-C and received total of 6 cycles of chemotherapy which he finished in February 2012.   Allergic to Augmentin, causes hives.     2. Patient underwent beam chemotherapy followed by autologous peripheral stem cell transplant in April 2012, by Dr. Yvonne Hobbs at [facility], was also involved in phase 3 clinical trial up killed shingles vaccine versus placebo ([employer]-1 Z10  study).     3.The patient developed shingles in January 2014, involving left flank.      4. 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      5.  Recurrent mantle cell lymphoma June 2019 assx presentation with leukocytosis. Peripheral blood flow which showed a CD5 positve clonal lymphocytosis. 6/2/19 CT of chest abdomen and pelvis revealed  splenomegaly of 15 cm compared to 12.8 cm last evaluation. Recurrence confirmed by FISH on peripheral blood earlier this month.  BM biopsy which showed 5% Mantle cell involvement although peripheral blood shows 30% involvement. CT imaging showed splenomegaly. PET scan declined by insurance twice.  Patient underwent colonoscopy  with Dr. Ac ( [place]) 8/8/19 which showed cobblestoning of colon and multiple biopsies including terminal ileum, appendix, cecum,  rectosigmoid positive for mantle cell lymphoma.     6. Initiation of acalabrutinib August 2019 with minimal response. Switched to ibrutinib and venetoclax 12/20/20 with clearing of t(11, 14) by by FISH ( 1/2020) and resolution of involvement of colon by endoscopy and pathology on exam 3/2/2020! BM biopsy  4/2020 HEATHER.     C-scope (3/11/22): at OSH: negative, no evidence of disease. PET scan ( 3/14/22): no abnormal uptake. Deauville 1. BMBx (3/17/22): negative, no evidence of MCL.   March 2022, ibrutinib and venetoclax discontinued.    July 2024 development of Tpenia, bone marrow biopsy 7/25/24 showed focal involvement with mantle cell lymphoma (NGS cyclin D1 positive and Sox 11 positive. BIRC+ and TP 53 positve with a VAF of 3%, BIRC3 positive). FISH negative for 17p deletion  CT imaging done on 8/7/24 shows thickening of sigmoid colon, development of mild to moderate retroperitoneal adenopathy, splenomegally and possible splenic infarct.    Pirotbrutib 200 mg daily initiated 8/12/24.  Due to inadequate response, he started the ventoclax (50 mg) on Adolfo 10/13, then increased to 100 mg , 200 mg and 400 mg      BMBx (10/23/24) with low level involvement by mantle cells about 3% Mantle cells by flow.      PET/CT (11/4/24) with FDG avid lymphadenopathy throughout the neck, chest, abdomen, and pelvis c/w lymphomatous involvement. Intense FDG avid splenomegaly. Peripheral regions of photopenia likely reflect areas of infarction.      Pirtobrutinib and Venetoclax stopped 11/14/24 in preparation for CART collection.      Admitted 11/29/24 for his CAR-T cell therapy, on CASE 2419 (T0=12/6/24), prepped with Fludarabine/Cyclophosphamide. Hospital course included grade 2 CRS (fever, tachycardia) on 12/7, received Toxi x1, infectious workup negative.  He also had symptomatic orthostatic hypotension on 12/8, resolved with fluids by 12/9.  He was discharged home on 12/18/24.     T+ 90 progressive tumorous involvement of bone  marrow, PET continues to show CR.     BM biopsy from 3/13/25 showing residual marrow disease of about 15% on his most recent bone marrow biopsy.  We have opted for rituxan, revlimid and venetoclax combination ( see below), first dose of venetoclax 50 mg and rituxan 4/3/25.     7. S/P COVID 19 infection 12/1/2020, no sequelae          Lymphoma   10/20/2011 Initial Diagnosis    Lymphoma (Multi)      Cellular Therapy    Mr. Nancie Armenta is a 59 y.o. male presenting with relapsed Mantle Cell Lymphoma. CAR-T cell therapy, on CASE 2419 (T0=12/6/24), prepped with Fludarabine/Cyclophosphamide.      Hosp Course:  - Grade 2 CRS (fever, tachycardia) on 12/7, gave Toxi x1, infectious workup negative. Resolved by 12/8.  - Symptomatic Orthostatic Hypotension on 12/8, resolved with fluids by 12/9.     Mantle cell lymphoma of spleen   10/13/2016 Initial Diagnosis    Mantle cell lymphoma of spleen (CMS/HCC)     Mantle cell lymphoma   11/18/2024 Initial Diagnosis    Mantle cell lymphoma     11/30/2024 - 12/20/2024 Bone Marrow Transplant        Cellular Therapy    Mr. Nancie Armenta is a 59 y.o. male presenting with relapsed Mantle Cell Lymphoma. CAR-T cell therapy, on CASE 2419 (T0=12/6/24), prepped with Fludarabine/Cyclophosphamide.      Hosp Course:  - Grade 2 CRS (fever, tachycardia) on 12/7, gave Toxi x1, infectious workup negative. Resolved by 12/8.  - Symptomatic Orthostatic Hypotension on 12/8, resolved with fluids by 12/9.     4/3/2025 - 5/8/2025 Chemotherapy    RiTUXimab (Weekly), 28 Day Cycle      7/10/2025 -  Chemotherapy    RiTUXimab, 8 Week Cycles - Maintenance       Response:     Past Medical History:  Renal insufficiency.   Past Medical History:   Diagnosis Date    Fourth (trochlear) nerve palsy, right eye 06/08/2017    Right-sided fourth cranial nerve palsy    Fourth (trochlear) nerve palsy, right eye 06/08/2017    Right-sided fourth cranial nerve palsy    Malignant neoplasm of connective and soft tissue,  unspecified 06/08/2017    Cancer of blood vessel     Surgical History:   Past Surgical History:   Procedure Laterality Date    IR CVC PICC  11/29/2024    IR CVC PICC 11/29/2024 [facility] ANGIO    SHOULDER SURGERY  07/13/2017    Shoulder Surgery      Family History:   Family History   Problem Relation Name Age of Onset    Cataracts Mother      Cataracts Father      Other (chronic lymphoid leukemia) Father       Social History:  .  Working full time for [employer].    Social History     Tobacco Use    Smoking status: Never    Smokeless tobacco: Never   Vaping Use    Vaping status: Never Used   Substance Use Topics    Alcohol use: Never    Drug use: Never   -------------------------------------------------------------------------------------------------------  Subjective       HPI    Nancie Armenta is a 60 year old man with PMH of hyperglycemia, renal insufficiency, and stage IV A mantle cell non hodgkin lymphoma involving the gastrointestinal tract, s/p autologous SCT (April 2012).      Early in July 2024 patient noted to have dropping platelet counts and bone marrow biopsy 7/25/24 showed focal involvement with mantle cell lymphoma (NGS cyclin D1 positive and Sox 11 positive. BIRC+ ad TP 53 positve with a VAF of 3%, BIRC3 positive). FISH negative for 17p deletion  CT imaging done on 8/7/24 shows thickening of sigmoid colon, development of mild to moderate retroperitoneal adenopathy, splenomegally and possible splenic infarct.  Started Pirtobrutinib salvage on 8/12/24.  Added Venetoclax starting 10/13/24 since spleen size was increasing on repeat CT imaging and on physical exam.  BMBx (10/23/24) with low level involvement by mantle cells about 3% Mantle cells by flow.      Pirtobrutinib and Venetoclax stopped 11/14/24 in preparation for CART collection.  Admitted 11/29/24 for his CAR-T cell therapy, on CASE 2419 (T0=12/6/24), prepped with     Fludarabine/Cyclophosphamide. Hospital course included grade 2 CRS  (fever, tachycardia) on 12/7, received Toxi x1, infectious workup negative.  He also had symptomatic orthostatic hypotension on 12/8, resolved with fluids by 12/9.  He was discharged home on 12/18/24.      Unfortunatley BM biopsy from 3/13/25 showing residual marrow disease of about 15% on his most recent bone marrow biopsy.  We have opted for rituxan, revlimid and venetoclax combination ( see below), first dose of venetoclax 50 mg and rituxan 4/3/25.  Venetoclax ramped up to 600 mg.     Nancie presents to the clinic today (6/5/25) with his wife for followup evaluation after his CAR-T T+181 days ago with BM relapse .  Today has planned IVIG and 6 month vaccinations. He is currently taking revlimid 15 mg on days 1-21 out of 28 days.  He started revlimid on 4/4/25, he is on venetoclax 600 mg daily, since 5/3/25.  He was just approved for monthly IVIG infusions and will receive his first infusion 5/8/25.  Forth weekly dose of  rituxan May 8th and starts maintenance July 10th. URI sx finally better. He has GI sx when he starts his monthly revlimid which resolves.     Patient is generally feeling well, working full time, feels tired on Friday. Ongoing leukopenia but no fevers. Looking forward to a short vacation the end of June in [place].      Review of Systems   Constitutional:  Negative for appetite change, chills, diaphoresis, fatigue, fever and unexpected weight change.   Respiratory:  Positive for cough. Negative for shortness of breath.    Cardiovascular: Negative.    Gastrointestinal:  Positive for nausea. Negative for abdominal pain, blood in stool, constipation, diarrhea and vomiting.   Genitourinary: Negative.     Musculoskeletal: Negative.  Negative for gait problem.   Skin: Negative.    Neurological:  Negative for dizziness, gait problem, light-headedness and numbness.   Hematological: Negative.     -------------------------------------------------------------------------------------------------------  Objective   BSA: 2.26 meters squared  /79   Pulse 70   Temp 36.4 °C (97.5 °F)   Resp 17   Wt 99.3 kg (218 lb 14.7 oz)   SpO2 100%   BMI 29.17 kg/m²     Physical Exam  Vitals reviewed.   Constitutional:       Appearance: Normal appearance.   HENT:      Head: Normocephalic and atraumatic.      Mouth/Throat:      Mouth: Mucous membranes are moist.   Eyes:      Extraocular Movements: Extraocular movements intact.      Conjunctiva/sclera: Conjunctivae normal.      Pupils: Pupils are equal, round, and reactive to light.   Cardiovascular:      Rate and Rhythm: Normal rate and regular rhythm.      Pulses: Normal pulses.      Heart sounds: Normal heart sounds.   Pulmonary:      Effort: Pulmonary effort is normal. No respiratory distress.      Breath sounds: Normal breath sounds. No wheezing.   Abdominal:      General: Abdomen is flat. Bowel sounds are normal. There is no distension.      Palpations: Abdomen is soft. There is no mass.      Tenderness: There is no abdominal tenderness.   Musculoskeletal:         General: No swelling. Normal range of motion.   Lymphadenopathy:      Comments: No lymphadenopathy   Skin:     General: Skin is warm and dry.   Neurological:      General: No focal deficit present.      Mental Status: He is alert and oriented to person, place, and time.   Psychiatric:         Mood and Affect: Mood normal.         Behavior: Behavior normal.         Thought Content: Thought content normal.         Judgment: Judgment normal.   Performance Status:  ECOG Score: 0- Fully active, able to carry on all pre-disease performance w/o restriction.     CAR-T product:  CAR-T [id number]  Cell infusion date: 12/6/2024  -------------------------------------------------------------------------------------------------------  Assessment/Plan      1. Stage IV mantle cell lymphoma itreated with Nordic regimen  and consolidation autograft with BEAM preparative regimen April 2012.  Relapse 2019 treated with ibrutinib and venetoclax with complete pathology response until 3/ 2022. See above.     Recurrence July 2024 presenting with dropping platelet counts and bone marrow biopsy 7/25/24 showed focal involvement with mantle cell lymphoma (NGS cyclin D1 positive and Sox 11 positive. BIRC+ ad TP 53 positve with a VAF of 3%, BIRC3 positive). FISH negative for 17p deletion  CT imaging done on 8/7/24 shows thickening of sigmoid colon, development of mild to moderate retroperitoneal adenopathy, splenomegally and possible splenic infarct.      Patient started pirtobrutinib salvage August 12,2024 and feels better. However spleen size was increasing per CT imaging (8/6/24)  and physical exam so Dr. Hobbs planned to add venetoclax as follows, starting 10/13/24:  - 50 mg for first week, then 100 (10/20), and 200 mg (10/27) for 7 days each, then 400 mg (11/3/24- )    Pre CART staging:    BM biopsy low level involvment by mantle cells about 3% mantle cells by flow. Absence of CD19 on this  flow sample likely reflects insufficient sampling and positive for CD 19 on prior bone marrow sample of 7/24/24.    PET imaging 11/4/24   1. FDG avid lymphadenopathy throughout the neck, chest, abdomen and  pelvis as detailed above, consistent with lymphomatous involvement.  2. Intense FDG avid splenomegaly, consistent with lymphomatous  involvement. Peripheral regions of photopenia likely reflect areas of  infarction.  3. No PET evidence of FDG avid extranodal disease.on     ECHO 10/23/24 low normal LVEF 53%, cleared by Dr. Izaguirre.     Admitted 11/29/24 for his CAR-T cell therapy, on CASE 2419 (T0=12/6/24), prepped with Fludarabine/Cyclophosphamide.     PET scan 1/2/25:   IMPRESSION:  1.  Interval resolution of hypermetabolic lymphadenopathy above and below the diaphragm. Interval resolution hypermetabolic activity within the spleen with improving  splenomegaly. (Deauville score of 1).  2. No evidence of new hypermetabolic robbie or extranodal lymphomatous disease.    Bone marrow biopsy performed 1/2/25 showed 1% mantle cells by IHC for SOX 11 and cycle D1 but nothing by flow.        3/13/25 Day 90  BM shows about 15% lymphoma cells which are cyclin D1 positive, PET scan shows no lymphomatous involvement and continued decrease in spleen size.   Patients case reviewed at interdisciplinary tumor board with recommendation options as follows:    Patient and team have opted to initiate salvage treatment with venetoclax R2. ( Cipriano et al. Blood Advances 8:(2) , p 407, 2024)  Phase 2 dose revlimid 15 mg 21/28 days ( note dose change), venetoclax 600 mg daily after ramp up and rituxan 275 mg/m2 weekly for 4 weeks, then q 8 weeks. Regimen associated with 60% RRat 6 months and median PFS of 21 momths with overall survival of 31 months. Plan BM bx at day 90 after starting.      Peripheral blood flow, negative for clonal b cell. Rituxan to start on 4/3/25. All questions answered.   Leukopenia, etiology not clear, will hold bactrim.    Received dose 1 of 4 rituximab 4/3/25.  Started venetoclax ramp up on 4/3/25.  Started revlimid 15 mg daily on days 1-21 out of 28 days on 4/4/25.    6/5/25:  ANC remains low with improved hgb and platelets.  Will decrease venetoclax to 400 mg daily, continues revlimid 15 mg on days 1-21 out of 28 days, today is day 6 of cycle.  4th weekly dose of rituxan given 5/8/25  as was postponed by 2 weeks due to upper respiratory infection.    Plan for maintenance rituximab to start July 10th.  Restaging BM biopsy 7/3/25. Follow up visit in 1 month.      Hemoglobin   Date Value Ref Range Status   06/05/2025 12.7 (L) 13.5 - 17.5 g/dL Preliminary   05/08/2025 11.8 (L) 13.5 - 17.5 g/dL Final   04/24/2025 11.4 (L) 13.5 - 17.5 g/dL Final     Platelets   Date Value Ref Range Status   06/05/2025 168 150 - 450 x10*3/uL Preliminary   05/08/2025 181 150 -  450 x10*3/uL Final   04/24/2025 136 (L) 150 - 450 x10*3/uL Final     WBC   Date Value Ref Range Status   06/05/2025 1.3 (L) 4.4 - 11.3 x10*3/uL Preliminary   05/08/2025 1.4 (L) 4.4 - 11.3 x10*3/uL Final   04/24/2025 1.4 (L) 4.4 - 11.3 x10*3/uL Final     Ferritin   Date Value Ref Range Status   05/08/2025 1,078 (H) 20 - 300 ng/mL Final   04/24/2025 885 (H) 20 - 300 ng/mL Final   04/17/2025 846 (H) 20 - 300 ng/mL Final     Fibrinogen   Date Value Ref Range Status   05/08/2025 464 (H) 200 - 400 mg/dL Final   04/24/2025 533 (H) 200 - 400 mg/dL Final   04/17/2025 392 200 - 400 mg/dL Final     C-Reactive Protein   Date Value Ref Range Status   05/08/2025 0.78 <1.00 mg/dL Final   04/24/2025 4.33 (H) <1.00 mg/dL Final   04/17/2025 0.95 <1.00 mg/dL Final     IgG   Date Value Ref Range Status   05/08/2025 366 (L) 700 - 1,600 mg/dL Final     Comment:     MONOCLONAL PROTEINS MAY CAUSE FALSELY LOW  RESULTS IN THIS ASSAY. SERUM PROTEIN  ELECTROPHORESIS SHOULD BE DONE AS THE  FIRST TEST TO EVALUATE MONOCLONAL GAMMOPATHY.     04/17/2025 397 (L) 700 - 1,600 mg/dL Final     Comment:     MONOCLONAL PROTEINS MAY CAUSE FALSELY LOW  RESULTS IN THIS ASSAY. SERUM PROTEIN  ELECTROPHORESIS SHOULD BE DONE AS THE  FIRST TEST TO EVALUATE MONOCLONAL GAMMOPATHY.     04/10/2025 418 (L) 700 - 1,600 mg/dL Final     Comment:     MONOCLONAL PROTEINS MAY CAUSE FALSELY LOW  RESULTS IN THIS ASSAY. SERUM PROTEIN  ELECTROPHORESIS SHOULD BE DONE AS THE  FIRST TEST TO EVALUATE MONOCLONAL GAMMOPATHY.       Infectious prophylaxis:   Antiviral prophylaxis Acyclovir. Continue for at least 6 months.   Antifungal prophylaxis Fluconazole discontinued.    Start levofloxacin 500 mg daily on 4/3/25 due to neutropenia.   Bactrim stopped on 4/3 due to neutropenia.  Will need to initiate atovaquone in next few weeks if need to continue to hold for cytopenias.   Started atovaquone 4/24/25 for PJP prophylaxis.      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  Currently  has upper respiratory infection.  Plan to start IVIG infusion, can try for 5/8/25 if approved by insurance.  Educated Serafino and wife on rationale for IVIG infusions and possible side effects.  Understanding verbalized.    5/8/25:  IgG level 366 today.  Monthly IVIG infusions approved by insurance.  Scheduled to receive his first IVIG infusion today.       Post CAR-T Vaccines:  Will plan to start vaccines with influenza and covid vaccine about 3-4 months after CAR-T and will start post transplant vaccines around 6 months.   4/24/25:  Advised to obtain influenza (if able to get as flu season is over), and covid series from local pharmacy.  Provided printed paper with information regarding covid vaccination following CAR-T to give to pharmacy.  Advised to wait a few weeks until upper respiratory infection has cleared.    Plan to start 6 month vaccines on 6/5/25.     Health maintenance.   Colonoscopy completed 7/14/23: normal except internal hemorrhoids.  Recommendations to repeat in 2 years.    - Some aortic calcifications on imaging, started on prevastatin-managed by his PCP.  PCP increased pravastatin dose to 40 mg daily.  PCP switched to atorvastatin 40 mg daily.    RTC:   Continue revlimid 15 mg on days 1-21 out of 28 days , decrease venetoclax to 400 mg a day. BM biopsy on July 3, 2025., 6 month vaccines, and IVIG infusion today. Followup on 7/10/25  ----------------------------------------------------------------------------------------------------------------  Yvonne Hobbs MD

## 2025-06-05 ENCOUNTER — SPECIALTY PHARMACY (OUTPATIENT)
Dept: HEMATOLOGY/ONCOLOGY | Facility: HOSPITAL | Age: 60
End: 2025-06-05

## 2025-06-05 ENCOUNTER — OFFICE VISIT (OUTPATIENT)
Dept: HEMATOLOGY/ONCOLOGY | Facility: HOSPITAL | Age: 60
End: 2025-06-05
Payer: COMMERCIAL

## 2025-06-05 ENCOUNTER — DOCUMENTATION (OUTPATIENT)
Dept: HEMATOLOGY/ONCOLOGY | Facility: HOSPITAL | Age: 60
End: 2025-06-05
Payer: COMMERCIAL

## 2025-06-05 ENCOUNTER — INFUSION (OUTPATIENT)
Dept: HEMATOLOGY/ONCOLOGY | Facility: HOSPITAL | Age: 60
End: 2025-06-05
Payer: COMMERCIAL

## 2025-06-05 ENCOUNTER — LAB (OUTPATIENT)
Dept: LAB | Facility: HOSPITAL | Age: 60
End: 2025-06-05
Payer: COMMERCIAL

## 2025-06-05 VITALS
RESPIRATION RATE: 17 BRPM | HEART RATE: 70 BPM | BODY MASS INDEX: 29.17 KG/M2 | OXYGEN SATURATION: 100 % | TEMPERATURE: 97.5 F | WEIGHT: 218.92 LBS | SYSTOLIC BLOOD PRESSURE: 126 MMHG | DIASTOLIC BLOOD PRESSURE: 79 MMHG

## 2025-06-05 DIAGNOSIS — C83.10 MANTLE CELL LYMPHOMA, UNSPECIFIED BODY REGION (MULTI): ICD-10-CM

## 2025-06-05 DIAGNOSIS — Z92.850 HISTORY OF CHIMERIC ANTIGEN RECEPTOR T-CELL THERAPY: ICD-10-CM

## 2025-06-05 DIAGNOSIS — D84.9 IMMUNOCOMPROMISED: ICD-10-CM

## 2025-06-05 DIAGNOSIS — D80.1 HYPOGAMMAGLOBULINEMIA (MULTI): ICD-10-CM

## 2025-06-05 DIAGNOSIS — C83.17 MANTLE CELL LYMPHOMA OF SPLEEN: ICD-10-CM

## 2025-06-05 LAB
ALBUMIN SERPL BCP-MCNC: 4.5 G/DL (ref 3.4–5)
ALP SERPL-CCNC: 69 U/L (ref 33–136)
ALT SERPL W P-5'-P-CCNC: 17 U/L (ref 10–52)
ANION GAP SERPL CALC-SCNC: 13 MMOL/L (ref 10–20)
APTT PPP: 28 SECONDS (ref 26–36)
AST SERPL W P-5'-P-CCNC: 17 U/L (ref 9–39)
BASOPHILS # BLD AUTO: 0.01 X10*3/UL (ref 0–0.1)
BASOPHILS NFR BLD AUTO: 0.8 %
BILIRUB SERPL-MCNC: 1.1 MG/DL (ref 0–1.2)
BUN SERPL-MCNC: 19 MG/DL (ref 6–23)
BURR CELLS BLD QL SMEAR: NORMAL
CALCIUM SERPL-MCNC: 9.5 MG/DL (ref 8.6–10.3)
CHLORIDE SERPL-SCNC: 101 MMOL/L (ref 98–107)
CO2 SERPL-SCNC: 28 MMOL/L (ref 21–32)
CREAT SERPL-MCNC: 1.16 MG/DL (ref 0.5–1.3)
CRP SERPL-MCNC: 0.8 MG/DL
EGFRCR SERPLBLD CKD-EPI 2021: 72 ML/MIN/1.73M*2
EOSINOPHIL # BLD AUTO: 0 X10*3/UL (ref 0–0.7)
EOSINOPHIL NFR BLD AUTO: 0 %
ERYTHROCYTE [DISTWIDTH] IN BLOOD BY AUTOMATED COUNT: 15.6 % (ref 11.5–14.5)
FERRITIN SERPL-MCNC: 1101 NG/ML (ref 20–300)
FIBRINOGEN PPP-MCNC: 418 MG/DL (ref 200–400)
GLUCOSE SERPL-MCNC: 88 MG/DL (ref 74–99)
HCT VFR BLD AUTO: 39 % (ref 41–52)
HGB BLD-MCNC: 12.7 G/DL (ref 13.5–17.5)
HOLD SPECIMEN: NORMAL
IGG SERPL-MCNC: 507 MG/DL (ref 700–1600)
IMM GRANULOCYTES # BLD AUTO: 0 X10*3/UL (ref 0–0.7)
IMM GRANULOCYTES NFR BLD AUTO: 0 % (ref 0–0.9)
INR PPP: 1.1 (ref 0.9–1.1)
LDH SERPL L TO P-CCNC: 193 U/L (ref 84–246)
LYMPHOCYTES # BLD AUTO: 0.33 X10*3/UL (ref 1.2–4.8)
LYMPHOCYTES NFR BLD AUTO: 26.4 %
MAGNESIUM SERPL-MCNC: 2.03 MG/DL (ref 1.6–2.4)
MCH RBC QN AUTO: 29.9 PG (ref 26–34)
MCHC RBC AUTO-ENTMCNC: 32.6 G/DL (ref 32–36)
MCV RBC AUTO: 92 FL (ref 80–100)
MONOCYTES # BLD AUTO: 0.31 X10*3/UL (ref 0.1–1)
MONOCYTES NFR BLD AUTO: 24.8 %
NEUTROPHILS # BLD AUTO: 0.6 X10*3/UL (ref 1.2–7.7)
NEUTROPHILS NFR BLD AUTO: 48 %
NRBC BLD-RTO: 0 /100 WBCS (ref 0–0)
OVALOCYTES BLD QL SMEAR: NORMAL
PLATELET # BLD AUTO: 168 X10*3/UL (ref 150–450)
POLYCHROMASIA BLD QL SMEAR: NORMAL
POTASSIUM SERPL-SCNC: 3.6 MMOL/L (ref 3.5–5.3)
PROT SERPL-MCNC: 6.8 G/DL (ref 6.4–8.2)
PROTHROMBIN TIME: 11.8 SECONDS (ref 9.8–12.4)
RBC # BLD AUTO: 4.25 X10*6/UL (ref 4.5–5.9)
RBC MORPH BLD: NORMAL
SCHISTOCYTES BLD QL SMEAR: NORMAL
SODIUM SERPL-SCNC: 138 MMOL/L (ref 136–145)
WBC # BLD AUTO: 1.3 X10*3/UL (ref 4.4–11.3)

## 2025-06-05 PROCEDURE — 84155 ASSAY OF PROTEIN SERUM: CPT

## 2025-06-05 PROCEDURE — 96365 THER/PROPH/DIAG IV INF INIT: CPT | Mod: INF

## 2025-06-05 PROCEDURE — 96366 THER/PROPH/DIAG IV INF ADDON: CPT | Mod: INF

## 2025-06-05 PROCEDURE — 2500000004 HC RX 250 GENERAL PHARMACY W/ HCPCS (ALT 636 FOR OP/ED)

## 2025-06-05 PROCEDURE — 99215 OFFICE O/P EST HI 40 MIN: CPT | Performed by: INTERNAL MEDICINE

## 2025-06-05 PROCEDURE — 83615 LACTATE (LD) (LDH) ENZYME: CPT

## 2025-06-05 PROCEDURE — 90648 HIB PRP-T VACCINE 4 DOSE IM: CPT

## 2025-06-05 PROCEDURE — 80053 COMPREHEN METABOLIC PANEL: CPT

## 2025-06-05 PROCEDURE — 2500000004 HC RX 250 GENERAL PHARMACY W/ HCPCS (ALT 636 FOR OP/ED): Mod: JZ

## 2025-06-05 PROCEDURE — 85610 PROTHROMBIN TIME: CPT

## 2025-06-05 PROCEDURE — 86140 C-REACTIVE PROTEIN: CPT

## 2025-06-05 PROCEDURE — 2500000001 HC RX 250 WO HCPCS SELF ADMINISTERED DRUGS (ALT 637 FOR MEDICARE OP)

## 2025-06-05 PROCEDURE — 85025 COMPLETE CBC W/AUTO DIFF WBC: CPT

## 2025-06-05 PROCEDURE — 90750 HZV VACC RECOMBINANT IM: CPT

## 2025-06-05 PROCEDURE — 82728 ASSAY OF FERRITIN: CPT

## 2025-06-05 PROCEDURE — 96372 THER/PROPH/DIAG INJ SC/IM: CPT

## 2025-06-05 PROCEDURE — 90739 HEPB VACC 2/4 DOSE ADULT IM: CPT

## 2025-06-05 PROCEDURE — 90677 PCV20 VACCINE IM: CPT

## 2025-06-05 PROCEDURE — 85730 THROMBOPLASTIN TIME PARTIAL: CPT

## 2025-06-05 PROCEDURE — 83735 ASSAY OF MAGNESIUM: CPT

## 2025-06-05 PROCEDURE — 82784 ASSAY IGA/IGD/IGG/IGM EACH: CPT

## 2025-06-05 PROCEDURE — 36415 COLL VENOUS BLD VENIPUNCTURE: CPT

## 2025-06-05 PROCEDURE — 85384 FIBRINOGEN ACTIVITY: CPT

## 2025-06-05 PROCEDURE — 90472 IMMUNIZATION ADMIN EACH ADD: CPT

## 2025-06-05 PROCEDURE — 1036F TOBACCO NON-USER: CPT | Performed by: INTERNAL MEDICINE

## 2025-06-05 PROCEDURE — 90696 DTAP-IPV VACCINE 4-6 YRS IM: CPT

## 2025-06-05 PROCEDURE — 90471 IMMUNIZATION ADMIN: CPT

## 2025-06-05 RX ORDER — FAMOTIDINE 10 MG/ML
20 INJECTION, SOLUTION INTRAVENOUS ONCE AS NEEDED
OUTPATIENT
Start: 2025-07-27

## 2025-06-05 RX ORDER — ACETAMINOPHEN 325 MG/1
650 TABLET ORAL ONCE
Status: COMPLETED | OUTPATIENT
Start: 2025-06-05 | End: 2025-06-05

## 2025-06-05 RX ORDER — ALBUTEROL SULFATE 0.83 MG/ML
3 SOLUTION RESPIRATORY (INHALATION) AS NEEDED
OUTPATIENT
Start: 2025-07-03

## 2025-06-05 RX ORDER — EPINEPHRINE 0.3 MG/.3ML
0.3 INJECTION SUBCUTANEOUS EVERY 5 MIN PRN
Status: DISCONTINUED | OUTPATIENT
Start: 2025-06-05 | End: 2025-06-06 | Stop reason: HOSPADM

## 2025-06-05 RX ORDER — ALBUTEROL SULFATE 0.83 MG/ML
3 SOLUTION RESPIRATORY (INHALATION) AS NEEDED
OUTPATIENT
Start: 2025-07-27

## 2025-06-05 RX ORDER — FAMOTIDINE 10 MG/ML
20 INJECTION, SOLUTION INTRAVENOUS ONCE AS NEEDED
OUTPATIENT
Start: 2025-07-03

## 2025-06-05 RX ORDER — DIPHENHYDRAMINE HYDROCHLORIDE 50 MG/ML
50 INJECTION, SOLUTION INTRAMUSCULAR; INTRAVENOUS AS NEEDED
Status: DISCONTINUED | OUTPATIENT
Start: 2025-06-05 | End: 2025-06-06 | Stop reason: HOSPADM

## 2025-06-05 RX ORDER — DIPHENHYDRAMINE HYDROCHLORIDE 50 MG/ML
50 INJECTION, SOLUTION INTRAMUSCULAR; INTRAVENOUS AS NEEDED
OUTPATIENT
Start: 2025-07-03

## 2025-06-05 RX ORDER — DIPHENHYDRAMINE HCL 25 MG
25 CAPSULE ORAL ONCE
OUTPATIENT
Start: 2025-07-03

## 2025-06-05 RX ORDER — EPINEPHRINE 0.3 MG/.3ML
0.3 INJECTION SUBCUTANEOUS EVERY 5 MIN PRN
OUTPATIENT
Start: 2025-07-03

## 2025-06-05 RX ORDER — DIPHENHYDRAMINE HYDROCHLORIDE 50 MG/ML
50 INJECTION, SOLUTION INTRAMUSCULAR; INTRAVENOUS AS NEEDED
OUTPATIENT
Start: 2025-07-27

## 2025-06-05 RX ORDER — DIPHENHYDRAMINE HCL 25 MG
25 CAPSULE ORAL ONCE
Status: COMPLETED | OUTPATIENT
Start: 2025-06-05 | End: 2025-06-05

## 2025-06-05 RX ORDER — ALBUTEROL SULFATE 0.83 MG/ML
3 SOLUTION RESPIRATORY (INHALATION) AS NEEDED
Status: DISCONTINUED | OUTPATIENT
Start: 2025-06-05 | End: 2025-06-06 | Stop reason: HOSPADM

## 2025-06-05 RX ORDER — EPINEPHRINE 0.3 MG/.3ML
0.3 INJECTION SUBCUTANEOUS EVERY 5 MIN PRN
OUTPATIENT
Start: 2025-07-27

## 2025-06-05 RX ORDER — FAMOTIDINE 10 MG/ML
20 INJECTION, SOLUTION INTRAVENOUS ONCE AS NEEDED
Status: DISCONTINUED | OUTPATIENT
Start: 2025-06-05 | End: 2025-06-06 | Stop reason: HOSPADM

## 2025-06-05 RX ORDER — ACETAMINOPHEN 325 MG/1
650 TABLET ORAL ONCE
OUTPATIENT
Start: 2025-07-03

## 2025-06-05 RX ADMIN — PNEUMOCOCCAL 20-VALENT CONJUGATE VACCINE 0.5 ML
2.2; 2.2; 2.2; 2.2; 2.2; 2.2; 2.2; 2.2; 2.2; 2.2; 2.2; 2.2; 2.2; 2.2; 2.2; 2.2; 4.4; 2.2; 2.2; 2.2 INJECTION, SUSPENSION INTRAMUSCULAR at 16:31

## 2025-06-05 RX ADMIN — HEPATITIS B VACCINE (RECOMBINANT) ADJUVANTED 20 MCG: 20 INJECTION, SOLUTION INTRAMUSCULAR at 16:32

## 2025-06-05 RX ADMIN — DIPHENHYDRAMINE HYDROCHLORIDE 25 MG: 25 CAPSULE ORAL at 14:10

## 2025-06-05 RX ADMIN — DIPHTHERIA AND TETANUS TOXOIDS AND ACELLULAR PERTUSSIS ADSORBED AND INACTIVATED POLIOVIRUS VACCINE 0.5 ML: 25; 10; 25; 8; 25; 40; 8; 32 INJECTION, SUSPENSION INTRAMUSCULAR at 16:32

## 2025-06-05 RX ADMIN — HAEMOPHILUS B POLYSACCHARIDE CONJUGATE VACCINE FOR INJ 0.5 ML: RECON SOLN at 16:33

## 2025-06-05 RX ADMIN — IMMUNE GLOBULIN INFUSION (HUMAN) 30 G: 100 INJECTION, SOLUTION INTRAVENOUS; SUBCUTANEOUS at 14:28

## 2025-06-05 RX ADMIN — Medication 0.5 ML: at 16:33

## 2025-06-05 RX ADMIN — ACETAMINOPHEN 650 MG: 325 TABLET ORAL at 14:10

## 2025-06-05 ASSESSMENT — PAIN SCALES - GENERAL: PAINLEVEL_OUTOF10: 0-NO PAIN

## 2025-06-05 NOTE — PROGRESS NOTES
Research Note Follow Up Visit       Nancie Armenta is here today for T+6 M follow up on [id number].  Follow up procedures s/p disease progression completed per protocol. Patient is aware of continued follow up plan.

## 2025-06-05 NOTE — PROGRESS NOTES
[facility] Clinical Note  Patient Reassessment     Introduction  Nancie Armenta is a 60 y.o. male who is on the specialty pharmacy service for management of: Oncology Core.      [facility] supplied medication: Venetoclax 600mg daily    Duration of therapy: 2 years    The most recent encounter visit with the referring prescriber Dr. Hobbs on 06/05/25  was reviewed.  Pharmacy will continue to collaborate in the care of this patient with the referring prescriber.    Discussion  Nancie was contacted on 6/5/2025 at 3:17 PM for a pharmacy visit with encounter number [id number] from:   [facility]  [facility]  [address]  [address]  [facility] [address]  Dept: [phone]  Loc: [phone]  Nancie consented to a/an In person visit, which was performed.    Efficacy  Patient has developed new symptoms of condition: No  Patient/caregiver feels medication is affecting the disease state: Feels a little tired but is mostly complaint free feeling good.    Goals  Provided education on goals and possible outcomes of therapy:  Adherence with therapy  Timely completion of appropriate labs  Timely and appropriate follow up with provider  Identify and address medication interactions with presciption medications, OTC medications and supplements  Optimize or maintain quality of life  Oncology: Prolong life/No disease progression  Manage side effects (ex: nausea/vomiting, constipation, fatigue) in conjunction with care team  Patient has documented target(s) for goals of therapy: No    Tolerance  Patient has experienced side effects from this medication: No  Changes to current therapy regimen: No    The follow-up timeline was discussed. Every person responds to and reacts to therapy differently. Patient should be assessed for efficacy and tolerability in approximately: 6 months    Adherence  Patient Information  Informant: Self (Patient)  Demonstrates Understanding of  Importance of Adherence: Yes  Does the patient have any barriers to self-administration (including physical and mental?): No  Support Network for Adherence: Family Member  Adherence Tools Used: Medication list  Medication Information  Medication: venetoclax (Venclexta)  Patient Reported Missed Doses in the Last 4 Weeks: 0  Estimated Medication Adherence Level: %  Adherence Estimation Source: Claims history  Barriers to Adherence: No Problems identified   The importance of adherence was discussed and patient/caregiver was advised to take the medication as prescribed by their provider. Encouraged patient/caregiver to call physician's office or specialty pharmacy if they have a question regarding a missed dose.    General Assessment  Changes to home medications, OTCs or supplements: No  Current Medications[1]  Reported new allergies: No  Reported new medical conditions: No  Additional monitoring reviewed: Oncology - CBC-diff:   Lab Results   Component Value Date    WBC 1.3 (L) 06/05/2025    RBC 4.25 (L) 06/05/2025    HGB 12.7 (L) 06/05/2025    HCT 39.0 (L) 06/05/2025    MCV 92 06/05/2025    MCHC 32.6 06/05/2025     06/05/2025    RDW 15.6 (H) 06/05/2025    NEUTOPHILPCT 48.0 06/05/2025    IGPCT 0.0 06/05/2025    LYMPHOPCT 26.4 06/05/2025    MONOPCT 24.8 06/05/2025    EOSPCT 0.0 06/05/2025    BASOPCT 0.8 06/05/2025    NEUTROABS 0.60 (L) 06/05/2025    LYMPHSABS 0.33 (L) 06/05/2025    MONOSABS 0.31 06/05/2025    EOSABS 0.00 06/05/2025    BASOSABS 0.01 06/05/2025    and CMP:   Lab Results   Component Value Date    GLUCOSE 88 06/05/2025     06/05/2025    K 3.6 06/05/2025     06/05/2025    CO2 28 06/05/2025    ANIONGAP 13 06/05/2025    BUN 19 06/05/2025    CREATININE 1.16 06/05/2025    CALCIUM 9.5 06/05/2025    ALBUMIN 4.5 06/05/2025    ALKPHOS 69 06/05/2025    PROT 6.8 06/05/2025    AST 17 06/05/2025    BILITOT 1.1 06/05/2025    ALT 17 06/05/2025     Is laboratory follow up needed? Yes - due to  prolonged neutropenia, decreasing venetoclax to 400mg daily. Will follow up labs and a bone marrow is upcoming.    Advised to contact the pharmacy if there are any changes to the patient's medication list, including prescriptions, OTC medications, herbal products, or supplements.    Impression/Plan  This patient has not been identified as high risk due to Lack of high risk qualifiers.  The following action was taken: N/A.    QOL/Patient Satisfaction  Rate your quality of life on scale of 1-10: 10 - Completely satisfied  Rate your satisfaction with  Specialty Pharmacy on scale of 1-10: 10 - Completely satisfied    Provided contact information ([phone]) for [facility] and reviewed dispensing process, refill timeline and patient management follow up. Confirmed understanding of education conducted during assessment. All questions and concerns were addressed and patient/caregiver was encouraged to reach out for additional questions or concerns.    Based on the patient's diagnosis, medication list, progress towards goals, adherence, tolerance, and medication list, medication remains appropriate: Therapy remains appropriate (I attest)    Sumit Donovan, PharmD        [1]   Current Outpatient Medications   Medication Sig Dispense Refill    acyclovir (Zovirax) 400 mg tablet Take 1 tablet (400 mg) by mouth 2 times a day. 60 tablet 3    aspirin 81 mg EC tablet Take 1 tablet (81 mg) by mouth once daily. Take daily every day to prevent blood clots while receiving revlimid 30 tablet 11    atorvastatin (Lipitor) 40 mg tablet Take 1 tablet (40 mg) by mouth once daily in the morning. 30 tablet 2    atovaquone (Mepron) 750 mg/5 mL suspension Take 10 mL (1,500 mg) by mouth once daily. 300 mL 1    hydrocortisone 1 % cream Apply 1 Application topically 2 times a day as needed (hemorrhoids).      lenalidomide (Revlimid) 15 mg capsule Take 1 capsule (15 mg total) by mouth once daily for 21 days. 21  capsule 0    ondansetron (Zofran) 8 mg tablet Take 1 tablet (8 mg) by mouth every 8 hours if needed for nausea or vomiting. 30 tablet 5    prochlorperazine (Compazine) 10 mg tablet Take 1 tablet (10 mg) by mouth every 6 hours if needed for nausea or vomiting. 30 tablet 5    venetoclax (Venclexta) 100 mg tablet Take 4 tablets (400 mg total) by mouth once daily.  Take with food. 360 tablet 3     No current facility-administered medications for this visit.     Facility-Administered Medications Ordered in Other Visits   Medication Dose Route Frequency Provider Last Rate Last Admin    albuterol 2.5 mg /3 mL (0.083 %) nebulizer solution 3 mL  3 mL nebulization PRN NORBERT Enriquez        dextrose 5 % in water (D5W) bolus 500 mL  500 mL intravenous PRN NORBERT Enriquez        diphenhydrAMINE (BENADryl) injection 50 mg  50 mg intravenous PRN NORBERT Enriquez        DTaP-IPV (Kinrix) 25 Lf-58 mcg-10 Lf/0.5 mL vaccine 0.5 mL  0.5 mL intramuscular Once NORBERT Enriquez        EPINEPHrine (Epipen) injection syringe 0.3 mg  0.3 mg intramuscular q5 min PRN NORBERT Enriquez        famotidine PF (Pepcid) injection 20 mg  20 mg intravenous Once PRN NORBERT Enriquez        haemophilus b conjugate (ActHIB) 10 mcg/0.5 mL vaccine 0.5 mL  0.5 mL intramuscular Once NORBERT Enriquez        hepatitis B (Heplisav-B) vaccine 20 mcg  0.5 mL intramuscular Once NORBERT Enriquez        methylPREDNISolone sod succinate (SOLU-Medrol) 40 mg/mL injection 40 mg  40 mg intravenous PRN NORBERT Enriquez        pneumococcal conjugate 20-valent (PREVNAR 20) vaccine  0.5 mL intramuscular Once NORBERT Enriquez        sodium chloride 0.9 % bolus 500 mL  500 mL intravenous PRN NORBERT Enriquez        zoster vaccine-recombinant adjuvanted (Shingrix) vaccine 0.5 mL  0.5 mL intramuscular Once NORBERT Enriquez

## 2025-06-06 LAB
CMV DNA SERPL NAA+PROBE-LOG IU: NORMAL {LOG_IU}/ML
HOLD SPECIMEN: NORMAL
LABORATORY COMMENT REPORT: NOT DETECTED

## 2025-06-06 PROCEDURE — 99001 SPECIMEN HANDLING PT-LAB: CPT | Mod: OUT | Performed by: INTERNAL MEDICINE

## 2025-06-06 NOTE — PROGRESS NOTES
-Patient presents to the clinic today for IVIG + Vaccines   -Patient tolerated infusion and injections well  -Patient discharged in stable condition. Reviewed calendar/next appointment, all questions answered.   -Patient ambulated out of clinic with ease with wife.   -Notes/Plan for next visit-

## 2025-06-17 ENCOUNTER — TELEPHONE (OUTPATIENT)
Dept: ADMISSION | Facility: HOSPITAL | Age: 60
End: 2025-06-17
Payer: COMMERCIAL

## 2025-06-17 DIAGNOSIS — C83.10 MANTLE CELL LYMPHOMA, UNSPECIFIED BODY REGION (MULTI): ICD-10-CM

## 2025-06-17 RX ORDER — LENALIDOMIDE 15 MG/1
15 CAPSULE ORAL DAILY
Qty: 21 CAPSULE | Refills: 0 | Status: SHIPPED | OUTPATIENT
Start: 2025-06-17 | End: 2025-07-08

## 2025-06-17 NOTE — TELEPHONE ENCOUNTER
[facility] pharmacy requesting refill   Revlimid 15mg. 1 capsule daily. 21 days on and 7 days off.   Last FUV 6/5, next FUV 7/10

## 2025-06-17 NOTE — PROGRESS NOTES
"ONCOLOGY MEDICATION REFILL NOTE:    [unfilled]  [facility] Dr. Yvonne Hobbs  on 6/5/25 for management of their mantle cell lymphoma.      [unfilled]  Current Outpatient Medications on File Prior to Visit   Medication Sig Dispense Refill    acyclovir (Zovirax) 400 mg tablet Take 1 tablet (400 mg) by mouth 2 times a day. 60 tablet 3    aspirin 81 mg EC tablet Take 1 tablet (81 mg) by mouth once daily. Take daily every day to prevent blood clots while receiving revlimid 30 tablet 11    atorvastatin (Lipitor) 40 mg tablet Take 1 tablet (40 mg) by mouth once daily in the morning. 30 tablet 2    atovaquone (Mepron) 750 mg/5 mL suspension Take 10 mL (1,500 mg) by mouth once daily. 300 mL 1    hydrocortisone 1 % cream Apply 1 Application topically 2 times a day as needed (hemorrhoids).      ondansetron (Zofran) 8 mg tablet Take 1 tablet (8 mg) by mouth every 8 hours if needed for nausea or vomiting. 30 tablet 5    prochlorperazine (Compazine) 10 mg tablet Take 1 tablet (10 mg) by mouth every 6 hours if needed for nausea or vomiting. 30 tablet 5    venetoclax (Venclexta) 100 mg tablet Take 4 tablets (400 mg total) by mouth once daily.  Take with food. 360 tablet 3    [DISCONTINUED] lenalidomide (Revlimid) 15 mg capsule Take 1 capsule (15 mg total) by mouth once daily for 21 days. 21 capsule 0     No current facility-administered medications on file prior to visit.      Relevant Myeloma Labs:   CBCd:  Recent Labs     06/05/25  1305 05/08/25  0935 04/24/25  0831 04/17/25  0821   WBC 1.3* 1.4* 1.4* 1.9*   ANC  --  0.63* 0.65* 0.85*   HGB 12.7* 11.8* 11.4* 12.0*   HCT 39.0* 35.4* 33.3* 35.4*    181 136* 110*   MCV 92 90 93 94       Serum Protein Electropheresis:  Recent Labs     06/05/25  1305 05/08/25  0935 04/24/25  0831   PROT 6.8 6.5 6.4       Serum Free Light Chains:  No results for input(s): \"KAPPA\", \"LAMBDA\", \"KAPLS\", \"LIGHTCHAIN\" in the last 22691 " "hours.    Immunoglobulins:  Recent Labs     06/05/25  1305 05/08/25  0935   * 366*         24 Hour Urine Protein Electropheresis:   No results for input(s): \"PUQA95ERS\", \"ALBPROTUR\", \"UPK01\", \"WBFEUYTY563\", \"UPEP\", \"IFESU\", \"PR35\" in the last 00291 hours.       @ASSESSMENTArizona Spine and Joint HospitalBEGIN@  Mr. Armenta is tolerating his R2 and venetoclax with only minimal and controlable GI side effects. Counts are stably low, will know more about disease course after bone marrow biopsy in July. Continue current treatment plan.   Clot Prevention: Thromboprophylaxis: Low dose aspirin      Per Dr. Yvonne Hobbs authorization, on 06/17/25 I refilled lenalidomide 15mg PO daily on days 1-21/25.  QTY 21 No refills. Authorization # [id number]  obtained, prescription was sent to [facility].      Sumit Donovan PharmD, [facility]  Outpatient Hematology Pharmacist  [phone]   "

## 2025-06-23 ENCOUNTER — SPECIALTY PHARMACY (OUTPATIENT)
Dept: PHARMACY | Facility: CLINIC | Age: 60
End: 2025-06-23

## 2025-06-23 PROCEDURE — RXMED WILLOW AMBULATORY MEDICATION CHARGE

## 2025-06-24 ENCOUNTER — PHARMACY VISIT (OUTPATIENT)
Dept: PHARMACY | Facility: CLINIC | Age: 60
End: 2025-06-24
Payer: COMMERCIAL

## 2025-06-25 ENCOUNTER — LAB REQUISITION (OUTPATIENT)
Dept: LAB | Facility: CLINIC | Age: 60
End: 2025-06-25
Payer: COMMERCIAL

## 2025-06-25 DIAGNOSIS — C83.10 MANTLE CELL LYMPHOMA, UNSPECIFIED SITE (MULTI): ICD-10-CM

## 2025-06-25 LAB
SPECIMEN HANDLING: NORMAL
SPECIMEN HANDLING: NORMAL

## 2025-06-27 DIAGNOSIS — Z00.00 HEALTHCARE MAINTENANCE: Primary | ICD-10-CM

## 2025-06-27 DIAGNOSIS — C83.10 MANTLE CELL LYMPHOMA, UNSPECIFIED BODY REGION (MULTI): ICD-10-CM

## 2025-06-27 DIAGNOSIS — C83.17 MANTLE CELL LYMPHOMA OF SPLEEN: ICD-10-CM

## 2025-06-27 RX ORDER — LEVOFLOXACIN 500 MG/1
500 TABLET, FILM COATED ORAL DAILY
Qty: 30 TABLET | Refills: 0 | Status: SHIPPED | OUTPATIENT
Start: 2025-06-27

## 2025-06-30 ENCOUNTER — TELEPHONE (OUTPATIENT)
Dept: ADMISSION | Facility: HOSPITAL | Age: 60
End: 2025-06-30
Payer: COMMERCIAL

## 2025-06-30 NOTE — TELEPHONE ENCOUNTER
Patient is calling the office his primary would like a TSH and the patient is wondering if we could add it on to his upcoming labs so he can get it drawn all at the same time? Patient uses  lab and not a [facility].

## 2025-07-02 ENCOUNTER — TELEPHONE (OUTPATIENT)
Dept: HEMATOLOGY/ONCOLOGY | Facility: HOSPITAL | Age: 60
End: 2025-07-02

## 2025-07-03 ENCOUNTER — APPOINTMENT (OUTPATIENT)
Dept: HEMATOLOGY/ONCOLOGY | Facility: HOSPITAL | Age: 60
End: 2025-07-03
Payer: COMMERCIAL

## 2025-07-03 ENCOUNTER — LAB (OUTPATIENT)
Dept: LAB | Facility: HOSPITAL | Age: 60
End: 2025-07-03
Payer: COMMERCIAL

## 2025-07-03 ENCOUNTER — PROCEDURE VISIT (OUTPATIENT)
Dept: HEMATOLOGY/ONCOLOGY | Facility: HOSPITAL | Age: 60
End: 2025-07-03
Payer: COMMERCIAL

## 2025-07-03 ENCOUNTER — INFUSION (OUTPATIENT)
Dept: HEMATOLOGY/ONCOLOGY | Facility: HOSPITAL | Age: 60
End: 2025-07-03
Payer: COMMERCIAL

## 2025-07-03 VITALS
DIASTOLIC BLOOD PRESSURE: 73 MMHG | OXYGEN SATURATION: 100 % | SYSTOLIC BLOOD PRESSURE: 129 MMHG | TEMPERATURE: 97.3 F | WEIGHT: 219.7 LBS | HEART RATE: 66 BPM | BODY MASS INDEX: 29.28 KG/M2 | RESPIRATION RATE: 18 BRPM

## 2025-07-03 VITALS
OXYGEN SATURATION: 100 % | SYSTOLIC BLOOD PRESSURE: 116 MMHG | TEMPERATURE: 97.3 F | DIASTOLIC BLOOD PRESSURE: 83 MMHG | RESPIRATION RATE: 18 BRPM | HEART RATE: 67 BPM

## 2025-07-03 DIAGNOSIS — D80.1 HYPOGAMMAGLOBULINEMIA (MULTI): ICD-10-CM

## 2025-07-03 DIAGNOSIS — C83.17 MANTLE CELL LYMPHOMA OF SPLEEN: ICD-10-CM

## 2025-07-03 DIAGNOSIS — D84.9 IMMUNOCOMPROMISED: ICD-10-CM

## 2025-07-03 DIAGNOSIS — C83.10 MANTLE CELL LYMPHOMA, UNSPECIFIED BODY REGION (MULTI): ICD-10-CM

## 2025-07-03 DIAGNOSIS — Z00.00 HEALTHCARE MAINTENANCE: ICD-10-CM

## 2025-07-03 DIAGNOSIS — C83.17 MANTLE CELL LYMPHOMA OF SPLEEN: Primary | ICD-10-CM

## 2025-07-03 DIAGNOSIS — Z92.850 HISTORY OF CHIMERIC ANTIGEN RECEPTOR T-CELL THERAPY: ICD-10-CM

## 2025-07-03 LAB
ACANTHOCYTES BLD QL SMEAR: NORMAL
ALBUMIN SERPL BCP-MCNC: 4.4 G/DL (ref 3.4–5)
ALP SERPL-CCNC: 65 U/L (ref 33–136)
ALT SERPL W P-5'-P-CCNC: 18 U/L (ref 10–52)
ANION GAP SERPL CALC-SCNC: 12 MMOL/L (ref 10–20)
APTT PPP: 27 SECONDS (ref 26–36)
AST SERPL W P-5'-P-CCNC: 18 U/L (ref 9–39)
BASOPHILS # BLD AUTO: 0.01 X10*3/UL (ref 0–0.1)
BASOPHILS NFR BLD AUTO: 0.9 %
BILIRUB SERPL-MCNC: 1.1 MG/DL (ref 0–1.2)
BUN SERPL-MCNC: 19 MG/DL (ref 6–23)
CALCIUM SERPL-MCNC: 9.6 MG/DL (ref 8.6–10.3)
CHLORIDE SERPL-SCNC: 104 MMOL/L (ref 98–107)
CO2 SERPL-SCNC: 28 MMOL/L (ref 21–32)
CREAT SERPL-MCNC: 1.2 MG/DL (ref 0.5–1.3)
CRP SERPL-MCNC: 0.22 MG/DL
EGFRCR SERPLBLD CKD-EPI 2021: 69 ML/MIN/1.73M*2
EOSINOPHIL # BLD AUTO: 0.01 X10*3/UL (ref 0–0.7)
EOSINOPHIL NFR BLD AUTO: 0.9 %
ERYTHROCYTE [DISTWIDTH] IN BLOOD BY AUTOMATED COUNT: 17.2 % (ref 11.5–14.5)
FERRITIN SERPL-MCNC: 947 NG/ML (ref 20–300)
FIBRINOGEN PPP-MCNC: 310 MG/DL (ref 200–400)
GLUCOSE SERPL-MCNC: 109 MG/DL (ref 74–99)
HCT VFR BLD AUTO: 36.8 % (ref 41–52)
HGB BLD-MCNC: 12.6 G/DL (ref 13.5–17.5)
IGG SERPL-MCNC: 552 MG/DL (ref 700–1600)
IMM GRANULOCYTES # BLD AUTO: 0 X10*3/UL (ref 0–0.7)
IMM GRANULOCYTES NFR BLD AUTO: 0 % (ref 0–0.9)
INR PPP: 1 (ref 0.9–1.1)
LDH SERPL L TO P-CCNC: 160 U/L (ref 84–246)
LYMPHOCYTES # BLD AUTO: 0.41 X10*3/UL (ref 1.2–4.8)
LYMPHOCYTES NFR BLD AUTO: 38.7 %
MAGNESIUM SERPL-MCNC: 2.04 MG/DL (ref 1.6–2.4)
MCH RBC QN AUTO: 31 PG (ref 26–34)
MCHC RBC AUTO-ENTMCNC: 34.2 G/DL (ref 32–36)
MCV RBC AUTO: 90 FL (ref 80–100)
MONOCYTES # BLD AUTO: 0.21 X10*3/UL (ref 0.1–1)
MONOCYTES NFR BLD AUTO: 19.8 %
NEUTROPHILS # BLD AUTO: 0.42 X10*3/UL (ref 1.2–7.7)
NEUTROPHILS NFR BLD AUTO: 39.7 %
NRBC BLD-RTO: 0 /100 WBCS (ref 0–0)
OVALOCYTES BLD QL SMEAR: NORMAL
PLATELET # BLD AUTO: 124 X10*3/UL (ref 150–450)
POTASSIUM SERPL-SCNC: 4.2 MMOL/L (ref 3.5–5.3)
PROT SERPL-MCNC: 6.5 G/DL (ref 6.4–8.2)
PROTHROMBIN TIME: 11 SECONDS (ref 9.8–12.4)
RBC # BLD AUTO: 4.07 X10*6/UL (ref 4.5–5.9)
RBC MORPH BLD: NORMAL
SCHISTOCYTES BLD QL SMEAR: NORMAL
SODIUM SERPL-SCNC: 140 MMOL/L (ref 136–145)
TSH SERPL-ACNC: 2.96 MIU/L (ref 0.44–3.98)
WBC # BLD AUTO: 1.1 X10*3/UL (ref 4.4–11.3)

## 2025-07-03 PROCEDURE — 85384 FIBRINOGEN ACTIVITY: CPT

## 2025-07-03 PROCEDURE — 96365 THER/PROPH/DIAG IV INF INIT: CPT | Mod: INF

## 2025-07-03 PROCEDURE — 38222 DX BONE MARROW BX & ASPIR: CPT | Mod: RT | Performed by: PHYSICIAN ASSISTANT

## 2025-07-03 PROCEDURE — 84443 ASSAY THYROID STIM HORMONE: CPT

## 2025-07-03 PROCEDURE — 2500000004 HC RX 250 GENERAL PHARMACY W/ HCPCS (ALT 636 FOR OP/ED): Mod: JZ

## 2025-07-03 PROCEDURE — 82784 ASSAY IGA/IGD/IGG/IGM EACH: CPT

## 2025-07-03 PROCEDURE — 85025 COMPLETE CBC W/AUTO DIFF WBC: CPT

## 2025-07-03 PROCEDURE — 96375 TX/PRO/DX INJ NEW DRUG ADDON: CPT | Mod: INF

## 2025-07-03 PROCEDURE — 36415 COLL VENOUS BLD VENIPUNCTURE: CPT

## 2025-07-03 PROCEDURE — 83735 ASSAY OF MAGNESIUM: CPT

## 2025-07-03 PROCEDURE — 84075 ASSAY ALKALINE PHOSPHATASE: CPT

## 2025-07-03 PROCEDURE — 2500000004 HC RX 250 GENERAL PHARMACY W/ HCPCS (ALT 636 FOR OP/ED): Performed by: PHYSICIAN ASSISTANT

## 2025-07-03 PROCEDURE — 96366 THER/PROPH/DIAG IV INF ADDON: CPT | Mod: INF

## 2025-07-03 PROCEDURE — 82728 ASSAY OF FERRITIN: CPT

## 2025-07-03 PROCEDURE — 36415 COLL VENOUS BLD VENIPUNCTURE: CPT | Performed by: PHYSICIAN ASSISTANT

## 2025-07-03 PROCEDURE — 86140 C-REACTIVE PROTEIN: CPT

## 2025-07-03 PROCEDURE — 83615 LACTATE (LD) (LDH) ENZYME: CPT

## 2025-07-03 PROCEDURE — 85730 THROMBOPLASTIN TIME PARTIAL: CPT

## 2025-07-03 PROCEDURE — 2500000001 HC RX 250 WO HCPCS SELF ADMINISTERED DRUGS (ALT 637 FOR MEDICARE OP)

## 2025-07-03 RX ORDER — HEPARIN 100 UNIT/ML
500 SYRINGE INTRAVENOUS AS NEEDED
OUTPATIENT
Start: 2025-07-03

## 2025-07-03 RX ORDER — ALBUTEROL SULFATE 0.83 MG/ML
3 SOLUTION RESPIRATORY (INHALATION) AS NEEDED
OUTPATIENT
Start: 2025-07-31

## 2025-07-03 RX ORDER — EPINEPHRINE 0.3 MG/.3ML
0.3 INJECTION SUBCUTANEOUS EVERY 5 MIN PRN
Status: DISCONTINUED | OUTPATIENT
Start: 2025-07-03 | End: 2025-07-03 | Stop reason: HOSPADM

## 2025-07-03 RX ORDER — FAMOTIDINE 10 MG/ML
20 INJECTION, SOLUTION INTRAVENOUS ONCE AS NEEDED
OUTPATIENT
Start: 2025-07-31

## 2025-07-03 RX ORDER — FAMOTIDINE 10 MG/ML
20 INJECTION, SOLUTION INTRAVENOUS ONCE AS NEEDED
Status: DISCONTINUED | OUTPATIENT
Start: 2025-07-03 | End: 2025-07-03 | Stop reason: HOSPADM

## 2025-07-03 RX ORDER — ACETAMINOPHEN 325 MG/1
650 TABLET ORAL ONCE
Status: COMPLETED | OUTPATIENT
Start: 2025-07-03 | End: 2025-07-03

## 2025-07-03 RX ORDER — LIDOCAINE HYDROCHLORIDE 20 MG/ML
10 INJECTION, SOLUTION INFILTRATION; PERINEURAL ONCE
Status: DISCONTINUED | OUTPATIENT
Start: 2025-07-03 | End: 2025-07-03 | Stop reason: HOSPADM

## 2025-07-03 RX ORDER — DIPHENHYDRAMINE HYDROCHLORIDE 50 MG/ML
50 INJECTION, SOLUTION INTRAMUSCULAR; INTRAVENOUS AS NEEDED
OUTPATIENT
Start: 2025-07-31

## 2025-07-03 RX ORDER — DIPHENHYDRAMINE HYDROCHLORIDE 50 MG/ML
50 INJECTION, SOLUTION INTRAMUSCULAR; INTRAVENOUS AS NEEDED
Status: DISCONTINUED | OUTPATIENT
Start: 2025-07-03 | End: 2025-07-03 | Stop reason: HOSPADM

## 2025-07-03 RX ORDER — ALBUTEROL SULFATE 0.83 MG/ML
3 SOLUTION RESPIRATORY (INHALATION) AS NEEDED
Status: DISCONTINUED | OUTPATIENT
Start: 2025-07-03 | End: 2025-07-03 | Stop reason: HOSPADM

## 2025-07-03 RX ORDER — HEPARIN SODIUM,PORCINE/PF 10 UNIT/ML
50 SYRINGE (ML) INTRAVENOUS AS NEEDED
OUTPATIENT
Start: 2025-07-03

## 2025-07-03 RX ORDER — EPINEPHRINE 0.3 MG/.3ML
0.3 INJECTION SUBCUTANEOUS EVERY 5 MIN PRN
OUTPATIENT
Start: 2025-07-31

## 2025-07-03 RX ORDER — DIPHENHYDRAMINE HCL 25 MG
25 CAPSULE ORAL ONCE
OUTPATIENT
Start: 2025-07-31

## 2025-07-03 RX ORDER — DIPHENHYDRAMINE HCL 25 MG
25 CAPSULE ORAL ONCE
Status: COMPLETED | OUTPATIENT
Start: 2025-07-03 | End: 2025-07-03

## 2025-07-03 RX ORDER — ACETAMINOPHEN 325 MG/1
650 TABLET ORAL ONCE
OUTPATIENT
Start: 2025-07-31

## 2025-07-03 RX ORDER — LORAZEPAM 2 MG/ML
1 INJECTION INTRAMUSCULAR ONCE AS NEEDED
Status: COMPLETED | OUTPATIENT
Start: 2025-07-03 | End: 2025-07-03

## 2025-07-03 RX ADMIN — ACETAMINOPHEN 650 MG: 325 TABLET ORAL at 11:30

## 2025-07-03 RX ADMIN — IMMUNE GLOBULIN INFUSION (HUMAN) 30 G: 100 INJECTION, SOLUTION INTRAVENOUS; SUBCUTANEOUS at 12:20

## 2025-07-03 RX ADMIN — DIPHENHYDRAMINE HYDROCHLORIDE 25 MG: 25 CAPSULE ORAL at 11:30

## 2025-07-03 RX ADMIN — LORAZEPAM 1 MG: 2 INJECTION, SOLUTION INTRAMUSCULAR; INTRAVENOUS at 11:30

## 2025-07-03 NOTE — PROGRESS NOTES
Pt present today for bone marrow biopsy and IVIG infusion.  Treatment tolerated with no issues.  Pt discharged to home in stable condition.

## 2025-07-03 NOTE — PROGRESS NOTES
Patient ID: Nancie Armenta is a 60 y.o. male.    Biopsy bone marrow    Date/Time: 7/3/2025 11:45 AM    Performed by: Vern Anguiano PA-C  Authorized by: Vern Anguiano PA-C    Consent:     Consent obtained:  Verbal    Consent given by:  Patient    Risks, benefits, and alternatives were discussed: yes      Risks discussed:  Bleeding, infection and pain    Alternatives discussed:  Observation  Universal protocol:     Procedure explained and questions answered to patient or proxy's satisfaction: yes      Relevant documents present and verified: yes      Test results available: yes      Site/side marked: yes      Immediately prior to procedure, a time out was called: yes      Patient identity confirmed:  Verbally with patient  Indications:     Indications:  Mantle Cell Lymphoma  Pre-procedure details:     Skin preparation:  Chlorhexidine    Preparation: Patient was prepped and draped in the usual sterile fashion    Sedation:     Sedation type:  None  Anesthesia:     Anesthesia method:  Local infiltration    Local anesthetic:  Lidocaine 1% w/o epi and lidocaine 2% w/o epi  Procedure specific details:      Informed consent was obtained and potential risks including bleeding, infection and pain were reviewed with the patient.     The patient was placed in the prone position, and the Right posterior iliac crest was prepped with chlorhexidine.     The skin, subcutaneous tissues, and periosteum were anesthetized with 5mL of 1% lidocaine and 10mL of 2% lidocaine.     A small incision was made with a #15 scalpel, and the BD Trek powered bone marrow needle was advanced through the periosteum into the intramedullary space.    12 mL bone marrow was aspirated; the needle was then advanced further and a 1 cm core biopsy obtained. The specimen was sent for morphology, flow cytometry, cytogenetics, and FISH/molecular per Hematopathology.    The needle was removed and hemostasis achieved.     The procedure was  tolerated well and there were no complications.    Procedure completed by: Vern Anguiano PA-C    Post-procedure details:     Procedure completion:  Tolerated

## 2025-07-04 LAB
CMV DNA SERPL NAA+PROBE-LOG IU: NORMAL {LOG_IU}/ML
LABORATORY COMMENT REPORT: NOT DETECTED

## 2025-07-09 LAB
CELL COUNT (BLOOD): 0.92 X10*3/UL
CELL POPULATIONS: NORMAL
DIAGNOSIS: NORMAL
FLOW DIFFERENTIAL: NORMAL
FLOW TEST ORDERED: NORMAL
LAB TEST METHOD: NORMAL
NUMBER OF CELLS COLLECTED: NORMAL
PATH REPORT.COMMENTS IMP SPEC: NORMAL
PATH REPORT.FINAL DX SPEC: NORMAL
PATH REPORT.GROSS SPEC: NORMAL
PATH REPORT.MICROSCOPIC SPEC OTHER STN: NORMAL
PATH REPORT.RELEVANT HX SPEC: NORMAL
PATH REPORT.TOTAL CANCER: NORMAL
PATH REPORT.TOTAL CANCER: NORMAL
SIGNATURE COMMENT: NORMAL
SPECIMEN VIABILITY: NORMAL

## 2025-07-09 RX ORDER — ACETAMINOPHEN 325 MG/1
650 TABLET ORAL ONCE
OUTPATIENT
Start: 2025-09-04

## 2025-07-09 RX ORDER — PROCHLORPERAZINE EDISYLATE 5 MG/ML
10 INJECTION INTRAMUSCULAR; INTRAVENOUS EVERY 6 HOURS PRN
OUTPATIENT
Start: 2025-09-04

## 2025-07-09 RX ORDER — DIPHENHYDRAMINE HCL 50 MG
50 CAPSULE ORAL ONCE
OUTPATIENT
Start: 2025-09-04

## 2025-07-09 RX ORDER — EPINEPHRINE 0.3 MG/.3ML
0.3 INJECTION SUBCUTANEOUS EVERY 5 MIN PRN
OUTPATIENT
Start: 2025-09-04

## 2025-07-09 RX ORDER — DIPHENHYDRAMINE HYDROCHLORIDE 50 MG/ML
50 INJECTION, SOLUTION INTRAMUSCULAR; INTRAVENOUS AS NEEDED
OUTPATIENT
Start: 2025-09-04

## 2025-07-09 RX ORDER — PROCHLORPERAZINE MALEATE 10 MG
10 TABLET ORAL EVERY 6 HOURS PRN
OUTPATIENT
Start: 2025-09-04

## 2025-07-09 RX ORDER — FAMOTIDINE 10 MG/ML
20 INJECTION, SOLUTION INTRAVENOUS ONCE AS NEEDED
OUTPATIENT
Start: 2025-09-04

## 2025-07-09 RX ORDER — ALBUTEROL SULFATE 0.83 MG/ML
3 SOLUTION RESPIRATORY (INHALATION) AS NEEDED
OUTPATIENT
Start: 2025-09-04

## 2025-07-09 ASSESSMENT — ENCOUNTER SYMPTOMS
LIGHT-HEADEDNESS: 0
DIZZINESS: 0
CARDIOVASCULAR NEGATIVE: 1
CONSTIPATION: 0
MUSCULOSKELETAL NEGATIVE: 1
VOMITING: 0
DIARRHEA: 0
DIAPHORESIS: 0
ABDOMINAL PAIN: 0
APPETITE CHANGE: 0
NUMBNESS: 0
FATIGUE: 0
UNEXPECTED WEIGHT CHANGE: 0
CHILLS: 0
SHORTNESS OF BREATH: 0
HEMATOLOGIC/LYMPHATIC NEGATIVE: 1
FEVER: 0
BLOOD IN STOOL: 0

## 2025-07-09 NOTE — PROGRESS NOTES
Patient ID: Nancie Armenta is a 60 y.o. male.    Treatment:   Oncology History Overview Note           Patient Visit Information:   Visit Type: Follow Up Visit      Cancer History:   Treatment Synopsis:     1. Stage IV A mantle cell non-Hodgkin lymphoma involving the gastrointestinal tract diagnosed in October 2011, with diffuse gastrointestinal involvement. The patient was treated  with RCHOP alternating with Rituxan and high-dose Mamie-C and received total of 6 cycles of chemotherapy which he finished in February 2012.   Allergic to Augmentin, causes hives.     2. Patient underwent beam chemotherapy followed by autologous peripheral stem cell transplant in April 2012, by Dr. Yvonne Hobbs at [facility], was also involved in phase 3 clinical trial up killed shingles vaccine versus placebo ([employer]-1 Z10  study).     3.The patient developed shingles in January 2014, involving left flank.      4. 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      5.  Recurrent mantle cell lymphoma June 2019 assx presentation with leukocytosis. Peripheral blood flow which showed a CD5 positve clonal lymphocytosis. 6/2/19 CT of chest abdomen and pelvis revealed  splenomegaly of 15 cm compared to 12.8 cm last evaluation. Recurrence confirmed by FISH on peripheral blood earlier this month.  BM biopsy which showed 5% Mantle cell involvement although peripheral blood shows 30% involvement. CT imaging showed splenomegaly. PET scan declined by insurance twice.  Patient underwent colonoscopy  with Dr. Ac ( [place]) 8/8/19 which showed cobblestoning of colon and multiple biopsies including terminal ileum, appendix, cecum,  rectosigmoid positive for mantle cell lymphoma.     6. Initiation of acalabrutinib August 2019 with minimal response. Switched to ibrutinib and venetoclax 12/20/20 with clearing of t(11, 14) by by FISH ( 1/2020) and resolution of involvement of colon by endoscopy and pathology on exam 3/2/2020! BM biopsy  4/2020 HEATHER.     C-scope (3/11/22): at OSH: negative, no evidence of disease. PET scan ( 3/14/22): no abnormal uptake. Deauville 1. BMBx (3/17/22): negative, no evidence of MCL.   March 2022, ibrutinib and venetoclax discontinued.    July 2024 development of Tpenia, bone marrow biopsy 7/25/24 showed focal involvement with mantle cell lymphoma (NGS cyclin D1 positive and Sox 11 positive. BIRC+ and TP 53 positve with a VAF of 3%, BIRC3 positive). FISH negative for 17p deletion  CT imaging done on 8/7/24 shows thickening of sigmoid colon, development of mild to moderate retroperitoneal adenopathy, splenomegally and possible splenic infarct.    Pirotbrutib 200 mg daily initiated 8/12/24.  Due to inadequate response, he started the ventoclax (50 mg) on Adolfo 10/13, then increased to 100 mg , 200 mg and 400 mg      BMBx (10/23/24) with low level involvement by mantle cells about 3% Mantle cells by flow.      PET/CT (11/4/24) with FDG avid lymphadenopathy throughout the neck, chest, abdomen, and pelvis c/w lymphomatous involvement. Intense FDG avid splenomegaly. Peripheral regions of photopenia likely reflect areas of infarction.      Pirtobrutinib and Venetoclax stopped 11/14/24 in preparation for CART collection.      Admitted 11/29/24 for his CAR-T cell therapy, on CASE 2419 (T0=12/6/24), prepped with Fludarabine/Cyclophosphamide. Hospital course included grade 2 CRS (fever, tachycardia) on 12/7, received Toxi x1, infectious workup negative.  He also had symptomatic orthostatic hypotension on 12/8, resolved with fluids by 12/9.  He was discharged home on 12/18/24.     T+ 90 progressive tumorous involvement of bone  marrow, PET continues to show CR.     BM biopsy from 3/13/25 showing residual marrow disease of about 15% on his most recent bone marrow biopsy.  We have opted for rituxan, revlimid and venetoclax combination ( see below), first dose of venetoclax 50 mg and rituxan 4/3/25.     7. S/P COVID 19 infection 12/1/2020, no sequelae          Lymphoma   10/20/2011 Initial Diagnosis    Lymphoma (Multi)      Cellular Therapy    Mr. Nancie Armenta is a 59 y.o. male presenting with relapsed Mantle Cell Lymphoma. CAR-T cell therapy, on CASE 2419 (T0=12/6/24), prepped with Fludarabine/Cyclophosphamide.      Hosp Course:  - Grade 2 CRS (fever, tachycardia) on 12/7, gave Toxi x1, infectious workup negative. Resolved by 12/8.  - Symptomatic Orthostatic Hypotension on 12/8, resolved with fluids by 12/9.     Mantle cell lymphoma of spleen   10/13/2016 Initial Diagnosis    Mantle cell lymphoma of spleen (CMS/HCC)     Mantle cell lymphoma   11/18/2024 Initial Diagnosis    Mantle cell lymphoma     11/30/2024 - 12/20/2024 Bone Marrow Transplant        Cellular Therapy    Mr. Nancie Armenta is a 59 y.o. male presenting with relapsed Mantle Cell Lymphoma. CAR-T cell therapy, on CASE 2419 (T0=12/6/24), prepped with Fludarabine/Cyclophosphamide.      Hosp Course:  - Grade 2 CRS (fever, tachycardia) on 12/7, gave Toxi x1, infectious workup negative. Resolved by 12/8.  - Symptomatic Orthostatic Hypotension on 12/8, resolved with fluids by 12/9.     4/3/2025 - 5/8/2025 Chemotherapy    RiTUXimab (Weekly), 28 Day Cycle      7/10/2025 -  Chemotherapy    RiTUXimab, 8 Week Cycles - Maintenance       Response:     Past Medical History:  Renal insufficiency.   Past Medical History:   Diagnosis Date    Fourth (trochlear) nerve palsy, right eye 06/08/2017    Right-sided fourth cranial nerve palsy    Fourth (trochlear) nerve palsy, right eye 06/08/2017    Right-sided fourth cranial nerve palsy    Malignant neoplasm of connective and soft tissue,  unspecified 06/08/2017    Cancer of blood vessel     Surgical History:   Past Surgical History:   Procedure Laterality Date    IR CVC PICC  11/29/2024    IR CVC PICC 11/29/2024 [facility] ANGIO    SHOULDER SURGERY  07/13/2017    Shoulder Surgery      Family History:   Family History   Problem Relation Name Age of Onset    Cataracts Mother      Cataracts Father      Other (chronic lymphoid leukemia) Father       Social History:  .  Working full time for [employer].    Social History     Tobacco Use    Smoking status: Never    Smokeless tobacco: Never   Vaping Use    Vaping status: Never Used   Substance Use Topics    Alcohol use: Never    Drug use: Never   -------------------------------------------------------------------------------------------------------  Subjective       HPI    Nancie Armenta is a 60 year old man with PMH of hyperglycemia, renal insufficiency, and stage IV A mantle cell non hodgkin lymphoma involving the gastrointestinal tract, s/p autologous SCT (April 2012).      Early in July 2024 patient noted to have dropping platelet counts and bone marrow biopsy 7/25/24 showed focal involvement with mantle cell lymphoma (NGS cyclin D1 positive and Sox 11 positive. BIRC+ ad TP 53 positve with a VAF of 3%, BIRC3 positive). FISH negative for 17p deletion  CT imaging done on 8/7/24 shows thickening of sigmoid colon, development of mild to moderate retroperitoneal adenopathy, splenomegally and possible splenic infarct.  Started Pirtobrutinib salvage on 8/12/24.  Added Venetoclax starting 10/13/24 since spleen size was increasing on repeat CT imaging and on physical exam.  BMBx (10/23/24) with low level involvement by mantle cells about 3% Mantle cells by flow.      Pirtobrutinib and Venetoclax stopped 11/14/24 in preparation for CART collection.  Admitted 11/29/24 for his CAR-T cell therapy, on CASE 2419 (T0=12/6/24), prepped with     Fludarabine/Cyclophosphamide. Hospital course included grade 2 CRS  (fever, tachycardia) on 12/7, received Toxi x1, infectious workup negative.  He also had symptomatic orthostatic hypotension on 12/8, resolved with fluids by 12/9.  He was discharged home on 12/18/24.      Unfortunatley BM biopsy from 3/13/25 showing residual marrow disease of about 15% on his most recent bone marrow biopsy.  We have opted for rituxan, revlimid and venetoclax combination ( see below), first dose of venetoclax 50 mg and rituxan 4/3/25.  Venetoclax ramped up to 600 mg.     Nancie presents to the clinic today (7/10/25) with his wife for followup evaluation after his CAR-T T+215 days ago with BM relapse and to review BM biopsy results from 7/3/25 which show no evidence of MCL by histology or flow!.  He remains pancytopenic .  He continues on monthly IVIG . He is currently taking revlimid 15 mg on days 1-21 out of 28 days.  He started revlimid on 4/4/25, he is on venetoclax 400 mg daily  .  Forth weekly dose of  rituxan May 8th and starts maintenance July 10th.  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 Endorses that energy level are close to baseline. Had congestive symptoms following CAR-T but his has now resolved. No other infectious prodromes. Does get diarrhea after Revlimid doses but this is controlled on loperamide and spontaneously resolves in a few days. Very excited regarding BMBx results.       Review of Systems   Constitutional:  Negative for appetite change, chills, diaphoresis, fatigue, fever and unexpected weight change.   Respiratory:  Negative for cough and shortness of breath.    Cardiovascular: Negative.    Gastrointestinal:  Negative for abdominal pain, blood in stool, constipation, diarrhea, nausea and vomiting.   Genitourinary: Negative.     Musculoskeletal: Negative.  Negative for gait problem.   Skin: Negative.    Neurological:  Negative for dizziness, gait problem, light-headedness and numbness.  "  Hematological: Negative.    -------------------------------------------------------------------------------------------------------  Objective   BSA: 2.26 meters squared  /76   Pulse 55   Temp 36.5 °C (97.7 °F)   Resp 17   Ht (S) 1.842 m (6' 0.52\")   Wt 99.7 kg (219 lb 12.8 oz)   SpO2 100%   BMI 29.38 kg/m²     Physical Exam  Vitals reviewed.   Constitutional:       Appearance: Normal appearance.      Comments: Very tanned   HENT:      Head: Normocephalic and atraumatic.      Mouth/Throat:      Mouth: Mucous membranes are moist.   Eyes:      Extraocular Movements: Extraocular movements intact.      Conjunctiva/sclera: Conjunctivae normal.      Pupils: Pupils are equal, round, and reactive to light.   Cardiovascular:      Rate and Rhythm: Normal rate and regular rhythm.      Pulses: Normal pulses.      Heart sounds: Normal heart sounds.   Pulmonary:      Effort: Pulmonary effort is normal. No respiratory distress.      Breath sounds: Normal breath sounds. No wheezing.   Abdominal:      General: Abdomen is flat. Bowel sounds are normal. There is no distension.      Palpations: Abdomen is soft. There is no mass.      Tenderness: There is no abdominal tenderness.      Comments: Spleen not palpable   Musculoskeletal:         General: No swelling. Normal range of motion.   Lymphadenopathy:      Comments: No lymphadenopathy   Skin:     General: Skin is warm and dry.   Neurological:      General: No focal deficit present.      Mental Status: He is alert and oriented to person, place, and time.   Psychiatric:         Mood and Affect: Mood normal.         Behavior: Behavior normal.         Thought Content: Thought content normal.         Judgment: Judgment normal.     Performance Status:  ECOG Score: 0- Fully active, able to carry on all pre-disease performance w/o restriction.     CAR-T product:  CAR-T [id number]  Cell infusion date: " 12/6/2024  -------------------------------------------------------------------------------------------------------  Assessment/Plan      1. Stage IV mantle cell lymphoma itreated with Nordic regimen and consolidation autograft with BEAM preparative regimen April 2012.  Relapse 2019 treated with ibrutinib and venetoclax with complete pathology response until 3/ 2022. See above.     Recurrence July 2024 presenting with dropping platelet counts and bone marrow biopsy 7/25/24 showed focal involvement with mantle cell lymphoma (NGS cyclin D1 positive and Sox 11 positive. BIRC+ ad TP 53 positve with a VAF of 3%, BIRC3 positive). FISH negative for 17p deletion  CT imaging done on 8/7/24 shows thickening of sigmoid colon, development of mild to moderate retroperitoneal adenopathy, splenomegally and possible splenic infarct.      Patient started pirtobrutinib salvage August 12,2024 and feels better. However spleen size was increasing per CT imaging (8/6/24)  and physical exam so Dr. Hobbs planned to add venetoclax as follows, starting 10/13/24:  - 50 mg for first week, then 100 (10/20), and 200 mg (10/27) for 7 days each, then 400 mg (11/3/24- )    Pre CART staging:    BM biopsy low level involvment by mantle cells about 3% mantle cells by flow. Absence of CD19 on this  flow sample likely reflects insufficient sampling and positive for CD 19 on prior bone marrow sample of 7/24/24.    PET imaging 11/4/24   1. FDG avid lymphadenopathy throughout the neck, chest, abdomen and  pelvis as detailed above, consistent with lymphomatous involvement.  2. Intense FDG avid splenomegaly, consistent with lymphomatous  involvement. Peripheral regions of photopenia likely reflect areas of  infarction.  3. No PET evidence of FDG avid extranodal disease.on     ECHO 10/23/24 low normal LVEF 53%, cleared by Dr. Iazguirre.     Admitted 11/29/24 for his CAR-T cell therapy, on CASE 2419 (T0=12/6/24), prepped with Fludarabine/Cyclophosphamide.      PET scan 1/2/25:   IMPRESSION:  1.  Interval resolution of hypermetabolic lymphadenopathy above and below the diaphragm. Interval resolution hypermetabolic activity within the spleen with improving splenomegaly. (Deauville score of 1).  2. No evidence of new hypermetabolic robbie or extranodal lymphomatous disease.    Bone marrow biopsy performed 1/2/25 showed 1% mantle cells by IHC for SOX 11 and cycle D1 but nothing by flow.        3/13/25 Day 90  BM shows about 15% lymphoma cells which are cyclin D1 positive, PET scan shows no lymphomatous involvement and continued decrease in spleen size.   Patients case reviewed at interdisciplinary tumor board with recommendation options as follows:    Patient and team have opted to initiate salvage treatment with venetoclax R2. ( Cipriano et al. Blood Advances 8:(2) , p 407, 2024)  Phase 2 dose revlimid 15 mg 21/28 days ( note dose change), venetoclax 600 mg daily after ramp up and rituxan 275 mg/m2 weekly for 4 weeks, then q 8 weeks. Regimen associated with 60% RRat 6 months and median PFS of 21 months with overall survival of 31 months. Plan BM bx at day 90 after starting.      Peripheral blood flow, negative for clonal b cell. Rituxan started on 4/3/25. Received dose 1 of 4 rituximab 4/3/25.  Started venetoclax ramp up on 4/3/25.  Started revlimid 15 mg daily on days 1-21 out of 28 days on 4/4/25. Pancytopenic at initiation of treatment    6/5/25:  ANC remains low with improved hgb and platelets.  Will decrease venetoclax to 400 mg daily, continues revlimid 15 mg on days 1-21 out of 28 days, today is day 6 of cycle.  4th weekly dose of rituxan given 5/8/25  as was postponed by 2 weeks due to upper respiratory infection.    Plan for maintenance rituximab to start July 10th.  Restaging BM biopsy 7/3/25. Follow up visit in 1 month.      7/10/25:  Remains pancytopenic,  BM biopsy from 7/3/25 shows no evidence of lymphoma by histology or flow, no worrisome findings on  physical exam. Given worsening cytopenia will hold venetoclax and revlimid and obtain weekly blood counts, resume when platelets >100k with same dose revlimid and decrease of venetoclax to 200 mg.  Will order CT of of chest, abdomen and pelvis, also plan colonoscopy for MCL screening in fall. Plan BM biopsy in 3 months again and if MRD negative by clonoseq, protocol is to complete 3 more months of regimen and then place patient on observation,.    Hemoglobin   Date Value Ref Range Status   07/10/2025 12.6 (L) 13.5 - 17.5 g/dL Final   07/03/2025 12.6 (L) 13.5 - 17.5 g/dL Final   06/05/2025 12.7 (L) 13.5 - 17.5 g/dL Final     Platelets   Date Value Ref Range Status   07/10/2025 85 (L) 150 - 450 x10*3/uL Final   07/03/2025 124 (L) 150 - 450 x10*3/uL Final   06/05/2025 168 150 - 450 x10*3/uL Final     WBC   Date Value Ref Range Status   07/10/2025 1.3 (L) 4.4 - 11.3 x10*3/uL Final   07/03/2025 1.1 (L) 4.4 - 11.3 x10*3/uL Final   06/05/2025 1.3 (L) 4.4 - 11.3 x10*3/uL Final     Ferritin   Date Value Ref Range Status   07/03/2025 947 (H) 20 - 300 ng/mL Final   06/05/2025 1,101 (H) 20 - 300 ng/mL Final   05/08/2025 1,078 (H) 20 - 300 ng/mL Final     Fibrinogen   Date Value Ref Range Status   07/03/2025 310 200 - 400 mg/dL Final   06/05/2025 418 (H) 200 - 400 mg/dL Final   05/08/2025 464 (H) 200 - 400 mg/dL Final     C-Reactive Protein   Date Value Ref Range Status   07/03/2025 0.22 <1.00 mg/dL Final   06/05/2025 0.80 <1.00 mg/dL Final   05/08/2025 0.78 <1.00 mg/dL Final     IgG   Date Value Ref Range Status   07/03/2025 552 (L) 700 - 1,600 mg/dL Final     Comment:     MONOCLONAL PROTEINS MAY CAUSE FALSELY LOW  RESULTS IN THIS ASSAY. SERUM PROTEIN  ELECTROPHORESIS SHOULD BE DONE AS THE  FIRST TEST TO EVALUATE MONOCLONAL GAMMOPATHY.     06/05/2025 507 (L) 700 - 1,600 mg/dL Final     Comment:     MONOCLONAL PROTEINS MAY CAUSE FALSELY LOW  RESULTS IN THIS ASSAY. SERUM PROTEIN  ELECTROPHORESIS SHOULD BE DONE AS THE  FIRST TEST  TO EVALUATE MONOCLONAL GAMMOPATHY.     05/08/2025 366 (L) 700 - 1,600 mg/dL Final     Comment:     MONOCLONAL PROTEINS MAY CAUSE FALSELY LOW  RESULTS IN THIS ASSAY. SERUM PROTEIN  ELECTROPHORESIS SHOULD BE DONE AS THE  FIRST TEST TO EVALUATE MONOCLONAL GAMMOPATHY.       Infectious prophylaxis:   Antiviral prophylaxis Acyclovir. Continue for at least 6 months.   Antifungal prophylaxis Fluconazole discontinued.    Start levofloxacin 500 mg daily on 4/3/25 due to neutropenia.   Bactrim stopped on 4/3 due to neutropenia.  Will need to initiate atovaquone in next few weeks if need to continue to hold for cytopenias.   Started atovaquone 4/24/25 for PJP prophylaxis?.      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  Currently has upper respiratory infection.  Plan to start IVIG infusion, can try for 5/8/25 if approved by insurance.  Educated Serafino and wife on rationale for IVIG infusions and possible side effects.  Understanding verbalized.    5/8/25:  IgG level 366 today.  Monthly IVIG infusions approved by insurance.  Scheduled to receive his first IVIG infusion today.       Post CAR-T Vaccines:  Will plan to start vaccines with influenza and covid vaccine about 3-4 months after CAR-T and will start post transplant vaccines around 6 months.   4/24/25:  Advised to obtain influenza (if able to get as flu season is over), and covid series from local pharmacy.  Provided printed paper with information regarding covid vaccination following CAR-T to give to pharmacy.  Advised to wait a few weeks until upper respiratory infection has cleared.    Started 6 month vaccines on 6/5/25, next vaccines 8/7/25.     Health maintenance.   Colonoscopy completed 7/14/23: normal except internal hemorrhoids.  Recommendations to repeat in 2 years.    - Some aortic calcifications on imaging, started on prevastatin-managed by his PCP.  PCP increased pravastatin dose to 40 mg daily.  PCP switched to atorvastatin 40 mg daily.    RTC: Hold  revlimid and venetoclax with weekly blood counts until platelets > 100,000. Plan CT CAP, colonoscopy in fall,  next IVIG will be 8/7/25 with physician visit. Will be also due for 8 month vaccines.     ----------------------------------------------------------------------------------------------------------------  Yvonne Hobbs MD

## 2025-07-10 ENCOUNTER — LAB (OUTPATIENT)
Dept: LAB | Facility: HOSPITAL | Age: 60
End: 2025-07-10
Payer: COMMERCIAL

## 2025-07-10 ENCOUNTER — INFUSION (OUTPATIENT)
Dept: HEMATOLOGY/ONCOLOGY | Facility: HOSPITAL | Age: 60
End: 2025-07-10
Payer: COMMERCIAL

## 2025-07-10 ENCOUNTER — OFFICE VISIT (OUTPATIENT)
Dept: HEMATOLOGY/ONCOLOGY | Facility: HOSPITAL | Age: 60
End: 2025-07-10
Payer: COMMERCIAL

## 2025-07-10 ENCOUNTER — APPOINTMENT (OUTPATIENT)
Dept: HEMATOLOGY/ONCOLOGY | Facility: HOSPITAL | Age: 60
End: 2025-07-10
Payer: COMMERCIAL

## 2025-07-10 VITALS
TEMPERATURE: 97.7 F | HEIGHT: 73 IN | BODY MASS INDEX: 29.13 KG/M2 | SYSTOLIC BLOOD PRESSURE: 119 MMHG | DIASTOLIC BLOOD PRESSURE: 76 MMHG | WEIGHT: 219.8 LBS | HEART RATE: 55 BPM | OXYGEN SATURATION: 100 % | RESPIRATION RATE: 17 BRPM

## 2025-07-10 VITALS
SYSTOLIC BLOOD PRESSURE: 123 MMHG | RESPIRATION RATE: 16 BRPM | OXYGEN SATURATION: 100 % | TEMPERATURE: 97.9 F | DIASTOLIC BLOOD PRESSURE: 74 MMHG | HEART RATE: 63 BPM

## 2025-07-10 DIAGNOSIS — C83.10 MANTLE CELL LYMPHOMA, UNSPECIFIED BODY REGION (MULTI): Primary | ICD-10-CM

## 2025-07-10 DIAGNOSIS — C83.10 MANTLE CELL LYMPHOMA, UNSPECIFIED BODY REGION (MULTI): ICD-10-CM

## 2025-07-10 DIAGNOSIS — D80.1 HYPOGAMMAGLOBULINEMIA (MULTI): ICD-10-CM

## 2025-07-10 DIAGNOSIS — D84.9 IMMUNOCOMPROMISED: ICD-10-CM

## 2025-07-10 LAB
ACANTHOCYTES BLD QL SMEAR: ABNORMAL
ALBUMIN SERPL BCP-MCNC: 4.1 G/DL (ref 3.4–5)
ALP SERPL-CCNC: 72 U/L (ref 33–136)
ALT SERPL W P-5'-P-CCNC: 35 U/L (ref 10–52)
ANION GAP SERPL CALC-SCNC: 11 MMOL/L (ref 10–20)
AST SERPL W P-5'-P-CCNC: 25 U/L (ref 9–39)
BASOPHILS # BLD MANUAL: 0 X10*3/UL (ref 0–0.1)
BASOPHILS NFR BLD MANUAL: 0 %
BILIRUB SERPL-MCNC: 0.8 MG/DL (ref 0–1.2)
BUN SERPL-MCNC: 18 MG/DL (ref 6–23)
BURR CELLS BLD QL SMEAR: ABNORMAL
CALCIUM SERPL-MCNC: 9 MG/DL (ref 8.6–10.3)
CHLORIDE SERPL-SCNC: 104 MMOL/L (ref 98–107)
CO2 SERPL-SCNC: 29 MMOL/L (ref 21–32)
CREAT SERPL-MCNC: 1.15 MG/DL (ref 0.5–1.3)
EGFRCR SERPLBLD CKD-EPI 2021: 72 ML/MIN/1.73M*2
EOSINOPHIL # BLD MANUAL: 0 X10*3/UL (ref 0–0.7)
EOSINOPHIL NFR BLD MANUAL: 0 %
ERYTHROCYTE [DISTWIDTH] IN BLOOD BY AUTOMATED COUNT: 16.2 % (ref 11.5–14.5)
GLUCOSE SERPL-MCNC: 94 MG/DL (ref 74–99)
HCT VFR BLD AUTO: 37.5 % (ref 41–52)
HGB BLD-MCNC: 12.6 G/DL (ref 13.5–17.5)
HOLD SPECIMEN: NORMAL
IMM GRANULOCYTES # BLD AUTO: 0.09 X10*3/UL (ref 0–0.7)
IMM GRANULOCYTES NFR BLD AUTO: 7 % (ref 0–0.9)
LDH SERPL L TO P-CCNC: 144 U/L (ref 84–246)
LYMPHOCYTES # BLD MANUAL: 0.65 X10*3/UL (ref 1.2–4.8)
LYMPHOCYTES NFR BLD MANUAL: 50 %
MCH RBC QN AUTO: 30.7 PG (ref 26–34)
MCHC RBC AUTO-ENTMCNC: 33.6 G/DL (ref 32–36)
MCV RBC AUTO: 91 FL (ref 80–100)
METAMYELOCYTES # BLD MANUAL: 0.03 X10*3/UL
METAMYELOCYTES NFR BLD MANUAL: 2 %
MONOCYTES # BLD MANUAL: 0.29 X10*3/UL (ref 0.1–1)
MONOCYTES NFR BLD MANUAL: 22 %
MYELOCYTES # BLD MANUAL: 0.01 X10*3/UL
MYELOCYTES NFR BLD MANUAL: 1 %
NEUTROPHILS # BLD MANUAL: 0.32 X10*3/UL (ref 1.2–7.7)
NEUTS BAND # BLD MANUAL: 0.03 X10*3/UL (ref 0–0.7)
NEUTS BAND NFR BLD MANUAL: 2 %
NEUTS SEG # BLD MANUAL: 0.29 X10*3/UL (ref 1.2–7)
NEUTS SEG NFR BLD MANUAL: 22 %
NRBC BLD-RTO: 0 /100 WBCS (ref 0–0)
OVALOCYTES BLD QL SMEAR: ABNORMAL
PLATELET # BLD AUTO: 85 X10*3/UL (ref 150–450)
POTASSIUM SERPL-SCNC: 3.9 MMOL/L (ref 3.5–5.3)
PROT SERPL-MCNC: 6.4 G/DL (ref 6.4–8.2)
RBC # BLD AUTO: 4.11 X10*6/UL (ref 4.5–5.9)
RBC MORPH BLD: ABNORMAL
SCHISTOCYTES BLD QL SMEAR: ABNORMAL
SODIUM SERPL-SCNC: 140 MMOL/L (ref 136–145)
TOTAL CELLS COUNTED BLD: 100
URATE SERPL-MCNC: 5.1 MG/DL (ref 4–7.5)
VARIANT LYMPHS # BLD MANUAL: 0.01 X10*3/UL (ref 0–0.5)
VARIANT LYMPHS NFR BLD: 1 %
WBC # BLD AUTO: 1.3 X10*3/UL (ref 4.4–11.3)

## 2025-07-10 PROCEDURE — 84075 ASSAY ALKALINE PHOSPHATASE: CPT

## 2025-07-10 PROCEDURE — 1036F TOBACCO NON-USER: CPT | Performed by: INTERNAL MEDICINE

## 2025-07-10 PROCEDURE — 2500000001 HC RX 250 WO HCPCS SELF ADMINISTERED DRUGS (ALT 637 FOR MEDICARE OP): Performed by: INTERNAL MEDICINE

## 2025-07-10 PROCEDURE — 85027 COMPLETE CBC AUTOMATED: CPT

## 2025-07-10 PROCEDURE — 85007 BL SMEAR W/DIFF WBC COUNT: CPT

## 2025-07-10 PROCEDURE — 83615 LACTATE (LD) (LDH) ENZYME: CPT

## 2025-07-10 PROCEDURE — 2500000004 HC RX 250 GENERAL PHARMACY W/ HCPCS (ALT 636 FOR OP/ED): Performed by: INTERNAL MEDICINE

## 2025-07-10 PROCEDURE — 96415 CHEMO IV INFUSION ADDL HR: CPT

## 2025-07-10 PROCEDURE — 96413 CHEMO IV INFUSION 1 HR: CPT

## 2025-07-10 PROCEDURE — 99215 OFFICE O/P EST HI 40 MIN: CPT | Performed by: INTERNAL MEDICINE

## 2025-07-10 PROCEDURE — 84550 ASSAY OF BLOOD/URIC ACID: CPT

## 2025-07-10 PROCEDURE — 36415 COLL VENOUS BLD VENIPUNCTURE: CPT

## 2025-07-10 PROCEDURE — 3008F BODY MASS INDEX DOCD: CPT | Performed by: INTERNAL MEDICINE

## 2025-07-10 RX ORDER — ALBUTEROL SULFATE 0.83 MG/ML
3 SOLUTION RESPIRATORY (INHALATION) AS NEEDED
OUTPATIENT
Start: 2025-08-07

## 2025-07-10 RX ORDER — FAMOTIDINE 10 MG/ML
20 INJECTION, SOLUTION INTRAVENOUS ONCE AS NEEDED
Status: DISCONTINUED | OUTPATIENT
Start: 2025-07-10 | End: 2025-07-10 | Stop reason: HOSPADM

## 2025-07-10 RX ORDER — ACETAMINOPHEN 325 MG/1
650 TABLET ORAL ONCE
Status: COMPLETED | OUTPATIENT
Start: 2025-07-10 | End: 2025-07-10

## 2025-07-10 RX ORDER — PROCHLORPERAZINE EDISYLATE 5 MG/ML
10 INJECTION INTRAMUSCULAR; INTRAVENOUS EVERY 6 HOURS PRN
Status: DISCONTINUED | OUTPATIENT
Start: 2025-07-10 | End: 2025-07-10 | Stop reason: HOSPADM

## 2025-07-10 RX ORDER — DIPHENHYDRAMINE HCL 25 MG
25 CAPSULE ORAL ONCE
OUTPATIENT
Start: 2025-08-07

## 2025-07-10 RX ORDER — ACETAMINOPHEN 325 MG/1
650 TABLET ORAL ONCE
OUTPATIENT
Start: 2025-08-07

## 2025-07-10 RX ORDER — ATOVAQUONE 750 MG/5ML
1500 SUSPENSION ORAL DAILY
Qty: 1800 ML | Refills: 0 | Status: SHIPPED | OUTPATIENT
Start: 2025-07-10 | End: 2026-01-06

## 2025-07-10 RX ORDER — DIPHENHYDRAMINE HCL 50 MG
50 CAPSULE ORAL ONCE
Status: COMPLETED | OUTPATIENT
Start: 2025-07-10 | End: 2025-07-10

## 2025-07-10 RX ORDER — EPINEPHRINE 0.3 MG/.3ML
0.3 INJECTION SUBCUTANEOUS EVERY 5 MIN PRN
Status: DISCONTINUED | OUTPATIENT
Start: 2025-07-10 | End: 2025-07-10 | Stop reason: HOSPADM

## 2025-07-10 RX ORDER — PROCHLORPERAZINE MALEATE 10 MG
10 TABLET ORAL EVERY 6 HOURS PRN
Status: DISCONTINUED | OUTPATIENT
Start: 2025-07-10 | End: 2025-07-10 | Stop reason: HOSPADM

## 2025-07-10 RX ORDER — FAMOTIDINE 10 MG/ML
20 INJECTION, SOLUTION INTRAVENOUS ONCE AS NEEDED
OUTPATIENT
Start: 2025-08-07

## 2025-07-10 RX ORDER — ALBUTEROL SULFATE 0.83 MG/ML
3 SOLUTION RESPIRATORY (INHALATION) AS NEEDED
Status: DISCONTINUED | OUTPATIENT
Start: 2025-07-10 | End: 2025-07-10 | Stop reason: HOSPADM

## 2025-07-10 RX ORDER — DIPHENHYDRAMINE HYDROCHLORIDE 50 MG/ML
50 INJECTION, SOLUTION INTRAMUSCULAR; INTRAVENOUS AS NEEDED
Status: DISCONTINUED | OUTPATIENT
Start: 2025-07-10 | End: 2025-07-10 | Stop reason: HOSPADM

## 2025-07-10 RX ORDER — EPINEPHRINE 0.3 MG/.3ML
0.3 INJECTION SUBCUTANEOUS EVERY 5 MIN PRN
OUTPATIENT
Start: 2025-08-07

## 2025-07-10 RX ORDER — DIPHENHYDRAMINE HYDROCHLORIDE 50 MG/ML
50 INJECTION, SOLUTION INTRAMUSCULAR; INTRAVENOUS AS NEEDED
OUTPATIENT
Start: 2025-08-07

## 2025-07-10 RX ADMIN — SODIUM CHLORIDE 900 MG: 0.9 INJECTION, SOLUTION INTRAVENOUS at 11:02

## 2025-07-10 RX ADMIN — ACETAMINOPHEN 650 MG: 325 TABLET ORAL at 10:26

## 2025-07-10 RX ADMIN — DIPHENHYDRAMINE HYDROCHLORIDE 50 MG: 50 CAPSULE ORAL at 10:26

## 2025-07-10 ASSESSMENT — ENCOUNTER SYMPTOMS
NAUSEA: 0
COUGH: 0

## 2025-07-10 ASSESSMENT — PAIN SCALES - GENERAL: PAINLEVEL_OUTOF10: 0-NO PAIN

## 2025-07-10 NOTE — PROGRESS NOTES
Ritux Rates Verified by 2nd nurse     1-142.8 ml/hr (71.4 ml)   2- 285.6 ml/hr (remainder volume)    -Patient presents to the clinic today for accelerated ritux (maintenance)  -Patient tolerated infusion well  -Patient discharged in stable condition. Reviewed calendar/next appointment, all questions answered.   -Patient ambulated out of clinic with ease with wife.   -Notes/Plan for next visit-

## 2025-07-11 DIAGNOSIS — C83.10 MANTLE CELL LYMPHOMA, UNSPECIFIED BODY REGION (MULTI): ICD-10-CM

## 2025-07-11 LAB
CHROM ANALY OVERALL INTERP-IMP: NORMAL
CMV DNA SERPL NAA+PROBE-LOG IU: NORMAL {LOG_IU}/ML
ELECTRONICALLY SIGNED BY CYTOGENETICS: NORMAL
LABORATORY COMMENT REPORT: NOT DETECTED

## 2025-07-17 ENCOUNTER — SPECIALTY PHARMACY (OUTPATIENT)
Dept: PHARMACY | Facility: CLINIC | Age: 60
End: 2025-07-17

## 2025-07-17 PROCEDURE — RXMED WILLOW AMBULATORY MEDICATION CHARGE

## 2025-07-18 ENCOUNTER — LAB (OUTPATIENT)
Dept: LAB | Facility: HOSPITAL | Age: 60
End: 2025-07-18
Payer: COMMERCIAL

## 2025-07-18 DIAGNOSIS — C83.10 MANTLE CELL LYMPHOMA, UNSPECIFIED BODY REGION (MULTI): ICD-10-CM

## 2025-07-18 DIAGNOSIS — C83.10 MANTLE CELL LYMPHOMA, UNSPECIFIED SITE (MULTI): Primary | ICD-10-CM

## 2025-07-18 LAB
BASOPHILS # BLD MANUAL: 0.02 X10*3/UL (ref 0–0.1)
BASOPHILS NFR BLD MANUAL: 1.7 %
BLASTS # BLD MANUAL: 0 X10*3/UL
BLASTS NFR BLD MANUAL: 0 %
BURR CELLS BLD QL SMEAR: ABNORMAL
EOSINOPHIL # BLD MANUAL: 0 X10*3/UL (ref 0–0.7)
EOSINOPHIL NFR BLD MANUAL: 0 %
ERYTHROCYTE [DISTWIDTH] IN BLOOD BY AUTOMATED COUNT: 17.1 % (ref 11.5–14.5)
HCT VFR BLD AUTO: 36.9 % (ref 41–52)
HGB BLD-MCNC: 11.8 G/DL (ref 13.5–17.5)
IMM GRANULOCYTES # BLD AUTO: 0.08 X10*3/UL (ref 0–0.7)
IMM GRANULOCYTES NFR BLD AUTO: 6.9 % (ref 0–0.9)
LYMPHOCYTES # BLD MANUAL: 0.5 X10*3/UL (ref 1.2–4.8)
LYMPHOCYTES NFR BLD MANUAL: 42 %
MCH RBC QN AUTO: 30.8 PG (ref 26–34)
MCHC RBC AUTO-ENTMCNC: 32 G/DL (ref 32–36)
MCV RBC AUTO: 96 FL (ref 80–100)
METAMYELOCYTES # BLD MANUAL: 0 X10*3/UL
METAMYELOCYTES NFR BLD MANUAL: 0 %
MONOCYTES # BLD MANUAL: 0.35 X10*3/UL (ref 0.1–1)
MONOCYTES NFR BLD MANUAL: 29.4 %
MYELOCYTES # BLD MANUAL: 0 X10*3/UL
MYELOCYTES NFR BLD MANUAL: 0 %
NEUTROPHILS # BLD MANUAL: 0.32 X10*3/UL (ref 1.2–7.7)
NEUTS BAND # BLD MANUAL: 0 X10*3/UL (ref 0–0.7)
NEUTS BAND NFR BLD MANUAL: 0 %
NEUTS SEG # BLD MANUAL: 0.32 X10*3/UL (ref 1.2–7)
NEUTS SEG NFR BLD MANUAL: 26.9 %
NRBC BLD MANUAL-RTO: 0 % (ref 0–0)
NRBC BLD-RTO: 0 /100 WBCS (ref 0–0)
OVALOCYTES BLD QL SMEAR: ABNORMAL
PLASMA CELLS # BLD MANUAL: 0 X10*3/UL
PLASMA CELLS NFR BLD MANUAL: 0 %
PLATELET # BLD AUTO: 115 X10*3/UL (ref 150–450)
PROMYELOCYTES # BLD MANUAL: 0 X10*3/UL
PROMYELOCYTES NFR BLD MANUAL: 0 %
RBC # BLD AUTO: 3.83 X10*6/UL (ref 4.5–5.9)
RBC MORPH BLD: ABNORMAL
TOTAL CELLS COUNTED BLD: 119
VARIANT LYMPHS # BLD MANUAL: 0 X10*3/UL (ref 0–0.5)
VARIANT LYMPHS NFR BLD: 0 %
WBC # BLD AUTO: 1.2 X10*3/UL (ref 4.4–11.3)
WBC OTHER # BLD MANUAL: 0 X10*3/UL
WBC OTHER NFR BLD MANUAL: 0 %

## 2025-07-22 ENCOUNTER — PHARMACY VISIT (OUTPATIENT)
Dept: PHARMACY | Facility: CLINIC | Age: 60
End: 2025-07-22
Payer: COMMERCIAL

## 2025-07-22 ENCOUNTER — TELEPHONE (OUTPATIENT)
Dept: HEMATOLOGY/ONCOLOGY | Facility: CLINIC | Age: 60
End: 2025-07-22
Payer: COMMERCIAL

## 2025-07-22 NOTE — TELEPHONE ENCOUNTER
"Patient called to update Dr Hdez and team.  States he is currently on break from his treatment regimen with Venetoclax and Revlimid due to low counts.  Continues with monthly IVIG.    Per Dr Rojas note \"BM BX shows no evidence of Lymphoma by histology or flow, no worrisome findings on physical exam\".  Dr. Hdez provided update and is pleased.  No follow-up needed with Dr Hdez at this time.    "

## 2025-07-24 ENCOUNTER — APPOINTMENT (OUTPATIENT)
Dept: LAB | Facility: HOSPITAL | Age: 60
End: 2025-07-24
Payer: COMMERCIAL

## 2025-07-24 ENCOUNTER — HOSPITAL ENCOUNTER (OUTPATIENT)
Dept: RADIOLOGY | Facility: CLINIC | Age: 60
Discharge: HOME | End: 2025-07-24
Payer: COMMERCIAL

## 2025-07-24 ENCOUNTER — PATIENT MESSAGE (OUTPATIENT)
Dept: HEMATOLOGY/ONCOLOGY | Facility: HOSPITAL | Age: 60
End: 2025-07-24

## 2025-07-24 ENCOUNTER — TELEPHONE (OUTPATIENT)
Dept: ADMISSION | Facility: HOSPITAL | Age: 60
End: 2025-07-24

## 2025-07-24 DIAGNOSIS — C83.10 MANTLE CELL LYMPHOMA, UNSPECIFIED BODY REGION (MULTI): ICD-10-CM

## 2025-07-24 LAB
BASOPHILS # BLD MANUAL: 0.04 X10*3/UL (ref 0–0.1)
BASOPHILS NFR BLD MANUAL: 2.6 %
BLASTS # BLD MANUAL: 0 X10*3/UL
BLASTS NFR BLD MANUAL: 0 %
EOSINOPHIL # BLD MANUAL: 0 X10*3/UL (ref 0–0.7)
EOSINOPHIL NFR BLD MANUAL: 0 %
ERYTHROCYTE [DISTWIDTH] IN BLOOD BY AUTOMATED COUNT: 16.7 % (ref 11.5–14.5)
HCT VFR BLD AUTO: 36.9 % (ref 41–52)
HGB BLD-MCNC: 12.5 G/DL (ref 13.5–17.5)
IMM GRANULOCYTES # BLD AUTO: 0.11 X10*3/UL (ref 0–0.7)
IMM GRANULOCYTES NFR BLD AUTO: 7.4 % (ref 0–0.9)
LYMPHOCYTES # BLD MANUAL: 0.46 X10*3/UL (ref 1.2–4.8)
LYMPHOCYTES NFR BLD MANUAL: 30.8 %
MCH RBC QN AUTO: 31.3 PG (ref 26–34)
MCHC RBC AUTO-ENTMCNC: 33.9 G/DL (ref 32–36)
MCV RBC AUTO: 93 FL (ref 80–100)
METAMYELOCYTES # BLD MANUAL: 0 X10*3/UL
METAMYELOCYTES NFR BLD MANUAL: 0 %
MONOCYTES # BLD MANUAL: 0.24 X10*3/UL (ref 0.1–1)
MONOCYTES NFR BLD MANUAL: 16.2 %
MYELOCYTES # BLD MANUAL: 0 X10*3/UL
MYELOCYTES NFR BLD MANUAL: 0 %
NEUTROPHILS # BLD MANUAL: 0.75 X10*3/UL (ref 1.2–7.7)
NEUTS BAND # BLD MANUAL: 0.01 X10*3/UL (ref 0–0.7)
NEUTS BAND NFR BLD MANUAL: 0.8 %
NEUTS SEG # BLD MANUAL: 0.74 X10*3/UL (ref 1.2–7)
NEUTS SEG NFR BLD MANUAL: 49.6 %
NRBC BLD MANUAL-RTO: 0 % (ref 0–0)
NRBC BLD-RTO: 0 /100 WBCS (ref 0–0)
OVALOCYTES BLD QL SMEAR: ABNORMAL
PLASMA CELLS # BLD MANUAL: 0 X10*3/UL
PLASMA CELLS NFR BLD MANUAL: 0 %
PLATELET # BLD AUTO: 120 X10*3/UL (ref 150–450)
POLYCHROMASIA BLD QL SMEAR: ABNORMAL
PROMYELOCYTES # BLD MANUAL: 0 X10*3/UL
PROMYELOCYTES NFR BLD MANUAL: 0 %
RBC # BLD AUTO: 3.99 X10*6/UL (ref 4.5–5.9)
RBC MORPH BLD: ABNORMAL
TOTAL CELLS COUNTED BLD: 117
VARIANT LYMPHS # BLD MANUAL: 0 X10*3/UL (ref 0–0.5)
VARIANT LYMPHS NFR BLD: 0 %
WBC # BLD AUTO: 1.5 X10*3/UL (ref 4.4–11.3)
WBC OTHER # BLD MANUAL: 0 X10*3/UL
WBC OTHER NFR BLD MANUAL: 0 %

## 2025-07-24 PROCEDURE — 74177 CT ABD & PELVIS W/CONTRAST: CPT

## 2025-07-24 PROCEDURE — 85007 BL SMEAR W/DIFF WBC COUNT: CPT

## 2025-07-24 PROCEDURE — 85027 COMPLETE CBC AUTOMATED: CPT

## 2025-07-24 PROCEDURE — 2550000001 HC RX 255 CONTRASTS: Performed by: INTERNAL MEDICINE

## 2025-07-24 RX ORDER — LENALIDOMIDE 15 MG/1
15 CAPSULE ORAL DAILY
Qty: 21 CAPSULE | Refills: 0 | Status: SHIPPED | OUTPATIENT
Start: 2025-07-24 | End: 2025-08-14

## 2025-07-24 RX ADMIN — IOHEXOL 75 ML: 350 INJECTION, SOLUTION INTRAVENOUS at 10:35

## 2025-07-24 NOTE — PROGRESS NOTES
ONCOLOGY MEDICATION REFILL NOTE:    [unfilled]  [facility] Lauri Armenta had a patient care encounter with Dr. Yvonne Hobbs  on 7/10/25 for management of their Mantle cell lymphoma.      [unfilled]  Current Outpatient Medications on File Prior to Visit   Medication Sig Dispense Refill    acyclovir (Zovirax) 400 mg tablet Take 1 tablet (400 mg) by mouth 2 times a day. 60 tablet 3    aspirin 81 mg EC tablet Take 1 tablet (81 mg) by mouth once daily. Take daily every day to prevent blood clots while receiving revlimid 30 tablet 11    atorvastatin (Lipitor) 40 mg tablet Take 1 tablet (40 mg) by mouth once daily in the morning. 30 tablet 2    atovaquone (Mepron) 750 mg/5 mL suspension Take 10 mL (1,500 mg) by mouth once daily. 1800 mL 0    hydrocortisone 1 % cream Apply 1 Application topically 2 times a day as needed (hemorrhoids).      levoFLOXacin (Levaquin) 500 mg tablet TAKE 1 TABLET BY MOUTH ONCE DAILY 30 tablet 0    ondansetron (Zofran) 8 mg tablet Take 1 tablet (8 mg) by mouth every 8 hours if needed for nausea or vomiting. 30 tablet 5    prochlorperazine (Compazine) 10 mg tablet Take 1 tablet (10 mg) by mouth every 6 hours if needed for nausea or vomiting. 30 tablet 5    venetoclax (Venclexta) 100 mg tablet Take 4 tablets (400 mg total) by mouth once daily.  Take with food. 360 tablet 3    [DISCONTINUED] lenalidomide (Revlimid) 15 mg capsule Take 1 capsule (15 mg total) by mouth once daily for 21 days. 21 capsule 0     Current Facility-Administered Medications on File Prior to Visit   Medication Dose Route Frequency Provider Last Rate Last Admin    [COMPLETED] iohexol (OMNIPaque) 350 mg iodine/mL solution 75 mL  75 mL intravenous Once in imaging Yvonne Hobbs MD   75 mL at 07/24/25 1035      Relevant Myeloma Labs:   CBCd:  Recent Labs     07/24/25  1052 07/17/25  1251 07/10/25  0834   WBC 1.5* 1.2* 1.3*   ANC 0.75* 0.32* 0.32*   HGB 12.5* 11.8* 12.6*   HCT 36.9* 36.9* 37.5*   * 115* 85*  "  MCV 93 96 91       Serum Protein Electropheresis:  Recent Labs     07/10/25  0834 07/03/25  1040 06/05/25  1305   PROT 6.4 6.5 6.8       Serum Free Light Chains:  No results for input(s): \"KAPPA\", \"LAMBDA\", \"KAPLS\", \"LIGHTCHAIN\" in the last 97099 hours.    Immunoglobulins:  Recent Labs     07/03/25  1040 06/05/25  1305   * 507*         24 Hour Urine Protein Electropheresis:   No results for input(s): \"PPQN09CCE\", \"ALBPROTUR\", \"UPK01\", \"WQEJTPTJ439\", \"UPEP\", \"IFESU\", \"PR35\" in the last 57963 hours.       [unfilled]  Mr. Armenta continues to tolerate his rituximab, lenalidomide and venetoclax regimen. He had a treatment break to allow for count recovery, but has been advised to restart his maddy and adriana. White count and platelets have improved- bone marrow shows no evidence of disease!  Clot Prevention: Thromboprophylaxis: Low dose aspirin      Per Dr. Yvonne Hobbs authorization, on 07/24/25 I refilled lenalidomide 15mg PO daily on days 1-21/28.  QTY 21 No refills. Authorization # [id number] obtained, prescription was sent to [facility].      Sumit NixD, [facility]  Outpatient Hematology Pharmacist  [phone]  "

## 2025-07-24 NOTE — TELEPHONE ENCOUNTER
Pt called due to only having 8 Revlimid  pills left and he is leaving for [place] tomorrow, returning on 8/5 or 8/6.  He is looking for direction about this cycle- he won't have enough medication to complete 21 days.  He will end mid-cycle while in [place].

## 2025-07-25 DIAGNOSIS — C83.10 MANTLE CELL LYMPHOMA, UNSPECIFIED BODY REGION (MULTI): ICD-10-CM

## 2025-07-25 NOTE — TELEPHONE ENCOUNTER
Clinical pharmacist Ry Donovan spoke to pt regarding new prescription sent yesterday that can be delivered to his hotel in [place]. He was also instructed that after his 2 weeks off treatment he should start taking the lenolidomide for 21 days again and off for 7 days.  In other words, he will treat his restart as the start of a new 28 day treatment cycle.

## 2025-07-28 ENCOUNTER — APPOINTMENT (OUTPATIENT)
Dept: HEMATOLOGY/ONCOLOGY | Facility: HOSPITAL | Age: 60
End: 2025-07-28
Payer: COMMERCIAL

## 2025-07-30 ENCOUNTER — TELEPHONE (OUTPATIENT)
Dept: ADMISSION | Facility: HOSPITAL | Age: 60
End: 2025-07-30
Payer: COMMERCIAL

## 2025-07-30 NOTE — TELEPHONE ENCOUNTER
Daisy from [facility] calling to update team that a new Prior Auth is needed for most recent script sent for Revlimid on 7/24.    [facility] initiated a case on CoverMyMEd    KEY info-   [id number]    Forwarding to team

## 2025-07-31 ENCOUNTER — APPOINTMENT (OUTPATIENT)
Dept: HEMATOLOGY/ONCOLOGY | Facility: HOSPITAL | Age: 60
End: 2025-07-31
Payer: COMMERCIAL

## 2025-08-06 ASSESSMENT — ENCOUNTER SYMPTOMS
COUGH: 0
LIGHT-HEADEDNESS: 0
BLOOD IN STOOL: 0
NUMBNESS: 0
UNEXPECTED WEIGHT CHANGE: 0
APPETITE CHANGE: 0
HEMATOLOGIC/LYMPHATIC NEGATIVE: 1
ABDOMINAL PAIN: 0
DIZZINESS: 0
SHORTNESS OF BREATH: 0
CARDIOVASCULAR NEGATIVE: 1
DIAPHORESIS: 0
FATIGUE: 0
MUSCULOSKELETAL NEGATIVE: 1
VOMITING: 0
CHILLS: 0
DIARRHEA: 0
CONSTIPATION: 0
FEVER: 0
NAUSEA: 0

## 2025-08-07 ENCOUNTER — LAB (OUTPATIENT)
Dept: LAB | Facility: HOSPITAL | Age: 60
End: 2025-08-07
Payer: COMMERCIAL

## 2025-08-07 ENCOUNTER — OFFICE VISIT (OUTPATIENT)
Dept: HEMATOLOGY/ONCOLOGY | Facility: HOSPITAL | Age: 60
End: 2025-08-07
Payer: COMMERCIAL

## 2025-08-07 ENCOUNTER — INFUSION (OUTPATIENT)
Dept: HEMATOLOGY/ONCOLOGY | Facility: HOSPITAL | Age: 60
End: 2025-08-07
Payer: COMMERCIAL

## 2025-08-07 VITALS
TEMPERATURE: 97.7 F | RESPIRATION RATE: 16 BRPM | DIASTOLIC BLOOD PRESSURE: 76 MMHG | HEART RATE: 83 BPM | BODY MASS INDEX: 28.9 KG/M2 | WEIGHT: 216.2 LBS | SYSTOLIC BLOOD PRESSURE: 111 MMHG | OXYGEN SATURATION: 100 %

## 2025-08-07 VITALS
DIASTOLIC BLOOD PRESSURE: 92 MMHG | TEMPERATURE: 96.6 F | SYSTOLIC BLOOD PRESSURE: 132 MMHG | OXYGEN SATURATION: 100 % | WEIGHT: 216.27 LBS | RESPIRATION RATE: 18 BRPM | HEART RATE: 79 BPM | BODY MASS INDEX: 28.91 KG/M2

## 2025-08-07 DIAGNOSIS — D80.1 HYPOGAMMAGLOBULINEMIA (MULTI): ICD-10-CM

## 2025-08-07 DIAGNOSIS — D84.9 IMMUNOCOMPROMISED: ICD-10-CM

## 2025-08-07 DIAGNOSIS — Z92.850 HISTORY OF CHIMERIC ANTIGEN RECEPTOR T-CELL THERAPY: Primary | ICD-10-CM

## 2025-08-07 DIAGNOSIS — C83.10 MANTLE CELL LYMPHOMA, UNSPECIFIED BODY REGION (MULTI): ICD-10-CM

## 2025-08-07 DIAGNOSIS — Z92.850 HISTORY OF CHIMERIC ANTIGEN RECEPTOR T-CELL THERAPY: ICD-10-CM

## 2025-08-07 DIAGNOSIS — D84.9 IMMUNOCOMPROMISED: Primary | ICD-10-CM

## 2025-08-07 PROCEDURE — 90677 PCV20 VACCINE IM: CPT | Performed by: INTERNAL MEDICINE

## 2025-08-07 PROCEDURE — 2500000004 HC RX 250 GENERAL PHARMACY W/ HCPCS (ALT 636 FOR OP/ED): Mod: JZ | Performed by: INTERNAL MEDICINE

## 2025-08-07 PROCEDURE — 96366 THER/PROPH/DIAG IV INF ADDON: CPT | Mod: INF

## 2025-08-07 PROCEDURE — 90739 HEPB VACC 2/4 DOSE ADULT IM: CPT | Performed by: INTERNAL MEDICINE

## 2025-08-07 PROCEDURE — 96365 THER/PROPH/DIAG IV INF INIT: CPT | Mod: INF

## 2025-08-07 PROCEDURE — 90471 IMMUNIZATION ADMIN: CPT | Performed by: INTERNAL MEDICINE

## 2025-08-07 PROCEDURE — 90472 IMMUNIZATION ADMIN EACH ADD: CPT | Performed by: INTERNAL MEDICINE

## 2025-08-07 PROCEDURE — 90696 DTAP-IPV VACCINE 4-6 YRS IM: CPT | Performed by: INTERNAL MEDICINE

## 2025-08-07 PROCEDURE — 99215 OFFICE O/P EST HI 40 MIN: CPT | Performed by: INTERNAL MEDICINE

## 2025-08-07 PROCEDURE — 96372 THER/PROPH/DIAG INJ SC/IM: CPT

## 2025-08-07 PROCEDURE — 2500000001 HC RX 250 WO HCPCS SELF ADMINISTERED DRUGS (ALT 637 FOR MEDICARE OP): Performed by: INTERNAL MEDICINE

## 2025-08-07 PROCEDURE — 2500000004 HC RX 250 GENERAL PHARMACY W/ HCPCS (ALT 636 FOR OP/ED): Performed by: INTERNAL MEDICINE

## 2025-08-07 PROCEDURE — 90648 HIB PRP-T VACCINE 4 DOSE IM: CPT | Performed by: INTERNAL MEDICINE

## 2025-08-07 PROCEDURE — 90750 HZV VACC RECOMBINANT IM: CPT | Performed by: INTERNAL MEDICINE

## 2025-08-07 RX ORDER — DIPHENHYDRAMINE HYDROCHLORIDE 50 MG/ML
50 INJECTION, SOLUTION INTRAMUSCULAR; INTRAVENOUS AS NEEDED
OUTPATIENT
Start: 2025-09-07

## 2025-08-07 RX ORDER — ALBUTEROL SULFATE 0.83 MG/ML
3 SOLUTION RESPIRATORY (INHALATION) AS NEEDED
Status: CANCELLED | OUTPATIENT
Start: 2025-09-28

## 2025-08-07 RX ORDER — EPINEPHRINE 0.3 MG/.3ML
0.3 INJECTION SUBCUTANEOUS EVERY 5 MIN PRN
OUTPATIENT
Start: 2025-10-02

## 2025-08-07 RX ORDER — FAMOTIDINE 10 MG/ML
20 INJECTION, SOLUTION INTRAVENOUS ONCE AS NEEDED
OUTPATIENT
Start: 2025-09-07

## 2025-08-07 RX ORDER — ALBUTEROL SULFATE 0.83 MG/ML
3 SOLUTION RESPIRATORY (INHALATION) AS NEEDED
Status: DISCONTINUED | OUTPATIENT
Start: 2025-08-07 | End: 2025-08-07 | Stop reason: HOSPADM

## 2025-08-07 RX ORDER — ALBUTEROL SULFATE 0.83 MG/ML
3 SOLUTION RESPIRATORY (INHALATION) AS NEEDED
OUTPATIENT
Start: 2025-10-02

## 2025-08-07 RX ORDER — DIPHENHYDRAMINE HYDROCHLORIDE 50 MG/ML
50 INJECTION, SOLUTION INTRAMUSCULAR; INTRAVENOUS AS NEEDED
OUTPATIENT
Start: 2025-10-02

## 2025-08-07 RX ORDER — DIPHENHYDRAMINE HYDROCHLORIDE 50 MG/ML
50 INJECTION, SOLUTION INTRAMUSCULAR; INTRAVENOUS AS NEEDED
Status: DISCONTINUED | OUTPATIENT
Start: 2025-08-07 | End: 2025-08-07 | Stop reason: HOSPADM

## 2025-08-07 RX ORDER — FAMOTIDINE 10 MG/ML
20 INJECTION, SOLUTION INTRAVENOUS ONCE AS NEEDED
Status: DISCONTINUED | OUTPATIENT
Start: 2025-08-07 | End: 2025-08-07 | Stop reason: HOSPADM

## 2025-08-07 RX ORDER — EPINEPHRINE 0.3 MG/.3ML
0.3 INJECTION SUBCUTANEOUS EVERY 5 MIN PRN
Status: CANCELLED | OUTPATIENT
Start: 2025-08-07

## 2025-08-07 RX ORDER — DIPHENHYDRAMINE HYDROCHLORIDE 50 MG/ML
50 INJECTION, SOLUTION INTRAMUSCULAR; INTRAVENOUS AS NEEDED
Status: CANCELLED | OUTPATIENT
Start: 2025-09-28

## 2025-08-07 RX ORDER — ALBUTEROL SULFATE 0.83 MG/ML
3 SOLUTION RESPIRATORY (INHALATION) AS NEEDED
Status: CANCELLED | OUTPATIENT
Start: 2025-08-07

## 2025-08-07 RX ORDER — ALBUTEROL SULFATE 0.83 MG/ML
3 SOLUTION RESPIRATORY (INHALATION) AS NEEDED
OUTPATIENT
Start: 2025-09-07

## 2025-08-07 RX ORDER — ACETAMINOPHEN 325 MG/1
650 TABLET ORAL ONCE
Status: COMPLETED | OUTPATIENT
Start: 2025-08-07 | End: 2025-08-07

## 2025-08-07 RX ORDER — EPINEPHRINE 0.3 MG/.3ML
0.3 INJECTION SUBCUTANEOUS EVERY 5 MIN PRN
Status: DISCONTINUED | OUTPATIENT
Start: 2025-08-07 | End: 2025-08-07 | Stop reason: HOSPADM

## 2025-08-07 RX ORDER — ACETAMINOPHEN 325 MG/1
650 TABLET ORAL ONCE
OUTPATIENT
Start: 2025-09-07

## 2025-08-07 RX ORDER — EPINEPHRINE 0.3 MG/.3ML
0.3 INJECTION SUBCUTANEOUS EVERY 5 MIN PRN
OUTPATIENT
Start: 2025-09-07

## 2025-08-07 RX ORDER — EPINEPHRINE 0.3 MG/.3ML
0.3 INJECTION SUBCUTANEOUS EVERY 5 MIN PRN
Status: CANCELLED | OUTPATIENT
Start: 2025-09-28

## 2025-08-07 RX ORDER — DIPHENHYDRAMINE HCL 25 MG
25 CAPSULE ORAL ONCE
OUTPATIENT
Start: 2025-09-07

## 2025-08-07 RX ORDER — FAMOTIDINE 10 MG/ML
20 INJECTION, SOLUTION INTRAVENOUS ONCE AS NEEDED
OUTPATIENT
Start: 2025-10-02

## 2025-08-07 RX ORDER — DIPHENHYDRAMINE HCL 25 MG
25 CAPSULE ORAL ONCE
Status: COMPLETED | OUTPATIENT
Start: 2025-08-07 | End: 2025-08-07

## 2025-08-07 RX ORDER — DIPHENHYDRAMINE HYDROCHLORIDE 50 MG/ML
50 INJECTION, SOLUTION INTRAMUSCULAR; INTRAVENOUS AS NEEDED
Status: CANCELLED | OUTPATIENT
Start: 2025-08-07

## 2025-08-07 RX ORDER — FAMOTIDINE 10 MG/ML
20 INJECTION, SOLUTION INTRAVENOUS ONCE AS NEEDED
Status: CANCELLED | OUTPATIENT
Start: 2025-08-07

## 2025-08-07 RX ORDER — FAMOTIDINE 10 MG/ML
20 INJECTION, SOLUTION INTRAVENOUS ONCE AS NEEDED
Status: CANCELLED | OUTPATIENT
Start: 2025-09-28

## 2025-08-07 RX ADMIN — PNEUMOCOCCAL 20-VALENT CONJUGATE VACCINE 0.5 ML
2.2; 2.2; 2.2; 2.2; 2.2; 2.2; 2.2; 2.2; 2.2; 2.2; 2.2; 2.2; 2.2; 2.2; 2.2; 2.2; 4.4; 2.2; 2.2; 2.2 INJECTION, SUSPENSION INTRAMUSCULAR at 15:12

## 2025-08-07 RX ADMIN — IMMUNE GLOBULIN INFUSION (HUMAN) 30 G: 100 INJECTION, SOLUTION INTRAVENOUS; SUBCUTANEOUS at 13:02

## 2025-08-07 RX ADMIN — DIPHTHERIA AND TETANUS TOXOIDS AND ACELLULAR PERTUSSIS ADSORBED AND INACTIVATED POLIOVIRUS VACCINE 0.5 ML: 25; 10; 25; 8; 25; 40; 8; 32 INJECTION, SUSPENSION INTRAMUSCULAR at 15:11

## 2025-08-07 RX ADMIN — HEPATITIS B VACCINE (RECOMBINANT) ADJUVANTED 20 MCG: 20 INJECTION, SOLUTION INTRAMUSCULAR at 15:13

## 2025-08-07 RX ADMIN — ACETAMINOPHEN 650 MG: 325 TABLET ORAL at 12:23

## 2025-08-07 RX ADMIN — DIPHENHYDRAMINE HYDROCHLORIDE 25 MG: 25 CAPSULE ORAL at 12:23

## 2025-08-07 RX ADMIN — Medication 0.5 ML: at 15:14

## 2025-08-07 RX ADMIN — HAEMOPHILUS B POLYSACCHARIDE CONJUGATE VACCINE FOR INJ 0.5 ML: RECON SOLN at 15:12

## 2025-08-07 ASSESSMENT — PAIN SCALES - GENERAL
PAINLEVEL_OUTOF10: 0-NO PAIN
PAINLEVEL_OUTOF10: 0-NO PAIN

## 2025-08-07 NOTE — PROGRESS NOTES
Patient seen in outpatient infusion for IVIG and vaccines and tolerated well. PIV well maintained with blood return noted and removed before discharge. Reviewed labs and schedule with patient. Patient left in stable condition with wife.

## 2025-08-07 NOTE — PROGRESS NOTES
Patient ID: Nancie Armenta is a 60 y.o. male.    Treatment:   Oncology History Overview Note           Patient Visit Information:   Visit Type: Follow Up Visit      Cancer History:   Treatment Synopsis:     1. Stage IV A mantle cell non-Hodgkin lymphoma involving the gastrointestinal tract diagnosed in October 2011, with diffuse gastrointestinal involvement. The patient was treated  with RCHOP alternating with Rituxan and high-dose Mamie-C and received total of 6 cycles of chemotherapy which he finished in February 2012.   Allergic to Augmentin, causes hives.     2. Patient underwent beam chemotherapy followed by autologous peripheral stem cell transplant in April 2012, by Dr. Yvonne Hobbs at [facility], was also involved in phase 3 clinical trial up killed shingles vaccine versus placebo ([employer]-1 Z10  study).     3.The patient developed shingles in January 2014, involving left flank.      4. 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      5.  Recurrent mantle cell lymphoma June 2019 assx presentation with leukocytosis. Peripheral blood flow which showed a CD5 positve clonal lymphocytosis. 6/2/19 CT of chest abdomen and pelvis revealed  splenomegaly of 15 cm compared to 12.8 cm last evaluation. Recurrence confirmed by FISH on peripheral blood earlier this month.  BM biopsy which showed 5% Mantle cell involvement although peripheral blood shows 30% involvement. CT imaging showed splenomegaly. PET scan declined by insurance twice.  Patient underwent colonoscopy  with Dr. Ac ( [place]) 8/8/19 which showed cobblestoning of colon and multiple biopsies including terminal ileum, appendix, cecum,  rectosigmoid positive for mantle cell lymphoma.     6. Initiation of acalabrutinib August 2019 with minimal response. Switched to ibrutinib and venetoclax 12/20/20 with clearing of t(11, 14) by by FISH ( 1/2020) and resolution of involvement of colon by endoscopy and pathology on exam 3/2/2020! BM biopsy  4/2020 HEATHER.     C-scope (3/11/22): at OSH: negative, no evidence of disease. PET scan ( 3/14/22): no abnormal uptake. Deauville 1. BMBx (3/17/22): negative, no evidence of MCL.   March 2022, ibrutinib and venetoclax discontinued.    July 2024 development of Tpenia, bone marrow biopsy 7/25/24 showed focal involvement with mantle cell lymphoma (NGS cyclin D1 positive and Sox 11 positive. BIRC+ and TP 53 positve with a VAF of 3%, BIRC3 positive). FISH negative for 17p deletion  CT imaging done on 8/7/24 shows thickening of sigmoid colon, development of mild to moderate retroperitoneal adenopathy, splenomegally and possible splenic infarct.    Pirotbrutib 200 mg daily initiated 8/12/24.  Due to inadequate response, he started the ventoclax (50 mg) on Adolfo 10/13, then increased to 100 mg , 200 mg and 400 mg      BMBx (10/23/24) with low level involvement by mantle cells about 3% Mantle cells by flow.      PET/CT (11/4/24) with FDG avid lymphadenopathy throughout the neck, chest, abdomen, and pelvis c/w lymphomatous involvement. Intense FDG avid splenomegaly. Peripheral regions of photopenia likely reflect areas of infarction.      Pirtobrutinib and Venetoclax stopped 11/14/24 in preparation for CART collection.      Admitted 11/29/24 for his CAR-T cell therapy, on CASE 2419 (T0=12/6/24), prepped with Fludarabine/Cyclophosphamide. Hospital course included grade 2 CRS (fever, tachycardia) on 12/7, received Toxi x1, infectious workup negative.  He also had symptomatic orthostatic hypotension on 12/8, resolved with fluids by 12/9.  He was discharged home on 12/18/24.     T+ 90 progressive tumorous involvement of bone  marrow, PET continues to show CR.     BM biopsy from 3/13/25 showing residual marrow disease of about 15% on his most recent bone marrow biopsy.  We have opted for rituxan, revlimid and venetoclax combination ( see below), first dose of venetoclax 50 mg and rituxan 4/3/25.     7. S/P COVID 19 infection 12/1/2020, no sequelae          Lymphoma   10/20/2011 Initial Diagnosis    Lymphoma (Multi)      Cellular Therapy    Mr. Nancie Armenta is a 59 y.o. male presenting with relapsed Mantle Cell Lymphoma. CAR-T cell therapy, on CASE 2419 (T0=12/6/24), prepped with Fludarabine/Cyclophosphamide.      Hosp Course:  - Grade 2 CRS (fever, tachycardia) on 12/7, gave Toxi x1, infectious workup negative. Resolved by 12/8.  - Symptomatic Orthostatic Hypotension on 12/8, resolved with fluids by 12/9.     Mantle cell lymphoma of spleen   10/13/2016 Initial Diagnosis    Mantle cell lymphoma of spleen (CMS/HCC)     Mantle cell lymphoma   11/18/2024 Initial Diagnosis    Mantle cell lymphoma     11/30/2024 - 12/20/2024 Bone Marrow Transplant        Cellular Therapy    Mr. Nancie Armenta is a 59 y.o. male presenting with relapsed Mantle Cell Lymphoma. CAR-T cell therapy, on CASE 2419 (T0=12/6/24), prepped with Fludarabine/Cyclophosphamide.      Hosp Course:  - Grade 2 CRS (fever, tachycardia) on 12/7, gave Toxi x1, infectious workup negative. Resolved by 12/8.  - Symptomatic Orthostatic Hypotension on 12/8, resolved with fluids by 12/9.     4/3/2025 - 5/8/2025 Chemotherapy    RiTUXimab (Weekly), 28 Day Cycle      7/10/2025 -  Chemotherapy    RiTUXimab, 8 Week Cycles - Maintenance       Response:     Past Medical History:  Renal insufficiency.   Past Medical History:   Diagnosis Date    Fourth (trochlear) nerve palsy, right eye 06/08/2017    Right-sided fourth cranial nerve palsy    Fourth (trochlear) nerve palsy, right eye 06/08/2017    Right-sided fourth cranial nerve palsy    Malignant neoplasm of connective and soft tissue,  unspecified 06/08/2017    Cancer of blood vessel     Surgical History:   Past Surgical History:   Procedure Laterality Date    IR CVC PICC  11/29/2024    IR CVC PICC 11/29/2024 [facility] ANGIO    SHOULDER SURGERY  07/13/2017    Shoulder Surgery      Family History:   Family History   Problem Relation Name Age of Onset    Cataracts Mother      Cataracts Father      Other (chronic lymphoid leukemia) Father       Social History:  .  Working full time for [employer].    Social History     Tobacco Use    Smoking status: Never    Smokeless tobacco: Never   Vaping Use    Vaping status: Never Used   Substance Use Topics    Alcohol use: Never    Drug use: Never   -------------------------------------------------------------------------------------------------------  Subjective       HPI    Nancie Armenta is a 60 year old man with PMH of hyperglycemia, renal insufficiency, and stage IV A mantle cell non hodgkin lymphoma involving the gastrointestinal tract, s/p autologous SCT (April 2012).      Early in July 2024 patient noted to have dropping platelet counts and bone marrow biopsy 7/25/24 showed focal involvement with mantle cell lymphoma (NGS cyclin D1 positive and Sox 11 positive. BIRC+ ad TP 53 positve with a VAF of 3%, BIRC3 positive). FISH negative for 17p deletion  CT imaging done on 8/7/24 shows thickening of sigmoid colon, development of mild to moderate retroperitoneal adenopathy, splenomegally and possible splenic infarct.  Started Pirtobrutinib salvage on 8/12/24.  Added Venetoclax starting 10/13/24 since spleen size was increasing on repeat CT imaging and on physical exam.  BMBx (10/23/24) with low level involvement by mantle cells about 3% Mantle cells by flow.      Pirtobrutinib and Venetoclax stopped 11/14/24 in preparation for CART collection.  Admitted 11/29/24 for his CAR-T cell therapy, on CASE 2419 (T0=12/6/24), prepped with     Fludarabine/Cyclophosphamide. Hospital course included grade 2 CRS  (fever, tachycardia) on 12/7, received Toxi x1, infectious workup negative.  He also had symptomatic orthostatic hypotension on 12/8, resolved with fluids by 12/9.  He was discharged home on 12/18/24.      Unfortunatley BM biopsy from 3/13/25 showing residual marrow disease of about 15% on his most recent bone marrow biopsy.  We have opted for rituxan, revlimid and venetoclax combination ( see below), first dose of venetoclax 50 mg and rituxan 4/3/25.  Venetoclax ramped up to 600 mg.     BM biopsy results from 7/3/25 which show no evidence of MCL by histology or flow!    Nancie presents to the clinic today (8/7/25) with his wife for followup evaluation after his CAR-T T+244 days ago with BM relapse now with clean BM as of 7/3/25 . He remains pancytopenic .  He continues on monthly IVIG . He is started revlimid 15 mg on days 1-21 out of 28 days on 4/4/25, he is on venetoclax 400 mg daily .  Received his forth weekly dose of  rituxan May 8th and started maintenance July 10th.  Revlimid and venetoclax recently held for 2 weeks due  to cytopenias and restarted at revlimid 15 mg 21/28 days and venetoclax 200 mg daily on 7/24/25. CT imaging 7/24/25 shows no evidence of recurrent lymphoma, spleen of normal size and likely splenic infarct and mild prostatomegaly PSA 1 year ago negative.   Had food poisoning after eating fast food likely from milk shake and lost a few pounds. Feels better today..     Review of Systems   Constitutional:  Negative for appetite change, chills, diaphoresis, fatigue, fever and unexpected weight change.   Respiratory:  Negative for cough and shortness of breath.    Cardiovascular: Negative.    Gastrointestinal:  Negative for abdominal pain, blood in stool, constipation, diarrhea, nausea and vomiting.   Genitourinary: Negative.     Musculoskeletal: Negative.  Negative for gait problem.   Skin: Negative.    Neurological:  Negative for dizziness, gait problem, light-headedness and numbness.    Hematological: Negative.    All other systems reviewed and are negative.  -------------------------------------------------------------------------------------------------------  Objective   BSA: 2.24 meters squared  BP (!) 132/92 (BP Location: Right arm, Patient Position: Sitting, BP Cuff Size: Adult)   Pulse 79   Temp 35.9 °C (96.6 °F) (Temporal)   Resp 18   Wt 98.1 kg (216 lb 4.3 oz)   SpO2 100%   BMI 28.91 kg/m²     Physical Exam  Vitals reviewed.   Constitutional:       Appearance: Normal appearance.      Comments: Very tanned   HENT:      Head: Normocephalic and atraumatic.      Mouth/Throat:      Mouth: Mucous membranes are moist.     Eyes:      Extraocular Movements: Extraocular movements intact.      Conjunctiva/sclera: Conjunctivae normal.      Pupils: Pupils are equal, round, and reactive to light.       Cardiovascular:      Rate and Rhythm: Normal rate and regular rhythm.      Pulses: Normal pulses.      Heart sounds: Normal heart sounds.   Pulmonary:      Effort: Pulmonary effort is normal. No respiratory distress.      Breath sounds: Normal breath sounds. No wheezing.   Abdominal:      General: Abdomen is flat. Bowel sounds are normal. There is no distension.      Palpations: Abdomen is soft. There is no mass.      Tenderness: There is no abdominal tenderness.      Comments: Spleen not palpable     Musculoskeletal:         General: No swelling. Normal range of motion.   Lymphadenopathy:      Comments: No lymphadenopathy     Skin:     General: Skin is warm and dry.     Neurological:      General: No focal deficit present.      Mental Status: He is alert and oriented to person, place, and time.     Psychiatric:         Mood and Affect: Mood normal.         Behavior: Behavior normal.         Thought Content: Thought content normal.         Judgment: Judgment normal.     Performance Status:  ECOG Score: 0- Fully active, able to carry on all pre-disease performance w/o restriction.     CAR-T  product:  CAR-T [id number]  Cell infusion date: 12/6/2024  -------------------------------------------------------------------------------------------------------  Assessment/Plan      1. Stage IV mantle cell lymphoma itreated with Nordic regimen and consolidation autograft with BEAM preparative regimen April 2012.  Relapse 2019 treated with ibrutinib and venetoclax with complete pathology response until 3/ 2022. See above.     Recurrence July 2024 presenting with dropping platelet counts and bone marrow biopsy 7/25/24 showed focal involvement with mantle cell lymphoma (NGS cyclin D1 positive and Sox 11 positive. BIRC+ ad TP 53 positve with a VAF of 3%, BIRC3 positive). FISH negative for 17p deletion  CT imaging done on 8/7/24 shows thickening of sigmoid colon, development of mild to moderate retroperitoneal adenopathy, splenomegally and possible splenic infarct.      Patient started pirtobrutinib salvage August 12,2024 and feels better. However spleen size was increasing per CT imaging (8/6/24)  and physical exam so Dr. Hobbs planned to add venetoclax as follows, starting 10/13/24:  - 50 mg for first week, then 100 (10/20), and 200 mg (10/27) for 7 days each, then 400 mg (11/3/24- )    Pre CART staging:    BM biopsy low level involvment by mantle cells about 3% mantle cells by flow. Absence of CD19 on this  flow sample likely reflects insufficient sampling and positive for CD 19 on prior bone marrow sample of 7/24/24.    PET imaging 11/4/24   1. FDG avid lymphadenopathy throughout the neck, chest, abdomen and  pelvis as detailed above, consistent with lymphomatous involvement.  2. Intense FDG avid splenomegaly, consistent with lymphomatous  involvement. Peripheral regions of photopenia likely reflect areas of  infarction.  3. No PET evidence of FDG avid extranodal disease.on     ECHO 10/23/24 low normal LVEF 53%, cleared by Dr. Izaguirre.     Admitted 11/29/24 for his CAR-T cell therapy, on CASE 2419 (T0=12/6/24),  prepped with Fludarabine/Cyclophosphamide.     3/13/25 Day 90  BM shows about 15% lymphoma cells which are cyclin D1 positive, PET scan shows no lymphomatous involvement and continued decrease in spleen size.   Patients case reviewed at interdisciplinary tumor board with recommendation options as follows:    Patient and team have opted to initiate salvage treatment with venetoclax R2. ( Cipriano et al. Blood Advances 8:(2) , p 407, 2024)  Phase 2 dose revlimid 15 mg 21/28 days ( note dose change), venetoclax 600 mg daily after ramp up and rituxan 275 mg/m2 weekly for 4 weeks, then q 8 weeks. Regimen associated with 60% RRat 6 months and median PFS of 21 months with overall survival of 31 months. Plan BM bx at day 90 after starting.      Peripheral blood flow, negative for clonal b cell. Rituxan started on 4/3/25. Received dose 1 of 4 rituximab 4/3/25.  Started venetoclax ramp up on 4/3/25.  Started revlimid 15 mg daily on days 1-21 out of 28 days on 4/4/25. Pancytopenic at initiation of treatment    6/5/25:  ANC remains low with improved hgb and platelets.  Will decrease venetoclax to 400 mg daily, continues revlimid 15 mg on days 1-21 out of 28 days, today is day 6 of cycle.  4th weekly dose of rituxan given 5/8/25  as was postponed by 2 weeks due to upper respiratory infection.      BM biopsy from 7/3/25 shows no evidence of lymphoma by histology or flow.    CT imaging of CAP 7/24/25 normal sized spleen with probably infarct, no lymphadenopaty    7/10/25:   Given worsening cytopenia will hold venetoclax and revlimid and obtain weekly blood counts, resume 7/24/25 when platelets >100k with same dose revlimid and decrease of venetoclax to 200 mg.        8/7/25 Feels great , cytopenic again, will decrease revlimid to 15 mg every other day and then drop dose to 5 mg daily with next fill.  Plan BM biopsy early October to check histology and MRD. If MRD negative by clonoseq, protocol is to complete 3 more months of  regimen and then place patient on observation,.Will also order colonoscopy after BM resulted, likely will need to support with neupogen prior.    Hemoglobin   Date Value Ref Range Status   08/07/2025 13.9 13.5 - 17.5 g/dL Final   07/24/2025 12.5 (L) 13.5 - 17.5 g/dL Final   07/17/2025 11.8 (L) 13.5 - 17.5 g/dL Final     Platelets   Date Value Ref Range Status   08/07/2025 106 (L) 150 - 450 x10*3/uL Final   07/24/2025 120 (L) 150 - 450 x10*3/uL Final   07/17/2025 115 (L) 150 - 450 x10*3/uL Final     WBC   Date Value Ref Range Status   08/07/2025 1.1 (L) 4.4 - 11.3 x10*3/uL Final   07/24/2025 1.5 (L) 4.4 - 11.3 x10*3/uL Final   07/17/2025 1.2 (L) 4.4 - 11.3 x10*3/uL Final     Ferritin   Date Value Ref Range Status   08/07/2025 1,307 (H) 20 - 300 ng/mL Final   07/03/2025 947 (H) 20 - 300 ng/mL Final   06/05/2025 1,101 (H) 20 - 300 ng/mL Final     Fibrinogen   Date Value Ref Range Status   08/07/2025 384 200 - 400 mg/dL Final   07/03/2025 310 200 - 400 mg/dL Final   06/05/2025 418 (H) 200 - 400 mg/dL Final     C-Reactive Protein   Date Value Ref Range Status   08/07/2025 1.01 (H) <1.00 mg/dL Final   07/03/2025 0.22 <1.00 mg/dL Final   06/05/2025 0.80 <1.00 mg/dL Final     IgG   Date Value Ref Range Status   08/07/2025 597 (L) 700 - 1,600 mg/dL Final     Comment:     MONOCLONAL PROTEINS MAY CAUSE FALSELY LOW  RESULTS IN THIS ASSAY. SERUM PROTEIN  ELECTROPHORESIS SHOULD BE DONE AS THE  FIRST TEST TO EVALUATE MONOCLONAL GAMMOPATHY.     07/03/2025 552 (L) 700 - 1,600 mg/dL Final     Comment:     MONOCLONAL PROTEINS MAY CAUSE FALSELY LOW  RESULTS IN THIS ASSAY. SERUM PROTEIN  ELECTROPHORESIS SHOULD BE DONE AS THE  FIRST TEST TO EVALUATE MONOCLONAL GAMMOPATHY.     06/05/2025 507 (L) 700 - 1,600 mg/dL Final     Comment:     MONOCLONAL PROTEINS MAY CAUSE FALSELY LOW  RESULTS IN THIS ASSAY. SERUM PROTEIN  ELECTROPHORESIS SHOULD BE DONE AS THE  FIRST TEST TO EVALUATE MONOCLONAL GAMMOPATHY.       Infectious prophylaxis:    Antiviral prophylaxis Acyclovir. Continue for at least 6 months.   Antifungal prophylaxis Fluconazole discontinued.    Start levofloxacin 500 mg daily on 4/3/25 due to neutropenia.   Bactrim stopped on 4/3 due to neutropenia. 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  Currently has upper respiratory infection.  Plan to start IVIG infusion, can try for 5/8/25 if approved by insurance.  Educated Serafino and wife on rationale for IVIG infusions and possible side effects.  Understanding verbalized.    5/8/25:  IgG level 366 today.   Scheduled to receive his first IVIG infusion today.    Continues.     Post CAR-T Vaccines:  Will plan to start vaccines with influenza and covid vaccine about 3-4 months after CAR-T and will start post transplant vaccines around 6 months.   4/24/25:  Advised to obtain influenza (if able to get as flu season is over), and covid series from local pharmacy.  Provided printed paper with information regarding covid vaccination following CAR-T to give to pharmacy.  Advised to wait a few weeks until upper respiratory infection has cleared.    Started 6 month vaccines on 6/5/25, 8 month  vaccines given 8/7/25. 10 month vaccines early October 2025     Health maintenance.   Colonoscopy completed 7/14/23: normal except internal hemorrhoids.  Recommendations to repeat in 2 years.    - Some aortic calcifications on imaging, started on prevastatin-managed by his PCP.  PCP increased pravastatin dose to 40 mg daily.  PCP switched to atorvastatin 40 mg daily.    RTC: Provider visit , IVIG and rituxan 9/4/25 . Continue venetoclax 200 mg daily and change revlmid to  15 mg every other day, then decrease to 5 mg 21/28 days with next fill,.  BM biopsy with clonoseq to be repeated early October . If negative will plan colonoscopy with neupogen support.   8 month vaccines given  today. Flu vaccine when available.      ----------------------------------------------------------------------------------------------------------------  Yvonne Hobbs MD

## 2025-08-08 ENCOUNTER — OFFICE (OUTPATIENT)
Dept: URBAN - METROPOLITAN AREA CLINIC 27 | Facility: CLINIC | Age: 60
End: 2025-08-08
Payer: COMMERCIAL

## 2025-08-08 VITALS
TEMPERATURE: 98.3 F | DIASTOLIC BLOOD PRESSURE: 75 MMHG | HEIGHT: 73 IN | HEART RATE: 87 BPM | SYSTOLIC BLOOD PRESSURE: 120 MMHG | WEIGHT: 220 LBS

## 2025-08-08 DIAGNOSIS — Z86.0101 PERSONAL HISTORY OF ADENOMATOUS AND SERRATED COLON POLYPS: ICD-10-CM

## 2025-08-08 DIAGNOSIS — C83.10 MANTLE CELL LYMPHOMA, UNSPECIFIED SITE: ICD-10-CM

## 2025-08-08 PROCEDURE — 99204 OFFICE O/P NEW MOD 45 MIN: CPT | Performed by: INTERNAL MEDICINE

## 2025-08-21 DIAGNOSIS — C83.10 MANTLE CELL LYMPHOMA, UNSPECIFIED BODY REGION (MULTI): ICD-10-CM

## 2025-08-21 RX ORDER — LENALIDOMIDE 5 MG/1
5 CAPSULE ORAL DAILY
Qty: 21 CAPSULE | Refills: 0 | Status: SHIPPED | OUTPATIENT
Start: 2025-08-21 | End: 2025-09-11

## 2025-08-25 ENCOUNTER — SPECIALTY PHARMACY (OUTPATIENT)
Dept: PHARMACY | Facility: CLINIC | Age: 60
End: 2025-08-25

## 2025-08-26 DIAGNOSIS — C83.10 MANTLE CELL LYMPHOMA, UNSPECIFIED BODY REGION (MULTI): ICD-10-CM

## 2025-09-03 ASSESSMENT — ENCOUNTER SYMPTOMS
DIZZINESS: 0
CHILLS: 0
COUGH: 0
NUMBNESS: 0
DIARRHEA: 0
FEVER: 0
CONSTIPATION: 0
HEMATOLOGIC/LYMPHATIC NEGATIVE: 1
DIAPHORESIS: 0
FATIGUE: 0
NAUSEA: 0
UNEXPECTED WEIGHT CHANGE: 0
LIGHT-HEADEDNESS: 0
VOMITING: 0
ABDOMINAL PAIN: 0
MUSCULOSKELETAL NEGATIVE: 1
CARDIOVASCULAR NEGATIVE: 1
SHORTNESS OF BREATH: 0
APPETITE CHANGE: 0
BLOOD IN STOOL: 0

## 2025-09-04 ENCOUNTER — LAB (OUTPATIENT)
Dept: LAB | Facility: HOSPITAL | Age: 60
End: 2025-09-04
Payer: COMMERCIAL

## 2025-09-04 ENCOUNTER — INFUSION (OUTPATIENT)
Dept: HEMATOLOGY/ONCOLOGY | Facility: HOSPITAL | Age: 60
End: 2025-09-04
Payer: COMMERCIAL

## 2025-09-04 ENCOUNTER — OFFICE VISIT (OUTPATIENT)
Dept: HEMATOLOGY/ONCOLOGY | Facility: HOSPITAL | Age: 60
End: 2025-09-04
Payer: COMMERCIAL

## 2025-09-04 VITALS
BODY MASS INDEX: 29.21 KG/M2 | DIASTOLIC BLOOD PRESSURE: 54 MMHG | WEIGHT: 218.48 LBS | TEMPERATURE: 97 F | SYSTOLIC BLOOD PRESSURE: 131 MMHG | OXYGEN SATURATION: 100 % | HEART RATE: 64 BPM | RESPIRATION RATE: 17 BRPM

## 2025-09-04 VITALS
SYSTOLIC BLOOD PRESSURE: 106 MMHG | DIASTOLIC BLOOD PRESSURE: 62 MMHG | RESPIRATION RATE: 18 BRPM | TEMPERATURE: 97.7 F | HEART RATE: 68 BPM

## 2025-09-04 DIAGNOSIS — D84.9 IMMUNOCOMPROMISED: ICD-10-CM

## 2025-09-04 DIAGNOSIS — C83.17 MANTLE CELL LYMPHOMA OF SPLEEN: ICD-10-CM

## 2025-09-04 DIAGNOSIS — Z92.850 HISTORY OF CHIMERIC ANTIGEN RECEPTOR T-CELL THERAPY: ICD-10-CM

## 2025-09-04 DIAGNOSIS — D80.1 HYPOGAMMAGLOBULINEMIA (MULTI): ICD-10-CM

## 2025-09-04 DIAGNOSIS — D80.1 HYPOGAMMAGLOBULINEMIA (MULTI): Primary | ICD-10-CM

## 2025-09-04 DIAGNOSIS — C83.10 MANTLE CELL LYMPHOMA, UNSPECIFIED BODY REGION (MULTI): ICD-10-CM

## 2025-09-04 LAB
ACANTHOCYTES BLD QL SMEAR: NORMAL
ALBUMIN SERPL BCP-MCNC: 4.4 G/DL (ref 3.4–5)
ALP SERPL-CCNC: 69 U/L (ref 33–136)
ALT SERPL W P-5'-P-CCNC: 28 U/L (ref 10–52)
ANION GAP SERPL CALC-SCNC: 9 MMOL/L (ref 10–20)
APTT PPP: 27 SECONDS (ref 26–36)
AST SERPL W P-5'-P-CCNC: 20 U/L (ref 9–39)
BASOPHILS # BLD AUTO: 0.01 X10*3/UL (ref 0–0.1)
BASOPHILS NFR BLD AUTO: 0.8 %
BILIRUB SERPL-MCNC: 0.9 MG/DL (ref 0–1.2)
BUN SERPL-MCNC: 16 MG/DL (ref 6–23)
BURR CELLS BLD QL SMEAR: NORMAL
CALCIUM SERPL-MCNC: 9 MG/DL (ref 8.6–10.3)
CHLORIDE SERPL-SCNC: 106 MMOL/L (ref 98–107)
CO2 SERPL-SCNC: 29 MMOL/L (ref 21–32)
CREAT SERPL-MCNC: 1.14 MG/DL (ref 0.5–1.3)
CRP SERPL-MCNC: <0.1 MG/DL
EGFRCR SERPLBLD CKD-EPI 2021: 73 ML/MIN/1.73M*2
EOSINOPHIL # BLD AUTO: 0.03 X10*3/UL (ref 0–0.7)
EOSINOPHIL NFR BLD AUTO: 2.3 %
ERYTHROCYTE [DISTWIDTH] IN BLOOD BY AUTOMATED COUNT: 16.8 % (ref 11.5–14.5)
FERRITIN SERPL-MCNC: 898 NG/ML (ref 20–300)
FIBRINOGEN PPP-MCNC: 291 MG/DL (ref 200–400)
GLUCOSE SERPL-MCNC: 101 MG/DL (ref 74–99)
HCT VFR BLD AUTO: 36 % (ref 41–52)
HGB BLD-MCNC: 12.4 G/DL (ref 13.5–17.5)
IGG SERPL-MCNC: 534 MG/DL (ref 700–1600)
IMM GRANULOCYTES # BLD AUTO: 0.05 X10*3/UL (ref 0–0.7)
IMM GRANULOCYTES NFR BLD AUTO: 3.8 % (ref 0–0.9)
INR PPP: 1 (ref 0.9–1.1)
LDH SERPL L TO P-CCNC: 147 U/L (ref 84–246)
LYMPHOCYTES # BLD AUTO: 0.57 X10*3/UL (ref 1.2–4.8)
LYMPHOCYTES NFR BLD AUTO: 43.2 %
MAGNESIUM SERPL-MCNC: 2.1 MG/DL (ref 1.6–2.4)
MCH RBC QN AUTO: 33.6 PG (ref 26–34)
MCHC RBC AUTO-ENTMCNC: 34.4 G/DL (ref 32–36)
MCV RBC AUTO: 98 FL (ref 80–100)
MONOCYTES # BLD AUTO: 0.42 X10*3/UL (ref 0.1–1)
MONOCYTES NFR BLD AUTO: 31.8 %
NEUTROPHILS # BLD AUTO: 0.24 X10*3/UL (ref 1.2–7.7)
NEUTROPHILS NFR BLD AUTO: 18.1 %
NRBC BLD-RTO: 0 /100 WBCS (ref 0–0)
OVALOCYTES BLD QL SMEAR: NORMAL
PLATELET # BLD AUTO: 143 X10*3/UL (ref 150–450)
POTASSIUM SERPL-SCNC: 3.8 MMOL/L (ref 3.5–5.3)
PROT SERPL-MCNC: 6.1 G/DL (ref 6.4–8.2)
PROTHROMBIN TIME: 11.3 SECONDS (ref 9.8–12.4)
RBC # BLD AUTO: 3.69 X10*6/UL (ref 4.5–5.9)
RBC MORPH BLD: NORMAL
SODIUM SERPL-SCNC: 140 MMOL/L (ref 136–145)
URATE SERPL-MCNC: 6 MG/DL (ref 4–7.5)
WBC # BLD AUTO: 1.3 X10*3/UL (ref 4.4–11.3)

## 2025-09-04 PROCEDURE — 96366 THER/PROPH/DIAG IV INF ADDON: CPT | Mod: INF

## 2025-09-04 PROCEDURE — 85730 THROMBOPLASTIN TIME PARTIAL: CPT

## 2025-09-04 PROCEDURE — 83735 ASSAY OF MAGNESIUM: CPT

## 2025-09-04 PROCEDURE — 82784 ASSAY IGA/IGD/IGG/IGM EACH: CPT

## 2025-09-04 PROCEDURE — 2500000001 HC RX 250 WO HCPCS SELF ADMINISTERED DRUGS (ALT 637 FOR MEDICARE OP): Performed by: INTERNAL MEDICINE

## 2025-09-04 PROCEDURE — 83615 LACTATE (LD) (LDH) ENZYME: CPT

## 2025-09-04 PROCEDURE — 86140 C-REACTIVE PROTEIN: CPT

## 2025-09-04 PROCEDURE — 85384 FIBRINOGEN ACTIVITY: CPT

## 2025-09-04 PROCEDURE — 99215 OFFICE O/P EST HI 40 MIN: CPT | Performed by: INTERNAL MEDICINE

## 2025-09-04 PROCEDURE — 85025 COMPLETE CBC W/AUTO DIFF WBC: CPT

## 2025-09-04 PROCEDURE — 96367 TX/PROPH/DG ADDL SEQ IV INF: CPT

## 2025-09-04 PROCEDURE — 84550 ASSAY OF BLOOD/URIC ACID: CPT

## 2025-09-04 PROCEDURE — 96413 CHEMO IV INFUSION 1 HR: CPT

## 2025-09-04 PROCEDURE — 96365 THER/PROPH/DIAG IV INF INIT: CPT | Mod: INF

## 2025-09-04 PROCEDURE — 2500000004 HC RX 250 GENERAL PHARMACY W/ HCPCS (ALT 636 FOR OP/ED): Performed by: INTERNAL MEDICINE

## 2025-09-04 PROCEDURE — 82728 ASSAY OF FERRITIN: CPT

## 2025-09-04 PROCEDURE — 36415 COLL VENOUS BLD VENIPUNCTURE: CPT

## 2025-09-04 PROCEDURE — 80053 COMPREHEN METABOLIC PANEL: CPT

## 2025-09-04 RX ORDER — EPINEPHRINE 0.3 MG/.3ML
0.3 INJECTION SUBCUTANEOUS EVERY 5 MIN PRN
OUTPATIENT
Start: 2025-10-30

## 2025-09-04 RX ORDER — ALBUTEROL SULFATE 0.83 MG/ML
3 SOLUTION RESPIRATORY (INHALATION) AS NEEDED
OUTPATIENT
Start: 2025-10-02

## 2025-09-04 RX ORDER — EPINEPHRINE 0.3 MG/.3ML
0.3 INJECTION SUBCUTANEOUS EVERY 5 MIN PRN
OUTPATIENT
Start: 2025-10-02

## 2025-09-04 RX ORDER — HEPARIN 100 UNIT/ML
500 SYRINGE INTRAVENOUS AS NEEDED
OUTPATIENT
Start: 2025-09-04

## 2025-09-04 RX ORDER — ACETAMINOPHEN 325 MG/1
650 TABLET ORAL ONCE
Status: COMPLETED | OUTPATIENT
Start: 2025-09-04 | End: 2025-09-04

## 2025-09-04 RX ORDER — DIPHENHYDRAMINE HYDROCHLORIDE 50 MG/ML
50 INJECTION, SOLUTION INTRAMUSCULAR; INTRAVENOUS AS NEEDED
OUTPATIENT
Start: 2025-10-30

## 2025-09-04 RX ORDER — EPINEPHRINE 0.3 MG/.3ML
0.3 INJECTION SUBCUTANEOUS EVERY 5 MIN PRN
Status: CANCELLED | OUTPATIENT
Start: 2025-09-04

## 2025-09-04 RX ORDER — FAMOTIDINE 10 MG/ML
20 INJECTION, SOLUTION INTRAVENOUS ONCE AS NEEDED
OUTPATIENT
Start: 2025-10-02

## 2025-09-04 RX ORDER — HEPARIN SODIUM,PORCINE/PF 10 UNIT/ML
50 SYRINGE (ML) INTRAVENOUS AS NEEDED
OUTPATIENT
Start: 2025-09-04

## 2025-09-04 RX ORDER — ALBUTEROL SULFATE 0.83 MG/ML
3 SOLUTION RESPIRATORY (INHALATION) AS NEEDED
Status: CANCELLED | OUTPATIENT
Start: 2025-09-04

## 2025-09-04 RX ORDER — ACETAMINOPHEN 325 MG/1
650 TABLET ORAL ONCE
Status: CANCELLED | OUTPATIENT
Start: 2025-09-04

## 2025-09-04 RX ORDER — DIPHENHYDRAMINE HCL 25 MG
25 CAPSULE ORAL ONCE
Status: CANCELLED | OUTPATIENT
Start: 2025-09-04

## 2025-09-04 RX ORDER — FAMOTIDINE 10 MG/ML
20 INJECTION, SOLUTION INTRAVENOUS ONCE AS NEEDED
Status: CANCELLED | OUTPATIENT
Start: 2025-09-04

## 2025-09-04 RX ORDER — DIPHENHYDRAMINE HYDROCHLORIDE 50 MG/ML
50 INJECTION, SOLUTION INTRAMUSCULAR; INTRAVENOUS AS NEEDED
OUTPATIENT
Start: 2025-10-02

## 2025-09-04 RX ORDER — PROCHLORPERAZINE MALEATE 10 MG
10 TABLET ORAL EVERY 6 HOURS PRN
OUTPATIENT
Start: 2025-10-30

## 2025-09-04 RX ORDER — FAMOTIDINE 10 MG/ML
20 INJECTION, SOLUTION INTRAVENOUS ONCE AS NEEDED
OUTPATIENT
Start: 2025-10-30

## 2025-09-04 RX ORDER — DIPHENHYDRAMINE HCL 25 MG
25 CAPSULE ORAL ONCE
OUTPATIENT
Start: 2025-10-02

## 2025-09-04 RX ORDER — ACETAMINOPHEN 325 MG/1
650 TABLET ORAL ONCE
OUTPATIENT
Start: 2025-10-30

## 2025-09-04 RX ORDER — LEVOFLOXACIN 500 MG/1
500 TABLET, FILM COATED ORAL DAILY
Qty: 30 TABLET | Refills: 2 | Status: SHIPPED | OUTPATIENT
Start: 2025-09-04

## 2025-09-04 RX ORDER — DIPHENHYDRAMINE HYDROCHLORIDE 50 MG/ML
50 INJECTION, SOLUTION INTRAMUSCULAR; INTRAVENOUS AS NEEDED
Status: CANCELLED | OUTPATIENT
Start: 2025-09-04

## 2025-09-04 RX ORDER — ASPIRIN 81 MG/1
81 TABLET ORAL DAILY
Qty: 30 TABLET | Refills: 11 | Status: SHIPPED | OUTPATIENT
Start: 2025-09-04 | End: 2026-09-04

## 2025-09-04 RX ORDER — PROCHLORPERAZINE EDISYLATE 5 MG/ML
10 INJECTION INTRAMUSCULAR; INTRAVENOUS EVERY 6 HOURS PRN
OUTPATIENT
Start: 2025-10-30

## 2025-09-04 RX ORDER — ALBUTEROL SULFATE 0.83 MG/ML
3 SOLUTION RESPIRATORY (INHALATION) AS NEEDED
OUTPATIENT
Start: 2025-10-30

## 2025-09-04 RX ORDER — DIPHENHYDRAMINE HCL 50 MG
50 CAPSULE ORAL ONCE
OUTPATIENT
Start: 2025-10-30

## 2025-09-04 RX ORDER — DIPHENHYDRAMINE HCL 50 MG
50 CAPSULE ORAL ONCE
Status: COMPLETED | OUTPATIENT
Start: 2025-09-04 | End: 2025-09-04

## 2025-09-04 RX ORDER — ACETAMINOPHEN 325 MG/1
650 TABLET ORAL ONCE
OUTPATIENT
Start: 2025-10-02

## 2025-09-04 RX ADMIN — SODIUM CHLORIDE 900 MG: 0.9 INJECTION, SOLUTION INTRAVENOUS at 12:25

## 2025-09-04 RX ADMIN — IMMUNE GLOBULIN INFUSION (HUMAN) 30 G: 100 INJECTION, SOLUTION INTRAVENOUS; SUBCUTANEOUS at 14:00

## 2025-09-04 RX ADMIN — DIPHENHYDRAMINE HYDROCHLORIDE 50 MG: 50 CAPSULE ORAL at 11:45

## 2025-09-04 RX ADMIN — ACETAMINOPHEN 650 MG: 325 TABLET ORAL at 11:45

## 2025-09-04 ASSESSMENT — PAIN SCALES - GENERAL: PAINLEVEL_OUTOF10: 0-NO PAIN

## 2025-09-05 LAB
CMV DNA SERPL NAA+PROBE-LOG IU: NORMAL {LOG_IU}/ML
LABORATORY COMMENT REPORT: NOT DETECTED

## 2025-09-22 ENCOUNTER — APPOINTMENT (OUTPATIENT)
Dept: HEMATOLOGY/ONCOLOGY | Facility: HOSPITAL | Age: 60
End: 2025-09-22
Payer: COMMERCIAL

## 2025-10-02 ENCOUNTER — APPOINTMENT (OUTPATIENT)
Dept: HEMATOLOGY/ONCOLOGY | Facility: HOSPITAL | Age: 60
End: 2025-10-02
Payer: COMMERCIAL